# Patient Record
Sex: FEMALE | Race: WHITE | NOT HISPANIC OR LATINO | Employment: OTHER | URBAN - METROPOLITAN AREA
[De-identification: names, ages, dates, MRNs, and addresses within clinical notes are randomized per-mention and may not be internally consistent; named-entity substitution may affect disease eponyms.]

---

## 2017-02-27 ENCOUNTER — HOSPITAL ENCOUNTER (EMERGENCY)
Facility: OTHER | Age: 72
Discharge: HOME | End: 2017-02-27
Attending: EMERGENCY MEDICINE
Payer: MEDICARE

## 2017-02-27 VITALS
RESPIRATION RATE: 18 BRPM | OXYGEN SATURATION: 100 % | SYSTOLIC BLOOD PRESSURE: 115 MMHG | DIASTOLIC BLOOD PRESSURE: 72 MMHG | WEIGHT: 160 LBS | TEMPERATURE: 97 F | BODY MASS INDEX: 28.35 KG/M2 | HEART RATE: 66 BPM | HEIGHT: 63 IN

## 2017-02-27 DIAGNOSIS — R14.2 BELCHING: ICD-10-CM

## 2017-02-27 DIAGNOSIS — R42 VERTIGO: Primary | ICD-10-CM

## 2017-02-27 LAB
ALBUMIN SERPL-MCNC: 3.6 G/DL (ref 3.3–5.5)
ALP SERPL-CCNC: 60 U/L (ref 42–141)
BILIRUB SERPL-MCNC: 0.7 MG/DL (ref 0.2–1.6)
BUN SERPL-MCNC: 15 MG/DL (ref 7–22)
CALCIUM SERPL-MCNC: 9.1 MG/DL (ref 8–10.3)
CHLORIDE SERPL-SCNC: 103 MMOL/L (ref 98–108)
CREAT SERPL-MCNC: 0.7 MG/DL (ref 0.6–1.2)
GLUCOSE SERPL-MCNC: 146 MG/DL (ref 73–118)
POC ALT (SGPT): 20 (ref 10–47)
POC AST (SGOT): 23 (ref 11–38)
POC CARDIAC TROPONIN I: 0 NG/ML
POC TCO2: 25 (ref 18–33)
POTASSIUM BLD-SCNC: 4.1 MMOL/L (ref 3.6–5.1)
PROTEIN, POC: 7 (ref 6.4–8.1)
SAMPLE: NORMAL
SODIUM BLD-SCNC: 146 MMOL/L (ref 128–145)

## 2017-02-27 PROCEDURE — 25000003 PHARM REV CODE 250: Performed by: EMERGENCY MEDICINE

## 2017-02-27 PROCEDURE — 84484 ASSAY OF TROPONIN QUANT: CPT

## 2017-02-27 PROCEDURE — 96374 THER/PROPH/DIAG INJ IV PUSH: CPT

## 2017-02-27 PROCEDURE — 63600175 PHARM REV CODE 636 W HCPCS: Performed by: EMERGENCY MEDICINE

## 2017-02-27 PROCEDURE — 80053 COMPREHEN METABOLIC PANEL: CPT

## 2017-02-27 PROCEDURE — 99283 EMERGENCY DEPT VISIT LOW MDM: CPT

## 2017-02-27 PROCEDURE — 99284 EMERGENCY DEPT VISIT MOD MDM: CPT | Mod: 25

## 2017-02-27 PROCEDURE — 85025 COMPLETE CBC W/AUTO DIFF WBC: CPT

## 2017-02-27 RX ORDER — MECLIZINE HYDROCHLORIDE 25 MG/1
25 TABLET ORAL 3 TIMES DAILY PRN
Qty: 20 TABLET | Refills: 0 | Status: SHIPPED | OUTPATIENT
Start: 2017-02-27 | End: 2017-08-16

## 2017-02-27 RX ORDER — LIDOCAINE HYDROCHLORIDE 20 MG/ML
SOLUTION OROPHARYNGEAL
Status: DISCONTINUED
Start: 2017-02-27 | End: 2017-02-27 | Stop reason: WASHOUT

## 2017-02-27 RX ORDER — MECLIZINE HYDROCHLORIDE 25 MG/1
25 TABLET ORAL
Status: COMPLETED | OUTPATIENT
Start: 2017-02-27 | End: 2017-02-27

## 2017-02-27 RX ORDER — DICYCLOMINE HYDROCHLORIDE 10 MG/5ML
SOLUTION ORAL
Status: DISCONTINUED
Start: 2017-02-27 | End: 2017-02-27 | Stop reason: HOSPADM

## 2017-02-27 RX ORDER — ONDANSETRON 4 MG/1
4 TABLET, ORALLY DISINTEGRATING ORAL EVERY 8 HOURS PRN
Qty: 14 TABLET | Refills: 1 | Status: SHIPPED | OUTPATIENT
Start: 2017-02-27 | End: 2017-08-16

## 2017-02-27 RX ORDER — MAG HYDROX/ALUMINUM HYD/SIMETH 200-200-20
SUSPENSION, ORAL (FINAL DOSE FORM) ORAL
Status: DISCONTINUED
Start: 2017-02-27 | End: 2017-02-27 | Stop reason: WASHOUT

## 2017-02-27 RX ORDER — ONDANSETRON 2 MG/ML
4 INJECTION INTRAMUSCULAR; INTRAVENOUS
Status: COMPLETED | OUTPATIENT
Start: 2017-02-27 | End: 2017-02-27

## 2017-02-27 RX ADMIN — ONDANSETRON 4 MG: 2 INJECTION, SOLUTION INTRAMUSCULAR; INTRAVENOUS at 08:02

## 2017-02-27 RX ADMIN — MECLIZINE HYDROCHLORIDE 25 MG: 25 TABLET ORAL at 08:02

## 2017-02-27 NOTE — DISCHARGE INSTRUCTIONS
Managing Dizziness (Vertigo) with Medicines    Although medicines can't cure your problem, they can help control symptoms. Your doctor may prescribe medicines for a few weeks and then taper them off. Always take your medicine as prescribed. Never share your medicine with others.  Contact your healthcare provider right away if you have side effects from your medicines.   How medicines can help  · Treat infection or inflammation. If you have an infection caused by bacteria, your doctor can prescribe antibiotics.  · Limit conflicting balance signals. These medicines are often in pill form.  · Ease nausea. Suppositories, pills, or shots can reduce vomiting.  · Reduce pressure in the canals. Diuretics can be used to treat Meniere's disease. These medicines help your body get rid of extra fluid.  · Ease other symptoms. Other medicines can help ease depression and anxiety caused by living with dizziness or fainting.  Date Last Reviewed: 11/1/2016  © 7469-1175 [employer] [address] [place] [address], [place] [address]. All rights reserved. This information is not intended as a substitute for professional medical care. Always follow your healthcare professional's instructions.

## 2017-02-27 NOTE — ED AVS SNAPSHOT
[facility] EMERGENCY DEPARTMENT  [address] [facility] [address]               Deepali Alex   2017  7:39 AM   ED    Description:  Female : 1945   Department:  [facility] Emergency Department           Your Care was Coordinated By:     Provider Role From To    Barbra Escobar MD Attending Provider 17 0748 --      Reason for Visit     Dizziness           Diagnoses this Visit        Comments    Vertigo    -  Primary     Belching           ED Disposition     None           To Do List           Follow-up Information     Follow up with Bal Pathak MD.    Specialty:  Family Medicine    Contact information:    [address]  [facility] [address]  [phone]          Follow up with [facility] Emergency Department.    Specialty:  Emergency Medicine    Why:  If symptoms worsen    Contact information:    [address] [facility] [address]  [phone]       These Medications        Disp Refills Start End    meclizine (ANTIVERT) 25 mg tablet 20 tablet 0 2017     Take 1 tablet (25 mg total) by mouth 3 (three) times daily as needed. - Oral    Pharmacy: [facility] [place] Ph #: [phone]       ondansetron (ZOFRAN-ODT) 4 MG TbDL 14 tablet 1 2017     Take 1 tablet (4 mg total) by mouth every 8 (eight) hours as needed. - Oral    Ph[facility] [place] Ph #: [phone]         [facility] On Call     [facility] On Call Nurse Care Line -  Assistance  Registered nurses in the [facility] provide clinical advisement, health education, appointment booking, and other advisory services.  Call for this free service at [phone].             Medications           Message regarding Medications     Verify the changes and/or additions to your medication regime listed below are the same as discussed with your clinician today.  If any of these  changes or additions are incorrect, please notify your healthcare provider.        START taking these NEW medications        Refills    meclizine (ANTIVERT) 25 mg tablet 0    Sig: Take 1 tablet (25 mg total) by mouth 3 (three) times daily as needed.    Class: Print    Route: Oral    ondansetron (ZOFRAN-ODT) 4 MG TbDL 1    Sig: Take 1 tablet (4 mg total) by mouth every 8 (eight) hours as needed.    Class: Print    Route: Oral      These medications were administered today        Dose Freq    meclizine tablet 25 mg 25 mg ED 1 Time    Sig: Take 1 tablet (25 mg total) by mouth ED 1 Time.    Class: Normal    Route: Oral    GI cocktail (mylanta 30 mL, lidocaine 2 % viscous 10 mL, dicyclomine 10 mL) 50 mL  ED 1 Time    Sig: Take by mouth ED 1 Time.    Class: Normal    Route: Oral    Non-formulary Exception Code: Defer to pharmacy    ondansetron injection 4 mg 4 mg ED 1 Time    Sig: Inject 4 mg into the vein ED 1 Time.    Class: Normal    Route: Intravenous    aluminum-magnesium hydroxide-simethicone (MAALOX) 200-200-20 mg/5 mL suspension      Notes to Pharmacy: Created by cabinet override    dicyclomine (BENTYL) 10 mg/5 mL syrup      Notes to Pharmacy: Created by cabinet override    lidocaine HCl 2% (XYLOCAINE) 2 % oral solution      Notes to Pharmacy: Created by cabinet override           Verify that the below list of medications is an accurate representation of the medications you are currently taking.  If none reported, the list may be blank. If incorrect, please contact your healthcare provider. Carry this list with you in case of emergency.           Current Medications     aluminum-magnesium hydroxide-simethicone (MAALOX) 200-200-20 mg/5 mL suspension     dicyclomine (BENTYL) 10 mg/5 mL syrup     GI cocktail (mylanta 30 mL, lidocaine 2 % viscous 10 mL, dicyclomine 10 mL) 50 mL Take by mouth ED 1 Time.    lidocaine HCl 2% (XYLOCAINE) 2 % oral solution     meclizine (ANTIVERT) 25 mg tablet Take 1 tablet (25 mg total)  by mouth 3 (three) times daily as needed.    meclizine tablet 25 mg Take 1 tablet (25 mg total) by mouth ED 1 Time.    ondansetron (ZOFRAN-ODT) 4 MG TbDL Take 1 tablet (4 mg total) by mouth every 8 (eight) hours as needed.           Clinical Reference Information           Your Vitals Were     BP                   115/72 (Patient Position: Standing)           Allergies as of 2/27/2017        Reactions    Erythromycin (Bulk)     Other reaction(s): Eye irritation      Immunizations Administered on Date of Encounter - 2/27/2017     None      ED Micro, Lab, POCT     Start Ordered       Status Ordering Provider    02/27/17 0820 02/27/17 0820  Troponin ISTAT  Once      Final result     02/27/17 0811 02/27/17 0811  POCT CMP  Once      Final result     02/27/17 0806 02/27/17 0805  POCT URINALYSIS W/O SCOPE  Once      Acknowledged     02/27/17 0801 02/27/17 0801  POCT CBC  Once      Final result     02/27/17 0801 02/27/17 0801  POCT CMP  Once      Completed     02/27/17 0801 02/27/17 0801  POCT Troponin  Once      Completed     02/27/17 0801 02/27/17 0801    STAT,   Status:  Canceled      Canceled     02/27/17 0801 02/27/17 0801    Once,   Status:  Canceled      Canceled       ED Imaging Orders     None        Discharge Instructions         Managing Dizziness (Vertigo) with Medicines    Although medicines can't cure your problem, they can help control symptoms. Your doctor may prescribe medicines for a few weeks and then taper them off. Always take your medicine as prescribed. Never share your medicine with others.  Contact your healthcare provider right away if you have side effects from your medicines.   How medicines can help  · Treat infection or inflammation. If you have an infection caused by bacteria, your doctor can prescribe antibiotics.  · Limit conflicting balance signals. These medicines are often in pill form.  · Ease nausea. Suppositories, pills, or shots can reduce vomiting.  · Reduce pressure in the canals.  Diuretics can be used to treat Meniere's disease. These medicines help your body get rid of extra fluid.  · Ease other symptoms. Other medicines can help ease depression and anxiety caused by living with dizziness or fainting.  Date Last Reviewed: 11/1/2016  © 1024-0337 [employer]. [address] [place] [address], [place] [address]. All rights reserved. This information is not intended as a substitute for professional medical care. Always follow your healthcare professional's instructions.           [facility] Emergency Department complies with applicable Federal civil rights laws and does not discriminate on the basis of race, color, national origin, age, disability, or sex.        Language Assistance Services     ATTENTION: Language assistance services are available, free of charge. Please call [phone].      ATENCIÓN: Si jeri renita, tiene a scott disposición servicios gratuitos de asistencia lingüística. Llame al [phone].     MERNA Ý: N?u b?n nói Ti?ng Vi?t, có các d?ch v? h? tr? ngôn ng? mi?n phí dành cho b?n. G?i s? [phone].

## 2017-02-27 NOTE — ED PROVIDER NOTES
"Encounter Date: 2/27/2017       History     Chief Complaint   Patient presents with    Dizziness     Pt here with c/o dizziness along with nausea     Review of patient's allergies indicates:   Allergen Reactions    Erythromycin (bulk)      Other reaction(s): Eye irritation     The history is provided by the patient.    this is a 71-year-old lady who awoke around 6 AM and said that she felt dizzy.  Patient says that she  got up to walk to the bathroom and felt only slightly off balance.  While in the bathroom patient says she became nauseated and "was sweaty."  This lasted for about 5-10 minutes and resolved.  She denied chest pain but says that she has been "belching  and has a sour taste in the back of her throat she is no longer dizzy but is slightly nauseated.  She took Benadryl a few minutes after the symptoms began and felt better.  She says that she has dizzy spells every once in a while.  The last one was almost a year ago.  She denies headache".  No change in vision. Patient's symptoms worsen when she walks.  She denies abdominal pain or recent illness.  No nasal congestion   Past Medical History:   Diagnosis Date    Hyperlipidemia      Past Surgical History:   Procedure Laterality Date    ECTOPIC PREGNANCY SURGERY      HERNIA REPAIR      HYSTERECTOMY       Family History   Problem Relation Age of Onset    Cancer Mother     Breast cancer Mother 69    Heart disease Father     Breast cancer Sister 72    Breast cancer Maternal Aunt 70    Ovarian cancer Neg Hx      Social History   Substance Use Topics    Smoking status: Never Smoker    Smokeless tobacco: Never Used    Alcohol use No     Review of Systems   Constitutional: Positive for diaphoresis. Negative for fever.   HENT: Negative for sore throat.    Eyes: Negative for visual disturbance.   Respiratory: Negative for shortness of breath.    Cardiovascular: Negative for chest pain.   Gastrointestinal: Positive for nausea. Negative for abdominal " pain.         Belching   Genitourinary: Negative for dysuria.   Musculoskeletal: Negative for back pain.   Skin: Negative for rash.   Neurological: Positive for dizziness. Negative for headaches.       Physical Exam   Initial Vitals   BP Pulse Resp Temp SpO2   02/27/17 0746 02/27/17 0746 02/27/17 0746 02/27/17 0746 02/27/17 0746   146/78 67 18 96.7 °F (35.9 °C) 100 %     Physical Exam    Nursing note and vitals reviewed.  Constitutional: She appears well-developed and well-nourished.   HENT:   Head: Normocephalic and atraumatic.   Right Ear: Tympanic membrane normal.   Left Ear: Tympanic membrane normal.   Eyes: Conjunctivae and EOM are normal. Pupils are equal, round, and reactive to light.   Neck: Normal range of motion. Neck supple.   Cardiovascular: Normal rate and regular rhythm.   Pulmonary/Chest: Breath sounds normal.   Abdominal: Soft. There is no tenderness. There is no rebound and no guarding.   Musculoskeletal: Normal range of motion.   Neurological: She is alert and oriented to person, place, and time. She has normal strength. No cranial nerve deficit.   Good finger to nose, normal rapid alternating hand movements   Skin: Skin is warm and dry.   Psychiatric: She has a normal mood and affect.         ED Course   Procedures  Labs Reviewed   URINALYSIS   POCT CBC   POCT CMP   POCT TROPONIN   POCT TROPONIN     EKG Readings: (Independently Interpreted)   Normal sinus rhythm, heart rate 63, nonspecific ST abnormalities, no ST segment elevation, normal RI, normal QT, left axis          Medical Decision Making:   Initial Assessment:   This is a 71-year-old lady who complains of dizziness.  Patient felt dizzy at home.  She was concerned that her blood pressure was high.  She checked her blood pressure at home and her systolic was 100.  She has no neurological deficits on exam.  She said that she has had similar episodes in the past.  ED Management:  Patient's EKG shows no evidence of STEMI.  She did have a  decrease in her blood pressure when she stood up but her pulse had little change.  Patient will be evaluated with cardiac enzymes and baseline labs.  I do not feel that a CT is necessary as patient symptoms are almost completely resolved and she has no neurological deficits.  Patient's symptoms completely resolved in the emergency department.  Labs were without significant abnormalities.  She declined a GI cocktail and wanted to be discharged.  She appears well at discharge.  She was prescribed meclizine and Zofran.                   ED Course     Clinical Impression:   The primary encounter diagnosis was Vertigo. A diagnosis of Belching was also pertinent to this visit.          Barbra Escobar MD  02/27/17 1129

## 2017-02-27 NOTE — ED NOTES
"Pt refused GI cocktail. States having no GI or nausea issues at present and "feels better" Dr Escobar notified.   "

## 2017-08-16 ENCOUNTER — OFFICE VISIT (OUTPATIENT)
Dept: FAMILY MEDICINE | Facility: CLINIC | Age: 72
End: 2017-08-16
Payer: MEDICARE

## 2017-08-16 ENCOUNTER — HOSPITAL ENCOUNTER (OUTPATIENT)
Dept: RADIOLOGY | Facility: HOSPITAL | Age: 72
Discharge: HOME | End: 2017-08-16
Attending: INTERNAL MEDICINE
Payer: MEDICARE

## 2017-08-16 VITALS
HEIGHT: 63 IN | WEIGHT: 157.19 LBS | DIASTOLIC BLOOD PRESSURE: 78 MMHG | TEMPERATURE: 99 F | HEART RATE: 70 BPM | BODY MASS INDEX: 27.85 KG/M2 | SYSTOLIC BLOOD PRESSURE: 126 MMHG | OXYGEN SATURATION: 97 %

## 2017-08-16 DIAGNOSIS — E78.5 HYPERLIPIDEMIA, UNSPECIFIED HYPERLIPIDEMIA TYPE: ICD-10-CM

## 2017-08-16 DIAGNOSIS — Z00.00 ROUTINE MEDICAL EXAM: Primary | ICD-10-CM

## 2017-08-16 DIAGNOSIS — Z12.11 COLON CANCER SCREENING: ICD-10-CM

## 2017-08-16 DIAGNOSIS — E66.3 OVERWEIGHT (BMI 25.0-29.9): ICD-10-CM

## 2017-08-16 DIAGNOSIS — Z13.1 SCREENING FOR DIABETES MELLITUS: ICD-10-CM

## 2017-08-16 DIAGNOSIS — E34.9 ENDOCRINE DISORDER: ICD-10-CM

## 2017-08-16 DIAGNOSIS — Z12.31 ENCOUNTER FOR SCREENING MAMMOGRAM FOR BREAST CANCER: ICD-10-CM

## 2017-08-16 DIAGNOSIS — J30.9 ALLERGIC RHINITIS, UNSPECIFIED CHRONICITY, UNSPECIFIED SEASONALITY, UNSPECIFIED TRIGGER: ICD-10-CM

## 2017-08-16 DIAGNOSIS — R42 INTERMITTENT VERTIGO: ICD-10-CM

## 2017-08-16 PROCEDURE — 99999 PR PBB SHADOW E&M-EST. PATIENT-LVL IV: CPT | Mod: PBBFAC,,, | Performed by: INTERNAL MEDICINE

## 2017-08-16 PROCEDURE — 71020 XR CHEST PA AND LATERAL: CPT | Mod: 26,,, | Performed by: RADIOLOGY

## 2017-08-16 PROCEDURE — 71020 XR CHEST PA AND LATERAL: CPT | Mod: TC,PO

## 2017-08-16 PROCEDURE — 99397 PER PM REEVAL EST PAT 65+ YR: CPT | Mod: S$PBB,,, | Performed by: INTERNAL MEDICINE

## 2017-08-16 PROCEDURE — 99214 OFFICE O/P EST MOD 30 MIN: CPT | Mod: PBBFAC,25,PO | Performed by: INTERNAL MEDICINE

## 2017-08-16 NOTE — PROGRESS NOTES
HISTORY OF PRESENT ILLNESS:  Deepali Alex is a 72 y.o. female who presents to the clinic today for a routine medical physical exam. Her last physical exam was approximately 1 years(s) ago.        PAST MEDICAL HISTORY:  Past Medical History:   Diagnosis Date    Hyperlipidemia        PAST SURGICAL HISTORY:  Past Surgical History:   Procedure Laterality Date    ECTOPIC PREGNANCY SURGERY      HERNIA REPAIR      left inguinal    HYSTERECTOMY  1980    without BSO       SOCIAL HISTORY:  Social History     Social History    Marital status:      Spouse name: N/A    Number of children: 2    Years of education: N/A     Occupational History    retired      Social History Main Topics    Smoking status: Never Smoker    Smokeless tobacco: Never Used    Alcohol use No    Drug use: No    Sexual activity: Not Currently     Other Topics Concern    Not on file     Social History Narrative    No narrative on file       FAMILY HISTORY:  Family History   Problem Relation Age of Onset    Breast cancer Mother 69    Seizures Mother     Heart disease Father     Heart attack Father     Depression Father     Breast cancer Sister 72    Breast cancer Maternal Aunt 70    Obesity Brother     Mental illness Paternal Grandmother     Depression Paternal Aunt     Depression Paternal Uncle     Tongue cancer Cousin     Ovarian cancer Neg Hx        ALLERGIES AND MEDICATIONS: updated and reviewed.  Review of patient's allergies indicates:   Allergen Reactions    Erythromycin (bulk)      Other reaction(s): Eye irritation     Medication List with Changes/Refills   Discontinued Medications    MECLIZINE (ANTIVERT) 25 MG TABLET    Take 1 tablet (25 mg total) by mouth 3 (three) times daily as needed.    ONDANSETRON (ZOFRAN-ODT) 4 MG TBDL    Take 1 tablet (4 mg total) by mouth every 8 (eight) hours as needed.             SCREENING HISTORY:  Health Maintenance       Date Due Completion Date    Fecal Occult Blood Test  "(FOBT)/[employer] 03/05/1997 ---    DEXA SCAN 06/01/2010 6/1/2007 (Done)    Override on 6/1/2007: Done    Influenza Vaccine 08/01/2017 10/11/2016    Mammogram 07/07/2017 7/7/2016    Lipid Panel 07/21/2020 7/21/2015    TETANUS VACCINE 06/28/2026 6/28/2016            REVIEW OF SYSTEMS:   The patient reports good dietary habits.  The patient does not exercise regularly, but stays active.  General ROS: positive for  - mild difficulty sleeping at night  negative for - chills, fever or malaise  Psychological ROS: negative for - anxiety, depression, memory difficulties, obsessive thoughts, or suicidal ideation  Ophthalmic ROS: negative for - blurry vision or eye pain  ENT ROS: negative for - epistaxis, headaches, nasal congestion, oral lesions or sore throat  Allergy and Immunology ROS: negative for - hives  Hematological and Lymphatic ROS: negative for - swollen lymph nodes  Endocrine ROS: negative for - polydipsia/polyuria or temperature intolerance  Respiratory ROS: no cough, shortness of breath, or wheezing  Cardiovascular ROS: no chest pain or dyspnea on exertion  Gastrointestinal ROS: no abdominal pain, change in bowel habits, or black or bloody stools  Genito-Urinary ROS: no dysuria, trouble voiding, or hematuria (has intermittent uti's - asymptomatic today)  Breast ROS: negative for breast lumps  Musculoskeletal ROS: negative for - gait disturbance, joint swelling, muscle pain or muscular weakness  Neurological ROS: no TIA or stroke symptoms  Dermatological ROS: negative for mole changes and rash    Physical Examination:   Vitals:    08/16/17 0952   BP: 126/78   Pulse: 70   Temp: 99.3 °F (37.4 °C)     Weight: 71.3 kg (157 lb 3 oz)   Height: 5' 3" (160 cm)   Body mass index is 27.84 kg/m².    General appearance - alert, well appearing, and in no distress and overweight  Mental status - alert, oriented to person, place, and time, normal mood, behavior, speech, dress, motor activity, and thought processes  Eyes - pupils " equal and reactive, extraocular eye movements intact, sclera anicteric  Mouth - mucous membranes moist, pharynx normal without lesions  Neck - supple, no significant adenopathy, carotids upstroke normal bilaterally, no bruits, thyroid exam: thyroid is normal in size without nodules or tenderness  Lymphatics - no palpable lymphadenopathy  Chest - clear to auscultation, no wheezes, rales or rhonchi, symmetric air entry  Heart - normal rate and regular rhythm, no gallops noted  Abdomen - soft, nontender, nondistended, no masses or organomegaly  Breasts - not examined  Back exam - full range of motion, no tenderness, palpable spasm or pain on motion  Neurological - alert, oriented, normal speech, no focal findings or movement disorder noted, cranial nerves II through XII intact  Musculoskeletal - no muscular tenderness noted, Mild osteoarthritic changes noted to both knee joints. No joint effusions noted.   Extremities - pedal edema trace +, intact peripheral pulses, varicose veins noted  Skin - normal coloration and turgor, no rashes, no suspicious skin lesions noted      ASSESSMENT AND PLAN:  1. Routine medical exam  Counseled on age appropriate medical preventative services including age appropriate cancer screenings, age appropriate eye and dental exams, over all nutritional health, need for a consistent exercise regimen, and an over all push towards maintaining a vigorous and active lifestyle.  Counseled on age appropriate vaccines and discussed upcoming health care needs based on age/gender. Discussed good sleep hygiene and stress management.   - CBC auto differential; Future  - Comprehensive metabolic panel; Future  - Lipid panel; Future  - Hemoglobin A1c; Future    2. Hyperlipidemia, unspecified hyperlipidemia type  We discussed low fat diet and regular exercise.  She is not currently on a statin.  Per the CV risk calculator she probably should be.  Patient wishes to keep this under consideration at this time  but is not interested in starting.  There is no indication for her to be on an aspirin a day.  - CBC auto differential  - Comprehensive metabolic panel  - Lipid panel  - X-Ray Chest PA And Lateral; Future    3. Allergic rhinitis, unspecified chronicity, unspecified seasonality, unspecified trigger  We discussed several treatment strategies: antihistamine at bedtime, flonase in the morning. We also discussed saline nasal rinse in the evening as needed. I recommended allergy covers for pillow and mattress. Patient will let me know if symptoms worsen or persist.     4. Intermittent vertigo  This happens may be a few times a year.  Likely due to ALLERGIES.  Observe.    5. Overweight (BMI 25.0-29.9)  The patient is asked to make an attempt to improve diet and exercise patterns to aid in medical management of this problem.     6. Screening for diabetes mellitus  Asymptomatic.  I will screen for diabetes.  - Hemoglobin A1c    7. Endocrine disorder    - DXA Bone Density Spine And Hip; Future    8. Colon cancer screening    - Occult blood x 1, stool; Future  - Occult blood x 1, stool; Future  - Occult blood x 1, stool; Future    9. Encounter for screening mammogram for breast cancer    - Mammo Digital Screening Bilat with CAD; Future          Return in about 1 year (around 8/16/2018), or if symptoms worsen or fail to improve, for annual exam. or sooner as needed.

## 2017-08-17 ENCOUNTER — HOSPITAL ENCOUNTER (OUTPATIENT)
Dept: RADIOLOGY | Facility: CLINIC | Age: 72
Discharge: HOME | End: 2017-08-17
Attending: INTERNAL MEDICINE
Payer: MEDICARE

## 2017-08-17 DIAGNOSIS — E34.9 ENDOCRINE DISORDER: ICD-10-CM

## 2017-08-17 PROCEDURE — 77080 DXA BONE DENSITY AXIAL: CPT | Mod: 26,,, | Performed by: INTERNAL MEDICINE

## 2017-08-17 PROCEDURE — 77080 DXA BONE DENSITY AXIAL: CPT | Mod: TC,PO

## 2017-08-19 LAB
ALBUMIN SERPL-MCNC: 4.2 G/DL (ref 3.6–5.1)
ALBUMIN/GLOB SERPL: 1.6 (CALC) (ref 1–2.5)
ALP SERPL-CCNC: 82 U/L (ref 33–130)
ALT SERPL-CCNC: 11 U/L (ref 6–29)
AST SERPL-CCNC: 14 U/L (ref 10–35)
BASOPHILS # BLD AUTO: 50 CELLS/UL (ref 0–200)
BASOPHILS NFR BLD AUTO: 0.8 %
BILIRUB SERPL-MCNC: 0.6 MG/DL (ref 0.2–1.2)
BUN SERPL-MCNC: 18 MG/DL (ref 7–25)
BUN/CREAT SERPL: NORMAL (CALC) (ref 6–22)
CALCIUM SERPL-MCNC: 9.2 MG/DL (ref 8.6–10.4)
CHLORIDE SERPL-SCNC: 103 MMOL/L (ref 98–110)
CHOLEST SERPL-MCNC: 217 MG/DL (ref 125–200)
CHOLEST/HDLC SERPL: 3.4 (CALC)
CO2 SERPL-SCNC: 30 MMOL/L (ref 20–31)
CREAT SERPL-MCNC: 0.62 MG/DL (ref 0.6–0.93)
EOSINOPHIL # BLD AUTO: 99 CELLS/UL (ref 15–500)
EOSINOPHIL NFR BLD AUTO: 1.6 %
ERYTHROCYTE [DISTWIDTH] IN BLOOD BY AUTOMATED COUNT: 12.8 % (ref 11–15)
GFR SERPL CREATININE-BSD FRML MDRD: 91 ML/MIN/1.73M2
GLOBULIN SER CALC-MCNC: 2.7 G/DL (CALC) (ref 1.9–3.7)
GLUCOSE SERPL-MCNC: 92 MG/DL (ref 65–99)
HBA1C MFR BLD: 5.5 % OF TOTAL HGB
HCT VFR BLD AUTO: 37.6 % (ref 35–45)
HDLC SERPL-MCNC: 64 MG/DL
HGB BLD-MCNC: 12.7 G/DL (ref 11.7–15.5)
LDLC SERPL CALC-MCNC: 133 MG/DL (CALC)
LYMPHOCYTES # BLD AUTO: 1172 CELLS/UL (ref 850–3900)
LYMPHOCYTES NFR BLD AUTO: 18.9 %
MCH RBC QN AUTO: 30 PG (ref 27–33)
MCHC RBC AUTO-ENTMCNC: 33.8 G/DL (ref 32–36)
MCV RBC AUTO: 88.7 FL (ref 80–100)
MONOCYTES # BLD AUTO: 391 CELLS/UL (ref 200–950)
MONOCYTES NFR BLD AUTO: 6.3 %
NEUTROPHILS # BLD AUTO: 4489 CELLS/UL (ref 1500–7800)
NEUTROPHILS NFR BLD AUTO: 72.4 %
NONHDLC SERPL-MCNC: 153 MG/DL (CALC)
PLATELET # BLD AUTO: 268 THOUSAND/UL (ref 140–400)
PMV BLD REES-ECKER: 10.9 FL (ref 7.5–12.5)
POTASSIUM SERPL-SCNC: 4 MMOL/L (ref 3.5–5.3)
PROT SERPL-MCNC: 6.9 G/DL (ref 6.1–8.1)
RBC # BLD AUTO: 4.24 MILLION/UL (ref 3.8–5.1)
SODIUM SERPL-SCNC: 138 MMOL/L (ref 135–146)
TRIGL SERPL-MCNC: 99 MG/DL
WBC # BLD AUTO: 6.2 THOUSAND/UL (ref 3.8–10.8)

## 2017-08-21 ENCOUNTER — HOSPITAL ENCOUNTER (OUTPATIENT)
Dept: RADIOLOGY | Facility: HOSPITAL | Age: 72
Discharge: HOME | End: 2017-08-21
Attending: INTERNAL MEDICINE
Payer: MEDICARE

## 2017-08-21 VITALS — HEIGHT: 63 IN | BODY MASS INDEX: 27.82 KG/M2 | WEIGHT: 157 LBS

## 2017-08-21 DIAGNOSIS — Z12.31 ENCOUNTER FOR SCREENING MAMMOGRAM FOR BREAST CANCER: ICD-10-CM

## 2017-08-21 PROCEDURE — 77067 SCR MAMMO BI INCL CAD: CPT | Mod: TC

## 2017-08-21 PROCEDURE — 77063 BREAST TOMOSYNTHESIS BI: CPT | Mod: 26,,, | Performed by: RADIOLOGY

## 2017-08-21 PROCEDURE — 77067 SCR MAMMO BI INCL CAD: CPT | Mod: 26,,, | Performed by: RADIOLOGY

## 2017-10-17 ENCOUNTER — CLINICAL SUPPORT (OUTPATIENT)
Dept: FAMILY MEDICINE | Facility: CLINIC | Age: 72
End: 2017-10-17
Payer: MEDICARE

## 2017-10-17 DIAGNOSIS — Z23 NEED FOR INFLUENZA VACCINATION: Primary | ICD-10-CM

## 2017-10-17 PROCEDURE — G0008 ADMIN INFLUENZA VIRUS VAC: HCPCS | Mod: PBBFAC,PO | Performed by: INTERNAL MEDICINE

## 2017-10-17 PROCEDURE — 99499 UNLISTED E&M SERVICE: CPT | Mod: S$PBB,,, | Performed by: INTERNAL MEDICINE

## 2018-05-15 ENCOUNTER — PES CALL (OUTPATIENT)
Dept: ADMINISTRATIVE | Facility: CLINIC | Age: 73
End: 2018-05-15

## 2018-06-28 ENCOUNTER — OFFICE VISIT (OUTPATIENT)
Dept: FAMILY MEDICINE | Facility: CLINIC | Age: 73
End: 2018-06-28
Payer: MEDICARE

## 2018-06-28 VITALS
WEIGHT: 145.75 LBS | HEIGHT: 63 IN | BODY MASS INDEX: 25.82 KG/M2 | DIASTOLIC BLOOD PRESSURE: 74 MMHG | SYSTOLIC BLOOD PRESSURE: 138 MMHG

## 2018-06-28 DIAGNOSIS — I70.0 ATHEROSCLEROSIS OF AORTIC ARCH: ICD-10-CM

## 2018-06-28 DIAGNOSIS — Z12.39 SCREENING FOR MALIGNANT NEOPLASM OF BREAST: ICD-10-CM

## 2018-06-28 DIAGNOSIS — E78.5 HYPERLIPIDEMIA, UNSPECIFIED HYPERLIPIDEMIA TYPE: ICD-10-CM

## 2018-06-28 DIAGNOSIS — Z00.00 ENCOUNTER FOR PREVENTIVE HEALTH EXAMINATION: Primary | ICD-10-CM

## 2018-06-28 PROCEDURE — 99213 OFFICE O/P EST LOW 20 MIN: CPT | Mod: PBBFAC,PO | Performed by: NURSE PRACTITIONER

## 2018-06-28 PROCEDURE — 99999 PR PBB SHADOW E&M-EST. PATIENT-LVL III: CPT | Mod: PBBFAC,,, | Performed by: NURSE PRACTITIONER

## 2018-06-28 PROCEDURE — G0439 PPPS, SUBSEQ VISIT: HCPCS | Mod: ,,, | Performed by: NURSE PRACTITIONER

## 2018-06-30 NOTE — PROGRESS NOTES
"Deepali Alex presented for a  Medicare AWV and comprehensive Health Risk Assessment today. The following components were reviewed and updated:    · Medical history  · Family History  · Social history  · Allergies and Current Medications  · Health Risk Assessment  · Health Maintenance  · Care Team     ** See Completed Assessments for Annual Wellness Visit within the encounter summary.**       The following assessments were completed:  · Living Situation  · CAGE  · Depression Screening  · Timed Get Up and Go  · Whisper Test  · Cognitive Function Screening  · Nutrition Screening  · ADL Screening  · PAQ Screening    Vitals:    06/28/18 0919   BP: 138/74   BP Location: Left arm   Patient Position: Sitting   BP Method: Large (Manual)   Weight: 66.1 kg (145 lb 11.6 oz)   Height: 5' 3" (1.6 m)     Body mass index is 25.81 kg/m².  Physical Exam   Constitutional: She is oriented to person, place, and time.   Cardiovascular: Normal rate and regular rhythm.    Pulmonary/Chest: Effort normal and breath sounds normal.   Musculoskeletal: Normal range of motion.   Neurological: She is alert and oriented to person, place, and time.   Vitals reviewed.        Diagnoses and health risks identified today and associated recommendations/orders:    1. Encounter for preventive health examination  Education provided about preventive health examinations and procedures; addressed and discussed patient's health concerns. Additionally, reviewed medical record for risk factors and documented the results during this encounter.    2. Atherosclerosis of aortic arch  Stable, asymptomatic; monitor.     3. Hyperlipidemia, unspecified hyperlipidemia type  We discussed cholesterol, diet, family history, and exercise.     4. Screening for malignant neoplasm of breast  - Mammo Digital Screening Bilateral With CAD; Future    Reviewed health maintenance with patient, educated about recommended examinations, procedures (labs & images), and immunizations. "     Provided Deepali with a 5-10 year written screening schedule and personal prevention plan. Recommendations were developed using the USPSTF age appropriate recommendations. Education, counseling, and referrals were provided as needed. After Visit Summary printed and given to patient which includes a list of additional screenings\tests needed.    Follow-up in about 1 year (around 6/28/2019) for assessment .    Chava Fung Jr, NP

## 2018-06-30 NOTE — PATIENT INSTRUCTIONS
Counseling and Referral of Other Preventative  (Italic type indicates deductible and co-insurance are waived)    Patient Name: Deepali Alex  Today's Date: 6/30/2018    Health Maintenance       Date Due Completion Date    High Dose Statin 03/05/1966 ---    Mammogram 08/21/2018 8/21/2017    Influenza Vaccine 08/01/2018 10/17/2017    Fecal Occult Blood Test (FOBT)/FitKit 08/21/2018 8/21/2017    DEXA SCAN 08/17/2020 8/17/2017    Override on 6/1/2007: Done    Lipid Panel 08/18/2022 8/18/2017    TETANUS VACCINE 06/28/2026 6/28/2016        No orders of the defined types were placed in this encounter.    The following information is provided to all patients.  This information is to help you find resources for any of the problems found today that may be affecting your health:                Living healthy guide: www.[facility].[place].gov      Understanding Diabetes: www.diabetes.org      Eating healthy: www.cdc.gov/healthyweight      [facility] home safety checklist: www.cdc.gov/steadi/patient.html      Agency on Aging: www.goea.[place].[facility]      Alcoholics anonymous (AA): www.aa.org      Physical Activity: www.jatinder.nih.gov/cd6cjqx      Tobacco use: www.quitwithusla.org

## 2018-08-22 ENCOUNTER — TELEPHONE (OUTPATIENT)
Dept: FAMILY MEDICINE | Facility: CLINIC | Age: 73
End: 2018-08-22

## 2018-08-22 DIAGNOSIS — Z12.11 ENCOUNTER FOR FIT (FECAL IMMUNOCHEMICAL TEST) SCREENING: Primary | ICD-10-CM

## 2018-08-22 RX ORDER — NEOMYCIN SULFATE, POLYMYXIN B SULFATE AND DEXAMETHASONE 3.5; 10000; 1 MG/ML; [USP'U]/ML; MG/ML
SUSPENSION/ DROPS OPHTHALMIC
COMMUNITY
Start: 2018-07-18 | End: 2020-01-29

## 2018-08-22 RX ORDER — OLOPATADINE HYDROCHLORIDE 2 MG/ML
SOLUTION/ DROPS OPHTHALMIC
COMMUNITY
Start: 2018-07-18 | End: 2020-01-29

## 2018-08-22 NOTE — TELEPHONE ENCOUNTER
Spoke with the pt, I explained the procedure for the fitkit.  She will pick the package up tomorrow, for 9 am.  I explained the importance of the sensitive to light and the collection date process.  Do not provide the specimen on Sunday.

## 2018-08-22 NOTE — TELEPHONE ENCOUNTER
----- Message from Jeannette Spain sent at 8/22/2018  2:22 PM CDT -----  Contact: self  [phone]  Pt seen Chava Fung in July , she wants to get the colon guard test, wants to know if you can order it . Pls call pt [phone]. Thanks.......Sabrina

## 2018-08-24 ENCOUNTER — HOSPITAL ENCOUNTER (OUTPATIENT)
Dept: RADIOLOGY | Facility: HOSPITAL | Age: 73
Discharge: HOME | End: 2018-08-24
Attending: NURSE PRACTITIONER
Payer: MEDICARE

## 2018-08-24 DIAGNOSIS — Z12.39 SCREENING FOR MALIGNANT NEOPLASM OF BREAST: ICD-10-CM

## 2018-08-24 PROCEDURE — 77067 SCR MAMMO BI INCL CAD: CPT | Mod: 26,,, | Performed by: RADIOLOGY

## 2018-08-24 PROCEDURE — 77063 BREAST TOMOSYNTHESIS BI: CPT | Mod: 26,,, | Performed by: RADIOLOGY

## 2018-08-24 PROCEDURE — 77067 SCR MAMMO BI INCL CAD: CPT | Mod: TC,PO

## 2018-08-29 ENCOUNTER — LAB VISIT (OUTPATIENT)
Dept: LAB | Facility: HOSPITAL | Age: 73
End: 2018-08-29
Attending: INTERNAL MEDICINE
Payer: MEDICARE

## 2018-08-29 DIAGNOSIS — Z12.11 ENCOUNTER FOR FIT (FECAL IMMUNOCHEMICAL TEST) SCREENING: ICD-10-CM

## 2018-08-29 PROCEDURE — 82274 ASSAY TEST FOR BLOOD FECAL: CPT

## 2018-08-30 LAB — HEMOCCULT STL QL IA: NEGATIVE

## 2019-09-23 NOTE — PROGRESS NOTES
This note was created by combination of typed  and Dragon dictation.  Transcription errors may be present.  If there are any questions, please contact me.    Assessment / Plan:   Normal physical exam    Atherosclerosis of aortic arch  Dyslipidemia  -talked about statin therapy.  Her 10 year risk score is just above 10%.  We talked about risks and benefits of medication therapy.  She opts not to initiate therapy at this time.  -     Cancel: CBC auto differential; Future; Expected date: 09/24/2019  -     Cancel: Comprehensive metabolic panel; Future; Expected date: 09/24/2019  -     Cancel: Lipid panel; Future; Expected date: 09/24/2019  -     Lipid panel; Future; Expected date: 09/24/2019  -     Comprehensive metabolic panel; Future; Expected date: 09/24/2019  -     CBC auto differential; Future; Expected date: 09/24/2019    Urge urinary incontinence  -check urinalysis evaluate for occult infection.  -     Cancel: Urinalysis; Future; Expected date: 09/24/2019  -     Cancel: Urinalysis; Future; Expected date: 09/24/2019  -     Urinalysis    Osteopenia, unspecified location  -check vitamin-D.  She is not taking any sort of vitamin-D supplement.  She does eat dairy.  -     Cancel: Vitamin D; Future; Expected date: 09/24/2019  -     Vitamin D; Future; Expected date: 09/24/2019    Encounter for screening mammogram for malignant neoplasm of breast  -mammogram ordered  -     Mammo Digital Screening Bilat; Future; Expected date: 09/24/2019    There are no discontinued medications.    meds sent this encounter:       Follow Up: No follow-ups on file. Plan for PEx 1 year.      Subjective:     Chief Complaint   Patient presents with    Annual Exam    Generalized Body Aches       HPI  Deepali is a 74 y.o. female, last appointment with this clinic was Visit date not found.    No LMP recorded. Patient has had a hysterectomy.    Former pt of Dr. Pathak. Saw Dr. Arce last but access issues.  Dyslipidemia, had been  recommended for statin, pt had declined at that time.     8/2018 fitkit negative  8/2017 lipid borderline    Aches and pains.  Back pain.  [facility], dx with [facility].  Self directed PT. More noticeable with sitting after a period of time and then getting active again.  PRN use of ibuprofen; too much causes GERD.   And pain in the feet - was dx'd with hallux rigidus on the left.  [facility].   Plantar fasciitis. But pain in the toes, pain with dorsiflexion - cramping sensation in the dorsum of the foot.   No joint swelling.   No neck, thoracic, hip, knee pain.  No finger pain.     Had UTI about a month ago. Notes some urinary incontinence. Urge incontinence more.  Slight.    We talked about statin therapy. Is reluctant to start therapy.  We talked about risk/benefits and she declines statin therapy.    Upcoming colonoscopy next week with [facility]. For BRBPR.     mammo ordered    The 10-year ASCVD risk score (Alisa DC Jr., et al., 2013) is: 10.7%    Values used to calculate the score:      Age: 74 years      Sex: Female      Is Non- : No      Diabetic: No      Tobacco smoker: No      Systolic Blood Pressure: 122 mmHg      Is BP treated: No      HDL Cholesterol: 64 mg/dL      Total Cholesterol: 217 mg/dL    Diet - whole grains. Adequate vegetables/fruit  Physical activity - less walking; yes gardening.   I printed some chair based exercises.    Answers for HPI/ROS submitted by the patient on 9/24/2019   activity change: No  unexpected weight change: No  rhinorrhea: No  trouble swallowing: No  visual disturbance: No  chest tightness: No  polyuria: No  difficulty urinating: No  menstrual problem: No  joint swelling: Yes  arthralgias: Yes  confusion: No  dysphoric mood: No        Patient Care Team:  Raul Rivera MD as PCP - General (Internal Medicine)  Neha Rawls OD as Consulting Physician (Optometry)  Jerson Weaver MD as Consulting Physician (Orthopedic  Surgery)    Patient Active Problem List    Diagnosis Date Noted    Osteopenia      8/2017 - no need for prescription medication at this time.      AR (allergic rhinitis) 08/16/2017    Intermittent vertigo 08/16/2017    Atherosclerosis of aortic arch      - noted on CXR 8/2017      Family history of breast cancer in first degree relative 07/21/2015    Recurrent urinary tract infection 05/02/2014    Hyperlipidemia 10/17/2013     - no need for prescription medication at this time.         PAST MEDICAL HISTORY:  Past Medical History:   Diagnosis Date    Atherosclerosis of aortic arch     - noted on CXR 8/2017    Back pain     Fibrocystic breast     Hyperlipidemia     Osteopenia        PAST SURGICAL HISTORY:  Past Surgical History:   Procedure Laterality Date    ECTOPIC PREGNANCY SURGERY      HERNIA REPAIR      left inguinal    HYSTERECTOMY  1980    without BSO       SOCIAL HISTORY:  Social History     Socioeconomic History    Marital status:      Spouse name: Not on file    Number of children: 2    Years of education: Not on file    Highest education level: Not on file   Occupational History    Occupation: retired   Social Needs    Financial resource strain: Not hard at all    Food insecurity:     Worry: Never true     Inability: Never true    Transportation needs:     Medical: No     Non-medical: No   Tobacco Use    Smoking status: Never Smoker    Smokeless tobacco: Never Used   Substance and Sexual Activity    Alcohol use: No     Frequency: Never     Binge frequency: Never    Drug use: No    Sexual activity: Not Currently   Lifestyle    Physical activity:     Days per week: 1 day     Minutes per session: 30 min    Stress: Only a little   Relationships    Social connections:     Talks on phone: More than three times a week     Gets together: More than three times a week     Attends Yazidism service: Not on file     Active member of club or organization: No     Attends meetings of  "clubs or organizations: Never     Relationship status:    Other Topics Concern    Not on file   Social History Narrative    Not on file        ALLERGIES AND MEDICATIONS: updated and reviewed.  Review of patient's allergies indicates:   Allergen Reactions    Erythromycin (bulk)      Other reaction(s): Eye irritation     Current Outpatient Medications   Medication Sig Dispense Refill    neomycin-polymyxin-dexamethasone (MAXITROL) 3.5mg/mL-10,000 unit/mL-0.1 % DrpS       olopatadine (PATADAY) 0.2 % Drop        No current facility-administered medications for this visit.        Review of Systems   HENT: Positive for hearing loss.    Eyes: Negative for discharge.   Respiratory: Negative for wheezing.    Cardiovascular: Negative for chest pain and palpitations.   Gastrointestinal: Negative for blood in stool, constipation, diarrhea and vomiting.   Genitourinary: Positive for hematuria. Negative for dysuria.   Musculoskeletal: Negative for neck pain.   Neurological: Positive for tingling. Negative for weakness and headaches.        Notes in the mornings sometimes a feeling of tingling in her lower legs, does not involve the feet, did not go higher than the knees, resolves after getting up   Endo/Heme/Allergies: Negative for polydipsia.       Objective:   Physical Exam   Vitals:    09/24/19 1000   BP: 122/70   BP Location: Left arm   Patient Position: Sitting   BP Method: Small (Manual)   Pulse: 73   Temp: 98.6 °F (37 °C)   TempSrc: Oral   SpO2: 98%   Weight: 70 kg (154 lb 5.2 oz)   Height: 5' 3" (1.6 m)    Body mass index is 27.34 kg/m².  Weight: 70 kg (154 lb 5.2 oz)   Height: 5' 3" (160 cm)     Physical Exam   Constitutional: She is oriented to person, place, and time. She appears well-developed and well-nourished.   HENT:   Right Ear: Tympanic membrane, external ear and ear canal normal.   Left Ear: Tympanic membrane, external ear and ear canal normal.   Mouth/Throat: Oropharynx is clear and moist.   Eyes: " Pupils are equal, round, and reactive to light. EOM are normal. No scleral icterus.   Neck: Neck supple. No thyromegaly present.   Cardiovascular: Normal rate, regular rhythm and normal heart sounds.   No murmur heard.  Pulmonary/Chest: Effort normal and breath sounds normal. She has no wheezes.   Abdominal: Soft. She exhibits no mass. There is no splenomegaly or hepatomegaly. There is no tenderness.   Musculoskeletal: Normal range of motion. She exhibits no edema or deformity.   Lymphadenopathy:     She has no cervical adenopathy.   Neurological: She is alert and oriented to person, place, and time. She has normal reflexes.   Skin: Skin is warm and dry. No rash noted.   On exposed skin   Psychiatric: She has a normal mood and affect. Her behavior is normal. Judgment and thought content normal.

## 2019-09-24 ENCOUNTER — OFFICE VISIT (OUTPATIENT)
Dept: FAMILY MEDICINE | Facility: CLINIC | Age: 74
End: 2019-09-24
Payer: MEDICARE

## 2019-09-24 VITALS
SYSTOLIC BLOOD PRESSURE: 122 MMHG | HEART RATE: 73 BPM | BODY MASS INDEX: 27.34 KG/M2 | WEIGHT: 154.31 LBS | HEIGHT: 63 IN | OXYGEN SATURATION: 98 % | DIASTOLIC BLOOD PRESSURE: 70 MMHG | TEMPERATURE: 99 F

## 2019-09-24 DIAGNOSIS — I70.0 ATHEROSCLEROSIS OF AORTIC ARCH: ICD-10-CM

## 2019-09-24 DIAGNOSIS — M85.80 OSTEOPENIA, UNSPECIFIED LOCATION: ICD-10-CM

## 2019-09-24 DIAGNOSIS — Z00.00 NORMAL PHYSICAL EXAM: Primary | ICD-10-CM

## 2019-09-24 DIAGNOSIS — N39.41 URGE URINARY INCONTINENCE: ICD-10-CM

## 2019-09-24 DIAGNOSIS — E78.5 DYSLIPIDEMIA: ICD-10-CM

## 2019-09-24 DIAGNOSIS — Z12.31 ENCOUNTER FOR SCREENING MAMMOGRAM FOR MALIGNANT NEOPLASM OF BREAST: ICD-10-CM

## 2019-09-24 PROCEDURE — 99397 PR PREVENTIVE VISIT,EST,65 & OVER: ICD-10-PCS | Mod: S$PBB,,, | Performed by: INTERNAL MEDICINE

## 2019-09-24 PROCEDURE — 99397 PER PM REEVAL EST PAT 65+ YR: CPT | Mod: S$PBB,,, | Performed by: INTERNAL MEDICINE

## 2019-09-24 PROCEDURE — 99213 OFFICE O/P EST LOW 20 MIN: CPT | Mod: PBBFAC,PO | Performed by: INTERNAL MEDICINE

## 2019-09-24 PROCEDURE — 99999 PR PBB SHADOW E&M-EST. PATIENT-LVL III: CPT | Mod: PBBFAC,,, | Performed by: INTERNAL MEDICINE

## 2019-09-24 PROCEDURE — 99999 PR PBB SHADOW E&M-EST. PATIENT-LVL III: ICD-10-PCS | Mod: PBBFAC,,, | Performed by: INTERNAL MEDICINE

## 2019-09-26 ENCOUNTER — HOSPITAL ENCOUNTER (OUTPATIENT)
Dept: RADIOLOGY | Facility: HOSPITAL | Age: 74
Discharge: HOME | End: 2019-09-26
Attending: INTERNAL MEDICINE
Payer: MEDICARE

## 2019-09-26 DIAGNOSIS — Z12.31 ENCOUNTER FOR SCREENING MAMMOGRAM FOR MALIGNANT NEOPLASM OF BREAST: ICD-10-CM

## 2019-09-26 PROCEDURE — 77067 MAMMO DIGITAL SCREENING BILAT WITH TOMOSYNTHESIS_CAD: ICD-10-PCS | Mod: 26,,, | Performed by: RADIOLOGY

## 2019-09-26 PROCEDURE — 77067 SCR MAMMO BI INCL CAD: CPT | Mod: 26,,, | Performed by: RADIOLOGY

## 2019-09-26 PROCEDURE — 77063 MAMMO DIGITAL SCREENING BILAT WITH TOMOSYNTHESIS_CAD: ICD-10-PCS | Mod: 26,,, | Performed by: RADIOLOGY

## 2019-09-26 PROCEDURE — 77067 SCR MAMMO BI INCL CAD: CPT | Mod: TC,PO

## 2019-09-26 PROCEDURE — 77063 BREAST TOMOSYNTHESIS BI: CPT | Mod: 26,,, | Performed by: RADIOLOGY

## 2019-09-27 DIAGNOSIS — Z12.11 COLON CANCER SCREENING: ICD-10-CM

## 2019-09-28 LAB
25(OH)D3 SERPL-MCNC: 21 NG/ML (ref 30–100)
ALBUMIN SERPL-MCNC: 4.2 G/DL (ref 3.6–5.1)
ALBUMIN/GLOB SERPL: 1.6 (CALC) (ref 1–2.5)
ALP SERPL-CCNC: 80 U/L (ref 33–130)
ALT SERPL-CCNC: 11 U/L (ref 6–29)
APPEARANCE UR: CLEAR
AST SERPL-CCNC: 14 U/L (ref 10–35)
BASOPHILS # BLD AUTO: 39 CELLS/UL (ref 0–200)
BASOPHILS NFR BLD AUTO: 0.7 %
BILIRUB SERPL-MCNC: 0.6 MG/DL (ref 0.2–1.2)
BILIRUB UR QL STRIP: NEGATIVE
BUN SERPL-MCNC: 17 MG/DL (ref 7–25)
BUN/CREAT SERPL: NORMAL (CALC) (ref 6–22)
CALCIUM SERPL-MCNC: 9.4 MG/DL (ref 8.6–10.4)
CHLORIDE SERPL-SCNC: 104 MMOL/L (ref 98–110)
CHOLEST SERPL-MCNC: 207 MG/DL
CHOLEST/HDLC SERPL: 3.2 (CALC)
CO2 SERPL-SCNC: 29 MMOL/L (ref 20–32)
COLOR UR: YELLOW
CREAT SERPL-MCNC: 0.7 MG/DL (ref 0.6–0.93)
EOSINOPHIL # BLD AUTO: 77 CELLS/UL (ref 15–500)
EOSINOPHIL NFR BLD AUTO: 1.4 %
ERYTHROCYTE [DISTWIDTH] IN BLOOD BY AUTOMATED COUNT: 12.8 % (ref 11–15)
GFRSERPLBLD MDRD-ARVRAT: 87 ML/MIN/1.73M2
GLOBULIN SER CALC-MCNC: 2.7 G/DL (CALC) (ref 1.9–3.7)
GLUCOSE SERPL-MCNC: 91 MG/DL (ref 65–99)
GLUCOSE UR QL STRIP: NEGATIVE
HCT VFR BLD AUTO: 40.2 % (ref 35–45)
HDLC SERPL-MCNC: 65 MG/DL
HGB BLD-MCNC: 12.8 G/DL (ref 11.7–15.5)
HGB UR QL STRIP: NEGATIVE
KETONES UR QL STRIP: NEGATIVE
LDLC SERPL CALC-MCNC: 124 MG/DL (CALC)
LEUKOCYTE ESTERASE UR QL STRIP: NEGATIVE
LYMPHOCYTES # BLD AUTO: 1430 CELLS/UL (ref 850–3900)
LYMPHOCYTES NFR BLD AUTO: 26 %
MCH RBC QN AUTO: 29.2 PG (ref 27–33)
MCHC RBC AUTO-ENTMCNC: 31.8 G/DL (ref 32–36)
MCV RBC AUTO: 91.6 FL (ref 80–100)
MONOCYTES # BLD AUTO: 380 CELLS/UL (ref 200–950)
MONOCYTES NFR BLD AUTO: 6.9 %
NEUTROPHILS # BLD AUTO: 3575 CELLS/UL (ref 1500–7800)
NEUTROPHILS NFR BLD AUTO: 65 %
NITRITE UR QL STRIP: NEGATIVE
NONHDLC SERPL-MCNC: 142 MG/DL (CALC)
PH UR STRIP: 7 [PH] (ref 5–8)
PLATELET # BLD AUTO: 266 THOUSAND/UL (ref 140–400)
PMV BLD REES-ECKER: 10.5 FL (ref 7.5–12.5)
POTASSIUM SERPL-SCNC: 4.7 MMOL/L (ref 3.5–5.3)
PROT SERPL-MCNC: 6.9 G/DL (ref 6.1–8.1)
PROT UR QL STRIP: NEGATIVE
RBC # BLD AUTO: 4.39 MILLION/UL (ref 3.8–5.1)
SODIUM SERPL-SCNC: 139 MMOL/L (ref 135–146)
SP GR UR STRIP: 1.01 (ref 1–1.03)
TRIGL SERPL-MCNC: 84 MG/DL
WBC # BLD AUTO: 5.5 THOUSAND/UL (ref 3.8–10.8)

## 2019-09-29 NOTE — PROGRESS NOTES
Vit D low increase OTC dose by 2000  Lipid borderline mainly due to demographics. She had declined statin at [facility]  CMP WNL  CBC WNL  UA normal. Had urinary c/o.  If persisting - would refer to urology    Results to pt via my [facility]. PEx 1 year.

## 2019-10-24 ENCOUNTER — PATIENT OUTREACH (OUTPATIENT)
Dept: ADMINISTRATIVE | Facility: HOSPITAL | Age: 74
End: 2019-10-24

## 2019-10-24 PROBLEM — K63.5 POLYP OF ASCENDING COLON: Status: ACTIVE | Noted: 2019-10-24

## 2019-10-31 ENCOUNTER — DOCUMENTATION ONLY (OUTPATIENT)
Dept: FAMILY MEDICINE | Facility: CLINIC | Age: 74
End: 2019-10-31

## 2019-10-31 DIAGNOSIS — D12.6 TUBULAR ADENOMA OF COLON: ICD-10-CM

## 2020-01-28 PROBLEM — K63.5 POLYP OF ASCENDING COLON: Status: RESOLVED | Noted: 2019-10-24 | Resolved: 2020-01-28

## 2020-01-28 NOTE — PROGRESS NOTES
This note was created by combination of typed  and M-Modal dictation.  Transcription errors may be present.  If there are any questions, please contact me.    Assessment / Plan:   Numbness of tongue  -  Normal exam.  No evidence of glossitis, ulceration.  Did not respond to PPI therapy.  CBC and CMP have previously been normal.    I will check a TSH and a B12.  If all normal she does not find it debilitating and is content to watch.  -     Vitamin B12; Future; Expected date: 01/29/2020  -     TSH; Future; Expected date: 01/29/2020    Vertigo  -normal neuro exam today.  May be due to sinus congestion. No evidence of infection. Monitor.    Need for shingles vaccine  -     varicella-zoster [id number], PF, (SHINGRIX, PF,) 50 mcg/0.5 mL injection; Inject 0.5 mLs into the muscle once. Repeat in 2 months for 1 dose  Dispense: 0.5 mL; Refill: 1      Medications Discontinued During This Encounter   Medication Reason    FLUZONE HIGH-DOSE 2019-20, PF, 180 mcg/0.5 mL Syrg Patient no longer taking    neomycin-polymyxin-dexamethasone (MAXITROL) 3.5mg/mL-10,000 unit/mL-0.1 % DrpS Patient no longer taking    olopatadine (PATADAY) 0.2 % Drop Patient no longer taking       meds sent this encounter:       Follow Up: No follow-ups on file.      Subjective:     Chief Complaint   Patient presents with    Gastroesophageal Reflux     took omeprazole. sometimes have heartburn    dry mouth    Dizziness     last night       BERTHA Palumbo is a 74 y.o. female, last appointment with this clinic was 9/24/2019.    No LMP recorded. Patient has had a hysterectomy.    I previously saw her back in September for physical.  Vitamin-D levels at that time low increase the over-the-counter dose.  Borderline lipid profile.  She had declined statin therapy at office visit.  10/22/2019 colonoscopy tubular adenoma      She wonders if she has reflux.  She primarily notices at night that she gets numbness of the tip of her tongue as well as the hard  palate.  And maybe the lower lip.  Not cheeks.  No swelling.  No ulcerations.  She mentioned this to GI when she saw them for her preop colonoscopy.  Was recommended that she try over-the-counter Prilosec.    So she tried Prilosec over-the-counter once daily and did not help.  When she saw GI for her colonoscopy she mentioned that and they sent in a prescription for omeprazole 20 b.i.d..    She took it for several weeks and did not find any improvement so she stopped.    Does not find it debilitating.  No ulcerations.    She mentioned to her dentist and the dentist told her that her teeth and gums were normal.    No fevers, no chills.  No headaches.  No numbness or tingling in the extremities.  No unilateral weakness.      Mostly noticeable at night but when she sits to think about if she thinks she may get it during the day as well.  Has been going on for years.      She is also requesting that I check her ears.  She had an episode of dizziness with laying down last night.  When she turned her head.  Felt like room spinning.  Mild.  Did not occur this morning when she woke up.  Not occurring now.  Having some sinus congestion.  No headaches.  No unilateral weakness.    Patient Care Team:  Raul Rivera MD as PCP - General (Internal Medicine)  Neha Rawls OD as Consulting Physician (Optometry)  Jerson Weaver MD as Consulting Physician (Orthopedic Surgery)  Lico Her MA as Care Coordinator  Jimi Mccollum MD as Consulting Physician (Gastroenterology)    Patient Active Problem List    Diagnosis Date Noted    Tubular adenoma of colon 10/22/2019 colonoscopy with ascending tubular adenoma 3 mm 10/31/2019     10/22/2019 colonoscopy with ascending tubular adenoma 3 mm      Osteopenia      8/2017 - no need for prescription medication at this time.      AR (allergic rhinitis) 08/16/2017    Intermittent vertigo 08/16/2017    Atherosclerosis of aortic arch      - noted on CXR 8/2017       Family history of breast cancer in first degree relative 07/21/2015    Recurrent urinary tract infection 05/02/2014    Dyslipidemia 10/17/2013     - no need for prescription medication at this time.         PAST MEDICAL HISTORY:  Past Medical History:   Diagnosis Date    Atherosclerosis of aortic arch     - noted on CXR 8/2017    Back pain     Fibrocystic breast     Hyperlipidemia     Osteopenia        PAST SURGICAL HISTORY:  Past Surgical History:   Procedure Laterality Date    ECTOPIC PREGNANCY SURGERY      HERNIA REPAIR      left inguinal    HYSTERECTOMY  1980    without BSO       SOCIAL HISTORY:  Social History     Socioeconomic History    Marital status:      Spouse name: Not on file    Number of children: 2    Years of education: Not on file    Highest education level: Not on file   Occupational History    Occupation: retired   Social Needs    Financial resource strain: Not hard at all    Food insecurity:     Worry: Never true     Inability: Never true    Transportation needs:     Medical: No     Non-medical: No   Tobacco Use    Smoking status: Never Smoker    Smokeless tobacco: Never Used   Substance and Sexual Activity    Alcohol use: No     Frequency: Never     Binge frequency: Never    Drug use: No    Sexual activity: Not Currently   Lifestyle    Physical activity:     Days per week: 1 day     Minutes per session: 30 min    Stress: Only a little   Relationships    Social connections:     Talks on phone: More than three times a week     Gets together: More than three times a week     Attends Nondenominational service: Not on file     Active member of club or organization: No     Attends meetings of clubs or organizations: Never     Relationship status:    Other Topics Concern    Not on file   Social History Narrative    Not on file        ALLERGIES AND MEDICATIONS: updated and reviewed.  Review of patient's allergies indicates:   Allergen Reactions    Erythromycin (bulk)   "    Other reaction(s): Eye irritation     No current outpatient medications on file.     No current facility-administered medications for this visit.        Review of Systems   All other systems reviewed and are negative.      Objective:   Physical Exam   Vitals:    01/29/20 1130   BP: 108/72   BP Location: Left arm   Patient Position: Sitting   BP Method: Medium (Manual)   Pulse: 74   Temp: 98 °F (36.7 °C)   TempSrc: Oral   SpO2: 98%   Weight: 69.9 kg (154 lb)   Height: 5' 3" (1.6 m)    Body mass index is 27.28 kg/m².  Weight: 69.9 kg (154 lb)   Height: 5' 3" (160 cm)     Physical Exam   Constitutional: She is oriented to person, place, and time. She appears well-developed and well-nourished.   HENT:   TMs grey/clear bilaterally.  OP no erythema no exudates   Eyes: EOM are normal.   Neck: Neck supple.   Cardiovascular: Normal rate, regular rhythm and normal heart sounds.   Pulmonary/Chest: Effort normal and breath sounds normal. She has no wheezes.   Lymphadenopathy:     She has no cervical adenopathy.   Neurological: She is alert and oriented to person, place, and time.   earline hallpike maneuver normal/negative   Skin: Skin is warm and dry.   Psychiatric: She has a normal mood and affect. Her behavior is normal.     "

## 2020-01-29 ENCOUNTER — OFFICE VISIT (OUTPATIENT)
Dept: FAMILY MEDICINE | Facility: CLINIC | Age: 75
End: 2020-01-29
Payer: MEDICARE

## 2020-01-29 ENCOUNTER — LAB VISIT (OUTPATIENT)
Dept: LAB | Facility: HOSPITAL | Age: 75
End: 2020-01-29
Attending: INTERNAL MEDICINE
Payer: MEDICARE

## 2020-01-29 VITALS
DIASTOLIC BLOOD PRESSURE: 72 MMHG | SYSTOLIC BLOOD PRESSURE: 108 MMHG | HEIGHT: 63 IN | HEART RATE: 74 BPM | TEMPERATURE: 98 F | OXYGEN SATURATION: 98 % | BODY MASS INDEX: 27.29 KG/M2 | WEIGHT: 154 LBS

## 2020-01-29 DIAGNOSIS — R42 VERTIGO: ICD-10-CM

## 2020-01-29 DIAGNOSIS — R20.0 NUMBNESS OF TONGUE: Primary | ICD-10-CM

## 2020-01-29 DIAGNOSIS — R20.0 NUMBNESS OF TONGUE: ICD-10-CM

## 2020-01-29 DIAGNOSIS — Z23 NEED FOR SHINGLES VACCINE: ICD-10-CM

## 2020-01-29 LAB
TSH SERPL DL<=0.005 MIU/L-ACNC: 2.19 UIU/ML (ref 0.4–4)
VIT B12 SERPL-MCNC: 564 PG/ML (ref 210–950)

## 2020-01-29 PROCEDURE — 84443 ASSAY THYROID STIM HORMONE: CPT

## 2020-01-29 PROCEDURE — 1159F PR MEDICATION LIST DOCUMENTED IN MEDICAL RECORD: ICD-10-PCS | Mod: ,,, | Performed by: INTERNAL MEDICINE

## 2020-01-29 PROCEDURE — 82607 VITAMIN B-12: CPT

## 2020-01-29 PROCEDURE — 99213 OFFICE O/P EST LOW 20 MIN: CPT | Mod: PBBFAC,PO | Performed by: INTERNAL MEDICINE

## 2020-01-29 PROCEDURE — 99214 PR OFFICE/OUTPT VISIT, EST, LEVL IV, 30-39 MIN: ICD-10-PCS | Mod: S$PBB,25,, | Performed by: INTERNAL MEDICINE

## 2020-01-29 PROCEDURE — 99999 PR PBB SHADOW E&M-EST. PATIENT-LVL III: CPT | Mod: PBBFAC,,, | Performed by: INTERNAL MEDICINE

## 2020-01-29 PROCEDURE — 1159F MED LIST DOCD IN RCRD: CPT | Mod: ,,, | Performed by: INTERNAL MEDICINE

## 2020-01-29 PROCEDURE — 36415 COLL VENOUS BLD VENIPUNCTURE: CPT | Mod: PO

## 2020-01-29 PROCEDURE — 99214 OFFICE O/P EST MOD 30 MIN: CPT | Mod: S$PBB,25,, | Performed by: INTERNAL MEDICINE

## 2020-01-29 PROCEDURE — 1126F PR PAIN SEVERITY QUANTIFIED, NO PAIN PRESENT: ICD-10-PCS | Mod: ,,, | Performed by: INTERNAL MEDICINE

## 2020-01-29 PROCEDURE — 99999 PR PBB SHADOW E&M-EST. PATIENT-LVL III: ICD-10-PCS | Mod: PBBFAC,,, | Performed by: INTERNAL MEDICINE

## 2020-01-29 PROCEDURE — 1126F AMNT PAIN NOTED NONE PRSNT: CPT | Mod: ,,, | Performed by: INTERNAL MEDICINE

## 2020-01-30 NOTE — PROGRESS NOTES
TSH, B12 normal.  Done for complaints of dysesthesia of the tip of her tongue.  Had not complained of any headaches, dizziness, neurologic symptoms other than that.  Plan was to monitor if labs were negative.  Results to patient via my [facility].  No further pursuit.

## 2020-03-03 ENCOUNTER — PATIENT OUTREACH (OUTPATIENT)
Dept: ADMINISTRATIVE | Facility: HOSPITAL | Age: 75
End: 2020-03-03

## 2020-03-17 ENCOUNTER — PATIENT OUTREACH (OUTPATIENT)
Dept: ADMINISTRATIVE | Facility: HOSPITAL | Age: 75
End: 2020-03-17

## 2020-08-06 ENCOUNTER — TELEPHONE (OUTPATIENT)
Dept: FAMILY MEDICINE | Facility: CLINIC | Age: 75
End: 2020-08-06

## 2020-08-06 DIAGNOSIS — R30.0 DYSURIA: Primary | ICD-10-CM

## 2020-08-06 NOTE — TELEPHONE ENCOUNTER
----- Message from Nita Gómez sent at 8/6/2020 10:19 AM CDT -----  Contact: Self [phone]  Type: Patient Call Back    Who called: Self    What is the request in detail: pt is calling because she feels a UTI coming on and she is wondering if the doctor can call in CIPRO to her pharmacy. Pt pharmacy is   [facility]/pharmacy #[id number] - [place] - [address]  [address] [place] [address]  Phone: [phone] Fax: [phone]    Can the clinic reply by [facility]? Call back    Would the patient rather a call back or a response via [facility]? Call back    Best call back number: [phone]

## 2020-08-06 NOTE — TELEPHONE ENCOUNTER
Patient states frequent urination with discomfort,started yesterday,she thinks its a UTI requesting cipro for treatment. Please advise, Thanks

## 2020-08-07 ENCOUNTER — TELEPHONE (OUTPATIENT)
Dept: FAMILY MEDICINE | Facility: CLINIC | Age: 75
End: 2020-08-07

## 2020-08-07 ENCOUNTER — LAB VISIT (OUTPATIENT)
Dept: LAB | Facility: HOSPITAL | Age: 75
End: 2020-08-07
Attending: INTERNAL MEDICINE
Payer: MEDICARE

## 2020-08-07 DIAGNOSIS — R30.0 DYSURIA: ICD-10-CM

## 2020-08-07 LAB
BACTERIA #/AREA URNS AUTO: ABNORMAL /HPF
BILIRUB UR QL STRIP: NEGATIVE
CLARITY UR REFRACT.AUTO: ABNORMAL
COLOR UR AUTO: YELLOW
GLUCOSE UR QL STRIP: NEGATIVE
HGB UR QL STRIP: ABNORMAL
KETONES UR QL STRIP: NEGATIVE
LEUKOCYTE ESTERASE UR QL STRIP: ABNORMAL
MICROSCOPIC COMMENT: ABNORMAL
NITRITE UR QL STRIP: NEGATIVE
PH UR STRIP: 5 [PH] (ref 5–8)
PROT UR QL STRIP: NEGATIVE
RBC #/AREA URNS AUTO: 4 /HPF (ref 0–4)
SP GR UR STRIP: 1.01 (ref 1–1.03)
SQUAMOUS #/AREA URNS AUTO: 1 /HPF
URN SPEC COLLECT METH UR: ABNORMAL
WBC #/AREA URNS AUTO: >100 /HPF (ref 0–5)
WBC CLUMPS UR QL AUTO: ABNORMAL

## 2020-08-07 PROCEDURE — 87086 URINE CULTURE/COLONY COUNT: CPT

## 2020-08-07 PROCEDURE — 81001 URINALYSIS AUTO W/SCOPE: CPT

## 2020-08-07 NOTE — TELEPHONE ENCOUNTER
----- Message from Shahid Moseley sent at 8/6/2020  4:21 PM CDT -----  Regarding: Pt Advice  Contact: PO CONNOLLY [[id number]]  Type:  Patient Returning Call    Who Called: PO CONNOLLY [[id number]]    Who Left Message for Patient: Susan    Does the patient know what this is regarding?: yes    Would the patient rather a call back or a response via [facility]? call    Best Call Back Number: [phone]    Additional Information:

## 2020-08-07 NOTE — TELEPHONE ENCOUNTER
----- Message from Shahid Moseley sent at 8/6/2020  4:21 PM CDT -----  Regarding: Pt Advice  Contact: PO CONNOLLY [[id number]]  Type:  Patient Returning Call    Who Called: PO CONNOLLY [[id number]]    Who Left Message for Patient: Susan    Does the patient know what this is regarding?: yes    Would the patient rather a call back or a response via [facility]? call    Best Call Back Number: [phone]    Additional Information:

## 2020-08-07 NOTE — TELEPHONE ENCOUNTER
Patient reports today that her symptoms have resolved for the most part. Has been drinking plenty of fluids. Denies any strong odor in urine and barely cloudy. Will call back if symptoms get worst or just go to UC.

## 2020-08-09 LAB — BACTERIA UR CULT: NORMAL

## 2020-08-10 NOTE — PROGRESS NOTES
Urinalysis white blood cells otherwise was fairly unremarkable.  Urine culture was negative.  Patient on Friday reported via phone that she was feeling a bit better.  Results to patient via my chart.  I also sent her a message.  Asking about her clinical status.

## 2021-01-06 ENCOUNTER — APPOINTMENT (OUTPATIENT)
Dept: OBSTETRICS AND GYNECOLOGY | Facility: CLINIC | Age: 76
End: 2021-01-06
Payer: MEDICARE

## 2021-01-06 DIAGNOSIS — Z23 NEED FOR VACCINATION: ICD-10-CM

## 2021-01-06 PROCEDURE — 91300 COVID-19, MRNA, LNP-S, PF, 30 MCG/0.3 ML DOSE VACCINE: CPT | Mod: PBBFAC | Performed by: FAMILY MEDICINE

## 2021-01-27 ENCOUNTER — IMMUNIZATION (OUTPATIENT)
Dept: OBSTETRICS AND GYNECOLOGY | Facility: CLINIC | Age: 76
End: 2021-01-27
Payer: MEDICARE

## 2021-01-27 DIAGNOSIS — Z23 NEED FOR VACCINATION: Primary | ICD-10-CM

## 2021-01-27 PROCEDURE — 91300 COVID-19, MRNA, LNP-S, PF, 30 MCG/0.3 ML DOSE VACCINE: CPT | Mod: PBBFAC | Performed by: FAMILY MEDICINE

## 2021-01-27 PROCEDURE — 0002A COVID-19, MRNA, LNP-S, PF, 30 MCG/0.3 ML DOSE VACCINE: CPT | Mod: PBBFAC | Performed by: FAMILY MEDICINE

## 2021-04-06 ENCOUNTER — PATIENT MESSAGE (OUTPATIENT)
Dept: ADMINISTRATIVE | Facility: HOSPITAL | Age: 76
End: 2021-04-06

## 2021-04-20 DIAGNOSIS — Z78.0 ASYMPTOMATIC MENOPAUSAL STATE: ICD-10-CM

## 2021-04-20 DIAGNOSIS — Z12.31 ENCOUNTER FOR SCREENING MAMMOGRAM FOR MALIGNANT NEOPLASM OF BREAST: ICD-10-CM

## 2021-04-20 DIAGNOSIS — E78.5 DYSLIPIDEMIA: Primary | ICD-10-CM

## 2021-04-20 DIAGNOSIS — E55.9 VITAMIN D DEFICIENCY: ICD-10-CM

## 2021-04-20 DIAGNOSIS — M85.80 OSTEOPENIA, UNSPECIFIED LOCATION: ICD-10-CM

## 2021-04-22 ENCOUNTER — PATIENT OUTREACH (OUTPATIENT)
Dept: ADMINISTRATIVE | Facility: HOSPITAL | Age: 76
End: 2021-04-22

## 2021-04-23 ENCOUNTER — TELEPHONE (OUTPATIENT)
Dept: UROLOGY | Facility: CLINIC | Age: 76
End: 2021-04-23

## 2021-04-23 ENCOUNTER — OFFICE VISIT (OUTPATIENT)
Dept: UROLOGY | Facility: CLINIC | Age: 76
End: 2021-04-23
Payer: MEDICARE

## 2021-04-23 VITALS — HEIGHT: 63 IN | WEIGHT: 154.31 LBS | BODY MASS INDEX: 27.34 KG/M2

## 2021-04-23 DIAGNOSIS — N81.10 PELVIC ORGAN PROLAPSE QUANTIFICATION STAGE 2 CYSTOCELE: Primary | ICD-10-CM

## 2021-04-23 DIAGNOSIS — R15.9 INCONTINENCE OF FECES, UNSPECIFIED FECAL INCONTINENCE TYPE: ICD-10-CM

## 2021-04-23 DIAGNOSIS — N95.8 GENITOURINARY SYNDROME OF MENOPAUSE: ICD-10-CM

## 2021-04-23 PROCEDURE — 1159F PR MEDICATION LIST DOCUMENTED IN MEDICAL RECORD: ICD-10-PCS | Mod: S$GLB,,, | Performed by: STUDENT IN AN ORGANIZED HEALTH CARE EDUCATION/TRAINING PROGRAM

## 2021-04-23 PROCEDURE — 1126F PR PAIN SEVERITY QUANTIFIED, NO PAIN PRESENT: ICD-10-PCS | Mod: S$GLB,,, | Performed by: STUDENT IN AN ORGANIZED HEALTH CARE EDUCATION/TRAINING PROGRAM

## 2021-04-23 PROCEDURE — 3288F FALL RISK ASSESSMENT DOCD: CPT | Mod: CPTII,S$GLB,, | Performed by: STUDENT IN AN ORGANIZED HEALTH CARE EDUCATION/TRAINING PROGRAM

## 2021-04-23 PROCEDURE — 99204 OFFICE O/P NEW MOD 45 MIN: CPT | Mod: S$GLB,,, | Performed by: STUDENT IN AN ORGANIZED HEALTH CARE EDUCATION/TRAINING PROGRAM

## 2021-04-23 PROCEDURE — 3288F PR FALLS RISK ASSESSMENT DOCUMENTED: ICD-10-PCS | Mod: CPTII,S$GLB,, | Performed by: STUDENT IN AN ORGANIZED HEALTH CARE EDUCATION/TRAINING PROGRAM

## 2021-04-23 PROCEDURE — 3008F BODY MASS INDEX DOCD: CPT | Mod: CPTII,S$GLB,, | Performed by: STUDENT IN AN ORGANIZED HEALTH CARE EDUCATION/TRAINING PROGRAM

## 2021-04-23 PROCEDURE — 99204 PR OFFICE/OUTPT VISIT, NEW, LEVL IV, 45-59 MIN: ICD-10-PCS | Mod: S$GLB,,, | Performed by: STUDENT IN AN ORGANIZED HEALTH CARE EDUCATION/TRAINING PROGRAM

## 2021-04-23 PROCEDURE — 1101F PR PT FALLS ASSESS DOC 0-1 FALLS W/OUT INJ PAST YR: ICD-10-PCS | Mod: CPTII,S$GLB,, | Performed by: STUDENT IN AN ORGANIZED HEALTH CARE EDUCATION/TRAINING PROGRAM

## 2021-04-23 PROCEDURE — 1126F AMNT PAIN NOTED NONE PRSNT: CPT | Mod: S$GLB,,, | Performed by: STUDENT IN AN ORGANIZED HEALTH CARE EDUCATION/TRAINING PROGRAM

## 2021-04-23 PROCEDURE — 1159F MED LIST DOCD IN RCRD: CPT | Mod: S$GLB,,, | Performed by: STUDENT IN AN ORGANIZED HEALTH CARE EDUCATION/TRAINING PROGRAM

## 2021-04-23 PROCEDURE — 3008F PR BODY MASS INDEX (BMI) DOCUMENTED: ICD-10-PCS | Mod: CPTII,S$GLB,, | Performed by: STUDENT IN AN ORGANIZED HEALTH CARE EDUCATION/TRAINING PROGRAM

## 2021-04-23 PROCEDURE — 99999 PR PBB SHADOW E&M-EST. PATIENT-LVL III: CPT | Mod: PBBFAC,,, | Performed by: STUDENT IN AN ORGANIZED HEALTH CARE EDUCATION/TRAINING PROGRAM

## 2021-04-23 PROCEDURE — 99999 PR PBB SHADOW E&M-EST. PATIENT-LVL III: ICD-10-PCS | Mod: PBBFAC,,, | Performed by: STUDENT IN AN ORGANIZED HEALTH CARE EDUCATION/TRAINING PROGRAM

## 2021-04-23 PROCEDURE — 1101F PT FALLS ASSESS-DOCD LE1/YR: CPT | Mod: CPTII,S$GLB,, | Performed by: STUDENT IN AN ORGANIZED HEALTH CARE EDUCATION/TRAINING PROGRAM

## 2021-04-23 RX ORDER — ESTRADIOL 0.1 MG/G
1 CREAM VAGINAL DAILY
Qty: 42.5 G | Refills: 11 | Status: SHIPPED | OUTPATIENT
Start: 2021-04-23 | End: 2021-10-20

## 2021-05-03 ENCOUNTER — LAB VISIT (OUTPATIENT)
Dept: LAB | Facility: HOSPITAL | Age: 76
End: 2021-05-03
Attending: INTERNAL MEDICINE
Payer: MEDICARE

## 2021-05-03 DIAGNOSIS — M85.80 OSTEOPENIA, UNSPECIFIED LOCATION: ICD-10-CM

## 2021-05-03 DIAGNOSIS — E55.9 VITAMIN D DEFICIENCY: ICD-10-CM

## 2021-05-03 DIAGNOSIS — E78.5 DYSLIPIDEMIA: ICD-10-CM

## 2021-05-03 LAB
BASOPHILS # BLD AUTO: 0.04 K/UL (ref 0–0.2)
BASOPHILS NFR BLD: 0.7 % (ref 0–1.9)
DIFFERENTIAL METHOD: ABNORMAL
EOSINOPHIL # BLD AUTO: 0.1 K/UL (ref 0–0.5)
EOSINOPHIL NFR BLD: 1.4 % (ref 0–8)
ERYTHROCYTE [DISTWIDTH] IN BLOOD BY AUTOMATED COUNT: 13.3 % (ref 11.5–14.5)
HCT VFR BLD AUTO: 37.7 % (ref 37–48.5)
HGB BLD-MCNC: 12.3 G/DL (ref 12–16)
IMM GRANULOCYTES # BLD AUTO: 0.02 K/UL (ref 0–0.04)
IMM GRANULOCYTES NFR BLD AUTO: 0.4 % (ref 0–0.5)
LYMPHOCYTES # BLD AUTO: 1 K/UL (ref 1–4.8)
LYMPHOCYTES NFR BLD: 17.7 % (ref 18–48)
MCH RBC QN AUTO: 29.9 PG (ref 27–31)
MCHC RBC AUTO-ENTMCNC: 32.6 G/DL (ref 32–36)
MCV RBC AUTO: 92 FL (ref 82–98)
MONOCYTES # BLD AUTO: 0.5 K/UL (ref 0.3–1)
MONOCYTES NFR BLD: 7.9 % (ref 4–15)
NEUTROPHILS # BLD AUTO: 4.1 K/UL (ref 1.8–7.7)
NEUTROPHILS NFR BLD: 71.9 % (ref 38–73)
NRBC BLD-RTO: 0 /100 WBC
PLATELET # BLD AUTO: 251 K/UL (ref 150–450)
PMV BLD AUTO: 10.9 FL (ref 9.2–12.9)
RBC # BLD AUTO: 4.12 M/UL (ref 4–5.4)
WBC # BLD AUTO: 5.71 K/UL (ref 3.9–12.7)

## 2021-05-03 PROCEDURE — 36415 COLL VENOUS BLD VENIPUNCTURE: CPT | Mod: PO | Performed by: INTERNAL MEDICINE

## 2021-05-03 PROCEDURE — 80053 COMPREHEN METABOLIC PANEL: CPT | Performed by: INTERNAL MEDICINE

## 2021-05-03 PROCEDURE — 82306 VITAMIN D 25 HYDROXY: CPT | Performed by: INTERNAL MEDICINE

## 2021-05-03 PROCEDURE — 85025 COMPLETE CBC W/AUTO DIFF WBC: CPT | Performed by: INTERNAL MEDICINE

## 2021-05-03 PROCEDURE — 80061 LIPID PANEL: CPT | Performed by: INTERNAL MEDICINE

## 2021-05-04 LAB
25(OH)D3+25(OH)D2 SERPL-MCNC: 34 NG/ML (ref 30–96)
ALBUMIN SERPL BCP-MCNC: 3.9 G/DL (ref 3.5–5.2)
ALP SERPL-CCNC: 71 U/L (ref 55–135)
ALT SERPL W/O P-5'-P-CCNC: 14 U/L (ref 10–44)
ANION GAP SERPL CALC-SCNC: 8 MMOL/L (ref 8–16)
AST SERPL-CCNC: 21 U/L (ref 10–40)
BILIRUB SERPL-MCNC: 0.6 MG/DL (ref 0.1–1)
BUN SERPL-MCNC: 13 MG/DL (ref 8–23)
CALCIUM SERPL-MCNC: 9 MG/DL (ref 8.7–10.5)
CHLORIDE SERPL-SCNC: 105 MMOL/L (ref 95–110)
CHOLEST SERPL-MCNC: 190 MG/DL (ref 120–199)
CHOLEST/HDLC SERPL: 3 {RATIO} (ref 2–5)
CO2 SERPL-SCNC: 26 MMOL/L (ref 23–29)
CREAT SERPL-MCNC: 0.7 MG/DL (ref 0.5–1.4)
EST. GFR  (AFRICAN AMERICAN): >60 ML/MIN/1.73 M^2
EST. GFR  (NON AFRICAN AMERICAN): >60 ML/MIN/1.73 M^2
GLUCOSE SERPL-MCNC: 76 MG/DL (ref 70–110)
HDLC SERPL-MCNC: 63 MG/DL (ref 40–75)
HDLC SERPL: 33.2 % (ref 20–50)
LDLC SERPL CALC-MCNC: 111.4 MG/DL (ref 63–159)
NONHDLC SERPL-MCNC: 127 MG/DL
POTASSIUM SERPL-SCNC: 4 MMOL/L (ref 3.5–5.1)
PROT SERPL-MCNC: 7 G/DL (ref 6–8.4)
SODIUM SERPL-SCNC: 139 MMOL/L (ref 136–145)
TRIGL SERPL-MCNC: 78 MG/DL (ref 30–150)

## 2021-05-06 ENCOUNTER — OFFICE VISIT (OUTPATIENT)
Dept: FAMILY MEDICINE | Facility: CLINIC | Age: 76
End: 2021-05-06
Payer: MEDICARE

## 2021-05-06 ENCOUNTER — TELEPHONE (OUTPATIENT)
Dept: FAMILY MEDICINE | Facility: CLINIC | Age: 76
End: 2021-05-06

## 2021-05-06 VITALS
BODY MASS INDEX: 27.27 KG/M2 | TEMPERATURE: 98 F | WEIGHT: 153.88 LBS | OXYGEN SATURATION: 97 % | SYSTOLIC BLOOD PRESSURE: 120 MMHG | HEART RATE: 72 BPM | DIASTOLIC BLOOD PRESSURE: 64 MMHG | HEIGHT: 63 IN

## 2021-05-06 DIAGNOSIS — E55.9 VITAMIN D DEFICIENCY: ICD-10-CM

## 2021-05-06 DIAGNOSIS — Z23 NEED FOR SHINGLES VACCINE: ICD-10-CM

## 2021-05-06 DIAGNOSIS — I70.0 ATHEROSCLEROSIS OF AORTIC ARCH: ICD-10-CM

## 2021-05-06 DIAGNOSIS — M85.80 OSTEOPENIA, UNSPECIFIED LOCATION: ICD-10-CM

## 2021-05-06 DIAGNOSIS — M20.22 ACQUIRED HALLUX RIGIDUS OF LEFT FOOT: ICD-10-CM

## 2021-05-06 DIAGNOSIS — D12.6 TUBULAR ADENOMA OF COLON: ICD-10-CM

## 2021-05-06 DIAGNOSIS — E78.5 DYSLIPIDEMIA: ICD-10-CM

## 2021-05-06 DIAGNOSIS — Z00.00 NORMAL PHYSICAL EXAM: Primary | ICD-10-CM

## 2021-05-06 PROCEDURE — 3008F BODY MASS INDEX DOCD: CPT | Mod: CPTII,S$GLB,, | Performed by: INTERNAL MEDICINE

## 2021-05-06 PROCEDURE — 1101F PT FALLS ASSESS-DOCD LE1/YR: CPT | Mod: CPTII,S$GLB,, | Performed by: INTERNAL MEDICINE

## 2021-05-06 PROCEDURE — 1126F AMNT PAIN NOTED NONE PRSNT: CPT | Mod: S$GLB,,, | Performed by: INTERNAL MEDICINE

## 2021-05-06 PROCEDURE — 3008F PR BODY MASS INDEX (BMI) DOCUMENTED: ICD-10-PCS | Mod: CPTII,S$GLB,, | Performed by: INTERNAL MEDICINE

## 2021-05-06 PROCEDURE — 99999 PR PBB SHADOW E&M-EST. PATIENT-LVL IV: CPT | Mod: PBBFAC,,, | Performed by: INTERNAL MEDICINE

## 2021-05-06 PROCEDURE — 1159F MED LIST DOCD IN RCRD: CPT | Mod: S$GLB,,, | Performed by: INTERNAL MEDICINE

## 2021-05-06 PROCEDURE — 1126F PR PAIN SEVERITY QUANTIFIED, NO PAIN PRESENT: ICD-10-PCS | Mod: S$GLB,,, | Performed by: INTERNAL MEDICINE

## 2021-05-06 PROCEDURE — 99499 UNLISTED E&M SERVICE: CPT | Mod: HCNC,S$GLB,, | Performed by: INTERNAL MEDICINE

## 2021-05-06 PROCEDURE — 99214 OFFICE O/P EST MOD 30 MIN: CPT | Mod: S$GLB,,, | Performed by: INTERNAL MEDICINE

## 2021-05-06 PROCEDURE — 99214 PR OFFICE/OUTPT VISIT, EST, LEVL IV, 30-39 MIN: ICD-10-PCS | Mod: S$GLB,,, | Performed by: INTERNAL MEDICINE

## 2021-05-06 PROCEDURE — 99999 PR PBB SHADOW E&M-EST. PATIENT-LVL IV: ICD-10-PCS | Mod: PBBFAC,,, | Performed by: INTERNAL MEDICINE

## 2021-05-06 PROCEDURE — 3288F FALL RISK ASSESSMENT DOCD: CPT | Mod: CPTII,S$GLB,, | Performed by: INTERNAL MEDICINE

## 2021-05-06 PROCEDURE — 1101F PR PT FALLS ASSESS DOC 0-1 FALLS W/OUT INJ PAST YR: ICD-10-PCS | Mod: CPTII,S$GLB,, | Performed by: INTERNAL MEDICINE

## 2021-05-06 PROCEDURE — 99499 RISK ADDL DX/OHS AUDIT: ICD-10-PCS | Mod: HCNC,S$GLB,, | Performed by: INTERNAL MEDICINE

## 2021-05-06 PROCEDURE — 1159F PR MEDICATION LIST DOCUMENTED IN MEDICAL RECORD: ICD-10-PCS | Mod: S$GLB,,, | Performed by: INTERNAL MEDICINE

## 2021-05-06 PROCEDURE — 3288F PR FALLS RISK ASSESSMENT DOCUMENTED: ICD-10-PCS | Mod: CPTII,S$GLB,, | Performed by: INTERNAL MEDICINE

## 2021-05-06 RX ORDER — ZOSTER VACCINE RECOMBINANT, ADJUVANTED 50 MCG/0.5
0.5 KIT INTRAMUSCULAR ONCE
Qty: 1 EACH | Refills: 1 | Status: SHIPPED | OUTPATIENT
Start: 2021-05-06 | End: 2021-05-06

## 2021-05-13 ENCOUNTER — HOSPITAL ENCOUNTER (OUTPATIENT)
Dept: RADIOLOGY | Facility: CLINIC | Age: 76
Discharge: HOME | End: 2021-05-13
Attending: INTERNAL MEDICINE
Payer: MEDICARE

## 2021-05-13 ENCOUNTER — HOSPITAL ENCOUNTER (OUTPATIENT)
Dept: RADIOLOGY | Facility: HOSPITAL | Age: 76
Discharge: HOME | End: 2021-05-13
Attending: INTERNAL MEDICINE
Payer: MEDICARE

## 2021-05-13 DIAGNOSIS — Z78.0 ASYMPTOMATIC MENOPAUSAL STATE: ICD-10-CM

## 2021-05-13 DIAGNOSIS — M85.80 OSTEOPENIA, UNSPECIFIED LOCATION: ICD-10-CM

## 2021-05-13 DIAGNOSIS — Z12.31 ENCOUNTER FOR SCREENING MAMMOGRAM FOR MALIGNANT NEOPLASM OF BREAST: ICD-10-CM

## 2021-05-13 PROCEDURE — 77067 SCR MAMMO BI INCL CAD: CPT | Mod: 26,,, | Performed by: RADIOLOGY

## 2021-05-13 PROCEDURE — 77080 DXA BONE DENSITY AXIAL: CPT | Mod: 26,,, | Performed by: INTERNAL MEDICINE

## 2021-05-13 PROCEDURE — 77067 SCR MAMMO BI INCL CAD: CPT | Mod: TC,PO

## 2021-05-13 PROCEDURE — 77080 DXA BONE DENSITY AXIAL: CPT | Mod: TC,PO

## 2021-05-13 PROCEDURE — 77063 MAMMO DIGITAL SCREENING BILAT WITH TOMO: ICD-10-PCS | Mod: 26,,, | Performed by: RADIOLOGY

## 2021-05-13 PROCEDURE — 77080 DEXA BONE DENSITY SPINE HIP: ICD-10-PCS | Mod: 26,,, | Performed by: INTERNAL MEDICINE

## 2021-05-13 PROCEDURE — 77063 BREAST TOMOSYNTHESIS BI: CPT | Mod: 26,,, | Performed by: RADIOLOGY

## 2021-05-13 PROCEDURE — 77067 MAMMO DIGITAL SCREENING BILAT WITH TOMO: ICD-10-PCS | Mod: 26,,, | Performed by: RADIOLOGY

## 2021-06-03 DIAGNOSIS — M81.0 AGE-RELATED OSTEOPOROSIS WITHOUT CURRENT PATHOLOGICAL FRACTURE: ICD-10-CM

## 2021-06-03 DIAGNOSIS — M81.0 OSTEOPOROSIS WITHOUT CURRENT PATHOLOGICAL FRACTURE, UNSPECIFIED OSTEOPOROSIS TYPE: Primary | ICD-10-CM

## 2021-06-03 RX ORDER — ALENDRONATE SODIUM 70 MG/1
70 TABLET ORAL
Qty: 4 TABLET | Refills: 11 | Status: SHIPPED | OUTPATIENT
Start: 2021-06-03 | End: 2021-10-20 | Stop reason: ALTCHOICE

## 2021-06-17 ENCOUNTER — NURSE TRIAGE (OUTPATIENT)
Dept: ADMINISTRATIVE | Facility: CLINIC | Age: 76
End: 2021-06-17

## 2021-06-17 ENCOUNTER — TELEPHONE (OUTPATIENT)
Dept: FAMILY MEDICINE | Facility: CLINIC | Age: 76
End: 2021-06-17

## 2021-10-20 ENCOUNTER — HOSPITAL ENCOUNTER (OUTPATIENT)
Dept: RADIOLOGY | Facility: HOSPITAL | Age: 76
Discharge: HOME | End: 2021-10-20
Attending: INTERNAL MEDICINE
Payer: MEDICARE

## 2021-10-20 ENCOUNTER — OFFICE VISIT (OUTPATIENT)
Dept: FAMILY MEDICINE | Facility: CLINIC | Age: 76
End: 2021-10-20
Payer: MEDICARE

## 2021-10-20 VITALS
HEIGHT: 63 IN | BODY MASS INDEX: 26.93 KG/M2 | SYSTOLIC BLOOD PRESSURE: 130 MMHG | HEART RATE: 76 BPM | WEIGHT: 152 LBS | TEMPERATURE: 98 F | DIASTOLIC BLOOD PRESSURE: 70 MMHG | OXYGEN SATURATION: 98 %

## 2021-10-20 DIAGNOSIS — M81.0 AGE-RELATED OSTEOPOROSIS WITHOUT CURRENT PATHOLOGICAL FRACTURE: ICD-10-CM

## 2021-10-20 DIAGNOSIS — R00.2 PALPITATIONS: Primary | ICD-10-CM

## 2021-10-20 DIAGNOSIS — R00.2 PALPITATIONS: ICD-10-CM

## 2021-10-20 DIAGNOSIS — R20.0 NUMBNESS: ICD-10-CM

## 2021-10-20 DIAGNOSIS — E78.5 DYSLIPIDEMIA: ICD-10-CM

## 2021-10-20 PROCEDURE — 3288F PR FALLS RISK ASSESSMENT DOCUMENTED: ICD-10-PCS | Mod: CPTII,S$GLB,, | Performed by: INTERNAL MEDICINE

## 2021-10-20 PROCEDURE — 99214 OFFICE O/P EST MOD 30 MIN: CPT | Mod: S$GLB,,, | Performed by: INTERNAL MEDICINE

## 2021-10-20 PROCEDURE — 1159F PR MEDICATION LIST DOCUMENTED IN MEDICAL RECORD: ICD-10-PCS | Mod: CPTII,S$GLB,, | Performed by: INTERNAL MEDICINE

## 2021-10-20 PROCEDURE — 99999 PR PBB SHADOW E&M-EST. PATIENT-LVL IV: ICD-10-PCS | Mod: PBBFAC,,, | Performed by: INTERNAL MEDICINE

## 2021-10-20 PROCEDURE — 99999 PR PBB SHADOW E&M-EST. PATIENT-LVL IV: CPT | Mod: PBBFAC,,, | Performed by: INTERNAL MEDICINE

## 2021-10-20 PROCEDURE — 1126F AMNT PAIN NOTED NONE PRSNT: CPT | Mod: CPTII,S$GLB,, | Performed by: INTERNAL MEDICINE

## 2021-10-20 PROCEDURE — 3008F BODY MASS INDEX DOCD: CPT | Mod: CPTII,S$GLB,, | Performed by: INTERNAL MEDICINE

## 2021-10-20 PROCEDURE — 3288F FALL RISK ASSESSMENT DOCD: CPT | Mod: CPTII,S$GLB,, | Performed by: INTERNAL MEDICINE

## 2021-10-20 PROCEDURE — 93010 EKG 12-LEAD: ICD-10-PCS | Mod: S$GLB,,, | Performed by: INTERNAL MEDICINE

## 2021-10-20 PROCEDURE — 1160F RVW MEDS BY RX/DR IN RCRD: CPT | Mod: CPTII,S$GLB,, | Performed by: INTERNAL MEDICINE

## 2021-10-20 PROCEDURE — 99499 UNLISTED E&M SERVICE: CPT | Mod: S$GLB,,, | Performed by: INTERNAL MEDICINE

## 2021-10-20 PROCEDURE — 1126F PR PAIN SEVERITY QUANTIFIED, NO PAIN PRESENT: ICD-10-PCS | Mod: CPTII,S$GLB,, | Performed by: INTERNAL MEDICINE

## 2021-10-20 PROCEDURE — 71046 X-RAY EXAM CHEST 2 VIEWS: CPT | Mod: TC,FY,PO

## 2021-10-20 PROCEDURE — 3075F PR MOST RECENT SYSTOLIC BLOOD PRESS GE 130-139MM HG: ICD-10-PCS | Mod: CPTII,S$GLB,, | Performed by: INTERNAL MEDICINE

## 2021-10-20 PROCEDURE — 1101F PR PT FALLS ASSESS DOC 0-1 FALLS W/OUT INJ PAST YR: ICD-10-PCS | Mod: CPTII,S$GLB,, | Performed by: INTERNAL MEDICINE

## 2021-10-20 PROCEDURE — 3075F SYST BP GE 130 - 139MM HG: CPT | Mod: CPTII,S$GLB,, | Performed by: INTERNAL MEDICINE

## 2021-10-20 PROCEDURE — 93010 ELECTROCARDIOGRAM REPORT: CPT | Mod: S$GLB,,, | Performed by: INTERNAL MEDICINE

## 2021-10-20 PROCEDURE — 3078F PR MOST RECENT DIASTOLIC BLOOD PRESSURE < 80 MM HG: ICD-10-PCS | Mod: CPTII,S$GLB,, | Performed by: INTERNAL MEDICINE

## 2021-10-20 PROCEDURE — 93005 ELECTROCARDIOGRAM TRACING: CPT | Mod: S$GLB,,, | Performed by: INTERNAL MEDICINE

## 2021-10-20 PROCEDURE — 71046 X-RAY EXAM CHEST 2 VIEWS: CPT | Mod: 26,,, | Performed by: RADIOLOGY

## 2021-10-20 PROCEDURE — 71046 XR CHEST PA AND LATERAL: ICD-10-PCS | Mod: 26,,, | Performed by: RADIOLOGY

## 2021-10-20 PROCEDURE — 1101F PT FALLS ASSESS-DOCD LE1/YR: CPT | Mod: CPTII,S$GLB,, | Performed by: INTERNAL MEDICINE

## 2021-10-20 PROCEDURE — 1160F PR REVIEW ALL MEDS BY PRESCRIBER/CLIN PHARMACIST DOCUMENTED: ICD-10-PCS | Mod: CPTII,S$GLB,, | Performed by: INTERNAL MEDICINE

## 2021-10-20 PROCEDURE — 99499 RISK ADDL DX/OHS AUDIT: ICD-10-PCS | Mod: S$GLB,,, | Performed by: INTERNAL MEDICINE

## 2021-10-20 PROCEDURE — 3078F DIAST BP <80 MM HG: CPT | Mod: CPTII,S$GLB,, | Performed by: INTERNAL MEDICINE

## 2021-10-20 PROCEDURE — 1159F MED LIST DOCD IN RCRD: CPT | Mod: CPTII,S$GLB,, | Performed by: INTERNAL MEDICINE

## 2021-10-20 PROCEDURE — 3008F PR BODY MASS INDEX (BMI) DOCUMENTED: ICD-10-PCS | Mod: CPTII,S$GLB,, | Performed by: INTERNAL MEDICINE

## 2021-10-20 PROCEDURE — 93005 EKG 12-LEAD: ICD-10-PCS | Mod: S$GLB,,, | Performed by: INTERNAL MEDICINE

## 2021-10-20 PROCEDURE — 99214 PR OFFICE/OUTPT VISIT, EST, LEVL IV, 30-39 MIN: ICD-10-PCS | Mod: S$GLB,,, | Performed by: INTERNAL MEDICINE

## 2021-12-12 ENCOUNTER — OFFICE VISIT (OUTPATIENT)
Dept: URGENT CARE | Facility: CLINIC | Age: 76
End: 2021-12-12
Payer: MEDICARE

## 2021-12-12 VITALS
RESPIRATION RATE: 20 BRPM | SYSTOLIC BLOOD PRESSURE: 132 MMHG | TEMPERATURE: 99 F | DIASTOLIC BLOOD PRESSURE: 81 MMHG | WEIGHT: 152 LBS | OXYGEN SATURATION: 96 % | HEART RATE: 87 BPM | HEIGHT: 63 IN | BODY MASS INDEX: 26.93 KG/M2

## 2021-12-12 DIAGNOSIS — J01.00 ACUTE MAXILLARY SINUSITIS, RECURRENCE NOT SPECIFIED: Primary | ICD-10-CM

## 2021-12-12 LAB
CTP QC/QA: YES
CTP QC/QA: YES
FLUAV AG NPH QL: NEGATIVE
FLUBV AG NPH QL: NEGATIVE
SARS-COV-2 RDRP RESP QL NAA+PROBE: NEGATIVE

## 2021-12-12 PROCEDURE — 87804 INFLUENZA ASSAY W/OPTIC: CPT | Mod: QW,S$GLB,, | Performed by: FAMILY MEDICINE

## 2021-12-12 PROCEDURE — U0002 COVID-19 LAB TEST NON-CDC: HCPCS | Mod: QW,S$GLB,, | Performed by: FAMILY MEDICINE

## 2021-12-12 PROCEDURE — 99214 OFFICE O/P EST MOD 30 MIN: CPT | Mod: 25,S$GLB,, | Performed by: FAMILY MEDICINE

## 2021-12-12 PROCEDURE — U0002: ICD-10-PCS | Mod: QW,S$GLB,, | Performed by: FAMILY MEDICINE

## 2021-12-12 PROCEDURE — 87804 POCT INFLUENZA A/B: ICD-10-PCS | Mod: 59,QW,S$GLB, | Performed by: FAMILY MEDICINE

## 2021-12-12 PROCEDURE — 99214 PR OFFICE/OUTPT VISIT, EST, LEVL IV, 30-39 MIN: ICD-10-PCS | Mod: 25,S$GLB,, | Performed by: FAMILY MEDICINE

## 2021-12-12 RX ORDER — BENZONATATE 100 MG/1
100 CAPSULE ORAL EVERY 6 HOURS PRN
Qty: 30 CAPSULE | Refills: 1 | Status: SHIPPED | OUTPATIENT
Start: 2021-12-12 | End: 2022-03-15

## 2021-12-12 RX ORDER — KETOCONAZOLE 20 MG/ML
SHAMPOO, SUSPENSION TOPICAL
COMMUNITY
Start: 2021-07-14 | End: 2022-03-15

## 2021-12-12 RX ORDER — AMOXICILLIN AND CLAVULANATE POTASSIUM 875; 125 MG/1; MG/1
1 TABLET, FILM COATED ORAL 2 TIMES DAILY
Qty: 20 TABLET | Refills: 0 | Status: SHIPPED | OUTPATIENT
Start: 2021-12-12 | End: 2021-12-22

## 2021-12-12 RX ORDER — INFLUENZA A VIRUS A/VICTORIA/2570/2019 IVR-215 (H1N1) ANTIGEN (FORMALDEHYDE INACTIVATED), INFLUENZA A VIRUS A/TASMANIA/503/2020 IVR-221 (H3N2) ANTIGEN (FORMALDEHYDE INACTIVATED), INFLUENZA B VIRUS B/PHUKET/3073/2013 ANTIGEN (FORMALDEHYDE INACTIVATED), AND INFLUENZA B VIRUS B/WASHINGTON/02/2019 ANTIGEN (FORMALDEHYDE INACTIVATED) 60; 60; 60; 60 UG/.7ML; UG/.7ML; UG/.7ML; UG/.7ML
INJECTION, SUSPENSION INTRAMUSCULAR
COMMUNITY
Start: 2021-10-13 | End: 2022-03-15

## 2021-12-12 RX ORDER — NEOMYCIN SULFATE, POLYMYXIN B SULFATE, AND DEXAMETHASONE 3.5; 10000; 1 MG/G; [USP'U]/G; MG/G
OINTMENT OPHTHALMIC
COMMUNITY
Start: 2021-07-19 | End: 2022-03-15

## 2022-03-11 ENCOUNTER — TELEPHONE (OUTPATIENT)
Dept: FAMILY MEDICINE | Facility: CLINIC | Age: 77
End: 2022-03-11
Payer: MEDICARE

## 2022-03-11 ENCOUNTER — OFFICE VISIT (OUTPATIENT)
Dept: URGENT CARE | Facility: CLINIC | Age: 77
End: 2022-03-11
Payer: MEDICARE

## 2022-03-11 VITALS
OXYGEN SATURATION: 95 % | RESPIRATION RATE: 16 BRPM | HEART RATE: 69 BPM | TEMPERATURE: 98 F | WEIGHT: 152 LBS | HEIGHT: 63 IN | SYSTOLIC BLOOD PRESSURE: 147 MMHG | DIASTOLIC BLOOD PRESSURE: 80 MMHG | BODY MASS INDEX: 26.93 KG/M2

## 2022-03-11 DIAGNOSIS — R30.0 DYSURIA: ICD-10-CM

## 2022-03-11 DIAGNOSIS — N30.01 ACUTE CYSTITIS WITH HEMATURIA: Primary | ICD-10-CM

## 2022-03-11 LAB
BILIRUB UR QL STRIP: NEGATIVE
GLUCOSE UR QL STRIP: NEGATIVE
KETONES UR QL STRIP: NEGATIVE
LEUKOCYTE ESTERASE UR QL STRIP: NEGATIVE
PH, POC UA: 6 (ref 5–8)
POC BLOOD, URINE: POSITIVE
POC NITRATES, URINE: NEGATIVE
PROT UR QL STRIP: NEGATIVE
SP GR UR STRIP: 1.01 (ref 1–1.03)
UROBILINOGEN UR STRIP-ACNC: NORMAL (ref 0.1–1.1)

## 2022-03-11 PROCEDURE — 3079F PR MOST RECENT DIASTOLIC BLOOD PRESSURE 80-89 MM HG: ICD-10-PCS | Mod: CPTII,S$GLB,, | Performed by: FAMILY MEDICINE

## 2022-03-11 PROCEDURE — 1160F RVW MEDS BY RX/DR IN RCRD: CPT | Mod: CPTII,S$GLB,, | Performed by: FAMILY MEDICINE

## 2022-03-11 PROCEDURE — 1159F MED LIST DOCD IN RCRD: CPT | Mod: CPTII,S$GLB,, | Performed by: FAMILY MEDICINE

## 2022-03-11 PROCEDURE — 81003 POCT URINALYSIS, DIPSTICK, AUTOMATED, W/O SCOPE: ICD-10-PCS | Mod: QW,S$GLB,, | Performed by: FAMILY MEDICINE

## 2022-03-11 PROCEDURE — 87086 URINE CULTURE/COLONY COUNT: CPT | Performed by: FAMILY MEDICINE

## 2022-03-11 PROCEDURE — 3079F DIAST BP 80-89 MM HG: CPT | Mod: CPTII,S$GLB,, | Performed by: FAMILY MEDICINE

## 2022-03-11 PROCEDURE — 3077F PR MOST RECENT SYSTOLIC BLOOD PRESSURE >= 140 MM HG: ICD-10-PCS | Mod: CPTII,S$GLB,, | Performed by: FAMILY MEDICINE

## 2022-03-11 PROCEDURE — 87077 CULTURE AEROBIC IDENTIFY: CPT | Performed by: FAMILY MEDICINE

## 2022-03-11 PROCEDURE — 81003 URINALYSIS AUTO W/O SCOPE: CPT | Mod: QW,S$GLB,, | Performed by: FAMILY MEDICINE

## 2022-03-11 PROCEDURE — 99213 OFFICE O/P EST LOW 20 MIN: CPT | Mod: S$GLB,,, | Performed by: FAMILY MEDICINE

## 2022-03-11 PROCEDURE — 99213 PR OFFICE/OUTPT VISIT, EST, LEVL III, 20-29 MIN: ICD-10-PCS | Mod: S$GLB,,, | Performed by: FAMILY MEDICINE

## 2022-03-11 PROCEDURE — 87186 SC STD MICRODIL/AGAR DIL: CPT | Performed by: FAMILY MEDICINE

## 2022-03-11 PROCEDURE — 1160F PR REVIEW ALL MEDS BY PRESCRIBER/CLIN PHARMACIST DOCUMENTED: ICD-10-PCS | Mod: CPTII,S$GLB,, | Performed by: FAMILY MEDICINE

## 2022-03-11 PROCEDURE — 1159F PR MEDICATION LIST DOCUMENTED IN MEDICAL RECORD: ICD-10-PCS | Mod: CPTII,S$GLB,, | Performed by: FAMILY MEDICINE

## 2022-03-11 PROCEDURE — 3077F SYST BP >= 140 MM HG: CPT | Mod: CPTII,S$GLB,, | Performed by: FAMILY MEDICINE

## 2022-03-11 PROCEDURE — 87088 URINE BACTERIA CULTURE: CPT | Performed by: FAMILY MEDICINE

## 2022-03-11 RX ORDER — CIPROFLOXACIN 250 MG/1
250 TABLET, FILM COATED ORAL 2 TIMES DAILY
Qty: 10 TABLET | Refills: 0 | Status: SHIPPED | OUTPATIENT
Start: 2022-03-11 | End: 2022-03-15 | Stop reason: ALTCHOICE

## 2022-03-11 NOTE — PROGRESS NOTES
"Subjective:       Patient ID: Deepali Alex is a 77 y.o. female.    Vitals:  height is 5' 3" (1.6 m) and weight is 68.9 kg (152 lb). Her temperature is 97.8 °F (36.6 °C). Her blood pressure is 147/80 (abnormal) and her pulse is 69. Her respiration is 16 and oxygen saturation is 95%.     Chief Complaint: Urinary Tract Infection    Pt is coming in today with uti symptoms that started today, pt states she woke up this morning with burning with urination, pt states she has test strips at home from [employer] and her urine came back positive for leukotics.   Provider note begins below:  Pt reports she voided this morning and had some burning, she has voided since without symptoms. Reports last UTI about 1 year ago, denies any f/c/back pain. Denies hx of AAA, she says she has taken cipro in the past and would like this treatment again.     Urinary Tract Infection   This is a recurrent problem. The current episode started today. The problem occurs every urination. The problem has been unchanged. The quality of the pain is described as burning. The pain is at a severity of 5/10. The pain is mild. There has been no fever. She is not sexually active. There is no history of pyelonephritis. Pertinent negatives include no behavior changes, chills, discharge, flank pain, frequency, hematuria, hesitancy, nausea, possible pregnancy, sweats, urgency, vomiting, weight loss, bubble bath use, constipation, rash or withholding. She has tried nothing for the symptoms.       Constitution: Negative for activity change, appetite change, chills, sweating, fatigue and fever.   Gastrointestinal: Negative for nausea, vomiting and constipation.   Genitourinary: Positive for dysuria. Negative for frequency, urgency, urine decreased, flank pain, bladder incontinence, bed wetting, hematuria, vaginal discharge, vaginal bleeding, vaginal odor, painful intercourse, genital sore, painful ejaculation and pelvic pain.   Skin: Negative for rash.     "   Objective:      Physical Exam   Constitutional: She is oriented to person, place, and time. She appears well-developed.  Non-toxic appearance. She does not appear ill. No distress.   HENT:   Head: Normocephalic and atraumatic.   Ears:   Right Ear: External ear normal.   Left Ear: External ear normal.   Nose: Nose normal.   Mouth/Throat: Oropharynx is clear and moist.   Eyes: EOM and lids are normal.   Neck: Trachea normal and phonation normal. Neck supple.   Abdominal: Bowel sounds are normal. Soft. There is no abdominal tenderness. There is no left CVA tenderness and no right CVA tenderness.   Musculoskeletal: Normal range of motion.         General: Normal range of motion.   Neurological: She is alert and oriented to person, place, and time.   Skin: Skin is warm, dry, intact and not diaphoretic.   Psychiatric: Her speech is normal and behavior is normal. Judgment and thought content normal.   Nursing note and vitals reviewed.          Assessment:       1. Acute cystitis with hematuria    2. Dysuria        Results for orders placed or performed in visit on 03/11/22   POCT Urinalysis, Dipstick, Automated, W/O Scope   Result Value Ref Range    POC Blood, Urine Positive (A) Negative    POC Bilirubin, Urine Negative Negative    POC Urobilinogen, Urine normal 0.1 - 1.1    POC Ketones, Urine Negative Negative    POC Protein, Urine Negative Negative    POC Nitrates, Urine Negative Negative    POC Glucose, Urine Negative Negative    pH, UA 6.0 5 - 8    POC Specific Gravity, Urine 1.015 1.003 - 1.029    POC Leukocytes, Urine Negative Negative      Plan:       No CVAT  Will give cipro per request, taken in the past without problem  Risks of fluoroquinolones discussed in detail, she agrees with treatment plan.    Discussed results/diagnosis/plan with patient in clinic. Strict precautions given to patient to monitor for worsening signs and symptoms. Advised to follow up with PCP or specialist.    Explained side effects of  medications prescribed with patient and informed him/her to discontinue use if he/she has any side effects and to inform UC or PCP if this occurs. All questions answered. Strict ED verses clinic return precautions stressed and given in depth. Advised if symptoms worsens of fail to improve he/she should go to the Emergency Room. Discharge and follow-up instructions given verbally/printed with the patient who expressed understanding and willingness to comply with my recommendations. Patient voiced understanding and in agreement with current treatment plan. Patient exits the exam room in no acute distress. Conversant and engaged during discharge discussion, verbalized understanding.      Acute cystitis with hematuria  -     ciprofloxacin HCl (CIPRO) 250 MG tablet; Take 1 tablet (250 mg total) by mouth 2 (two) times daily. for 5 days  Dispense: 10 tablet; Refill: 0    Dysuria  -     POCT Urinalysis, Dipstick, Automated, W/O Scope  -     Culture, Urine           Medical Decision Making:   Clinical Tests:   Lab Tests: Ordered and Reviewed  The following lab test(s) were unremarkable: Urinalysis  Urgent Care Management:  After complete evaluation, including thorough history and physical exam, presentation is most consistent with uncomplicated UTI.  The patient has no severe flank pain or systemic symptoms to suggest pyelonephritis or sepsis. Physical exam is inconsistent with nephrolithiasis or acute intra-abdominal infection.  Patient is tolerating PO and is stable for D/C with PO antibiotics.  The patient was informed of findings, and recommended to follow-up with PCP for further management and urine re-check in 3-5 days.         Patient Instructions   General Discharge Instructions   PLEASE READ YOUR DISCHARGE INSTRUCTIONS ENTIRELY AS IT CONTAINS IMPORTANT INFORMATION.  If you were prescribed a narcotic or controlled medication, do not drive or operate heavy equipment or machinery while taking these medications.  If you  were prescribed antibiotics, please take them to completion.  You must understand that you've received an Urgent Care treatment only and that you may be released before all your medical problems are known or treated. You, the patient, will arrange for follow up care as instructed.    OVER THE COUNTER RECOMMENDATIONS/SUGGESTIONS.    Make sure to stay well hydrated.    Use Nasal Saline to mechanically move any post nasal drip from your eustachian tube or from the back of your throat.    Use warm salt water gargles to ease your throat pain. Warm salt water gargles as needed for sore throat- 1/2 tsp salt to 1 cup warm water, gargle as desired.    Use an antihistamine such as Claritin, Zyrtec or Allegra to dry you out.    Use pseudoephedrine (behind the counter) to decongest. Pseudoephedrine 30 mg up to 240 mg /day. It can raise your blood pressure and give you palpitations.    Use mucinex (guaifenesin) to break up mucous up to 2400mg/day to loosen any mucous.    The mucinex DM pill has a cough suppressant that can be sedating. It can be used at night to stop the tickle at the back of your throat.    You can use Mucinex D (it has guaifenesin and a high dose of pseudoephedrine) in the mornings to help decongest.    Use Afrin in each nare for no longer than 3 days, as it is addictive. It can also dry out your mucous membranes and cause elevated blood pressure. This is especially useful if you are flying.    Use Flonase 1-2 sprays/nostril per day. It is a local acting steroid nasal spray, if you develop a bloody nose, stop using the medication immediately.    Sometimes Nyquil at night is beneficial to help you get some rest, however it is sedating and it does have an antihistamine, and tylenol.    Honey is a natural cough suppressant that can be used.    Tylenol up to 4,000 mg a day is safe for short periods and can be used for body aches, pain, and fever. However in high doses and prolonged use it can cause liver  irritation.    Ibuprofen is a non-steroidal anti-inflammatory that can be used for body aches, pain, and fever.However it can also cause stomach irritation if over used.     Follow up with your PCP or specialty clinic as instructed in the next 2-3 days if not improved or as needed. You can call [phone] to schedule an appointment with appropriate provider.      If you condition worsens, we recommend that you receive another evaluation at the emergency room immediately or contact your primary medical clinic's after hours call service to discuss your concerns.      Please return here or go to the Emergency Department for any concerns or worsening condition.   You can also call [phone] to schedule an appointment with the appropriate provider.    Please return here or go to the Emergency Department for any concerns or worsening of condition.    Thank you for choosing [facility]!    Our goal in the Urgent Care is to always provide outstanding medical care. You may receive a survey by mail or e-mail in the next week regarding your experience today. We would greatly appreciate you completing and returning the survey. Your feedback provides us with a way to recognize our staff who provide very good care, and it helps us learn how to improve when your experience was below our aspiration of excellence.      We appreciate you trusting us with your medical care. We hope you feel better soon. We will be happy to take care of you for all of your future medical needs.    Sincerely,    YOLANDA Ross    Acute Cystitis Discharge Instructions   About this topic   Acute cystitis is irritation of the bladder. It is often caused by germs getting into the urinary tract. The urinary tract includes the kidneys, ureters, bladder, and urethra. The urethra is a tube at the bottom of the bladder. Urine flows out of this tube. The germs enter the urethra and then spread in the bladder. These germs may cause an  infection in the bladder or urinary tract. This condition may also be caused by irritation from things like bubble baths, sanitary pads, sex, certain drugs, or certain foods. This condition is more common if you are pregnant.         What care is needed at home?   · Ask your doctor what you need to do when you go home. Make sure you ask questions if you do not understand what the doctor says.  · For the first day or so, you may want to take an over-the-counter medicine, like phenazopyridine. This will help to numb your bladder. You will also not have the strong urge to urinate. This medicine causes your urine and tears to look orange. If you have kidney disease, talk to your doctor before taking this medicine.  · To lower your chance of getting a UTI in the future, you can:  ? Drink extra fluids.  ? If you have sex, urinate right afterwards.  · Apply a warm compress or warm water bottle to your lower belly to lessen pain.  · Practice good hygiene. Wipe from front to back after going to the toilet.  · Do not use scented tampons, soap, or toilet paper.  · Keep your genital area clean. Wash daily with soap and water.  · Take showers instead of tub baths.  · Do not use douches or genital hygiene sprays.  What follow-up care is needed?   Your doctor may ask you to make visits to the office to check on your progress. Be sure to keep these visits.  What drugs may be needed?   The doctor may order drugs to:  · Help with pain or swelling  · Help with the urge or need to pass urine often  · Fight an infection  Will physical activity be limited?   Physical activities will not be limited. You may have to pass urine more often.  What changes to diet are needed?   · Do not drink beer, wine, and mixed drinks (alcohol) or caffeine. These can bother the bladder.  · Talk to your doctor about drinking cranberry juice or taking cranberry tablets.  What problems could happen?   · Kidney infection  · Blood stream infection or  sepsis  · Blood in the urine  When do I need to call the doctor?   · You have very bad pain in your back, shoulder, or belly.  · You have a fever of 100.4°F (38°C) or higher; shaking chills or sweats even though you are taking antibiotics.  · You notice more blood in your urine.  · Your signs get worse or do not improve within 24 hours of starting treatment.  · You are not able to urinate for more than 8 hours.  · Your signs come back after treatment has stopped.  Teach Back: Helping You Understand   The Teach Back Method helps you understand the information we are giving you. After you talk with the staff, tell them in your own words what you learned. This helps to make sure the staff has described each thing clearly. It also helps to explain things that may have been confusing. Before going home, make sure you can do these:  · I can tell you about my condition.  · I can tell you what are good fluids for me to drink and how often I should try to go to the bathroom.  · I can tell you what I will do if I have a fever; chills; pain with passing urine; blood in my urine; or back, side, or belly pain.  Where can I learn more?   Better [employer]  https://www.betterhealth.[place].gov.[place]/health/ConditionsAndTreatments/cystitis   NHS Choices  http://www.nhs.uk/conditions/Cystitis/Pages/Introduction.aspx   Last Reviewed Date   2021-06-09  Consumer Information Use and Disclaimer   This information is not specific medical advice and does not replace information you receive from your health care provider. This is only a brief summary of general information. It does NOT include all information about conditions, illnesses, injuries, tests, procedures, treatments, therapies, discharge instructions or life-style choices that may apply to you. You must talk with your health care provider for complete information about your health and treatment options. This information should not be used to decide whether or not to accept your  health care providers advice, instructions or recommendations. Only your health care provider has the knowledge and training to provide advice that is right for you.  Copyright   Copyright © 2021 [employer], Inc. and its affiliates and/or licensors. All rights reserved.     Patient Education        Urinary Tract Infection Discharge Instructions, Adult   About this topic   A urinary tract infection is a UTI. It is caused by germs getting into the urinary tract. The urinary tract is made up of the kidneys, ureters, bladder, and urethra. The urethra is a tube at the bottom of the bladder. Urine flows out of this tube. The germs enter the urethra and then spread in the bladder. The ureters are small tubes that join the bladder and the kidneys. Most UTIs are infections in either your bladder or your kidneys. Bladder infections are more common and may also be called cystitis. Kidney infections are more serious and may also be called pyelonephritis.         What care is needed at home?   · Ask your doctor what you need to do when you go home. Make sure you ask questions if you do not understand what the doctor says. This way you will know what you need to do.  · Take your drugs as ordered by your doctor.  · Unless your doctor has told you otherwise, drink at least 8 to 10 glasses of water or water-based drinks each day. Do not include drinks with caffeine, like coffee or tea.  · Do not hold back your urine. Go to the bathroom every 2 to 3 hours.  What follow-up care is needed?   Your doctor may ask you to make visits to the office to check on your progress. Be sure to keep these visits.  What drugs may be needed?   The doctor may order drugs to:  · Fight an infection  · Help with pain  · Numb your bladder  · Help you pass your urine more easily  Be sure to talk to your doctor about all of your drugs if you are pregnant.  Will physical activity be limited?   Physical activities will not be limited. You may have to pass urine  more often.  What changes to diet are needed?   · Do not drink beer, wine, and mixed drinks (alcohol) or caffeine. These can bother the bladder.  · Talk to your doctor about drinking cranberry juice.  What can be done to prevent this health problem?   · Gently cleanse your genital area each day. Wipe from front to back to keep germs from going in your body.  · If your penis is uncircumcised, retract the foreskin and gently clean around the head of the penis each day.  · Consider other methods of birth control instead of a spermicide.  · Empty your bladder after having sex.  · Empty your bladder before going to sleep.  When do I need to call the doctor?   · Signs of infection. These include a fever of 100.4°F (38°C) or higher, chills, back pain, nausea, throwing up, or bloody urine.  · Signs come back after treatment ends  · You notice more blood in your urine.  · Your signs get worse or do not improve within 24 hours of starting treatment.  · You are not able to urinate for more than 8 hours.  · Your signs come back after treatment has stopped.  Teach Back: Helping You Understand   The Teach Back Method helps you understand the information we are giving you. After you talk with the staff, tell them in your own words what you learned. This helps to make sure the staff has described each thing clearly. It also helps to explain things that may have been confusing. Before going home, make sure you can do these things:  · I can tell you about my condition.  · I can tell you how to prevent this problem from coming back.  · I can tell you what I will do if my signs do not get better after 24 hours of treatment or come back after I have finished treatment.  Where can I learn more?   American Academy of Family Physicians  https://familydoctor.org/condition/urinary-tract-infections/   NHS Choices  https://www.nhs.uk/conditions/urinary-tract-infections-utis/   Last Reviewed Date   2021-06-03  Consumer Information Use and  Disclaimer   This information is not specific medical advice and does not replace information you receive from your health care provider. This is only a brief summary of general information. It does NOT include all information about conditions, illnesses, injuries, tests, procedures, treatments, therapies, discharge instructions or life-style choices that may apply to you. You must talk with your health care provider for complete information about your health and treatment options. This information should not be used to decide whether or not to accept your health care providers advice, instructions or recommendations. Only your health care provider has the knowledge and training to provide advice that is right for you.  Copyright   Copyright © 2021 [employer]Date, Inc. and its affiliates and/or licensors. All rights reserved.

## 2022-03-11 NOTE — PATIENT INSTRUCTIONS
General Discharge Instructions   PLEASE READ YOUR DISCHARGE INSTRUCTIONS ENTIRELY AS IT CONTAINS IMPORTANT INFORMATION.  If you were prescribed a narcotic or controlled medication, do not drive or operate heavy equipment or machinery while taking these medications.  If you were prescribed antibiotics, please take them to completion.  You must understand that you've received an Urgent Care treatment only and that you may be released before all your medical problems are known or treated. You, the patient, will arrange for follow up care as instructed.    OVER THE COUNTER RECOMMENDATIONS/SUGGESTIONS.    Make sure to stay well hydrated.    Use Nasal Saline to mechanically move any post nasal drip from your eustachian tube or from the back of your throat.    Use warm salt water gargles to ease your throat pain. Warm salt water gargles as needed for sore throat- 1/2 tsp salt to 1 cup warm water, gargle as desired.    Use an antihistamine such as Claritin, Zyrtec or Allegra to dry you out.    Use pseudoephedrine (behind the counter) to decongest. Pseudoephedrine 30 mg up to 240 mg /day. It can raise your blood pressure and give you palpitations.    Use mucinex (guaifenesin) to break up mucous up to 2400mg/day to loosen any mucous.    The mucinex DM pill has a cough suppressant that can be sedating. It can be used at night to stop the tickle at the back of your throat.    You can use Mucinex D (it has guaifenesin and a high dose of pseudoephedrine) in the mornings to help decongest.    Use Afrin in each nare for no longer than 3 days, as it is addictive. It can also dry out your mucous membranes and cause elevated blood pressure. This is especially useful if you are flying.    Use Flonase 1-2 sprays/nostril per day. It is a local acting steroid nasal spray, if you develop a bloody nose, stop using the medication immediately.    Sometimes Nyquil at night is beneficial to help you get some rest, however it is sedating and it  does have an antihistamine, and tylenol.    Honey is a natural cough suppressant that can be used.    Tylenol up to 4,000 mg a day is safe for short periods and can be used for body aches, pain, and fever. However in high doses and prolonged use it can cause liver irritation.    Ibuprofen is a non-steroidal anti-inflammatory that can be used for body aches, pain, and fever.However it can also cause stomach irritation if over used.     Follow up with your PCP or specialty clinic as instructed in the next 2-3 days if not improved or as needed. You can call [phone] to schedule an appointment with appropriate provider.      If you condition worsens, we recommend that you receive another evaluation at the emergency room immediately or contact your primary medical clinic's after hours call service to discuss your concerns.      Please return here or go to the Emergency Department for any concerns or worsening condition.   You can also call [phone] to schedule an appointment with the appropriate provider.    Please return here or go to the Emergency Department for any concerns or worsening of condition.    Thank you for choosing [facility]!    Our goal in the Urgent Care is to always provide outstanding medical care. You may receive a survey by mail or e-mail in the next week regarding your experience today. We would greatly appreciate you completing and returning the survey. Your feedback provides us with a way to recognize our staff who provide very good care, and it helps us learn how to improve when your experience was below our aspiration of excellence.      We appreciate you trusting us with your medical care. We hope you feel better soon. We will be happy to take care of you for all of your future medical needs.    Sincerely,    YOLANDA Ross

## 2022-03-11 NOTE — TELEPHONE ENCOUNTER
Patient is wanting to know if she could be prescribed for Cipro for urine infection,please advise

## 2022-03-11 NOTE — TELEPHONE ENCOUNTER
----- Message from Lianet Bruno sent at 3/11/2022  9:59 AM CST -----  Type:  Needs Medical Advice/Symptom-based Call    Who Called:  Self    Symptoms (please be specific): UTI ( burning when urinating )  but has urinated 3 times this morning , she would like to know if she can get Cipro called in     How long has patient had these symptoms:  this morning     Would the patient rather a call back or a response via [facility]? Call     Best Call Back Number: .[phone] (home)       [id number] - [place], [place] - [address]   Phone:  [phone]  Fax:  [phone]

## 2022-03-15 ENCOUNTER — TELEPHONE (OUTPATIENT)
Dept: FAMILY MEDICINE | Facility: CLINIC | Age: 77
End: 2022-03-15
Payer: MEDICARE

## 2022-03-15 ENCOUNTER — OFFICE VISIT (OUTPATIENT)
Dept: FAMILY MEDICINE | Facility: CLINIC | Age: 77
End: 2022-03-15
Payer: MEDICARE

## 2022-03-15 VITALS
HEIGHT: 63 IN | DIASTOLIC BLOOD PRESSURE: 70 MMHG | HEART RATE: 84 BPM | TEMPERATURE: 98 F | OXYGEN SATURATION: 97 % | SYSTOLIC BLOOD PRESSURE: 130 MMHG | BODY MASS INDEX: 27.29 KG/M2 | WEIGHT: 154 LBS

## 2022-03-15 DIAGNOSIS — N81.10 BLADDER PROLAPSE, FEMALE, ACQUIRED: ICD-10-CM

## 2022-03-15 DIAGNOSIS — N39.0 URINARY TRACT INFECTION WITH HEMATURIA, SITE UNSPECIFIED: Primary | ICD-10-CM

## 2022-03-15 DIAGNOSIS — R19.8 PULSATILE ABDOMEN: ICD-10-CM

## 2022-03-15 DIAGNOSIS — R03.0 WHITE COAT SYNDROME WITHOUT DIAGNOSIS OF HYPERTENSION: ICD-10-CM

## 2022-03-15 DIAGNOSIS — I70.0 ATHEROSCLEROSIS OF AORTIC ARCH: ICD-10-CM

## 2022-03-15 DIAGNOSIS — R31.9 URINARY TRACT INFECTION WITH HEMATURIA, SITE UNSPECIFIED: Primary | ICD-10-CM

## 2022-03-15 PROCEDURE — 99214 OFFICE O/P EST MOD 30 MIN: CPT | Mod: S$GLB,,, | Performed by: INTERNAL MEDICINE

## 2022-03-15 PROCEDURE — 1101F PT FALLS ASSESS-DOCD LE1/YR: CPT | Mod: CPTII,S$GLB,, | Performed by: INTERNAL MEDICINE

## 2022-03-15 PROCEDURE — 1101F PR PT FALLS ASSESS DOC 0-1 FALLS W/OUT INJ PAST YR: ICD-10-PCS | Mod: CPTII,S$GLB,, | Performed by: INTERNAL MEDICINE

## 2022-03-15 PROCEDURE — 3075F PR MOST RECENT SYSTOLIC BLOOD PRESS GE 130-139MM HG: ICD-10-PCS | Mod: CPTII,S$GLB,, | Performed by: INTERNAL MEDICINE

## 2022-03-15 PROCEDURE — 99999 PR PBB SHADOW E&M-EST. PATIENT-LVL V: ICD-10-PCS | Mod: PBBFAC,,, | Performed by: INTERNAL MEDICINE

## 2022-03-15 PROCEDURE — 1126F AMNT PAIN NOTED NONE PRSNT: CPT | Mod: CPTII,S$GLB,, | Performed by: INTERNAL MEDICINE

## 2022-03-15 PROCEDURE — 3078F PR MOST RECENT DIASTOLIC BLOOD PRESSURE < 80 MM HG: ICD-10-PCS | Mod: CPTII,S$GLB,, | Performed by: INTERNAL MEDICINE

## 2022-03-15 PROCEDURE — 1160F PR REVIEW ALL MEDS BY PRESCRIBER/CLIN PHARMACIST DOCUMENTED: ICD-10-PCS | Mod: CPTII,S$GLB,, | Performed by: INTERNAL MEDICINE

## 2022-03-15 PROCEDURE — 99999 PR PBB SHADOW E&M-EST. PATIENT-LVL V: CPT | Mod: PBBFAC,,, | Performed by: INTERNAL MEDICINE

## 2022-03-15 PROCEDURE — 99499 RISK ADDL DX/OHS AUDIT: ICD-10-PCS | Mod: S$GLB,,, | Performed by: INTERNAL MEDICINE

## 2022-03-15 PROCEDURE — 99499 UNLISTED E&M SERVICE: CPT | Mod: S$GLB,,, | Performed by: INTERNAL MEDICINE

## 2022-03-15 PROCEDURE — 3288F PR FALLS RISK ASSESSMENT DOCUMENTED: ICD-10-PCS | Mod: CPTII,S$GLB,, | Performed by: INTERNAL MEDICINE

## 2022-03-15 PROCEDURE — 99214 PR OFFICE/OUTPT VISIT, EST, LEVL IV, 30-39 MIN: ICD-10-PCS | Mod: S$GLB,,, | Performed by: INTERNAL MEDICINE

## 2022-03-15 PROCEDURE — 1126F PR PAIN SEVERITY QUANTIFIED, NO PAIN PRESENT: ICD-10-PCS | Mod: CPTII,S$GLB,, | Performed by: INTERNAL MEDICINE

## 2022-03-15 PROCEDURE — 3078F DIAST BP <80 MM HG: CPT | Mod: CPTII,S$GLB,, | Performed by: INTERNAL MEDICINE

## 2022-03-15 PROCEDURE — 1160F RVW MEDS BY RX/DR IN RCRD: CPT | Mod: CPTII,S$GLB,, | Performed by: INTERNAL MEDICINE

## 2022-03-15 PROCEDURE — 3288F FALL RISK ASSESSMENT DOCD: CPT | Mod: CPTII,S$GLB,, | Performed by: INTERNAL MEDICINE

## 2022-03-15 PROCEDURE — 1159F PR MEDICATION LIST DOCUMENTED IN MEDICAL RECORD: ICD-10-PCS | Mod: CPTII,S$GLB,, | Performed by: INTERNAL MEDICINE

## 2022-03-15 PROCEDURE — 3075F SYST BP GE 130 - 139MM HG: CPT | Mod: CPTII,S$GLB,, | Performed by: INTERNAL MEDICINE

## 2022-03-15 PROCEDURE — 1159F MED LIST DOCD IN RCRD: CPT | Mod: CPTII,S$GLB,, | Performed by: INTERNAL MEDICINE

## 2022-03-15 RX ORDER — NITROFURANTOIN 25; 75 MG/1; MG/1
100 CAPSULE ORAL 2 TIMES DAILY
Qty: 10 CAPSULE | Refills: 0 | Status: SHIPPED | OUTPATIENT
Start: 2022-03-15 | End: 2022-03-20

## 2022-03-16 ENCOUNTER — TELEPHONE (OUTPATIENT)
Dept: URGENT CARE | Facility: CLINIC | Age: 77
End: 2022-03-16
Payer: MEDICARE

## 2022-03-16 LAB — BACTERIA UR CULT: ABNORMAL

## 2022-03-16 NOTE — TELEPHONE ENCOUNTER
Called pt to discuss UCx with pt. (+) E. Coli. Tx with macrobid by PCP. Reports improvement in sxs.

## 2022-04-19 ENCOUNTER — PATIENT OUTREACH (OUTPATIENT)
Dept: ADMINISTRATIVE | Facility: OTHER | Age: 77
End: 2022-04-19
Payer: MEDICARE

## 2022-04-19 NOTE — PROGRESS NOTES
Health Maintenance Due   Topic Date Due    High Dose Statin  Never done    Shingles Vaccine (2 of 3) 01/28/2016    Mammogram  05/13/2022     Updates were requested from care everywhere.  Chart was reviewed for overdue Proactive [facility] Encounters ([facility]) topics (CRS, Breast Cancer Screening, Eye exam)  Health Maintenance has been updated.  [employer] immunization registry triggered.  Immunizations were reconciled.

## 2022-04-20 ENCOUNTER — TELEPHONE (OUTPATIENT)
Dept: UROGYNECOLOGY | Facility: CLINIC | Age: 77
End: 2022-04-20
Payer: MEDICARE

## 2022-04-20 DIAGNOSIS — R19.8 PULSATILE ABDOMEN: Primary | ICD-10-CM

## 2022-04-20 NOTE — TELEPHONE ENCOUNTER
----- Message from Maryann Mann sent at 4/20/2022 11:12 AM CDT -----  Pt is calling to see if she can be seen sooner  Pt can be contacted at [phone]

## 2022-04-20 NOTE — TELEPHONE ENCOUNTER
----- Message from Domenica Smith sent at 4/20/2022 11:29 AM CDT -----  Pt called and wanted to be seen before her 05/24 appointment with the provider.     PT call back is [phone]

## 2022-04-20 NOTE — TELEPHONE ENCOUNTER
Patient returned call, patient was informed that she is on the waiting list for a sooner appointment, but the appointment on 5/24/22 at 9:30am is the soonest appointment at the moment.    Ivy SERRATO MA

## 2022-04-20 NOTE — TELEPHONE ENCOUNTER
Reached out to patient in regards to her message about getting sooner appointment that 5/24/22 at 9:30am. Patient did not and lvm instructing patient to return the call.    Ivy SERRATO MA

## 2022-04-21 ENCOUNTER — OFFICE VISIT (OUTPATIENT)
Dept: CARDIOLOGY | Facility: CLINIC | Age: 77
End: 2022-04-21
Payer: MEDICARE

## 2022-04-21 VITALS
HEIGHT: 63 IN | DIASTOLIC BLOOD PRESSURE: 62 MMHG | SYSTOLIC BLOOD PRESSURE: 138 MMHG | WEIGHT: 156.44 LBS | HEART RATE: 96 BPM | OXYGEN SATURATION: 98 % | RESPIRATION RATE: 15 BRPM | BODY MASS INDEX: 27.72 KG/M2

## 2022-04-21 DIAGNOSIS — R19.8 PULSATILE ABDOMEN: ICD-10-CM

## 2022-04-21 DIAGNOSIS — I45.2 RIGHT BUNDLE BRANCH BLOCK (RBBB) WITH LEFT ANTERIOR FASCICULAR BLOCK (LAFB): ICD-10-CM

## 2022-04-21 DIAGNOSIS — I70.0 ATHEROSCLEROSIS OF AORTIC ARCH: ICD-10-CM

## 2022-04-21 DIAGNOSIS — R00.2 PALPITATIONS: Primary | ICD-10-CM

## 2022-04-21 DIAGNOSIS — E78.5 DYSLIPIDEMIA: ICD-10-CM

## 2022-04-21 PROCEDURE — 1159F MED LIST DOCD IN RCRD: CPT | Mod: CPTII,S$GLB,, | Performed by: INTERNAL MEDICINE

## 2022-04-21 PROCEDURE — 3288F FALL RISK ASSESSMENT DOCD: CPT | Mod: CPTII,S$GLB,, | Performed by: INTERNAL MEDICINE

## 2022-04-21 PROCEDURE — 3288F PR FALLS RISK ASSESSMENT DOCUMENTED: ICD-10-PCS | Mod: CPTII,S$GLB,, | Performed by: INTERNAL MEDICINE

## 2022-04-21 PROCEDURE — 99204 PR OFFICE/OUTPT VISIT, NEW, LEVL IV, 45-59 MIN: ICD-10-PCS | Mod: S$GLB,,, | Performed by: INTERNAL MEDICINE

## 2022-04-21 PROCEDURE — 3078F PR MOST RECENT DIASTOLIC BLOOD PRESSURE < 80 MM HG: ICD-10-PCS | Mod: CPTII,S$GLB,, | Performed by: INTERNAL MEDICINE

## 2022-04-21 PROCEDURE — 93000 ELECTROCARDIOGRAM COMPLETE: CPT | Mod: S$GLB,,, | Performed by: INTERNAL MEDICINE

## 2022-04-21 PROCEDURE — 3075F PR MOST RECENT SYSTOLIC BLOOD PRESS GE 130-139MM HG: ICD-10-PCS | Mod: CPTII,S$GLB,, | Performed by: INTERNAL MEDICINE

## 2022-04-21 PROCEDURE — 99999 PR PBB SHADOW E&M-EST. PATIENT-LVL III: ICD-10-PCS | Mod: PBBFAC,,, | Performed by: INTERNAL MEDICINE

## 2022-04-21 PROCEDURE — 1101F PT FALLS ASSESS-DOCD LE1/YR: CPT | Mod: CPTII,S$GLB,, | Performed by: INTERNAL MEDICINE

## 2022-04-21 PROCEDURE — 3078F DIAST BP <80 MM HG: CPT | Mod: CPTII,S$GLB,, | Performed by: INTERNAL MEDICINE

## 2022-04-21 PROCEDURE — 1159F PR MEDICATION LIST DOCUMENTED IN MEDICAL RECORD: ICD-10-PCS | Mod: CPTII,S$GLB,, | Performed by: INTERNAL MEDICINE

## 2022-04-21 PROCEDURE — 99999 PR PBB SHADOW E&M-EST. PATIENT-LVL III: CPT | Mod: PBBFAC,,, | Performed by: INTERNAL MEDICINE

## 2022-04-21 PROCEDURE — 1101F PR PT FALLS ASSESS DOC 0-1 FALLS W/OUT INJ PAST YR: ICD-10-PCS | Mod: CPTII,S$GLB,, | Performed by: INTERNAL MEDICINE

## 2022-04-21 PROCEDURE — 93000 EKG 12-LEAD: ICD-10-PCS | Mod: S$GLB,,, | Performed by: INTERNAL MEDICINE

## 2022-04-21 PROCEDURE — 99204 OFFICE O/P NEW MOD 45 MIN: CPT | Mod: S$GLB,,, | Performed by: INTERNAL MEDICINE

## 2022-04-21 PROCEDURE — 3075F SYST BP GE 130 - 139MM HG: CPT | Mod: CPTII,S$GLB,, | Performed by: INTERNAL MEDICINE

## 2022-04-21 NOTE — PROGRESS NOTES
CARDIOLOGY CONSULTATION    REASON FOR CONSULT:   Deepali Alex is a 77 y.o. female who presents for evaluation palpitations.      HISTORY OF PRESENT ILLNESS:     Deepali Alex presents for evaluation of palpitations.  Symptoms since August of last year.  Almost daily.  Describes a rhythmic sensation.  Pulsatile.  At times feels her heart racing.  Can last a few minutes upwards of hours.  EKG shows sinus rhythm RBBB/LAFB.  No history of hypertension.  Last . On exam aorta is palpable but does not feel enlarged.    CARDIOVASCULAR HISTORY:     None    PAST MEDICAL HISTORY:     Past Medical History:   Diagnosis Date    Age-related osteoporosis without current pathological fracture     Atherosclerosis of aortic arch     - noted on CXR 8/2017    Back pain     Diverticulosis of sigmoid colon 10/22/2019    Fibrocystic breast     Hyperlipidemia     Osteopenia     Polyp of ascending colon 10/22/2019       PAST SURGICAL HISTORY:     Past Surgical History:   Procedure Laterality Date    ECTOPIC PREGNANCY SURGERY      HERNIA REPAIR      left inguinal    HYSTERECTOMY  1980    without BSO       ALLERGIES AND MEDICATION:     Review of patient's allergies indicates:   Allergen Reactions    Erythromycin (bulk)      Other reaction(s): Eye irritation        Medication List      as of April 21, 2022  2:52 PM     You have not been prescribed any medications.         SOCIAL HISTORY:     Social History     Socioeconomic History    Marital status:     Number of children: 2   Occupational History    Occupation: retired   Tobacco Use    Smoking status: Never Smoker    Smokeless tobacco: Never Used   Substance and Sexual Activity    Alcohol use: No    Drug use: No    Sexual activity: Not Currently     Social Determinants of Health     Financial Resource Strain: Low Risk     Difficulty of Paying Living Expenses: Not hard at all   Food Insecurity: No Food Insecurity    Worried About Running Out of  Food in the Last Year: Never true    Ran Out of Food in the Last Year: Never true   Transportation Needs: No Transportation Needs    Lack of Transportation (Medical): No    Lack of Transportation (Non-Medical): No   Physical Activity: Inactive    Days of Exercise per Week: 0 days    Minutes of Exercise per Session: 0 min   Stress: No Stress Concern Present    Feeling of Stress : Only a little   Social Connections: Unknown    Frequency of Communication with Friends and Family: Three times a week    Frequency of Social Gatherings with Friends and Family: Three times a week    Active Member of Clubs or Organizations: No    Attends Club or Organization Meetings: Never    Marital Status:    Housing Stability: Low Risk     Unable to Pay for Housing in the Last Year: No    Number of Places Lived in the Last Year: 1    Unstable Housing in the Last Year: No       FAMILY HISTORY:     Family History   Problem Relation Age of Onset    Breast cancer Mother 69    Seizures Mother     Heart disease Father     Heart attack Father     Depression Father     Breast cancer Sister 72    Breast cancer Maternal Aunt 70    Obesity Brother     Mental illness Paternal Grandmother     Depression Paternal Aunt     Depression Paternal Uncle     Tongue cancer Cousin     Ovarian cancer Neg Hx        REVIEW OF SYSTEMS:   Review of Systems   Constitutional: Negative for chills, diaphoresis, fever, malaise/fatigue and weight loss.   Eyes: Negative for blurred vision and pain.   Respiratory: Negative for sputum production, shortness of breath and wheezing.    Cardiovascular: Positive for palpitations. Negative for chest pain, orthopnea, claudication, leg swelling and PND.   Gastrointestinal: Negative for abdominal pain, heartburn, nausea and vomiting.   Musculoskeletal: Negative for back pain, falls, joint pain, myalgias and neck pain.   Neurological: Negative for dizziness, speech change, focal weakness, loss of  "consciousness, weakness and headaches.   Endo/Heme/Allergies: Does not bruise/bleed easily.   Psychiatric/Behavioral: Negative for depression, memory loss and substance abuse. The patient is not nervous/anxious.        PHYSICAL EXAM:     Vitals:    04/21/22 1414   BP: 138/62   Pulse: 96   Resp: 15    Body mass index is 27.71 kg/m².  Weight: 70.9 kg (156 lb 6.7 oz)   Height: 5' 3" (160 cm)     Physical Exam  Vitals reviewed.   Constitutional:       General: She is not in acute distress.     Appearance: She is well-developed. She is not diaphoretic.   HENT:      Head: Normocephalic.   Neck:      Vascular: No carotid bruit or JVD.   Cardiovascular:      Rate and Rhythm: Normal rate and regular rhythm.      Pulses: Normal pulses.      Heart sounds: Normal heart sounds.   Pulmonary:      Effort: Pulmonary effort is normal.      Breath sounds: Normal breath sounds.   Abdominal:      General: Bowel sounds are normal.      Palpations: Abdomen is soft.      Tenderness: There is no abdominal tenderness.   Skin:     General: Skin is warm and dry.   Neurological:      Mental Status: She is alert and oriented to person, place, and time.   Psychiatric:         Speech: Speech normal.         Behavior: Behavior normal.         Thought Content: Thought content normal.         DATA:   EKG: (personally reviewed tracing)  04/21/2022-sinus rhythm, right bundle branch block/left anterior fascicular block  Laboratory:  CBC:  Recent Labs   Lab 09/27/19  0902 05/03/21  1025 10/20/21  1024   WBC 5.5 5.71 7.60   Hemoglobin 12.8 12.3 12.5   Hematocrit 40.2 37.7 38.7   Platelets 266 251 280       CHEMISTRIES:  Recent Labs   Lab 09/27/19  0902 05/03/21  1025 10/20/21  1024   Glucose 91 76 85   Sodium 139 139 140   Potassium 4.7 4.0 4.6   BUN 17 13 16   Creatinine 0.70 0.7 0.7   eGFR if African American 100 >60.0 >60.0   eGFR if non African American 87 >60.0 >60.0   Calcium 9.4 9.0 9.6       CARDIAC BIOMARKERS:        COAGS:    "     LIPIDS/LFTS:  Recent Labs   Lab 09/27/19  0902 05/03/21  1025 10/20/21  1024   Cholesterol 207 H 190  --    Triglycerides 84 78  --    HDL 65 63  --    LDL Cholesterol 124 H 111.4  --    Non-HDL Cholesterol  --  127  --    Non HDL Chol. (LDL+VLDL) 142 H  --   --    AST 14 21 18   ALT 11 14 15       Cardiovascular Testing:      ASSESSMENT:     1. Palpitations  2. Pulsatile abdomen:  Palpable aorta but does not feel enlarged  3. Aortic atherosclerosis  4. 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 Palpitations:  24 hour Holter.  2D echocardiogram.  2. Pulsatile abdomen:  AAA screen.  3. Return to clinic 1 month.            Gopi Ambrocio MD, MPH, FACC, [facility]

## 2022-04-29 ENCOUNTER — HOSPITAL ENCOUNTER (OUTPATIENT)
Dept: CARDIOLOGY | Facility: HOSPITAL | Age: 77
Discharge: HOME | End: 2022-04-29
Attending: INTERNAL MEDICINE
Payer: MEDICARE

## 2022-04-29 DIAGNOSIS — I45.2 RIGHT BUNDLE BRANCH BLOCK (RBBB) WITH LEFT ANTERIOR FASCICULAR BLOCK (LAFB): ICD-10-CM

## 2022-04-29 DIAGNOSIS — R00.2 PALPITATIONS: ICD-10-CM

## 2022-04-29 DIAGNOSIS — I70.0 ATHEROSCLEROSIS OF AORTIC ARCH: ICD-10-CM

## 2022-04-29 DIAGNOSIS — R19.8 PULSATILE ABDOMEN: ICD-10-CM

## 2022-04-29 DIAGNOSIS — E78.5 DYSLIPIDEMIA: ICD-10-CM

## 2022-04-29 LAB
ABDOMINAL IMA AP: 1.4 CM
ABDOMINAL IMA ED VEL: 0 CM/S
ABDOMINAL IMA PS VEL: 81 CM/S
ABDOMINAL IMA TRANS: 1.4 CM
ABDOMINAL INFRARENAL AORTA AP: 1.4 CM
ABDOMINAL INFRARENAL AORTA ED VEL: 0 CM/S
ABDOMINAL INFRARENAL AORTA PS VEL: 74 CM/S
ABDOMINAL INFRARENAL AORTA TRANS: 1.6 CM
ABDOMINAL JUXTARENAL AORTA AP: 2 CM
ABDOMINAL JUXTARENAL AORTA ED VEL: 0 CM/S
ABDOMINAL JUXTARENAL AORTA PS VEL: 74 CM/S
ABDOMINAL JUXTARENAL AORTA TRANS: 1.9 CM
ABDOMINAL LT COM ILIAC AP: 1.2 CM
ABDOMINAL LT COM ILIAC TRANS: 1 CM
ABDOMINAL LT COM ILIAC VEL: 153 CM/S
ABDOMINAL LT COM ILLIAC ED VEL: 0 CM/S
ABDOMINAL RT COM ILIAC AP: 1.1 CM
ABDOMINAL RT COM ILIAC TRANS: 1.1 CM
ABDOMINAL RT COM ILIAC VEL: 90 CM/S
ABDOMINAL RT COM ILLIAC ED VEL: 0 CM/S
ABDOMINAL SUPRARENAL AORTA AP: 2.1 CM
ABDOMINAL SUPRARENAL AORTA ED VEL: 0 CM/S
ABDOMINAL SUPRARENAL AORTA PS VEL: 124 CM/S
ABDOMINAL SUPRARENAL AORTA TRANS: 2.1 CM
AV INDEX (PROSTH): 0.91
AV MEAN GRADIENT: 3 MMHG
AV PEAK GRADIENT: 5 MMHG
AV VALVE AREA: 2.33 CM2
AV VELOCITY RATIO: 0.9
CV ECHO LV RWT: 0.35 CM
DOP CALC AO PEAK VEL: 1.15 M/S
DOP CALC AO VTI: 28.46 CM
DOP CALC LVOT AREA: 2.5 CM2
DOP CALC LVOT DIAMETER: 1.8 CM
DOP CALC LVOT PEAK VEL: 1.04 M/S
DOP CALC LVOT STROKE VOLUME: 66.23 CM3
DOP CALC MV VTI: 31.07 CM
DOP CALCLVOT PEAK VEL VTI: 26.04 CM
E WAVE DECELERATION TIME: 173.16 MSEC
E/A RATIO: 0.98
E/E' RATIO: 11.47 M/S
ECHO LV POSTERIOR WALL: 0.82 CM (ref 0.6–1.1)
EJECTION FRACTION: 55 %
FRACTIONAL SHORTENING: 30 % (ref 28–44)
INTERVENTRICULAR SEPTUM: 0.77 CM (ref 0.6–1.1)
IVC PROX: 1.6 CM
IVRT: 148.43 MSEC
LA MAJOR: 4.19 CM
LA MINOR: 4.64 CM
LA WIDTH: 4.14 CM
LEFT ATRIUM SIZE: 3.39 CM
LEFT ATRIUM VOLUME: 52.53 CM3
LEFT INTERNAL DIMENSION IN SYSTOLE: 3.27 CM (ref 2.1–4)
LEFT VENTRICLE DIASTOLIC VOLUME: 102.39 ML
LEFT VENTRICLE SYSTOLIC VOLUME: 43.28 ML
LEFT VENTRICULAR INTERNAL DIMENSION IN DIASTOLE: 4.7 CM (ref 3.5–6)
LEFT VENTRICULAR MASS: 121.27 G
LV LATERAL E/E' RATIO: 10.75 M/S
LV SEPTAL E/E' RATIO: 12.29 M/S
MV MEAN GRADIENT: 1 MMHG
MV PEAK A VEL: 0.88 M/S
MV PEAK E VEL: 0.86 M/S
MV PEAK GRADIENT: 4 MMHG
MV VALVE AREA BY CONTINUITY EQUATION: 2.13 CM2
PISA MRMAX VEL: 0.06 M/S
PISA TR MAX VEL: 2.33 M/S
PULM VEIN S/D RATIO: 1.73
PV PEAK D VEL: 0.41 M/S
PV PEAK S VEL: 0.71 M/S
PV PEAK VELOCITY: 1.06 CM/S
RA MAJOR: 4.03 CM
RA PRESSURE: 3 MMHG
RA WIDTH: 3.5 CM
RIGHT VENTRICULAR END-DIASTOLIC DIMENSION: 3.08 CM
SINUS: 3.1 CM
STJ: 3.33 CM
TDI LATERAL: 0.08 M/S
TDI SEPTAL: 0.07 M/S
TDI: 0.08 M/S
TR MAX PG: 22 MMHG
TRICUSPID ANNULAR PLANE SYSTOLIC EXCURSION: 2.03 CM
TV REST PULMONARY ARTERY PRESSURE: 25 MMHG

## 2022-04-29 PROCEDURE — 76706 US ABDL AORTA SCREEN AAA: CPT

## 2022-04-29 PROCEDURE — 76706 CV US AAA SCREENING (CUPID ONLY): ICD-10-PCS | Mod: 26,,, | Performed by: INTERNAL MEDICINE

## 2022-04-29 PROCEDURE — 93227 XTRNL ECG REC<48 HR R&I: CPT | Mod: ,,, | Performed by: INTERNAL MEDICINE

## 2022-04-29 PROCEDURE — 93225 XTRNL ECG REC<48 HRS REC: CPT

## 2022-04-29 PROCEDURE — 93306 TTE W/DOPPLER COMPLETE: CPT | Mod: 26,,, | Performed by: INTERNAL MEDICINE

## 2022-04-29 PROCEDURE — 93227 HOLTER MONITOR - 24 HOUR (CUPID ONLY): ICD-10-PCS | Mod: ,,, | Performed by: INTERNAL MEDICINE

## 2022-04-29 PROCEDURE — 76706 US ABDL AORTA SCREEN AAA: CPT | Mod: 26,,, | Performed by: INTERNAL MEDICINE

## 2022-04-29 PROCEDURE — 93306 ECHO (CUPID ONLY): ICD-10-PCS | Mod: 26,,, | Performed by: INTERNAL MEDICINE

## 2022-04-29 PROCEDURE — 93306 TTE W/DOPPLER COMPLETE: CPT

## 2022-05-02 LAB
OHS CV EVENT MONITOR DAY: 1
OHS CV HOLTER LENGTH DECIMAL HOURS: 48
OHS CV HOLTER LENGTH HOURS: 24
OHS CV HOLTER LENGTH MINUTES: 0
OHS CV HOLTER SINUS AVERAGE HR: 77
OHS CV HOLTER SINUS MAX HR: 125
OHS CV HOLTER SINUS MIN HR: 58

## 2022-05-20 ENCOUNTER — OFFICE VISIT (OUTPATIENT)
Dept: CARDIOLOGY | Facility: CLINIC | Age: 77
End: 2022-05-20
Payer: MEDICARE

## 2022-05-20 VITALS
HEART RATE: 88 BPM | WEIGHT: 155 LBS | SYSTOLIC BLOOD PRESSURE: 118 MMHG | OXYGEN SATURATION: 95 % | DIASTOLIC BLOOD PRESSURE: 66 MMHG | BODY MASS INDEX: 27.46 KG/M2 | RESPIRATION RATE: 15 BRPM | HEIGHT: 63 IN

## 2022-05-20 DIAGNOSIS — I38 VALVULAR HEART DISEASE: ICD-10-CM

## 2022-05-20 DIAGNOSIS — R00.2 PALPITATIONS: Primary | ICD-10-CM

## 2022-05-20 DIAGNOSIS — R19.8 PULSATILE ABDOMEN: ICD-10-CM

## 2022-05-20 DIAGNOSIS — I70.0 AORTIC ATHEROSCLEROSIS: ICD-10-CM

## 2022-05-20 DIAGNOSIS — I51.89 DIASTOLIC DYSFUNCTION: ICD-10-CM

## 2022-05-20 DIAGNOSIS — I45.2 RIGHT BUNDLE BRANCH BLOCK (RBBB) WITH LEFT ANTERIOR FASCICULAR BLOCK (LAFB): ICD-10-CM

## 2022-05-20 PROCEDURE — 99999 PR PBB SHADOW E&M-EST. PATIENT-LVL III: CPT | Mod: PBBFAC,,, | Performed by: INTERNAL MEDICINE

## 2022-05-20 PROCEDURE — 3288F PR FALLS RISK ASSESSMENT DOCUMENTED: ICD-10-PCS | Mod: CPTII,S$GLB,, | Performed by: INTERNAL MEDICINE

## 2022-05-20 PROCEDURE — 1101F PT FALLS ASSESS-DOCD LE1/YR: CPT | Mod: CPTII,S$GLB,, | Performed by: INTERNAL MEDICINE

## 2022-05-20 PROCEDURE — 3078F DIAST BP <80 MM HG: CPT | Mod: CPTII,S$GLB,, | Performed by: INTERNAL MEDICINE

## 2022-05-20 PROCEDURE — 3074F SYST BP LT 130 MM HG: CPT | Mod: CPTII,S$GLB,, | Performed by: INTERNAL MEDICINE

## 2022-05-20 PROCEDURE — 3074F PR MOST RECENT SYSTOLIC BLOOD PRESSURE < 130 MM HG: ICD-10-PCS | Mod: CPTII,S$GLB,, | Performed by: INTERNAL MEDICINE

## 2022-05-20 PROCEDURE — 1126F PR PAIN SEVERITY QUANTIFIED, NO PAIN PRESENT: ICD-10-PCS | Mod: CPTII,S$GLB,, | Performed by: INTERNAL MEDICINE

## 2022-05-20 PROCEDURE — 99999 PR PBB SHADOW E&M-EST. PATIENT-LVL III: ICD-10-PCS | Mod: PBBFAC,,, | Performed by: INTERNAL MEDICINE

## 2022-05-20 PROCEDURE — 3288F FALL RISK ASSESSMENT DOCD: CPT | Mod: CPTII,S$GLB,, | Performed by: INTERNAL MEDICINE

## 2022-05-20 PROCEDURE — 99214 PR OFFICE/OUTPT VISIT, EST, LEVL IV, 30-39 MIN: ICD-10-PCS | Mod: S$GLB,,, | Performed by: INTERNAL MEDICINE

## 2022-05-20 PROCEDURE — 1159F PR MEDICATION LIST DOCUMENTED IN MEDICAL RECORD: ICD-10-PCS | Mod: CPTII,S$GLB,, | Performed by: INTERNAL MEDICINE

## 2022-05-20 PROCEDURE — 1126F AMNT PAIN NOTED NONE PRSNT: CPT | Mod: CPTII,S$GLB,, | Performed by: INTERNAL MEDICINE

## 2022-05-20 PROCEDURE — 1101F PR PT FALLS ASSESS DOC 0-1 FALLS W/OUT INJ PAST YR: ICD-10-PCS | Mod: CPTII,S$GLB,, | Performed by: INTERNAL MEDICINE

## 2022-05-20 PROCEDURE — 99214 OFFICE O/P EST MOD 30 MIN: CPT | Mod: S$GLB,,, | Performed by: INTERNAL MEDICINE

## 2022-05-20 PROCEDURE — 1159F MED LIST DOCD IN RCRD: CPT | Mod: CPTII,S$GLB,, | Performed by: INTERNAL MEDICINE

## 2022-05-20 PROCEDURE — 3078F PR MOST RECENT DIASTOLIC BLOOD PRESSURE < 80 MM HG: ICD-10-PCS | Mod: CPTII,S$GLB,, | Performed by: INTERNAL MEDICINE

## 2022-05-20 NOTE — PROGRESS NOTES
CARDIOLOGY CLINIC VISIT        HISTORY OF PRESENT ILLNESS:     Deepali Alex presents for continued care.  Seen 04/21/2022 for evaluation of palpitations.  Symptoms since August of last year.  Almost daily.  Describes a rhythmic sensation.  Pulsatile.  At times feels her heart racing.  Can last a few minutes upwards of hours.  EKG shows sinus rhythm RBBB/LAFB.  No history of hypertension.  Last . On exam aorta is palpable but does not feel enlarged.    05/20/2022:  Holter showed normal sinus rhythm.  Average heart rate 77 beats per minute.  Very rare PACs.  AAA screen was negative.  Did show some aortic atherosclerosis.  Echocardiogram showed normal left ventricular systolic function with estimated ejection fraction 55%.  Grade 1 diastolic dysfunction.  Normal pulmonary pressure.  No significant valvular abnormalities.    CARDIOVASCULAR HISTORY:     None    PAST MEDICAL HISTORY:     Past Medical History:   Diagnosis Date    Age-related osteoporosis without current pathological fracture     Atherosclerosis of aortic arch     - noted on CXR 8/2017    Back pain     Diverticulosis of sigmoid colon 10/22/2019    Fibrocystic breast     Hyperlipidemia     Osteopenia     Polyp of ascending colon 10/22/2019       PAST SURGICAL HISTORY:     Past Surgical History:   Procedure Laterality Date    ECTOPIC PREGNANCY SURGERY      HERNIA REPAIR      left inguinal    HYSTERECTOMY  1980    without BSO       ALLERGIES AND MEDICATION:     Review of patient's allergies indicates:   Allergen Reactions    Erythromycin (bulk)      Other reaction(s): Eye irritation        Medication List      as of May 20, 2022  1:14 PM     You have not been prescribed any medications.         SOCIAL HISTORY:     Social History     Socioeconomic History    Marital status:     Number of children: 2   Occupational History    Occupation: retired   Tobacco Use    Smoking status: Never Smoker    Smokeless tobacco: Never Used    Substance and Sexual Activity    Alcohol use: No    Drug use: No    Sexual activity: Not Currently     Social Determinants of Health     Financial Resource Strain: Low Risk     Difficulty of Paying Living Expenses: Not hard at all   Food Insecurity: No Food Insecurity    Worried About Running Out of Food in the Last Year: Never true    Ran Out of Food in the Last Year: Never true   Transportation Needs: No Transportation Needs    Lack of Transportation (Medical): No    Lack of Transportation (Non-Medical): No   Physical Activity: Insufficiently Active    Days of Exercise per Week: 1 day    Minutes of Exercise per Session: 20 min   Stress: No Stress Concern Present    Feeling of Stress : Not at all   Social Connections: Unknown    Frequency of Communication with Friends and Family: Three times a week    Frequency of Social Gatherings with Friends and Family: More than three times a week    Active Member of Clubs or Organizations: No    Attends Club or Organization Meetings: Never    Marital Status:    Housing Stability: Low Risk     Unable to Pay for Housing in the Last Year: No    Number of Places Lived in the Last Year: 1    Unstable Housing in the Last Year: No       FAMILY HISTORY:     Family History   Problem Relation Age of Onset    Breast cancer Mother 69    Seizures Mother     Heart disease Father     Heart attack Father     Depression Father     Breast cancer Sister 72    Breast cancer Maternal Aunt 70    Obesity Brother     Mental illness Paternal Grandmother     Depression Paternal Aunt     Depression Paternal Uncle     Tongue cancer Cousin     Ovarian cancer Neg Hx        REVIEW OF SYSTEMS:   Review of Systems   Constitutional: Negative for chills, diaphoresis, fever, malaise/fatigue and weight loss.   Eyes: Negative for blurred vision and pain.   Respiratory: Negative for sputum production, shortness of breath and wheezing.    Cardiovascular: Positive for  "palpitations. Negative for chest pain, orthopnea, claudication, leg swelling and PND.   Gastrointestinal: Negative for abdominal pain, heartburn, nausea and vomiting.   Musculoskeletal: Negative for back pain, falls, joint pain, myalgias and neck pain.   Neurological: Negative for dizziness, speech change, focal weakness, loss of consciousness, weakness and headaches.   Endo/Heme/Allergies: Does not bruise/bleed easily.   Psychiatric/Behavioral: Negative for depression, memory loss and substance abuse. The patient is not nervous/anxious.        PHYSICAL EXAM:     Vitals:    05/20/22 1256   BP: 118/66   Pulse: 88   Resp: 15    Body mass index is 27.45 kg/m².  Weight: 70.3 kg (154 lb 15.7 oz)   Height: 5' 3" (160 cm)     Physical Exam  Vitals reviewed.   Constitutional:       General: She is not in acute distress.     Appearance: She is well-developed. She is not diaphoretic.   HENT:      Head: Normocephalic.   Neck:      Vascular: No carotid bruit or JVD.   Cardiovascular:      Rate and Rhythm: Normal rate and regular rhythm.      Pulses: Normal pulses.      Heart sounds: Normal heart sounds.   Pulmonary:      Effort: Pulmonary effort is normal.      Breath sounds: Normal breath sounds.   Abdominal:      General: Bowel sounds are normal.      Palpations: Abdomen is soft.      Tenderness: There is no abdominal tenderness.   Skin:     General: Skin is warm and dry.   Neurological:      Mental Status: She is alert and oriented to person, place, and time.   Psychiatric:         Speech: Speech normal.         Behavior: Behavior normal.         Thought Content: Thought content normal.         DATA:   EKG: (personally reviewed tracing)    Laboratory:  CBC:  Recent Labs   Lab 09/27/19  0902 05/03/21  1025 10/20/21  1024   WBC 5.5 5.71 7.60   Hemoglobin 12.8 12.3 12.5   Hematocrit 40.2 37.7 38.7   Platelets 266 251 280       CHEMISTRIES:  Recent Labs   Lab 09/27/19  0902 05/03/21  1025 10/20/21  1024   Glucose 91 76 85 "   Sodium 139 139 140   Potassium 4.7 4.0 4.6   BUN 17 13 16   Creatinine 0.70 0.7 0.7   eGFR if African American 100 >60.0 >60.0   eGFR if non African American 87 >60.0 >60.0   Calcium 9.4 9.0 9.6       CARDIAC BIOMARKERS:        COAGS:        LIPIDS/LFTS:  Recent Labs   Lab 09/27/19  0902 05/03/21  1025 10/20/21  1024   Cholesterol 207 H 190  --    Triglycerides 84 78  --    HDL 65 63  --    LDL Cholesterol 124 H 111.4  --    Non-HDL Cholesterol  --  127  --    Non HDL Chol. (LDL+VLDL) 142 H  --   --    AST 14 21 18   ALT 11 14 15       Cardiovascular Testing:    Echocardiogram 04/29/2022:    · The estimated ejection fraction is 55%.  · The left ventricle is normal in size with normal systolic function.  · Grade I left ventricular diastolic dysfunction.  · Normal right ventricular size with normal right ventricular systolic function.  · Mild mitral regurgitation.  · The estimated PA systolic pressure is 25 mmHg.  · Normal central venous pressure (3 mmHg).  · Mild pulmonic regurgitation.  · Mild to moderate left atrial enlargement.  · Mild right atrial enlargement.  · Mild aortic regurgitation.    Holter 04/29/2022:    · NSR. Heart rates varied between 58 and 125 BPM with an average of 77 BPM.  · There were very rare PACs totalling 59 and averaging 1.23 per hour    CV AAA screening 04/29/2022:    · Negative AAA study.  · Aortic atherosclerosis.        ASSESSMENT:     1. Palpitations  2. Pulsatile abdomen:  Negative AAA screen.  3. Aortic atherosclerosis  4. 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 Diastolic dysfunction  6. Valvular heart disease:  Mild AI, MR, TX    PLAN:     1. Palpitations:  24 hour Holter showed average heart rate 77 beats per minute.  No significant arrhythmias..  2D echocardiogram showed normal function.  2. Pulsatile abdomen:  AAA screen negative.  3. Diastolic dysfunction:  No symptoms suggestive of heart failure  4. Valvular heart disease:  Mild    5. Return to clinic 6  months.           Gopi Ambrocio MD, MPH, FACC, [facility]

## 2022-05-23 NOTE — PROGRESS NOTES
[facility]  [address]  [facility] [address]    Deepali Alex  [id number]  1945  May 24, 2022    Consulting Physician: Raul Rivera MD   GYN: none  Primary M.D.: Raul Rivera MD    Chief Complaint   Patient presents with    BLADDER PROLAPSE       HPI:     1)  UI:  (+) JOEL (occasionally) > (+) UUI (occasionally).  (+) pads (liner):1/day, usually minimum wetness and 1/night usually dry. Daytime frequency: Q 2 hours.  Nocturia: Yes: 1/night.   (--) dysuria,  (--) hematuria,  (--) frequent UTIs. 3/2022 urine C&S+  (+) complete bladder emptying.    2)  POP:  Present--feels worsening x last year. below introitus.  Symptoms: (--).  (--) vaginal bleeding. (--) vaginal discharge. (+) sexually active--mostly gives oral sex,  has some ED.  (--) dyspareunia.  (--)  Vaginal dryness.  (--) vaginal estrogen use. Estrogen cream was recc'd in past--did not want to use due to +FH BR cancer.     3)  BM:  (--) constipation/straining.  (--) chronic diarrhea. (--) hematochezia.  (--) fecal incontinence.  (+) fecal smearing/urgency--notices some smudges on pad sometimes.  (+) complete evacuation.     4)  H/o L inguinal hernia repair:  --notices some burning/pulling when raises leg   --started after heavy lifting last year    Past Medical History  Past Medical History:   Diagnosis Date    Age-related osteoporosis without current pathological fracture     Atherosclerosis of aortic arch     - noted on CXR 8/2017    Back pain     Diverticulosis of sigmoid colon 10/22/2019    Fibrocystic breast     Hyperlipidemia     Osteopenia     Polyp of ascending colon 10/22/2019    Urinary incontinence, mixed 5/24/2022        Past Surgical History  Past Surgical History:   Procedure Laterality Date    ECTOPIC PREGNANCY SURGERY      HERNIA REPAIR      left inguinal    HYSTERECTOMY  1980    without BSO   --vert midline ectopic (had blood transfusion)  --L inguinal hernia     Hysterectomy: Yes    Date: .  Indication: menorrhagia.    Type: vaginal  Cervix present: No  Ovaries present: Yes   Other procedures at time of hysterectomy:  none    Past Ob History    --SAB x 1, ectopic x 1   x 2.  C/s x 0.    Largest infant weight: 9#2oz.   no FAVD. no episiotomy. +significant ob lac.     Gynecologic History  LMP: No LMP recorded. Patient has had a hysterectomy.  Age of menarche: unknown  Age of menopause: with TVH  Menstrual history: h/o menorrhagia  Pap test: post hyst.  History of abnormal paps: No.  History of STIs:  No  Mammogram: Date of last: 2021.  Result: Normal  Colonoscopy: Date of last: .  Result:  Diverticulosis, polyp.  Repeat due:  .    DEXA:  Date of last: 2021.  Result:  Osteoporosis--fosamax started .  Repeat due:   per PCP.     Family History  Family History   Problem Relation Age of Onset    Breast cancer Mother 69    Seizures Mother     Heart disease Father     Heart attack Father     Depression Father     Breast cancer Sister 72    Breast cancer Maternal Aunt 70    Obesity Brother     Mental illness Paternal Grandmother     Depression Paternal Aunt     Depression Paternal Uncle     Tongue cancer Cousin     Ovarian cancer Neg Hx       Colon CA: No  Breast CA: Yes - sister, mother, MA -- has no had genetic testing  GYN CA: No   CA: No    Social History  Social History     Tobacco Use   Smoking Status Never Smoker   Smokeless Tobacco Never Used     Social History     Substance and Sexual Activity   Alcohol Use No   .    Social History     Substance and Sexual Activity   Drug Use No     The patient is .  Resides in [place] [address].  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 .     Allergies  Review of patient's allergies indicates:   Allergen Reactions    Erythromycin (bulk)      Other reaction(s): Eye irritation       Medications  No current outpatient medications on file prior to visit.     No current  "facility-administered medications on file prior to visit.       Review of Systems A 14 point ROS was reviewed with pertinent positives as noted above in the history of present illness.      Constitutional: negative  Eyes: negative  Endocrine: negative  Gastrointestinal: negative  Cardiovascular: negative  Respiratory: negative  Allergic/Immunologic: negative  Integumentary: negative  Psychiatric: negative  Musculoskeletal: negative   Ear/Nose/Throat: negative  Neurologic: negative  Genitourinary: SEE HPI  Hematologic/Lymphatic: negative   Breast: negative    Urogynecologic Exam  /68   Ht 5' 3" (1.6 m)   Wt 70.3 kg (155 lb)   BMI 27.46 kg/m²     GENERAL APPEARANCE:  The patient is well-developed, well-nourished.  Neck:  Supple with no thyromegaly, no carotid bruits.  Heart:  Regular rate and rhythm, no murmurs, rubs or gallops.  Lungs:  Clear.  No CVA tenderness.  Abdomen:  Soft, nontender, nondistended, no hepatosplenomegaly.  Incisions:  vert midline well-healed    PELVIC:    External genitalia:  Normal Bartholins, Skenes and labia bilaterally.    Urethra:  No caruncle, diverticulum or masses.  (+) hypermobility.    Vagina:  Atrophy (+) , no bladder masses or tender, no discharge.  +TTP B LV.  Cervix:  absent  Uterus: uterus absent  Adnexa: Not palpable.    POP-Q:  Aa +1; Ba +1; C -4; Ap 0; Bp 0.  Genital hiatus 3, perineal body 2, total vaginal length 9-10.    NEUROLOGIC:  Cranial nerves 2 through 12 intact.  Strength 5/5.  DTRs 2+ lower extremities.  S2 through 4 normal.  Sacral reflexes intact.    EXT: GONCALVES, 2+ pulses bilaterally, no C/C/E    COUGH STRESS TEST:  negative  KEGEL: 1 /5    RECTAL:    External:  Normal, (--) hemorrhoids, (--) dovetailing.   Internal:   (--) tenderness, (--) masses, Normal resting tone, Normal active tone.    PVR: 140 mL    The patient was fit with #2 ring + support pessary. #3/4 caused too much pressure/too tight.  She was able to tolerate the device comfortabley with " bending, squatting, valsalva.  She was able to demonstrate independent removal and placement. At the end, she wasn't sure if #2 was causing some rectal pressure, too, but she was also very sensitive from exams and rectal exam.  Therefore, will defer final pessary fitting to a few weeks from now to give time for patient to rest from exam and see which pessary is really best.     Impression    1. Encounter for screening mammogram for breast cancer    2. Midline cystocele    3. Urinary incontinence, mixed    4. Incomplete emptying of bladder    5. Rectocele, female    6. Vaginal vault prolapse    7. Family history of breast cancer    8. Fecal soiling    9. Vaginal atrophy        Initial Plan  The patient was counseled regarding these issues. The patient was given a summary sheet containing each of these issues with possible options for evaluation and management. When appropriate, we also reviewed computer-generated diagrams specific to their diagnoses..  All questions were addressed to the patient's satisfaction.    1)  Well-woman:  --mammogram ordered-please order    2)  Family history of breast cancer:  --if you would like testing in the future, please let us know    3)  Stage 2 cystocele/rectocele, stage 1 vaginal vault prolapse:  --Empty bladder every 3 hours.  Empty well: wait a minute, lean forward on toilet.  Use pessary.  Try not to hover--makes harder to empty.   --since not emptying bladder, want to reduce bulge   --pessary vs surgery (sacral colpopexy)    --if surgery: would need bladder testing to see if need sling for stress leakage    --if surgery: need clearance per PCP + labs (CBC, BMP, T&S)/EKG  --trial of pessary   --RTC for size confirmation with NP: would try #2, then #3 ring + support--need to see if one feels ok and minimal rectal pressure (was too uncomfortable after last fitting to determine)--also will use coconut oil in between visits to help tolerate fitting   --recheck PVR with pessary in  place at next visit, as well    PESSARY CARE: Remove pessary as frequently as nightly and as infrequently as every 2-3 weeks.  Remove before intercourse.  May need to remove before bowel movements if straining dislodges.   Wash with soap and water (no ).  Replace using small amount of water-based lubricant (like KY jelly or coconut oil) at end being introduced into vagina.      4)  Mixed urinary incontinence, urge < stress:    --urine C&S  --Empty bladder every 3 hours.  Empty well: wait a minute, lean forward on toilet.    --Avoid dietary irritants (see sheet).  Keep diary x 3-5 days to determine your irritants.  --start pelvic floor PT.  Call to make appt:  GABRIELLE Alejo: (p) [phone]. (f) [phone].    --URGE: consider medication in future. Takes 2-4 weeks to see if will have effect.  For dry mouth: get sour, sugar free lozenge or gum.     --see if pessary helps  --STRESS:  Pessary vs. Sling.     5)  Fecal smearing:  --hydrate well  --work on stool consistency  --Start daily fiber.  Take 1 tsp of fiber powder (psyllium or other sugar-free powder).  Mix in 8 oz of water.  Take x 3-5 days.  Then, increase fiber by 1 tsp every 3-5 days until stool is easy to pass, well-formed, less smearing.  Stop and continue at that dose.   Do not exceed 6 tsps/day.  May also use over the counter stool softener 1-2 x/day.  AVOID laxatives.  --start PT    6)  Incomplete emptying:  --urine C&S  --trial of pessary--RTC for placement and PVR recheck  --Empty bladder every 3 hours.  Empty well: wait a minute, lean forward on toilet.  Use pessary.  Try not to hover--makes harder to empty.   --renal US  --start pelvic floor PT.  Call to make appt:  GABRIELLE Alejo: (p) [phone]  . (f) [phone].    --if PVR not improved on RTC, would need to do further testing    7)  Vaginal atrophy (dryness):  Use REPLENS or REFRESH OTC: 1/2 applicator full in vagina twice a week.    --OR can also use coconut  oil: dime-sized amount with finger in vagina up nightly and as needed    8)  RTC for pessary size confirmation with NP: would try #2, then #3 ring + support--need to see if one feels ok and minimal rectal pressure (was too uncomfortable after last fitting to determine)--also will use coconut oil in between visits to help tolerate fitting   --recheck PVR with pessary in place at next visit, as well    Approximately 60 min were spent in consult, 75 % in discussion.     Thank you for requesting consultation of your patient.  I look forward to participating in their care.    Marcelle Richter  Female Pelvic Medicine and Reconstructive Surgery  [facility] [place], [place]

## 2022-05-24 ENCOUNTER — OFFICE VISIT (OUTPATIENT)
Dept: UROGYNECOLOGY | Facility: CLINIC | Age: 77
End: 2022-05-24
Payer: MEDICARE

## 2022-05-24 ENCOUNTER — TELEPHONE (OUTPATIENT)
Dept: UROGYNECOLOGY | Facility: CLINIC | Age: 77
End: 2022-05-24
Payer: MEDICARE

## 2022-05-24 VITALS
HEIGHT: 63 IN | WEIGHT: 155 LBS | DIASTOLIC BLOOD PRESSURE: 68 MMHG | SYSTOLIC BLOOD PRESSURE: 130 MMHG | BODY MASS INDEX: 27.46 KG/M2

## 2022-05-24 DIAGNOSIS — Z12.31 ENCOUNTER FOR SCREENING MAMMOGRAM FOR BREAST CANCER: Primary | ICD-10-CM

## 2022-05-24 DIAGNOSIS — N81.6 RECTOCELE, FEMALE: ICD-10-CM

## 2022-05-24 DIAGNOSIS — R33.9 INCOMPLETE EMPTYING OF BLADDER: ICD-10-CM

## 2022-05-24 DIAGNOSIS — N39.46 URINARY INCONTINENCE, MIXED: ICD-10-CM

## 2022-05-24 DIAGNOSIS — N81.9 VAGINAL VAULT PROLAPSE: ICD-10-CM

## 2022-05-24 DIAGNOSIS — N95.2 VAGINAL ATROPHY: ICD-10-CM

## 2022-05-24 DIAGNOSIS — R15.1 FECAL SOILING: ICD-10-CM

## 2022-05-24 DIAGNOSIS — N81.11 MIDLINE CYSTOCELE: ICD-10-CM

## 2022-05-24 DIAGNOSIS — Z80.3 FAMILY HISTORY OF BREAST CANCER: ICD-10-CM

## 2022-05-24 PROCEDURE — 99205 PR OFFICE/OUTPT VISIT, NEW, LEVL V, 60-74 MIN: ICD-10-PCS | Mod: 25,S$GLB,, | Performed by: OBSTETRICS & GYNECOLOGY

## 2022-05-24 PROCEDURE — 1159F PR MEDICATION LIST DOCUMENTED IN MEDICAL RECORD: ICD-10-PCS | Mod: CPTII,S$GLB,, | Performed by: OBSTETRICS & GYNECOLOGY

## 2022-05-24 PROCEDURE — 1126F AMNT PAIN NOTED NONE PRSNT: CPT | Mod: CPTII,S$GLB,, | Performed by: OBSTETRICS & GYNECOLOGY

## 2022-05-24 PROCEDURE — 3075F SYST BP GE 130 - 139MM HG: CPT | Mod: CPTII,S$GLB,, | Performed by: OBSTETRICS & GYNECOLOGY

## 2022-05-24 PROCEDURE — 1160F RVW MEDS BY RX/DR IN RCRD: CPT | Mod: CPTII,S$GLB,, | Performed by: OBSTETRICS & GYNECOLOGY

## 2022-05-24 PROCEDURE — 99205 OFFICE O/P NEW HI 60 MIN: CPT | Mod: 25,S$GLB,, | Performed by: OBSTETRICS & GYNECOLOGY

## 2022-05-24 PROCEDURE — 87086 URINE CULTURE/COLONY COUNT: CPT | Performed by: OBSTETRICS & GYNECOLOGY

## 2022-05-24 PROCEDURE — 1160F PR REVIEW ALL MEDS BY PRESCRIBER/CLIN PHARMACIST DOCUMENTED: ICD-10-PCS | Mod: CPTII,S$GLB,, | Performed by: OBSTETRICS & GYNECOLOGY

## 2022-05-24 PROCEDURE — 3078F DIAST BP <80 MM HG: CPT | Mod: CPTII,S$GLB,, | Performed by: OBSTETRICS & GYNECOLOGY

## 2022-05-24 PROCEDURE — 1126F PR PAIN SEVERITY QUANTIFIED, NO PAIN PRESENT: ICD-10-PCS | Mod: CPTII,S$GLB,, | Performed by: OBSTETRICS & GYNECOLOGY

## 2022-05-24 PROCEDURE — 1159F MED LIST DOCD IN RCRD: CPT | Mod: CPTII,S$GLB,, | Performed by: OBSTETRICS & GYNECOLOGY

## 2022-05-24 PROCEDURE — 1101F PR PT FALLS ASSESS DOC 0-1 FALLS W/OUT INJ PAST YR: ICD-10-PCS | Mod: CPTII,S$GLB,, | Performed by: OBSTETRICS & GYNECOLOGY

## 2022-05-24 PROCEDURE — 57160 PR FIT/INSERT INTRAVAG SUPPORT DEVICE: ICD-10-PCS | Mod: S$GLB,,, | Performed by: OBSTETRICS & GYNECOLOGY

## 2022-05-24 PROCEDURE — 3075F PR MOST RECENT SYSTOLIC BLOOD PRESS GE 130-139MM HG: ICD-10-PCS | Mod: CPTII,S$GLB,, | Performed by: OBSTETRICS & GYNECOLOGY

## 2022-05-24 PROCEDURE — 1101F PT FALLS ASSESS-DOCD LE1/YR: CPT | Mod: CPTII,S$GLB,, | Performed by: OBSTETRICS & GYNECOLOGY

## 2022-05-24 PROCEDURE — 99999 PR PBB SHADOW E&M-EST. PATIENT-LVL IV: ICD-10-PCS | Mod: PBBFAC,,, | Performed by: OBSTETRICS & GYNECOLOGY

## 2022-05-24 PROCEDURE — 3288F PR FALLS RISK ASSESSMENT DOCUMENTED: ICD-10-PCS | Mod: CPTII,S$GLB,, | Performed by: OBSTETRICS & GYNECOLOGY

## 2022-05-24 PROCEDURE — 3078F PR MOST RECENT DIASTOLIC BLOOD PRESSURE < 80 MM HG: ICD-10-PCS | Mod: CPTII,S$GLB,, | Performed by: OBSTETRICS & GYNECOLOGY

## 2022-05-24 PROCEDURE — 99999 PR PBB SHADOW E&M-EST. PATIENT-LVL IV: CPT | Mod: PBBFAC,,, | Performed by: OBSTETRICS & GYNECOLOGY

## 2022-05-24 PROCEDURE — 3288F FALL RISK ASSESSMENT DOCD: CPT | Mod: CPTII,S$GLB,, | Performed by: OBSTETRICS & GYNECOLOGY

## 2022-05-24 PROCEDURE — 57160 INSERT PESSARY/OTHER DEVICE: CPT | Mod: S$GLB,,, | Performed by: OBSTETRICS & GYNECOLOGY

## 2022-05-24 NOTE — TELEPHONE ENCOUNTER
Reached out to patient to confirm appointment and location. Patient did not answer; lvm instructing patient to return the call.    Ivy SERRATO MA

## 2022-05-24 NOTE — PATIENT INSTRUCTIONS
Bladder Irritants  Certain foods and drinks have been associated with worsening symptoms of urinary frequency, urgency, urge incontinence, or bladder pain. If you suffer from any of these conditions, you may wish to try eliminating one or more of these foods from your diet and see if your symptoms improve. If bladder symptoms are related to dietary factors, strict adherence to a diet thateliminates the food should bring marked relief in 10 days. Once you are feeling better, you can begin to add foods back into your diet, one at a time. If symptoms return, you will be able to identify the irritant. As you add foods back to your diet it is very important that you drink significant amounts of water.    -----------------------------------------------------------------------------------------------  List of Common Bladder Irritants*  Alcoholic beverages  Apples and apple juice  Cantaloupe  Carbonated beverages  Chili and spicy foods  Chocolate  Citrus fruit  Coffee (including decaffeinated)  Cranberries and cranberry juice  Grapes  Guava  Milk Products: milk, cheese, cottage cheese, yogurt, ice cream  Peaches  Pineapple  Plums  Strawberries  Sugar especially artificial sweeteners, saccharin, aspartame, corn sweeteners, honey, fructose, sucrose, lactose  Tea  Tomatoes and tomato juice  Vitamin B complex  Vinegar  *Most people are not sensitive to ALL of these products; your goal is to find the foods that make YOUR symptoms worse.  ---------------------------------------------------------------------------------------------------    Low-acid fruit substitutions include apricots, papaya, pears and watermelon. Coffee drinkers can drink Kava or other lowacid instant drinks. Tea drinkers can substitute non-citrus herbal and sun brewed teas. Calcium carbonate co-buffered with calcium ascorbate can be substituted for Vitamin C. Prelief is a dietary supplement that works as an acid blocker for the bladder.    Where to get more  information:        Overcoming Bladder Disorders by Karina Renee and Joy Ferrara, 1990        You Dont Have to Live with Cystitis! By Brooke Moe, 1988  http://[email or web address].org/oab  -------------------------------------------------    Fiber Information Sheet  Your doctor has recommended that you follow a high fiber diet. The addition of fiber to your diet can make an enormous difference in your bowel control and regularity. Fiber helps people whether they lose stool or have trouble with constipation. Fiber works by bulking the stool and keeping it formed, yet making the movement soft and easy to pass. Fiber helps keep moisture within the stool so that neither diarrhea nor hard stool occurs. Fiber makes the bowels work more regularly, but it is not a laxative. An additional bonus from eating a high fiber diet is that your risk of cancer is reduced, too.    Most of us eat some high fiber foods already, but nearly all of us do not eat the necessary amount. For example, a slice of whole wheat bread contains only about 10% of the daily recommended amount of fiber. This means if you are relying on only whole wheat bread to meet the recommended fiber requirements, you would need to eat  between 10-18 slices every day! Please note that fiber is NOT in any meat or dairy product. It is only found in grains, vegetables and fruits. The recommended daily fiber intake is 20-25 grams. Foods having high fiber content include:     Fiber One Cereal, ½ cup 13.0 g   Aragon beans, ¾ cup 10.4 g   Wheat bran cereal, 1 oz 10.0 g   Kidney beans, ¾ cup 9.3 g   All Bran Cereal, ½ cup 6.0 g   Oat Bran Cereal, hot, 1 oz 4.0 g   Banana, 1medium 3.8 g   Canned pears, ½ cup 3.7 g   3 prunes or ¼ cup raisins 3.5 g   Whole Wheat Total, 1 cup 3.0 g   Carrots, ½ cup 3.2 g   Apple, small 2.8 g   Broccoli, ½ cup 2.8 g   Cauliflower, ½ cup 2.6 g   Oatmeal, 1 oz 2.5 g   Whole Wheat Toast 2.0 g   Cheerios,  1 1/3 cup 2.0 g   Baked potato with skin 2.0 g   Corn, ½ cup 1.9 g   Popcorn, 3 cups 1.9 g   Orange, medium 1.9 g   Granola bar 1.0 g   Lettuce, ½ cup 0.9 g    If you dont think that you can get enough fiber through your everyday diet, there are many good fiber supplements you can take along with eating your high fiber diet. Some of these are: Metamucil (1 heaping teaspoon or 1-2 wafers), Citrucel (1 tablespoon), Fiberall (1-2 wafers or 1 teaspoon), Perdium (2 rounded teaspoons) and 1-2 teaspoons unprocessed bran (to mix with foods)    You may need to use the fiber supplement up to 3-4 times daily to produce normal elimination. Please follow specific package directions or call us for help in regulating the dose. You may notice some bloating and/or increased gas at first. These symptoms can be relieved by adding fiber to your diet slowly. Once your body gets used to this increased fiber, these symptoms will go away.   ----------------------------------------------------  1)  Well-woman:  --mammogram ordered-please order    2)  Family history of breast cancer:  --if you would like testing in the future, please let us know    3)  Stage 2 cystocele/rectocele, stage 1 vaginal vault prolapse:  --Empty bladder every 3 hours.  Empty well: wait a minute, lean forward on toilet.  Use pessary.  Try not to hover--makes harder to empty.   --since not emptying bladder, want to reduce bulge   --pessary vs surgery (sacral colpopexy)    --if surgery: would need bladder testing to see if need sling for stress leakage    --if surgery: need clearance per PCP + labs (CBC, BMP, T&S)/EKG  --trial of pessary   --will help you order   --RTC for placement/review and reheck PVR    PESSARY CARE: Remove pessary as frequently as nightly and as infrequently as every 2-3 weeks.  Remove before intercourse.  May need to remove before bowel movements if straining dislodges.   Wash with soap and water (no ).  Replace using small amount of  water-based lubricant (like KY jelly or coconut oil) at end being introduced into vagina.      4)  Mixed urinary incontinence, urge < stress:    --urine C&S  --Empty bladder every 3 hours.  Empty well: wait a minute, lean forward on toilet.    --Avoid dietary irritants (see sheet).  Keep diary x 3-5 days to determine your irritants.  --start pelvic floor PT.  Call to make appt:  GABRIELLE Alejo: (p) [phone]. (f) [phone].    --URGE: consider medication in future. Takes 2-4 weeks to see if will have effect.  For dry mouth: get sour, sugar free lozenge or gum.     --see if pessary helps  --STRESS:  Pessary vs. Sling.     5)  Fecal smearing:  --hydrate well  --work on stool consistency  --Start daily fiber.  Take 1 tsp of fiber powder (psyllium or other sugar-free powder).  Mix in 8 oz of water.  Take x 3-5 days.  Then, increase fiber by 1 tsp every 3-5 days until stool is easy to pass, well-formed, less smearing.  Stop and continue at that dose.   Do not exceed 6 tsps/day.  May also use over the counter stool softener 1-2 x/day.  AVOID laxatives.  --start PT    6)  Incomplete emptying:  --urine C&S  --trial of pessary--RTC for placement and PVR recheck  --Empty bladder every 3 hours.  Empty well: wait a minute, lean forward on toilet.  Use pessary.  Try not to hover--makes harder to empty.   --renal US  --start pelvic floor PT.  Call to make appt:  GABRIELLE Alejo: (p) [phone]  . (f) [phone].    --if PVR not improved on RTC, would need to do further testing    7)  Vaginal atrophy (dryness):  Use REPLENS or REFRESH OTC: 1/2 applicator full in vagina twice a week.    --OR can also use coconut oil: dime-sized amount with finger in vagina up nightly and as needed    8)  RTC 2-3 weeks to place pessary and recheck PVR.

## 2022-05-25 ENCOUNTER — OFFICE VISIT (OUTPATIENT)
Dept: FAMILY MEDICINE | Facility: CLINIC | Age: 77
End: 2022-05-25
Payer: MEDICARE

## 2022-05-25 ENCOUNTER — LAB VISIT (OUTPATIENT)
Dept: LAB | Facility: HOSPITAL | Age: 77
End: 2022-05-25
Attending: INTERNAL MEDICINE
Payer: MEDICARE

## 2022-05-25 ENCOUNTER — CLINICAL SUPPORT (OUTPATIENT)
Dept: REHABILITATION | Facility: HOSPITAL | Age: 77
End: 2022-05-25
Attending: OBSTETRICS & GYNECOLOGY
Payer: MEDICARE

## 2022-05-25 VITALS
DIASTOLIC BLOOD PRESSURE: 58 MMHG | OXYGEN SATURATION: 97 % | BODY MASS INDEX: 27.46 KG/M2 | HEIGHT: 63 IN | SYSTOLIC BLOOD PRESSURE: 126 MMHG | TEMPERATURE: 98 F | HEART RATE: 78 BPM

## 2022-05-25 DIAGNOSIS — R27.8 COORDINATION IMPAIRMENT: ICD-10-CM

## 2022-05-25 DIAGNOSIS — R33.9 INCOMPLETE EMPTYING OF BLADDER: ICD-10-CM

## 2022-05-25 DIAGNOSIS — R39.89 SUSPECTED UTI: ICD-10-CM

## 2022-05-25 DIAGNOSIS — E78.5 DYSLIPIDEMIA: ICD-10-CM

## 2022-05-25 DIAGNOSIS — R30.0 DYSURIA: ICD-10-CM

## 2022-05-25 DIAGNOSIS — R30.0 DYSURIA: Primary | ICD-10-CM

## 2022-05-25 DIAGNOSIS — R29.3 POSTURAL IMBALANCE: ICD-10-CM

## 2022-05-25 DIAGNOSIS — N39.46 URINARY INCONTINENCE, MIXED: ICD-10-CM

## 2022-05-25 DIAGNOSIS — M62.89 PELVIC FLOOR DYSFUNCTION: ICD-10-CM

## 2022-05-25 LAB
BACTERIA #/AREA URNS AUTO: ABNORMAL /HPF
BACTERIA UR CULT: NO GROWTH
BILIRUB UR QL STRIP: NEGATIVE
CAOX CRY UR QL COMP ASSIST: ABNORMAL
CLARITY UR REFRACT.AUTO: ABNORMAL
COLOR UR AUTO: YELLOW
GLUCOSE UR QL STRIP: NEGATIVE
HGB UR QL STRIP: ABNORMAL
HYALINE CASTS UR QL AUTO: 4 /LPF
KETONES UR QL STRIP: NEGATIVE
LEUKOCYTE ESTERASE UR QL STRIP: ABNORMAL
MICROSCOPIC COMMENT: ABNORMAL
NITRITE UR QL STRIP: NEGATIVE
PH UR STRIP: 5 [PH] (ref 5–8)
PROT UR QL STRIP: NEGATIVE
RBC #/AREA URNS AUTO: 40 /HPF (ref 0–4)
SP GR UR STRIP: 1.02 (ref 1–1.03)
SQUAMOUS #/AREA URNS AUTO: 1 /HPF
URN SPEC COLLECT METH UR: ABNORMAL
WBC #/AREA URNS AUTO: >100 /HPF (ref 0–5)
WBC CLUMPS UR QL AUTO: ABNORMAL

## 2022-05-25 PROCEDURE — 1159F MED LIST DOCD IN RCRD: CPT | Mod: CPTII,S$GLB,, | Performed by: INTERNAL MEDICINE

## 2022-05-25 PROCEDURE — 3074F SYST BP LT 130 MM HG: CPT | Mod: CPTII,S$GLB,, | Performed by: INTERNAL MEDICINE

## 2022-05-25 PROCEDURE — 3074F PR MOST RECENT SYSTOLIC BLOOD PRESSURE < 130 MM HG: ICD-10-PCS | Mod: CPTII,S$GLB,, | Performed by: INTERNAL MEDICINE

## 2022-05-25 PROCEDURE — 97530 THERAPEUTIC ACTIVITIES: CPT | Mod: PN

## 2022-05-25 PROCEDURE — 99214 PR OFFICE/OUTPT VISIT, EST, LEVL IV, 30-39 MIN: ICD-10-PCS | Mod: S$GLB,,, | Performed by: INTERNAL MEDICINE

## 2022-05-25 PROCEDURE — 1126F AMNT PAIN NOTED NONE PRSNT: CPT | Mod: CPTII,S$GLB,, | Performed by: INTERNAL MEDICINE

## 2022-05-25 PROCEDURE — 97161 PT EVAL LOW COMPLEX 20 MIN: CPT | Mod: PN

## 2022-05-25 PROCEDURE — 99999 PR PBB SHADOW E&M-EST. PATIENT-LVL III: CPT | Mod: PBBFAC,,, | Performed by: INTERNAL MEDICINE

## 2022-05-25 PROCEDURE — 87086 URINE CULTURE/COLONY COUNT: CPT | Performed by: INTERNAL MEDICINE

## 2022-05-25 PROCEDURE — 1126F PR PAIN SEVERITY QUANTIFIED, NO PAIN PRESENT: ICD-10-PCS | Mod: CPTII,S$GLB,, | Performed by: INTERNAL MEDICINE

## 2022-05-25 PROCEDURE — 99999 PR PBB SHADOW E&M-EST. PATIENT-LVL III: ICD-10-PCS | Mod: PBBFAC,,, | Performed by: INTERNAL MEDICINE

## 2022-05-25 PROCEDURE — 3078F PR MOST RECENT DIASTOLIC BLOOD PRESSURE < 80 MM HG: ICD-10-PCS | Mod: CPTII,S$GLB,, | Performed by: INTERNAL MEDICINE

## 2022-05-25 PROCEDURE — 3078F DIAST BP <80 MM HG: CPT | Mod: CPTII,S$GLB,, | Performed by: INTERNAL MEDICINE

## 2022-05-25 PROCEDURE — 81001 URINALYSIS AUTO W/SCOPE: CPT | Performed by: INTERNAL MEDICINE

## 2022-05-25 PROCEDURE — 97112 NEUROMUSCULAR REEDUCATION: CPT | Mod: PN

## 2022-05-25 PROCEDURE — 1159F PR MEDICATION LIST DOCUMENTED IN MEDICAL RECORD: ICD-10-PCS | Mod: CPTII,S$GLB,, | Performed by: INTERNAL MEDICINE

## 2022-05-25 PROCEDURE — 99214 OFFICE O/P EST MOD 30 MIN: CPT | Mod: S$GLB,,, | Performed by: INTERNAL MEDICINE

## 2022-05-25 RX ORDER — NITROFURANTOIN 25; 75 MG/1; MG/1
100 CAPSULE ORAL 2 TIMES DAILY
Qty: 10 CAPSULE | Refills: 0 | Status: SHIPPED | OUTPATIENT
Start: 2022-05-25 | End: 2022-05-30

## 2022-05-25 NOTE — PATIENT INSTRUCTIONS
Home Exercise Program: 05/25/2022    DIAPHRAGMATIC BREATHING     The diaphragm is a dome shaped muscle that forms the floor of the rib cage. It is the most efficient muscle for breathing and relaxation, although most people are not used to using the diaphragm. Diaphragmatic or belly breathing is an important technique to learn because it helps settle down or relax the autonomic nervous system. The correct use of diaphragmatic breathing can help to quiet brain activity resulting in the relaxation of all the muscles and organs of the body. This is accomplished by slow rhythmic breathing concentrated in the diaphragm muscle rather than the chest.    360 Breath - Inhale long, slow and deep. You should feel as if your lower ribs are expanding in all directions like the way an umbrella opens. You should feel the belly, back and sides gently expand and you may notice a relaxation in the pelvic floor. If you notice that your belly is pulling up and flattening during your inhale - try to reverse this and allow your belly to gently expand during the inhale while it recoils and flattens during the exhale      Continue to breath like this for 1 minutes. Repeat 1 times/day.         ([email or web address])    Initially it may be useful to practice squeezing at different layers of your pelvic floor muscles to help you find them, as each layer plays an important role in pelvic floor function. Eventually you want to be doing your kegel contractions to include squeezing at all 3 layers, and this should become one smooth movement.    To isolate layer 1: think about closing your openings (around vagina and anus) and nodding the clitoris    To isolate layer 2: imagine a drawstring in your urethra that you are pulling up inside or that you are telescoping the urethra in on itself.     To isolate layer 3: pull your tailbone up and forward towards your pubic bone    When putting it all together, it can be helpful to think about closing  your openings and lifting up and in.      So when practicing this at home:   1. Inhale and let the muscles relax   2. Exhale and perform a kegel (closing your openings and lifting up and in)   3. Hold for 1-2 seconds without holding your breath   4. Inhale and release. Drop these muscles away fully before repeating   5. Perform 1 set of 5 repetitions at each layer and one set of perla all 3 layers together.

## 2022-05-25 NOTE — PROGRESS NOTES
This note was created by combination of typed  and M-Modal dictation.  Transcription errors may be present.  If there are any questions, please contact me.    Assessment and Plan:   Dysuria  Suspected UTI  -symptoms started this afternoon  Check UA  Empiric treatment with macrobid  S/[facility] yesterday. Could be from pelvic floor dysfunction without infection, could be irritation from cath without infection, could be from infection from cath?   -     Urinalysis, Reflex to Urine Culture Urine, Clean Catch; Future; Expected date: 05/25/2022  -     nitrofurantoin, macrocrystal-monohydrate, (MACROBID) 100 MG capsule; Take 1 capsule (100 mg total) by mouth 2 (two) times daily. for 5 days  Dispense: 10 capsule; Refill: 0    Dyslipidemia  -future labs. She has been resistant to statin. Would take if TC > 250 otherwise is resistant to idea of statin.  -     CBC Auto Differential; Future; Expected date: 05/26/2022  -     Comprehensive Metabolic Panel; Future; Expected date: 05/26/2022  -     Lipid Panel; Future; Expected date: 05/26/2022      There are no discontinued medications.    meds sent this encounter:  Medications Ordered This Encounter   Medications    nitrofurantoin, macrocrystal-monohydrate, (MACROBID) 100 MG capsule     Sig: Take 1 capsule (100 mg total) by mouth 2 (two) times daily. for 5 days     Dispense:  10 capsule     Refill:  0       Follow Up: No follow-ups on file.    Subjective:     Chief Complaint   Patient presents with    Urinary Tract Infection     PAINFUL WHILE URINATING       BERTHA Palumbo is a 77 y.o. female, last appointment with this clinic was 3/15/2022.  Social History     Tobacco Use    Smoking status: Never Smoker    Smokeless tobacco: Never Used   Substance Use Topics    Alcohol use: No      Social History     Occupational History    Occupation: retired      Social History     Social History Narrative    Not on file       No LMP recorded. Patient has had  a hysterectomy.    Last visit with me in mid March  UTI  Bladder prolapse.  Was given topical estrogen cream but did not started because of family history of breast cancer.  She wanted to see Urogynecology, referral submitted. Saw urogyn yesterday. Fitted with pessary but not sent home with one. Plan was future fitting  White coat, home BP cuff slightly over reads.  History of aortic atherosclerosis.  Had previously discussed statin therapy she did not want to initiate.  Complaining of sensation of pulsatile sensation in the abdomen.  Referred to Cardiology.  Subsequently seen by Cardiology.  Echo showing diastolic dysfunction otherwise normal.  Holter negative.  AAA screening negative.    Saw urogyn yesterday  Trial of pessary, not sent home with one  Urine culture obtained for sx of incomplete emptying and mixed urinary incontinence. Ucx pending.     Underwent the catheterization yesterday.  No pain yesterday  Started pelvic floor PT.  This morning no pain urination  But about 2 hours ago - developed discomfort in the lower abd; sensation of swelling and then pain with urination. No blood.      Patient Care Team:  Raul Rivera MD as PCP - General (Internal Medicine)  Neha Rawls OD as Consulting Physician (Optometry)  Jerson Weaver MD as Consulting Physician (Orthopedic Surgery)  Lico Her MA as Care Coordinator  Jimi Mccollum MD as Consulting Physician (Gastroenterology)    Patient Active Problem List    Diagnosis Date Noted    Urinary incontinence, mixed 05/24/2022    Vaginal vault prolapse 05/24/2022    Incomplete emptying of bladder 05/24/2022    Rectocele, female 05/24/2022    Fecal soiling 05/24/2022    Vaginal atrophy 05/24/2022    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    Diastolic dysfunction 05/20/2022    Tubular adenoma of colon 10/22/2019 colonoscopy with ascending tubular adenoma 3 mm 10/31/2019     10/22/2019  "colonoscopy with ascending tubular adenoma 3 mm      Age-related osteoporosis without current pathological fracture      06/03/2021 DEXA scan L-spine-0.3, total hip -2.4 femoral neck -2.5.  FRAX score4.6/16%. osteoporosis.      AR (allergic rhinitis) 08/16/2017    Intermittent vertigo 08/16/2017    Aortic atherosclerosis      - noted on CXR 8/2017      Family history of breast cancer 07/21/2015    Recurrent urinary tract infection 05/02/2014    Dyslipidemia 10/17/2013     - no need for prescription medication at this time.  4/29/22 TTE LV normal size, systolic function LVEF 55%. Grade 1 diastolic dysfunction. Normal RV size and systolic function. PA pressure 25, CVP 3  4/29/22 holter normal  4/29/22 AAA US negative         PAST MEDICAL PROBLEMS, PAST SURGICAL HISTORY: please see relevant portions of the electronic medical record    ALLERGIES AND MEDICATIONS: updated and reviewed.  Review of patient's allergies indicates:   Allergen Reactions    Erythromycin (bulk)      Other reaction(s): Eye irritation            Objective:   Physical Exam   Vitals:    05/25/22 1501   BP: (!) 126/58   BP Location: Right arm   Patient Position: Sitting   BP Method: Medium (Manual)   Pulse: 78   Temp: 97.8 °F (36.6 °C)   TempSrc: Oral   SpO2: 97%   Height: 5' 3" (1.6 m)    Body mass index is 27.46 kg/m².      Height: 5' 3" (160 cm)     Physical Exam  Constitutional:       General: She is not in acute distress.     Appearance: She is well-developed.   Cardiovascular:      Rate and Rhythm: Normal rate and regular rhythm.      Heart sounds: Normal heart sounds. No murmur heard.  Pulmonary:      Effort: Pulmonary effort is normal.      Breath sounds: Normal breath sounds.   Abdominal:      General: There is no distension.      Palpations: There is no mass.      Tenderness: There is no abdominal tenderness. There is no guarding.   Musculoskeletal:         General: Normal range of motion.      Right lower leg: No edema.      Left " lower leg: No edema.   Skin:     General: Skin is warm and dry.   Neurological:      Mental Status: She is alert and oriented to person, place, and time.      Coordination: Coordination normal.   Psychiatric:         Behavior: Behavior normal.         Thought Content: Thought content normal.

## 2022-05-26 ENCOUNTER — TELEPHONE (OUTPATIENT)
Dept: UROGYNECOLOGY | Facility: CLINIC | Age: 77
End: 2022-05-26
Payer: MEDICARE

## 2022-05-26 NOTE — TELEPHONE ENCOUNTER
----- Message from Marcelle Richter MD sent at 5/26/2022  6:58 AM CDT -----  Please let the patient know urine C&S was negative for infection.  Thanks!

## 2022-05-26 NOTE — TELEPHONE ENCOUNTER
Reached out to per to inform her per MD BARRY Benítez Staff  Cc: Ivy Chanel MA  Please let the patient know urine C&S was negative for infection.  Thanks!   Patient did not answer; lvm instructing patient to return the call to the office.    Ivy SERRATO MA

## 2022-05-27 LAB
BACTERIA UR CULT: NORMAL
BACTERIA UR CULT: NORMAL

## 2022-05-27 NOTE — PROGRESS NOTES
UCx mixed avila  Empirically treated with macrobid  Results to pt via MyChart. If no improvement may change antibiotic

## 2022-05-30 PROBLEM — M62.89 PELVIC FLOOR DYSFUNCTION: Status: ACTIVE | Noted: 2022-05-30

## 2022-05-30 PROBLEM — R27.8 COORDINATION IMPAIRMENT: Status: ACTIVE | Noted: 2022-05-30

## 2022-05-30 PROBLEM — R29.3 POSTURAL IMBALANCE: Status: ACTIVE | Noted: 2022-05-30

## 2022-05-30 NOTE — PLAN OF CARE
[facility]  Pelvic Health Physical Therapy Initial Evaluation    Date: 2022   Name: Deepali Davis [facility] Number: [id number]  Therapy Diagnosis:   Encounter Diagnoses   Name Primary?    Urinary incontinence, mixed     Incomplete emptying of bladder     Pelvic floor dysfunction     Coordination impairment     Postural imbalance      Physician: Marcelle Richter MD    Physician Orders: PT Eval and Treat - Pelvic Floor PT   Medical Diagnosis from Referral:   N39.46 (ICD-10-CM) - Urinary incontinence, mixed   R33.9 (ICD-10-CM) - Incomplete emptying of bladder   Evaluation Date: 2022  Authorization Period Expiration: 2023  Plan of Care Expiration: 2022  Visit # / Visits authorized:     Time In: 905  Time Out: 101  Total Appointment Time (timed & untimed codes): 65 minutes  Total Billing Time: 25    Precautions: universal    Subjective     Date of onset: >1 yr ago     History of current condition - Deepali reports: Has been diagnosed with cystocele; feels like it has worsened over the last year. Feels it is below introitus. Does not use vaginal estrogen due to + FH BR cancer.To be fitted for pessary at the end of .  Would like to avoid surgery. Experiences occasional ALEXANDRA. Slight frequency reported. Denies constipation, but she does finds small smudges of stool on pad occasionally. Feels as if she is emptying completely. Hx of L inguinal hernia. Was repaired years ago, but she still notices some burning/pulling when she raises leg.     OB/GYN History:   - SAB x 1, ectopic x 1,  x2, Hx of partial hysterectomy     Bladder/Bowel History:    Frequency of urination:   Daytime: 2           Nighttime: 1   Difficulty initiating urine stream: No   Urine stream: strong   Complete emptying: Yes   Bladder leakage: Yes   Frequency of incidents: occasionally    Amount leaked (urine): few drops and teaspoon(s)   Urinary Urgency: No    Frequency of bowel  movements: WNL    Form of protection: panty liner   Number of pads required in 24 hours: 1    PAIN: n/a      Medical History: Deepali  has a past medical history of Age-related osteoporosis without current pathological fracture, Atherosclerosis of aortic arch, Back pain, Diverticulosis of sigmoid colon (10/22/2019), Fibrocystic breast, Hyperlipidemia, Osteopenia, Polyp of ascending colon (10/22/2019), and Urinary incontinence, mixed (5/24/2022).     Surgical History:  Deepali Alex  has a past surgical history that includes Hysterectomy (1980); Ectopic pregnancy surgery; and Hernia repair.    Medications: Deepali has a current medication list which includes the following prescription(s): nitrofurantoin (macrocrystal-monohydrate).    Allergies:   Review of patient's allergies indicates:   Allergen Reactions    Erythromycin (bulk)      Other reaction(s): Eye irritation        Imaging none    Prior Therapy/Previous treatment included: none   Social History:  lives with their spouse  Current exercise: irregular use of exercise plan   Prior Level of Function: independent   Current Level of Function: independent     Habitus:well developed, well nourished  Abuse/Neglect: No     Pts goals: learn how to prevent prolapse from progressing, avoid surgical repair if possible     OBJECTIVE     See EMR under MEDIA for written consent provided 5/25/2022  Chaperone: declined    ORTHO SCREEN  Posture in sitting: lateral trunk flexion to the R  Posture in standing: thoracic rotation to the right, lateral trunk flexion to the right, decreased lordosis and elevated iliac crest on the right   Pelvic alignment: Left leg appears shorter in supine    SI Joint Palpation: Denies tenderness to SI joint palpation bilaterally.    LUMBAR ROM      ROM Loss   Flexion within functional limits   Extension within functional limits   Side bending Right within functional limits   Side bending Left minimal loss   Rotation Right within  functional limits   Rotation Left minimal loss       HIP STRENGTH:      Left      Right  Hip flexion: 4/5 Hip flexion: 4/5   Hip Internal Rotation:  4-/5    Hip Internal Rotation: 4-/5      Hip External Rotation: 4+/5    Hip External Rotation: 4+/5      Hip extension:  4+/5 Hip extension: 4+/5   Hip abduction: 4+/5 Hip abduction: 4+/5   Hip adduction: 4/5 Hip adduction 4/5     Special Tests for Load Transfer Assessment Between the Trunk and Lower Extremities:     Active Straight Leg Test:    o Right: pelvic rotation or drop    o Left: pelvic rotation or drop    Single Leg Stance Test:    o Right: impaired balance reactions compared to L   o Left: WNL     ABDOMINAL WALL ASSESSMENT  Palpation: tender and increased tension at upper abdomen, WNL at lower abdomen  Abdominal strength: Rectus abdominus: 4+/5     Transverse abdominus: 4+/5  Scarring: all with good mobility   Pelvic Floor Muscle and Transverse Abdominus Synergy: present  Diastasis: <1 cm       BREATHING MECHANICS ASSESSMENT   Thorax Assessment During Quiet Respiration: WNL excursion of ribcage and WNL excursion of abdominal wall  Thorax Assessment During Deep Respiration: WNL excursion of ribcage and WNL excursion of abdominal wall    VAGINAL PELVIC FLOOR EXAM - NP       Limitation/Restriction for FOTO Pelvic Floor Survey - NP this session        TREATMENT     Treatment Time In: 0940  Treatment Time Out: 1010  Total Treatment time (time-based codes) separate from Evaluation: 30 minutes    Neuromuscular Re-education to develop Coordination and Control for 10 minutes including: pelvic floor relaxation speed after performing a kegel, diaphragmatic breathing with Kegel, diaphragmatic breathing and pelvic floor mm contractions focus on activation at 3 layers sequentially in isolation and then sequentially    Therapeutic Activity Patient participated in dynamic functional therapeutic activities to improve functional performance for 20 minutes. Including:  Education as described below.     Patient Education provided:   general anatomy/physiology of urinary/ bowel  system, benefits of treatment, risks of treatment, and alternative methods of treatment were discussed with the pt. Additionally, anatomy/physiology of pelvic floor, posture/body mechanices, bladder retraining, kegels and behavior modifications were reviewed.     Home Exercises Provided:  Written Home Exercises Provided: yes.  Exercises were reviewed and Deepali was able to demonstrate them prior to the end of the session.    Deepali demonstrated good  understanding of the education provided.     See EMR under Patient Instructions for exercises provided 5/25/2022.    Assessment     Deepali is a 77 y.o. female referred to outpatient Physical Therapy with a medical diagnosis of mixed urinary incontinence. Pt presents with significantly altered posture, pelvic asymmetry, and poor knowledge of body mechanics and posture. Poor trunk stability demonstrated, with weakness noted at B LE and abdominals. Small MORIS noted near umbilicus. Complete pelvic assessment not performed, though poor pelvic floor coordination indicated by bowel and bladder incontinence. Together these deficits have resulted in significant limitations in bowel and bladder control as well as pelvic organ prolapse. Based on presentation, Deepali would benefit from continued pelvic floor PT to improve abdominopelvic coordination and control.    Pt prognosis is Good.   Pt will benefit from skilled outpatient Physical Therapy to address the deficits stated above and in the chart below, provide pt/family education, and to maximize pt's level of independence.     Plan of care discussed with patient: Yes  Pt's spiritual, cultural and educational needs considered and patient is agreeable to the plan of care and goals as stated below:       Anticipated Barriers for therapy: none    Medical Necessity is demonstrated by the  following    History  Co-morbidities and personal factors that may impact the plan of care Co-morbidities:   Vertigo, dyslipidemia, osteoporosis, RBBB, aortic atherosclerosis    Personal Factors:   no deficits     low   Examination  Body Structures and Functions, activity limitations and participation restrictions that may impact the plan of care Body Regions/Systems/Functions:  altered posture, pelvic asymmetry, poor knowledge of body mechanics and posture, poor trunk stability, decreased pelvic muscle strength, poor quality of pelvic muscle contraction, increased frequency of urination, poor coordination of pelvic floor muscles during ADL's leading to urinary or fecal leakage, dysfunctional voiding and dysfunctional defecation     Activity Limitations:  incontinence with ADLs and Participating in social activities and ADLs due to pelvic pressure    Participation Restrictions:  all ADLs/iADLs uninterrupted by urinary incontinence/urgency/frequency and all ADLs/iADLs uninterrupted by fecal incontinence/urgency/frequency    Activity limitations:   Learning and applying knowledge  no deficits    General Tasks and Commands  no deficits    Communication  no deficits    Mobility  no deficits    Self care  no deficits    Domestic Life  no deficits    Interactions/Relationships  no deficits    Life Areas  no deficits    Community and Social Life  community life  recreation and leisure       low   Clinical Presentation stable and uncomplicated low   Decision Making/ Complexity Score: low       Goals:  Short Term Goals: 4 weeks   Pt to be edu pelvic muscle bracing and be able to consistently perform correctly and quickly to help decrease incontinence with cough/laugh/sneeze.  Pt to demonstrate being able to correctly and consistently perform a kegel which is needed  to increase pelvic floor muscle coordination and strength needed for continence.  Pt to be compliant with a voiding schedule of every 4 hours  to help decrease  urinary urgency   Pt to demonstrate proper diaphragmatic breathing to help with calming the nervous system in order to decrease pain and to improve abdominal wall musculature extensibility.   Pt to demonstrate good body mechanics when performing ADLs such as lifting and bending to decrease strain to lumbopelvic structures and reduce risk of further injury.     Long Term Goals: 8 weeks   Pt to be discharged with home plan for carry over after discharge.    Pt to no longer need to wear a pad for protection due to reduction of urinary incontinence by at least 90% with ADLs.  Pt will be trained and compliant with postural strategies in sitting and standing to improve alignment and decrease muscle fatigue  Pt to report being able to have a comfortable vaginal exam without significant increase in pelvic pain.  Pt to increase abdominal wall strength by at least 1 muscle grade to improve lumbopelvic stability with ADLs.    Plan     Plan of care Certification: 5/25/2022 to 8/25/2022.    Outpatient Physical Therapy 1 times weekly for 8 weeks to include the following interventions: therapeutic exercises, therapeutic activity, neuromuscular re-education, manual therapy, modalities PRN, patient/family education and self care/home management    Plan for next visit: PFM assessment     KERI PLAZA, PT  5/25/2022        I have seen the patient, reviewed the therapist's plan of care, and I agree with the plan of care.      I certify the need for these services furnished under this plan of treatment and while under my care.     ___________________ ________ Physician/Referring Practitioner            ___________________________ Date of Signature

## 2022-05-31 ENCOUNTER — LAB VISIT (OUTPATIENT)
Dept: LAB | Facility: HOSPITAL | Age: 77
End: 2022-05-31
Attending: INTERNAL MEDICINE
Payer: MEDICARE

## 2022-05-31 DIAGNOSIS — E55.9 VITAMIN D DEFICIENCY: ICD-10-CM

## 2022-05-31 DIAGNOSIS — Z00.00 NORMAL PHYSICAL EXAM: ICD-10-CM

## 2022-05-31 DIAGNOSIS — E78.5 DYSLIPIDEMIA: ICD-10-CM

## 2022-05-31 LAB
25(OH)D3+25(OH)D2 SERPL-MCNC: 36 NG/ML (ref 30–96)
ALBUMIN SERPL BCP-MCNC: 3.9 G/DL (ref 3.5–5.2)
ALP SERPL-CCNC: 78 U/L (ref 55–135)
ALT SERPL W/O P-5'-P-CCNC: 16 U/L (ref 10–44)
ANION GAP SERPL CALC-SCNC: 6 MMOL/L (ref 8–16)
AST SERPL-CCNC: 20 U/L (ref 10–40)
BASOPHILS # BLD AUTO: 0.07 K/UL (ref 0–0.2)
BASOPHILS NFR BLD: 1.1 % (ref 0–1.9)
BILIRUB SERPL-MCNC: 0.8 MG/DL (ref 0.1–1)
BUN SERPL-MCNC: 16 MG/DL (ref 8–23)
CALCIUM SERPL-MCNC: 9 MG/DL (ref 8.7–10.5)
CHLORIDE SERPL-SCNC: 104 MMOL/L (ref 95–110)
CHOLEST SERPL-MCNC: 201 MG/DL (ref 120–199)
CHOLEST/HDLC SERPL: 3.2 {RATIO} (ref 2–5)
CO2 SERPL-SCNC: 27 MMOL/L (ref 23–29)
CREAT SERPL-MCNC: 0.7 MG/DL (ref 0.5–1.4)
DIFFERENTIAL METHOD: ABNORMAL
EOSINOPHIL # BLD AUTO: 0.2 K/UL (ref 0–0.5)
EOSINOPHIL NFR BLD: 2.3 % (ref 0–8)
ERYTHROCYTE [DISTWIDTH] IN BLOOD BY AUTOMATED COUNT: 13.6 % (ref 11.5–14.5)
EST. GFR  (AFRICAN AMERICAN): >60 ML/MIN/1.73 M^2
EST. GFR  (NON AFRICAN AMERICAN): >60 ML/MIN/1.73 M^2
GLUCOSE SERPL-MCNC: 95 MG/DL (ref 70–110)
HCT VFR BLD AUTO: 37.4 % (ref 37–48.5)
HDLC SERPL-MCNC: 62 MG/DL (ref 40–75)
HDLC SERPL: 30.8 % (ref 20–50)
HGB BLD-MCNC: 12.3 G/DL (ref 12–16)
IMM GRANULOCYTES # BLD AUTO: 0.01 K/UL (ref 0–0.04)
IMM GRANULOCYTES NFR BLD AUTO: 0.2 % (ref 0–0.5)
LDLC SERPL CALC-MCNC: 119 MG/DL (ref 63–159)
LYMPHOCYTES # BLD AUTO: 1 K/UL (ref 1–4.8)
LYMPHOCYTES NFR BLD: 14.8 % (ref 18–48)
MCH RBC QN AUTO: 29.6 PG (ref 27–31)
MCHC RBC AUTO-ENTMCNC: 32.9 G/DL (ref 32–36)
MCV RBC AUTO: 90 FL (ref 82–98)
MONOCYTES # BLD AUTO: 0.4 K/UL (ref 0.3–1)
MONOCYTES NFR BLD: 6.5 % (ref 4–15)
NEUTROPHILS # BLD AUTO: 4.8 K/UL (ref 1.8–7.7)
NEUTROPHILS NFR BLD: 75.1 % (ref 38–73)
NONHDLC SERPL-MCNC: 139 MG/DL
NRBC BLD-RTO: 0 /100 WBC
PLATELET # BLD AUTO: 284 K/UL (ref 150–450)
PMV BLD AUTO: 11.2 FL (ref 9.2–12.9)
POTASSIUM SERPL-SCNC: 4 MMOL/L (ref 3.5–5.1)
PROT SERPL-MCNC: 6.9 G/DL (ref 6–8.4)
RBC # BLD AUTO: 4.15 M/UL (ref 4–5.4)
SODIUM SERPL-SCNC: 137 MMOL/L (ref 136–145)
TRIGL SERPL-MCNC: 100 MG/DL (ref 30–150)
WBC # BLD AUTO: 6.43 K/UL (ref 3.9–12.7)

## 2022-05-31 PROCEDURE — 80053 COMPREHEN METABOLIC PANEL: CPT | Performed by: INTERNAL MEDICINE

## 2022-05-31 PROCEDURE — 36415 COLL VENOUS BLD VENIPUNCTURE: CPT | Mod: PO | Performed by: INTERNAL MEDICINE

## 2022-05-31 PROCEDURE — 80061 LIPID PANEL: CPT | Performed by: INTERNAL MEDICINE

## 2022-05-31 PROCEDURE — 85025 COMPLETE CBC W/AUTO DIFF WBC: CPT | Performed by: INTERNAL MEDICINE

## 2022-05-31 PROCEDURE — 82306 VITAMIN D 25 HYDROXY: CPT | Performed by: INTERNAL MEDICINE

## 2022-06-02 DIAGNOSIS — Z00.00 NORMAL PHYSICAL EXAM: ICD-10-CM

## 2022-06-02 DIAGNOSIS — R03.0 WHITE COAT SYNDROME WITHOUT DIAGNOSIS OF HYPERTENSION: ICD-10-CM

## 2022-06-02 DIAGNOSIS — I70.0 AORTIC ATHEROSCLEROSIS: Primary | ICD-10-CM

## 2022-06-02 NOTE — PROGRESS NOTES
CBC normal  CMP normal  Lipid okay, non .  We previously discussed statins and she was very reluctant to pursue.  Plan was unless lipid very high, hold off on statin  Vitamin-D is good  Urinalysis with pyuria, urine culture mixed avila, empirically treated with Macrobid at office visit  Recent cystoscopy with Urogynecology  Results to patient via mychart.  No changes.

## 2022-06-08 ENCOUNTER — CLINICAL SUPPORT (OUTPATIENT)
Dept: REHABILITATION | Facility: HOSPITAL | Age: 77
End: 2022-06-08
Attending: OBSTETRICS & GYNECOLOGY
Payer: MEDICARE

## 2022-06-08 DIAGNOSIS — R27.8 COORDINATION IMPAIRMENT: ICD-10-CM

## 2022-06-08 DIAGNOSIS — R29.3 POSTURAL IMBALANCE: ICD-10-CM

## 2022-06-08 DIAGNOSIS — M62.89 PELVIC FLOOR DYSFUNCTION: Primary | ICD-10-CM

## 2022-06-08 PROCEDURE — 97140 MANUAL THERAPY 1/> REGIONS: CPT | Mod: PN

## 2022-06-08 PROCEDURE — 97112 NEUROMUSCULAR REEDUCATION: CPT | Mod: PN

## 2022-06-08 PROCEDURE — 97530 THERAPEUTIC ACTIVITIES: CPT | Mod: PN

## 2022-06-08 NOTE — PROGRESS NOTES
Pelvic Health Physical Therapy   Treatment Note     Name: Deepali Davis [facility] Number: [id number]    Therapy Diagnosis:   Encounter Diagnoses   Name Primary?    Pelvic floor dysfunction Yes    Coordination impairment     Postural imbalance      Physician: Marcelle Richter MD    Visit Date: 6/8/2022    Physician Orders: PT Eval and Treat - Pelvic Floor PT   Medical Diagnosis from Referral:   N39.46 (ICD-10-CM) - Urinary incontinence, mixed   R33.9 (ICD-10-CM) - Incomplete emptying of bladder   Evaluation Date: 5/25/2022  Authorization Period Expiration: 12/31/2022  Plan of Care Expiration: 8/24/2022  Visit # / Visits authorized: 1/ 20    Cancelled Visits: 0  No Show Visits: 0    Time In: 0900  Time Out: 1002  Total Billable Time: 62 minutes    Precautions: Standard    Subjective     Pt reports: Has been using exercises and feels that she is perla well. No significant changes in bowel/bladder habits. Noted that she had a UTI after she left here; has completed treatment and not longer feels dysuria.     She was compliant with home exercise program.  Response to previous treatment: Good   Functional change: Ongoing     Pain: 0/10  Location:    Objective     VAGINAL PELVIC FLOOR EXAM    EXTERNAL ASSESSMENT  Introitus: WNL  Skin condition: WNL  Scarring: episiotomy scar noted   Sensation: WNL   Pain:  none  Voluntary contraction: visible lift  Voluntary relaxation: visible drop  Involuntary contraction: reflex tightening  Bearing down: bulge  Perineal descent: present      INTERNAL ASSESSMENT  Pain: tender areas noted as follows: posterolateral vaginal canal on R    Sensation: able to localized pressure appropriately   Vaginal vault: asymmetrical - stenotic posteriorly when superficial; when deeper, roomy on R and stenotic on L  Muscle Bulk: atrophy on R, hypertonus on L   Muscle Power: 3/5     Quality of contraction: WNL   Specificity: WNL   Coordination: WNL     Deepali received therapeutic exercises  to develop  strength, endurance, ROM and flexibility for 00 minutes including:    Deepali received the following manual therapy techniques: to develop extensibility for 20 minutes including: trigger point/myofascial release of PFM     SIP + DB posterior layer 1, L LA     Deepali participated in neuromuscular re-education activities to develop Coordination and Control for 30 minutes including: pelvic floor relaxation/bulging training, pelvic floor relaxation speed after performing a kegel, diaphragmatic breathing with Kegel and diaphragmatic breathing     DB review     Deepali participated in dynamic functional therapeutic activities to improve functional performance for 12  minutes, including:    Breath management during activity    Modification to HEP       Home Exercises Provided and Patient Education Provided     Education provided:   - anatomy/physiology of pelvic floor, posture/body mechanices, diaphragmatic breathing, kegels and behavior modifications  Discussed progression of plan of care with patient; educated pt in activity modification; reviewed HEP with pt. Pt demonstrated and verbalized understanding of all instruction and was provided with a handout of HEP (see Patient Instructions).    Written Home Exercises Provided: yes.  Exercises were reviewed and Deepali was able to demonstrate them prior to the end of the session.  Deepali demonstrated good  understanding of the education provided.     See EMR under Patient Instructions for exercises provided 6/8/2022.    Assessment     PFM assessment resumed this session, with Deepali demonstrating significant tension in posterolateral pelvic floor musculature, though she denied tenderness. Slight improvement in mobility noted following pelvic floor drops, though Deepali would benefit from continued practice and review. HEP updated to include. Pelvic floor recruitment also evaluated, with significant deficit in layer 3 recruitment noted in comparison to layer 1.  When cued to increase focus on tailbone, improved recruitment noted. Asymmetry in contraction likely related to demonstrated tension, so focus placed on improved mobility at this time.     Deepali Is progressing well towards her goals.   Pt prognosis is Good.     Pt will continue to benefit from skilled outpatient physical therapy to address the deficits listed in the problem list box on initial evaluation, provide pt/family education and to maximize pt's level of independence in the home and community environment.     Pt's spiritual, cultural and educational needs considered and pt agreeable to plan of care and goals.     Anticipated barriers to physical therapy: None     Goals:   Short Term Goals: 4 weeks -progressing  Pt to be edu pelvic muscle bracing and be able to consistently perform correctly and quickly to help decrease incontinence with cough/laugh/sneeze.  Pt to demonstrate being able to correctly and consistently perform a kegel which is needed  to increase pelvic floor muscle coordination and strength needed for continence.  Pt to be compliant with a voiding schedule of every 4 hours  to help decrease urinary urgency   Pt to demonstrate proper diaphragmatic breathing to help with calming the nervous system in order to decrease pain and to improve abdominal wall musculature extensibility.   Pt to demonstrate good body mechanics when performing ADLs such as lifting and bending to decrease strain to lumbopelvic structures and reduce risk of further injury.      Long Term Goals: 8 weeks -progressing  Pt to be discharged with home plan for carry over after discharge.    Pt to no longer need to wear a pad for protection due to reduction of urinary incontinence by at least 90% with ADLs.  Pt will be trained and compliant with postural strategies in sitting and standing to improve alignment and decrease muscle fatigue  Pt to report being able to have a comfortable vaginal exam without significant increase in  pelvic pain.  Pt to increase abdominal wall strength by at least 1 muscle grade to improve lumbopelvic stability with ADLs.    Plan        Plan of care Certification: 5/25/2022 to 8/25/2022.    Plan for next visit: review pelvic drops, progress abdominal strengthening and pressure management     KERI PLAZA, PT   06/08/2022

## 2022-06-08 NOTE — PATIENT INSTRUCTIONS
([employer])    Initially it may be useful to practice squeezing at different layers of your pelvic floor muscles to help you find them, as each layer plays an important role in pelvic floor function. Eventually you want to be doing your kegel contractions to include squeezing at all 3 layers, and this should become one smooth movement.    To isolate layer 1: think about closing your openings (around vagina and anus) and nodding the clitoris    To isolate layer 2: imagine a drawstring in your urethra that you are pulling up inside or that you are telescoping the urethra in on itself.     To isolate layer 3: pull your tailbone up and forward towards your pubic bone    When putting it all together, it can be helpful to think about closing your openings and lifting up and in.    Reverse Kegels/Pelvic Floor Drops - relaxation practice      The feeling of dropping your pelvic floor is similar to the moment of relief when you have reached the bathroom; when you urinate or have a bowel movement, you first drop your pelvic floor, and let the pelvic floor muscles (PFM) go. Pay attention to this, and see if you can feel that happening. The key to dropping your pelvic floor is visualization, and Deep Breathing. The best way to consciously release tension from the PFMs is to try to release the muscles while you inhale. When you inhale properly with diaphragmatic breathing, your diaphragm actually lowers to make room for the breath, so it is natural to also lower and relax the pelvic floor muscles at the same time. When you exhale, your diaphragm rises to push the air out, and you then naturally raise or contract your PFMs on the breath out. If you can get this Pelvic Floor Rhythm, reverse kegels will be much easier to do.     You can start by gently perla your pelvic floor to feel what tightening feels like. Let that go and feel how the tension releases. Really focus on the difference between tension and  relaxation. Once you get this, you can go a step further and visualize the muscles between the pubic bone and tailbone lengthen and gently move downwards away from your body. You can visualize your pubic bone moving towards the ceiling and tailbone towards the surface (if you are laying on your back).      It is helpful to take a mirror to look at your contraction and relaxation. When you perform a pelvic floor contraction (Kegel) your clitoris should move slightly downward, your anus should wink, and the perineal body (area between the vagina and anus) should move up and in. On the reverse Kegel, you should see the anus release and your perineal body move downwards towards the mirror. It should also feel like you are creating more space between the pubic bone and tailbone. Dont make it happen, visualize and let it happen!        Eventually, once you have mastered the art of relaxing your pelvic floor muscles, you will need to check in with your pelvic floor throughout the day, and let go of any tension that you discover.

## 2022-06-17 ENCOUNTER — TELEPHONE (OUTPATIENT)
Dept: UROGYNECOLOGY | Facility: CLINIC | Age: 77
End: 2022-06-17
Payer: MEDICARE

## 2022-06-17 ENCOUNTER — CLINICAL SUPPORT (OUTPATIENT)
Dept: REHABILITATION | Facility: HOSPITAL | Age: 77
End: 2022-06-17
Attending: OBSTETRICS & GYNECOLOGY
Payer: MEDICARE

## 2022-06-17 DIAGNOSIS — M62.89 PELVIC FLOOR DYSFUNCTION: Primary | ICD-10-CM

## 2022-06-17 DIAGNOSIS — R29.3 POSTURAL IMBALANCE: ICD-10-CM

## 2022-06-17 DIAGNOSIS — R27.8 COORDINATION IMPAIRMENT: ICD-10-CM

## 2022-06-17 PROCEDURE — 97112 NEUROMUSCULAR REEDUCATION: CPT | Mod: PN

## 2022-06-17 NOTE — TELEPHONE ENCOUNTER
Reviewed need for retroperitoneal ultrasound. Patient will keep both appointments. Graciela Joy, ALFREDOP-BC

## 2022-06-17 NOTE — TELEPHONE ENCOUNTER
----- Message from Rosalinda Kaba sent at 6/17/2022  3:01 PM CDT -----  Regarding: Patient would like a call  Have a questions.  Did not go into detail.

## 2022-06-17 NOTE — PROGRESS NOTES
Pelvic Health Physical Therapy   Treatment Note     Name: Deepali Davis [facility] Number: [id number]    Therapy Diagnosis:   Encounter Diagnoses   Name Primary?    Pelvic floor dysfunction Yes    Coordination impairment     Postural imbalance      Physician: Marcelle Richter MD    Visit Date: 6/17/2022    Physician Orders: PT Eval and Treat - Pelvic Floor PT   Medical Diagnosis from Referral:   N39.46 (ICD-10-CM) - Urinary incontinence, mixed   R33.9 (ICD-10-CM) - Incomplete emptying of bladder   Evaluation Date: 5/25/2022  Authorization Period Expiration: 12/31/2022  Plan of Care Expiration: 8/24/2022  Visit # / Visits authorized: 2/ 20    Cancelled Visits: 0  No Show Visits: 0    Time In: 1000  Time Out: 1056  Total Billable Time: 56 minutes    Precautions: Standard    Subjective     Pt reports: Feels like frequency has decreased slightly. No difficulty reported with relaxation exercises.     She was compliant with home exercise program.  Response to previous treatment: Good   Functional change: Ongoing     Pain: 0/10  Location:    Objective       Deepali received therapeutic exercises to develop  strength, endurance, ROM and flexibility for 00 minutes including:    Deepali received the following manual therapy techniques: to develop extensibility for 00 minutes including: trigger point/myofascial release of PFM     SIP + DB posterior layer 1, L LA     Deepali participated in neuromuscular re-education activities to develop Coordination and Control for 56 minutes including: pelvic floor relaxation/bulging training, pelvic floor relaxation speed after performing a kegel, diaphragmatic breathing with Kegel and diaphragmatic breathing     PFM elevators    Inhale > Exhale and Contract > Inhale and Drop > Exhale and Relax   - layer sprcific focus   - sitting, sitting with forward lean, leaning over mat     Deepali participated in dynamic functional therapeutic activities to improve functional performance for  00  minutes, including:    Breath management during activity    Modification to HEP       Home Exercises Provided and Patient Education Provided     Education provided:   - anatomy/physiology of pelvic floor, posture/body mechanices, diaphragmatic breathing, kegels and behavior modifications  Discussed progression of plan of care with patient; educated pt in activity modification; reviewed HEP with pt. Pt demonstrated and verbalized understanding of all instruction and was provided with a handout of HEP (see Patient Instructions).    Written Home Exercises Provided: yes.  Exercises were reviewed and Deepali was able to demonstrate them prior to the end of the session.  Deepali demonstrated good  understanding of the education provided.     See EMR under Patient Instructions for exercises provided 6/8/2022.    Assessment     Deepali demonstrated slight improvement in coordination with drops, so elevators introduced. Good performance demonstrated following continued practice and frequent verbal cueing. Consistency of coordinated performance remains most limited. Based on performance, PT to review prior to progressing in positions of performance NV.     Deepali Is progressing well towards her goals.   Pt prognosis is Good.     Pt will continue to benefit from skilled outpatient physical therapy to address the deficits listed in the problem list box on initial evaluation, provide pt/family education and to maximize pt's level of independence in the home and community environment.     Pt's spiritual, cultural and educational needs considered and pt agreeable to plan of care and goals.     Anticipated barriers to physical therapy: None     Goals:   Short Term Goals: 4 weeks -progressing  Pt to be edu pelvic muscle bracing and be able to consistently perform correctly and quickly to help decrease incontinence with cough/laugh/sneeze.  Pt to demonstrate being able to correctly and consistently perform a kegel which is  needed  to increase pelvic floor muscle coordination and strength needed for continence.  Pt to be compliant with a voiding schedule of every 4 hours  to help decrease urinary urgency   Pt to demonstrate proper diaphragmatic breathing to help with calming the nervous system in order to decrease pain and to improve abdominal wall musculature extensibility.   Pt to demonstrate good body mechanics when performing ADLs such as lifting and bending to decrease strain to lumbopelvic structures and reduce risk of further injury.      Long Term Goals: 8 weeks -progressing  Pt to be discharged with home plan for carry over after discharge.    Pt to no longer need to wear a pad for protection due to reduction of urinary incontinence by at least 90% with ADLs.  Pt will be trained and compliant with postural strategies in sitting and standing to improve alignment and decrease muscle fatigue  Pt to report being able to have a comfortable vaginal exam without significant increase in pelvic pain.  Pt to increase abdominal wall strength by at least 1 muscle grade to improve lumbopelvic stability with ADLs.    Plan        Plan of care Certification: 5/25/2022 to 8/25/2022.    Plan for next visit: review PFM elevators , progress abdominal strengthening and pressure management     KERI PLAZA, PT   06/17/2022

## 2022-06-17 NOTE — PATIENT INSTRUCTIONS
([email or web address])    Initially it may be useful to practice squeezing at different layers of your pelvic floor muscles to help you find them, as each layer plays an important role in pelvic floor function. Eventually you want to be doing your kegel contractions to include squeezing at all 3 layers, and this should become one smooth movement.    To isolate layer 1: think about closing your openings (around vagina and anus) and nodding the clitoris    To isolate layer 2: imagine a drawstring in your urethra that you are pulling up inside or that you are telescoping the urethra in on itself.     To isolate layer 3: pull your tailbone up and forward towards your pubic bone    When putting it all together, it can be helpful to think about closing your openings and lifting up and in.

## 2022-06-22 ENCOUNTER — TELEPHONE (OUTPATIENT)
Dept: RADIOLOGY | Facility: HOSPITAL | Age: 77
End: 2022-06-22
Payer: MEDICARE

## 2022-06-22 ENCOUNTER — CLINICAL SUPPORT (OUTPATIENT)
Dept: REHABILITATION | Facility: HOSPITAL | Age: 77
End: 2022-06-22
Attending: OBSTETRICS & GYNECOLOGY
Payer: MEDICARE

## 2022-06-22 DIAGNOSIS — R29.3 POSTURAL IMBALANCE: ICD-10-CM

## 2022-06-22 DIAGNOSIS — R27.8 COORDINATION IMPAIRMENT: ICD-10-CM

## 2022-06-22 DIAGNOSIS — M62.89 PELVIC FLOOR DYSFUNCTION: Primary | ICD-10-CM

## 2022-06-22 PROCEDURE — 97112 NEUROMUSCULAR REEDUCATION: CPT | Mod: PN

## 2022-06-22 PROCEDURE — 97110 THERAPEUTIC EXERCISES: CPT | Mod: PN

## 2022-06-22 NOTE — PROGRESS NOTES
Pelvic Health Physical Therapy   Treatment Note     Name: Deepali Davis [facility] Number: [id number]    Therapy Diagnosis:   Encounter Diagnoses   Name Primary?    Pelvic floor dysfunction Yes    Coordination impairment     Postural imbalance      Physician: Marcelle Richter MD    Visit Date: 6/22/2022    Physician Orders: PT Eval and Treat - Pelvic Floor PT   Medical Diagnosis from Referral:   N39.46 (ICD-10-CM) - Urinary incontinence, mixed   R33.9 (ICD-10-CM) - Incomplete emptying of bladder   Evaluation Date: 5/25/2022  Authorization Period Expiration: 12/31/2022  Plan of Care Expiration: 8/24/2022  Visit # / Visits authorized: 3/ 20    Cancelled Visits: 0  No Show Visits: 0    Time In: 1002  Time Out: 1037  Total Billable Time: 35 minutes    Precautions: Standard    Subjective     Pt reports: Used HEP a few times per day. Continues with use of complete kegels more than individual layers. Appointment for pessary fitting scheduled next week. She feels she is prepared to continue with use of HEP independently until pessary insertion.     She was compliant with home exercise program.  Response to previous treatment: Good   Functional change: Ongoing     Pain: 0/10  Location:    Objective             Deepali received therapeutic exercises to develop  strength, endurance, ROM and flexibility for 15 minutes including:    Kegel review in: sitting, with forward lean, standing against wall, staggered standing     Deepali received the following manual therapy techniques: to develop extensibility for 00 minutes including: trigger point/myofascial release of PFM      SIP + DB posterior layer 1, L ARBEN Palumbo participated in neuromuscular re-education activities to develop Coordination and Control for 20 minutes including: pelvic floor relaxation/bulging training, pelvic floor relaxation speed after performing a kegel, diaphragmatic breathing with Kegel and diaphragmatic breathing     Review of PFM elevators     Inhale > Exhale and Contract > Inhale and Drop > Exhale and Relax   - layer sprcific focus   - sitting, sitting with forward lean, leaning over mat     Review of complete kegel recruitment in staggered standing, standing at wall, standing     Deepali participated in dynamic functional therapeutic activities to improve functional performance for 00  minutes, including:    Breath management during activity    Modification to HEP - core stability, L LE strength       Home Exercises Provided and Patient Education Provided     Education provided:   - anatomy/physiology of pelvic floor, posture/body mechanices, diaphragmatic breathing, kegels and behavior modifications  Discussed progression of plan of care with patient; educated pt in activity modification; reviewed HEP with pt. Pt demonstrated and verbalized understanding of all instruction and was provided with a handout of HEP (see Patient Instructions).    Written Home Exercises Provided: yes.  Exercises were reviewed and Deepali was able to demonstrate them prior to the end of the session.  Deepali demonstrated good  understanding of the education provided.     See EMR under Patient Instructions for exercises provided 6/8/2022.    Assessment     Good carry over demonstrated from previous session. Complete kegels in asymmetrical stance introduced this session with decreased recruitment noted with L-sided weight bearing. Performance supports previous findings of decreased mobility on L side. Slight improvement noted with continued practice and pelvic drop performance prior to kegels. Overall, improved awareness and coordination noted compared to previous sessions. Based on performance, Deepali would benefit from further review. At patient's request, appointments to be held until pessary placement. Until then, Deepali to continue with use of HEP.     Deepali Is progressing well towards her goals.   Pt prognosis is Good.     Pt will continue to benefit from skilled  outpatient physical therapy to address the deficits listed in the problem list box on initial evaluation, provide pt/family education and to maximize pt's level of independence in the home and community environment.     Pt's spiritual, cultural and educational needs considered and pt agreeable to plan of care and goals.     Anticipated barriers to physical therapy: None     Goals:   Short Term Goals: 4 weeks -progressing  Pt to be edu pelvic muscle bracing and be able to consistently perform correctly and quickly to help decrease incontinence with cough/laugh/sneeze.  Pt to demonstrate being able to correctly and consistently perform a kegel which is needed  to increase pelvic floor muscle coordination and strength needed for continence.  Pt to be compliant with a voiding schedule of every 4 hours  to help decrease urinary urgency   Pt to demonstrate proper diaphragmatic breathing to help with calming the nervous system in order to decrease pain and to improve abdominal wall musculature extensibility.   Pt to demonstrate good body mechanics when performing ADLs such as lifting and bending to decrease strain to lumbopelvic structures and reduce risk of further injury.      Long Term Goals: 8 weeks -progressing  Pt to be discharged with home plan for carry over after discharge.    Pt to no longer need to wear a pad for protection due to reduction of urinary incontinence by at least 90% with ADLs.  Pt will be trained and compliant with postural strategies in sitting and standing to improve alignment and decrease muscle fatigue  Pt to report being able to have a comfortable vaginal exam without significant increase in pelvic pain.  Pt to increase abdominal wall strength by at least 1 muscle grade to improve lumbopelvic stability with ADLs.    Plan        Plan of care Certification: 5/25/2022 to 8/25/2022.    Plan for next visit: review asymmetrical positions >> progress , progress abdominal strengthening and pressure  management     KERI PLAZA, PT   06/22/2022

## 2022-06-30 ENCOUNTER — HOSPITAL ENCOUNTER (OUTPATIENT)
Dept: RADIOLOGY | Facility: OTHER | Age: 77
Discharge: HOME | End: 2022-06-30
Attending: OBSTETRICS & GYNECOLOGY
Payer: MEDICARE

## 2022-06-30 ENCOUNTER — TELEPHONE (OUTPATIENT)
Dept: UROGYNECOLOGY | Facility: CLINIC | Age: 77
End: 2022-06-30
Payer: MEDICARE

## 2022-06-30 ENCOUNTER — OFFICE VISIT (OUTPATIENT)
Dept: UROGYNECOLOGY | Facility: CLINIC | Age: 77
End: 2022-06-30
Payer: MEDICARE

## 2022-06-30 VITALS
WEIGHT: 154.56 LBS | SYSTOLIC BLOOD PRESSURE: 110 MMHG | HEIGHT: 63 IN | BODY MASS INDEX: 27.39 KG/M2 | DIASTOLIC BLOOD PRESSURE: 70 MMHG

## 2022-06-30 DIAGNOSIS — N81.6 RECTOCELE, FEMALE: ICD-10-CM

## 2022-06-30 DIAGNOSIS — R33.9 INCOMPLETE EMPTYING OF BLADDER: ICD-10-CM

## 2022-06-30 DIAGNOSIS — N81.11 MIDLINE CYSTOCELE: Primary | ICD-10-CM

## 2022-06-30 DIAGNOSIS — R15.1 FECAL SMEARING: ICD-10-CM

## 2022-06-30 DIAGNOSIS — N81.9 VAGINAL VAULT PROLAPSE: ICD-10-CM

## 2022-06-30 DIAGNOSIS — N39.46 URINARY INCONTINENCE, MIXED: ICD-10-CM

## 2022-06-30 PROCEDURE — 3288F FALL RISK ASSESSMENT DOCD: CPT | Mod: CPTII,S$GLB,, | Performed by: NURSE PRACTITIONER

## 2022-06-30 PROCEDURE — 3288F PR FALLS RISK ASSESSMENT DOCUMENTED: ICD-10-PCS | Mod: CPTII,S$GLB,, | Performed by: NURSE PRACTITIONER

## 2022-06-30 PROCEDURE — 76770 US RETROPERITONEAL COMPLETE: ICD-10-PCS | Mod: 26,,, | Performed by: RADIOLOGY

## 2022-06-30 PROCEDURE — 99213 OFFICE O/P EST LOW 20 MIN: CPT | Mod: 25,S$GLB,, | Performed by: NURSE PRACTITIONER

## 2022-06-30 PROCEDURE — 99999 PR PBB SHADOW E&M-EST. PATIENT-LVL III: CPT | Mod: PBBFAC,,, | Performed by: NURSE PRACTITIONER

## 2022-06-30 PROCEDURE — 1159F PR MEDICATION LIST DOCUMENTED IN MEDICAL RECORD: ICD-10-PCS | Mod: CPTII,S$GLB,, | Performed by: NURSE PRACTITIONER

## 2022-06-30 PROCEDURE — 76770 US EXAM ABDO BACK WALL COMP: CPT | Mod: 26,,, | Performed by: RADIOLOGY

## 2022-06-30 PROCEDURE — 1126F AMNT PAIN NOTED NONE PRSNT: CPT | Mod: CPTII,S$GLB,, | Performed by: NURSE PRACTITIONER

## 2022-06-30 PROCEDURE — 57160 INSERT PESSARY/OTHER DEVICE: CPT | Mod: S$GLB,,, | Performed by: NURSE PRACTITIONER

## 2022-06-30 PROCEDURE — 1101F PT FALLS ASSESS-DOCD LE1/YR: CPT | Mod: CPTII,S$GLB,, | Performed by: NURSE PRACTITIONER

## 2022-06-30 PROCEDURE — 3074F PR MOST RECENT SYSTOLIC BLOOD PRESSURE < 130 MM HG: ICD-10-PCS | Mod: CPTII,S$GLB,, | Performed by: NURSE PRACTITIONER

## 2022-06-30 PROCEDURE — 1159F MED LIST DOCD IN RCRD: CPT | Mod: CPTII,S$GLB,, | Performed by: NURSE PRACTITIONER

## 2022-06-30 PROCEDURE — 1101F PR PT FALLS ASSESS DOC 0-1 FALLS W/OUT INJ PAST YR: ICD-10-PCS | Mod: CPTII,S$GLB,, | Performed by: NURSE PRACTITIONER

## 2022-06-30 PROCEDURE — 1160F RVW MEDS BY RX/DR IN RCRD: CPT | Mod: CPTII,S$GLB,, | Performed by: NURSE PRACTITIONER

## 2022-06-30 PROCEDURE — 3078F PR MOST RECENT DIASTOLIC BLOOD PRESSURE < 80 MM HG: ICD-10-PCS | Mod: CPTII,S$GLB,, | Performed by: NURSE PRACTITIONER

## 2022-06-30 PROCEDURE — 3074F SYST BP LT 130 MM HG: CPT | Mod: CPTII,S$GLB,, | Performed by: NURSE PRACTITIONER

## 2022-06-30 PROCEDURE — 99999 PR PBB SHADOW E&M-EST. PATIENT-LVL III: ICD-10-PCS | Mod: PBBFAC,,, | Performed by: NURSE PRACTITIONER

## 2022-06-30 PROCEDURE — 76770 US EXAM ABDO BACK WALL COMP: CPT | Mod: TC

## 2022-06-30 PROCEDURE — 1160F PR REVIEW ALL MEDS BY PRESCRIBER/CLIN PHARMACIST DOCUMENTED: ICD-10-PCS | Mod: CPTII,S$GLB,, | Performed by: NURSE PRACTITIONER

## 2022-06-30 PROCEDURE — 1126F PR PAIN SEVERITY QUANTIFIED, NO PAIN PRESENT: ICD-10-PCS | Mod: CPTII,S$GLB,, | Performed by: NURSE PRACTITIONER

## 2022-06-30 PROCEDURE — 99213 PR OFFICE/OUTPT VISIT, EST, LEVL III, 20-29 MIN: ICD-10-PCS | Mod: 25,S$GLB,, | Performed by: NURSE PRACTITIONER

## 2022-06-30 PROCEDURE — 57160 PR FIT/INSERT INTRAVAG SUPPORT DEVICE: ICD-10-PCS | Mod: S$GLB,,, | Performed by: NURSE PRACTITIONER

## 2022-06-30 PROCEDURE — 3078F DIAST BP <80 MM HG: CPT | Mod: CPTII,S$GLB,, | Performed by: NURSE PRACTITIONER

## 2022-06-30 NOTE — PROGRESS NOTES
Urogyn follow up  06/30/2022  .  [facility] - UROGYNECOLOGY  [address] [address]    Deepali Alex  [id number]  1945      Deepali Alex is a 77 y.o.  here for a urogyn follow up for pessary fitting    Last HPI from 05/24/2022  1)  UI:  (+) JOEL (occasionally) > (+) UUI (occasionally).  (+) pads (liner):1/day, usually minimum wetness and 1/night usually dry. Daytime frequency: Q 2 hours.  Nocturia: Yes: 1/night.   (--) dysuria,  (--) hematuria,  (--) frequent UTIs. 3/2022 urine C&S+  (+) complete bladder emptying.     2)  POP:  Present--feels worsening x last year. below introitus.  Symptoms: (--).  (--) vaginal bleeding. (--) vaginal discharge. (+) sexually active--mostly gives oral sex,  has some ED.  (--) dyspareunia.  (--)  Vaginal dryness.  (--) vaginal estrogen use. Estrogen cream was recc'd in past--did not want to use due to +FH BR cancer.      3)  BM:  (--) constipation/straining.  (--) chronic diarrhea. (--) hematochezia.  (--) fecal incontinence.  (+) fecal smearing/urgency--notices some smudges on pad sometimes.  (+) complete evacuation.      4)  H/o L inguinal hernia repair:  --notices some burning/pulling when raises leg   --started after heavy lifting last year       Changes from last visit:  1)  Stage 2 cystocele/rectocele, stage 1 vaginal vault prolapse:  --here for pessary check       2)  Mixed urinary incontinence, urge < stress:    --rare     3)  Fecal smearing:  --improved with metamucil     4)  Incomplete emptying:  --here for pessary fitting  --retroperitoneal ultrasound normal      5)  Vaginal atrophy (dryness):    --using REPLENS or REFRESH OTC: 1/2 applicator full in vagina twice a week.    --OR can also use coconut oil: dime-sized amount with finger in vagina up nightly and as needed        06/30/2022  Retroperitoneal ultrasound  Right kidney: The right kidney measures 9.5 cm in length.  No cortical thinning. No loss of  corticomedullary distinction. Resistive index measures 0.72.  No mass. No renal stone. No hydronephrosis.   Left kidney: The left kidney measures 11.3 cm in length.  No cortical thinning. No loss of corticomedullary distinction. Resistive index measures 0.74.  No mass. No renal stone. No hydronephrosis.   The bladder is partially distended at the time of scanning and has an unremarkable appearance.   Impression:   Unremarkable sonographic appearance of the kidneys and urinary bladder.       Past Medical History:   Diagnosis Date    Age-related osteoporosis without current pathological fracture     Atherosclerosis of aortic arch     - noted on CXR 8/2017    Back pain     Diverticulosis of sigmoid colon 10/22/2019    Fibrocystic breast     Hyperlipidemia     Osteopenia     Polyp of ascending colon 10/22/2019    Urinary incontinence, mixed 5/24/2022       Past Surgical History:   Procedure Laterality Date    ECTOPIC PREGNANCY SURGERY      HERNIA REPAIR      left inguinal    HYSTERECTOMY  1980    without BSO       Family History   Problem Relation Age of Onset    Breast cancer Mother 69    Seizures Mother     Heart disease Father     Heart attack Father     Depression Father     Breast cancer Sister 72    Breast cancer Maternal Aunt 70    Obesity Brother     Mental illness Paternal Grandmother     Depression Paternal Aunt     Depression Paternal Uncle     Tongue cancer Cousin     Ovarian cancer Neg Hx        Social History     Socioeconomic History    Marital status:     Number of children: 2   Occupational History    Occupation: retired   Tobacco Use    Smoking status: Never Smoker    Smokeless tobacco: Never Used   Substance and Sexual Activity    Alcohol use: No    Drug use: No    Sexual activity: Not Currently     Social Determinants of Health     Financial Resource Strain: Low Risk     Difficulty of Paying Living Expenses: Not hard at all   Food Insecurity: No Food  "Insecurity    Worried About Running Out of Food in the Last Year: Never true    Ran Out of Food in the Last Year: Never true   Transportation Needs: No Transportation Needs    Lack of Transportation (Medical): No    Lack of Transportation (Non-Medical): No   Physical Activity: Insufficiently Active    Days of Exercise per Week: 1 day    Minutes of Exercise per Session: 20 min   Stress: No Stress Concern Present    Feeling of Stress : Not at all   Social Connections: Unknown    Frequency of Communication with Friends and Family: Three times a week    Frequency of Social Gatherings with Friends and Family: More than three times a week    Active Member of Clubs or Organizations: No    Attends Club or Organization Meetings: Never    Marital Status:    Housing Stability: Low Risk     Unable to Pay for Housing in the Last Year: No    Number of Places Lived in the Last Year: 1    Unstable Housing in the Last Year: No       No current outpatient medications on file.     No current facility-administered medications for this visit.       Review of patient's allergies indicates:   Allergen Reactions    Erythromycin (bulk)      Other reaction(s): Eye irritation       ROS:  As per HPI.      Exam  /70 (BP Location: Right arm, Patient Position: Sitting, BP Method: Medium (Manual))   Ht 5' 3" (1.6 m)   Wt 70.1 kg (154 lb 8.7 oz)   BMI 27.38 kg/m²   General: alert and oriented, no acute distress  Respiratory: normal respiratory effort  Abd: soft, non-tender, non-distended    Pelvic  Ext. Genitalia: normal external genitalia. Normal bartholin's and skeens glands  Vagina: + atrophy. Normal vaginal mucosa without lesions. No discharge noted.   Non-tender bladder base without palpable mass.  Cervix: absent  Uterus:  absent   Urethra: no masses or tenderness  Urethral meatus: no lesions, caruncle or prolapse.      The patient was fit with #2 ring with support pessary.  She was able to tolerate the device " comfortabley with bending, squatting, valsalva.  She was able to demonstrate independent removal and placement.  Will order replacement from [employer]. She tolerated the procedure well.      Bladder scan 50 mL with pessary in place    Impression  1. Midline cystocele     2. Rectocele, female     3. Vaginal vault prolapse     4. Incomplete emptying of bladder     5. Urinary incontinence, mixed     6. Fecal smearing       We reviewed the above issues and discussed options for short-term versus long-term management of her problems.   Plan:       1) Stage 2 cystocele/rectocele, stage 1 vaginal vault prolapse:  --Empty bladder every 3 hours.  Empty well: wait a minute, lean forward on toilet.  Use pessary.  Try not to hover--makes harder to empty.   --since not emptying bladder, want to reduce bulge  --trial #2 ring with support -- will order replacement from [employer]  ----to insert:  Fold in half, push down behind cervix and back to back of vagina. Lift up under pubic bone.  --to remove: grab rim with forefinger and thumb, pull down and twist out  PESSARY CARE: Remove pessary as frequently as nightly and as infrequently as every 2-3 weeks.  Remove before intercourse.  May need to remove before bowel movements if straining dislodges.   Wash with soap and water (no ).  Replace using small amount of water-based lubricant (like KY jelly or coconut oil) at end being introduced into vagina                  --pessary vs surgery (sacral colpopexy)                          --if surgery: would need bladder testing to see if need sling for stress leakage                          --if surgery: need clearance per PCP + labs (CBC, BMP, T&S)/EKG  --trial of pessary            4)  Mixed urinary incontinence, urge < stress:    --Empty bladder every 3 hours.  Empty well: wait a minute, lean forward on toilet.    --Avoid dietary irritants (see sheet).  Keep diary x 3-5 days to determine your irritants.  --start pelvic floor PT.   Call to make appt:  GABRIELLE W Bank/Melonie Alejo: (p) [phone]. f) [phone].    --URGE: consider medication in future. Takes 2-4 weeks to see if will have effect.  For dry mouth: get sour, sugar free lozenge or gum.                --see if pessary helps  --STRESS:  Pessary vs. Sling.      5)  Fecal smearing:  --hydrate well  --work on stool consistency  --continue fiber supplement  --continue pelvic floor PT     6)  Incomplete emptying:  --improved with pessary in place  --Empty bladder every 3 hours.  Empty well: wait a minute, lean forward on toilet.  Use pessary.  Try not to hover--makes harder to empty.   --renal US normal  --start pelvic floor PT.  Call to make appt:     7)  Vaginal atrophy (dryness):  Use REPLENS or REFRESH OTC: 1/2 applicator full in vagina twice a week.    --OR can also use coconut oil: dime-sized amount with finger in vagina up nightly and as needed     8) RTC 6 weeks for follow up for pc         I spent a total of 20 minutes on the day of the visit.  This includes face to face time and non-face to face time preparing to see the patient (eg, review of tests), obtaining and/or reviewing separately obtained history, documenting clinical information in the electronic or other health record, independently interpreting results and communicating results to the patient/family/caregiver, or care coordinator.    Graciela Joy, FNP-BC  [facility]  Division of Female Pelvic Medicine and Reconstructive Surgery  Department of Obstetrics & Gynecology

## 2022-06-30 NOTE — TELEPHONE ENCOUNTER
----- Message from Marcelle Richter MD sent at 6/30/2022 12:31 PM CDT -----  Please let patient know that renal US looks normal. I tried to call several times but no answer :( Thanks!

## 2022-06-30 NOTE — PATIENT INSTRUCTIONS
1) Stage 2 cystocele/rectocele, stage 1 vaginal vault prolapse:  --Empty bladder every 3 hours.  Empty well: wait a minute, lean forward on toilet.  Use pessary.  Try not to hover--makes harder to empty.   --since not emptying bladder, want to reduce bulge  --trial #2 ring with support -- will order replacement from [employer]  ----to insert:  Fold in half, push down behind cervix and back to back of vagina. Lift up under pubic bone.  --to remove: grab rim with forefinger and thumb, pull down and twist out  PESSARY CARE: Remove pessary as frequently as nightly and as infrequently as every 2-3 weeks.  Remove before intercourse.  May need to remove before bowel movements if straining dislodges.   Wash with soap and water (no ).  Replace using small amount of water-based lubricant (like KY jelly or coconut oil) at end being introduced into vagina                  --pessary vs surgery (sacral colpopexy)                          --if surgery: would need bladder testing to see if need sling for stress leakage                          --if surgery: need clearance per PCP + labs (CBC, BMP, T&S)/EKG  --trial of pessary            4)  Mixed urinary incontinence, urge < stress:    --Empty bladder every 3 hours.  Empty well: wait a minute, lean forward on toilet.    --Avoid dietary irritants (see sheet).  Keep diary x 3-5 days to determine your irritants.  --start pelvic floor PT.  Call to make appt:  GABRIELLE W Bank/Melonie Alejo: p) [phone]. (f) [phone].    --URGE: consider medication in future. Takes 2-4 weeks to see if will have effect.  For dry mouth: get sour, sugar free lozenge or gum.                --see if pessary helps  --STRESS:  Pessary vs. Sling.      5)  Fecal smearing:  --hydrate well  --work on stool consistency  --continue fiber supplement  --continue pelvic floor PT     6)  Incomplete emptying:  --improved with pessary in place  --Empty bladder every 3 hours.  Empty well: wait a minute, lean  forward on toilet.  Use pessary.  Try not to hover--makes harder to empty.   --renal US normal  --continue pelvic floor PT     7)  Vaginal atrophy (dryness):  Use REPLENS or REFRESH OTC: 1/2 applicator full in vagina twice a week.    --OR can also use coconut oil: dime-sized amount with finger in vagina up nightly and as needed     8) RTC 6 weeks for follow up

## 2022-07-01 ENCOUNTER — HOSPITAL ENCOUNTER (OUTPATIENT)
Dept: RADIOLOGY | Facility: HOSPITAL | Age: 77
Discharge: HOME | End: 2022-07-01
Attending: OBSTETRICS & GYNECOLOGY
Payer: MEDICARE

## 2022-07-01 DIAGNOSIS — Z12.31 ENCOUNTER FOR SCREENING MAMMOGRAM FOR BREAST CANCER: ICD-10-CM

## 2022-07-01 PROCEDURE — 77063 MAMMO DIGITAL SCREENING BILAT WITH TOMO: ICD-10-PCS | Mod: 26,,, | Performed by: RADIOLOGY

## 2022-07-01 PROCEDURE — 77063 BREAST TOMOSYNTHESIS BI: CPT | Mod: TC

## 2022-07-01 PROCEDURE — 77067 SCR MAMMO BI INCL CAD: CPT | Mod: TC

## 2022-07-01 PROCEDURE — 77063 BREAST TOMOSYNTHESIS BI: CPT | Mod: 26,,, | Performed by: RADIOLOGY

## 2022-07-01 PROCEDURE — 77067 MAMMO DIGITAL SCREENING BILAT WITH TOMO: ICD-10-PCS | Mod: 26,,, | Performed by: RADIOLOGY

## 2022-07-01 PROCEDURE — 77067 SCR MAMMO BI INCL CAD: CPT | Mod: 26,,, | Performed by: RADIOLOGY

## 2022-07-07 ENCOUNTER — TELEPHONE (OUTPATIENT)
Dept: UROGYNECOLOGY | Facility: CLINIC | Age: 77
End: 2022-07-07
Payer: MEDICARE

## 2022-07-07 NOTE — TELEPHONE ENCOUNTER
----- Message from Marcelle Richter MD sent at 7/6/2022  5:48 PM CDT -----  Please let patient know mammogram was normal. Thanks!

## 2022-07-07 NOTE — TELEPHONE ENCOUNTER
Patient is having pessary issues. Stated uncomfortable and patient took it out multiple times throughout the weekend (may be the cause)     Called to state normal mammo, patient verbalized understanding. Call ended     Please advise in regards to pessary

## 2022-07-19 ENCOUNTER — PATIENT MESSAGE (OUTPATIENT)
Dept: RESEARCH | Facility: CLINIC | Age: 77
End: 2022-07-19
Payer: MEDICARE

## 2022-07-22 ENCOUNTER — OFFICE VISIT (OUTPATIENT)
Dept: FAMILY MEDICINE | Facility: CLINIC | Age: 77
End: 2022-07-22
Payer: MEDICARE

## 2022-07-22 ENCOUNTER — TELEPHONE (OUTPATIENT)
Dept: FAMILY MEDICINE | Facility: CLINIC | Age: 77
End: 2022-07-22
Payer: MEDICARE

## 2022-07-22 VITALS
HEIGHT: 63 IN | SYSTOLIC BLOOD PRESSURE: 118 MMHG | TEMPERATURE: 99 F | HEART RATE: 76 BPM | BODY MASS INDEX: 27.56 KG/M2 | OXYGEN SATURATION: 95 % | WEIGHT: 155.56 LBS | DIASTOLIC BLOOD PRESSURE: 60 MMHG

## 2022-07-22 DIAGNOSIS — I51.89 DIASTOLIC DYSFUNCTION: ICD-10-CM

## 2022-07-22 DIAGNOSIS — I70.0 AORTIC ATHEROSCLEROSIS: ICD-10-CM

## 2022-07-22 DIAGNOSIS — N30.01 ACUTE CYSTITIS WITH HEMATURIA: Primary | ICD-10-CM

## 2022-07-22 LAB
BILIRUB SERPL-MCNC: NEGATIVE MG/DL
BILIRUB UR QL STRIP: NEGATIVE
BLOOD URINE, POC: NORMAL
CLARITY UR REFRACT.AUTO: ABNORMAL
CLARITY, POC UA: CLEAR
COLOR UR AUTO: YELLOW
COLOR, POC UA: NORMAL
GLUCOSE UR QL STRIP: NEGATIVE
GLUCOSE UR QL STRIP: NORMAL
HGB UR QL STRIP: ABNORMAL
KETONES UR QL STRIP: NEGATIVE
KETONES UR QL STRIP: NEGATIVE
LEUKOCYTE ESTERASE UR QL STRIP: ABNORMAL
LEUKOCYTE ESTERASE URINE, POC: NORMAL
MICROSCOPIC COMMENT: ABNORMAL
NITRITE UR QL STRIP: NEGATIVE
NITRITE, POC UA: NEGATIVE
PH UR STRIP: 5 [PH] (ref 5–8)
PH, POC UA: 5
PROT UR QL STRIP: NEGATIVE
PROTEIN, POC: NORMAL
RBC #/AREA URNS AUTO: 1 /HPF (ref 0–4)
SP GR UR STRIP: 1 (ref 1–1.03)
SPECIFIC GRAVITY, POC UA: 1.01
URN SPEC COLLECT METH UR: ABNORMAL
UROBILINOGEN, POC UA: NORMAL
WBC #/AREA URNS AUTO: 45 /HPF (ref 0–5)
WBC CLUMPS UR QL AUTO: ABNORMAL

## 2022-07-22 PROCEDURE — 3288F PR FALLS RISK ASSESSMENT DOCUMENTED: ICD-10-PCS | Mod: CPTII,S$GLB,, | Performed by: NURSE PRACTITIONER

## 2022-07-22 PROCEDURE — 99999 PR PBB SHADOW E&M-EST. PATIENT-LVL III: CPT | Mod: PBBFAC,,, | Performed by: NURSE PRACTITIONER

## 2022-07-22 PROCEDURE — 99215 PR OFFICE/OUTPT VISIT, EST, LEVL V, 40-54 MIN: ICD-10-PCS | Mod: S$GLB,,, | Performed by: NURSE PRACTITIONER

## 2022-07-22 PROCEDURE — 1159F MED LIST DOCD IN RCRD: CPT | Mod: CPTII,S$GLB,, | Performed by: NURSE PRACTITIONER

## 2022-07-22 PROCEDURE — 1126F AMNT PAIN NOTED NONE PRSNT: CPT | Mod: CPTII,S$GLB,, | Performed by: NURSE PRACTITIONER

## 2022-07-22 PROCEDURE — 1159F PR MEDICATION LIST DOCUMENTED IN MEDICAL RECORD: ICD-10-PCS | Mod: CPTII,S$GLB,, | Performed by: NURSE PRACTITIONER

## 2022-07-22 PROCEDURE — 81002 POCT URINE DIPSTICK WITHOUT MICROSCOPE: ICD-10-PCS | Mod: S$GLB,,, | Performed by: NURSE PRACTITIONER

## 2022-07-22 PROCEDURE — 1126F PR PAIN SEVERITY QUANTIFIED, NO PAIN PRESENT: ICD-10-PCS | Mod: CPTII,S$GLB,, | Performed by: NURSE PRACTITIONER

## 2022-07-22 PROCEDURE — 3078F PR MOST RECENT DIASTOLIC BLOOD PRESSURE < 80 MM HG: ICD-10-PCS | Mod: CPTII,S$GLB,, | Performed by: NURSE PRACTITIONER

## 2022-07-22 PROCEDURE — 81002 URINALYSIS NONAUTO W/O SCOPE: CPT | Mod: S$GLB,,, | Performed by: NURSE PRACTITIONER

## 2022-07-22 PROCEDURE — 3074F PR MOST RECENT SYSTOLIC BLOOD PRESSURE < 130 MM HG: ICD-10-PCS | Mod: CPTII,S$GLB,, | Performed by: NURSE PRACTITIONER

## 2022-07-22 PROCEDURE — 99215 OFFICE O/P EST HI 40 MIN: CPT | Mod: S$GLB,,, | Performed by: NURSE PRACTITIONER

## 2022-07-22 PROCEDURE — 3078F DIAST BP <80 MM HG: CPT | Mod: CPTII,S$GLB,, | Performed by: NURSE PRACTITIONER

## 2022-07-22 PROCEDURE — 1101F PT FALLS ASSESS-DOCD LE1/YR: CPT | Mod: CPTII,S$GLB,, | Performed by: NURSE PRACTITIONER

## 2022-07-22 PROCEDURE — 81001 URINALYSIS AUTO W/SCOPE: CPT | Performed by: NURSE PRACTITIONER

## 2022-07-22 PROCEDURE — 1101F PR PT FALLS ASSESS DOC 0-1 FALLS W/OUT INJ PAST YR: ICD-10-PCS | Mod: CPTII,S$GLB,, | Performed by: NURSE PRACTITIONER

## 2022-07-22 PROCEDURE — 99999 PR PBB SHADOW E&M-EST. PATIENT-LVL III: ICD-10-PCS | Mod: PBBFAC,,, | Performed by: NURSE PRACTITIONER

## 2022-07-22 PROCEDURE — 3288F FALL RISK ASSESSMENT DOCD: CPT | Mod: CPTII,S$GLB,, | Performed by: NURSE PRACTITIONER

## 2022-07-22 PROCEDURE — 87088 URINE BACTERIA CULTURE: CPT | Performed by: NURSE PRACTITIONER

## 2022-07-22 PROCEDURE — 87186 SC STD MICRODIL/AGAR DIL: CPT | Performed by: NURSE PRACTITIONER

## 2022-07-22 PROCEDURE — 87086 URINE CULTURE/COLONY COUNT: CPT | Performed by: NURSE PRACTITIONER

## 2022-07-22 PROCEDURE — 87077 CULTURE AEROBIC IDENTIFY: CPT | Performed by: NURSE PRACTITIONER

## 2022-07-22 PROCEDURE — 3074F SYST BP LT 130 MM HG: CPT | Mod: CPTII,S$GLB,, | Performed by: NURSE PRACTITIONER

## 2022-07-22 RX ORDER — NITROFURANTOIN 25; 75 MG/1; MG/1
100 CAPSULE ORAL 2 TIMES DAILY
Qty: 10 CAPSULE | Refills: 0 | Status: SHIPPED | OUTPATIENT
Start: 2022-07-22 | End: 2022-07-27

## 2022-07-22 NOTE — TELEPHONE ENCOUNTER
----- Message from Pierre Cortez sent at 7/22/2022  8:05 AM CDT -----  Type: Patient Call Back    Who called:self    What is the request in detail:Pt states she is having another UTI and would like to know if doctor can prescribe medication. Dr. Rivera does not available appt until 8/15.    Can the clinic reply by [facility]?no    Would the patient rather a call back or a response via [facility]? call    Best call back number:[phone]

## 2022-07-22 NOTE — TELEPHONE ENCOUNTER
Spoke to patient and she sts that she has a possible Uti. Informed patient that Dr. Rivera is currently out of office. Patient was able to get an appt with Swati Concepcion for 1:00 today.

## 2022-07-25 DIAGNOSIS — N30.01 ACUTE CYSTITIS WITH HEMATURIA: Primary | ICD-10-CM

## 2022-07-25 LAB — BACTERIA UR CULT: ABNORMAL

## 2022-07-27 ENCOUNTER — TELEPHONE (OUTPATIENT)
Dept: UROGYNECOLOGY | Facility: CLINIC | Age: 77
End: 2022-07-27
Payer: MEDICARE

## 2022-07-27 NOTE — TELEPHONE ENCOUNTER
----- Message from Patrice Monique sent at 7/27/2022  9:19 AM CDT -----  Please call pt she has some question regarding her appt [phone]

## 2022-08-05 ENCOUNTER — OFFICE VISIT (OUTPATIENT)
Dept: UROGYNECOLOGY | Facility: CLINIC | Age: 77
End: 2022-08-05
Payer: MEDICARE

## 2022-08-05 VITALS
SYSTOLIC BLOOD PRESSURE: 140 MMHG | HEIGHT: 63 IN | WEIGHT: 153.44 LBS | BODY MASS INDEX: 27.19 KG/M2 | DIASTOLIC BLOOD PRESSURE: 80 MMHG

## 2022-08-05 DIAGNOSIS — N81.11 MIDLINE CYSTOCELE: Primary | ICD-10-CM

## 2022-08-05 DIAGNOSIS — N39.46 MIXED INCONTINENCE URGE AND STRESS: ICD-10-CM

## 2022-08-05 DIAGNOSIS — N95.2 VAGINAL ATROPHY: ICD-10-CM

## 2022-08-05 DIAGNOSIS — N81.6 RECTOCELE, FEMALE: ICD-10-CM

## 2022-08-05 DIAGNOSIS — R33.9 INCOMPLETE EMPTYING OF BLADDER: ICD-10-CM

## 2022-08-05 DIAGNOSIS — N81.9 VAGINAL VAULT PROLAPSE: ICD-10-CM

## 2022-08-05 DIAGNOSIS — R15.1 FECAL SMEARING: ICD-10-CM

## 2022-08-05 DIAGNOSIS — Z46.89 PESSARY MAINTENANCE: ICD-10-CM

## 2022-08-05 PROCEDURE — 1101F PT FALLS ASSESS-DOCD LE1/YR: CPT | Mod: CPTII,S$GLB,, | Performed by: NURSE PRACTITIONER

## 2022-08-05 PROCEDURE — 3077F PR MOST RECENT SYSTOLIC BLOOD PRESSURE >= 140 MM HG: ICD-10-PCS | Mod: CPTII,S$GLB,, | Performed by: NURSE PRACTITIONER

## 2022-08-05 PROCEDURE — 1126F PR PAIN SEVERITY QUANTIFIED, NO PAIN PRESENT: ICD-10-PCS | Mod: CPTII,S$GLB,, | Performed by: NURSE PRACTITIONER

## 2022-08-05 PROCEDURE — 3079F PR MOST RECENT DIASTOLIC BLOOD PRESSURE 80-89 MM HG: ICD-10-PCS | Mod: CPTII,S$GLB,, | Performed by: NURSE PRACTITIONER

## 2022-08-05 PROCEDURE — 99213 PR OFFICE/OUTPT VISIT, EST, LEVL III, 20-29 MIN: ICD-10-PCS | Mod: S$GLB,,, | Performed by: NURSE PRACTITIONER

## 2022-08-05 PROCEDURE — 99213 OFFICE O/P EST LOW 20 MIN: CPT | Mod: S$GLB,,, | Performed by: NURSE PRACTITIONER

## 2022-08-05 PROCEDURE — 99999 PR PBB SHADOW E&M-EST. PATIENT-LVL III: CPT | Mod: PBBFAC,,, | Performed by: NURSE PRACTITIONER

## 2022-08-05 PROCEDURE — 99499 RISK ADDL DX/OHS AUDIT: ICD-10-PCS | Mod: S$GLB,,, | Performed by: NURSE PRACTITIONER

## 2022-08-05 PROCEDURE — 99499 UNLISTED E&M SERVICE: CPT | Mod: S$GLB,,, | Performed by: NURSE PRACTITIONER

## 2022-08-05 PROCEDURE — 3079F DIAST BP 80-89 MM HG: CPT | Mod: CPTII,S$GLB,, | Performed by: NURSE PRACTITIONER

## 2022-08-05 PROCEDURE — 3077F SYST BP >= 140 MM HG: CPT | Mod: CPTII,S$GLB,, | Performed by: NURSE PRACTITIONER

## 2022-08-05 PROCEDURE — 1159F PR MEDICATION LIST DOCUMENTED IN MEDICAL RECORD: ICD-10-PCS | Mod: CPTII,S$GLB,, | Performed by: NURSE PRACTITIONER

## 2022-08-05 PROCEDURE — 1160F PR REVIEW ALL MEDS BY PRESCRIBER/CLIN PHARMACIST DOCUMENTED: ICD-10-PCS | Mod: CPTII,S$GLB,, | Performed by: NURSE PRACTITIONER

## 2022-08-05 PROCEDURE — 1126F AMNT PAIN NOTED NONE PRSNT: CPT | Mod: CPTII,S$GLB,, | Performed by: NURSE PRACTITIONER

## 2022-08-05 PROCEDURE — 1159F MED LIST DOCD IN RCRD: CPT | Mod: CPTII,S$GLB,, | Performed by: NURSE PRACTITIONER

## 2022-08-05 PROCEDURE — 3288F FALL RISK ASSESSMENT DOCD: CPT | Mod: CPTII,S$GLB,, | Performed by: NURSE PRACTITIONER

## 2022-08-05 PROCEDURE — 3288F PR FALLS RISK ASSESSMENT DOCUMENTED: ICD-10-PCS | Mod: CPTII,S$GLB,, | Performed by: NURSE PRACTITIONER

## 2022-08-05 PROCEDURE — 1101F PR PT FALLS ASSESS DOC 0-1 FALLS W/OUT INJ PAST YR: ICD-10-PCS | Mod: CPTII,S$GLB,, | Performed by: NURSE PRACTITIONER

## 2022-08-05 PROCEDURE — 99999 PR PBB SHADOW E&M-EST. PATIENT-LVL III: ICD-10-PCS | Mod: PBBFAC,,, | Performed by: NURSE PRACTITIONER

## 2022-08-05 PROCEDURE — 1160F RVW MEDS BY RX/DR IN RCRD: CPT | Mod: CPTII,S$GLB,, | Performed by: NURSE PRACTITIONER

## 2022-08-05 NOTE — PROGRESS NOTES
Urogyn follow up  08/5/2022  .  [facility] - UROGYNECOLOGY  [address] [address]    Deepali Alex  [id number]  1945      Deepali Alex is a 77 y.o.  here for a urogyn follow up for pessary check    Last HPI from 05/24/2022  1)  UI:  (+) JOEL (occasionally) > (+) UUI (occasionally).  (+) pads (liner):1/day, usually minimum wetness and 1/night usually dry. Daytime frequency: Q 2 hours.  Nocturia: Yes: 1/night.   (--) dysuria,  (--) hematuria,  (--) frequent UTIs. 3/2022 urine C&S+  (+) complete bladder emptying.     2)  POP:  Present--feels worsening x last year. below introitus.  Symptoms: (--).  (--) vaginal bleeding. (--) vaginal discharge. (+) sexually active--mostly gives oral sex,  has some ED.  (--) dyspareunia.  (--)  Vaginal dryness.  (--) vaginal estrogen use. Estrogen cream was recc'd in past--did not want to use due to +FH BR cancer.      3)  BM:  (--) constipation/straining.  (--) chronic diarrhea. (--) hematochezia.  (--) fecal incontinence.  (+) fecal smearing/urgency--notices some smudges on pad sometimes.  (+) complete evacuation.      4)  H/o L inguinal hernia repair:  --notices some burning/pulling when raises leg   --started after heavy lifting last year       Changes from last visit:  1)  Stage 2 cystocele/rectocele, stage 1 vaginal vault prolapse:  --here for pessary check       2)  Mixed urinary incontinence, urge < stress:    --rare UUI with full bladder or rare JOEL with change in position  --did one session with PT     3)  Fecal smearing:  --improved with metamucil     4)  Incomplete emptying:  --retroperitoneal ultrasound normal      5)  Vaginal atrophy (dryness):    --using REPLENS or REFRESH OTC: 1/2 applicator full in vagina twice a week.    --OR can also use coconut oil: dime-sized amount with finger in vagina up nightly and as needed        06/30/2022  Retroperitoneal ultrasound  Right kidney: The right kidney measures 9.5 cm in  length.  No cortical thinning. No loss of corticomedullary distinction. Resistive index measures 0.72.  No mass. No renal stone. No hydronephrosis.   Left kidney: The left kidney measures 11.3 cm in length.  No cortical thinning. No loss of corticomedullary distinction. Resistive index measures 0.74.  No mass. No renal stone. No hydronephrosis.   The bladder is partially distended at the time of scanning and has an unremarkable appearance.   Impression:   Unremarkable sonographic appearance of the kidneys and urinary bladder.       Past Medical History:   Diagnosis Date    Age-related osteoporosis without current pathological fracture     Atherosclerosis of aortic arch     - noted on CXR 8/2017    Back pain     Diverticulosis of sigmoid colon 10/22/2019    Fibrocystic breast     Hyperlipidemia     Osteopenia     Polyp of ascending colon 10/22/2019    Urinary incontinence, mixed 5/24/2022       Past Surgical History:   Procedure Laterality Date    ECTOPIC PREGNANCY SURGERY      HERNIA REPAIR      left inguinal    HYSTERECTOMY  1980    without BSO       Family History   Problem Relation Age of Onset    Breast cancer Mother 69    Seizures Mother     Heart disease Father     Heart attack Father     Depression Father     Breast cancer Sister 72    Breast cancer Maternal Aunt 70    Obesity Brother     Mental illness Paternal Grandmother     Depression Paternal Aunt     Depression Paternal Uncle     Tongue cancer Cousin     Ovarian cancer Neg Hx        Social History     Socioeconomic History    Marital status:     Number of children: 2   Occupational History    Occupation: retired   Tobacco Use    Smoking status: Never Smoker    Smokeless tobacco: Never Used   Substance and Sexual Activity    Alcohol use: No    Drug use: No    Sexual activity: Not Currently     Social Determinants of Health     Financial Resource Strain: Low Risk     Difficulty of Paying Living Expenses: Not hard  "at all   Food Insecurity: No Food Insecurity    Worried About Running Out of Food in the Last Year: Never true    Ran Out of Food in the Last Year: Never true   Transportation Needs: No Transportation Needs    Lack of Transportation (Medical): No    Lack of Transportation (Non-Medical): No   Physical Activity: Insufficiently Active    Days of Exercise per Week: 1 day    Minutes of Exercise per Session: 20 min   Stress: No Stress Concern Present    Feeling of Stress : Not at all   Social Connections: Unknown    Frequency of Communication with Friends and Family: Three times a week    Frequency of Social Gatherings with Friends and Family: More than three times a week    Active Member of Clubs or Organizations: No    Attends Club or Organization Meetings: Never    Marital Status:    Housing Stability: Low Risk     Unable to Pay for Housing in the Last Year: No    Number of Places Lived in the Last Year: 1    Unstable Housing in the Last Year: No       No current outpatient medications on file.     No current facility-administered medications for this visit.       Review of patient's allergies indicates:   Allergen Reactions    Erythromycin (bulk)      Other reaction(s): Eye irritation       ROS:  As per HPI.      Pap test: post hyst.  History of abnormal paps: No.  History of STIs:  No  Mammogram: Date of last: 5/13/2021.  Result: Normal  Colonoscopy: Date of last: 2019.  Result:  Diverticulosis, polyp.  Repeat due:  2024.    DEXA:  Date of last: 5/2021.  Result:  Osteoporosis--fosamax started 2021.  Repeat due:  2026 per PCP.     Exam  BP (!) 140/80 (BP Location: Right arm, Patient Position: Sitting, BP Method: Medium (Manual))   Ht 5' 3" (1.6 m)   Wt 69.6 kg (153 lb 7 oz)   BMI 27.18 kg/m²   General: alert and oriented, no acute distress  Respiratory: normal respiratory effort  Abd: soft, non-tender, non-distended    Pelvic  Ext. Genitalia: normal external genitalia. Normal bartholin's and " skeens glands  Vagina: + atrophy. Normal vaginal mucosa without lesions. No discharge noted.   Non-tender bladder base without palpable mass.  #2 ring with support   Cervix: absent  Uterus:  absent   Urethra: no masses or tenderness  Urethral meatus: no lesions, caruncle or prolapse.      Pessary removed without difficulty. Washed with soap and water. Reinserted without difficulty    Impression  1. Midline cystocele     2. Rectocele, female     3. Vaginal vault prolapse     4. Vaginal atrophy     5. Pessary maintenance     6. Incomplete emptying of bladder     7. Mixed incontinence urge and stress  Ambulatory referral/consult to Urogynecology   8. Fecal smearing       We reviewed the above issues and discussed options for short-term versus long-term management of her problems.   Plan:       1) Stage 2 cystocele/rectocele, stage 1 vaginal vault prolapse:  --Empty bladder every 3 hours.  Empty well: wait a minute, lean forward on toilet.  Use pessary.  Try not to hover--makes harder to empty.   --since not emptying bladder, want to reduce bulge  --continue #2 ring with support  ----to insert:  Fold in half, push down behind cervix and back to back of vagina. Lift up under pubic bone.  --to remove: grab rim with forefinger and thumb, pull down and twist out  PESSARY CARE: Remove pessary as frequently as nightly and as infrequently as every 2-3 weeks.  Remove before intercourse.  May need to remove before bowel movements if straining dislodges.   Wash with soap and water (no ).  Replace using small amount of water-based lubricant (like KY jelly or coconut oil) at end being introduced into vagina              --pessary vs surgery (sacral colpopexy)                          --if surgery: would need bladder testing to see if need sling for stress leakage                          --if surgery: need clearance per PCP + labs (CBC, BMP, T&S)/EKG        4)  Mixed urinary incontinence, urge < stress:    --Empty bladder  every 3 hours.  Empty well: wait a minute, lean forward on toilet.    --Avoid dietary irritants (see sheet).  Keep diary x 3-5 days to determine your irritants.  --continue pelvic floor PT home exercise program  --URGE: consider medication in future. Takes 2-4 weeks to see if will have effect.  For dry mouth: get sour, sugar free lozenge or gum.                --see if pessary helps  --STRESS:  Pessary vs. Sling.      5)  Fecal smearing:  --hydrate well  --work on stool consistency  --continue fiber supplement  --continue pelvic floor PT home exercise program     6)  Incomplete emptying:  --improved with pessary in place  --Empty bladder every 3 hours.  Empty well: wait a minute, lean forward on toilet.  Use pessary.  Try not to hover--makes harder to empty.   --renal US normal     7)  Vaginal atrophy (dryness):   -- use coconut oil: dime-sized amount with finger in vagina up nightly and as needed     8) RTC 6 months for pc         I spent a total of 20 minutes on the day of the visit.  This includes face to face time and non-face to face time preparing to see the patient (eg, review of tests), obtaining and/or reviewing separately obtained history, documenting clinical information in the electronic or other health record, independently interpreting results and communicating results to the patient/family/caregiver, or care coordinator.    Graciela Joy, FNP-BC  [facility]  Division of Female Pelvic Medicine and Reconstructive Surgery  Department of Obstetrics & Gynecology

## 2022-08-05 NOTE — PATIENT INSTRUCTIONS
1) Stage 2 cystocele/rectocele, stage 1 vaginal vault prolapse:  --Empty bladder every 3 hours.  Empty well: wait a minute, lean forward on toilet.  Use pessary.  Try not to hover--makes harder to empty.   --since not emptying bladder, want to reduce bulge  --continue #2 ring with support  ----to insert:  Fold in half, push down behind cervix and back to back of vagina. Lift up under pubic bone.  --to remove: grab rim with forefinger and thumb, pull down and twist out  PESSARY CARE: Remove pessary as frequently as nightly and as infrequently as every 2-3 weeks.  Remove before intercourse.  May need to remove before bowel movements if straining dislodges.   Wash with soap and water (no ).  Replace using small amount of water-based lubricant (like KY jelly or coconut oil) at end being introduced into vagina              --pessary vs surgery (sacral colpopexy)                          --if surgery: would need bladder testing to see if need sling for stress leakage                          --if surgery: need clearance per PCP + labs (CBC, BMP, T&S)/EKG        4)  Mixed urinary incontinence, urge < stress:    --Empty bladder every 3 hours.  Empty well: wait a minute, lean forward on toilet.    --Avoid dietary irritants (see sheet).  Keep diary x 3-5 days to determine your irritants.  --continue pelvic floor PT home exercise program  --URGE: consider medication in future. Takes 2-4 weeks to see if will have effect.  For dry mouth: get sour, sugar free lozenge or gum.                --see if pessary helps  --STRESS:  Pessary vs. Sling.      5)  Fecal smearing:  --hydrate well  --work on stool consistency  --continue fiber supplement  --continue pelvic floor PT home exercise program     6)  Incomplete emptying:  --improved with pessary in place  --Empty bladder every 3 hours.  Empty well: wait a minute, lean forward on toilet.  Use pessary.  Try not to hover--makes harder to empty.   --renal US normal     7)   Vaginal atrophy (dryness):   -- use coconut oil: dime-sized amount with finger in vagina up nightly and as needed     8) RTC 6 months for pc

## 2022-08-12 ENCOUNTER — PES CALL (OUTPATIENT)
Dept: ADMINISTRATIVE | Facility: CLINIC | Age: 77
End: 2022-08-12
Payer: MEDICARE

## 2022-08-12 ENCOUNTER — TELEPHONE (OUTPATIENT)
Dept: UROGYNECOLOGY | Facility: CLINIC | Age: 77
End: 2022-08-12
Payer: MEDICARE

## 2022-08-12 DIAGNOSIS — N90.89 VULVAR IRRITATION: Primary | ICD-10-CM

## 2022-08-12 RX ORDER — TERCONAZOLE 4 MG/G
1 CREAM VAGINAL NIGHTLY
Qty: 45 G | Refills: 0 | Status: SHIPPED | OUTPATIENT
Start: 2022-08-12 | End: 2022-08-19

## 2022-08-12 NOTE — TELEPHONE ENCOUNTER
----- Message from Genevieve Lewis sent at 8/12/2022  2:16 PM CDT -----  Regarding: Requesting a call back  Contact: PO CONNOLLY [[id number]]  Name of Who is Calling:PO CONNOLLY [[id number]]          What is the request in detail:  Pt states she is calling stating she thinks she has a yeast infection, she states she is not sure and is requesting a call back for advice. Please advise         Can the clinic reply by [facility]:No          [facility]:[phone]

## 2022-08-12 NOTE — TELEPHONE ENCOUNTER
Pt stated she's experiencing vaginal irritation and think it may be a yeast infection, requests rx. She noticed redness around her vagina and took out the pessary. Pt is requesting a call back directly to discuss further.

## 2022-08-12 NOTE — TELEPHONE ENCOUNTER
Complains of vulvar irritation after being on antibiotics. Will treat with terazol 7.  MARY Pacheco-BC

## 2022-08-19 NOTE — PROGRESS NOTES
See POC for initial evaluation.     KERI PLAZA, PT, DPT  5/25/2022     Abbe Flap (Lower To Upper Lip) Text: The defect of the upper lip was assessed and measured.  Given the location and size of the defect, an Abbe flap was deemed most appropriate.  Using a sterile surgical marker, an appropriate Abbe flap was measured and drawn on the lower lip. Local anesthesia was then infiltrated. A scalpel was then used to incise the upper lip through and through the skin, vermilion, muscle and mucosa, leaving the flap pedicled on the opposite side.  The flap was then rotated and transferred to the lower lip defect.  The flap was then sutured into place with a three layer technique, closing the orbicularis oris muscle layer with subcutaneous buried sutures, followed by a mucosal layer and an epidermal layer.

## 2022-11-22 ENCOUNTER — OFFICE VISIT (OUTPATIENT)
Dept: FAMILY MEDICINE | Facility: CLINIC | Age: 77
End: 2022-11-22
Payer: MEDICARE

## 2022-11-22 ENCOUNTER — LAB VISIT (OUTPATIENT)
Dept: LAB | Facility: HOSPITAL | Age: 77
End: 2022-11-22
Attending: FAMILY MEDICINE
Payer: MEDICARE

## 2022-11-22 VITALS
SYSTOLIC BLOOD PRESSURE: 108 MMHG | TEMPERATURE: 99 F | HEART RATE: 97 BPM | OXYGEN SATURATION: 98 % | BODY MASS INDEX: 27.15 KG/M2 | DIASTOLIC BLOOD PRESSURE: 60 MMHG | WEIGHT: 153.25 LBS | HEIGHT: 63 IN

## 2022-11-22 DIAGNOSIS — R35.0 URINARY FREQUENCY: ICD-10-CM

## 2022-11-22 DIAGNOSIS — I51.89 DIASTOLIC DYSFUNCTION: ICD-10-CM

## 2022-11-22 DIAGNOSIS — R35.0 URINARY FREQUENCY: Primary | ICD-10-CM

## 2022-11-22 DIAGNOSIS — J40 BRONCHITIS: ICD-10-CM

## 2022-11-22 DIAGNOSIS — E78.5 DYSLIPIDEMIA: ICD-10-CM

## 2022-11-22 DIAGNOSIS — I70.0 AORTIC ATHEROSCLEROSIS: ICD-10-CM

## 2022-11-22 PROCEDURE — 3078F DIAST BP <80 MM HG: CPT | Mod: CPTII,S$GLB,, | Performed by: FAMILY MEDICINE

## 2022-11-22 PROCEDURE — 99999 PR PBB SHADOW E&M-EST. PATIENT-LVL III: CPT | Mod: PBBFAC,,, | Performed by: FAMILY MEDICINE

## 2022-11-22 PROCEDURE — 81001 URINALYSIS AUTO W/SCOPE: CPT | Performed by: FAMILY MEDICINE

## 2022-11-22 PROCEDURE — 1126F AMNT PAIN NOTED NONE PRSNT: CPT | Mod: CPTII,S$GLB,, | Performed by: FAMILY MEDICINE

## 2022-11-22 PROCEDURE — 3288F PR FALLS RISK ASSESSMENT DOCUMENTED: ICD-10-PCS | Mod: CPTII,S$GLB,, | Performed by: FAMILY MEDICINE

## 2022-11-22 PROCEDURE — 1159F MED LIST DOCD IN RCRD: CPT | Mod: CPTII,S$GLB,, | Performed by: FAMILY MEDICINE

## 2022-11-22 PROCEDURE — 87086 URINE CULTURE/COLONY COUNT: CPT | Performed by: FAMILY MEDICINE

## 2022-11-22 PROCEDURE — 1160F RVW MEDS BY RX/DR IN RCRD: CPT | Mod: CPTII,S$GLB,, | Performed by: FAMILY MEDICINE

## 2022-11-22 PROCEDURE — 3078F PR MOST RECENT DIASTOLIC BLOOD PRESSURE < 80 MM HG: ICD-10-PCS | Mod: CPTII,S$GLB,, | Performed by: FAMILY MEDICINE

## 2022-11-22 PROCEDURE — 94640 AIRWAY INHALATION TREATMENT: CPT | Mod: S$GLB,,, | Performed by: FAMILY MEDICINE

## 2022-11-22 PROCEDURE — 1126F PR PAIN SEVERITY QUANTIFIED, NO PAIN PRESENT: ICD-10-PCS | Mod: CPTII,S$GLB,, | Performed by: FAMILY MEDICINE

## 2022-11-22 PROCEDURE — 1160F PR REVIEW ALL MEDS BY PRESCRIBER/CLIN PHARMACIST DOCUMENTED: ICD-10-PCS | Mod: CPTII,S$GLB,, | Performed by: FAMILY MEDICINE

## 2022-11-22 PROCEDURE — 99214 OFFICE O/P EST MOD 30 MIN: CPT | Mod: 25,S$GLB,, | Performed by: FAMILY MEDICINE

## 2022-11-22 PROCEDURE — 3288F FALL RISK ASSESSMENT DOCD: CPT | Mod: CPTII,S$GLB,, | Performed by: FAMILY MEDICINE

## 2022-11-22 PROCEDURE — 1101F PR PT FALLS ASSESS DOC 0-1 FALLS W/OUT INJ PAST YR: ICD-10-PCS | Mod: CPTII,S$GLB,, | Performed by: FAMILY MEDICINE

## 2022-11-22 PROCEDURE — 96372 THER/PROPH/DIAG INJ SC/IM: CPT | Mod: 59,S$GLB,, | Performed by: FAMILY MEDICINE

## 2022-11-22 PROCEDURE — 3074F SYST BP LT 130 MM HG: CPT | Mod: CPTII,S$GLB,, | Performed by: FAMILY MEDICINE

## 2022-11-22 PROCEDURE — 99999 PR PBB SHADOW E&M-EST. PATIENT-LVL III: ICD-10-PCS | Mod: PBBFAC,,, | Performed by: FAMILY MEDICINE

## 2022-11-22 PROCEDURE — 1101F PT FALLS ASSESS-DOCD LE1/YR: CPT | Mod: CPTII,S$GLB,, | Performed by: FAMILY MEDICINE

## 2022-11-22 PROCEDURE — 1159F PR MEDICATION LIST DOCUMENTED IN MEDICAL RECORD: ICD-10-PCS | Mod: CPTII,S$GLB,, | Performed by: FAMILY MEDICINE

## 2022-11-22 PROCEDURE — 3074F PR MOST RECENT SYSTOLIC BLOOD PRESSURE < 130 MM HG: ICD-10-PCS | Mod: CPTII,S$GLB,, | Performed by: FAMILY MEDICINE

## 2022-11-22 PROCEDURE — 94640 PR INHAL RX, AIRWAY OBST/DX SPUTUM INDUCT: ICD-10-PCS | Mod: S$GLB,,, | Performed by: FAMILY MEDICINE

## 2022-11-22 PROCEDURE — 99214 PR OFFICE/OUTPT VISIT, EST, LEVL IV, 30-39 MIN: ICD-10-PCS | Mod: 25,S$GLB,, | Performed by: FAMILY MEDICINE

## 2022-11-22 PROCEDURE — 96372 PR INJECTION,THERAP/PROPH/DIAG2ST, IM OR SUBCUT: ICD-10-PCS | Mod: 59,S$GLB,, | Performed by: FAMILY MEDICINE

## 2022-11-22 RX ORDER — IPRATROPIUM BROMIDE AND ALBUTEROL SULFATE 2.5; .5 MG/3ML; MG/3ML
3 SOLUTION RESPIRATORY (INHALATION)
Status: COMPLETED | OUTPATIENT
Start: 2022-11-22 | End: 2022-11-22

## 2022-11-22 RX ORDER — ALBUTEROL SULFATE 90 UG/1
1-2 AEROSOL, METERED RESPIRATORY (INHALATION)
Qty: 18 G | Refills: 0 | Status: SHIPPED | OUTPATIENT
Start: 2022-11-22 | End: 2023-06-22

## 2022-11-22 RX ORDER — PROMETHAZINE HYDROCHLORIDE AND DEXTROMETHORPHAN HYDROBROMIDE 6.25; 15 MG/5ML; MG/5ML
SYRUP ORAL
Qty: 240 ML | Refills: 0 | Status: SHIPPED | OUTPATIENT
Start: 2022-11-22 | End: 2023-06-22

## 2022-11-22 RX ORDER — PREDNISONE 20 MG/1
40 TABLET ORAL DAILY
Qty: 8 TABLET | Refills: 0 | Status: SHIPPED | OUTPATIENT
Start: 2022-11-22 | End: 2022-11-26

## 2022-11-22 RX ORDER — DEXAMETHASONE SODIUM PHOSPHATE 4 MG/ML
8 INJECTION, SOLUTION INTRA-ARTICULAR; INTRALESIONAL; INTRAMUSCULAR; INTRAVENOUS; SOFT TISSUE
Status: COMPLETED | OUTPATIENT
Start: 2022-11-22 | End: 2022-11-22

## 2022-11-22 RX ADMIN — DEXAMETHASONE SODIUM PHOSPHATE 8 MG: 4 INJECTION, SOLUTION INTRA-ARTICULAR; INTRALESIONAL; INTRAMUSCULAR; INTRAVENOUS; SOFT TISSUE at 02:11

## 2022-11-22 RX ADMIN — IPRATROPIUM BROMIDE AND ALBUTEROL SULFATE 3 ML: 2.5; .5 SOLUTION RESPIRATORY (INHALATION) at 02:11

## 2022-11-22 NOTE — PROGRESS NOTES
Patient tolerated dexamethasone 8  mg  injection well, instructed to remain in clinic for 15mins.to monitor for allergic reaction. Verbalized understanding.

## 2022-11-22 NOTE — PROGRESS NOTES
"Routine Office Visit    Deepali Alex  1945  [id number]      Subjective     Deepali is a 77 y.o. female who presents today for:    Urinary frequency - started today. Patient does not have pain like her normal UTIs. She started taking AZO. She does have a "fallen bladder" and has UTI. She checked at home and noted leukocytes on the test strips.   Cold - Patient had tickly cough 4 days ago. Patient has facial pain and cough. Cough is non productive. Covid testing was negative. No sick contacts. Patient reports fever of 100.6.     Objective     Review of Systems   Constitutional:  Negative for chills and fever.   HENT:  Negative for congestion.    Eyes:  Negative for blurred vision.   Respiratory:  Negative for cough.    Cardiovascular:  Negative for chest pain.   Gastrointestinal:  Negative for abdominal pain, constipation, diarrhea, heartburn, nausea and vomiting.   Genitourinary:  Negative for dysuria.   Musculoskeletal:  Negative for myalgias.   Skin:  Negative for itching and rash.   Neurological:  Negative for dizziness and headaches.   Psychiatric/Behavioral:  Negative for depression.      /60   Pulse 97   Temp 98.5 °F (36.9 °C) (Oral)   Ht 5' 3" (1.6 m)   Wt 69.5 kg (153 lb 3.5 oz)   SpO2 98%   BMI 27.14 kg/m²   Physical Exam  Constitutional:       Appearance: She is well-developed.   HENT:      Head: Normocephalic and atraumatic.   Eyes:      Conjunctiva/sclera: Conjunctivae normal.      Pupils: Pupils are equal, round, and reactive to light.   Cardiovascular:      Rate and Rhythm: Normal rate and regular rhythm.      Heart sounds: Normal heart sounds. No murmur heard.    No friction rub. No gallop.   Pulmonary:      Effort: No respiratory distress.      Breath sounds: Wheezing present.   Abdominal:      General: Bowel sounds are normal. There is no distension.      Palpations: Abdomen is soft.      Tenderness: There is no abdominal tenderness.   Musculoskeletal:         General: Normal " range of motion.      Cervical back: Normal range of motion and neck supple.   Lymphadenopathy:      Cervical: No cervical adenopathy.   Skin:     General: Skin is warm.   Neurological:      Mental Status: She is alert and oriented to person, place, and time.           Assessment     Health Maintenance         Date Due Completion Date    Shingles Vaccine (2 of 3) 01/28/2016 12/3/2015    DEXA Scan 05/13/2023 5/13/2021    Mammogram 07/01/2023 7/1/2022    Colorectal Cancer Screening 10/22/2024 10/22/2019    TETANUS VACCINE 06/28/2026 6/28/2016    Lipid Panel 05/31/2027 5/31/2022              Problem List Items Addressed This Visit          Cardiac/Vascular    Aortic atherosclerosis    Overview     - noted on CXR 8/2017  Asymptomatic   Continue monitoring            Diastolic dysfunction    Dyslipidemia    Overview     - no need for prescription medication at this time.  4/29/22 TTE LV normal size, systolic function LVEF 55%. Grade 1 diastolic dysfunction. Normal RV size and systolic function. PA pressure 25, CVP 3  4/29/22 holter normal  4/29/22 AAA US negative          Other Visit Diagnoses       Urinary frequency    -  Primary    Relevant Orders    Urinalysis    Urine culture  F/u with studies and treat as indicated       Bronchitis        Relevant Medications    dexAMETHasone injection 8 mg (Completed)    albuterol-ipratropium 2.5 mg-0.5 mg/3 mL nebulizer solution 3 mL (Completed)    promethazine-dextromethorphan (PROMETHAZINE-DM) 6.25-15 mg/5 mL Syrp    predniSONE (DELTASONE) 20 MG tablet    albuterol (PROVENTIL/VENTOLIN HFA) 90 mcg/actuation inhaler  Wheezing improved in clinic.   - Antibiotics are not needed at this time  - Usual course of cold symptom is 10-14 days, but longer if patient is a smoker.   - If symptoms have not improved, will consider an antibiotic.   - Symptomatic treatment with over the counter Tylenol / Ibuprofen  - Use salt water gargle for sore throat  - Drink plenty of fluids                       Follow up if symptoms worsen or fail to improve.

## 2022-11-23 ENCOUNTER — TELEPHONE (OUTPATIENT)
Dept: FAMILY MEDICINE | Facility: CLINIC | Age: 77
End: 2022-11-23
Payer: MEDICARE

## 2022-11-23 LAB
BILIRUB UR QL STRIP: NEGATIVE
CLARITY UR REFRACT.AUTO: CLEAR
COLOR UR AUTO: YELLOW
GLUCOSE UR QL STRIP: NEGATIVE
HGB UR QL STRIP: ABNORMAL
KETONES UR QL STRIP: NEGATIVE
LEUKOCYTE ESTERASE UR QL STRIP: ABNORMAL
MICROSCOPIC COMMENT: NORMAL
NITRITE UR QL STRIP: NEGATIVE
PH UR STRIP: 6 [PH] (ref 5–8)
PROT UR QL STRIP: NEGATIVE
RBC #/AREA URNS AUTO: 1 /HPF (ref 0–4)
SP GR UR STRIP: 1.01 (ref 1–1.03)
URN SPEC COLLECT METH UR: ABNORMAL
WBC #/AREA URNS AUTO: 1 /HPF (ref 0–5)

## 2022-11-23 RX ORDER — CIPROFLOXACIN 500 MG/1
500 TABLET ORAL 2 TIMES DAILY
Qty: 10 TABLET | Refills: 0 | Status: SHIPPED | OUTPATIENT
Start: 2022-11-23 | End: 2022-11-28

## 2022-11-23 NOTE — TELEPHONE ENCOUNTER
Patient is requesting antibiotics for her abnormal urine results she received. Would like to have Cipro called in. Patient pharmacy information verified, using [employer] [id number].

## 2022-11-23 NOTE — TELEPHONE ENCOUNTER
----- Message from Arjun Tidwell MA sent at 11/23/2022  9:35 AM CST -----  Type: Patient Call Back    Who called: Self    What is the request in detail:Pt. States  her urinalysis results came back and looks like she has a infection.. she would like something called in..     Can the clinic reply by [facility]?No    Would the patient rather a call back or a response via [facility]? yes    Best call back number: [phone] (home)

## 2022-11-24 LAB — BACTERIA UR CULT: NO GROWTH

## 2022-11-25 ENCOUNTER — TELEPHONE (OUTPATIENT)
Dept: FAMILY MEDICINE | Facility: CLINIC | Age: 77
End: 2022-11-25
Payer: MEDICARE

## 2022-11-25 NOTE — TELEPHONE ENCOUNTER
----- Message from Marcia Barr sent at 11/25/2022 10:51 AM CST -----  Regarding: self [phone]  Type: Patient Call Back    Who called: self     What is the request in detail: pt would like to know her results from urine sample.    Can the clinic reply by [facility]? no    Would the patient rather a call back or a response via [facility]? Call back    Best call back number: [phone] (home)

## 2022-11-25 NOTE — TELEPHONE ENCOUNTER
Patient states she picked up her medication from her pharmacy. Patient states she seen a flag on her leukocytes and occult blood. Patient states she will start taking the medication

## 2022-12-22 ENCOUNTER — PES CALL (OUTPATIENT)
Dept: ADMINISTRATIVE | Facility: CLINIC | Age: 77
End: 2022-12-22
Payer: MEDICARE

## 2023-01-24 DIAGNOSIS — M25.561 PAIN IN BOTH KNEES, UNSPECIFIED CHRONICITY: Primary | ICD-10-CM

## 2023-01-24 DIAGNOSIS — M25.562 PAIN IN BOTH KNEES, UNSPECIFIED CHRONICITY: Primary | ICD-10-CM

## 2023-01-27 NOTE — TELEPHONE ENCOUNTER
Can she drop off UA/culture?   Fluconazole Counseling:  Patient counseled regarding adverse effects of fluconazole including but not limited to headache, diarrhea, nausea, upset stomach, liver function test abnormalities, taste disturbance, and stomach pain.  There is a rare possibility of liver failure that can occur when taking fluconazole.  The patient understands that monitoring of LFTs and kidney function test may be required, especially at baseline. The patient verbalized understanding of the proper use and possible adverse effects of fluconazole.  All of the patient's questions and concerns were addressed.

## 2023-02-09 DIAGNOSIS — Z00.00 ENCOUNTER FOR MEDICARE ANNUAL WELLNESS EXAM: ICD-10-CM

## 2023-04-03 ENCOUNTER — TELEPHONE (OUTPATIENT)
Dept: FAMILY MEDICINE | Facility: CLINIC | Age: 78
End: 2023-04-03
Payer: MEDICARE

## 2023-04-03 NOTE — TELEPHONE ENCOUNTER
----- Message from Trina Stover sent at 4/3/2023 10:31 AM CDT -----  Type:  Needs Medical Advice/Symptom-based Call    Who Called: pt     Symptoms (please be specific): bladder infection or uti. Asking for antibiotics. Call pt     How long has patient had these symptoms:  few days    Would the patient rather a call back or a response via [facility]? call    Best Call Back Number: [phone] (home)       Additional Information:

## 2023-04-06 ENCOUNTER — OFFICE VISIT (OUTPATIENT)
Dept: FAMILY MEDICINE | Facility: CLINIC | Age: 78
End: 2023-04-06
Payer: MEDICARE

## 2023-04-06 VITALS
DIASTOLIC BLOOD PRESSURE: 70 MMHG | SYSTOLIC BLOOD PRESSURE: 128 MMHG | OXYGEN SATURATION: 97 % | HEIGHT: 63 IN | WEIGHT: 157.31 LBS | TEMPERATURE: 99 F | BODY MASS INDEX: 27.87 KG/M2 | HEART RATE: 76 BPM

## 2023-04-06 DIAGNOSIS — N39.0 URINARY TRACT INFECTION WITHOUT HEMATURIA, SITE UNSPECIFIED: Primary | ICD-10-CM

## 2023-04-06 PROCEDURE — 99999 PR PBB SHADOW E&M-EST. PATIENT-LVL III: ICD-10-PCS | Mod: PBBFAC,HCNC,, | Performed by: FAMILY MEDICINE

## 2023-04-06 PROCEDURE — 3078F DIAST BP <80 MM HG: CPT | Mod: HCNC,CPTII,S$GLB, | Performed by: FAMILY MEDICINE

## 2023-04-06 PROCEDURE — 3074F SYST BP LT 130 MM HG: CPT | Mod: HCNC,CPTII,S$GLB, | Performed by: FAMILY MEDICINE

## 2023-04-06 PROCEDURE — 3074F PR MOST RECENT SYSTOLIC BLOOD PRESSURE < 130 MM HG: ICD-10-PCS | Mod: HCNC,CPTII,S$GLB, | Performed by: FAMILY MEDICINE

## 2023-04-06 PROCEDURE — 1126F AMNT PAIN NOTED NONE PRSNT: CPT | Mod: HCNC,CPTII,S$GLB, | Performed by: FAMILY MEDICINE

## 2023-04-06 PROCEDURE — 1101F PR PT FALLS ASSESS DOC 0-1 FALLS W/OUT INJ PAST YR: ICD-10-PCS | Mod: HCNC,CPTII,S$GLB, | Performed by: FAMILY MEDICINE

## 2023-04-06 PROCEDURE — 99214 OFFICE O/P EST MOD 30 MIN: CPT | Mod: HCNC,S$GLB,, | Performed by: FAMILY MEDICINE

## 2023-04-06 PROCEDURE — 1159F MED LIST DOCD IN RCRD: CPT | Mod: HCNC,CPTII,S$GLB, | Performed by: FAMILY MEDICINE

## 2023-04-06 PROCEDURE — 3078F PR MOST RECENT DIASTOLIC BLOOD PRESSURE < 80 MM HG: ICD-10-PCS | Mod: HCNC,CPTII,S$GLB, | Performed by: FAMILY MEDICINE

## 2023-04-06 PROCEDURE — 1159F PR MEDICATION LIST DOCUMENTED IN MEDICAL RECORD: ICD-10-PCS | Mod: HCNC,CPTII,S$GLB, | Performed by: FAMILY MEDICINE

## 2023-04-06 PROCEDURE — 1126F PR PAIN SEVERITY QUANTIFIED, NO PAIN PRESENT: ICD-10-PCS | Mod: HCNC,CPTII,S$GLB, | Performed by: FAMILY MEDICINE

## 2023-04-06 PROCEDURE — 99999 PR PBB SHADOW E&M-EST. PATIENT-LVL III: CPT | Mod: PBBFAC,HCNC,, | Performed by: FAMILY MEDICINE

## 2023-04-06 PROCEDURE — 3288F FALL RISK ASSESSMENT DOCD: CPT | Mod: HCNC,CPTII,S$GLB, | Performed by: FAMILY MEDICINE

## 2023-04-06 PROCEDURE — 3288F PR FALLS RISK ASSESSMENT DOCUMENTED: ICD-10-PCS | Mod: HCNC,CPTII,S$GLB, | Performed by: FAMILY MEDICINE

## 2023-04-06 PROCEDURE — 1101F PT FALLS ASSESS-DOCD LE1/YR: CPT | Mod: HCNC,CPTII,S$GLB, | Performed by: FAMILY MEDICINE

## 2023-04-06 PROCEDURE — 99214 PR OFFICE/OUTPT VISIT, EST, LEVL IV, 30-39 MIN: ICD-10-PCS | Mod: HCNC,S$GLB,, | Performed by: FAMILY MEDICINE

## 2023-04-06 RX ORDER — NITROFURANTOIN 25; 75 MG/1; MG/1
100 CAPSULE ORAL 2 TIMES DAILY
Qty: 10 CAPSULE | Refills: 0 | Status: SHIPPED | OUTPATIENT
Start: 2023-04-06 | End: 2023-06-22

## 2023-04-06 NOTE — PROGRESS NOTES
[facility]  Progress Note    SUBJECTIVE:     Chief Complaint   Patient presents with    Urinary Tract Infection       HPI   Deepali Alex  is a 78 y.o. female here for c/o dysuria, increased urinary frequency, and pelvic pressure for the past couple days. No fevers, chills, flank pain. Patient has no other new complaints/problems at this time.      Review of patient's allergies indicates:   Allergen Reactions    Erythromycin (bulk)      Other reaction(s): Eye irritation       Past Medical History:   Diagnosis Date    Age-related osteoporosis without current pathological fracture     Atherosclerosis of aortic arch     - noted on CXR 8/2017    Back pain     Diverticulosis of sigmoid colon 10/22/2019    Fibrocystic breast     Hyperlipidemia     Osteopenia     Polyp of ascending colon 10/22/2019    Urinary incontinence, mixed 5/24/2022     Past Surgical History:   Procedure Laterality Date    ECTOPIC PREGNANCY SURGERY      HERNIA REPAIR      left inguinal    HYSTERECTOMY  1980    without BSO     Family History   Problem Relation Age of Onset    Breast cancer Mother 69    Seizures Mother     Heart disease Father     Heart attack Father     Depression Father     Breast cancer Sister 72    Breast cancer Maternal Aunt 70    Obesity Brother     Mental illness Paternal Grandmother     Depression Paternal Aunt     Depression Paternal Uncle     Tongue cancer Cousin     Ovarian cancer Neg Hx      Social History     Tobacco Use    Smoking status: Never    Smokeless tobacco: Never   Substance Use Topics    Alcohol use: No    Drug use: No        Review of Systems   Constitutional:  Negative for chills and fever.   Gastrointestinal:  Negative for abdominal pain, nausea and vomiting.   Genitourinary:  Positive for dysuria, frequency and urgency. Negative for flank pain and hematuria.   All other systems reviewed and are negative.  OBJECTIVE:     Vitals:    04/06/23 1308   BP: 128/70   Pulse: 76   Temp: 98.9 °F  (37.2 °C)     Body mass index is 27.86 kg/m².    Physical Exam  Constitutional:       General: She is in acute distress (mild).      Appearance: She is not diaphoretic.   HENT:      Head: Normocephalic and atraumatic.   Abdominal:      General: Bowel sounds are normal. There is no distension.      Palpations: Abdomen is soft.      Tenderness: There is no abdominal tenderness. There is no right CVA tenderness, left CVA tenderness or rebound.   Neurological:      Mental Status: She is alert and oriented to person, place, and time.       Old records were reviewed. Labs and/or images were independently reviewed.    ASSESSMENT     1. Urinary tract infection without hematuria, site unspecified        PLAN:     Urinary tract infection without hematuria, site unspecified  -     POCT urinalysis, dipstick or tablet reag  -     Urine culture; Future; Expected date: 04/06/2023  -     nitrofurantoin, macrocrystal-monohydrate, (MACROBID) 100 MG capsule; Take 1 capsule (100 mg total) by mouth 2 (two) times daily.  Dispense: 10 capsule; Refill: 0  -     Instructed patient to drink plenty of water, and to take medications as prescribed. Advised patient to call or RTC if symptoms persist or worsen.    RTC PRMAGUI Rawls MD  04/06/2023 1:15 PM

## 2023-05-05 ENCOUNTER — PES CALL (OUTPATIENT)
Dept: ADMINISTRATIVE | Facility: CLINIC | Age: 78
End: 2023-05-05
Payer: MEDICARE

## 2023-06-16 ENCOUNTER — TELEPHONE (OUTPATIENT)
Dept: FAMILY MEDICINE | Facility: CLINIC | Age: 78
End: 2023-06-16
Payer: MEDICARE

## 2023-06-16 DIAGNOSIS — E78.5 DYSLIPIDEMIA: ICD-10-CM

## 2023-06-16 DIAGNOSIS — Z00.00 NORMAL PHYSICAL EXAM: ICD-10-CM

## 2023-06-16 DIAGNOSIS — I70.0 AORTIC ATHEROSCLEROSIS: Primary | ICD-10-CM

## 2023-06-16 NOTE — TELEPHONE ENCOUNTER
----- Message from Sin Sullivan sent at 6/16/2023  2:17 PM CDT -----  Regarding: Self [phone]  Caller is requesting to schedule their Lab appointment prior to annual appointment.    Order is not listed in EPIC.  Please enter order and contact patient to schedule.    Name of Caller: Self    Preferred Date and Time of Labs: Anytime ( early morning)    Date of EPP Appointment: 06/22    Where would they like the lab performed? [place] office     Would the patient rather a call back or a response via [facility]?    Best Call Back Number: [phone]      Additional Information:    Thank you.

## 2023-06-20 ENCOUNTER — LAB VISIT (OUTPATIENT)
Dept: LAB | Facility: HOSPITAL | Age: 78
End: 2023-06-20
Attending: INTERNAL MEDICINE
Payer: MEDICARE

## 2023-06-20 DIAGNOSIS — E78.5 DYSLIPIDEMIA: ICD-10-CM

## 2023-06-20 DIAGNOSIS — Z00.00 NORMAL PHYSICAL EXAM: ICD-10-CM

## 2023-06-20 DIAGNOSIS — I70.0 AORTIC ATHEROSCLEROSIS: ICD-10-CM

## 2023-06-20 LAB
ALBUMIN SERPL BCP-MCNC: 3.9 G/DL (ref 3.5–5.2)
ALP SERPL-CCNC: 73 U/L (ref 55–135)
ALT SERPL W/O P-5'-P-CCNC: 13 U/L (ref 10–44)
ANION GAP SERPL CALC-SCNC: 8 MMOL/L (ref 8–16)
AST SERPL-CCNC: 19 U/L (ref 10–40)
BILIRUB SERPL-MCNC: 0.4 MG/DL (ref 0.1–1)
BUN SERPL-MCNC: 15 MG/DL (ref 8–23)
CALCIUM SERPL-MCNC: 9.2 MG/DL (ref 8.7–10.5)
CHLORIDE SERPL-SCNC: 107 MMOL/L (ref 95–110)
CHOLEST SERPL-MCNC: 187 MG/DL (ref 120–199)
CHOLEST/HDLC SERPL: 3.2 {RATIO} (ref 2–5)
CO2 SERPL-SCNC: 27 MMOL/L (ref 23–29)
CREAT SERPL-MCNC: 0.6 MG/DL (ref 0.5–1.4)
ERYTHROCYTE [DISTWIDTH] IN BLOOD BY AUTOMATED COUNT: 13.1 % (ref 11.5–14.5)
EST. GFR  (NO RACE VARIABLE): >60 ML/MIN/1.73 M^2
GLUCOSE SERPL-MCNC: 94 MG/DL (ref 70–110)
HCT VFR BLD AUTO: 40.1 % (ref 37–48.5)
HDLC SERPL-MCNC: 58 MG/DL (ref 40–75)
HDLC SERPL: 31 % (ref 20–50)
HGB BLD-MCNC: 12.9 G/DL (ref 12–16)
LDLC SERPL CALC-MCNC: 112 MG/DL (ref 63–159)
MCH RBC QN AUTO: 29.7 PG (ref 27–31)
MCHC RBC AUTO-ENTMCNC: 32.2 G/DL (ref 32–36)
MCV RBC AUTO: 92 FL (ref 82–98)
NONHDLC SERPL-MCNC: 129 MG/DL
PLATELET # BLD AUTO: 255 K/UL (ref 150–450)
PMV BLD AUTO: 10.6 FL (ref 9.2–12.9)
POTASSIUM SERPL-SCNC: 4.7 MMOL/L (ref 3.5–5.1)
PROT SERPL-MCNC: 7.2 G/DL (ref 6–8.4)
RBC # BLD AUTO: 4.34 M/UL (ref 4–5.4)
SODIUM SERPL-SCNC: 142 MMOL/L (ref 136–145)
TRIGL SERPL-MCNC: 85 MG/DL (ref 30–150)
WBC # BLD AUTO: 4.88 K/UL (ref 3.9–12.7)

## 2023-06-20 PROCEDURE — 80053 COMPREHEN METABOLIC PANEL: CPT | Performed by: INTERNAL MEDICINE

## 2023-06-20 PROCEDURE — 85027 COMPLETE CBC AUTOMATED: CPT | Performed by: INTERNAL MEDICINE

## 2023-06-20 PROCEDURE — 80061 LIPID PANEL: CPT | Performed by: INTERNAL MEDICINE

## 2023-06-20 PROCEDURE — 36415 COLL VENOUS BLD VENIPUNCTURE: CPT | Mod: PO | Performed by: INTERNAL MEDICINE

## 2023-06-21 DIAGNOSIS — Z78.0 MENOPAUSE: ICD-10-CM

## 2023-06-21 NOTE — PROGRESS NOTES
This note was created by combination of typed  and M-Modal dictation.  Transcription errors may be present.  If there are any questions, please contact me.    Assessment and Plan:   Assessment and Plan    Normal physical exam  Tubular adenoma of colon 10/22/2019 colonoscopy with ascending tubular adenoma 3 mm  Encounter for screening for cardiovascular disorders  -will be due for colonoscopy 2024  -aortic atherosclerosis on previous CXR. Pt has been reluctant to start statin  Previsit lipid better  Continue therapeutic lifestyle modification (TLC)  -     Comprehensive Metabolic Panel; Future; Expected date: 06/22/2024  -     Lipid Panel; Future; Expected date: 06/22/2024  -     CBC Auto Differential; Future; Expected date: 06/22/2024    Recurrent urinary tract infection  Fecal soiling  -she thinks possibly fecal soiling contributes to UTIs. Using pessary. Denies urinary incontinence  Needs to do more kegels.  Strategies discussd to remind herself to do these more often  -     CBC Auto Differential; Future; Expected date: 06/22/2024    Age-related osteoporosis without current pathological fracture  -needs to update.  Is not committed to bisphosphonate. Last DEXA with osteoporosis. She's willing to update to see if any improvement at all. If still osteoporosis range, I recommend bisphosphonate. And I would communicate that to her and she would need to reply back to start if she is agreeable.             Medications Discontinued During This Encounter   Medication Reason    albuterol (PROVENTIL/VENTOLIN HFA) 90 mcg/actuation inhaler     nitrofurantoin, macrocrystal-monohydrate, (MACROBID) 100 MG capsule     promethazine-dextromethorphan (PROMETHAZINE-DM) 6.25-15 mg/5 mL Syrp        meds sent this encounter:         Follow Up: PEx 1 year.   Future Appointments   Date Time Provider Department Center   6/22/2023  1:40 PM Raul Rivera MD [facility]         Subjective:   Subjective   Chief Complaint    Patient presents with    Annual Exam       HPI  Deepali is a 78 y.o. female.    Social History     Tobacco Use    Smoking status: Never    Smokeless tobacco: Never   Substance Use Topics    Alcohol use: No      Social History     Occupational History    Occupation: retired      Social History     Social History Narrative    Not on file       No LMP recorded. Patient has had a hysterectomy.    Last appointment with this clinic was 4/6/2023. Last visit with me 5/25/2022   To summarize last visit and events leading up to today:  Bladder prolapse.  Was given topical estrogen cream but did not started because of family history of breast cancer.  She wanted to see Urogynecology, referral submitted. Saw urogyn yesterday. Fitted with pessary but not sent home with one. Plan was future fitting  White coat, home BP cuff slightly over reads.  History of aortic atherosclerosis.  Had previously discussed statin therapy she did not want to initiate.  Complaining of sensation of pulsatile sensation in the abdomen.  Referred to Cardiology.  Subsequently seen by Cardiology.  Echo showing diastolic dysfunction otherwise normal.  Holter negative.  AAA screening negative.    Recurrent UTI/dysuria   Last visit me in May 2022, urine culture mixed avila   Subsequently seen in July for UTI type symptoms, urine culture E coli  11/2022 saw my colleague for urinary frequency, urine culture negative  Saw my colleague in April, empirically treated with Macrobid, urine culture not obtained   5/6 urgent care for UTI symptoms.  No urine culture sent    Pre visit labs  CBC normal   CMP normal   Lipid profile non     Today's visit:  Please note: Chronic medical problems that are stable but are being addressed at this visit may not be listed/documented here, but may be addressed in more detail in the Assessment and Plan section.   Below are salient features of chronic medical conditions, or new/acute conditions and their details.    Has a  pessary  But gets dysuria    Thinks maybe some bowel incontinence?  Will have a BM, wipe clean, and then when she pees later she might see stool on wiping, does not feel anything. So wonders if that is causing her to have urine infections.  When she has bowel urgency is able to hold it.  No saddle anesthesia.     Is supposed to do kegels but forgets    Wants check for ear wax.  Slow to drain after shower    Diet high fiber. Eating more oatmeal.  Avoids sweets.     Went to bone and joint for left knee pain and bursitis. Given injection;  With stretching - startd getting fleeting lateral knee pain. Looked it up and thinks it is ITBS      Answers submitted by the patient for this visit:  Review of Systems Questionnaire (Submitted on 6/16/2023)  activity change: No  unexpected weight change: No  neck pain: No  hearing loss: Yes  rhinorrhea: No  trouble swallowing: No  eye discharge: No  visual disturbance: No  chest tightness: No  wheezing: No  chest pain: No  palpitations: No  blood in stool: No  constipation: No  vomiting: No  diarrhea: No  polydipsia: No  polyuria: No  difficulty urinating: No  hematuria: No  menstrual problem: No  dysuria: No  joint swelling: No  arthralgias: Yes  headaches: No  weakness: No  confusion: No  dysphoric mood: No      Patient Care Team:  Raul Rivera MD as PCP - General (Internal Medicine)  Neha Rawls OD as Consulting Physician (Optometry)  Jerson Weaver MD as Consulting Physician (Orthopedic Surgery)  Jimi Mccollum MD (Inactive) as Consulting Physician (Gastroenterology)  Mehnaz Dyson MA as Care Coordinator      Patient Active Problem List    Diagnosis Date Noted    Pelvic floor dysfunction 05/30/2022    Coordination impairment 05/30/2022    Postural imbalance 05/30/2022    Urinary incontinence, mixed 05/24/2022    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  FRAX score4.6/16%. osteoporosis.      AR (allergic rhinitis) 08/16/2017    Intermittent vertigo 08/16/2017    Aortic atherosclerosis      - noted on CXR 8/2017      Family history of breast cancer 07/21/2015    Recurrent urinary tract infection 05/02/2014    Dyslipidemia 10/17/2013     - no need for prescription medication at this time.  4/29/22 TTE LV normal size, systolic function LVEF 55%. Grade 1 diastolic dysfunction. Normal RV size and systolic function. PA pressure 25, CVP 3  4/29/22 holter normal  4/29/22 AAA US negative         PAST MEDICAL PROBLEMS, PAST SURGICAL HISTORY: please see relevant portions of the electronic medical record    ALLERGIES AND MEDICATIONS: updated and reviewed.  Medication List with Changes/Refills   Current Medications    ALBUTEROL (PROVENTIL/VENTOLIN HFA) 90 MCG/ACTUATION INHALER    Inhale 1-2 puffs into the lungs every 4 to 6 hours as needed for Wheezing or Shortness of Breath (chest tightness).    NITROFURANTOIN, MACROCRYSTAL-MONOHYDRATE, (MACROBID) 100 MG CAPSULE    Take 1 capsule (100 mg total) by mouth 2 (two) times daily.    PROMETHAZINE-DEXTROMETHORPHAN (PROMETHAZINE-DM) 6.25-15 MG/5 ML SYRP    Take 10-15ml by mouth every 8 hours as needed for coughing         Objective:   Objective   Physical Exam   Vitals:    06/22/23 1340   BP: 112/60   Pulse: 72   Temp: 98.1 °F (36.7 °C)   TempSrc: Oral   SpO2: 96%   Weight: 70.5 kg (155 lb 5 oz)   Height: 5' 3" (1.6 m)    Body mass index is 27.51 kg/m².            Physical " Exam  Constitutional:       Appearance: She is well-developed.   HENT:      Right Ear: Tympanic membrane, ear canal and external ear normal.      Left Ear: Tympanic membrane, ear canal and external ear normal.   Eyes:      General: No scleral icterus.     Extraocular Movements: Extraocular movements intact.      Pupils: Pupils are equal, round, and reactive to light.   Neck:      Thyroid: No thyromegaly.   Cardiovascular:      Rate and Rhythm: Normal rate and regular rhythm.      Heart sounds: Normal heart sounds. No murmur heard.  Pulmonary:      Effort: Pulmonary effort is normal.      Breath sounds: Normal breath sounds. No wheezing.   Abdominal:      Palpations: Abdomen is soft. There is no hepatomegaly, splenomegaly or mass.      Tenderness: There is no abdominal tenderness.   Musculoskeletal:         General: No deformity. Normal range of motion.      Cervical back: Neck supple.      Right lower leg: No edema.      Left lower leg: No edema.      Comments: Left knee no effusion  Varus stress no pain  No pain on the joint line   Lymphadenopathy:      Cervical: No cervical adenopathy.   Skin:     General: Skin is warm and dry.      Findings: No rash.      Comments: On exposed skin   Neurological:      Mental Status: She is alert and oriented to person, place, and time.      Deep Tendon Reflexes: Reflexes are normal and symmetric.   Psychiatric:         Behavior: Behavior normal.         Thought Content: Thought content normal.         Judgment: Judgment normal.       Component      Latest Ref Rng & Units 6/20/2023 5/31/2022   WBC      3.90 - 12.70 K/uL 4.88 6.43   RBC      4.00 - 5.40 M/uL 4.34 4.15   Hemoglobin      12.0 - 16.0 g/dL 12.9 12.3   Hematocrit      37.0 - 48.5 % 40.1 37.4   MCV      82 - 98 fL 92 90   MCH      27.0 - 31.0 pg 29.7 29.6   MCHC      32.0 - 36.0 g/dL 32.2 32.9   RDW      11.5 - 14.5 % 13.1 13.6   Platelets      150 - 450 K/uL 255 284   MPV      9.2 - 12.9 fL 10.6 11.2   Immature  Granulocytes      0.0 - 0.5 %  0.2   Gran # (ANC)      1.8 - 7.7 K/uL  4.8   Immature Grans (Abs)      0.00 - 0.04 K/uL  0.01   Lymph #      1.0 - 4.8 K/uL  1.0   Mono #      0.3 - 1.0 K/uL  0.4   Eos #      0.0 - 0.5 K/uL  0.2   Baso #      0.00 - 0.20 K/uL  0.07   nRBC      0 /100 WBC  0   Gran %      38.0 - 73.0 %  75.1 (H)   Lymph %      18.0 - 48.0 %  14.8 (L)   Mono %      4.0 - 15.0 %  6.5   Eosinophil %      0.0 - 8.0 %  2.3   Basophil %      0.0 - 1.9 %  1.1   Differential Method        Automated   Sodium      136 - 145 mmol/L 142 137   Potassium      3.5 - 5.1 mmol/L 4.7 4.0   Chloride      95 - 110 mmol/L 107 104   CO2      23 - 29 mmol/L 27 27   Glucose      70 - 110 mg/dL 94 95   BUN      8 - 23 mg/dL 15 16   Creatinine      0.5 - 1.4 mg/dL 0.6 0.7   Calcium      8.7 - 10.5 mg/dL 9.2 9.0   PROTEIN TOTAL      6.0 - 8.4 g/dL 7.2 6.9   Albumin      3.5 - 5.2 g/dL 3.9 3.9   BILIRUBIN TOTAL      0.1 - 1.0 mg/dL 0.4 0.8   Alkaline Phosphatase      55 - 135 U/L 73 78   AST      10 - 40 U/L 19 20   ALT      10 - 44 U/L 13 16   Anion Gap      8 - 16 mmol/L 8 6 (L)   eGFR if African American      >60 mL/min/1.73 m:2  >60.0   eGFR if non African American      >60 mL/min/1.73 m:2  >60.0   eGFR      >60 mL/min/1.73 m:2 >60.0    Cholesterol      120 - 199 mg/dL 187 201 (H)   Triglycerides      30 - 150 mg/dL 85 100   HDL      40 - 75 mg/dL 58 62   LDL Cholesterol External      63.0 - 159.0 mg/dL 112.0 119.0   HDL/Cholesterol Ratio      20.0 - 50.0 % 31.0 30.8   Total Cholesterol/HDL Ratio      2.0 - 5.0 3.2 3.2   Non-HDL Cholesterol      mg/dL 129 139   Vit D, 25-Hydroxy      30 - 96 ng/mL  36

## 2023-06-22 ENCOUNTER — OFFICE VISIT (OUTPATIENT)
Dept: FAMILY MEDICINE | Facility: CLINIC | Age: 78
End: 2023-06-22
Payer: MEDICARE

## 2023-06-22 VITALS
DIASTOLIC BLOOD PRESSURE: 60 MMHG | WEIGHT: 155.31 LBS | HEIGHT: 63 IN | OXYGEN SATURATION: 96 % | SYSTOLIC BLOOD PRESSURE: 112 MMHG | BODY MASS INDEX: 27.52 KG/M2 | TEMPERATURE: 98 F | HEART RATE: 72 BPM

## 2023-06-22 DIAGNOSIS — N39.0 RECURRENT URINARY TRACT INFECTION: ICD-10-CM

## 2023-06-22 DIAGNOSIS — Z13.6 ENCOUNTER FOR SCREENING FOR CARDIOVASCULAR DISORDERS: ICD-10-CM

## 2023-06-22 DIAGNOSIS — Z00.00 NORMAL PHYSICAL EXAM: Primary | ICD-10-CM

## 2023-06-22 DIAGNOSIS — M81.0 AGE-RELATED OSTEOPOROSIS WITHOUT CURRENT PATHOLOGICAL FRACTURE: ICD-10-CM

## 2023-06-22 DIAGNOSIS — R15.1 FECAL SOILING: ICD-10-CM

## 2023-06-22 DIAGNOSIS — D12.6 TUBULAR ADENOMA OF COLON: ICD-10-CM

## 2023-06-22 PROCEDURE — 3288F FALL RISK ASSESSMENT DOCD: CPT | Mod: CPTII,S$GLB,, | Performed by: INTERNAL MEDICINE

## 2023-06-22 PROCEDURE — 3078F DIAST BP <80 MM HG: CPT | Mod: CPTII,S$GLB,, | Performed by: INTERNAL MEDICINE

## 2023-06-22 PROCEDURE — 1160F RVW MEDS BY RX/DR IN RCRD: CPT | Mod: CPTII,S$GLB,, | Performed by: INTERNAL MEDICINE

## 2023-06-22 PROCEDURE — 1101F PT FALLS ASSESS-DOCD LE1/YR: CPT | Mod: CPTII,S$GLB,, | Performed by: INTERNAL MEDICINE

## 2023-06-22 PROCEDURE — 99397 PER PM REEVAL EST PAT 65+ YR: CPT | Mod: S$GLB,,, | Performed by: INTERNAL MEDICINE

## 2023-06-22 PROCEDURE — 1159F MED LIST DOCD IN RCRD: CPT | Mod: CPTII,S$GLB,, | Performed by: INTERNAL MEDICINE

## 2023-06-22 PROCEDURE — 1126F AMNT PAIN NOTED NONE PRSNT: CPT | Mod: CPTII,S$GLB,, | Performed by: INTERNAL MEDICINE

## 2023-06-22 PROCEDURE — 3074F PR MOST RECENT SYSTOLIC BLOOD PRESSURE < 130 MM HG: ICD-10-PCS | Mod: CPTII,S$GLB,, | Performed by: INTERNAL MEDICINE

## 2023-06-22 PROCEDURE — 1159F PR MEDICATION LIST DOCUMENTED IN MEDICAL RECORD: ICD-10-PCS | Mod: CPTII,S$GLB,, | Performed by: INTERNAL MEDICINE

## 2023-06-22 PROCEDURE — 3078F PR MOST RECENT DIASTOLIC BLOOD PRESSURE < 80 MM HG: ICD-10-PCS | Mod: CPTII,S$GLB,, | Performed by: INTERNAL MEDICINE

## 2023-06-22 PROCEDURE — 1101F PR PT FALLS ASSESS DOC 0-1 FALLS W/OUT INJ PAST YR: ICD-10-PCS | Mod: CPTII,S$GLB,, | Performed by: INTERNAL MEDICINE

## 2023-06-22 PROCEDURE — 99397 PR PREVENTIVE VISIT,EST,65 & OVER: ICD-10-PCS | Mod: S$GLB,,, | Performed by: INTERNAL MEDICINE

## 2023-06-22 PROCEDURE — 99999 PR PBB SHADOW E&M-EST. PATIENT-LVL III: ICD-10-PCS | Mod: PBBFAC,,, | Performed by: INTERNAL MEDICINE

## 2023-06-22 PROCEDURE — 99999 PR PBB SHADOW E&M-EST. PATIENT-LVL III: CPT | Mod: PBBFAC,,, | Performed by: INTERNAL MEDICINE

## 2023-06-22 PROCEDURE — 3288F PR FALLS RISK ASSESSMENT DOCUMENTED: ICD-10-PCS | Mod: CPTII,S$GLB,, | Performed by: INTERNAL MEDICINE

## 2023-06-22 PROCEDURE — 1126F PR PAIN SEVERITY QUANTIFIED, NO PAIN PRESENT: ICD-10-PCS | Mod: CPTII,S$GLB,, | Performed by: INTERNAL MEDICINE

## 2023-06-22 PROCEDURE — 1160F PR REVIEW ALL MEDS BY PRESCRIBER/CLIN PHARMACIST DOCUMENTED: ICD-10-PCS | Mod: CPTII,S$GLB,, | Performed by: INTERNAL MEDICINE

## 2023-06-22 PROCEDURE — 3074F SYST BP LT 130 MM HG: CPT | Mod: CPTII,S$GLB,, | Performed by: INTERNAL MEDICINE

## 2023-07-07 ENCOUNTER — TELEPHONE (OUTPATIENT)
Dept: FAMILY MEDICINE | Facility: CLINIC | Age: 78
End: 2023-07-07
Payer: MEDICARE

## 2023-07-07 DIAGNOSIS — Z12.31 ENCOUNTER FOR SCREENING MAMMOGRAM FOR BREAST CANCER: Primary | ICD-10-CM

## 2023-07-07 NOTE — TELEPHONE ENCOUNTER
----- Message from Compa Crocker sent at 7/7/2023  2:37 PM CDT -----  Type:  Mammogram    Caller is requesting to schedule their annual mammogram appointment.  Order is not listed in EPIC.  Please enter order and contact patient to schedule.  Name of Caller: Self     Where would they like the mammogram performed? [place]     Would the patient rather a call back or a response via [facility]? Call     Best Call Back Number:[phone] (cell) OR  [phone] (home)     Additional Information: Asking for order for bone density as well

## 2023-07-07 NOTE — TELEPHONE ENCOUNTER
Orders places. Patient scheduled mammogram for 7/10/23. Patient scheduled bone mass density for 7/11/23.

## 2023-07-10 ENCOUNTER — HOSPITAL ENCOUNTER (OUTPATIENT)
Dept: RADIOLOGY | Facility: HOSPITAL | Age: 78
Discharge: HOME | End: 2023-07-10
Attending: INTERNAL MEDICINE
Payer: MEDICARE

## 2023-07-10 DIAGNOSIS — Z12.31 ENCOUNTER FOR SCREENING MAMMOGRAM FOR BREAST CANCER: ICD-10-CM

## 2023-07-10 PROCEDURE — 77067 SCR MAMMO BI INCL CAD: CPT | Mod: 26,,, | Performed by: RADIOLOGY

## 2023-07-10 PROCEDURE — 77067 SCR MAMMO BI INCL CAD: CPT | Mod: TC,PO

## 2023-07-10 PROCEDURE — 77063 MAMMO DIGITAL SCREENING BILAT WITH TOMO: ICD-10-PCS | Mod: 26,,, | Performed by: RADIOLOGY

## 2023-07-10 PROCEDURE — 77063 BREAST TOMOSYNTHESIS BI: CPT | Mod: 26,,, | Performed by: RADIOLOGY

## 2023-07-10 PROCEDURE — 77067 MAMMO DIGITAL SCREENING BILAT WITH TOMO: ICD-10-PCS | Mod: 26,,, | Performed by: RADIOLOGY

## 2023-07-31 ENCOUNTER — OFFICE VISIT (OUTPATIENT)
Dept: UROGYNECOLOGY | Facility: CLINIC | Age: 78
End: 2023-07-31
Payer: MEDICARE

## 2023-07-31 VITALS
BODY MASS INDEX: 27.31 KG/M2 | DIASTOLIC BLOOD PRESSURE: 70 MMHG | WEIGHT: 154.13 LBS | HEIGHT: 63 IN | SYSTOLIC BLOOD PRESSURE: 120 MMHG

## 2023-07-31 DIAGNOSIS — N81.9 VAGINAL VAULT PROLAPSE: ICD-10-CM

## 2023-07-31 DIAGNOSIS — N39.41 URGE INCONTINENCE: ICD-10-CM

## 2023-07-31 DIAGNOSIS — N95.2 VAGINAL ATROPHY: ICD-10-CM

## 2023-07-31 DIAGNOSIS — N81.6 RECTOCELE, FEMALE: ICD-10-CM

## 2023-07-31 DIAGNOSIS — N81.11 MIDLINE CYSTOCELE: Primary | ICD-10-CM

## 2023-07-31 PROCEDURE — 1126F PR PAIN SEVERITY QUANTIFIED, NO PAIN PRESENT: ICD-10-PCS | Mod: HCNC,CPTII,S$GLB, | Performed by: NURSE PRACTITIONER

## 2023-07-31 PROCEDURE — 99214 OFFICE O/P EST MOD 30 MIN: CPT | Mod: HCNC,S$GLB,, | Performed by: NURSE PRACTITIONER

## 2023-07-31 PROCEDURE — 1126F AMNT PAIN NOTED NONE PRSNT: CPT | Mod: HCNC,CPTII,S$GLB, | Performed by: NURSE PRACTITIONER

## 2023-07-31 PROCEDURE — 3078F DIAST BP <80 MM HG: CPT | Mod: HCNC,CPTII,S$GLB, | Performed by: NURSE PRACTITIONER

## 2023-07-31 PROCEDURE — 99999 PR PBB SHADOW E&M-EST. PATIENT-LVL III: ICD-10-PCS | Mod: PBBFAC,HCNC,, | Performed by: NURSE PRACTITIONER

## 2023-07-31 PROCEDURE — 3074F SYST BP LT 130 MM HG: CPT | Mod: HCNC,CPTII,S$GLB, | Performed by: NURSE PRACTITIONER

## 2023-07-31 PROCEDURE — 3078F PR MOST RECENT DIASTOLIC BLOOD PRESSURE < 80 MM HG: ICD-10-PCS | Mod: HCNC,CPTII,S$GLB, | Performed by: NURSE PRACTITIONER

## 2023-07-31 PROCEDURE — 99214 PR OFFICE/OUTPT VISIT, EST, LEVL IV, 30-39 MIN: ICD-10-PCS | Mod: HCNC,S$GLB,, | Performed by: NURSE PRACTITIONER

## 2023-07-31 PROCEDURE — 3074F PR MOST RECENT SYSTOLIC BLOOD PRESSURE < 130 MM HG: ICD-10-PCS | Mod: HCNC,CPTII,S$GLB, | Performed by: NURSE PRACTITIONER

## 2023-07-31 PROCEDURE — 99999 PR PBB SHADOW E&M-EST. PATIENT-LVL III: CPT | Mod: PBBFAC,HCNC,, | Performed by: NURSE PRACTITIONER

## 2023-07-31 RX ORDER — COCONUT OIL
OIL (ML) MISCELLANEOUS
COMMUNITY

## 2023-07-31 NOTE — PROGRESS NOTES
Urogyn follow up  07/31/2023  .  [facility] - UROGYNECOLOGY  [address] [id number]    Deepali Alex  [id number]  1945      Deepali Alex is a 78 y.o.  here for a urogyn follow up for pessary check    Last HPI from 05/24/2022  1)  UI:  (+) JOEL (occasionally) > (+) UUI (occasionally).  (+) pads (liner):1/day, usually minimum wetness and 1/night usually dry. Daytime frequency: Q 2 hours.  Nocturia: Yes: 1/night.   (--) dysuria,  (--) hematuria,  (--) frequent UTIs. 3/2022 urine C&S+  (+) complete bladder emptying.     2)  POP:  Present--feels worsening x last year. below introitus.  Symptoms: (--).  (--) vaginal bleeding. (--) vaginal discharge. (+) sexually active--mostly gives oral sex,  has some ED.  (--) dyspareunia.  (--)  Vaginal dryness.  (--) vaginal estrogen use. Estrogen cream was recc'd in past--did not want to use due to +FH BR cancer.      3)  BM:  (--) constipation/straining.  (--) chronic diarrhea. (--) hematochezia.  (--) fecal incontinence.  (+) fecal smearing/urgency--notices some smudges on pad sometimes.  (+) complete evacuation.      4)  H/o L inguinal hernia repair:  --notices some burning/pulling when raises leg   --started after heavy lifting last year       08/5/2022  1)  Stage 2 cystocele/rectocele, stage 1 vaginal vault prolapse:  --here for pessary check       2)  Mixed urinary incontinence, urge < stress:    --rare UUI with full bladder or rare JOEL with change in position  --did one session with PT     3)  Fecal smearing:  --improved with metamucil     4)  Incomplete emptying:  --retroperitoneal ultrasound normal    5)  Vaginal atrophy (dryness):    --using REPLENS or REFRESH OTC: 1/2 applicator full in vagina twice a week.    --OR can also use coconut oil: dime-sized amount with finger in vagina up nightly and as needed    Changes since last visit:  Using #2 ring with support --independent in use. Has one episode of spotting a few weeks  ago after using coconut oil  Rare UUI if ignores urge  Denies constipation--eating high fiber diet        06/30/2022  Retroperitoneal ultrasound  Right kidney: The right kidney measures 9.5 cm in length.  No cortical thinning. No loss of corticomedullary distinction. Resistive index measures 0.72.  No mass. No renal stone. No hydronephrosis.   Left kidney: The left kidney measures 11.3 cm in length.  No cortical thinning. No loss of corticomedullary distinction. Resistive index measures 0.74.  No mass. No renal stone. No hydronephrosis.   The bladder is partially distended at the time of scanning and has an unremarkable appearance.   Impression:   Unremarkable sonographic appearance of the kidneys and urinary bladder.       Past Medical History:   Diagnosis Date    Age-related osteoporosis without current pathological fracture     Atherosclerosis of aortic arch     - noted on CXR 8/2017    Back pain     Diverticulosis of sigmoid colon 10/22/2019    Fibrocystic breast     Hyperlipidemia     Osteopenia     Polyp of ascending colon 10/22/2019    Urinary incontinence, mixed 5/24/2022       Past Surgical History:   Procedure Laterality Date    ECTOPIC PREGNANCY SURGERY      HERNIA REPAIR      left inguinal    HYSTERECTOMY  1980    without BSO       Family History   Problem Relation Age of Onset    Breast cancer Mother 69    Seizures Mother     Heart disease Father     Heart attack Father     Depression Father     Breast cancer Sister 72    Breast cancer Maternal Aunt 70    Obesity Brother     Mental illness Paternal Grandmother     Depression Paternal Aunt     Depression Paternal Uncle     Tongue cancer Cousin     Ovarian cancer Neg Hx        Social History     Socioeconomic History    Marital status:     Number of children: 2   Occupational History    Occupation: retired   Tobacco Use    Smoking status: Never    Smokeless tobacco: Never   Substance and Sexual Activity    Alcohol use: No    Drug use: No    Sexual  activity: Not Currently     Social Determinants of Health     Financial Resource Strain: Low Risk  (6/16/2023)    Overall Financial Resource Strain (CARDIA)     Difficulty of Paying Living Expenses: Not hard at all   Food Insecurity: No Food Insecurity (6/16/2023)    Hunger Vital Sign     Worried About Running Out of Food in the Last Year: Never true     Ran Out of Food in the Last Year: Never true   Transportation Needs: No Transportation Needs (6/16/2023)    PRAPARE - Transportation     Lack of Transportation (Medical): No     Lack of Transportation (Non-Medical): No   Physical Activity: Insufficiently Active (5/17/2022)    Exercise Vital Sign     Days of Exercise per Week: 1 day     Minutes of Exercise per Session: 20 min   Stress: No Stress Concern Present (6/16/2023)    [place] [employer] - Occupational Stress Questionnaire     Feeling of Stress : Only a little   Social Connections: Unknown (6/16/2023)    Social Connection and Isolation Panel [NHANES]     Frequency of Communication with Friends and Family: More than three times a week     Frequency of Social Gatherings with Friends and Family: Three times a week     Active Member of Clubs or Organizations: No     Attends Club or Organization Meetings: Never     Marital Status:    Housing Stability: Low Risk  (6/16/2023)    Housing Stability Vital Sign     Unable to Pay for Housing in the Last Year: No     Number of Places Lived in the Last Year: 1     Unstable Housing in the Last Year: No       Current Outpatient Medications   Medication Sig Dispense Refill    coconut oil, bulk, Oil by Misc.(Non-Drug; Combo Route) route.       No current facility-administered medications for this visit.       Review of patient's allergies indicates:   Allergen Reactions    Erythromycin (bulk)      Other reaction(s): Eye irritation       ROS:  As per HPI.      Pap test: post hyst.  History of abnormal paps: No.  History of STIs:  No  Mammogram: Date  "of last: 5/13/2021.  Result: Normal  Colonoscopy: Date of last: 2019.  Result:  Diverticulosis, polyp.  Repeat due:  2024.    DEXA:  Date of last: 5/2021.  Result:  Osteoporosis--fosamax started 2021.  Repeat due:  2026 per PCP.     Exam  /70 (BP Location: Right arm, Patient Position: Sitting, BP Method: Medium (Manual))   Ht 5' 3" (1.6 m)   Wt 69.9 kg (154 lb 1.6 oz)   BMI 27.30 kg/m²   General: alert and oriented, no acute distress  Respiratory: normal respiratory effort  Abd: soft, non-tender, non-distended    Pelvic  Ext. Genitalia: normal external genitalia. Normal bartholin's and skeens glands  Vagina: + atrophy. Normal vaginal mucosa without lesions. No discharge noted.   Non-tender bladder base without palpable mass.  #2 ring with support   Cervix: absent  Uterus:  absent   Urethra: no masses or tenderness  Urethral meatus: no lesions, caruncle or prolapse.      Pessary removed without difficulty. Washed with soap and water. Reinserted without difficulty    Impression  1. Midline cystocele        2. Rectocele, female        3. Vaginal vault prolapse        4. Vaginal atrophy        5. Urge incontinence            We reviewed the above issues and discussed options for short-term versus long-term management of her problems.   Plan:       1) Stage 2 cystocele/rectocele, stage 1 vaginal vault prolapse:  --Empty bladder every 3 hours.  Empty well: wait a minute, lean forward on toilet.  Use pessary.  Try not to hover--makes harder to empty.   --since not emptying bladder, want to reduce bulge  --continue #2 ring with support  ----to insert:  Fold in half, push down behind cervix and back to back of vagina. Lift up under pubic bone.  --to remove: grab rim with forefinger and thumb, pull down and twist out  PESSARY CARE: Remove pessary as frequently as nightly and as infrequently as every 2-3 weeks.  Remove before intercourse.  May need to remove before bowel movements if straining dislodges.   Wash with " soap and water (no ).  Replace using small amount of water-based lubricant (like KY jelly or coconut oil) at end being introduced into vagina              --pessary vs surgery (sacral colpopexy)                          --if surgery: would need bladder testing to see if need sling for stress leakage                          --if surgery: need clearance per PCP + labs (CBC, BMP, T&S)/EKG        4)  Mixed urinary incontinence, urge < stress:    --Empty bladder every 3 hours.  Empty well: wait a minute, lean forward on toilet.    --Avoid dietary irritants (see sheet).  Keep diary x 3-5 days to determine your irritants.  --continue pelvic floor PT home exercise program  --URGE: consider medication in future. Takes 2-4 weeks to see if will have effect.  For dry mouth: get sour, sugar free lozenge or gum.                --see if pessary helps  --STRESS:  Pessary vs. Sling.      5)  Fecal smearing:  --hydrate well  --work on stool consistency  --continue high fiber diet  --continue pelvic floor PT home exercise program     6)  Incomplete emptying:  --improved with pessary in place  --Empty bladder every 3 hours.  Empty well: wait a minute, lean forward on toilet.  Use pessary.  Try not to hover--makes harder to empty.   --renal US normal     7)  Vaginal atrophy (dryness):   -- use coconut oil: dime-sized amount with finger in vagina up nightly and as needed     8) RTC 1 year for pc         I spent a total of 30 minutes on the day of the visit.  This includes face to face time and non-face to face time preparing to see the patient (eg, review of tests), obtaining and/or reviewing separately obtained history, documenting clinical information in the electronic or other health record, independently interpreting results and communicating results to the patient/family/caregiver, or care coordinator.    Graciela Joy, MARY-BC  [facility]  Division of Female Pelvic Medicine and Reconstructive  Surgery  Department of Obstetrics & Gynecology

## 2023-07-31 NOTE — PATIENT INSTRUCTIONS
1) Stage 2 cystocele/rectocele, stage 1 vaginal vault prolapse:  --Empty bladder every 3 hours.  Empty well: wait a minute, lean forward on toilet.  Use pessary.  Try not to hover--makes harder to empty.   --since not emptying bladder, want to reduce bulge  --continue #2 ring with support  ----to insert:  Fold in half, push down behind cervix and back to back of vagina. Lift up under pubic bone.  --to remove: grab rim with forefinger and thumb, pull down and twist out  PESSARY CARE: Remove pessary as frequently as nightly and as infrequently as every 2-3 weeks.  Remove before intercourse.  May need to remove before bowel movements if straining dislodges.   Wash with soap and water (no ).  Replace using small amount of water-based lubricant (like KY jelly or coconut oil) at end being introduced into vagina              --pessary vs surgery (sacral colpopexy)                          --if surgery: would need bladder testing to see if need sling for stress leakage                          --if surgery: need clearance per PCP + labs (CBC, BMP, T&S)/EKG        4)  Mixed urinary incontinence, urge < stress:    --Empty bladder every 3 hours.  Empty well: wait a minute, lean forward on toilet.    --Avoid dietary irritants (see sheet).  Keep diary x 3-5 days to determine your irritants.  --continue pelvic floor PT home exercise program  --URGE: consider medication in future. Takes 2-4 weeks to see if will have effect.  For dry mouth: get sour, sugar free lozenge or gum.                --see if pessary helps  --STRESS:  Pessary vs. Sling.      5)  Fecal smearing:  --hydrate well  --work on stool consistency  --continue high fiber diet  --continue pelvic floor PT home exercise program     6)  Incomplete emptying:  --improved with pessary in place  --Empty bladder every 3 hours.  Empty well: wait a minute, lean forward on toilet.  Use pessary.  Try not to hover--makes harder to empty.   --renal US normal     7)   Vaginal atrophy (dryness):   -- use coconut oil: dime-sized amount with finger in vagina up nightly and as needed     8) RTC 1 year for pc

## 2023-08-04 ENCOUNTER — HOSPITAL ENCOUNTER (OUTPATIENT)
Dept: RADIOLOGY | Facility: HOSPITAL | Age: 78
Discharge: HOME | End: 2023-08-04
Attending: PODIATRIST
Payer: MEDICARE

## 2023-08-04 ENCOUNTER — OFFICE VISIT (OUTPATIENT)
Dept: PODIATRY | Facility: CLINIC | Age: 78
End: 2023-08-04
Payer: MEDICARE

## 2023-08-04 VITALS
HEART RATE: 74 BPM | DIASTOLIC BLOOD PRESSURE: 70 MMHG | BODY MASS INDEX: 27.31 KG/M2 | WEIGHT: 154.13 LBS | HEIGHT: 63 IN | SYSTOLIC BLOOD PRESSURE: 124 MMHG

## 2023-08-04 DIAGNOSIS — M20.5X9 HALLUX LIMITUS, UNSPECIFIED LATERALITY: Primary | ICD-10-CM

## 2023-08-04 DIAGNOSIS — M19.079 ARTHRITIS OF FOOT: ICD-10-CM

## 2023-08-04 DIAGNOSIS — M79.672 FOOT PAIN, LEFT: ICD-10-CM

## 2023-08-04 DIAGNOSIS — M24.573 EQUINUS CONTRACTURE OF ANKLE: ICD-10-CM

## 2023-08-04 DIAGNOSIS — M20.5X9 HALLUX LIMITUS, UNSPECIFIED LATERALITY: ICD-10-CM

## 2023-08-04 PROCEDURE — 73630 X-RAY EXAM OF FOOT: CPT | Mod: 26,HCNC,LT, | Performed by: RADIOLOGY

## 2023-08-04 PROCEDURE — 3288F FALL RISK ASSESSMENT DOCD: CPT | Mod: HCNC,CPTII,S$GLB, | Performed by: PODIATRIST

## 2023-08-04 PROCEDURE — 99204 OFFICE O/P NEW MOD 45 MIN: CPT | Mod: HCNC,S$GLB,, | Performed by: PODIATRIST

## 2023-08-04 PROCEDURE — 3288F PR FALLS RISK ASSESSMENT DOCUMENTED: ICD-10-PCS | Mod: HCNC,CPTII,S$GLB, | Performed by: PODIATRIST

## 2023-08-04 PROCEDURE — 1126F PR PAIN SEVERITY QUANTIFIED, NO PAIN PRESENT: ICD-10-PCS | Mod: HCNC,CPTII,S$GLB, | Performed by: PODIATRIST

## 2023-08-04 PROCEDURE — 3078F PR MOST RECENT DIASTOLIC BLOOD PRESSURE < 80 MM HG: ICD-10-PCS | Mod: HCNC,CPTII,S$GLB, | Performed by: PODIATRIST

## 2023-08-04 PROCEDURE — 1101F PT FALLS ASSESS-DOCD LE1/YR: CPT | Mod: HCNC,CPTII,S$GLB, | Performed by: PODIATRIST

## 2023-08-04 PROCEDURE — 3074F SYST BP LT 130 MM HG: CPT | Mod: HCNC,CPTII,S$GLB, | Performed by: PODIATRIST

## 2023-08-04 PROCEDURE — 3078F DIAST BP <80 MM HG: CPT | Mod: HCNC,CPTII,S$GLB, | Performed by: PODIATRIST

## 2023-08-04 PROCEDURE — 1101F PR PT FALLS ASSESS DOC 0-1 FALLS W/OUT INJ PAST YR: ICD-10-PCS | Mod: HCNC,CPTII,S$GLB, | Performed by: PODIATRIST

## 2023-08-04 PROCEDURE — 1126F AMNT PAIN NOTED NONE PRSNT: CPT | Mod: HCNC,CPTII,S$GLB, | Performed by: PODIATRIST

## 2023-08-04 PROCEDURE — 3074F PR MOST RECENT SYSTOLIC BLOOD PRESSURE < 130 MM HG: ICD-10-PCS | Mod: HCNC,CPTII,S$GLB, | Performed by: PODIATRIST

## 2023-08-04 PROCEDURE — 99999 PR PBB SHADOW E&M-EST. PATIENT-LVL III: ICD-10-PCS | Mod: PBBFAC,HCNC,, | Performed by: PODIATRIST

## 2023-08-04 PROCEDURE — 73630 X-RAY EXAM OF FOOT: CPT | Mod: TC,HCNC,FY,PO,LT

## 2023-08-04 PROCEDURE — 99204 PR OFFICE/OUTPT VISIT, NEW, LEVL IV, 45-59 MIN: ICD-10-PCS | Mod: HCNC,S$GLB,, | Performed by: PODIATRIST

## 2023-08-04 PROCEDURE — 1159F PR MEDICATION LIST DOCUMENTED IN MEDICAL RECORD: ICD-10-PCS | Mod: HCNC,CPTII,S$GLB, | Performed by: PODIATRIST

## 2023-08-04 PROCEDURE — 1159F MED LIST DOCD IN RCRD: CPT | Mod: HCNC,CPTII,S$GLB, | Performed by: PODIATRIST

## 2023-08-04 PROCEDURE — 73630 XR FOOT COMPLETE 3 VIEW LEFT: ICD-10-PCS | Mod: 26,HCNC,LT, | Performed by: RADIOLOGY

## 2023-08-04 PROCEDURE — 99999 PR PBB SHADOW E&M-EST. PATIENT-LVL III: CPT | Mod: PBBFAC,HCNC,, | Performed by: PODIATRIST

## 2023-08-04 RX ORDER — LIDOCAINE AND PRILOCAINE 25; 25 MG/G; MG/G
CREAM TOPICAL
Qty: 30 G | Refills: 3 | Status: SHIPPED | OUTPATIENT
Start: 2023-08-04 | End: 2023-09-01

## 2023-08-04 NOTE — PROGRESS NOTES
Subjective:      Patient ID: Deepali Alex is a 78 y.o. female.    Chief Complaint: Foot Pain (Pain from hallux rigidux in big toe)    Enlarged left big toe joint with pain on bump ans sometimes deep.  Gradual onset, worsening over past several years, aggravated by increased weight bearing, shoe gear, pressure.  No previous medical treatment past year or more.  OTC pain med not helping. Denies trauma and surgery.    Review of Systems   Constitutional: Negative for chills, diaphoresis, fever, malaise/fatigue and night sweats.   Cardiovascular:  Negative for claudication, cyanosis, leg swelling and syncope.   Skin:  Negative for color change, dry skin, nail changes, rash, suspicious lesions and unusual hair distribution.   Musculoskeletal:  Positive for joint pain and joint swelling. Negative for falls, muscle cramps, muscle weakness and stiffness.   Gastrointestinal:  Negative for constipation, diarrhea, nausea and vomiting.   Neurological:  Negative for brief paralysis, disturbances in coordination, focal weakness, numbness, paresthesias, sensory change and tremors.         Objective:      Physical Exam  Constitutional:       General: She is not in acute distress.     Appearance: She is well-developed. She is not diaphoretic.   Cardiovascular:      Pulses:           Popliteal pulses are 2+ on the right side and 2+ on the left side.        Dorsalis pedis pulses are 2+ on the right side and 2+ on the left side.        Posterior tibial pulses are 2+ on the right side and 2+ on the left side.      Comments: Capillary refill 3 seconds all toes/distal feet, all toes/both feet warm to touch.      Negative lymphadenopathy bilateral popliteal fossa and tarsal tunnel.      Negavie lower extremity edema bilateral.    Musculoskeletal:      Right ankle: No swelling, deformity, ecchymosis or lacerations. Normal range of motion. Normal pulse.      Right Achilles Tendon: Normal. No defects. Watkins's test negative.       Comments: Pain to palpation and end range of motion left 1st mtpj with visible and palpable periarticular exostoses, and reduced range of motion with mild crepitus.     Ankle dorsiflexion decreased at <10 degrees bilateral with moderate increase with knee flexion bilateral.        Otherwise, Normal angle, base, station of gait. All ten toes without clubbing, cyanosis, or signs of ischemia.  No pain to palpation bilateral lower extremities.  Range of motion, stability, muscle strength, and muscle tone normal bilateral feet and legs.    Lymphadenopathy:      Lower Body: No right inguinal adenopathy. No left inguinal adenopathy.      Comments: Negative lymphadenopathy bilateral popliteal fossa and tarsal tunnel.    Negative lymphangitic streaking bilateral feet/ankles/legs.   Skin:     General: Skin is warm and dry.      Capillary Refill: Capillary refill takes 2 to 3 seconds.      Coloration: Skin is not pale.      Findings: No abrasion, bruising, burn, ecchymosis, erythema, laceration, lesion or rash.      Nails: There is no clubbing.      Comments:   Skin is normal age and health appropriate color, turgor, texture, and temperature bilateral lower extremities without ulceration, hyperpigmentation, discoloration, masses nodules or cords palpated.  No ecchymosis, erythema, edema, or cardinal signs of infection bilateral lower extremities.    Neurological:      Mental Status: She is alert and oriented to person, place, and time.      Sensory: No sensory deficit.      Motor: No tremor, atrophy or abnormal muscle tone.      Gait: Gait normal.      Deep Tendon Reflexes:      Reflex Scores:       Patellar reflexes are 2+ on the right side and 2+ on the left side.       Achilles reflexes are 2+ on the right side and 2+ on the left side.     Comments: Negative tinel sign to percussion sural, superficial peroneal, deep peroneal, saphenous, and posterior tibial nerves right and left ankles and feet.     Psychiatric:          Behavior: Behavior is cooperative.           Assessment:       Encounter Diagnoses   Name Primary?    Hallux limitus, unspecified laterality Yes    Foot pain, left     Arthritis of foot     Equinus contracture of ankle          Plan:       Deepali was seen today for foot pain.    Diagnoses and all orders for this visit:    Hallux limitus, unspecified laterality  -     X-Ray Foot Complete Left; Future    Foot pain, left  -     X-Ray Foot Complete Left; Future    Arthritis of foot  -     X-Ray Foot Complete Left; Future    Equinus contracture of ankle  -     X-Ray Foot Complete Left; Future    Other orders  -     LIDOcaine-prilocaine (EMLA) cream; Apply topically as needed.      I counseled the patient on her conditions, their implications and medical management.        Patient will stretch the tendo achilles complex three times daily as demonstrated in the office.  Literature was dispensed illustrating proper stretching technique.    Patient will obtain over the counter arch supports and wear them in shoes whenever possible.  Athletic shoes intended for walking or running are usually best.    The patient was advised that NSAID-type medications have two very important potential side effects: gastrointestinal irritation including hemorrhage and renal injuries. She was asked to take the medication with food and to stop if she experiences any GI upset. I asked her to call for vomiting, abdominal pain or black/bloody stools. The patient expresses understanding of these issues and questions were answered.    Discussed conservative treatment with shoes of adequate dimensions, material, and style to alleviate symptoms and delay or prevent surgical intervention.    Spot stretch shoes    Emla    Xrays            No follow-ups on file.

## 2023-08-23 ENCOUNTER — TELEPHONE (OUTPATIENT)
Dept: FAMILY MEDICINE | Facility: CLINIC | Age: 78
End: 2023-08-23
Payer: MEDICARE

## 2023-08-23 ENCOUNTER — LAB VISIT (OUTPATIENT)
Dept: LAB | Facility: HOSPITAL | Age: 78
End: 2023-08-23
Attending: INTERNAL MEDICINE
Payer: MEDICARE

## 2023-08-23 ENCOUNTER — OFFICE VISIT (OUTPATIENT)
Dept: FAMILY MEDICINE | Facility: CLINIC | Age: 78
End: 2023-08-23
Payer: MEDICARE

## 2023-08-23 VITALS
OXYGEN SATURATION: 96 % | BODY MASS INDEX: 26.99 KG/M2 | HEART RATE: 84 BPM | TEMPERATURE: 98 F | DIASTOLIC BLOOD PRESSURE: 68 MMHG | HEIGHT: 63 IN | SYSTOLIC BLOOD PRESSURE: 122 MMHG | WEIGHT: 152.31 LBS

## 2023-08-23 DIAGNOSIS — R10.11 RUQ PAIN: ICD-10-CM

## 2023-08-23 DIAGNOSIS — R10.11 RUQ PAIN: Primary | ICD-10-CM

## 2023-08-23 LAB
ALBUMIN SERPL BCP-MCNC: 4 G/DL (ref 3.5–5.2)
ALP SERPL-CCNC: 89 U/L (ref 55–135)
ALT SERPL W/O P-5'-P-CCNC: 18 U/L (ref 10–44)
ANION GAP SERPL CALC-SCNC: 9 MMOL/L (ref 8–16)
AST SERPL-CCNC: 24 U/L (ref 10–40)
BILIRUB SERPL-MCNC: 0.5 MG/DL (ref 0.1–1)
BUN SERPL-MCNC: 17 MG/DL (ref 8–23)
CALCIUM SERPL-MCNC: 9.5 MG/DL (ref 8.7–10.5)
CHLORIDE SERPL-SCNC: 103 MMOL/L (ref 95–110)
CO2 SERPL-SCNC: 27 MMOL/L (ref 23–29)
CREAT SERPL-MCNC: 0.7 MG/DL (ref 0.5–1.4)
ERYTHROCYTE [DISTWIDTH] IN BLOOD BY AUTOMATED COUNT: 13.4 % (ref 11.5–14.5)
EST. GFR  (NO RACE VARIABLE): >60 ML/MIN/1.73 M^2
GLUCOSE SERPL-MCNC: 95 MG/DL (ref 70–110)
HCT VFR BLD AUTO: 40 % (ref 37–48.5)
HGB BLD-MCNC: 12.5 G/DL (ref 12–16)
MCH RBC QN AUTO: 29.1 PG (ref 27–31)
MCHC RBC AUTO-ENTMCNC: 31.3 G/DL (ref 32–36)
MCV RBC AUTO: 93 FL (ref 82–98)
PLATELET # BLD AUTO: 275 K/UL (ref 150–450)
PMV BLD AUTO: 10.8 FL (ref 9.2–12.9)
POTASSIUM SERPL-SCNC: 4.5 MMOL/L (ref 3.5–5.1)
PROT SERPL-MCNC: 7.5 G/DL (ref 6–8.4)
RBC # BLD AUTO: 4.29 M/UL (ref 4–5.4)
SODIUM SERPL-SCNC: 139 MMOL/L (ref 136–145)
WBC # BLD AUTO: 7.92 K/UL (ref 3.9–12.7)

## 2023-08-23 PROCEDURE — 85027 COMPLETE CBC AUTOMATED: CPT | Mod: HCNC | Performed by: INTERNAL MEDICINE

## 2023-08-23 PROCEDURE — 99214 OFFICE O/P EST MOD 30 MIN: CPT | Mod: HCNC,S$GLB,, | Performed by: INTERNAL MEDICINE

## 2023-08-23 PROCEDURE — 3288F PR FALLS RISK ASSESSMENT DOCUMENTED: ICD-10-PCS | Mod: HCNC,CPTII,S$GLB, | Performed by: INTERNAL MEDICINE

## 2023-08-23 PROCEDURE — 3074F SYST BP LT 130 MM HG: CPT | Mod: HCNC,CPTII,S$GLB, | Performed by: INTERNAL MEDICINE

## 2023-08-23 PROCEDURE — 80053 COMPREHEN METABOLIC PANEL: CPT | Mod: HCNC | Performed by: INTERNAL MEDICINE

## 2023-08-23 PROCEDURE — 1159F PR MEDICATION LIST DOCUMENTED IN MEDICAL RECORD: ICD-10-PCS | Mod: HCNC,CPTII,S$GLB, | Performed by: INTERNAL MEDICINE

## 2023-08-23 PROCEDURE — 3288F FALL RISK ASSESSMENT DOCD: CPT | Mod: HCNC,CPTII,S$GLB, | Performed by: INTERNAL MEDICINE

## 2023-08-23 PROCEDURE — 1160F RVW MEDS BY RX/DR IN RCRD: CPT | Mod: HCNC,CPTII,S$GLB, | Performed by: INTERNAL MEDICINE

## 2023-08-23 PROCEDURE — 3074F PR MOST RECENT SYSTOLIC BLOOD PRESSURE < 130 MM HG: ICD-10-PCS | Mod: HCNC,CPTII,S$GLB, | Performed by: INTERNAL MEDICINE

## 2023-08-23 PROCEDURE — 1159F MED LIST DOCD IN RCRD: CPT | Mod: HCNC,CPTII,S$GLB, | Performed by: INTERNAL MEDICINE

## 2023-08-23 PROCEDURE — 99214 PR OFFICE/OUTPT VISIT, EST, LEVL IV, 30-39 MIN: ICD-10-PCS | Mod: HCNC,S$GLB,, | Performed by: INTERNAL MEDICINE

## 2023-08-23 PROCEDURE — 3078F DIAST BP <80 MM HG: CPT | Mod: HCNC,CPTII,S$GLB, | Performed by: INTERNAL MEDICINE

## 2023-08-23 PROCEDURE — 99999 PR PBB SHADOW E&M-EST. PATIENT-LVL IV: CPT | Mod: PBBFAC,HCNC,, | Performed by: INTERNAL MEDICINE

## 2023-08-23 PROCEDURE — 1101F PT FALLS ASSESS-DOCD LE1/YR: CPT | Mod: HCNC,CPTII,S$GLB, | Performed by: INTERNAL MEDICINE

## 2023-08-23 PROCEDURE — 36415 COLL VENOUS BLD VENIPUNCTURE: CPT | Mod: HCNC,PO | Performed by: INTERNAL MEDICINE

## 2023-08-23 PROCEDURE — 1160F PR REVIEW ALL MEDS BY PRESCRIBER/CLIN PHARMACIST DOCUMENTED: ICD-10-PCS | Mod: HCNC,CPTII,S$GLB, | Performed by: INTERNAL MEDICINE

## 2023-08-23 PROCEDURE — 99999 PR PBB SHADOW E&M-EST. PATIENT-LVL IV: ICD-10-PCS | Mod: PBBFAC,HCNC,, | Performed by: INTERNAL MEDICINE

## 2023-08-23 PROCEDURE — 1125F PR PAIN SEVERITY QUANTIFIED, PAIN PRESENT: ICD-10-PCS | Mod: HCNC,CPTII,S$GLB, | Performed by: INTERNAL MEDICINE

## 2023-08-23 PROCEDURE — 3078F PR MOST RECENT DIASTOLIC BLOOD PRESSURE < 80 MM HG: ICD-10-PCS | Mod: HCNC,CPTII,S$GLB, | Performed by: INTERNAL MEDICINE

## 2023-08-23 PROCEDURE — 1125F AMNT PAIN NOTED PAIN PRSNT: CPT | Mod: HCNC,CPTII,S$GLB, | Performed by: INTERNAL MEDICINE

## 2023-08-23 PROCEDURE — 1101F PR PT FALLS ASSESS DOC 0-1 FALLS W/OUT INJ PAST YR: ICD-10-PCS | Mod: HCNC,CPTII,S$GLB, | Performed by: INTERNAL MEDICINE

## 2023-08-23 NOTE — PROGRESS NOTES
This note was created by combination of typed  and M-Modal dictation.  Transcription errors may be present.  If there are any questions, please contact me.    Assessment and Plan:   Assessment and Plan    RUQ pain  -suspicious for gallstones.  Check CBC, CMP.  Check right upper quadrant ultrasound.  Feels like overall it is improving.  She has researched it and is not interested in surgery unless symptoms recur/worsen.    If labs and ultrasound completely negative and does not recur, monitor; but if it recurs, would check CT  -     Comprehensive Metabolic Panel; Future; Expected date: 08/24/2023  -     CBC Without Differential; Future; Expected date: 08/24/2023  -     US Abdomen Limited; Future; Expected date: 08/23/2023    She declines DEXA screening at this time.        There are no discontinued medications.    meds sent this encounter:         Follow Up:   Future Appointments   Date Time Provider Department Center   9/8/2023  8:15 AM Kishan Kennedy DPM [facility] POD [facility]   12/13/2023  2:30 PM Marcelle Richter MD [facility] UROGYN [facility]         Subjective:   Subjective   Chief Complaint   Patient presents with    Abdominal Pain       HPI  Deepali is a 78 y.o. female.    Social History     Tobacco Use    Smoking status: Never    Smokeless tobacco: Never   Substance Use Topics    Alcohol use: No      Social History     Occupational History    Occupation: retired      Social History     Social History Narrative    Not on file       No LMP recorded. Patient has had a hysterectomy.    Last appointment with this clinic was 6/22/2023. Last visit with me 6/22/2023   To summarize last visit and events leading up to today:  Bladder prolapse.  Was given topical estrogen cream but did not started because of family history of breast cancer.  S followed by Urogynecology.  Was fitted with a pessary.  Recurrent UTI type symptoms.  With which she thinks is accompanying fecal soiling.  Work on Kegel exercises.  05/2022  urine culture mixed avila   2022 urine culture E coli   2022 urine culture negative  2023 empiric treatment with Macrobid.  Urine culture not obtained.  2023 urgent care for UTI symptoms, no urine culture sent.    White coat, home BP cuff slightly over reads.  History of aortic atherosclerosis.  Had previously discussed statin therapy she did not want to initiate.  Complaining of sensation of pulsatile sensation in the abdomen.  Referred to Cardiology.  Subsequently seen by Cardiology.  Echo showing diastolic dysfunction otherwise normal.  Holter negative.  AAA screening negative.        Today's visit:  Please note: Chronic medical problems that are stable but are being addressed at this visit may not be listed/documented here, but may be addressed in more detail in the Assessment and Plan section.   Below are salient features of chronic medical conditions, or new/acute conditions and their details.    3 weeks of RUQ pain  Worse with deep inspiration  Feels like a bruise  And then this weekend R shoulder pain radiating into the shoulder  No previous dx of gallstones  But she looked it up   If she eats fried food she gets belching.    Initially noticeable with deep inspiration on standing  But more noticeable with sitting  And could not lie on the right side  No fever no chills  Stool is unremarkable  Urine normal. No nausea  No pain triggered with eating  The shoulder has resolved.     No chest pain  No dyspnea no cough    If she is dx'd with gallstones she does not want surgery.  Her mother-in-law was able to live with gallstones and  of other causes.  She already has issues with urinary frequency and incontinence and is concerned about post cholecystectomy diarrhea worsening this.    Feels like the rUQ pain is starting to lessen.  Is able to lay down on her back/right side now.    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  FRAX score4.6/16%. osteoporosis.      AR (allergic rhinitis) 08/16/2017    Intermittent vertigo 08/16/2017    Aortic atherosclerosis      - noted on CXR 8/2017      Family history of breast cancer 07/21/2015    Recurrent urinary tract infection 05/02/2014    Dyslipidemia 10/17/2013     - no need for prescription medication at this time.  4/29/22 TTE LV normal size, systolic function LVEF 55%. Grade 1 diastolic dysfunction. Normal RV size and systolic function. PA pressure 25, CVP 3  4/29/22 holter normal  4/29/22 AAA US negative         PAST MEDICAL PROBLEMS, PAST SURGICAL HISTORY: please see relevant portions of the electronic medical record    ALLERGIES AND MEDICATIONS: updated and reviewed.  Medication List with Changes/Refills   Current Medications    COCONUT OIL, BULK, OIL    by Misc.(Non-Drug; Combo Route) route.    LIDOCAINE-PRILOCAINE (EMLA) CREAM    Apply topically as needed.         Objective:  "  Objective   Physical Exam   Vitals:    08/23/23 1312   BP: 122/68   Pulse: 84   Temp: 98.2 °F (36.8 °C)   TempSrc: Oral   SpO2: 96%   Weight: 69.1 kg (152 lb 5.4 oz)   Height: 5' 3" (1.6 m)    Body mass index is 26.99 kg/m².  Weight: 69.1 kg (152 lb 5.4 oz)   Height: 5' 3" (160 cm)     Physical Exam  Constitutional:       General: She is not in acute distress.     Appearance: She is well-developed.   Eyes:      Extraocular Movements: Extraocular movements intact.   Cardiovascular:      Rate and Rhythm: Normal rate and regular rhythm.      Heart sounds: Normal heart sounds. No murmur heard.  Pulmonary:      Effort: Pulmonary effort is normal.      Breath sounds: Normal breath sounds.   Abdominal:      Tenderness: There is abdominal tenderness.      Comments: Some mild tenderness on palpation of the right upper quadrant without mass, guarding, rebound   Musculoskeletal:         General: Normal range of motion.   Skin:     General: Skin is warm and dry.   Neurological:      Mental Status: She is alert and oriented to person, place, and time.      Coordination: Coordination normal.   Psychiatric:         Behavior: Behavior normal.         Thought Content: Thought content normal.                "

## 2023-08-24 ENCOUNTER — TELEPHONE (OUTPATIENT)
Dept: FAMILY MEDICINE | Facility: CLINIC | Age: 78
End: 2023-08-24
Payer: MEDICARE

## 2023-08-24 ENCOUNTER — HOSPITAL ENCOUNTER (OUTPATIENT)
Dept: RADIOLOGY | Facility: HOSPITAL | Age: 78
Discharge: HOME | End: 2023-08-24
Attending: INTERNAL MEDICINE
Payer: MEDICARE

## 2023-08-24 DIAGNOSIS — R10.11 RUQ PAIN: ICD-10-CM

## 2023-08-24 PROCEDURE — 76705 ECHO EXAM OF ABDOMEN: CPT | Mod: TC,HCNC

## 2023-08-24 PROCEDURE — 76705 ECHO EXAM OF ABDOMEN: CPT | Mod: 26,HCNC,, | Performed by: STUDENT IN AN ORGANIZED HEALTH CARE EDUCATION/TRAINING PROGRAM

## 2023-08-24 PROCEDURE — 76705 US ABDOMEN LIMITED: ICD-10-PCS | Mod: 26,HCNC,, | Performed by: STUDENT IN AN ORGANIZED HEALTH CARE EDUCATION/TRAINING PROGRAM

## 2023-08-24 NOTE — TELEPHONE ENCOUNTER
Patient states she wanted to know if provider was seen results. Patient was notified that she will receive a call once provider has reviewed those results.

## 2023-08-25 DIAGNOSIS — K76.9 LIVER DISEASE, UNSPECIFIED: Primary | ICD-10-CM

## 2023-08-25 NOTE — PROGRESS NOTES
Mild heterogeneous echotexture of the liver with increased echogenicity surrounding the portal triads.  Findings are nonspecific, and but can be seen in the setting of hepatitis.  Correlation is advised.  Hypoechoic lesions in both hepatic lobes as detailed above.  In the absence of prior imaging, lesions remain indeterminate, and further evaluation is recommended with CT or MR liver mass protocol.    Results to patient via [email or web address].  No evidence of gallstones.  She would noted that her symptoms were improving at the time of visit.  Musculoskeletal?    Incidental findings of abnormal echotexture and hypoechoic lesions.  Will check MRI liver

## 2023-08-28 ENCOUNTER — TELEPHONE (OUTPATIENT)
Dept: FAMILY MEDICINE | Facility: CLINIC | Age: 78
End: 2023-08-28
Payer: MEDICARE

## 2023-08-28 NOTE — TELEPHONE ENCOUNTER
----- Message from Sourav Diaz sent at 8/28/2023  4:08 PM CDT -----  Regarding: Deepali  Type: Patient Callback     Who called: Deepali     What is the request in detail: Pt stated that she would like to know if she has to wait a whole week for the MRI or can she get a sooner appointment. Please call the patient as soon as possible.     [facility]     Would the patient rather a call back or a response via [facility]?Callback     Best call back number: [phone]      Additional Information:

## 2023-08-31 NOTE — PROGRESS NOTES
This note was created by combination of typed  and M-Modal dictation.  Transcription errors may be present.  If there are any questions, please contact me.    Assessment and Plan:   Assessment and Plan    RUQ pain  Liver disease, unspecified  Epigastric pain  Right-sided chest pain  -has MRI liver tomorrow to evaluate the abnormal findings on the right upper quadrant ultrasound.  Sonographic Mahan's negative.  No gallstones.  Pain seems to localize more in the epigastrium.  No pain with eating.    I will check a chest x-ray but I suspect lungs are not the issue   If MRI unrevealing and I suspect it will not localize the source of her pain, I will order CT abdomen pelvis with contrast.  We discussed this today.  Most recent creatinine was normal.  Because the pain is worsened with laying down, I gave her Percocet to take 1-2 pills 1 hour before MRI  -     oxyCODONE-acetaminophen (PERCOCET) 5-325 mg per tablet; Take 2 tablets by mouth once daily.  Dispense: 2 tablet; Refill: 0  -     X-Ray Chest PA And Lateral; Future; Expected date: 09/01/2023    Medications Discontinued During This Encounter   Medication Reason    LIDOcaine-prilocaine (EMLA) cream        meds sent this encounter:     Medications Ordered This Encounter   Medications    oxyCODONE-acetaminophen (PERCOCET) 5-325 mg per tablet     Sig: Take 2 tablets by mouth once daily.     Dispense:  2 tablet     Refill:  0     Quantity prescribed more than 7 day supply? No     Order Specific Question:   I have reviewed the Prescription Drug Monitoring Program (PDMP) database for this patient prior to prescribing the above opioid medication     Answer:   Yes        Follow Up:   Future Appointments   Date Time Provider Department Center   9/2/2023  8:15 AM [facility]   9/15/2023  8:15 AM Kishan Kennedy DPM [facility]         Subjective:   Subjective   Chief Complaint   Patient presents with    Advice Only     Regarding MRI         BERTHA Palumbo is a 78 y.o. female.    Social History     Tobacco Use    Smoking status: Never    Smokeless tobacco: Never   Substance Use Topics    Alcohol use: No      Social History     Occupational History    Occupation: retired      Social History     Social History Narrative    Not on file       No LMP recorded. Patient has had a hysterectomy.    Last appointment with this clinic was 8/23/2023. Last visit with me 8/23/2023   To summarize last visit and events leading up to today:  White coat, home BP cuff slightly over reads.  History of aortic atherosclerosis.  Had previously discussed statin therapy she did not want to initiate.  Complaining of sensation of pulsatile sensation in the abdomen.  Referred to Cardiology.  Subsequently seen by Cardiology.  Echo showing diastolic dysfunction otherwise normal.  Holter negative.  AAA screening negative.  Bladder prolapse.  Was given topical estrogen cream but did not started because of family history of breast cancer.  S followed by Urogynecology.  Was fitted with a pessary.  Recurrent UTI type symptoms.  With which she thinks is accompanying fecal soiling.  Work on Kegel exercises.  05/2022 urine culture mixed avila   07/2022 urine culture E coli   11/2022 urine culture negative  04/2023 empiric treatment with Macrobid.  Urine culture not obtained.  05/06/2023 urgent care for UTI symptoms, no urine culture sent.    Last visit had complained of right upper quadrant pain, plan was ultrasound and if completely negative monitor.  If it recurs, CT.  Right upper quadrant ultrasound  Mild heterogeneous echotexture of the liver with increased echogenicity surrounding the portal triads. Findings are nonspecific, and but can be seen in the setting of hepatitis. Correlation is advised.  Hypoechoic lesions in both hepatic lobes as detailed above. In the absence of prior imaging, lesions remain indeterminate, and further evaluation is recommended with CT or MR liver mass  protocol.  Results to patient via [facility]. No evidence of gallstones. She would noted that her symptoms were improving at the time of visit. Musculoskeletal?   Incidental findings of abnormal echotexture and hypoechoic lesions. Will check MRI liver    MRI 9/2    Today's visit:  Please note: Chronic medical problems that are stable but are being addressed at this visit may not be listed/documented here, but may be addressed in more detail in the Assessment and Plan section.   Below are salient features of chronic medical conditions, or new/acute conditions and their details.    She is here accompanied by her .    She continues to have pain.  It actually is more pronounced when she lays down.  She does not have upper back pain she has lower back pain in general.  But when she lays down it seems to make the pain occur.  She is pointing towards epigastrium.    She notes a slight cough but nothing severe.  She attributes this to postnasal drip.  Denies dyspnea.  But sometimes she takes deep breath in May trigger the pain.    Eating food does not make it hurt.    Discussed that I think the MRI of her liver is going to help us with the abnormal ultrasound findings but is probably not going to give her the source of her pain.  She is requesting something for pain because it is going to be intolerable if she has to lay down for the MRI without something for pain.    Answers submitted by the patient for this visit:  Abdominal Pain Questionnaire (Submitted on 9/1/2023)  Chief Complaint: Abdominal pain  Chronicity: chronic  Onset: 1 to 4 weeks ago  Onset quality: sudden  Frequency: daily  Episode duration: 24 Hours  Progression since onset: waxing and waning  Pain location: RUQ  Pain - numeric: 8/10  anorexia: No  arthralgias: No  belching: Yes  constipation: No  diarrhea: No  dysuria: No  fever: No  flatus: Yes  frequency: No  headaches: No  hematochezia: No  hematuria: No  melena: No  myalgias: No  nausea: Yes  weight  loss: No  vomiting: No  Aggravated by: certain positions, coughing, deep breathing, movement  Relieved by: belching, passing flatus, standing  Pain severity: moderate  Treatments tried: nothing  UTI: Yes      Patient Care Team:  Raul Rivera MD as PCP - General (Internal Medicine)  Neha Rawls OD as Consulting Physician (Optometry)  Jerson Weaver MD as Consulting Physician (Orthopedic Surgery)  Jimi Mccollum MD (Inactive) as Consulting Physician (Gastroenterology)  Mehnaz Dyson MA as Care Coordinator      Patient Active Problem List    Diagnosis Date Noted    Pelvic floor dysfunction 05/30/2022    Coordination impairment 05/30/2022    Postural imbalance 05/30/2022    Urinary incontinence, mixed 05/24/2022    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  FRAX score4.6/16%. osteoporosis.      AR (allergic rhinitis) 08/16/2017    Intermittent vertigo 08/16/2017    Aortic atherosclerosis      - noted on CXR 8/2017      Family history of breast cancer 07/21/2015    Recurrent urinary tract infection 05/02/2014    Dyslipidemia 10/17/2013     - no need for prescription medication at this time.  4/29/22 TTE LV normal size, systolic function LVEF 55%. Grade 1 diastolic dysfunction. Normal RV size and systolic function. PA pressure 25, CVP 3  4/29/22 holter normal  4/29/22 AAA US negative         PAST MEDICAL PROBLEMS, PAST SURGICAL HISTORY: please see  "relevant portions of the electronic medical record    ALLERGIES AND MEDICATIONS: updated and reviewed.  Medication List with Changes/Refills   Current Medications    COCONUT OIL, BULK, OIL    by Misc.(Non-Drug; Combo Route) route.    LIDOCAINE-PRILOCAINE (EMLA) CREAM    Apply topically as needed.         Objective:   Objective   Physical Exam   Vitals:    09/01/23 0856   BP: (!) 116/50   Pulse: 78   Temp: 98.3 °F (36.8 °C)   TempSrc: Oral   SpO2: 96%   Weight: 67.8 kg (149 lb 7.6 oz)   Height: 5' 3" (1.6 m)    Body mass index is 26.48 kg/m².  Weight: 67.8 kg (149 lb 7.6 oz)   Height: 5' 3" (160 cm)     Physical Exam  Constitutional:       General: She is not in acute distress.     Appearance: She is well-developed.   HENT:      Head: Normocephalic and atraumatic.   Eyes:      General: No scleral icterus.  Pulmonary:      Effort: Pulmonary effort is normal.   Abdominal:      Tenderness: There is abdominal tenderness.          Comments: She is tender in the epigastrium without obvious mass, no guarding no rebound  No flank area tenderness   Musculoskeletal:      Comments: Thoracic back nontender, the right-sided posterior and lateral ribcage nontender on moderate pressure palpation   Skin:     General: Skin is warm and dry.   Neurological:      Mental Status: She is alert and oriented to person, place, and time.   Psychiatric:         Behavior: Behavior normal.         Thought Content: Thought content normal.                "

## 2023-09-01 ENCOUNTER — HOSPITAL ENCOUNTER (OUTPATIENT)
Dept: RADIOLOGY | Facility: HOSPITAL | Age: 78
Discharge: HOME | End: 2023-09-01
Attending: INTERNAL MEDICINE
Payer: MEDICARE

## 2023-09-01 ENCOUNTER — OFFICE VISIT (OUTPATIENT)
Dept: FAMILY MEDICINE | Facility: CLINIC | Age: 78
End: 2023-09-01
Payer: MEDICARE

## 2023-09-01 VITALS
TEMPERATURE: 98 F | WEIGHT: 149.5 LBS | SYSTOLIC BLOOD PRESSURE: 116 MMHG | BODY MASS INDEX: 26.49 KG/M2 | OXYGEN SATURATION: 96 % | HEART RATE: 78 BPM | DIASTOLIC BLOOD PRESSURE: 50 MMHG | HEIGHT: 63 IN

## 2023-09-01 DIAGNOSIS — R07.9 RIGHT-SIDED CHEST PAIN: ICD-10-CM

## 2023-09-01 DIAGNOSIS — R10.11 RUQ PAIN: Primary | ICD-10-CM

## 2023-09-01 DIAGNOSIS — K76.9 LIVER DISEASE, UNSPECIFIED: ICD-10-CM

## 2023-09-01 DIAGNOSIS — R10.13 EPIGASTRIC PAIN: ICD-10-CM

## 2023-09-01 PROCEDURE — 71046 XR CHEST PA AND LATERAL: ICD-10-PCS | Mod: 26,HCNC,, | Performed by: RADIOLOGY

## 2023-09-01 PROCEDURE — 1101F PR PT FALLS ASSESS DOC 0-1 FALLS W/OUT INJ PAST YR: ICD-10-PCS | Mod: HCNC,CPTII,S$GLB, | Performed by: INTERNAL MEDICINE

## 2023-09-01 PROCEDURE — 99999 PR PBB SHADOW E&M-EST. PATIENT-LVL IV: ICD-10-PCS | Mod: PBBFAC,HCNC,, | Performed by: INTERNAL MEDICINE

## 2023-09-01 PROCEDURE — 3074F PR MOST RECENT SYSTOLIC BLOOD PRESSURE < 130 MM HG: ICD-10-PCS | Mod: HCNC,CPTII,S$GLB, | Performed by: INTERNAL MEDICINE

## 2023-09-01 PROCEDURE — 3288F FALL RISK ASSESSMENT DOCD: CPT | Mod: HCNC,CPTII,S$GLB, | Performed by: INTERNAL MEDICINE

## 2023-09-01 PROCEDURE — 1159F PR MEDICATION LIST DOCUMENTED IN MEDICAL RECORD: ICD-10-PCS | Mod: HCNC,CPTII,S$GLB, | Performed by: INTERNAL MEDICINE

## 2023-09-01 PROCEDURE — 99214 OFFICE O/P EST MOD 30 MIN: CPT | Mod: HCNC,S$GLB,, | Performed by: INTERNAL MEDICINE

## 2023-09-01 PROCEDURE — 1160F RVW MEDS BY RX/DR IN RCRD: CPT | Mod: HCNC,CPTII,S$GLB, | Performed by: INTERNAL MEDICINE

## 2023-09-01 PROCEDURE — 71046 X-RAY EXAM CHEST 2 VIEWS: CPT | Mod: 26,HCNC,, | Performed by: RADIOLOGY

## 2023-09-01 PROCEDURE — 1125F AMNT PAIN NOTED PAIN PRSNT: CPT | Mod: HCNC,CPTII,S$GLB, | Performed by: INTERNAL MEDICINE

## 2023-09-01 PROCEDURE — 3288F PR FALLS RISK ASSESSMENT DOCUMENTED: ICD-10-PCS | Mod: HCNC,CPTII,S$GLB, | Performed by: INTERNAL MEDICINE

## 2023-09-01 PROCEDURE — 71046 X-RAY EXAM CHEST 2 VIEWS: CPT | Mod: TC,HCNC,FY,PO

## 2023-09-01 PROCEDURE — 99999 PR PBB SHADOW E&M-EST. PATIENT-LVL IV: CPT | Mod: PBBFAC,HCNC,, | Performed by: INTERNAL MEDICINE

## 2023-09-01 PROCEDURE — 3074F SYST BP LT 130 MM HG: CPT | Mod: HCNC,CPTII,S$GLB, | Performed by: INTERNAL MEDICINE

## 2023-09-01 PROCEDURE — 3078F DIAST BP <80 MM HG: CPT | Mod: HCNC,CPTII,S$GLB, | Performed by: INTERNAL MEDICINE

## 2023-09-01 PROCEDURE — 1159F MED LIST DOCD IN RCRD: CPT | Mod: HCNC,CPTII,S$GLB, | Performed by: INTERNAL MEDICINE

## 2023-09-01 PROCEDURE — 1125F PR PAIN SEVERITY QUANTIFIED, PAIN PRESENT: ICD-10-PCS | Mod: HCNC,CPTII,S$GLB, | Performed by: INTERNAL MEDICINE

## 2023-09-01 PROCEDURE — 99214 PR OFFICE/OUTPT VISIT, EST, LEVL IV, 30-39 MIN: ICD-10-PCS | Mod: HCNC,S$GLB,, | Performed by: INTERNAL MEDICINE

## 2023-09-01 PROCEDURE — 1160F PR REVIEW ALL MEDS BY PRESCRIBER/CLIN PHARMACIST DOCUMENTED: ICD-10-PCS | Mod: HCNC,CPTII,S$GLB, | Performed by: INTERNAL MEDICINE

## 2023-09-01 PROCEDURE — 3078F PR MOST RECENT DIASTOLIC BLOOD PRESSURE < 80 MM HG: ICD-10-PCS | Mod: HCNC,CPTII,S$GLB, | Performed by: INTERNAL MEDICINE

## 2023-09-01 PROCEDURE — 1101F PT FALLS ASSESS-DOCD LE1/YR: CPT | Mod: HCNC,CPTII,S$GLB, | Performed by: INTERNAL MEDICINE

## 2023-09-01 RX ORDER — OXYCODONE AND ACETAMINOPHEN 5; 325 MG/1; MG/1
2 TABLET ORAL DAILY
Qty: 2 TABLET | Refills: 0 | Status: SHIPPED | OUTPATIENT
Start: 2023-09-01 | End: 2023-09-28 | Stop reason: ALTCHOICE

## 2023-09-02 ENCOUNTER — HOSPITAL ENCOUNTER (OUTPATIENT)
Dept: RADIOLOGY | Facility: HOSPITAL | Age: 78
Discharge: HOME | End: 2023-09-02
Attending: INTERNAL MEDICINE
Payer: MEDICARE

## 2023-09-02 DIAGNOSIS — K76.9 LIVER DISEASE, UNSPECIFIED: ICD-10-CM

## 2023-09-02 PROCEDURE — 74183 MRI ABD W/O CNTR FLWD CNTR: CPT | Mod: TC,HCNC

## 2023-09-02 PROCEDURE — A9585 GADOBUTROL INJECTION: HCPCS | Mod: HCNC | Performed by: INTERNAL MEDICINE

## 2023-09-02 PROCEDURE — 25500020 PHARM REV CODE 255: Mod: HCNC | Performed by: INTERNAL MEDICINE

## 2023-09-02 PROCEDURE — 74183 MRI ABD W/O CNTR FLWD CNTR: CPT | Mod: 26,HCNC,, | Performed by: RADIOLOGY

## 2023-09-02 PROCEDURE — 74183 MRI ABDOMEN W WO CONTRAST: ICD-10-PCS | Mod: 26,HCNC,, | Performed by: RADIOLOGY

## 2023-09-02 RX ORDER — GADOBUTROL 604.72 MG/ML
10 INJECTION INTRAVENOUS
Status: COMPLETED | OUTPATIENT
Start: 2023-09-02 | End: 2023-09-02

## 2023-09-02 RX ADMIN — GADOBUTROL 10 ML: 604.72 INJECTION INTRAVENOUS at 08:09

## 2023-09-05 ENCOUNTER — TELEPHONE (OUTPATIENT)
Dept: FAMILY MEDICINE | Facility: CLINIC | Age: 78
End: 2023-09-05
Payer: MEDICARE

## 2023-09-05 DIAGNOSIS — K86.89 PANCREATIC MASS: Primary | ICD-10-CM

## 2023-09-05 NOTE — TELEPHONE ENCOUNTER
----- Message from Compa Crocker sent at 9/5/2023  8:43 AM CDT -----  Type: Patient Call Back    Who called:Self     What is the request in detail: Asking for a call regarding MRI Results     Can the clinic reply by [facility]? No     Would the patient rather a call back or a response via [facility]? Call     Best call back number: [phone] (home)

## 2023-09-05 NOTE — TELEPHONE ENCOUNTER
----- Message from Compa Crocker sent at 9/5/2023  8:43 AM CDT -----  Type: Patient Call Back    Who called:Self     What is the request in detail: Asking for a call regarding MRI Results     Can the clinic reply by [facility]? No     Would the patient rather a call back or a response via [facility]? Call     Best call back number: [phone] (home)

## 2023-09-05 NOTE — TELEPHONE ENCOUNTER
Spoke with pt notified of results, will discuss with GI whether this is appropriate for EUS for biopsy or if this should be with IR for CT guided biopsy  The narcotic helped with pain but caused significant nausea  She notes less pain and able to lie supine at night  She'll try ibuprofen or tylenol for future flares of pain  If not controlled may re-try narcotic like hydrocodone and send in zofran for any accompanying nausea.

## 2023-09-05 NOTE — PROGRESS NOTES
1.8 cm hypoenhancing mass of the pancreatic tail, concerning for malignancy.  Numerous enhancing lesions of the liver with diffusion restriction, concerning for metastases.    Spoke with pt about results - see phone note  Discussed with GI - would pursue CT guided biopsy; if unable to schedule in expedited timeframe, next option would be EUS with bx

## 2023-09-07 ENCOUNTER — TELEPHONE (OUTPATIENT)
Dept: FAMILY MEDICINE | Facility: CLINIC | Age: 78
End: 2023-09-07
Payer: MEDICARE

## 2023-09-07 NOTE — TELEPHONE ENCOUNTER
----- Message from Trina Stover sent at 9/7/2023  3:48 PM CDT -----  Type: Patient Call Back    Who called:pt     What is the request in detail:pt requesting to speak to nurse in regards to biopsy. Call pt     Can the clinic reply by [facility]?    Would the patient rather a call back or a response via [facility]? call    Best call back number:[phone] (home)       Additional Information:

## 2023-09-07 NOTE — TELEPHONE ENCOUNTER
Spoke to patient and relay message per Dr. Rivera concerning pt seeing a specialist before getting a biopsy done.

## 2023-09-11 ENCOUNTER — PATIENT MESSAGE (OUTPATIENT)
Dept: FAMILY MEDICINE | Facility: CLINIC | Age: 78
End: 2023-09-11
Payer: MEDICARE

## 2023-09-11 ENCOUNTER — OFFICE VISIT (OUTPATIENT)
Dept: URGENT CARE | Facility: CLINIC | Age: 78
End: 2023-09-11
Payer: MEDICARE

## 2023-09-11 VITALS
SYSTOLIC BLOOD PRESSURE: 123 MMHG | TEMPERATURE: 98 F | RESPIRATION RATE: 19 BRPM | DIASTOLIC BLOOD PRESSURE: 73 MMHG | OXYGEN SATURATION: 95 % | HEART RATE: 72 BPM | BODY MASS INDEX: 26.49 KG/M2 | HEIGHT: 63 IN | WEIGHT: 149.5 LBS

## 2023-09-11 DIAGNOSIS — K86.89 PANCREATIC MASS: Primary | ICD-10-CM

## 2023-09-11 DIAGNOSIS — R10.9 FLANK PAIN: Primary | ICD-10-CM

## 2023-09-11 DIAGNOSIS — K59.00 CONSTIPATION, UNSPECIFIED CONSTIPATION TYPE: ICD-10-CM

## 2023-09-11 DIAGNOSIS — K76.9 LIVER DISEASE, UNSPECIFIED: ICD-10-CM

## 2023-09-11 LAB
BILIRUB UR QL STRIP: NEGATIVE
GLUCOSE UR QL STRIP: NEGATIVE
KETONES UR QL STRIP: NEGATIVE
LEUKOCYTE ESTERASE UR QL STRIP: NEGATIVE
PH, POC UA: 5
POC BLOOD, URINE: POSITIVE
POC NITRATES, URINE: NEGATIVE
PROT UR QL STRIP: NEGATIVE
SP GR UR STRIP: 1.01 (ref 1–1.03)
UROBILINOGEN UR STRIP-ACNC: NORMAL (ref 0.1–1.1)

## 2023-09-11 PROCEDURE — 81003 URINALYSIS AUTO W/O SCOPE: CPT | Mod: QW,S$GLB,,

## 2023-09-11 PROCEDURE — 74018 XR KUB: ICD-10-PCS | Mod: S$GLB,,, | Performed by: RADIOLOGY

## 2023-09-11 PROCEDURE — 99214 OFFICE O/P EST MOD 30 MIN: CPT | Mod: S$GLB,,,

## 2023-09-11 PROCEDURE — 99214 PR OFFICE/OUTPT VISIT, EST, LEVL IV, 30-39 MIN: ICD-10-PCS | Mod: S$GLB,,,

## 2023-09-11 PROCEDURE — 74018 RADEX ABDOMEN 1 VIEW: CPT | Mod: S$GLB,,, | Performed by: RADIOLOGY

## 2023-09-11 PROCEDURE — 81003 POCT URINALYSIS, DIPSTICK, AUTOMATED, W/O SCOPE: ICD-10-PCS | Mod: QW,S$GLB,,

## 2023-09-11 PROCEDURE — 87086 URINE CULTURE/COLONY COUNT: CPT | Mod: HCNC

## 2023-09-12 NOTE — PATIENT INSTRUCTIONS
Please return here or go to the Emergency Department for any concerns or worsening of condition.    Please increase fluid intake.  Please consider taking over the counter MiraLAX for constipation relief.    Someone will contact you with your results from your urine culture results and any potential changes to your treatment plan.    Tylenol:  Regular strength can take 650 mg every 4-6 hours as needed, do not exceed 3,250 mg within a day.  Extra strength can take 1,000 mg every 6 hours as needed, do not exceed 3,000 mg in one day.    Please take IBUPROFEN (up to 800mg) every 8 hours as needed for pain/inflammation, you may decrease to every 12 hour, or once daily, or discontinue as your symptoms improve.     Please follow up with your primary care doctor or specialist as needed.    If you  smoke, please stop smoking.

## 2023-09-12 NOTE — PROGRESS NOTES
"Subjective:      Patient ID: Deepali Alex is a 78 y.o. female.    Vitals:  height is 5' 3" (1.6 m) and weight is 67.8 kg (149 lb 7.6 oz). Her oral temperature is 98.1 °F (36.7 °C). Her blood pressure is 123/73 and her pulse is 72. Her respiration is 19 and oxygen saturation is 95%.     Chief Complaint: Dysuria    Patient is presenting with bilateral flank pain.  She states that her symptoms started yesterday.  She states that she has not tried anything for her symptoms.  She denies dysuria, urinary frequency, urinary urgency, hematuria, fever, chills, nausea, vomiting, abdominal pain, jaundice, CP, SOB. Patient is able to tolerate food and beverage. Patient has a history of a pancreatic mass with "spots" on her liver that she is currently having worked up.    Flank Pain  This is a new problem. The current episode started in the past 7 days. The problem occurs intermittently. The pain is present in the lumbar spine. The quality of the pain is described as aching. The pain does not radiate. Pertinent negatives include no abdominal pain, bladder incontinence, bowel incontinence, chest pain, dysuria, fever, headaches, leg pain, numbness, paresis, paresthesias, pelvic pain, perianal numbness, tingling, weakness or weight loss. She has tried nothing for the symptoms. The treatment provided no relief.       Constitution: Negative for chills, fatigue and fever.   Cardiovascular:  Negative for chest pain and sob on exertion.   Respiratory:  Negative for shortness of breath.    Gastrointestinal:  Negative for abdominal pain, nausea, vomiting, diarrhea and bowel incontinence.   Genitourinary:  Positive for flank pain. Negative for dysuria, frequency, urgency, bladder incontinence, hematuria and pelvic pain.   Skin:  Negative for erythema.   Neurological:  Negative for headaches and numbness.      Objective:     Physical Exam   Constitutional:  Non-toxic appearance. She does not appear ill. No distress.      " Comments:Patient is in no acute distress, patient is non-toxic appearing, patient is ox3, patient is answering question appropriately.   normal  Eyes: Conjunctivae are normal. Right eye exhibits no discharge. Left eye exhibits no discharge. No scleral icterus. Extraocular movement intact   Cardiovascular: Normal rate, regular rhythm and normal heart sounds.   No murmur heard.Exam reveals no gallop and no friction rub.   Pulmonary/Chest: Effort normal and breath sounds normal. No stridor. No respiratory distress. She has no wheezes. She has no rhonchi. She has no rales. She exhibits no tenderness.         Comments: Patient is in no respiratory distress. Breath sounds even, unlabored, and clear to auscultation bilaterally. No accessory muscle usage. Patient able to speak in complete sentences with ease.    Abdominal: Normal appearance and bowel sounds are normal. She exhibits no shifting dullness, no distension, no fluid wave, no pulsatile midline mass and no mass. Soft. There is no splenomegaly or hepatomegaly. No signs of injury. There is no abdominal tenderness. There is no rebound, no guarding, no tenderness at McBurney's point, negative Mahan's sign, no left CVA tenderness, negative Rovsing's sign and no right CVA tenderness. No hernia.   Neurological: She is alert.   Skin: Skin is not diaphoretic. No erythema jaundice  Nursing note and vitals reviewed.    Results for orders placed or performed in visit on 09/11/23   POCT Urinalysis, Dipstick, Automated, W/O Scope   Result Value Ref Range    POC Blood, Urine Positive (A) Negative    POC Bilirubin, Urine Negative Negative    POC Urobilinogen, Urine normal 0.1 - 1.1    POC Ketones, Urine Negative Negative    POC Protein, Urine Negative Negative    POC Nitrates, Urine Negative Negative    POC Glucose, Urine Negative Negative    pH, UA 5.0     POC Specific Gravity, Urine 1.015 1.003 - 1.029    POC Leukocytes, Urine Negative Negative     XR KUB    Result Date:  "9/11/2023  EXAMINATION: XR KUB CLINICAL HISTORY: Unspecified abdominal pain TECHNIQUE: Single AP supine view of the abdomen (KUB) was performed COMPARISON: None FINDINGS: There is a normal, nonobstructive bowel gas pattern.  There is a large volume of stool.  Free air cannot be excluded on this supine radiograph, though none is seen.  There is no abnormal calcification. The visualized osseous structures demonstrate degenerative changes.  The visualized lung bases are clear.     Large volume of stool, correlate for constipation. Electronically signed by: Sean Ware Date:    09/11/2023 Time:    20:23    Assessment:     1. Flank pain    2. Constipation, unspecified constipation type      Plan:   Previous notes reviewed.  Vital signs reviewed.  Labs ordered. Labs reviewed. KUB shows constipation without kidney stones. MRI/US reviewed.  Discussed flank pain and constipation, home care, tx options, and given follow up precautions.  Patient was briefed on my thought process and diagnosis.   Patient involved with the treatment plan and agreed to the plan.    Patient present with flank pain, patient has a history of a pancreatic mass and "spots" on her liver that is being further worked up, she presents today without systemic symptoms/ worsening of pre-existing abdominal concerns, patient is stable and nontoxic appearing, physical exam is without jaundice, abdominal tenderness/guarding/rebound, no hepatomegaly, no CVA tenderness, UA shows hematuria, KUB is without evidence of nephrolithiasis, KUB shows significant evidence of constipation, will send urine culture due to patient history and new onset of flank pain, most recent US/CT reviewed, will have patient increase fluids, take over the counter miralax for constipation, will treat pain with OTC management, pcp follow up encouraged, ER precautions given.    Patient informed on warning signs, patient understood warning signs and to go to urgent care or ER if warning " signs appear.  Please excuse grammatical/spelling errors appreciated throughout this visit encounter for a remote dictation device was used during this encounter.    Patient Instructions   Please return here or go to the Emergency Department for any concerns or worsening of condition.    Please increase fluid intake.  Please consider taking over the counter MiraLAX for constipation relief.    Someone will contact you with your results from your urine culture results and any potential changes to your treatment plan.    Tylenol:  Regular strength can take 650 mg every 4-6 hours as needed, do not exceed 3,250 mg within a day.  Extra strength can take 1,000 mg every 6 hours as needed, do not exceed 3,000 mg in one day.    Please take IBUPROFEN (up to 800mg) every 8 hours as needed for pain/inflammation, you may decrease to every 12 hour, or once daily, or discontinue as your symptoms improve.     Please follow up with your primary care doctor or specialist as needed.    If you  smoke, please stop smoking.    Flank pain  -     POCT Urinalysis, Dipstick, Automated, W/O Scope  -     XR KUB; Future; Expected date: 09/11/2023  -     Urine culture    Constipation, unspecified constipation type    Medical Decision Making:   Differential Diagnosis:   Nephrolithiasis, acute abdomen (hepatitis, appendicitis, cholecystitis, diverticulitis, pancreatitis), cholelithiasis, UTI, pyelonephritis, AAA, malignancy, constipation, muscular strain, bowel obstruction, renal cyst, shingles    Zacarias Geller PA-C

## 2023-09-13 ENCOUNTER — TELEPHONE (OUTPATIENT)
Dept: URGENT CARE | Facility: CLINIC | Age: 78
End: 2023-09-13
Payer: MEDICARE

## 2023-09-13 LAB — BACTERIA UR CULT: NO GROWTH

## 2023-09-13 NOTE — TELEPHONE ENCOUNTER
Results for orders placed or performed in visit on 09/11/23   Urine culture    Specimen: Urine, Clean Catch   Result Value Ref Range    Urine Culture, Routine No growth    POCT Urinalysis, Dipstick, Automated, W/O Scope   Result Value Ref Range    POC Blood, Urine Positive (A) Negative    POC Bilirubin, Urine Negative Negative    POC Urobilinogen, Urine normal 0.1 - 1.1    POC Ketones, Urine Negative Negative    POC Protein, Urine Negative Negative    POC Nitrates, Urine Negative Negative    POC Glucose, Urine Negative Negative    pH, UA 5.0     POC Specific Gravity, Urine 1.015 1.003 - 1.029    POC Leukocytes, Urine Negative Negative       Called and discussed test results with patient, she verified full name and date of birth.  Advise urine without any sign of infection.  Patient reports she took MiraLax in the helped with her symptoms.  She says she is wears hematuria and that she has workup later on for evaluation of liver and pancreas.  I advised her to follow up with Dr. Alves and review further hematuria for recheck she agreed with plan.  She reports she took MiraLax and she feels much better today.

## 2023-09-14 ENCOUNTER — TELEPHONE (OUTPATIENT)
Dept: FAMILY MEDICINE | Facility: CLINIC | Age: 78
End: 2023-09-14
Payer: MEDICARE

## 2023-09-14 DIAGNOSIS — R82.90 ABNORMAL URINALYSIS: Primary | ICD-10-CM

## 2023-09-14 NOTE — TELEPHONE ENCOUNTER
I would repeat another urine.  They saw 5 blood cells under the microscope.  That is just at the borderline of what is normal versus abnormal.  If more than 5 may need to have her see urology  But the pancreas/liver should be figured out first.  I ordered the urine

## 2023-09-14 NOTE — TELEPHONE ENCOUNTER
----- Message from Sourav Diaz sent at 9/14/2023  2:29 PM CDT -----  Regarding: Deepali  Type: Patient Callback     Who called: Deepali     What is the request in detail: Patient went to urgent care for a possible bladder infection and was negative. Urine results came back and was advised that she had a little blood in the urine and that she needed contact her doctor. Please call the patient as soon as possible    Can the clinic reply by [facility]? Yes     Would the patient rather a call back or a response via [facility]?Callback     Best call back number: [phone]      Additional Information:

## 2023-09-18 ENCOUNTER — LAB VISIT (OUTPATIENT)
Dept: LAB | Facility: HOSPITAL | Age: 78
End: 2023-09-18
Attending: INTERNAL MEDICINE
Payer: MEDICARE

## 2023-09-18 ENCOUNTER — TELEPHONE (OUTPATIENT)
Dept: FAMILY MEDICINE | Facility: CLINIC | Age: 78
End: 2023-09-18
Payer: MEDICARE

## 2023-09-18 DIAGNOSIS — R82.90 ABNORMAL URINALYSIS: ICD-10-CM

## 2023-09-18 LAB
BACTERIA #/AREA URNS AUTO: NORMAL /HPF
BILIRUB UR QL STRIP: NEGATIVE
CLARITY UR REFRACT.AUTO: CLEAR
COLOR UR AUTO: YELLOW
GLUCOSE UR QL STRIP: NEGATIVE
HGB UR QL STRIP: ABNORMAL
HYALINE CASTS UR QL AUTO: 1 /LPF
KETONES UR QL STRIP: NEGATIVE
LEUKOCYTE ESTERASE UR QL STRIP: NEGATIVE
MICROSCOPIC COMMENT: NORMAL
NITRITE UR QL STRIP: NEGATIVE
PH UR STRIP: 6 [PH] (ref 5–8)
PROT UR QL STRIP: NEGATIVE
RBC #/AREA URNS AUTO: 3 /HPF (ref 0–4)
SP GR UR STRIP: 1.01 (ref 1–1.03)
SQUAMOUS #/AREA URNS AUTO: 0 /HPF
URN SPEC COLLECT METH UR: ABNORMAL

## 2023-09-18 PROCEDURE — 81001 URINALYSIS AUTO W/SCOPE: CPT | Mod: HCNC | Performed by: INTERNAL MEDICINE

## 2023-09-18 NOTE — PROGRESS NOTES
Repeat UA normal with RBC < 5  [facility] 5 just at border of abnormal  Undergoing workup for panreatic mass with liver lesions

## 2023-09-18 NOTE — TELEPHONE ENCOUNTER
----- Message from Marcia Barr sent at 9/18/2023 11:21 AM CDT -----  Regarding: self [phone]  Type: Patient Call Back    Who called: self     What is the request in detail: pt wanted to let provider know, she left urine sample this morning.    Can the clinic reply by [facility]? no    Would the patient rather a call back or a response via [facility]? Call back     Best call back number: [phone]

## 2023-09-19 ENCOUNTER — HOSPITAL ENCOUNTER (OUTPATIENT)
Dept: PREADMISSION TESTING | Facility: HOSPITAL | Age: 78
Discharge: HOME | End: 2023-09-19
Attending: INTERNAL MEDICINE
Payer: MEDICARE

## 2023-09-19 NOTE — PLAN OF CARE
PHONE PREOP COMPLETED--Pre-operative instructions, medication directives and pain scales reviewed with patient. All questions the patient had were answered. Re-assurance about surgical procedure and day of surgery routine given as needed, patient verbalized understanding of the pre-op instructions.

## 2023-09-21 ENCOUNTER — HOSPITAL ENCOUNTER (OUTPATIENT)
Dept: INTERVENTIONAL RADIOLOGY/VASCULAR | Facility: HOSPITAL | Age: 78
Discharge: HOME | End: 2023-09-21
Attending: INTERNAL MEDICINE | Admitting: RADIOLOGY
Payer: MEDICARE

## 2023-09-21 VITALS
HEART RATE: 68 BPM | OXYGEN SATURATION: 97 % | DIASTOLIC BLOOD PRESSURE: 62 MMHG | SYSTOLIC BLOOD PRESSURE: 114 MMHG | RESPIRATION RATE: 20 BRPM | TEMPERATURE: 98 F

## 2023-09-21 DIAGNOSIS — K76.9 LIVER DISEASE, UNSPECIFIED: ICD-10-CM

## 2023-09-21 DIAGNOSIS — R16.0 LIVER MASS: ICD-10-CM

## 2023-09-21 DIAGNOSIS — K86.89 PANCREATIC MASS: ICD-10-CM

## 2023-09-21 LAB
INR PPP: 1.1 (ref 0.8–1.2)
PROTHROMBIN TIME: 11.4 SEC (ref 9–12.5)

## 2023-09-21 PROCEDURE — 25000003 PHARM REV CODE 250: Mod: HCNC | Performed by: RADIOLOGY

## 2023-09-21 PROCEDURE — 99203 OFFICE O/P NEW LOW 30 MIN: CPT | Mod: HCNC,,, | Performed by: RADIOLOGY

## 2023-09-21 PROCEDURE — 99152 PR MOD CONSCIOUS SEDATION, SAME PHYS, 5+ YRS, FIRST 15 MIN: ICD-10-PCS | Mod: HCNC,,, | Performed by: RADIOLOGY

## 2023-09-21 PROCEDURE — 77012 IR BIOPSY LIVER: ICD-10-PCS | Mod: 26,HCNC,, | Performed by: RADIOLOGY

## 2023-09-21 PROCEDURE — 36415 COLL VENOUS BLD VENIPUNCTURE: CPT | Mod: HCNC | Performed by: RADIOLOGY

## 2023-09-21 PROCEDURE — 77012 CT SCAN FOR NEEDLE BIOPSY: CPT | Mod: 26,HCNC,, | Performed by: RADIOLOGY

## 2023-09-21 PROCEDURE — 47000 NEEDLE BIOPSY OF LIVER PERQ: CPT | Mod: HCNC,,, | Performed by: RADIOLOGY

## 2023-09-21 PROCEDURE — 99153 MOD SED SAME PHYS/QHP EA: CPT | Mod: HCNC

## 2023-09-21 PROCEDURE — 47000 IR BIOPSY LIVER: ICD-10-PCS | Mod: HCNC,,, | Performed by: RADIOLOGY

## 2023-09-21 PROCEDURE — 63600175 PHARM REV CODE 636 W HCPCS: Mod: HCNC | Performed by: RADIOLOGY

## 2023-09-21 PROCEDURE — 99203 PR OFFICE/OUTPT VISIT, NEW, LEVL III, 30-44 MIN: ICD-10-PCS | Mod: HCNC,,, | Performed by: RADIOLOGY

## 2023-09-21 PROCEDURE — 77012 CT SCAN FOR NEEDLE BIOPSY: CPT | Mod: HCNC

## 2023-09-21 PROCEDURE — 99152 MOD SED SAME PHYS/QHP 5/>YRS: CPT | Mod: HCNC,,, | Performed by: RADIOLOGY

## 2023-09-21 PROCEDURE — 99152 MOD SED SAME PHYS/QHP 5/>YRS: CPT | Mod: HCNC

## 2023-09-21 PROCEDURE — 27200937 IR BIOPSY LIVER: Mod: HCNC

## 2023-09-21 PROCEDURE — 85610 PROTHROMBIN TIME: CPT | Mod: HCNC | Performed by: RADIOLOGY

## 2023-09-21 PROCEDURE — 47000 NEEDLE BIOPSY OF LIVER PERQ: CPT | Mod: HCNC,LT

## 2023-09-21 RX ORDER — MORPHINE SULFATE 10 MG/ML
2 INJECTION INTRAMUSCULAR; INTRAVENOUS; SUBCUTANEOUS
Status: DISCONTINUED | OUTPATIENT
Start: 2023-09-21 | End: 2023-09-22 | Stop reason: HOSPADM

## 2023-09-21 RX ORDER — LIDOCAINE HYDROCHLORIDE 10 MG/ML
INJECTION INFILTRATION; PERINEURAL
Status: COMPLETED | OUTPATIENT
Start: 2023-09-21 | End: 2023-09-21

## 2023-09-21 RX ORDER — HYDROCODONE BITARTRATE AND ACETAMINOPHEN 5; 325 MG/1; MG/1
1 TABLET ORAL EVERY 4 HOURS PRN
Status: DISCONTINUED | OUTPATIENT
Start: 2023-09-21 | End: 2023-09-22 | Stop reason: HOSPADM

## 2023-09-21 RX ORDER — FENTANYL CITRATE 50 UG/ML
INJECTION, SOLUTION INTRAMUSCULAR; INTRAVENOUS
Status: COMPLETED | OUTPATIENT
Start: 2023-09-21 | End: 2023-09-21

## 2023-09-21 RX ORDER — MIDAZOLAM HYDROCHLORIDE 1 MG/ML
INJECTION INTRAMUSCULAR; INTRAVENOUS
Status: COMPLETED | OUTPATIENT
Start: 2023-09-21 | End: 2023-09-21

## 2023-09-21 RX ADMIN — MIDAZOLAM HYDROCHLORIDE 0.5 MG: 1 INJECTION INTRAMUSCULAR; INTRAVENOUS at 10:09

## 2023-09-21 RX ADMIN — MIDAZOLAM HYDROCHLORIDE 1 MG: 1 INJECTION INTRAMUSCULAR; INTRAVENOUS at 10:09

## 2023-09-21 RX ADMIN — LIDOCAINE HYDROCHLORIDE 5 ML: 10 INJECTION, SOLUTION INFILTRATION; PERINEURAL at 10:09

## 2023-09-21 RX ADMIN — FENTANYL CITRATE 25 MCG: 50 INJECTION, SOLUTION INTRAMUSCULAR; INTRAVENOUS at 10:09

## 2023-09-21 RX ADMIN — FENTANYL CITRATE 50 MCG: 50 INJECTION, SOLUTION INTRAMUSCULAR; INTRAVENOUS at 10:09

## 2023-09-21 NOTE — DISCHARGE SUMMARY
Radiology Discharge Summary      Hospital Course: No complications    Admit Date: 9/21/2023  Discharge Date: 09/21/2023     Instructions Given to Patient: Yes    Diet: Resume prior diet    Activity: No driving for today. No heavy lifting, strenuous activity, exercising or submerging in water x 2 days.    Description of Condition on Discharge: Stable    Vital Signs (Most Recent): Temp: 98.2 °F (36.8 °C) (09/21/23 0820)  Pulse: 70 (09/21/23 1055)  Resp: 16 (09/21/23 1055)  BP: (!) 118/59 (09/21/23 1055)  SpO2: 96 % (09/21/23 1055)    Discharge Disposition: Home    Discharge Diagnosis:   78 y.o. 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    Patient is now s/p successful CT-guided percutaneous epigastric-approach 18-gauge core biopsy of Lt hepatic lobe suspicious mass with local anesthetic and moderate conscious sedation. Patient tolerated the procedure well. No immediate post-procedural complications noted.     No heavy lifting, strenuous activity, exercising or submerging in water x 2 days.    Thank you for considering IR for the care of your patient.     Ankur Juarez MD  Interventional Radiology

## 2023-09-21 NOTE — BRIEF OP NOTE
Radiology Post-Procedure Note    Pre Op Diagnosis: Pancreatic mass with hepatic lesions  Post Op Diagnosis: Same    Procedure: 1. CT-guided percutaneous epigastric-approach 18-gauge core biopsy of Lt hepatic lobe suspicious mass    Procedure performed by: Ankur Juarez MD    Written Informed Consent Obtained: Yes  Specimen Removed: YES, 18-G cores x 16  Estimated Blood Loss: Minimal    Findings:   Successful CT-guided percutaneous epigastric-approach 18-gauge core biopsy of Lt hepatic lobe suspicious mass with local anesthetic and moderate conscious sedation. Patient tolerated the procedure well. No immediate post-procedural complications noted.     Patient transferred back to [facility] for 2 hours post-op monitoring and bed rest prior to tentative discharge.    No heavy lifting, strenuous activity, exercising or submerging in water x 2 days.    Thank you for considering IR for the care of your patient.     Ankur Juarez MD  Interventional Radiology

## 2023-09-21 NOTE — PLAN OF CARE
Report given to ERNESTINE Ibrahim.     Pt transferred to [facility] for recovery.

## 2023-09-21 NOTE — DISCHARGE INSTRUCTIONS
BATHING:  You may shower after the dressing is removed.  DRESSING:  Remove dressing tomorrow        ACTIVITY LEVEL: If you have received sedation or an anesthetic, you may feel sleepy for several hours. Rest until you are more awake. Gradually resume your normal activities        No heavy lifting, strenuous activity, exercising or submerging in water x 2 days.      DIET: You may resume your home diet. If nausea is present, increase your diet gradually with fluids and bland foods.    Medications: Pain medication should be taken only if needed and as directed. If antibiotics are prescribed, the medication should be taken until completed. You will be given an updated list of you medications.  No driving, alcoholic beverages or signing legal documents for next 24 hours if you have had sedation, or while taking pain medication    CALL THE DOCTOR:   For any obvious bleeding (some dried blood over the incision is normal).     Redness, swelling, foul smell around incision or fever over 101.  Shortness of breath.  Persistent pain or nausea not relieved by medication.                         Call  [phone]     to speak with an Interventional Radiologist    If any unusual problems or difficulties occur contact your doctor. If you cannot contact your doctor but feel your signs and symptoms warrant a physicians attention return to the emergency room.    Follow with Dr. Rivera    Fall Prevention  Millions of people fall every year and injure themselves. You may have had anesthesia or sedation which may increase your risk of falling. You may have health issues that put you at an increased risk of falling.     Here are ways to reduce your risk of falling.    Make your home safe by keeping walkways clear of objects you may trip over.  Use non-slip pads under rugs. Do not use area rugs or small throw rugs.  Use non-slip mats in bathtubs and showers.  Install handrails and lights on staircases.  Do not walk in poorly lit areas.  Do not  stand on chairs or wobbly ladders.  Use caution when reaching overhead or looking upward. This position can cause a loss of balance.  Be sure your shoes fit properly, have non-slip bottoms and are in good condition.   Wear shoes both inside and out. Avoid going barefoot or wearing slippers.  Be cautious when going up and down stairs, curbs, and when walking on uneven sidewalks.  If your balance is poor, consider using a cane or walker.  If your fall was related to alcohol use, stop or limit alcohol intake.   If your fall was related to use of sleeping medicines, talk to your doctor about this. You may need to reduce your dosage at bedtime if you awaken during the night to go to the bathroom.    To reduce the need for nighttime bathroom trips:  Avoid drinking fluids for several hours before going to bed  Empty your bladder before going to bed  Men can keep a urinal at the bedside  Stay as active as you can. Balance, flexibility, strength, and endurance all come from exercise. They all play a role in preventing falls. Ask your healthcare provider which types of activity are right for you.  Get your vision checked on a regular basis.  If you have pets, know where they are before you stand up or walk so you don't trip over them.  Use night lights.

## 2023-09-21 NOTE — H&P
Radiology History & Physical      SUBJECTIVE:     Chief Complaint: Pancreatic mass with hepatic lesions    History of Present Illness:  Deepali Alex is a 78 y.o. 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    A new inpatient IR consult received for CT-guided percutaneous epigastric-approach 18-gauge core biopsy of Lt hepatic lobe suspicious mass.    Past Medical History:   Diagnosis Date    Age-related osteoporosis without current pathological fracture     Atherosclerosis of aortic arch     - noted on CXR 8/2017    Back pain     Diverticulosis of sigmoid colon 10/22/2019    Fibrocystic breast     Hyperlipidemia     Osteopenia     Polyp of ascending colon 10/22/2019    Urinary incontinence, mixed 5/24/2022     Past Surgical History:   Procedure Laterality Date    ECTOPIC PREGNANCY SURGERY      HERNIA REPAIR      left inguinal    HYSTERECTOMY  1980    without BSO     Home Meds:   Prior to Admission medications    Medication Sig Start Date End Date Taking? Authorizing Provider   coconut oil, bulk, Oil by Misc.(Non-Drug; Combo Route) route.    Provider, Historical   oxyCODONE-acetaminophen (PERCOCET) 5-325 mg per tablet Take 2 tablets by mouth once daily. 9/1/23   Raul Rivera MD     Anticoagulants/Antiplatelets: no anticoagulation    Allergies:   Review of patient's allergies indicates:   Allergen Reactions    Erythromycin (bulk)      Other reaction(s): Eye irritation     Sedation History:  no adverse reactions    Review of Systems:   Hematological: no known coagulopathies  Respiratory: no cough, shortness of breath, or wheezing  Cardiovascular: positive for -  "chest pain  Gastrointestinal: positive for - abdominal pain  Genito-Urinary: no dysuria, trouble voiding, or hematuria  Musculoskeletal: negative  Neurological: no TIA or stroke symptoms       OBJECTIVE:     Vital Signs (Most Recent)       Physical Exam:  ASA: II  Mallampati: II    General: no acute distress  Mental Status: alert and oriented to person, place and time  HEENT: normocephalic, atraumatic  Chest: unlabored breathing  Heart: regular heart rate  Abdomen: nondistended  Extremity: moves all extremities    Laboratory  No results found for: "INR", "PT", "PTT"    Lab Results   Component Value Date    WBC 7.92 08/23/2023    HGB 12.5 08/23/2023    HCT 40.0 08/23/2023    MCV 93 08/23/2023     08/23/2023      Lab Results   Component Value Date    GLU 95 08/23/2023     08/23/2023    K 4.5 08/23/2023     08/23/2023    CO2 27 08/23/2023    BUN 17 08/23/2023    CREATININE 0.7 08/23/2023    CALCIUM 9.5 08/23/2023    ALT 18 08/23/2023    AST 24 08/23/2023    ALBUMIN 4.0 08/23/2023    BILITOT 0.5 08/23/2023     ASSESSMENT/PLAN:     78 y.o. 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    Pancreatic mass with hepatic lesions - Will attempt CT-guided percutaneous epigastric-approach 18-gauge core biopsy of Lt hepatic lobe suspicious mass with local anesthetic and moderate conscious sedation.    Risks (including, but not limited to, pain, bleeding, infection, damage to nearby structures, failure to obtain sufficient material for a diagnosis, the need for additional procedures, and death), benefits, and alternatives were discussed with the patient. All questions " were answered to the best of my abilities. The patient wishes to proceed with the procedure. Written informed consent was obtained.    Thank you for considering IR for the care of your patient.     Ankur Juarez MD  Interventional Radiology

## 2023-09-21 NOTE — Clinical Note
Left: Abdomen.   Scrubbed with Chlorhexidine/Alcohol.    Hair: N/A.  Skin prep dry before draping.  Prepped by: Ankur Juarez MD 9/21/2023 10:29 AM.

## 2023-09-21 NOTE — PLAN OF CARE
Pt arrived to IR for liver biopsy. Pt oriented to unit and staff. Plan of care reviewed with patient, patient verbalizes understanding. Comfort measures utilized. Pt safely transferred from stretcher to procedural table. Fall risk reviewed with patient, fall risk interventions maintained. Safety strap applied, positioner pillows utilized to minimize pressure points. Blankets applied. Pt prepped and draped utilizing standard sterile technique. Patient placed on continuous monitoring, as required by sedation policy. Timeouts completed utilizing standard universal time-out, per department and facility policy. RN to remain at bedside, continuous monitoring maintained. Pt resting comfortably. Denies pain/discomfort. Will continue to monitor. See flow sheets for monitoring, medication administration, and updates.

## 2023-09-21 NOTE — PLAN OF CARE
Procedure completed, pt tolerated well. No apparent distress noted. Dressing applied CDI. Biopsies collected and handed to pathologist. Pt to be transferred to \[facility]\"" for recovery. Attempted to call report, nurse not available; will attempt again.

## 2023-09-21 NOTE — PLAN OF CARE
Attempted to call report to SDS x4; nurse not available to take report and states will call IR back.

## 2023-09-22 ENCOUNTER — TELEPHONE (OUTPATIENT)
Dept: FAMILY MEDICINE | Facility: CLINIC | Age: 78
End: 2023-09-22
Payer: MEDICARE

## 2023-09-22 ENCOUNTER — HOSPITAL ENCOUNTER (EMERGENCY)
Facility: HOSPITAL | Age: 78
Discharge: HOME | End: 2023-09-22
Attending: INTERNAL MEDICINE
Payer: MEDICARE

## 2023-09-22 VITALS
WEIGHT: 145 LBS | TEMPERATURE: 98 F | OXYGEN SATURATION: 99 % | DIASTOLIC BLOOD PRESSURE: 77 MMHG | BODY MASS INDEX: 25.69 KG/M2 | SYSTOLIC BLOOD PRESSURE: 159 MMHG | RESPIRATION RATE: 16 BRPM | HEIGHT: 63 IN | HEART RATE: 77 BPM

## 2023-09-22 DIAGNOSIS — R10.10 PAIN OF UPPER ABDOMEN: Primary | ICD-10-CM

## 2023-09-22 DIAGNOSIS — R10.11 RUQ PAIN: Primary | ICD-10-CM

## 2023-09-22 LAB
ALBUMIN SERPL BCP-MCNC: 3.9 G/DL (ref 3.5–5.2)
ALLENS TEST: ABNORMAL
ALP SERPL-CCNC: 159 U/L (ref 55–135)
ALT SERPL W/O P-5'-P-CCNC: 28 U/L (ref 10–44)
ANION GAP SERPL CALC-SCNC: 11 MMOL/L (ref 8–16)
ANION GAP SERPL CALC-SCNC: 14 MMOL/L (ref 8–16)
AST SERPL-CCNC: 29 U/L (ref 10–40)
BASOPHILS # BLD AUTO: 0.06 K/UL (ref 0–0.2)
BASOPHILS NFR BLD: 0.4 % (ref 0–1.9)
BILIRUB SERPL-MCNC: 0.6 MG/DL (ref 0.1–1)
BILIRUB UR QL STRIP: NEGATIVE
BUN SERPL-MCNC: 12 MG/DL (ref 8–23)
BUN SERPL-MCNC: 13 MG/DL (ref 6–30)
CALCIUM SERPL-MCNC: 9.6 MG/DL (ref 8.7–10.5)
CHLORIDE SERPL-SCNC: 101 MMOL/L (ref 95–110)
CHLORIDE SERPL-SCNC: 98 MMOL/L (ref 95–110)
CLARITY UR: CLEAR
CO2 SERPL-SCNC: 26 MMOL/L (ref 23–29)
COLOR UR: COLORLESS
CREAT SERPL-MCNC: 0.6 MG/DL (ref 0.5–1.4)
CREAT SERPL-MCNC: 0.8 MG/DL (ref 0.5–1.4)
DIFFERENTIAL METHOD: ABNORMAL
EOSINOPHIL # BLD AUTO: 0.3 K/UL (ref 0–0.5)
EOSINOPHIL NFR BLD: 2.3 % (ref 0–8)
ERYTHROCYTE [DISTWIDTH] IN BLOOD BY AUTOMATED COUNT: 12.7 % (ref 11.5–14.5)
EST. GFR  (NO RACE VARIABLE): >60 ML/MIN/1.73 M^2
GLUCOSE SERPL-MCNC: 114 MG/DL (ref 70–110)
GLUCOSE SERPL-MCNC: 114 MG/DL (ref 70–110)
GLUCOSE UR QL STRIP: NEGATIVE
HCT VFR BLD AUTO: 36.4 % (ref 37–48.5)
HCT VFR BLD CALC: 39 %PCV (ref 36–54)
HGB BLD-MCNC: 12.1 G/DL (ref 12–16)
HGB UR QL STRIP: ABNORMAL
IMM GRANULOCYTES # BLD AUTO: 0.05 K/UL (ref 0–0.04)
IMM GRANULOCYTES NFR BLD AUTO: 0.4 % (ref 0–0.5)
KETONES UR QL STRIP: ABNORMAL
LEUKOCYTE ESTERASE UR QL STRIP: NEGATIVE
LIPASE SERPL-CCNC: 51 U/L (ref 4–60)
LYMPHOCYTES # BLD AUTO: 1.2 K/UL (ref 1–4.8)
LYMPHOCYTES NFR BLD: 8.5 % (ref 18–48)
MCH RBC QN AUTO: 29 PG (ref 27–31)
MCHC RBC AUTO-ENTMCNC: 33.2 G/DL (ref 32–36)
MCV RBC AUTO: 87 FL (ref 82–98)
MICROSCOPIC COMMENT: NORMAL
MONOCYTES # BLD AUTO: 1 K/UL (ref 0.3–1)
MONOCYTES NFR BLD: 7.2 % (ref 4–15)
NEUTROPHILS # BLD AUTO: 11.1 K/UL (ref 1.8–7.7)
NEUTROPHILS NFR BLD: 81.2 % (ref 38–73)
NITRITE UR QL STRIP: NEGATIVE
NRBC BLD-RTO: 0 /100 WBC
PH UR STRIP: 7 [PH] (ref 5–8)
PLATELET # BLD AUTO: 302 K/UL (ref 150–450)
PMV BLD AUTO: 10.3 FL (ref 9.2–12.9)
POC IONIZED CALCIUM: 1.2 MMOL/L (ref 1.06–1.42)
POC TCO2 (MEASURED): 30 MMOL/L (ref 23–29)
POTASSIUM BLD-SCNC: 4.2 MMOL/L (ref 3.5–5.1)
POTASSIUM SERPL-SCNC: 4.2 MMOL/L (ref 3.5–5.1)
PROT SERPL-MCNC: 8.1 G/DL (ref 6–8.4)
PROT UR QL STRIP: NEGATIVE
RBC # BLD AUTO: 4.17 M/UL (ref 4–5.4)
RBC #/AREA URNS HPF: 4 /HPF (ref 0–4)
SAMPLE: ABNORMAL
SITE: ABNORMAL
SODIUM BLD-SCNC: 137 MMOL/L (ref 136–145)
SODIUM SERPL-SCNC: 138 MMOL/L (ref 136–145)
SP GR UR STRIP: >1.03 (ref 1–1.03)
URN SPEC COLLECT METH UR: ABNORMAL
UROBILINOGEN UR STRIP-ACNC: NEGATIVE EU/DL
WBC # BLD AUTO: 13.7 K/UL (ref 3.9–12.7)

## 2023-09-22 PROCEDURE — 84295 ASSAY OF SERUM SODIUM: CPT | Mod: HCNC

## 2023-09-22 PROCEDURE — 63600175 PHARM REV CODE 636 W HCPCS: Mod: HCNC | Performed by: INTERNAL MEDICINE

## 2023-09-22 PROCEDURE — 82565 ASSAY OF CREATININE: CPT | Mod: HCNC

## 2023-09-22 PROCEDURE — 96374 THER/PROPH/DIAG INJ IV PUSH: CPT | Mod: HCNC

## 2023-09-22 PROCEDURE — 85014 HEMATOCRIT: CPT | Mod: 91,HCNC

## 2023-09-22 PROCEDURE — 81000 URINALYSIS NONAUTO W/SCOPE: CPT | Mod: HCNC | Performed by: NURSE PRACTITIONER

## 2023-09-22 PROCEDURE — 84132 ASSAY OF SERUM POTASSIUM: CPT | Mod: 91,HCNC

## 2023-09-22 PROCEDURE — 85025 COMPLETE CBC W/AUTO DIFF WBC: CPT | Mod: HCNC | Performed by: NURSE PRACTITIONER

## 2023-09-22 PROCEDURE — 80048 BASIC METABOLIC PNL TOTAL CA: CPT | Mod: HCNC,XB

## 2023-09-22 PROCEDURE — 82803 BLOOD GASES ANY COMBINATION: CPT | Mod: HCNC

## 2023-09-22 PROCEDURE — 96361 HYDRATE IV INFUSION ADD-ON: CPT | Mod: HCNC

## 2023-09-22 PROCEDURE — 83690 ASSAY OF LIPASE: CPT | Mod: HCNC | Performed by: NURSE PRACTITIONER

## 2023-09-22 PROCEDURE — 80053 COMPREHEN METABOLIC PANEL: CPT | Mod: HCNC | Performed by: NURSE PRACTITIONER

## 2023-09-22 PROCEDURE — 99900035 HC TECH TIME PER 15 MIN (STAT): Mod: HCNC

## 2023-09-22 PROCEDURE — 25500020 PHARM REV CODE 255: Mod: HCNC | Performed by: INTERNAL MEDICINE

## 2023-09-22 PROCEDURE — 25000003 PHARM REV CODE 250: Mod: HCNC | Performed by: INTERNAL MEDICINE

## 2023-09-22 PROCEDURE — 82330 ASSAY OF CALCIUM: CPT | Mod: HCNC

## 2023-09-22 PROCEDURE — 99285 EMERGENCY DEPT VISIT HI MDM: CPT | Mod: 25,HCNC

## 2023-09-22 RX ORDER — HYDROCODONE BITARTRATE AND ACETAMINOPHEN 5; 325 MG/1; MG/1
1 TABLET ORAL EVERY 12 HOURS PRN
Qty: 10 TABLET | Refills: 0 | Status: SHIPPED | OUTPATIENT
Start: 2023-09-22

## 2023-09-22 RX ORDER — KETOROLAC TROMETHAMINE 30 MG/ML
15 INJECTION, SOLUTION INTRAMUSCULAR; INTRAVENOUS
Status: COMPLETED | OUTPATIENT
Start: 2023-09-22 | End: 2023-09-22

## 2023-09-22 RX ORDER — ONDANSETRON 4 MG/1
4 TABLET, ORALLY DISINTEGRATING ORAL EVERY 8 HOURS PRN
Qty: 10 TABLET | Refills: 0 | Status: SHIPPED | OUTPATIENT
Start: 2023-09-22

## 2023-09-22 RX ORDER — KETOROLAC TROMETHAMINE 10 MG/1
10 TABLET, FILM COATED ORAL 3 TIMES DAILY PRN
Qty: 10 TABLET | Refills: 0 | Status: SHIPPED | OUTPATIENT
Start: 2023-09-22 | End: 2023-10-19 | Stop reason: SDUPTHER

## 2023-09-22 RX ADMIN — IOHEXOL 100 ML: 350 INJECTION, SOLUTION INTRAVENOUS at 09:09

## 2023-09-22 RX ADMIN — KETOROLAC TROMETHAMINE 15 MG: 30 INJECTION, SOLUTION INTRAMUSCULAR; INTRAVENOUS at 08:09

## 2023-09-22 RX ADMIN — SODIUM CHLORIDE 500 ML: 9 INJECTION, SOLUTION INTRAVENOUS at 08:09

## 2023-09-22 NOTE — TELEPHONE ENCOUNTER
I spoke with pt, screaming with paroxysms of pain.  The LUQ   She wonders if this could be from constipation  Ok to continue miralax   But given such pain, would recommend ED - may need imaging to r/o any complication from biopsy

## 2023-09-22 NOTE — TELEPHONE ENCOUNTER
I sent in hydrocodone 5 mg.  She can take 1/2 pill if she wants to start off low.   If pain gets worse, if she gets nausea, swelling in the abdomen - to ER

## 2023-09-22 NOTE — TELEPHONE ENCOUNTER
Patient said that she had a liver biopsy on yesterday and having pain from biopsy on yesterday. Did CT after the procedure and it did not show any bleeding. Patient is on the left side. Patient said that she don't like taking any extra strong medication due to pain being not unbearable. Patient don't know if this gas related. Just concern.

## 2023-09-23 NOTE — ED PROVIDER NOTES
Encounter Date: 9/22/2023    SCRIBE #1 NOTE: I, Tex Robin, am scribing for, and in the presence of,  Paolo Newman MD.       History     Chief Complaint   Patient presents with    Abdominal Pain     Patient reports had liver BX yesterday has been having sharp abdominal pain and spasms that are getting worse.      Deepali Alex is a 78 y.o. female who has a past medical history of  Diverticulosis of sigmoid colon (10/22/2019), Hyperlipidemia, Polyp of ascending colon (10/22/2019), and Urinary incontinence, mixed (5/24/2022) presents to the ED due to abdominal pain.  Patient reports experiencing LUQ abdominal pain after IR biopsy of her liver yesterday. Patient states that her last bowel movement was yesterday despite Miralax PO 5 hours prior to arrival. Denies nausea, vomiting, and diarrhea.    The history is provided by the patient.     Review of patient's allergies indicates:   Allergen Reactions    Erythromycin (bulk)      Other reaction(s): Eye irritation     Past Medical History:   Diagnosis Date    Age-related osteoporosis without current pathological fracture     Atherosclerosis of aortic arch     - noted on CXR 8/2017    Back pain     Diverticulosis of sigmoid colon 10/22/2019    Fibrocystic breast     Hyperlipidemia     Osteopenia     Polyp of ascending colon 10/22/2019    Urinary incontinence, mixed 5/24/2022     Past Surgical History:   Procedure Laterality Date    ECTOPIC PREGNANCY SURGERY      HERNIA REPAIR      left inguinal    HYSTERECTOMY  1980    without BSO     Family History   Problem Relation Age of Onset    Breast cancer Mother 69    Seizures Mother     Heart disease Father     Heart attack Father     Depression Father     Breast cancer Sister 72    Breast cancer Maternal Aunt 70    Obesity Brother     Mental illness Paternal Grandmother     Depression Paternal Aunt     Depression Paternal Uncle     Tongue cancer Cousin     Ovarian cancer Neg Hx      Social History     Tobacco Use     Smoking status: Never    Smokeless tobacco: Never   Substance Use Topics    Alcohol use: No    Drug use: No     Review of Systems   Constitutional:  Negative for fever.   HENT:  Negative for sore throat.    Respiratory:  Negative for shortness of breath.    Cardiovascular:  Negative for chest pain.   Gastrointestinal:  Positive for abdominal pain and constipation. Negative for diarrhea, nausea and vomiting.   Genitourinary:  Negative for dysuria.   Musculoskeletal:  Negative for back pain.   Skin:  Negative for rash.   Neurological:  Negative for weakness and headaches.   Psychiatric/Behavioral:  Negative for behavioral problems.    All other systems reviewed and are negative.      Physical Exam     Initial Vitals [09/22/23 1833]   BP Pulse Resp Temp SpO2   (!) 154/67 97 18 98.1 °F (36.7 °C) 96 %      MAP       --         Physical Exam    Nursing note and vitals reviewed.  Constitutional: She appears well-developed and well-nourished.   HENT:   Head: Normocephalic and atraumatic.   Eyes: Conjunctivae are normal.   Neck: Neck supple.   Normal range of motion.  Cardiovascular:  Normal rate, regular rhythm and normal heart sounds.     Exam reveals no gallop and no friction rub.       No murmur heard.  Pulmonary/Chest: Breath sounds normal. No respiratory distress. She has no wheezes. She has no rhonchi. She has no rales.   Abdominal: Abdomen is soft.   There is LUQ tenderness to palpation with guarding. There is no rebound.   Musculoskeletal:         General: No edema. Normal range of motion.      Cervical back: Normal range of motion and neck supple.     Neurological: She is alert and oriented to person, place, and time. GCS score is 15. GCS eye subscore is 4. GCS verbal subscore is 5. GCS motor subscore is 6.   Skin: Skin is warm and dry.   Psychiatric: She has a normal mood and affect.         ED Course   Procedures  Labs Reviewed   CBC W/ AUTO DIFFERENTIAL - Abnormal; Notable for the following components:        Result Value    WBC 13.70 (*)     Hematocrit 36.4 (*)     Gran # (ANC) 11.1 (*)     Immature Grans (Abs) 0.05 (*)     Gran % 81.2 (*)     Lymph % 8.5 (*)     All other components within normal limits   COMPREHENSIVE METABOLIC PANEL - Abnormal; Notable for the following components:    Glucose 114 (*)     Alkaline Phosphatase 159 (*)     All other components within normal limits   ISTAT PROCEDURE - Abnormal; Notable for the following components:    POC Glucose 114 (*)     POC TCO2 (MEASURED) 30 (*)     All other components within normal limits   LIPASE   URINALYSIS, REFLEX TO URINE CULTURE   ISTAT CHEM8          Imaging Results              CT Abdomen Pelvis With Contrast (Final result)  Result time 09/22/23 22:50:33      Final result by Donald Meneses MD (09/22/23 22:50:33)                   Impression:      1. 1.9 cm mass in the pancreatic tail suspicious for neoplasm/carcinoma.  Follow-up recommended.  2. Diffuse metastatic disease to the liver with the largest measuring 4.5 cm in the left lobe.  3. Small fat containing umbilical hernia.  4. Diverticulosis of the sigmoid colon.      Electronically signed by: Donald Meneses  Date:    09/22/2023  Time:    22:50               Narrative:    EXAMINATION:  CT ABDOMEN PELVIS WITH CONTRAST    CLINICAL HISTORY:  Abdominal pain, post-op;    TECHNIQUE:  Low dose axial images, sagittal and coronal reformations were obtained from the lung bases to the pubic symphysis following the IV administration of 100 mL of Omnipaque 350 .  Oral contrast was not administered.    COMPARISON:  09/02/2023    FINDINGS:  Abdomen:    - Lower thorax:    - Lung bases: No infiltrates and no nodules.    - Liver: Numerous hepatic masses consistent with metastatic disease.  The largest measures 4.5 cm in the left lobe on axial 37.    - Gallbladder: No calcified gallstones.    - Bile Ducts: No evidence of intra or extra hepatic biliary ductal dilation.    - Spleen: Negative.    - Kidneys: No mass or  hydronephrosis.    - Adrenals: Unremarkable.    - Pancreas: 1.9 cm ovoid hypodense mass in the pancreatic tail could represent carcinoma.    - Retroperitoneum:  No significant adenopathy.    - Vascular: No abdominal aortic aneurysm.    - Abdominal wall:  Small fat containing umbilical hernia.    Pelvis:    The urinary bladder is within normal limits.    Status post hysterectomy.    Mild retained feces in the colon.    No evidence of appendicitis.    Bowel/Mesentery:    No evidence of bowel obstruction or inflammation.    Diverticulosis of the sigmoid colon.  No evidence of acute diverticulitis.    Bones:  Grade 1 spondylolisthesis of L4 on L5.    No acute fractures.    No hematoma or abscess.    No significant change.                                       Medications   sodium chloride 0.9% bolus 500 mL 500 mL (0 mLs Intravenous Stopped 9/22/23 2145)   ketorolac injection 15 mg (15 mg Intravenous Given 9/22/23 2046)   iohexoL (OMNIPAQUE 350) injection 100 mL (100 mLs Intravenous Given 9/22/23 2140)     Medical Decision Making  Deepali Alex is a 78 y.o. female who has a past medical history of  Diverticulosis of sigmoid colon (10/22/2019), Hyperlipidemia, Polyp of ascending colon (10/22/2019), and Urinary incontinence, mixed (5/24/2022) presents to the ED due to abdominal pain.  Patient reports experiencing LUQ abdominal pain after IR biopsy of her liver yesterday. Patient states that her last bowel movement was yesterday despite Miralax PO 5 hours prior to arrival.  Course of ED stay:   CBC and CMP are reassuring except for elevated white blood cell count (13,000).  CT of the abdomen and pelvis with contrast shows no acute changes and is consistent with previous readings diffuse metastatic disease/pancreatic lesion.  Results were discussed with the patient and she received Toradol for pain, which she states relieved her pain completely.  Instructions for abdominal pain were given as well as prescriptions  for Zofran/Toradol.  Patient was advised to follow-up with her primary care physician within the next 2-3 days for re-evaluation/return to the emergency department if condition worsens.    Amount and/or Complexity of Data Reviewed  External Data Reviewed: notes.     Details: External documents reviewed.  Radiology: ordered.    Risk  Prescription drug management.                               This document was produced by a scribe under my direction and in my presence. I agree with the content of the note and have made any necessary edits.     Dr. Newman    09/22/2023 11:35 PM      Clinical Impression:   Final diagnoses:  [R10.10] Pain of upper abdomen (Primary)        ED Disposition Condition    Discharge Stable          ED Prescriptions       Medication Sig Dispense Start Date End Date Auth. Provider    ketorolac (TORADOL) 10 mg tablet Take 1 tablet (10 mg total) by mouth 3 (three) times daily as needed for Pain. 10 tablet 9/22/2023 -- Paolo Newman MD    ondansetron (ZOFRAN-ODT) 4 MG TbDL Take 1 tablet (4 mg total) by mouth every 8 (eight) hours as needed (Nausea). 10 tablet 9/22/2023 -- Paolo Newman MD          Follow-up Information       Follow up With Specialties Details Why Contact Info    Raul Rivera MD Internal Medicine Schedule an appointment as soon as possible for a visit in 2 days For reevaluation [address] [facility]  [place] [address]  [phone]               Paolo Newman MD  09/22/23 9942

## 2023-09-23 NOTE — ED TRIAGE NOTES
"Pt AAOX3, non-diaphoretic, abd pain S/P liver bx on 9/21/23, "bloated," abd soft, non-distended, denies N/V/D or any other complaints.  "

## 2023-09-25 ENCOUNTER — TELEPHONE (OUTPATIENT)
Dept: FAMILY MEDICINE | Facility: CLINIC | Age: 78
End: 2023-09-25
Payer: MEDICARE

## 2023-09-25 NOTE — TELEPHONE ENCOUNTER
----- Message from Saray Arredondo sent at 9/25/2023  3:42 PM CDT -----  Regarding: self [phone]  .Type: Patient Call Back    Who called: self    What is the request in detail: patient called seeking advice on a follow up appointment from her biopsy on Thursday. Patient stated she had to go to the ER Thursday night and would like to follow up with her pcp.    Can the clinic reply by [facility]? NO    Would the patient rather a call back or a response via [facility]? Call back     Best call back number [phone]

## 2023-09-26 DIAGNOSIS — K86.89 PANCREATIC MASS: Primary | ICD-10-CM

## 2023-09-26 DIAGNOSIS — R16.0 LIVER MASS: ICD-10-CM

## 2023-09-28 ENCOUNTER — TELEPHONE (OUTPATIENT)
Dept: FAMILY MEDICINE | Facility: CLINIC | Age: 78
End: 2023-09-28
Payer: MEDICARE

## 2023-09-28 DIAGNOSIS — C25.9 MALIGNANT NEOPLASM OF PANCREAS, UNSPECIFIED LOCATION OF MALIGNANCY: Primary | ICD-10-CM

## 2023-09-28 DIAGNOSIS — K86.89 PANCREATIC MASS: Primary | ICD-10-CM

## 2023-09-28 NOTE — TELEPHONE ENCOUNTER
I spoke with the patient at length about her biopsy findings, will submit urgent referral to Hematology-Oncology.  She does not have to keep her follow-up appointment with me scheduled for early October.  She has Toradol in case of pain.  She also has some old hydrocodone.  It sounds like she was not able to get the hydrocodone that I sent in at the pharmacy as they are out of stock.    Please cancel appointment with me 10/2.

## 2023-09-28 NOTE — TELEPHONE ENCOUNTER
Pt states she has received the biopsy report and her cancer has spread to the liver, she is in the process of scheduling with Dr Cooper, asking if she  should keep appointment with pcp for 10/2

## 2023-09-28 NOTE — TELEPHONE ENCOUNTER
----- Message from Alvaro Wall sent at 9/28/2023  8:20 AM CDT -----  Regarding: Self  [phone]  Type: Patient Call Back    Who called: Self     What is the request in detail: called to let the doctor know stating she saw her biopsy report, and would like to talk to the office in regards to this. Would like a call back.     Can the clinic reply by [facility]? No     Would the patient rather a call back or a response via [facility]? Call back     Best call back number: [phone]     Additional Information:    Thank you.

## 2023-09-29 ENCOUNTER — HOSPITAL ENCOUNTER (OUTPATIENT)
Dept: RADIOLOGY | Facility: HOSPITAL | Age: 78
Discharge: HOME | End: 2023-09-29
Attending: STUDENT IN AN ORGANIZED HEALTH CARE EDUCATION/TRAINING PROGRAM
Payer: MEDICARE

## 2023-09-29 DIAGNOSIS — K86.89 PANCREATIC MASS: ICD-10-CM

## 2023-09-29 PROCEDURE — 71260 CT THORAX DX C+: CPT | Mod: TC,HCNC

## 2023-09-29 PROCEDURE — 25500020 PHARM REV CODE 255: Mod: HCNC | Performed by: STUDENT IN AN ORGANIZED HEALTH CARE EDUCATION/TRAINING PROGRAM

## 2023-09-29 PROCEDURE — 71260 CT CHEST WITH CONTRAST: ICD-10-PCS | Mod: 26,HCNC,, | Performed by: RADIOLOGY

## 2023-09-29 PROCEDURE — 71260 CT THORAX DX C+: CPT | Mod: 26,HCNC,, | Performed by: RADIOLOGY

## 2023-09-29 RX ADMIN — IOHEXOL 75 ML: 350 INJECTION, SOLUTION INTRAVENOUS at 02:09

## 2023-10-02 ENCOUNTER — TUMOR BOARD CONFERENCE (OUTPATIENT)
Dept: SURGERY | Facility: CLINIC | Age: 78
End: 2023-10-02
Payer: MEDICARE

## 2023-10-02 NOTE — PROGRESS NOTES
[facility] UGI MULTIDISCIPLINARY TUMOR BOARD  PATIENT REVIEW FORM   ____________________________________________________________    CLINIC #: [id number]  DATE: 10/2/2023    DIAGNOSIS: pancreas CA    PRESENTER: Angela    PATIENT SUMMARY:   This 77 y/o female presented in August with abd pain, decreased appetite with weight loss. Reviewed CT and MRI imaging ~ 2 cm mass in tail of pancreas with diffuse metastatic liver disease, largest liver lesion in left lobe measuring 4.5 cm. IR performed liver bx and this pathology was reviewed - poorly differentiated adenocarcinoma, favoring upper GI origin.     BOARD RECOMMENDATIONS:   Stage IV pancreas CA with liver mets  Add MMR and TEMPUS  Proceed with palliative systemic chemotherapy    CONSULT NEEDED:     [] Surgery    [] Hem/Onc    [] Rad/Onc    [] Dietary     [] Social Service    [] Psychology       [] AES  [] Radiology     Clinical Stage: Tumor   Node(s)   Metastasis        GROUP STAGE:  [] O    [] 1A    [] IB    [] IIA    [] IIB     [] IIIA     [] IIIB     [] IIIC    [x]IV  [] Local recurrence     [] Regional recurrence     [] Distant recurrence   Metastatic site(s): liver         [x] [facility]idia'[facility] Treatment Guidelines reviewed and care planned is consistent with guidelines.         (i.e., NCCN, NCI, PD, ACO, AUA, etc.)    PRESENTATION AT CANCER CONFERENCE:         [x] Prospective    [] Retrospective     [] Follow-Up    Eligible for clinical trial : yes

## 2023-10-05 LAB
FINAL PATHOLOGIC DIAGNOSIS: NORMAL
GROSS: NORMAL
Lab: NORMAL
MICROSCOPIC EXAM: NORMAL
SUPPLEMENTAL DIAGNOSIS: NORMAL

## 2023-10-08 LAB
DNA RANGE(S) EXAMINED NAR: NORMAL
GENE DIS ANL INTERP-IMP: NORMAL
GENE DIS ASSESSED: NORMAL
REASON FOR STUDY: NORMAL
TEMPUS LCA: NORMAL
TEMPUS PORTAL: NORMAL
TEMPUS TRIAL1: NORMAL
TEMPUS TRIAL2: NORMAL
TEMPUS TRIAL3: NORMAL
TEMPUS TRIALCOUNT: 3

## 2023-10-10 ENCOUNTER — TELEPHONE (OUTPATIENT)
Dept: HEMATOLOGY/ONCOLOGY | Facility: CLINIC | Age: 78
End: 2023-10-10
Payer: MEDICARE

## 2023-10-10 LAB
DNA RANGE(S) EXAMINED NAR: NORMAL
GENE DIS ANL INTERP-IMP: POSITIVE
GENE DIS ASSESSED: NORMAL
GENE MUT TESTED BLD/T: 1.6 M/MB
MSI CA SPEC-IMP: NORMAL
REASON FOR STUDY: NORMAL
TEMPUS FUSIONADDENDUM: NORMAL
TEMPUS LCA: NORMAL
TEMPUS PORTAL: NORMAL
TEMPUS TRIAL1: NORMAL
TEMPUS TRIAL2: NORMAL
TEMPUS TRIAL3: NORMAL
TEMPUS TRIALCOUNT: 3

## 2023-10-11 ENCOUNTER — OFFICE VISIT (OUTPATIENT)
Dept: HEMATOLOGY/ONCOLOGY | Facility: CLINIC | Age: 78
End: 2023-10-11
Payer: MEDICARE

## 2023-10-11 DIAGNOSIS — E78.5 DYSLIPIDEMIA: ICD-10-CM

## 2023-10-11 DIAGNOSIS — C25.9 PANCREATIC ADENOCARCINOMA: Primary | ICD-10-CM

## 2023-10-11 DIAGNOSIS — M81.0 AGE-RELATED OSTEOPOROSIS WITHOUT CURRENT PATHOLOGICAL FRACTURE: ICD-10-CM

## 2023-10-11 DIAGNOSIS — C78.7 METASTASIS TO LIVER: ICD-10-CM

## 2023-10-11 PROCEDURE — 1159F MED LIST DOCD IN RCRD: CPT | Mod: HCNC,CPTII,S$GLB, | Performed by: INTERNAL MEDICINE

## 2023-10-11 PROCEDURE — 3288F PR FALLS RISK ASSESSMENT DOCUMENTED: ICD-10-PCS | Mod: HCNC,CPTII,S$GLB, | Performed by: INTERNAL MEDICINE

## 2023-10-11 PROCEDURE — 99999 PR PBB SHADOW E&M-EST. PATIENT-LVL III: ICD-10-PCS | Mod: PBBFAC,HCNC,, | Performed by: INTERNAL MEDICINE

## 2023-10-11 PROCEDURE — 1101F PT FALLS ASSESS-DOCD LE1/YR: CPT | Mod: HCNC,CPTII,S$GLB, | Performed by: INTERNAL MEDICINE

## 2023-10-11 PROCEDURE — 1159F PR MEDICATION LIST DOCUMENTED IN MEDICAL RECORD: ICD-10-PCS | Mod: HCNC,CPTII,S$GLB, | Performed by: INTERNAL MEDICINE

## 2023-10-11 PROCEDURE — 1101F PR PT FALLS ASSESS DOC 0-1 FALLS W/OUT INJ PAST YR: ICD-10-PCS | Mod: HCNC,CPTII,S$GLB, | Performed by: INTERNAL MEDICINE

## 2023-10-11 PROCEDURE — 3288F FALL RISK ASSESSMENT DOCD: CPT | Mod: HCNC,CPTII,S$GLB, | Performed by: INTERNAL MEDICINE

## 2023-10-11 PROCEDURE — 99205 OFFICE O/P NEW HI 60 MIN: CPT | Mod: HCNC,S$GLB,, | Performed by: INTERNAL MEDICINE

## 2023-10-11 PROCEDURE — 99999 PR PBB SHADOW E&M-EST. PATIENT-LVL III: CPT | Mod: PBBFAC,HCNC,, | Performed by: INTERNAL MEDICINE

## 2023-10-11 PROCEDURE — 99205 PR OFFICE/OUTPT VISIT, NEW, LEVL V, 60-74 MIN: ICD-10-PCS | Mod: HCNC,S$GLB,, | Performed by: INTERNAL MEDICINE

## 2023-10-11 NOTE — Clinical Note
Hey team, Can we get her over to IR for port asap and then get her chemo auth'ed and scheduled asap.  This is a patient with aggressive stage IV pancreatic cancer. Thanks! Jc

## 2023-10-11 NOTE — PROGRESS NOTES
MEDICAL ONCOLOGY - NEW PATIENT VISIT    Reason for visit: Pancreatic adenocarcinoma    Best Contact Phone Number(s): [phone] (home)      Cancer/Stage/TNM:    Cancer Staging   Pancreatic adenocarcinoma  Staging form: Exocrine Pancreas, AJCC 8th Edition  - Clinical stage from 9/21/2023: Stage IV (cT1, cNX, pM1) - Signed by David Briceño MD on 10/11/2023       Oncology History   Pancreatic adenocarcinoma   9/5/2023 Initial Diagnosis    Pancreatic adenocarcinoma     9/21/2023 Cancer Staged    Staging form: Exocrine Pancreas, AJCC 8th Edition  - Clinical stage from 9/21/2023: Stage IV (cT1, cNX, pM1)     10/18/2023 -  Chemotherapy    Treatment Summary   Plan Name: OP PANC NAB-PACLITAXEL + GEMCITABINE Q4W  Treatment Goal: Palliative  Status: Active  Start Date: 10/18/2023 (Planned)  End Date: 9/4/2024 (Planned)  Provider: David Briceño MD  Chemotherapy: gemcitabine (GEMZAR) 1,000 mg/m2 in sodium chloride 0.9% SolP 250 mL chemo infusion, 1,000 mg/m2, Intravenous, Clinic/HOD 1 time, 0 of 12 cycles          HPI:   78 y.o. female with osteoporosis who presents for further management discussion of her recently diagnosed metastatic pancreatic cancer.  She has been experiencing RUQ pain with deep inspiration since August of 2023 along with poor appetite with weight loss of about 12 pounds and constipation.  She had workup including abdominal ultrasound, MRI and CT which ultimately revealed a pancreatic tail mass with multiple liver lesions consistent with metastases.  On 9/21/23 she underwent IR biopsy of a liver lesion which was consistent with poorly differentiated adenocarcinoma, consistent with pancreatic origin.  She remains active; never smoker.  Mother, maternal aunt and sister all had breast cancer.  No family history of pancreatic cancer.  Genetic testing was drawn at [facility].  [facility] already checked.    Patient presents with her  and two daughters in law.  ECOG PS is 0.  History has been  obtained by chart review and discussion with the patient.    ROS:   Review of Systems   Constitutional:  Positive for weight loss. Negative for chills, fever and malaise/fatigue.   HENT:  Negative for sore throat.    Eyes:  Negative for blurred vision and pain.   Respiratory:  Negative for cough and shortness of breath.    Cardiovascular:  Negative for chest pain and leg swelling.   Gastrointestinal:  Positive for abdominal pain and constipation. Negative for diarrhea, nausea and vomiting.   Genitourinary:  Positive for frequency. Negative for dysuria.   Musculoskeletal:  Negative for back pain, falls and myalgias.   Skin:  Positive for rash (lower legs).   Neurological:  Negative for dizziness, weakness and headaches.   Endo/Heme/Allergies:  Does not bruise/bleed easily.   Psychiatric/Behavioral:  Negative for depression. The patient is not nervous/anxious.        Past Medical History:   Past Medical History:   Diagnosis Date    Age-related osteoporosis without current pathological fracture     Atherosclerosis of aortic arch     - noted on CXR 8/2017    Back pain     Diverticulosis of sigmoid colon 10/22/2019    Fibrocystic breast     Hyperlipidemia     Osteopenia     Polyp of ascending colon 10/22/2019    Urinary incontinence, mixed 5/24/2022        Past Surgical History:   Past Surgical History:   Procedure Laterality Date    ECTOPIC PREGNANCY SURGERY      HERNIA REPAIR      left inguinal    HYSTERECTOMY  1980    without BSO        Family History:   Family History   Problem Relation Age of Onset    Breast cancer Mother 69    Seizures Mother     Heart disease Father     Heart attack Father     Depression Father     Breast cancer Sister 72    Breast cancer Maternal Aunt 70    Obesity Brother     Mental illness Paternal Grandmother     Depression Paternal Aunt     Depression Paternal Uncle     Tongue cancer Cousin     Ovarian cancer Neg Hx         Social History:   Social History     Tobacco Use    Smoking status:  "Never    Smokeless tobacco: Never   Substance Use Topics    Alcohol use: No        I have reviewed and updated the patient's past medical, surgical, family and social histories.    Allergies:   Review of patient's allergies indicates:   Allergen Reactions    Erythromycin (bulk)      Other reaction(s): Eye irritation        Medications:   Current Outpatient Medications   Medication Sig Dispense Refill    coconut oil, bulk, Oil by Misc.(Non-Drug; Combo Route) route.      HYDROcodone-acetaminophen (NORCO) 5-325 mg per tablet Take 1 tablet by mouth every 12 (twelve) hours as needed for Pain. (Patient not taking: Reported on 10/11/2023) 10 tablet 0    ketorolac (TORADOL) 10 mg tablet Take 1 tablet (10 mg total) by mouth 3 (three) times daily as needed for Pain. (Patient not taking: Reported on 10/11/2023) 10 tablet 0    ondansetron (ZOFRAN-ODT) 4 MG TbDL Take 1 tablet (4 mg total) by mouth every 8 (eight) hours as needed (Nausea). (Patient not taking: Reported on 10/11/2023) 10 tablet 0     No current facility-administered medications for this visit.        Physical Exam:   BP (P) 130/74 (BP Location: Right arm, Patient Position: Sitting, BP Method: Medium (Automatic))   Pulse (P) 94   Temp (P) 97.9 °F (36.6 °C) (Oral)   Ht (P) 5' 3" (1.6 m)   Wt (P) 63.7 kg (140 lb 6.9 oz)   SpO2 (P) 97%   BMI (P) 24.88 kg/m²                Physical Exam  Vitals reviewed.   Constitutional:       General: She is not in acute distress.     Appearance: Normal appearance. She is normal weight.   HENT:      Head: Normocephalic and atraumatic.      Right Ear: External ear normal.      Left Ear: External ear normal.      Nose: Nose normal.      Mouth/Throat:      Mouth: Mucous membranes are moist.      Pharynx: Oropharynx is clear. No posterior oropharyngeal erythema.   Eyes:      General: No scleral icterus.     Extraocular Movements: Extraocular movements intact.      Conjunctiva/sclera: Conjunctivae normal.      Pupils: Pupils are " equal, round, and reactive to light.   Cardiovascular:      Rate and Rhythm: Normal rate and regular rhythm.      Pulses: Normal pulses.      Heart sounds: Normal heart sounds.   Pulmonary:      Effort: Pulmonary effort is normal.      Breath sounds: Normal breath sounds. No wheezing or rales.   Abdominal:      General: Bowel sounds are normal. There is no distension.      Palpations: Abdomen is soft.      Tenderness: There is no abdominal tenderness.   Musculoskeletal:         General: No swelling. Normal range of motion.      Cervical back: Normal range of motion and neck supple.   Skin:     General: Skin is warm.      Coloration: Skin is not jaundiced.      Findings: Rash (faint reticular rash on lower legs bilaterally) present. No erythema.   Neurological:      General: No focal deficit present.      Mental Status: She is alert and oriented to person, place, and time. Mental status is at baseline.      Gait: Gait normal.   Psychiatric:         Mood and Affect: Mood normal.         Behavior: Behavior normal.         Thought Content: Thought content normal.         Judgment: Judgment normal.           Labs:   Recent Results (from the past 48 hour(s))   Tumor NGS Tissue    Collection Time: 10/10/23  9:20 AM    Specimen: Tissue   Result Value Ref Range    Reason for Study       To identify somatic and germline mutations relevant to patient's cancer.    Genetic Diseases Assessed Cancer     Description of Ranges of DNA Sequences Examined 648 gene panel     Overall Interpretation positive     MSI Stable     TMB 1.6 m/MB    [facility] Portal       [email or web address]    Fusion Addendum Issue Type       NEGATIVE  Negative - This addendum is being issued to report that no gene fusions or altered splicing variants from RNA sequencing analysis were found.       Low Coverage Regions KDM5D, TAF1     Trial Count 3      [facility]: Clinical Trial Match 1       Clinical Trial NCT ID: [id number]  Clinical Trial Title: TAPUR: Testing the Use of Food and Drug Administration (FDA) Approved Drugs That Target a Specific Abnormality in a Tumor Gene in People With Advanced Stage Cancer  Clinical Trial URL: https://clinicaltrials.gov/ct2/show/SUE91758750  Clinical Phase: Phase 2  Matched criteria: BRAF p.Q148_R692ecujdjM mutation, CDKN2A deletion, CDKN2B deletion      [facility]: Clinical Trial Match 2       Clinical Trial NCT ID: [id number]  Clinical Trial Title: Safety and Effectiveness of ABM-168 in Adults With Advanced Solid Tumors.  Clinical Trial URL: https://clinicaltrials.gov/ct2/show/QZB34822319  Clinical Phase: Phase 1  Matched criteria: BRAF p.B410_I278dcctjdW mutation      [facility]: Clinical Trial Match 3       Clinical Trial NCT ID: [id number]  Clinical Trial Title: Study of Combined SGT-53 Plus Gemcitabine/Nab-Paclitaxel for Metastatic Pancreatic Cancer  Clinical Trial URL: https://clinicaltrials.gov/ct2/show/PEC00032869  Clinical Phase: Phase 2  Matched criteria: TP53 p.Y163C mutation          Imaging:       I have personally reviewed her CT A/P from 9/22/23 which reveals a 1.9 cm pancreatic tail mass and multifocal liver metastases.     Path:   Final Pathologic Diagnosis     Left hepatic lobe, biopsy:   Positive for malignancy, poorly differentiated adenocarcinoma (see comment)   Tumor necrosis present     Comment:  A review of the patient's imaging shows the presence of a 1.9 cm pancreatic tail mass and diffuse liver lesions.  These morphologic and immunophenotypic findings are supportive of a pancreatobiliary and/or upper gastrointestinal primary.  VC      Comment: Interp By Nataliia Peralta D.O., Signed on 10/05/2023 at 08:36   Supplemental Diagnosis     The purpose of this supplemental report is to report results of MMR panel in the tumor cells as follows:     Immunohistochemistry (IHC) Testing for Mismatch Repair (MMR)  Proteins:      MLH1 - Intact nuclear expression    MSH2 - Intact nuclear expression    MSH6 - Intact nuclear expression    PMS2 - Intact nuclear expression            Somatic Genomic Results       Pancreatic mass       Tumor NGS Tissue  Resulted: 10/10/23 Status: Preliminary result       Detected - Pathogenic (4)      Gene Variant VAF   BRAF p.[id number] - c.1458_1466del Inframe deletion - GOF 12.00 %   CDKN2A CDKN2A - Copy number loss --   CDKN2B CDKN2B - Copy number loss --   TP53 p.Y163C - c.488A>G Missense variant - LOF 30.50 %              Detected - Uncertain significance (1)      Gene Variant VAF   DIS3 p.N671S - c.2012A>G Missense variant 23.10 %                                        Assessment:       1. Pancreatic adenocarcinoma    2. Metastasis to liver    3. Dyslipidemia    4. Age-related osteoporosis without current pathological fracture          Plan:             # Pancreatic cancer  I discussed with the patient and her family her biopsy-proven diagnosis of metastatic pancreatic adenocarcinoma with multifocal liver metastases.  Her tumor is pMMR and [facility] tissue showed a non V600E BRAF mutation, TP53 mutation and CDKN2A and CDKN2B loss.  QUINN.    I discussed her prognosis and potential treatment options.  Because of her age and discussion of tolerance concerns, I have recommended biweekly gemcitabine + nab-paclitaxel as palliative intent first-line therapy.  Dosing for gemcitabine + nab-paclitaxel will be 1000 mg/m2 and 125 mg/m2, respectively, and I will administer these on a biweekly basis.    She is interested in having a port placed.  Will place a referral to IR.  We do not currently have a first-line clinical trial available, but will continue to consider these options with each line of therapy.  Consideration to BRAF non-V600E targeting trials in the future if available.    Will place palliative care referral.  Genetic testing done outside - results pending.    PGX showed DPYD nml and  UGT1A1 intermediate metabolism.    # HLD  Not currently on therapy.  Monitor.    # Osteoporosis  Monitor calcium levels on chemo.  Will continue to encourage maintaining activity.    Follow up: 1-2 weeks for cycle 1.     The above information has been reviewed with the patient and all questions have been answered to their apparent satisfaction.  They understand that they can call the clinic with any questions.    At least 88 minutes were spent today on this encounter including face to face time with the patient, data gathering/interpretation and documentation. Greater than 50% of this time involved counseling or coordination of care. I have provided the patient with an opportunity to ask questions and have all questions answered to patient's satisfaction.       David Briceño MD  Hematology/Oncology  [facility]    Route Chart for Scheduling    Med Onc Chart Routing  Urgent    Follow up with physician 2 weeks. cycle 1 of gem/abraxane   Follow up with DONTE 1 week. chemo education   Infusion scheduling note New or changed treatment      Injection scheduling note    Labs CMP, CA 19-9 and CBC   Scheduling:  Preferred lab:  Lab interval:  prior to cycle 1 - can be done at [facility] day prior to appt   Imaging    Pharmacy appointment    Other referrals       Additional referrals needed  palliative care and IR for port             Treatment Plan Information   OP PANC NAB-PACLITAXEL + GEMCITABINE Q4W   David Briceño MD   Upcoming Treatment Dates - OP PANC NAB-PACLITAXEL + GEMCITABINE Q4W    10/18/2023       Chemotherapy       PACLitaxeL protein-bound (ABRAXANE) 125 mg/m2 infusion       gemcitabine (GEMZAR) 1,000 mg/m2 in sodium chloride 0.9% SolP 250 mL chemo infusion       Antiemetics       ondansetron injection 8 mg  10/25/2023       Chemotherapy       PACLitaxeL protein-bound (ABRAXANE) 125 mg/m2 infusion       gemcitabine (GEMZAR) 1,000 mg/m2 in sodium chloride 0.9% SolP 250 mL chemo  infusion       Antiemetics       ondansetron injection 8 mg  11/1/2023       Chemotherapy       PACLitaxeL protein-bound (ABRAXANE) 125 mg/m2 infusion       gemcitabine (GEMZAR) 1,000 mg/m2 in sodium chloride 0.9% SolP 250 mL chemo infusion       Antiemetics       ondansetron injection 8 mg  11/15/2023       Chemotherapy       PACLitaxeL protein-bound (ABRAXANE) 125 mg/m2 infusion       gemcitabine (GEMZAR) 1,000 mg/m2 in sodium chloride 0.9% SolP 250 mL chemo infusion       Antiemetics       ondansetron injection 8 mg

## 2023-10-12 NOTE — PLAN OF CARE
START ON PATHWAY REGIMEN - Pancreatic Adenocarcinoma    PANOS51        Nab-paclitaxel (protein bound) (Abraxane)       Gemcitabine     **Always confirm dose/schedule in your pharmacy ordering system**    Patient Characteristics:  Metastatic Disease, First Line, PS = 0,1, BRCA1/2 and PALB2  Mutation   Absent/Unknown  Therapeutic Status: Metastatic Disease  Line of Therapy: First Line  ECOG Performance Status: 0  BRCA1/2 Mutation Status: Awaiting Test Results  PALB2 Mutation Status: Awaiting Test Results  Intent of Therapy:  Non-Curative / Palliative Intent, Discussed with Patient

## 2023-10-13 ENCOUNTER — PATIENT MESSAGE (OUTPATIENT)
Dept: RESEARCH | Facility: HOSPITAL | Age: 78
End: 2023-10-13
Payer: MEDICARE

## 2023-10-13 ENCOUNTER — PATIENT MESSAGE (OUTPATIENT)
Dept: SURGERY | Facility: CLINIC | Age: 78
End: 2023-10-13
Payer: MEDICARE

## 2023-10-16 ENCOUNTER — PATIENT MESSAGE (OUTPATIENT)
Dept: PALLIATIVE MEDICINE | Facility: CLINIC | Age: 78
End: 2023-10-16
Payer: MEDICARE

## 2023-10-16 ENCOUNTER — PATIENT MESSAGE (OUTPATIENT)
Dept: HEMATOLOGY/ONCOLOGY | Facility: CLINIC | Age: 78
End: 2023-10-16
Payer: MEDICARE

## 2023-10-16 DIAGNOSIS — C25.9 PANCREATIC ADENOCARCINOMA: Primary | ICD-10-CM

## 2023-10-17 ENCOUNTER — RESEARCH ENCOUNTER (OUTPATIENT)
Dept: RESEARCH | Facility: HOSPITAL | Age: 78
End: 2023-10-17
Payer: MEDICARE

## 2023-10-17 ENCOUNTER — PATIENT MESSAGE (OUTPATIENT)
Dept: RESEARCH | Facility: HOSPITAL | Age: 78
End: 2023-10-17
Payer: MEDICARE

## 2023-10-17 NOTE — PROGRESS NOTES
The Patient and spouse was on the phone when called today regarding the patient's participation in (IRB #[id number] PI: Britt and [id number] PI: Tomi).   The Verbal Informed Consent was read and discussed by the consenter. The following was discussed:  Types of specimens to be collected  All medical information released to researchers will be stripped of identifiers and no patient information will be given to anyone outside of this research project.   Participating in a research study is not the same as getting regular medical care and will not improve the patient's health. The purpose of a research study is to gather information.  Being in this study does not interfere with your regular medical care.  The patient/LAR does not have to participate in this study. If they do not join, their care at [facility] will not be affected.  The person granting permission was provided adequate time to ask questions regarding the scope and purpose of the study.  Permission was obtained by telephone.   The above statements were read by the person obtaining permission to the person granting permission and witnessed by Gina Rosas, who also confirmed the patient's consent to the study. The witness information was documented on the verbal consent form as well.  This Verbal Informed Consent process was conducted prior to initiation of any study procedures.

## 2023-10-18 ENCOUNTER — RESEARCH ENCOUNTER (OUTPATIENT)
Dept: RESEARCH | Facility: HOSPITAL | Age: 78
End: 2023-10-18
Payer: MEDICARE

## 2023-10-18 ENCOUNTER — OFFICE VISIT (OUTPATIENT)
Dept: PALLIATIVE MEDICINE | Facility: CLINIC | Age: 78
End: 2023-10-18
Payer: MEDICARE

## 2023-10-18 VITALS
HEART RATE: 77 BPM | DIASTOLIC BLOOD PRESSURE: 58 MMHG | HEIGHT: 63 IN | WEIGHT: 143.06 LBS | BODY MASS INDEX: 25.35 KG/M2 | SYSTOLIC BLOOD PRESSURE: 119 MMHG

## 2023-10-18 DIAGNOSIS — Z71.89 ACP (ADVANCE CARE PLANNING): ICD-10-CM

## 2023-10-18 DIAGNOSIS — C25.9 PANCREATIC ADENOCARCINOMA: ICD-10-CM

## 2023-10-18 DIAGNOSIS — R11.0 NAUSEA: ICD-10-CM

## 2023-10-18 DIAGNOSIS — Z51.5 ENCOUNTER FOR PALLIATIVE CARE: Primary | ICD-10-CM

## 2023-10-18 DIAGNOSIS — G47.00 INSOMNIA, UNSPECIFIED TYPE: ICD-10-CM

## 2023-10-18 DIAGNOSIS — G89.3 CANCER ASSOCIATED PAIN: ICD-10-CM

## 2023-10-18 DIAGNOSIS — F43.20 ADJUSTMENT DISORDER, UNSPECIFIED TYPE: ICD-10-CM

## 2023-10-18 DIAGNOSIS — R53.83 FATIGUE, UNSPECIFIED TYPE: ICD-10-CM

## 2023-10-18 PROCEDURE — 3074F SYST BP LT 130 MM HG: CPT | Mod: HCNC,CPTII,S$GLB, | Performed by: NURSE PRACTITIONER

## 2023-10-18 PROCEDURE — 99205 PR OFFICE/OUTPT VISIT, NEW, LEVL V, 60-74 MIN: ICD-10-PCS | Mod: HCNC,S$GLB,, | Performed by: NURSE PRACTITIONER

## 2023-10-18 PROCEDURE — 1101F PT FALLS ASSESS-DOCD LE1/YR: CPT | Mod: HCNC,CPTII,S$GLB, | Performed by: NURSE PRACTITIONER

## 2023-10-18 PROCEDURE — 99417 PR PROLONGED SVC, OUTPT, W/WO DIRECT PT CONTACT,  EA ADDTL 15 MIN: ICD-10-PCS | Mod: HCNC,S$GLB,, | Performed by: NURSE PRACTITIONER

## 2023-10-18 PROCEDURE — 99417 PROLNG OP E/M EACH 15 MIN: CPT | Mod: HCNC,S$GLB,, | Performed by: NURSE PRACTITIONER

## 2023-10-18 PROCEDURE — 3078F PR MOST RECENT DIASTOLIC BLOOD PRESSURE < 80 MM HG: ICD-10-PCS | Mod: HCNC,CPTII,S$GLB, | Performed by: NURSE PRACTITIONER

## 2023-10-18 PROCEDURE — 1159F PR MEDICATION LIST DOCUMENTED IN MEDICAL RECORD: ICD-10-PCS | Mod: HCNC,CPTII,S$GLB, | Performed by: NURSE PRACTITIONER

## 2023-10-18 PROCEDURE — 1159F MED LIST DOCD IN RCRD: CPT | Mod: HCNC,CPTII,S$GLB, | Performed by: NURSE PRACTITIONER

## 2023-10-18 PROCEDURE — 1125F PR PAIN SEVERITY QUANTIFIED, PAIN PRESENT: ICD-10-PCS | Mod: HCNC,CPTII,S$GLB, | Performed by: NURSE PRACTITIONER

## 2023-10-18 PROCEDURE — 3288F PR FALLS RISK ASSESSMENT DOCUMENTED: ICD-10-PCS | Mod: HCNC,CPTII,S$GLB, | Performed by: NURSE PRACTITIONER

## 2023-10-18 PROCEDURE — 1125F AMNT PAIN NOTED PAIN PRSNT: CPT | Mod: HCNC,CPTII,S$GLB, | Performed by: NURSE PRACTITIONER

## 2023-10-18 PROCEDURE — 99999 PR PBB SHADOW E&M-EST. PATIENT-LVL III: ICD-10-PCS | Mod: PBBFAC,HCNC,, | Performed by: NURSE PRACTITIONER

## 2023-10-18 PROCEDURE — 1101F PR PT FALLS ASSESS DOC 0-1 FALLS W/OUT INJ PAST YR: ICD-10-PCS | Mod: HCNC,CPTII,S$GLB, | Performed by: NURSE PRACTITIONER

## 2023-10-18 PROCEDURE — 99999 PR PBB SHADOW E&M-EST. PATIENT-LVL III: CPT | Mod: PBBFAC,HCNC,, | Performed by: NURSE PRACTITIONER

## 2023-10-18 PROCEDURE — 3074F PR MOST RECENT SYSTOLIC BLOOD PRESSURE < 130 MM HG: ICD-10-PCS | Mod: HCNC,CPTII,S$GLB, | Performed by: NURSE PRACTITIONER

## 2023-10-18 PROCEDURE — 3078F DIAST BP <80 MM HG: CPT | Mod: HCNC,CPTII,S$GLB, | Performed by: NURSE PRACTITIONER

## 2023-10-18 PROCEDURE — 3288F FALL RISK ASSESSMENT DOCD: CPT | Mod: HCNC,CPTII,S$GLB, | Performed by: NURSE PRACTITIONER

## 2023-10-18 PROCEDURE — 99205 OFFICE O/P NEW HI 60 MIN: CPT | Mod: HCNC,S$GLB,, | Performed by: NURSE PRACTITIONER

## 2023-10-18 RX ORDER — GADOBUTROL 604.72 MG/ML
INJECTION INTRAVENOUS
COMMUNITY
Start: 2023-09-02

## 2023-10-18 NOTE — Clinical Note
Met with this carmelina patient and her  (who quietly cried through our entire appt). She's very much at peace with her prognosis, sounds like she has a nice supportive family around her. Mostly rapport building and a refill of Toradol. Looking forward to continuing to support her and her family.   Best, A

## 2023-10-18 NOTE — PROGRESS NOTES
Consult Note  Palliative Care      Consult Requested By: Dr. David Briceño  Reason for Consult: symptom mgmt/ACP      ASSESSMENT/PLAN:     Plan/Recommendations:  Diagnoses and all orders for this visit:    10/20/2023:  - initial visit     Pancreatic adenocarcinoma  - ID 9/5/2023  - following w Dr. Briceño   - as of initial visit, plan to start first-line chemo, palliative intent, pending port placement    Encounter for palliative care   - Patient is decisional  - Patient accompanied to initial visit by her  Deshawn  - ACP documents are not uploaded into EMR   - Philosophy of Palliative Medicine reviewed with patient and family at first visit  - New patient folder given to and reviewed with patient and family at first visit  - Goals of care: first-line chemo, palliative intent. Patient has excellent insight and is accepting of her prognosis. She has a robust support system including: , Deshawn; two sons (50, 52 yrs); 4 grandchildren (19-27 yrs, 26 yr old is an RN); older sister.     Adjustment disorder   - patient is accepting of her prognosis  - her biggest worry is her . Says he's always been the emotional/anxious one. She is emotional when discussing leaving her  to care for himself. 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   - has good support system  - extensive family hx of cancer, has seen many of her loved-ones' journeys, including both DIL's having double mastectomies  - older sister has hx of breast cancer, says she always expected to eventually care for her, it is unexpected that it will be other way around  - no additional support needs identified at this time  - emotional support provided    Insomnia/fatigue  - wakes up after ~ 6 hours and has difficulty falling back to sleep  - she uses audio-books on a timer to help fall asleep   - some  "fatigue in afternoon  - prefers to avoid medications  - tip sheet provided in new patient folder  - will continue to monitor     Cancer related pain   - "soreness"/discomfort in upper abdominal area  - did take percocet for an MRI, which worked for pain, but caused her nausea, prefers to aovid medications where possible   - does have a prescription for hydrocodone 5 mg, but did not fill due shortage, discussed eventually trying norco if needed  - currently on Toradol 10 mg TID prn at night to help with pain, provides good relief, refill provided at initial visit   - will continue to monitor     Nausea/anorexia   - "a little" nausea, usually in AM  - eating helps, though doesn't always feel like it   - cont zofran 4 mg q 8 hrs  - will continue to monitor    Constipation   - cont miralax  - bm soft     Understanding of illness: excellent     Goals of care: palliative intent    Follow up: 6 weeks     Patient's encounter and above plan of care discussed with patient's oncology team.     SUBJECTIVE:     History of Present Illness:  Patient is a 78 y.o. year old female presenting with pancreatic adenocarcinoma. Please see oncology notes for full oncologic history and treatment course.     10/20/2023:  ARBEN MICHAEL reviewed: no surprises     Patient presents to initial visit with her  Deshawn. Deshawn is tearful throughout our appointment. Patient is doing quite well. Physically, she is still able to do most tasks like cooking and laundry, which she enjoys. She has given up some more difficult tasks, like gardening, due to discomfort. She has a loving and supportive family. She is accepting and denies anxiety, depression. She is most worried about her  who she has cared for domestically through the course of their marriage. She says he is the 'emotional one', she worries about how he will fare in her absence, she is emotional while discussing this. She is awaiting port placement and plans to start chemo shortly.       Past " Medical History:   Diagnosis Date    Age-related osteoporosis without current pathological fracture     Atherosclerosis of aortic arch     - noted on CXR 8/2017    Back pain     Diverticulosis of sigmoid colon 10/22/2019    Fibrocystic breast     Hyperlipidemia     Osteopenia     Polyp of ascending colon 10/22/2019    Urinary incontinence, mixed 5/24/2022     Past Surgical History:   Procedure Laterality Date    ECTOPIC PREGNANCY SURGERY      HERNIA REPAIR      left inguinal    HYSTERECTOMY  1980    without BSO     Family History   Problem Relation Age of Onset    Breast cancer Mother 69    Seizures Mother     Heart disease Father     Heart attack Father     Depression Father     Breast cancer Sister 72    Breast cancer Maternal Aunt 70    Obesity Brother     Mental illness Paternal Grandmother     Depression Paternal Aunt     Depression Paternal Uncle     Tongue cancer Cousin     Ovarian cancer Neg Hx      Review of patient's allergies indicates:   Allergen Reactions    Erythromycin (bulk)      Other reaction(s): Eye irritation       Medications:    Current Outpatient Medications:     coconut oil, bulk, Oil, by Misc.(Non-Drug; Combo Route) route., Disp: , Rfl:     HYDROcodone-acetaminophen (NORCO) 5-325 mg per tablet, Take 1 tablet by mouth every 12 (twelve) hours as needed for Pain. (Patient not taking: Reported on 10/11/2023), Disp: 10 tablet, Rfl: 0    ketorolac (TORADOL) 10 mg tablet, Take 1 tablet (10 mg total) by mouth 3 (three) times daily as needed for Pain. (Patient not taking: Reported on 10/11/2023), Disp: 10 tablet, Rfl: 0    ondansetron (ZOFRAN-ODT) 4 MG TbDL, Take 1 tablet (4 mg total) by mouth every 8 (eight) hours as needed (Nausea). (Patient not taking: Reported on 10/11/2023), Disp: 10 tablet, Rfl: 0    OBJECTIVE:       ROS:  Review of Systems   Constitutional:  Positive for activity change, appetite change and fatigue.   HENT:  Negative for congestion, dental problem and drooling.    Eyes:   Negative for pain, discharge and itching.   Respiratory:  Positive for shortness of breath. Negative for wheezing and stridor.    Cardiovascular:  Negative for chest pain, palpitations and leg swelling.   Gastrointestinal:  Positive for abdominal pain, constipation and nausea. Negative for abdominal distention and diarrhea.   Musculoskeletal:  Negative for arthralgias, back pain and gait problem.   Neurological:  Negative for dizziness and light-headedness.   Psychiatric/Behavioral:  Positive for sleep disturbance. Negative for dysphoric mood. The patient is not nervous/anxious.        Review of Symptoms      Symptom Assessment (ESAS 0-10 Scale)  Pain:  4  Dyspnea:  1  Anxiety:  2  Nausea:  2  Depression:  0  Anorexia:  3  Fatigue:  3  Insomnia:  4  Restlessness:  0  Agitation:  0     CAM / Delirium:  Negative  Constipation:  Positive  Diarrhea:  Negative    Anxiety:  Is not nervous/anxious  Constipation:  Constipation    Bowel Management Plan (BMP):  Yes      Pain Assessment:  OME in 24 hours:  0  Location(s): abdomen    Abdomen       Location: generalized (upper)        Quality: None (soreness)        Quantity: 4/10 in intensity        Chronicity: Onset 0 second(s) ago, controlled        Aggravating Factors: None        Alleviating Factors: Opiates and NSAIDs        Associated Symptoms: None    Modified Ba Scale:  0    ECOG Performance Status ndGndrndanddndend:nd nd2nd Living Arrangements:  Lives with spouse    Psychosocial/Cultural:   See Palliative Psychosocial Note: Yes  **Primary  to Follow**  Palliative Care  Consult: Yes      Advance Care Planning   Advance Directives:   Living Will: No        Oral Declaration: No    LaPOST: No    Do Not Resuscitate Status: No    Medical Power of : No        Oral Declaration: No      Decision Making:  Patient answered questions  Goals of Care: The patient endorses that what is most important right now is to focus on symptom/pain control, quality of  life, even if it means sacrificing a little time, and improvement in condition but with limits to adverse effects    Accordingly, we have decided that the best plan to meet the patient's goals includes continuing with treatment         Physical Exam:  Vitals:    Vitals:    10/18/23 0842   BP: (!) 119/58   Pulse: 77     Physical Exam  Vitals reviewed.   Constitutional:       General: She is not in acute distress.     Appearance: Normal appearance. She is not ill-appearing, toxic-appearing or diaphoretic.   HENT:      Head: Normocephalic and atraumatic.      Right Ear: External ear normal.      Left Ear: External ear normal.      Nose: Nose normal.   Eyes:      General:         Right eye: No discharge.         Left eye: No discharge.      Extraocular Movements: Extraocular movements intact.      Conjunctiva/sclera: Conjunctivae normal.   Cardiovascular:      Rate and Rhythm: Normal rate.   Pulmonary:      Effort: Pulmonary effort is normal.      Breath sounds: No stridor. No wheezing.   Musculoskeletal:         General: No swelling or deformity. Normal range of motion.      Cervical back: Normal range of motion.   Skin:     General: Skin is warm and dry.      Coloration: Skin is not jaundiced.   Neurological:      Mental Status: She is alert and oriented to person, place, and time. Mental status is at baseline.      Motor: No weakness.      Gait: Gait normal.   Psychiatric:         Mood and Affect: Mood normal.         Behavior: Behavior normal.         Thought Content: Thought content normal.         Judgment: Judgment normal.         Labs:  CBC:   WBC   Date Value Ref Range Status   09/29/2023 10.38 3.90 - 12.70 K/uL Final     Hemoglobin   Date Value Ref Range Status   09/29/2023 11.0 (L) 12.0 - 16.0 g/dL Final     POC Hematocrit   Date Value Ref Range Status   09/22/2023 39 36 - 54 %PCV Final     Hematocrit   Date Value Ref Range Status   09/29/2023 34.2 (L) 37.0 - 48.5 % Final     MCV   Date Value Ref Range Status    09/29/2023 88 82 - 98 fL Final     Platelets   Date Value Ref Range Status   09/29/2023 326 150 - 450 K/uL Final       LFT:   Lab Results   Component Value Date    AST 32 09/29/2023    ALKPHOS 171 (H) 09/29/2023    BILITOT 0.5 09/29/2023       Albumin:   Albumin   Date Value Ref Range Status   09/29/2023 3.6 3.5 - 5.2 g/dL Final     Protein:   Total Protein   Date Value Ref Range Status   09/29/2023 7.5 6.0 - 8.4 g/dL Final     Radiology:I have reviewed all pertinent imaging results/findings within the past 24 hours.    09/29/2023 CT chest: Impression:     Few subcentimeter pulmonary nodules, For multiple solid nodules all <6 mm, Fleischner Society 2017 guidelines recommend    follow up with non-contrast chest CT at 12 months after discovery in a high risk patient.     Multiple liver masses and pancreatic body mass, better evaluated on CT 09/22/2023.    09/22/2023 CT AP: Impression:     1. 1.9 cm mass in the pancreatic tail suspicious for neoplasm/carcinoma.  Follow-up recommended.  2. Diffuse metastatic disease to the liver with the largest measuring 4.5 cm in the left lobe.  3. Small fat containing umbilical hernia.  4. Diverticulosis of the sigmoid colon.    I spent a total of 103 minutes on the day of the visit.This includes face to face time in discussion of goals of care, symptom assessment, coordination of care and emotional support.  This also includes non-face to face time preparing to see the patient (eg, review of tests/imaging), obtaining and/or reviewing separately obtained history, documenting clinical information in the electronic or other health record, independently interpreting results and communicating results to the patient/family/caregiver, or care coordinator.     A total of 16 min was spent on advance care planning, goals of care discussion, emotional support, formulating and communicating prognosis and goals of care, exploring burden/benefit of various approaches of treatment. This  discussion occurred on a fully voluntary basis with the verbal consent of the patient and/or family    Signature: Codie Gomez DNP

## 2023-10-18 NOTE — PROGRESS NOTES
Subject came in today for lab draw for the EDRN protocol(IRB #2015.101 PI: Britt and [id number] PI: Tomi).      Patient was consented for the study on October 17,2023. Labs were drawn October 18,2023 at 10:02 per protocol

## 2023-10-19 RX ORDER — KETOROLAC TROMETHAMINE 10 MG/1
10 TABLET, FILM COATED ORAL 3 TIMES DAILY PRN
Qty: 10 TABLET | Refills: 0 | Status: SHIPPED | OUTPATIENT
Start: 2023-10-19 | End: 2023-10-30 | Stop reason: SDUPTHER

## 2023-10-24 ENCOUNTER — TELEPHONE (OUTPATIENT)
Dept: INTERVENTIONAL RADIOLOGY/VASCULAR | Facility: HOSPITAL | Age: 78
End: 2023-10-24
Payer: MEDICARE

## 2023-10-24 ENCOUNTER — LAB VISIT (OUTPATIENT)
Dept: LAB | Facility: HOSPITAL | Age: 78
End: 2023-10-24
Payer: MEDICARE

## 2023-10-24 DIAGNOSIS — C25.9 PANCREATIC ADENOCARCINOMA: ICD-10-CM

## 2023-10-24 LAB
BASOPHILS # BLD AUTO: 0.09 K/UL (ref 0–0.2)
BASOPHILS NFR BLD: 0.7 % (ref 0–1.9)
DIFFERENTIAL METHOD: ABNORMAL
EOSINOPHIL # BLD AUTO: 0.5 K/UL (ref 0–0.5)
EOSINOPHIL NFR BLD: 4.1 % (ref 0–8)
ERYTHROCYTE [DISTWIDTH] IN BLOOD BY AUTOMATED COUNT: 13.3 % (ref 11.5–14.5)
HCT VFR BLD AUTO: 33.2 % (ref 37–48.5)
HGB BLD-MCNC: 10.4 G/DL (ref 12–16)
IMM GRANULOCYTES # BLD AUTO: 0.06 K/UL (ref 0–0.04)
IMM GRANULOCYTES NFR BLD AUTO: 0.5 % (ref 0–0.5)
INR PPP: 1.1 (ref 0.8–1.2)
LYMPHOCYTES # BLD AUTO: 1.2 K/UL (ref 1–4.8)
LYMPHOCYTES NFR BLD: 9.6 % (ref 18–48)
MCH RBC QN AUTO: 28 PG (ref 27–31)
MCHC RBC AUTO-ENTMCNC: 31.3 G/DL (ref 32–36)
MCV RBC AUTO: 89 FL (ref 82–98)
MONOCYTES # BLD AUTO: 1 K/UL (ref 0.3–1)
MONOCYTES NFR BLD: 8 % (ref 4–15)
NEUTROPHILS # BLD AUTO: 9.9 K/UL (ref 1.8–7.7)
NEUTROPHILS NFR BLD: 77.1 % (ref 38–73)
NRBC BLD-RTO: 0 /100 WBC
PLATELET # BLD AUTO: 387 K/UL (ref 150–450)
PMV BLD AUTO: 11.1 FL (ref 9.2–12.9)
PROTHROMBIN TIME: 11.9 SEC (ref 9–12.5)
RBC # BLD AUTO: 3.72 M/UL (ref 4–5.4)
WBC # BLD AUTO: 12.85 K/UL (ref 3.9–12.7)

## 2023-10-24 PROCEDURE — 36415 COLL VENOUS BLD VENIPUNCTURE: CPT | Mod: HCNC,PO

## 2023-10-24 PROCEDURE — 85025 COMPLETE CBC W/AUTO DIFF WBC: CPT | Mod: HCNC

## 2023-10-24 PROCEDURE — 85610 PROTHROMBIN TIME: CPT | Mod: HCNC

## 2023-10-24 NOTE — NURSING
Pre-procedure call complete.Arrival time,1200,  Pt instructed not to eat or drink anything after midnight the night before procedure.  Pt aware will need someone to provide transport home and monitor pt post procedure.  No driving for at least 24 hours after procedure.   Medications reviewed.  Arrival time and location given.  Expected length of stay reviewed.  Covid screening completed.  Pt verbalized understanding.

## 2023-10-25 ENCOUNTER — HOSPITAL ENCOUNTER (OUTPATIENT)
Dept: INTERVENTIONAL RADIOLOGY/VASCULAR | Facility: HOSPITAL | Age: 78
Discharge: HOME | End: 2023-10-25
Attending: INTERNAL MEDICINE
Payer: MEDICARE

## 2023-10-25 VITALS
TEMPERATURE: 98 F | BODY MASS INDEX: 24.45 KG/M2 | HEART RATE: 77 BPM | WEIGHT: 138 LBS | RESPIRATION RATE: 20 BRPM | OXYGEN SATURATION: 95 % | DIASTOLIC BLOOD PRESSURE: 65 MMHG | HEIGHT: 63 IN | SYSTOLIC BLOOD PRESSURE: 142 MMHG

## 2023-10-25 DIAGNOSIS — C25.9 PANCREATIC ADENOCARCINOMA: Primary | ICD-10-CM

## 2023-10-25 PROCEDURE — 36561 INSERT TUNNELED CV CATH: CPT | Mod: RT

## 2023-10-25 PROCEDURE — 99152 PR MOD CONSCIOUS SEDATION, SAME PHYS, 5+ YRS, FIRST 15 MIN: ICD-10-PCS | Mod: ,,, | Performed by: RADIOLOGY

## 2023-10-25 PROCEDURE — 94761 N-INVAS EAR/PLS OXIMETRY MLT: CPT

## 2023-10-25 PROCEDURE — 99152 MOD SED SAME PHYS/QHP 5/>YRS: CPT

## 2023-10-25 PROCEDURE — 76380 IR TUNNELED CATH PLACEMENT WITH PORT: ICD-10-PCS | Mod: 26,,, | Performed by: RADIOLOGY

## 2023-10-25 PROCEDURE — 36561 INSERT TUNNELED CV CATH: CPT | Mod: RT,,, | Performed by: RADIOLOGY

## 2023-10-25 PROCEDURE — 99152 MOD SED SAME PHYS/QHP 5/>YRS: CPT | Mod: ,,, | Performed by: RADIOLOGY

## 2023-10-25 PROCEDURE — 36561 PR INSERT TUNNELED CV CATH WITH PORT: ICD-10-PCS | Mod: RT,,, | Performed by: RADIOLOGY

## 2023-10-25 PROCEDURE — 63600175 PHARM REV CODE 636 W HCPCS: Performed by: RADIOLOGY

## 2023-10-25 PROCEDURE — C1788 PORT, INDWELLING, IMP: HCPCS

## 2023-10-25 PROCEDURE — 76380 CAT SCAN FOLLOW-UP STUDY: CPT | Mod: 26,,, | Performed by: RADIOLOGY

## 2023-10-25 PROCEDURE — C1894 INTRO/SHEATH, NON-LASER: HCPCS

## 2023-10-25 PROCEDURE — 99153 MOD SED SAME PHYS/QHP EA: CPT

## 2023-10-25 PROCEDURE — 77001 FLUOROGUIDE FOR VEIN DEVICE: CPT | Mod: TC

## 2023-10-25 PROCEDURE — 25000003 PHARM REV CODE 250: Performed by: RADIOLOGY

## 2023-10-25 PROCEDURE — 99900035 HC TECH TIME PER 15 MIN (STAT)

## 2023-10-25 RX ORDER — HEPARIN SODIUM 1000 [USP'U]/ML
INJECTION, SOLUTION INTRAVENOUS; SUBCUTANEOUS
Status: COMPLETED | OUTPATIENT
Start: 2023-10-25 | End: 2023-10-25

## 2023-10-25 RX ORDER — SODIUM CHLORIDE 9 MG/ML
INJECTION, SOLUTION INTRAVENOUS CONTINUOUS
Status: DISCONTINUED | OUTPATIENT
Start: 2023-10-25 | End: 2023-10-26 | Stop reason: HOSPADM

## 2023-10-25 RX ORDER — MIDAZOLAM HYDROCHLORIDE 1 MG/ML
INJECTION INTRAMUSCULAR; INTRAVENOUS
Status: COMPLETED | OUTPATIENT
Start: 2023-10-25 | End: 2023-10-25

## 2023-10-25 RX ORDER — FENTANYL CITRATE 50 UG/ML
INJECTION, SOLUTION INTRAMUSCULAR; INTRAVENOUS
Status: COMPLETED | OUTPATIENT
Start: 2023-10-25 | End: 2023-10-25

## 2023-10-25 RX ORDER — LIDOCAINE HYDROCHLORIDE 10 MG/ML
1 INJECTION, SOLUTION EPIDURAL; INFILTRATION; INTRACAUDAL; PERINEURAL ONCE
Status: DISCONTINUED | OUTPATIENT
Start: 2023-10-25 | End: 2023-10-26 | Stop reason: HOSPADM

## 2023-10-25 RX ORDER — CHOLECALCIFEROL (VITAMIN D3) 25 MCG
1000 TABLET ORAL DAILY
COMMUNITY

## 2023-10-25 RX ORDER — LIDOCAINE HYDROCHLORIDE 20 MG/ML
INJECTION, SOLUTION EPIDURAL; INFILTRATION; INTRACAUDAL; PERINEURAL
Status: COMPLETED | OUTPATIENT
Start: 2023-10-25 | End: 2023-10-25

## 2023-10-25 RX ORDER — ONDANSETRON 2 MG/ML
4 INJECTION INTRAMUSCULAR; INTRAVENOUS EVERY 6 HOURS PRN
Status: DISCONTINUED | OUTPATIENT
Start: 2023-10-25 | End: 2023-10-26 | Stop reason: HOSPADM

## 2023-10-25 RX ORDER — MULTIVITAMIN
1 TABLET ORAL DAILY
COMMUNITY

## 2023-10-25 RX ADMIN — HEPARIN SODIUM 300 UNITS: 1000 INJECTION, SOLUTION INTRAVENOUS; SUBCUTANEOUS at 01:10

## 2023-10-25 RX ADMIN — MIDAZOLAM HYDROCHLORIDE 0.5 MG: 1 INJECTION INTRAMUSCULAR; INTRAVENOUS at 01:10

## 2023-10-25 RX ADMIN — FENTANYL CITRATE 50 MCG: 0.05 INJECTION, SOLUTION INTRAMUSCULAR; INTRAVENOUS at 01:10

## 2023-10-25 RX ADMIN — LIDOCAINE HYDROCHLORIDE 10 ML: 20 INJECTION, SOLUTION EPIDURAL; INFILTRATION; INTRACAUDAL; PERINEURAL at 01:10

## 2023-10-25 NOTE — Clinical Note
Right: Chest.   Scrubbed with ChloroPrep With Tint.    Hair: N/A.  Skin prep dry before draping.  Prepped by: Ifeoma Hidalgo RT 10/25/2023 1:38 PM.

## 2023-10-25 NOTE — H&P
Radiology History & Physical      SUBJECTIVE:     Chief Complaint: pancreatic cancer    History of Present Illness:  Deepali Alex is a 78 y.o. female who presents for port placement for chemotherapy.   Past Medical History:   Diagnosis Date    Age-related osteoporosis without current pathological fracture     Atherosclerosis of aortic arch     - noted on CXR 8/2017    Back pain     Diverticulosis of sigmoid colon 10/22/2019    Fibrocystic breast     Hyperlipidemia     Osteopenia     Polyp of ascending colon 10/22/2019    Urinary incontinence, mixed 5/24/2022     Past Surgical History:   Procedure Laterality Date    ECTOPIC PREGNANCY SURGERY      HERNIA REPAIR      left inguinal    HYSTERECTOMY  1980    without BSO       Home Meds:   Prior to Admission medications    Medication Sig Start Date End Date Taking? Authorizing Provider   coconut oil, bulk, Oil by Misc.(Non-Drug; Combo Route) route.   Yes Provider, Historical   ketorolac (TORADOL) 10 mg tablet Take 1 tablet (10 mg total) by mouth 3 (three) times daily as needed for Pain. 10/19/23  Yes Codie Gomez DNP   multivitamin (THERAGRAN) per tablet Take 1 tablet by mouth once daily.   Yes Provider, Historical   vitamin D (VITAMIN D3) 1000 units Tab Take 1,000 Units by mouth once daily.   Yes Provider, Historical   GADAVIST 1 mmol/mL (604.72 mg/mL) Soln injection  9/2/23   Provider, Historical   HYDROcodone-acetaminophen (NORCO) 5-325 mg per tablet Take 1 tablet by mouth every 12 (twelve) hours as needed for Pain.  Patient not taking: Reported on 10/11/2023 9/22/23   Raul Rivera MD   ondansetron (ZOFRAN-ODT) 4 MG TbDL Take 1 tablet (4 mg total) by mouth every 8 (eight) hours as needed (Nausea).  Patient not taking: Reported on 10/18/2023 9/22/23   Paolo Newman MD     Anticoagulants/Antiplatelets: no anticoagulation    Allergies:   Review of patient's allergies indicates:   Allergen Reactions    Erythromycin (bulk)      Other reaction(s): Eye  irritation     Sedation History:  no adverse reactions    Review of Systems:   Hematological: negative  Respiratory: negative  Cardiovascular: negative  Gastrointestinal: negative  Genito-Urinary: negative  Musculoskeletal: negative  Neurological: negative         OBJECTIVE:     Vital Signs (Most Recent)       Physical Exam:  ASA: 2  Mallampati: 2    General: no acute distress  Mental Status: alert and oriented to person, place and time  HEENT: normocephalic, atraumatic  Chest: unlabored breathing  Heart: regular heart rate  Abdomen: nondistended  Extremity: moves all extremities    Laboratory  Lab Results   Component Value Date    INR 1.1 10/24/2023       Lab Results   Component Value Date    WBC 12.85 (H) 10/24/2023    HGB 10.4 (L) 10/24/2023    HCT 33.2 (L) 10/24/2023    MCV 89 10/24/2023     10/24/2023      Lab Results   Component Value Date    GLU 93 09/29/2023     (L) 09/29/2023    K 4.5 09/29/2023     09/29/2023    CO2 27 09/29/2023    BUN 13 09/29/2023    CREATININE 0.7 09/29/2023    CALCIUM 9.3 09/29/2023    ALT 31 09/29/2023    AST 32 09/29/2023    ALBUMIN 3.6 09/29/2023    BILITOT 0.5 09/29/2023       ASSESSMENT/PLAN:     Sedation Plan: moderate sedation  Patient will undergo post placement.    Miquel Mcfarland MD  Interventional Radiology  Department of Radiology

## 2023-10-25 NOTE — PLAN OF CARE
Pt AAOx3, VSS on room air.Pt tolerated procedure well. Pt denies any pain, Dizziness or N/V. IV removed with catheter tip intact. Assisted up for the first time, steady on feet. Pt Discharge instructions given and explained to patient and family ( Deshawn) with verbalization of understanding all instructions. Pt denies any further questions at this time. Pt DC'd home VIA wheelchair with family.

## 2023-10-25 NOTE — PROCEDURES
Radiology Post-Procedure Note    Pre Op Diagnosis: Pancreatic cancer  Post Op Diagnosis: Same    Procedure: Right internal jugular vein single lumen chest port placement    Procedure performed by: Miquel Mcfarland MD    Written Informed Consent Obtained: Yes  Specimen Removed: NO  Estimated Blood Loss: Minimal    Findings:   Widely patent right internal jugular vein. The port is ready for immediate use.     Patient tolerated procedure well.    Miquel Mcfarland MD  Interventional Radiology  Department of Radiology

## 2023-10-26 ENCOUNTER — TELEPHONE (OUTPATIENT)
Dept: HEMATOLOGY/ONCOLOGY | Facility: CLINIC | Age: 78
End: 2023-10-26
Payer: MEDICARE

## 2023-10-26 NOTE — TELEPHONE ENCOUNTER
Genetic consult discussed and scheduled for Wednesday, 11/8 at 11am with Julito Sosa DNP. She states she received genetic testing from her doctor at [facility], states the results she be being sent over to Dr. Briceño's office, but she will also bring her copy. 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 Confirmed location and appt details, she voiced thanks and understanding.

## 2023-10-27 ENCOUNTER — TELEPHONE (OUTPATIENT)
Dept: RESEARCH | Facility: HOSPITAL | Age: 78
End: 2023-10-27
Payer: MEDICARE

## 2023-10-27 ENCOUNTER — LAB VISIT (OUTPATIENT)
Dept: LAB | Facility: HOSPITAL | Age: 78
End: 2023-10-27
Attending: INTERNAL MEDICINE
Payer: MEDICARE

## 2023-10-27 DIAGNOSIS — C25.9 PANCREATIC ADENOCARCINOMA: ICD-10-CM

## 2023-10-27 DIAGNOSIS — C25.9 PANCREATIC ADENOCARCINOMA: Primary | ICD-10-CM

## 2023-10-27 DIAGNOSIS — C78.7 METASTASIS TO LIVER: ICD-10-CM

## 2023-10-27 LAB
ALBUMIN SERPL BCP-MCNC: 3.1 G/DL (ref 3.5–5.2)
ALP SERPL-CCNC: 362 U/L (ref 55–135)
ALT SERPL W/O P-5'-P-CCNC: 46 U/L (ref 10–44)
ANION GAP SERPL CALC-SCNC: 9 MMOL/L (ref 8–16)
AST SERPL-CCNC: 50 U/L (ref 10–40)
BILIRUB SERPL-MCNC: 0.6 MG/DL (ref 0.1–1)
BUN SERPL-MCNC: 14 MG/DL (ref 8–23)
CALCIUM SERPL-MCNC: 9 MG/DL (ref 8.7–10.5)
CANCER AG19-9 SERPL-ACNC: ABNORMAL U/ML (ref 0–40)
CHLORIDE SERPL-SCNC: 103 MMOL/L (ref 95–110)
CO2 SERPL-SCNC: 25 MMOL/L (ref 23–29)
CREAT SERPL-MCNC: 0.8 MG/DL (ref 0.5–1.4)
ERYTHROCYTE [DISTWIDTH] IN BLOOD BY AUTOMATED COUNT: 13.4 % (ref 11.5–14.5)
EST. GFR  (NO RACE VARIABLE): >60 ML/MIN/1.73 M^2
GLUCOSE SERPL-MCNC: 199 MG/DL (ref 70–110)
HCT VFR BLD AUTO: 32.1 % (ref 37–48.5)
HGB BLD-MCNC: 10 G/DL (ref 12–16)
IMM GRANULOCYTES # BLD AUTO: 0.12 K/UL (ref 0–0.04)
MCH RBC QN AUTO: 27.3 PG (ref 27–31)
MCHC RBC AUTO-ENTMCNC: 31.2 G/DL (ref 32–36)
MCV RBC AUTO: 88 FL (ref 82–98)
NEUTROPHILS # BLD AUTO: 10.7 K/UL (ref 1.8–7.7)
PLATELET # BLD AUTO: 400 K/UL (ref 150–450)
PMV BLD AUTO: 10.9 FL (ref 9.2–12.9)
POTASSIUM SERPL-SCNC: 4.3 MMOL/L (ref 3.5–5.1)
PROT SERPL-MCNC: 7.1 G/DL (ref 6–8.4)
RBC # BLD AUTO: 3.66 M/UL (ref 4–5.4)
SODIUM SERPL-SCNC: 137 MMOL/L (ref 136–145)
WBC # BLD AUTO: 13.17 K/UL (ref 3.9–12.7)

## 2023-10-27 PROCEDURE — 85027 COMPLETE CBC AUTOMATED: CPT | Mod: HCNC | Performed by: INTERNAL MEDICINE

## 2023-10-27 PROCEDURE — 36415 COLL VENOUS BLD VENIPUNCTURE: CPT | Mod: HCNC,PO | Performed by: INTERNAL MEDICINE

## 2023-10-27 PROCEDURE — 80053 COMPREHEN METABOLIC PANEL: CPT | Mod: HCNC | Performed by: INTERNAL MEDICINE

## 2023-10-27 PROCEDURE — 86301 IMMUNOASSAY TUMOR CA 19-9: CPT | Mod: HCNC | Performed by: INTERNAL MEDICINE

## 2023-10-27 NOTE — TELEPHONE ENCOUNTER
Contacted patient regarding [facility] 1801 clinical trial per Dr. Briceño. Dr. Briceño had previously discussed clinical trial with patient and patient was interested in receiving further information.     [facility] of 9-ING-41, a Glycogen synthase kinase-3 beta inhibitor, as a single agent and combined with chemotherapy, in patients with refractory hematologic malignancies or solid tumors [facility] Protocol 6.0 IRB approved Aug. 07, 2023 was briefly discussed with patient and spouse over the phone. The study is currently in Part 3 Arm B.    This clinical research RN will send copy of [facility]CF [facility] Phase [facility]2 Study of 9-ING-41 [facility] Protocol 6.0 IRB approved Aug. 07, 2023 through patient portal for patient's review. Patient verbalizes understanding and plans to review over the weekend.     Patient is agreeable to nurse visit on Monday October 30, 2023 with Judit Gamboa RN at 1000 for ICF review and signing.     Emphasized to patient that the research study is completely voluntary, the patient can withdraw consent at any time, and withdrawal of consent or not signing the ICF would not effect her care at [facility]. Patient verbalizes understanding and states will present to appointment with spouse and daughter-in-law.

## 2023-10-30 ENCOUNTER — CLINICAL SUPPORT (OUTPATIENT)
Dept: HEMATOLOGY/ONCOLOGY | Facility: CLINIC | Age: 78
End: 2023-10-30
Payer: MEDICARE

## 2023-10-30 ENCOUNTER — RESEARCH ENCOUNTER (OUTPATIENT)
Dept: RESEARCH | Facility: HOSPITAL | Age: 78
End: 2023-10-30
Payer: MEDICARE

## 2023-10-30 VITALS
HEART RATE: 94 BPM | WEIGHT: 139.44 LBS | DIASTOLIC BLOOD PRESSURE: 58 MMHG | RESPIRATION RATE: 18 BRPM | OXYGEN SATURATION: 97 % | HEIGHT: 63 IN | BODY MASS INDEX: 24.71 KG/M2 | SYSTOLIC BLOOD PRESSURE: 110 MMHG

## 2023-10-30 DIAGNOSIS — D49.9 IMMUNODEFICIENCY SECONDARY TO NEOPLASM: ICD-10-CM

## 2023-10-30 DIAGNOSIS — D84.81 IMMUNODEFICIENCY SECONDARY TO NEOPLASM: ICD-10-CM

## 2023-10-30 DIAGNOSIS — C78.7 METASTASIS TO LIVER: ICD-10-CM

## 2023-10-30 DIAGNOSIS — C25.9 PANCREATIC ADENOCARCINOMA: ICD-10-CM

## 2023-10-30 DIAGNOSIS — G89.3 CANCER ASSOCIATED PAIN: ICD-10-CM

## 2023-10-30 DIAGNOSIS — C25.9 PANCREATIC ADENOCARCINOMA: Primary | ICD-10-CM

## 2023-10-30 PROCEDURE — 99215 OFFICE O/P EST HI 40 MIN: CPT | Mod: HCNC,S$GLB,, | Performed by: REGISTERED NURSE

## 2023-10-30 PROCEDURE — 99999 PR PBB SHADOW E&M-EST. PATIENT-LVL III: CPT | Mod: PBBFAC,HCNC,, | Performed by: REGISTERED NURSE

## 2023-10-30 PROCEDURE — 99999 PR PBB SHADOW E&M-EST. PATIENT-LVL III: ICD-10-PCS | Mod: PBBFAC,HCNC,, | Performed by: REGISTERED NURSE

## 2023-10-30 PROCEDURE — 99215 PR OFFICE/OUTPT VISIT, EST, LEVL V, 40-54 MIN: ICD-10-PCS | Mod: HCNC,S$GLB,, | Performed by: REGISTERED NURSE

## 2023-10-30 RX ORDER — ONDANSETRON 8 MG/1
8 TABLET, ORALLY DISINTEGRATING ORAL EVERY 6 HOURS PRN
Qty: 1 TABLET | Refills: 0 | Status: SHIPPED | OUTPATIENT
Start: 2023-10-30 | End: 2023-10-30

## 2023-10-30 RX ORDER — LIDOCAINE AND PRILOCAINE 25; 25 MG/G; MG/G
CREAM TOPICAL
Qty: 30 G | Refills: 3 | Status: SHIPPED | OUTPATIENT
Start: 2023-10-30

## 2023-10-30 RX ORDER — ONDANSETRON 8 MG/1
TABLET, ORALLY DISINTEGRATING ORAL
Qty: 30 TABLET | Refills: 5 | Status: SHIPPED | OUTPATIENT
Start: 2023-10-30

## 2023-10-30 RX ORDER — KETOROLAC TROMETHAMINE 10 MG/1
10 TABLET, FILM COATED ORAL EVERY 6 HOURS PRN
Qty: 60 TABLET | Refills: 0 | Status: SHIPPED | OUTPATIENT
Start: 2023-10-30 | End: 2023-11-14

## 2023-10-30 NOTE — PROGRESS NOTES
"Protocol: [facility] 1801: Phase 1/2 Study of 9-ING-41, a Glycogen Synthase Kinase-3 Beta (GSK-3?) Inhibitor, as a Single Agent and Combined with Chemotherapy, in Patients with Refractory Hematologic Malignancies or Solid Tumors  Protocol #: [facility] 1801  Protocol Version: v6, dated 28.Dec.2022  Sponsor: [employer]  IRB #: [id number]  P.I.: David Briceño MD        Informed Consent Review/ Introduction to [facility] 1801 Clinical Trial: 30.Oct.2023     Meredith Kowalski NP, and this research RN, met with subject and multiple family members today in person to discuss the above mentioned clinical trial at Dr. Briceño's request. Dr. Briceño would like to offer this patient clinical trials as a treatment option and briefly introduced the [facility] 1801 clinical trial to the patient. The following aspects of the consent version "ICF template Protocol v6", dated 30May2023, IRB approved [id number] were discussed in detail by this RN with the patient and her family:  - the purpose of the study, why she is being asked to participate.  - Pt understands she will be in the Phase 2 Part 3B portion of the trial and will receive either study drug 9-ING-41 + gemcitabine/nab-paclitaxel OR gemcitabine/ nab-paclitaxel   - the length of the study, that she will be on the medication until progression, if she can no longer tolerate the treatment or complete remission.  - the tasks she will need to complete prior to enrolling in the study  - the tasks she will need to complete during the study timepoints and why.  - the risks associated with study medication and study tasks and the likelihood of these risks, including (but not limited to) general/unforeseeable risks.  - the benefits of participating, for both the patient and future cancer patients.  -  alternative options besides study participation. (Meredith Kowalski NP, present to discuss SOC option).  - that participation in the study is completely voluntary. That she can withdraw her " consent at any time without penalty.   - Confidentiality and HIPAA authorization. What information may be shared with the study, what groups may have access to that information, and why.      After all of the above was discussed in detail, patient and her family were given the opportunity to ask questions. Patient's questions answered to her satisfaction at this time by RN. Patient and her family verbalized understanding of information. Patient given the opportunity to take the consent form home to review prior to signing. Patient stated she would like some time to consider her options and is not ready to sign consent at this time. Patient given a copy of the informed consent for further review and this RNs contact information. Will plan to follow-up with the patient by Wednesday of this week. Patient encouraged to call with any questions or concerns.    No study procedures were completed during this encounter

## 2023-10-30 NOTE — PROGRESS NOTES
Deepali Alex was consented for chemotherapy/immunotherapy to treat pancreatic cancer. Deepali Alex was consented to receive gemcitabine + nab-paclitaxel. The consent was discussed and reviewed with patient, spouse, son, and 2 daughter in laws.     2. Patient was education on what to expect when receiving chemotherapy including: checking in, receiving an ID band, 1 guest allowed in the infusion suite during infusion, can alternate people as well, pole going with you once you are hooked up, warm blankets are available, you may bring lunch and snacks, minimal snacks are available, take medications as regularly unless told otherwise, warm blankets, will be available. Education about what to expect during their chemo cycle and how often their regimen is given.     3. An extensive discussion was had which included a thorough discussion of the risk and benefits of treatment and alternatives.  Risks, including but not limited to, possible hair loss, bone marrow damage (anemia, thrombocytopenia, immune suppression, neutropenia), damage to body organs (brain, heart, liver, kidney, lungs, nervous system, skin, and others), allergic reactions, sterility, nausea/vomiting, constipation/diarrhea, sores in the mouth, secondary cancers, local damage at possible injection sites, and rarely death were all discussed. Specific side effects pertaining to their chemotherapy/immunotherapy medications were discussed as well. Consented the patient to the treatment plan and the patient was educated on the planned duration of the treatment and schedule of the treatment administration. The patient agrees with the plan, and all questions and their support system's questions have been answered to their satisfaction. Contraindications and potential side effects discussed as listed in chemocare.     4. Patient was given binder which includes: contact information for the [facility], an immunotherapy side effect guide  (if applicable to patient), resources including, but not limited to: women's wellness, acupuncture, physical therapy, , urgent care within oncology and financial assistance.     5. Patient was educated on when to call (and given the numbers to call and knows to message via [facility] if possible) or notify the provider including, but not limited to:   Persistent Nausea and/or Vomiting  Dehydration  Persistent Diarrhea  Fever of 100.4 > 1 hour in duration or any isolated fever > 101   Rash (while on active chemotherapy or immunotherapy)   Severe pain or new onset pain not controlled by current medication regimen  Or any other symptom you feel is related to your current hematology or oncology treatment    6. Patient was provided with additional resources that [facility] offers including, but not limited to: financial counseling, , psychologist, palliative care, support groups, transportation, dietician, rehab services, women's wellness and urgent care visits within the oncology department.     7. Patient was offered and refused chemo care companion. Educated on daily vital signs and daily questionnaire. Will consider this in the future but would like to hold off for now.     8. Patient has an established PCP, Dr. Rivera.      9. Patient was offered a phone call 1 week post their Cycle 1 Day 1 chemotherapy and agreed.     Patient will Proceed with cycle 1 day 1 of gemcitabine + nab-paclitaxel as scheduled on 11/1/23. Patient will be called ~1 week after for a post chemo f/u with DONTE.     Patient is in agreement with the proposed treatment plan. All questions were answered to the patient's satisfaction. Pt knows to call clinic if anything is needed before the next clinic visit.    Patient discussed with collaborating physician, Dr. Briceño.    At least 40 minutes were spent today on this encounter including face to face time with the patient, data gathering/interpretation and documentation.        Meredith Kowalski, MSN, APRN, ACCNS-AG  Hematology and Medical Oncology  Clinical Nurse Specialist to Dr. Briceño, Dr. Hammer & Dr. Orlando Chart for Scheduling    Med Onc Chart Routing      Follow up with physician . 12/13 with labs the day prior at [facility] to see Dr. Briceño for cycle 4   Follow up with DONTE . 11/29 with labs the day prior at [facility] to see DONTE for cycle 3   Infusion scheduling note   infusion every 2 weeks   Injection scheduling note    Labs CBC, CMP and CA 19-9   Scheduling:  Preferred lab: [facility]  Lab interval: every 2 weeks     Imaging    Pharmacy appointment    Other referrals              Treatment Plan Information   OP PANC NAB-PACLITAXEL + GEMCITABINE Q4W   David Bricñeo MD   Upcoming Treatment Dates - OP PANC NAB-PACLITAXEL + GEMCITABINE Q4W    10/18/2023       Chemotherapy       PACLitaxeL protein-bound (ABRAXANE) infusion       gemcitabine (GEMZAR) in sodium chloride 0.9% SolP 250 mL chemo infusion       Antiemetics       ondansetron injection 8 mg  11/1/2023       Chemotherapy       PACLitaxeL protein-bound (ABRAXANE) infusion       gemcitabine (GEMZAR) in sodium chloride 0.9% SolP 250 mL chemo infusion       Antiemetics       ondansetron injection 8 mg  11/15/2023       Chemotherapy       PACLitaxeL protein-bound (ABRAXANE) infusion       gemcitabine (GEMZAR) in sodium chloride 0.9% SolP 250 mL chemo infusion       Antiemetics       ondansetron injection 8 mg  11/29/2023       Chemotherapy       PACLitaxeL protein-bound (ABRAXANE) infusion       gemcitabine (GEMZAR) in sodium chloride 0.9% SolP 250 mL chemo infusion       Antiemetics       ondansetron injection 8 mg

## 2023-10-31 ENCOUNTER — TELEPHONE (OUTPATIENT)
Dept: HEMATOLOGY/ONCOLOGY | Facility: CLINIC | Age: 78
End: 2023-10-31
Payer: MEDICARE

## 2023-10-31 RX ORDER — DIPHENHYDRAMINE HYDROCHLORIDE 50 MG/ML
50 INJECTION INTRAMUSCULAR; INTRAVENOUS ONCE AS NEEDED
Status: CANCELLED | OUTPATIENT
Start: 2023-10-31

## 2023-10-31 RX ORDER — SODIUM CHLORIDE 0.9 % (FLUSH) 0.9 %
10 SYRINGE (ML) INJECTION
Status: CANCELLED | OUTPATIENT
Start: 2023-10-31

## 2023-10-31 RX ORDER — EPINEPHRINE 0.3 MG/.3ML
0.3 INJECTION SUBCUTANEOUS ONCE AS NEEDED
Status: CANCELLED | OUTPATIENT
Start: 2023-10-31

## 2023-10-31 RX ORDER — PROCHLORPERAZINE EDISYLATE 5 MG/ML
5 INJECTION INTRAMUSCULAR; INTRAVENOUS ONCE AS NEEDED
Status: CANCELLED
Start: 2023-10-31

## 2023-10-31 RX ORDER — ONDANSETRON 2 MG/ML
8 INJECTION INTRAMUSCULAR; INTRAVENOUS
Status: CANCELLED | OUTPATIENT
Start: 2023-10-31

## 2023-10-31 RX ORDER — HEPARIN 100 UNIT/ML
500 SYRINGE INTRAVENOUS
Status: CANCELLED | OUTPATIENT
Start: 2023-10-31

## 2023-10-31 NOTE — TELEPHONE ENCOUNTER
I spoke to patient. She said she read over the information last night and she just thinks the trial would be too much for her. She will be going for her regular chemo tomorrow. I thanked her for calling.

## 2023-10-31 NOTE — TELEPHONE ENCOUNTER
"----- Message from Sean Chavez sent at 10/31/2023  9:50 AM CDT -----  Consult/Advisory:          Name Of Caller: Self      Contact Preference?: [phone]      Provider Name: Dex /Keyanna      Does patient feel the need to be seen today? No      What is the nature of the call?: Calling to inform that she doesn't want to participate in the trial and would rather receive traditional chemo treatment          Additional Notes:  "Thank you for all that you do for our patients"       "

## 2023-11-01 ENCOUNTER — TELEPHONE (OUTPATIENT)
Dept: HEMATOLOGY/ONCOLOGY | Facility: CLINIC | Age: 78
End: 2023-11-01
Payer: MEDICARE

## 2023-11-01 ENCOUNTER — INFUSION (OUTPATIENT)
Dept: INFUSION THERAPY | Facility: HOSPITAL | Age: 78
End: 2023-11-01
Payer: MEDICARE

## 2023-11-01 VITALS
HEART RATE: 73 BPM | HEIGHT: 63 IN | TEMPERATURE: 98 F | WEIGHT: 139.44 LBS | RESPIRATION RATE: 18 BRPM | SYSTOLIC BLOOD PRESSURE: 119 MMHG | DIASTOLIC BLOOD PRESSURE: 57 MMHG | BODY MASS INDEX: 24.71 KG/M2

## 2023-11-01 DIAGNOSIS — C25.9 PANCREATIC ADENOCARCINOMA: Primary | ICD-10-CM

## 2023-11-01 PROCEDURE — 96413 CHEMO IV INFUSION 1 HR: CPT | Mod: HCNC

## 2023-11-01 PROCEDURE — A4216 STERILE WATER/SALINE, 10 ML: HCPCS | Mod: HCNC | Performed by: INTERNAL MEDICINE

## 2023-11-01 PROCEDURE — 96375 TX/PRO/DX INJ NEW DRUG ADDON: CPT | Mod: HCNC

## 2023-11-01 PROCEDURE — 63600175 PHARM REV CODE 636 W HCPCS: Mod: HCNC | Performed by: INTERNAL MEDICINE

## 2023-11-01 PROCEDURE — 96417 CHEMO IV INFUS EACH ADDL SEQ: CPT | Mod: HCNC

## 2023-11-01 PROCEDURE — 25000003 PHARM REV CODE 250: Mod: HCNC | Performed by: INTERNAL MEDICINE

## 2023-11-01 RX ORDER — DIPHENHYDRAMINE HYDROCHLORIDE 50 MG/ML
50 INJECTION INTRAMUSCULAR; INTRAVENOUS ONCE AS NEEDED
Status: DISCONTINUED | OUTPATIENT
Start: 2023-11-01 | End: 2023-11-01 | Stop reason: HOSPADM

## 2023-11-01 RX ORDER — SODIUM CHLORIDE 0.9 % (FLUSH) 0.9 %
10 SYRINGE (ML) INJECTION
Status: DISCONTINUED | OUTPATIENT
Start: 2023-11-01 | End: 2023-11-01 | Stop reason: HOSPADM

## 2023-11-01 RX ORDER — EPINEPHRINE 0.3 MG/.3ML
0.3 INJECTION SUBCUTANEOUS ONCE AS NEEDED
Status: DISCONTINUED | OUTPATIENT
Start: 2023-11-01 | End: 2023-11-01 | Stop reason: HOSPADM

## 2023-11-01 RX ORDER — HEPARIN 100 UNIT/ML
500 SYRINGE INTRAVENOUS
Status: DISCONTINUED | OUTPATIENT
Start: 2023-11-01 | End: 2023-11-01 | Stop reason: HOSPADM

## 2023-11-01 RX ORDER — ONDANSETRON 2 MG/ML
8 INJECTION INTRAMUSCULAR; INTRAVENOUS
Status: COMPLETED | OUTPATIENT
Start: 2023-11-01 | End: 2023-11-01

## 2023-11-01 RX ORDER — PROCHLORPERAZINE EDISYLATE 5 MG/ML
5 INJECTION INTRAMUSCULAR; INTRAVENOUS ONCE AS NEEDED
Status: DISCONTINUED | OUTPATIENT
Start: 2023-11-01 | End: 2023-11-01 | Stop reason: HOSPADM

## 2023-11-01 RX ADMIN — HEPARIN 500 UNITS: 100 SYRINGE at 10:11

## 2023-11-01 RX ADMIN — SODIUM CHLORIDE: 9 INJECTION, SOLUTION INTRAVENOUS at 07:11

## 2023-11-01 RX ADMIN — ONDANSETRON 8 MG: 2 INJECTION INTRAMUSCULAR; INTRAVENOUS at 08:11

## 2023-11-01 RX ADMIN — Medication 10 ML: at 10:11

## 2023-11-01 RX ADMIN — PACLITAXEL 200 MG: 100 INJECTION, POWDER, LYOPHILIZED, FOR SUSPENSION INTRAVENOUS at 09:11

## 2023-11-01 RX ADMIN — GEMCITABINE HYDROCHLORIDE 1600 MG: 1 INJECTION, SOLUTION INTRAVENOUS at 09:11

## 2023-11-01 NOTE — PLAN OF CARE
Patient tolerated C1D1 Abraxane/gemzar infusions today. Educated patient on treatment plan today and what to expect. Patient verbalized understanding. PAC + blood return upon access and deaccess. Steri strips still intact and patient is aware she can shower and let steri strips fall off individually dont pull them off. VSS. NAD noted. Discharged home and ambulated independently off unit with family by her side

## 2023-11-01 NOTE — TELEPHONE ENCOUNTER
"----- Message from Sean Amayaon sent at 11/1/2023  4:53 PM CDT -----  Consult/Advisory:        Name Of Caller: Deshawn Alex (Spouse)       Contact Preference?: [phone] (Mobile)    [phone]       Provider Name: Keyanna      Does patient feel the need to be seen today? Yes      What is the nature of the call?: Calling to speak w/ nurse ASAP about if pt needs to go to the ER. Stating pt currently has fever of 100.4; Also stating pt has chills       Additional Notes:  "Thank you for all that you do for our patients"       "

## 2023-11-01 NOTE — TELEPHONE ENCOUNTER
Patient has had chills the last few hours. T max 100.9.    Spoke to provider.    Patients symptoms most likely related to gemzar. She may take tylenol. If temp goes to 102 she should go to the ED.

## 2023-11-02 ENCOUNTER — PATIENT MESSAGE (OUTPATIENT)
Dept: HEMATOLOGY/ONCOLOGY | Facility: CLINIC | Age: 78
End: 2023-11-02
Payer: MEDICARE

## 2023-11-02 ENCOUNTER — TELEPHONE (OUTPATIENT)
Dept: HEMATOLOGY/ONCOLOGY | Facility: CLINIC | Age: 78
End: 2023-11-02
Payer: MEDICARE

## 2023-11-02 NOTE — TELEPHONE ENCOUNTER
"Called pt.  Her temperature still wnl.  No other issues.  Encouraged rest and drinking fluids.    ----- Message from Sean Chavez sent at 11/2/2023 10:37 AM CDT -----  Consult/Advisory:      Name Of Caller: Self      Contact Preference?: [phone]       Provider Name: Keyanna      Does patient feel the need to be seen today? No      What is the nature of the call?: Calling to inform office that her temperature never reached 102 last night and has since dropped to 98.6      Additional Notes:  "Thank you for all that you do for our patients"       "

## 2023-11-05 DIAGNOSIS — L28.2 PRURITIC RASH: Primary | ICD-10-CM

## 2023-11-05 RX ORDER — HYDROXYZINE HYDROCHLORIDE 25 MG/1
25 TABLET, FILM COATED ORAL 3 TIMES DAILY PRN
Qty: 60 TABLET | Refills: 2 | Status: SHIPPED | OUTPATIENT
Start: 2023-11-05

## 2023-11-05 NOTE — PROGRESS NOTES
Patient called with complaint of pruritic rash with erythema involving her abdomen, lower back and arms.  She has taken Benadryl but this did not help much. She has triamcinolone ointment at home.  I recommended she try this to affected areas BID and I sent her hydroxyzine 25 mg tabs PRN for pruritus.  She has no issues breathing or swallowing.  No new medications other than what we prescribed and chemo last week.   She will notify us tomorrow if symptoms not improving.    David Briceño MD  Hematology/Oncology  [facility]

## 2023-11-06 ENCOUNTER — PATIENT MESSAGE (OUTPATIENT)
Dept: HEMATOLOGY/ONCOLOGY | Facility: CLINIC | Age: 78
End: 2023-11-06
Payer: MEDICARE

## 2023-11-07 ENCOUNTER — PATIENT MESSAGE (OUTPATIENT)
Dept: HEMATOLOGY/ONCOLOGY | Facility: CLINIC | Age: 78
End: 2023-11-07
Payer: MEDICARE

## 2023-11-07 ENCOUNTER — HOSPITAL ENCOUNTER (OUTPATIENT)
Dept: RADIOLOGY | Facility: HOSPITAL | Age: 78
Discharge: HOME | End: 2023-11-07
Attending: REGISTERED NURSE
Payer: MEDICARE

## 2023-11-07 DIAGNOSIS — L28.2 PRURITIC RASH: Primary | ICD-10-CM

## 2023-11-07 DIAGNOSIS — C25.9 PANCREATIC ADENOCARCINOMA: ICD-10-CM

## 2023-11-07 DIAGNOSIS — C78.7 METASTASIS TO LIVER: ICD-10-CM

## 2023-11-07 PROCEDURE — 71260 CT THORAX DX C+: CPT | Mod: 26,HCNC,, | Performed by: RADIOLOGY

## 2023-11-07 PROCEDURE — 74177 CT ABD & PELVIS W/CONTRAST: CPT | Mod: 26,HCNC,, | Performed by: RADIOLOGY

## 2023-11-07 PROCEDURE — 25500020 PHARM REV CODE 255: Mod: HCNC | Performed by: REGISTERED NURSE

## 2023-11-07 PROCEDURE — 71260 CT CHEST ABDOMEN PELVIS WITH IV CONTRAST (XPD): ICD-10-PCS | Mod: 26,HCNC,, | Performed by: RADIOLOGY

## 2023-11-07 PROCEDURE — 74177 CT CHEST ABDOMEN PELVIS WITH IV CONTRAST (XPD): ICD-10-PCS | Mod: 26,HCNC,, | Performed by: RADIOLOGY

## 2023-11-07 PROCEDURE — 71260 CT THORAX DX C+: CPT | Mod: TC,HCNC

## 2023-11-07 RX ORDER — METHYLPREDNISOLONE 4 MG/1
TABLET ORAL
Qty: 21 EACH | Refills: 0 | Status: SHIPPED | OUTPATIENT
Start: 2023-11-07 | End: 2023-11-14 | Stop reason: SDUPTHER

## 2023-11-07 RX ADMIN — IOHEXOL 100 ML: 350 INJECTION, SOLUTION INTRAVENOUS at 03:11

## 2023-11-08 ENCOUNTER — OFFICE VISIT (OUTPATIENT)
Dept: HEMATOLOGY/ONCOLOGY | Facility: CLINIC | Age: 78
End: 2023-11-08
Payer: MEDICARE

## 2023-11-08 DIAGNOSIS — Z80.42 FAMILY HISTORY OF PROSTATE CANCER: ICD-10-CM

## 2023-11-08 DIAGNOSIS — Z71.83 ENCOUNTER FOR NONPROCREATIVE GENETIC COUNSELING: Primary | ICD-10-CM

## 2023-11-08 DIAGNOSIS — R06.02 SHORTNESS OF BREATH: ICD-10-CM

## 2023-11-08 DIAGNOSIS — C25.9 PANCREATIC ADENOCARCINOMA: ICD-10-CM

## 2023-11-08 DIAGNOSIS — L28.2 PRURITIC RASH: ICD-10-CM

## 2023-11-08 DIAGNOSIS — Z80.3 FAMILY HISTORY OF BREAST CANCER: ICD-10-CM

## 2023-11-08 PROCEDURE — 3074F SYST BP LT 130 MM HG: CPT | Mod: HCNC,CPTII,S$GLB, | Performed by: NURSE PRACTITIONER

## 2023-11-08 PROCEDURE — 3078F DIAST BP <80 MM HG: CPT | Mod: HCNC,CPTII,S$GLB, | Performed by: NURSE PRACTITIONER

## 2023-11-08 PROCEDURE — 99999 PR PBB SHADOW E&M-EST. PATIENT-LVL III: ICD-10-PCS | Mod: PBBFAC,HCNC,, | Performed by: NURSE PRACTITIONER

## 2023-11-08 PROCEDURE — 99215 PR OFFICE/OUTPT VISIT, EST, LEVL V, 40-54 MIN: ICD-10-PCS | Mod: HCNC,S$GLB,, | Performed by: NURSE PRACTITIONER

## 2023-11-08 PROCEDURE — 99999 PR PBB SHADOW E&M-EST. PATIENT-LVL III: CPT | Mod: PBBFAC,HCNC,, | Performed by: NURSE PRACTITIONER

## 2023-11-08 PROCEDURE — 3074F PR MOST RECENT SYSTOLIC BLOOD PRESSURE < 130 MM HG: ICD-10-PCS | Mod: HCNC,CPTII,S$GLB, | Performed by: NURSE PRACTITIONER

## 2023-11-08 PROCEDURE — 3078F PR MOST RECENT DIASTOLIC BLOOD PRESSURE < 80 MM HG: ICD-10-PCS | Mod: HCNC,CPTII,S$GLB, | Performed by: NURSE PRACTITIONER

## 2023-11-08 PROCEDURE — 1126F AMNT PAIN NOTED NONE PRSNT: CPT | Mod: HCNC,CPTII,S$GLB, | Performed by: NURSE PRACTITIONER

## 2023-11-08 PROCEDURE — 99215 OFFICE O/P EST HI 40 MIN: CPT | Mod: HCNC,S$GLB,, | Performed by: NURSE PRACTITIONER

## 2023-11-08 PROCEDURE — 1126F PR PAIN SEVERITY QUANTIFIED, NO PAIN PRESENT: ICD-10-PCS | Mod: HCNC,CPTII,S$GLB, | Performed by: NURSE PRACTITIONER

## 2023-11-08 NOTE — PROGRESS NOTES
"Cancer Genetics  Department of Hematology and Oncology  [facility]  [facility]  [facility]    Date of Service:  23  Visit Provider:  Julito Sosa DNP  Collaborating Physician:  Vicky Hammer MD    Patient ID  Name: Deepali Alex    : 1945    MRN: [id number]      Referring Provider  David Briceño MD  [address] [place] [address]    Release of Information (ERIKA):  Regarding Deepali Alex ( 1945) ("Deepali"), in the event that genetic results of Deepali's are updated and Deepali cannot be reached fo notification, Deepali has designated any and all of the following individuals to be notified:  Spouse, Deshawn Alex  Son, Landon Alex  Son, Jamaal Alex  Brother, Al      SUBJECTIVE      Chief Complaint: Genetic Evaluation    History of Present Illness (HPI):  Deepali Alex ("Deepali"), 78 y.o., assigned female sex at birth, is established with the [facility] but new to me.  She was referred by Dr. David Briceño ([facility]) for cancer-genetic risk assessment given her diagnosis of pancreatic adenocarcinoma.    Focused Medical History  Genetic testing:  Yes - only related to this recent pancreatic cancer diagnosis as indicated below  Cancer:  Yes  Squamous cell skin cancer on chest in ~ (and possibly an additional skin cancer on face; however, when patient when to [facility] it was reportedly said to be just an infected hair follicle)  Pancreatic adenocarcinoma, stage IV, diagnosed at age 78  2023:  Abdomen US for abdominal pain, with report indicating presence of liver lesions  2023:  Abdomen MRI, with report indicating presence of mass of the pancreatic tail, concerning for malignancy, and numerous liver lesions, concerning for metastases  2023:  Biopsy of left hepatic lobe, with pathology indicating " "presence of adenocarcinoma with features supportive of a pancreatobiliary and/or upper GI primary; IHC testing demonstrated intact nuclear expression of MLH1, MSH2, MSH6, and PMS2  [facility] Tumor Origin (TO) test estimated 99% probability of pancreatic adenocarcinoma  Has begun chemotherapy  [facility] xT 648-gene panel, liver (left lobe) specimen collected on 9/21/23, with results reported on 10/10/23 indicating the following (in part):  "Somatic - Biologically Relevant" genomic variants:  TP53 p.Y163C (VAF 30.5%)  BRAF p.[id number] (VAF 12%)  CDKN2A copy number loss  CDKN2B copy number loss  Microsatellite-stable (QUINN)  Per chart review:  "Mehdi Baldwin MD - 10/24/2023 5:29 PM CDT  Formatting of this note might be different from the original.  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 Mccollum, [facility]):    (1) tubular adenoma, 3 mm in size, with low-grade glandular dysplasia  History of colonoscopy in addition to those abovementioned?  No    Other tumor or pertinent mass/lesion:  Pulmonary nodules (see 9/29/23 CT chest report)  Chronic pancreatitis:  No  Blood disorder:  No    Past Medical History:   Diagnosis Date    Age-related osteoporosis without current pathological fracture     Atherosclerosis of aortic arch     - noted on CXR 8/2017    Back pain     Diverticulosis of sigmoid colon 10/22/2019    Ectopic pregnancy     Fibrocystic breast     Hyperlipidemia     Inguinal hernia     Osteopenia     Polyp of ascending colon 10/22/2019    Urinary incontinence, mixed 05/24/2022     Patient Active Problem List    Diagnosis Date Noted    Pain of upper abdomen 09/22/2023    Liver disease, unspecified 09/21/2023    Metastasis to liver 09/21/2023    Pancreatic adenocarcinoma " 09/05/2023    Pelvic floor dysfunction 05/30/2022    Coordination impairment 05/30/2022    Postural imbalance 05/30/2022    Urinary incontinence, mixed 05/24/2022    Vaginal vault prolapse 05/24/2022    Incomplete emptying of bladder 05/24/2022    Rectocele, female 05/24/2022    Fecal soiling 05/24/2022    Vaginal atrophy 05/24/2022    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    Diastolic dysfunction 05/20/2022    Tubular adenoma of colon 10/22/2019 colonoscopy with ascending tubular adenoma 3 mm 10/31/2019    Age-related osteoporosis without current pathological fracture     AR (allergic rhinitis) 08/16/2017    Intermittent vertigo 08/16/2017    Aortic atherosclerosis     Family history of breast cancer 07/21/2015    Recurrent urinary tract infection 05/02/2014    Dyslipidemia 10/17/2013     Focused Surgical History  S/p hysterectomy  Ovaries remain in situ    Ancestry  Ashkenazi Yazdanism:  No    Family Oncologic History  Consanguinity:  No  Hereditary cancer genetic testing in blood relatives:  No  Other than noted, no known history of cancer in relatives depicted in the pedigree or in siblings' descendants.  Other than noted, no known family history of benign tumor/mass/lesion, pancreatitis, or colon polyps.  Unaware of or limited knowledge of medical history of:  maternal first cousins, maternal extended family, paternal extended family.    ONCOLOGY PEDIGREE  ** If this pedigree appears small/illegible on your screen, expand this note window horizontally. **      Review of Systems  See HPI.    Pruritic rash wrapping around entirety of lower torso, onset 5 days ago (chemo started 7 days ago), and now extending down legs.  Shortness of breath began after cancer diagnosis but pre-chemotherapy treatment.  Patient states she feels she has been in denial about it.     OBJECTIVE     Physical Exam  Very pleasant patient.  Accompanied by her spouse, Deshawn, who is also very  "pleasant.  Vitals signs:  Reviewed:  Vitals:    11/08/23 1045 11/08/23 1140   BP: (!) 120/57    Pulse: 79    Resp:  18   Temp: 98.5 °F (36.9 °C)    TempSrc: Oral    SpO2: 95%    PainSc: 0-No pain    (Pain is rated on scale of 0-10 on which 10=worst.)  Distress score:  Reviewed: (0/10, on scale of 0-10 on which 10=worst).  Constitutional      Appearance:  Appears well developed and well nourished. No distress.   Cardiac     Heart sounds:  S1 and S2 auscultated.   Pulmonary     Effort:  Patient appears mildly short of breath to inspection.     Breath sounds:  No abnormality auscultated.  Integumentary     Skin:  Light-pink lacy-appearing rash wrapping around entirety of lower torso; it is evident that the rash is pruritic.  Neurological     Mental Status:  Alert and oriented.     Coordination:  Normal.   Psychiatric         Mood and Affect:  Normal.     Thought Content:  Normal.     Speech:  Normal.     Behavior:  Normal.     Judgment:  Normal.  Genetics-specific     It is my assessment that the patient is ready to proceed with cancer genetic testing from a psychosocial perspective.    ASSESSMENT / PLAN      Deepali Alex ("Deepali"), 78 y.o., assigned female sex at birth, is established with the [facility] but new to me.  She was referred by Dr. David Briceño ([facility]) for cancer-genetic risk assessment given her diagnosis of pancreatic adenocarcinoma.      Cancer-genetic risk assessment and pre-test cancer genetic counseling were conducted.  Cancer-genetic risk assessment is not yet complete as I am awaiting an additional finalized [facility] report of patient's (the germline testing report demonstrating her FANCC variant).        Assessment    From a clinical standpoint, regarding hereditary cancer susceptibility gene testing:    NCCN germline testing criteria is satisfied based on the personal diagnosis of pancreatic adenocarcinoma (as well as the " family history of breast and prostate cancers).       Counseling    Cancer Genetics:  Germline cancer genetic testing is the testing of genes associated with cancer, known as cancer susceptibility genes.  Just as these genes are inherited from parents--one copy of each gene from each parent--mutations in these genes can be inherited, as well.  A mutation in a cancer susceptibility gene adversely affects the gene's ability to prevent cancer; therefore, carriers of cancer susceptibility gene mutations may be at increased risk for certain cancers.     Causes of Cancer:  Only approximately 5%-10% of cancers are caused by an inherited cancer susceptibility gene mutation; rather, the majority of cancers are sporadic.  Causes of sporadic cancers may include environmental risk factors, lifestyle risk factors, and non-modifiable risk factors.  It is important to note that members of a family often share not only their genetics but also other risk factors, including environmental and lifestyle risk factors, so cancers can be familial.    Genetic Testing:  Somatic tumor-genetic testing assesses for presence of mutations within a tumor.  Germline cancer-genetic testing assesses for mutations that predispose the individual to certain cancers and may be present in other members of the family.      According to chart documentation by Dr. Baldwin with an outside facility, Deepali was found on [facility] germline testing to carry FANCC gene variant c.843+ 1G>A, which according to ClinVar at this time is classified as a pathogenic/likely pathogenic variant (i.e., a disease-causing mutation).  Individuals who carry only one mutated copy of the FANCC gene (rather than having both copies of that gene mutated) are considered to be carriers of (and not directly affected with) Fanconi anemia complementation group C (FANCC).  Fanconi anemia is a disorder which characteristic clinical features that include developmental abnormalities in major  organ systems, early-onset bone marrow failure, and a high predisposition to cancer.    Reference:  National Center for Biotechnology Information. ClinVar; [LTW664885815.18], https://www.ncbi.nlm.nih.gov/clinvar/variation/MEN981803657.18 (accessed Nov. 9, 2023).     Potential Results of Genetic Testing, and Their Implications:  Potential results of genetic testing include positive, negative, and variant of unknown significance (VUS).    Positive:  A positive result indicates the presence of at least one clinically significant gene mutation, and the individual's associated cancer risks vary depending upon the cancer susceptibility gene(s) in which there is/are a mutation(s).  With a positive result, depending upon the specific result and the individual's clinical history, modified risk management may be recommended, including measures for risk reduction and/or surveillance; however, there are not always effective strategies for modified risk management.  Negative:  A negative result indicates that no clinically significant mutations were identified in the gene(s) tested.  There can be cancer-treatment implications with a positive genetic test result.  VUS:  A VUS indicates that there is not presently enough data for the laboratory to make a determination as to whether the genetic variant is clinically significant.  VUSs are not typically acted upon clinically.       Genetic Mutation Inheritance:  When an individual tests positive for a gene mutation, her first-degree relatives each have a 50% chance of carrying the same mutation, and other, more distant blood relatives can also be at risk of carrying the same mutation.      Genetic Discrimination:  Genetic discrimination occurs when individuals are discriminated against on the basis of their genetic information.  The Genetic Information Nondiscrimination Act of 2008 (RENETTA) is [place]S. federal legislation that provides some protections against use of an individual's  genetic information by their health insurer and by their employer.  Title I of RENETTA prohibits most health insurers from utilizing an individual's genetic information to make decisions regarding insurance eligibility or premium charges; this protection does not apply to health insurance obtained through a job with the , and it may not apply to health insurance obtained through the Federal Employees Health Benefits Plan.  Title II of RENETTA prohibits covered entities, in many cases, from requesting or requiring the genetic information of employees and applicants and from using said information to make employment decisions; this protection does not apply to employers with fewer than 15 employees or to the .  RENETTA does not protect individuals from genetic discrimination by any other type of policy or entity, including but not limited to life insurance, disability insurance, long-term care insurance,  benefits, and [place] citizen Health Services benefits.    Genetic Testing Logistics:  An outside laboratory would perform the testing after a blood sample is collected at [facility].  One can expect this genetic testing to take approximately three weeks to result.  There is a potential for the patient to incur out-of-pocket costs related to genetic testing.  Post-test genetic counseling can be conducted once the genetic testing results are available.           ICD-10-CM ICD-9-CM   1. Encounter for nonprocreative genetic counseling  Z71.83 V26.33   2. Pancreatic adenocarcinoma  C25.9 157.9   3. Family history of breast cancer  Z80.3 V16.3   4. Family history of prostate cancer  Z80.42 V16.42   5. Shortness of breath  R06.02 786.05   6. Pruritic rash  L28.2 698.2     Plan    1. Encounter for nonprocreative genetic counseling  NCCN germline testing criteria is satisfied based on the personal diagnosis of pancreatic adenocarcinoma (as well as the family history of breast and prostate cancers).  I have requested  Deepali's germline genetic testing report from [facility], and once I am able to review that, I can determine whether any additional/confirmatory germline genetic testing is indicated for Deepali.  I will follow up with Deepali, via [facility] portal message, regarding this.  I suspect additional germline testing will be indicated, and we can tentatively plan to have this specimen collected on 11/14/23 with her other labs (Deepali would collect the test kit for such on 11/14 when she comes to [facility]).  Offered Deepali options of proceeding with hereditary cancer susceptibility gene testing at this time if indicated versus delaying/declining such.  Deepali desires to proceed with such testing at this time if indicated.  Provided germline cancer genetic test options of focused panel versus broad panel, and Deepali opts to proceed with whatever I recommend.    2. Pancreatic adenocarcinoma  - Ambulatory referral/consult to Genetics:  COMPLETED 11/8/23   - SEE #1    3. Family history of breast cancer  - SEE #1    4. Family history of prostate cancer  - SEE #1    5. Shortness of breath  - Discussed with Dr. Briceño.  Management deferred to Dr. Briceño.  - Provided to patient and spouse alarm signs that would warrant emergency care.    6. Pruritic rash  - Discussed with Dr. Briceño.  Management deferred to Dr. Briceño.         Questions were encouraged and answered to the patient's satisfaction, and she verbalized understanding of the information and agreement with the plan.           Approximately 48 minutes were spent face-to-face with the patient.  Approximately 63 minutes in total were spent on this encounter, which includes face-to-face time and non-face-to-face time preparing to see the patient (e.g., review of tests), obtaining and/or reviewing separately obtained history, documenting clinical information in the electronic or other health record, independently interpreting results (not separately reported)  and communicating results to the patient/family/caregiver, or care coordination (not separately reported).         Julito Sosa, ABDIFATAH, APRN, FNP-BC, AOCNP, CGRA  Nurse Practitioner, Cancer Genetics  Department of Hematology and Oncology  [facility]        CC:  Dr. David Briceño

## 2023-11-09 ENCOUNTER — PATIENT MESSAGE (OUTPATIENT)
Dept: HEMATOLOGY/ONCOLOGY | Facility: CLINIC | Age: 78
End: 2023-11-09
Payer: MEDICARE

## 2023-11-09 VITALS
OXYGEN SATURATION: 95 % | HEART RATE: 79 BPM | DIASTOLIC BLOOD PRESSURE: 57 MMHG | RESPIRATION RATE: 18 BRPM | SYSTOLIC BLOOD PRESSURE: 120 MMHG | TEMPERATURE: 99 F

## 2023-11-10 RX ORDER — TRIAMCINOLONE ACETONIDE 1 MG/G
OINTMENT TOPICAL
COMMUNITY

## 2023-11-14 ENCOUNTER — OFFICE VISIT (OUTPATIENT)
Dept: HEMATOLOGY/ONCOLOGY | Facility: CLINIC | Age: 78
End: 2023-11-14
Payer: MEDICARE

## 2023-11-14 ENCOUNTER — LAB VISIT (OUTPATIENT)
Dept: LAB | Facility: HOSPITAL | Age: 78
End: 2023-11-14
Attending: INTERNAL MEDICINE
Payer: MEDICARE

## 2023-11-14 ENCOUNTER — PATIENT MESSAGE (OUTPATIENT)
Dept: ADMINISTRATIVE | Facility: OTHER | Age: 78
End: 2023-11-14
Payer: MEDICARE

## 2023-11-14 VITALS
OXYGEN SATURATION: 98 % | HEART RATE: 79 BPM | DIASTOLIC BLOOD PRESSURE: 57 MMHG | WEIGHT: 132.19 LBS | HEIGHT: 63 IN | RESPIRATION RATE: 18 BRPM | SYSTOLIC BLOOD PRESSURE: 112 MMHG | BODY MASS INDEX: 23.42 KG/M2

## 2023-11-14 DIAGNOSIS — Z79.899 IMMUNODEFICIENCY DUE TO CHEMOTHERAPY: ICD-10-CM

## 2023-11-14 DIAGNOSIS — C78.01 MALIGNANT NEOPLASM METASTATIC TO BOTH LUNGS: ICD-10-CM

## 2023-11-14 DIAGNOSIS — C25.9 PANCREATIC ADENOCARCINOMA: Primary | ICD-10-CM

## 2023-11-14 DIAGNOSIS — D49.9 IMMUNODEFICIENCY SECONDARY TO NEOPLASM: ICD-10-CM

## 2023-11-14 DIAGNOSIS — D84.81 IMMUNODEFICIENCY SECONDARY TO NEOPLASM: ICD-10-CM

## 2023-11-14 DIAGNOSIS — C78.02 MALIGNANT NEOPLASM METASTATIC TO BOTH LUNGS: ICD-10-CM

## 2023-11-14 DIAGNOSIS — D84.821 IMMUNODEFICIENCY DUE TO CHEMOTHERAPY: ICD-10-CM

## 2023-11-14 DIAGNOSIS — C25.9 PANCREATIC ADENOCARCINOMA: ICD-10-CM

## 2023-11-14 DIAGNOSIS — C78.7 METASTASIS TO LIVER: ICD-10-CM

## 2023-11-14 DIAGNOSIS — T45.1X5A IMMUNODEFICIENCY DUE TO CHEMOTHERAPY: ICD-10-CM

## 2023-11-14 DIAGNOSIS — E78.5 DYSLIPIDEMIA: ICD-10-CM

## 2023-11-14 DIAGNOSIS — L28.2 PRURITIC RASH: ICD-10-CM

## 2023-11-14 DIAGNOSIS — M81.0 AGE-RELATED OSTEOPOROSIS WITHOUT CURRENT PATHOLOGICAL FRACTURE: ICD-10-CM

## 2023-11-14 LAB
ALBUMIN SERPL BCP-MCNC: 3 G/DL (ref 3.5–5.2)
ALP SERPL-CCNC: 296 U/L (ref 55–135)
ALT SERPL W/O P-5'-P-CCNC: 43 U/L (ref 10–44)
ANION GAP SERPL CALC-SCNC: 8 MMOL/L (ref 8–16)
AST SERPL-CCNC: 27 U/L (ref 10–40)
BILIRUB SERPL-MCNC: 0.5 MG/DL (ref 0.1–1)
BUN SERPL-MCNC: 16 MG/DL (ref 8–23)
CALCIUM SERPL-MCNC: 8.5 MG/DL (ref 8.7–10.5)
CANCER AG19-9 SERPL-ACNC: ABNORMAL U/ML (ref 0–40)
CHLORIDE SERPL-SCNC: 102 MMOL/L (ref 95–110)
CO2 SERPL-SCNC: 26 MMOL/L (ref 23–29)
CREAT SERPL-MCNC: 0.7 MG/DL (ref 0.5–1.4)
ERYTHROCYTE [DISTWIDTH] IN BLOOD BY AUTOMATED COUNT: 15.9 % (ref 11.5–14.5)
EST. GFR  (NO RACE VARIABLE): >60 ML/MIN/1.73 M^2
GLUCOSE SERPL-MCNC: 104 MG/DL (ref 70–110)
HCT VFR BLD AUTO: 29.7 % (ref 37–48.5)
HGB BLD-MCNC: 9.5 G/DL (ref 12–16)
IMM GRANULOCYTES # BLD AUTO: 0.15 K/UL (ref 0–0.04)
MCH RBC QN AUTO: 27.9 PG (ref 27–31)
MCHC RBC AUTO-ENTMCNC: 32 G/DL (ref 32–36)
MCV RBC AUTO: 87 FL (ref 82–98)
NEUTROPHILS # BLD AUTO: 10.8 K/UL (ref 1.8–7.7)
PLATELET # BLD AUTO: 387 K/UL (ref 150–450)
PMV BLD AUTO: 10.1 FL (ref 9.2–12.9)
POTASSIUM SERPL-SCNC: 4.2 MMOL/L (ref 3.5–5.1)
PROT SERPL-MCNC: 6.4 G/DL (ref 6–8.4)
RBC # BLD AUTO: 3.41 M/UL (ref 4–5.4)
SODIUM SERPL-SCNC: 136 MMOL/L (ref 136–145)
WBC # BLD AUTO: 14.34 K/UL (ref 3.9–12.7)

## 2023-11-14 PROCEDURE — 86301 IMMUNOASSAY TUMOR CA 19-9: CPT | Mod: HCNC | Performed by: INTERNAL MEDICINE

## 2023-11-14 PROCEDURE — 99215 OFFICE O/P EST HI 40 MIN: CPT | Mod: HCNC,S$GLB,, | Performed by: INTERNAL MEDICINE

## 2023-11-14 PROCEDURE — 3288F FALL RISK ASSESSMENT DOCD: CPT | Mod: HCNC,CPTII,S$GLB, | Performed by: INTERNAL MEDICINE

## 2023-11-14 PROCEDURE — 99999 PR PBB SHADOW E&M-EST. PATIENT-LVL III: ICD-10-PCS | Mod: PBBFAC,HCNC,, | Performed by: INTERNAL MEDICINE

## 2023-11-14 PROCEDURE — 85027 COMPLETE CBC AUTOMATED: CPT | Mod: HCNC | Performed by: INTERNAL MEDICINE

## 2023-11-14 PROCEDURE — 3074F PR MOST RECENT SYSTOLIC BLOOD PRESSURE < 130 MM HG: ICD-10-PCS | Mod: HCNC,CPTII,S$GLB, | Performed by: INTERNAL MEDICINE

## 2023-11-14 PROCEDURE — 1101F PR PT FALLS ASSESS DOC 0-1 FALLS W/OUT INJ PAST YR: ICD-10-PCS | Mod: HCNC,CPTII,S$GLB, | Performed by: INTERNAL MEDICINE

## 2023-11-14 PROCEDURE — 80053 COMPREHEN METABOLIC PANEL: CPT | Mod: HCNC | Performed by: INTERNAL MEDICINE

## 2023-11-14 PROCEDURE — 99215 PR OFFICE/OUTPT VISIT, EST, LEVL V, 40-54 MIN: ICD-10-PCS | Mod: HCNC,S$GLB,, | Performed by: INTERNAL MEDICINE

## 2023-11-14 PROCEDURE — 3288F PR FALLS RISK ASSESSMENT DOCUMENTED: ICD-10-PCS | Mod: HCNC,CPTII,S$GLB, | Performed by: INTERNAL MEDICINE

## 2023-11-14 PROCEDURE — 3074F SYST BP LT 130 MM HG: CPT | Mod: HCNC,CPTII,S$GLB, | Performed by: INTERNAL MEDICINE

## 2023-11-14 PROCEDURE — 3078F DIAST BP <80 MM HG: CPT | Mod: HCNC,CPTII,S$GLB, | Performed by: INTERNAL MEDICINE

## 2023-11-14 PROCEDURE — 1159F PR MEDICATION LIST DOCUMENTED IN MEDICAL RECORD: ICD-10-PCS | Mod: HCNC,CPTII,S$GLB, | Performed by: INTERNAL MEDICINE

## 2023-11-14 PROCEDURE — 1126F AMNT PAIN NOTED NONE PRSNT: CPT | Mod: HCNC,CPTII,S$GLB, | Performed by: INTERNAL MEDICINE

## 2023-11-14 PROCEDURE — 1159F MED LIST DOCD IN RCRD: CPT | Mod: HCNC,CPTII,S$GLB, | Performed by: INTERNAL MEDICINE

## 2023-11-14 PROCEDURE — 99999 PR PBB SHADOW E&M-EST. PATIENT-LVL III: CPT | Mod: PBBFAC,HCNC,, | Performed by: INTERNAL MEDICINE

## 2023-11-14 PROCEDURE — 36415 COLL VENOUS BLD VENIPUNCTURE: CPT | Mod: HCNC | Performed by: INTERNAL MEDICINE

## 2023-11-14 PROCEDURE — 1101F PT FALLS ASSESS-DOCD LE1/YR: CPT | Mod: HCNC,CPTII,S$GLB, | Performed by: INTERNAL MEDICINE

## 2023-11-14 PROCEDURE — 3078F PR MOST RECENT DIASTOLIC BLOOD PRESSURE < 80 MM HG: ICD-10-PCS | Mod: HCNC,CPTII,S$GLB, | Performed by: INTERNAL MEDICINE

## 2023-11-14 PROCEDURE — 1126F PR PAIN SEVERITY QUANTIFIED, NO PAIN PRESENT: ICD-10-PCS | Mod: HCNC,CPTII,S$GLB, | Performed by: INTERNAL MEDICINE

## 2023-11-14 RX ORDER — PROCHLORPERAZINE EDISYLATE 5 MG/ML
5 INJECTION INTRAMUSCULAR; INTRAVENOUS ONCE AS NEEDED
Status: CANCELLED
Start: 2023-11-14

## 2023-11-14 RX ORDER — SODIUM CHLORIDE 0.9 % (FLUSH) 0.9 %
10 SYRINGE (ML) INJECTION
Status: CANCELLED | OUTPATIENT
Start: 2023-11-14

## 2023-11-14 RX ORDER — HEPARIN 100 UNIT/ML
500 SYRINGE INTRAVENOUS
Status: CANCELLED | OUTPATIENT
Start: 2023-11-14

## 2023-11-14 RX ORDER — ONDANSETRON 2 MG/ML
8 INJECTION INTRAMUSCULAR; INTRAVENOUS
Status: CANCELLED | OUTPATIENT
Start: 2023-11-14

## 2023-11-14 RX ORDER — METHYLPREDNISOLONE 4 MG/1
TABLET ORAL
Qty: 21 EACH | Refills: 0 | Status: SHIPPED | OUTPATIENT
Start: 2023-11-14 | End: 2023-12-05

## 2023-11-14 RX ORDER — DEXAMETHASONE SODIUM PHOSPHATE 4 MG/ML
12 INJECTION, SOLUTION INTRA-ARTICULAR; INTRALESIONAL; INTRAMUSCULAR; INTRAVENOUS; SOFT TISSUE
Status: CANCELLED | OUTPATIENT
Start: 2023-11-14

## 2023-11-14 RX ORDER — EPINEPHRINE 0.3 MG/.3ML
0.3 INJECTION SUBCUTANEOUS ONCE AS NEEDED
Status: CANCELLED | OUTPATIENT
Start: 2023-11-14

## 2023-11-14 RX ORDER — DIPHENHYDRAMINE HYDROCHLORIDE 50 MG/ML
50 INJECTION INTRAMUSCULAR; INTRAVENOUS ONCE AS NEEDED
Status: CANCELLED | OUTPATIENT
Start: 2023-11-14

## 2023-11-14 NOTE — PROGRESS NOTES
MEDICAL ONCOLOGY - ESTABLISHED PATIENT VISIT    Reason for visit: Pancreatic adenocarcinoma    Best Contact Phone Number(s): [phone] (home)      Cancer/Stage/TNM:    Cancer Staging   Pancreatic adenocarcinoma  Staging form: Exocrine Pancreas, AJCC 8th Edition  - Clinical stage from 9/21/2023: Stage IV (cT1, cNX, pM1) - Signed by David Briceño MD on 10/11/2023       Oncology History   Pancreatic adenocarcinoma   9/5/2023 Initial Diagnosis    Pancreatic adenocarcinoma     9/21/2023 Cancer Staged    Staging form: Exocrine Pancreas, AJCC 8th Edition  - Clinical stage from 9/21/2023: Stage IV (cT1, cNX, pM1)     10/2/2023 Tumor Conference     [facility] UGI MULTIDISCIPLINARY TUMOR BOARD  PATIENT REVIEW FORM   ____________________________________________________________    CLINIC #: [id number]  DATE: 10/2/2023    DIAGNOSIS: pancreas CA    PRESENTER: Angela    PATIENT SUMMARY:   This 79 y/o female presented in August with abd pain, decreased appetite with weight loss. Reviewed CT and MRI imaging ~ 2 cm mass in tail of pancreas with diffuse metastatic liver disease, largest liver lesion in left lobe measuring 4.5 cm. IR performed liver bx and this pathology was reviewed - poorly differentiated adenocarcinoma, favoring upper GI origin.     BOARD RECOMMENDATIONS:   Stage IV pancreas CA with liver mets  Add MMR and TEMPUS  Proceed with palliative systemic chemotherapy    CONSULT NEEDED:     [] Surgery    [] Hem/Onc    [] Rad/Onc    [] Dietary     [] Social Service    [] Psychology       [] AES  [] Radiology     Clinical Stage: Tumor   Node(s)   Metastasis        GROUP STAGE:  [] O    [] 1A    [] IB    [] IIA    [] IIB     [] IIIA     [] IIIB     [] IIIC    [x]IV  [] Local recurrence     [] Regional recurrence     [] Distant recurrence   Metastatic site(s): liver         [x] Elidia'l Treatment Guidelines reviewed and care planned is consistent with guidelines.         (i.e., NCCN, NCI, PD, ACO, AUA,  etc.)    PRESENTATION AT CANCER CONFERENCE:         [x] Prospective    [] Retrospective     [] Follow-Up    Eligible for clinical trial : yes       11/1/2023 -  Chemotherapy    Treatment Summary   Plan Name: OP PANC NAB-PACLITAXEL + GEMCITABINE Q4W  Treatment Goal: Palliative  Status: Active  Start Date: 11/1/2023  End Date: 9/5/2024 (Planned)  Provider: David Briceño MD  Chemotherapy: gemcitabine (GEMZAR) 1,600 mg in sodium chloride 0.9% SolP 327.08 mL chemo infusion, 1,680 mg, Intravenous, Clinic/HOD 1 time, 1 of 12 cycles  Administration: 1,600 mg (11/1/2023)          Interim History:   78 y.o. female with metastatic pancreatic cancer who presents for follow-up prior to cycle 2 of biweekly gemcitabine + nab-paclitaxel.  She developed fevers up to 101 starting on day 1 of her cycle.  She took Tylenol as she felt chills but had no other localizing symptoms.  She developed a rash after two days on her bilateral arms and trunk; involving her back, abdomen and then later into the back of her legs.  It was pruritic.  Topical steroids and Benadryl provided only minimal relief.  Her symptoms did seem to improve with a Medrol Dose Nghia and the pruritus has essentially resolved.  She was having some constipation but this has improved.  She has no significant change in her abdominal pain; continues to be primarily when she takes a deep breath.  She is not taking any medications for pain.    Patient presents with her .  ECOG PS is 0.  History has been obtained by chart review and discussion with the patient.    ROS:   Review of Systems   Constitutional:  Positive for weight loss. Negative for chills, fever and malaise/fatigue.   HENT:  Negative for sore throat.    Eyes:  Negative for blurred vision and pain.   Respiratory:  Negative for cough and shortness of breath.    Cardiovascular:  Negative for chest pain and leg swelling.   Gastrointestinal:  Positive for abdominal pain and constipation. Negative for  diarrhea, nausea and vomiting.   Genitourinary:  Positive for frequency. Negative for dysuria.   Musculoskeletal:  Negative for back pain, falls and myalgias.   Skin:  Positive for itching. Negative for rash.   Neurological:  Negative for dizziness, weakness and headaches.   Endo/Heme/Allergies:  Does not bruise/bleed easily.   Psychiatric/Behavioral:  Negative for depression. The patient is not nervous/anxious.        Past Medical History:   Past Medical History:   Diagnosis Date    Age-related osteoporosis without current pathological fracture     Atherosclerosis of aortic arch     - noted on CXR 8/2017    Back pain     Diverticulosis of sigmoid colon 10/22/2019    Ectopic pregnancy     Fibrocystic breast     Hyperlipidemia     Inguinal hernia     Osteopenia     Polyp of ascending colon 10/22/2019    Urinary incontinence, mixed 05/24/2022        Past Surgical History:   Past Surgical History:   Procedure Laterality Date    ECTOPIC PREGNANCY SURGERY      HERNIA REPAIR      left inguinal    HYSTERECTOMY  1980    without BSO        Family History:   Family History   Problem Relation Age of Onset    Breast cancer Mother 69    Seizures Mother     Heart disease Father     Heart attack Father     Depression Father     Breast cancer Sister 73    Breast cancer Maternal Aunt 73    Prostate cancer Brother 81        no mets    Obesity Brother     Heart failure Maternal Grandmother     Heart attack Maternal Grandfather     Suicide Attempts Paternal Grandmother     Depression Paternal Grandmother     Mental illness Paternal Grandmother     Depression Paternal Aunt     Heart attack Paternal Uncle     Depression Paternal Uncle     Cancer Maternal Aunt 83        blood cancer (leukemia?)    Cancer Maternal Uncle         unk type; was COD    Suicide Paternal Cousin     Tongue cancer Paternal Cousin         smoking hx unk    Ovarian cancer Neg Hx         Social History:   Social History     Tobacco Use    Smoking status: Never     Smokeless tobacco: Never   Substance Use Topics    Alcohol use: No        I have reviewed and updated the patient's past medical, surgical, family and social histories.    Allergies:   Review of patient's allergies indicates:   Allergen Reactions    Erythromycin (bulk)      Other reaction(s): Eye irritation        Medications:   Current Outpatient Medications   Medication Sig Dispense Refill    coconut oil, bulk, Oil by Misc.(Non-Drug; Combo Route) route.      diphenhydramine HCl (BENADRYL ORAL) Take 25 mg by mouth as needed (rash).      GADAVIST 1 mmol/mL (604.72 mg/mL) Soln injection       hydrOXYzine HCL (ATARAX) 25 MG tablet Take 1 tablet (25 mg total) by mouth 3 (three) times daily as needed for Itching. 60 tablet 2    ketorolac (TORADOL) 10 mg tablet Take 1 tablet (10 mg total) by mouth every 6 (six) hours as needed for Pain. 60 tablet 0    LIDOcaine-prilocaine (EMLA) cream Apply topically as needed (place to port site 45-60 minutes prior to chemotherapy). 30 g 3    multivitamin (THERAGRAN) per tablet Take 1 tablet by mouth once daily.      ondansetron (ZOFRAN-ODT) 8 MG TbDL TAKE 1 TABLET (8 MG TOTAL) BY MOUTH EVERY 6 HOURS AS NEEDED FOR NAUSEA 30 tablet 5    triamcinolone acetonide 0.1% (KENALOG) 0.1 % ointment Apply topically as needed (rash).      vitamin D (VITAMIN D3) 1000 units Tab Take 1,000 Units by mouth once daily.      HYDROcodone-acetaminophen (NORCO) 5-325 mg per tablet Take 1 tablet by mouth every 12 (twelve) hours as needed for Pain. (Patient not taking: Reported on 10/11/2023) 10 tablet 0    methylPREDNISolone (MEDROL DOSEPACK) 4 mg tablet use as directed 21 each 0    ondansetron (ZOFRAN-ODT) 4 MG TbDL Take 1 tablet (4 mg total) by mouth every 8 (eight) hours as needed (Nausea). (Patient not taking: Reported on 10/18/2023) 10 tablet 0     No current facility-administered medications for this visit.        Physical Exam:   BP (!) 112/57 (BP Location: Left arm, Patient Position: Sitting, BP  "Method: Medium (Automatic))   Pulse 79   Resp 18   Ht 5' 3" (1.6 m)   Wt 59.9 kg (132 lb 2.7 oz)   SpO2 98%   BMI 23.41 kg/m²                Physical Exam  Vitals reviewed.   Constitutional:       General: She is not in acute distress.     Appearance: Normal appearance. She is normal weight.   HENT:      Head: Normocephalic and atraumatic.      Right Ear: External ear normal.      Left Ear: External ear normal.      Nose: Nose normal.      Mouth/Throat:      Mouth: Mucous membranes are moist.      Pharynx: Oropharynx is clear. No posterior oropharyngeal erythema.   Eyes:      General: No scleral icterus.     Extraocular Movements: Extraocular movements intact.      Conjunctiva/sclera: Conjunctivae normal.      Pupils: Pupils are equal, round, and reactive to light.   Cardiovascular:      Rate and Rhythm: Normal rate and regular rhythm.      Pulses: Normal pulses.      Heart sounds: Normal heart sounds.   Pulmonary:      Effort: Pulmonary effort is normal.      Breath sounds: Normal breath sounds. No wheezing or rales.   Abdominal:      General: Bowel sounds are normal. There is no distension.      Palpations: Abdomen is soft.      Tenderness: There is no abdominal tenderness.   Musculoskeletal:         General: No swelling. Normal range of motion.      Cervical back: Normal range of motion and neck supple.   Skin:     General: Skin is warm.      Coloration: Skin is not jaundiced.      Findings: No erythema or rash (no visible rash).   Neurological:      General: No focal deficit present.      Mental Status: She is alert and oriented to person, place, and time. Mental status is at baseline.      Gait: Gait normal.   Psychiatric:         Mood and Affect: Mood normal.         Behavior: Behavior normal.         Thought Content: Thought content normal.         Judgment: Judgment normal.           Labs:   No results found for this or any previous visit (from the past 48 hour(s)).       Imaging:       CT CAP " 11/7/23:  Impression:     1. Enlarged, 3.0 cm hypodense mass in the pancreatic tail concerning for malignancy.  There is associated encasement of the splenic artery and occlusion of the splenic vein.  2. Interval increase in size and number of numerous hypodense masses throughout the liver, consistent with biopsy-proven adenocarcinoma.  3. Interval increase in number and size of multiple bilateral pulmonary nodules and masses concerning for metastases.  4. Sigmoid diverticulosis without diverticulitis.  5. Additional findings as above.  6. This report was flagged in Epic as abnormal.    Path:   Final Pathologic Diagnosis     Left hepatic lobe, biopsy:   Positive for malignancy, poorly differentiated adenocarcinoma (see comment)   Tumor necrosis present     Comment:  A review of the patient's imaging shows the presence of a 1.9 cm pancreatic tail mass and diffuse liver lesions.  These morphologic and immunophenotypic findings are supportive of a pancreatobiliary and/or upper gastrointestinal primary.  VC      Comment: Interp By Nataliia Peralta D.O., Signed on 10/05/2023 at 08:36   Supplemental Diagnosis     The purpose of this supplemental report is to report results of MMR panel in the tumor cells as follows:     Immunohistochemistry (IHC) Testing for Mismatch Repair (MMR) Proteins:      MLH1 - Intact nuclear expression    MSH2 - Intact nuclear expression    MSH6 - Intact nuclear expression    PMS2 - Intact nuclear expression            Somatic Genomic Results       Pancreatic mass       Tumor NGS Tissue  Resulted: 10/10/23 Status: Preliminary result       Detected - Pathogenic (4)      Gene Variant VAF   BRAF p.[id number] - [id number]_[id number] Inframe deletion - GOF 12.00 %   CDKN2A CDKN2A - Copy number loss --   CDKN2B CDKN2B - Copy number loss --   TP53 p.Y163C - c.488A>G Missense variant - LOF 30.50 %              Detected - Uncertain significance (1)      Gene Variant VAF   DIS3 p.N671S - c.2012A>G  Missense variant 23.10 %                                        Assessment:       1. Pancreatic adenocarcinoma    2. 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 Metastasis to liver    4. 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 Immunodeficiency secondary to neoplasm    7. Dyslipidemia    8. Age-related osteoporosis without current pathological fracture            Plan:             # Pancreatic cancer  I discussed with the patient and her family her biopsy-proven diagnosis of metastatic pancreatic adenocarcinoma with multifocal liver metastases.  Her tumor is pMMR and Tempus tissue showed a non V600E BRAF mutation, TP53 mutation and CDKN2A and CDKN2B loss.  QUINN.    I discussed her prognosis and potential treatment options.  Because of her age and discussion of tolerance concerns, I have recommended biweekly gemcitabine + nab-paclitaxel as palliative intent first-line therapy.  Dosing for gemcitabine + nab-paclitaxel will be 1000 mg/m2 and 125 mg/m2, respectively, and I will administer these on a biweekly basis.    Port was placed.  Consideration to BRAF non-V600E targeting trials in the future if available.    She began cycle 1 of biweekly gemcitabine + nab-paclitaxel on 11/1/23.  She experienced fevers/chills and rash.  I believe these are all chemotherapy related.    Will administer dexamethasone 12 mg IV prior to infusion for rash prevention.  May need to increase to 20mg for future cycles.  Also gave her Medrol Dose Nghia to use in case rash recurs despite premed dex.  Proceed with cycle 2.    Will place palliative care referral.  Genetic testing done outside - saw Julito Sosa here.    PGX showed DPYD nml and UGT1A1 intermediate metabolism.    # HLD  Not currently on therapy.  Monitor.    # Osteoporosis  Monitor calcium levels on chemo.  Will continue to encourage maintaining activity.    Follow up: 2 weeks for cycle 3.     The above information has been reviewed with the patient and all  questions have been answered to their apparent satisfaction.  They understand that they can call the clinic with any questions.      David Briceño MD  Hematology/Oncology  [facility] [place]    Route Chart for Scheduling    Med Onc Chart Routing      Follow up with physician 4 weeks. for gem + abraxane   Follow up with DONTE 2 weeks. for gem + abraxane   Infusion scheduling note    Injection scheduling note    Labs CBC, CMP and CA 19-9   Scheduling:  Preferred lab:  Lab interval: every 2 weeks  labs at [facility]   Imaging    Pharmacy appointment    Other referrals                  Treatment Plan Information   OP PANC NAB-PACLITAXEL + GEMCITABINE Q4W   David Briceño MD   Upcoming Treatment Dates - OP PANC NAB-PACLITAXEL + GEMCITABINE Q4W    11/2/2023       Chemotherapy       PACLitaxeL protein-bound (ABRAXANE) in 40 mL infusion       gemcitabine (GEMZAR) in sodium chloride 0.9% SolP 250 mL chemo infusion       Antiemetics       ondansetron injection 8 mg       dexAMETHasone injection 12 mg  11/16/2023       Chemotherapy       PACLitaxeL protein-bound (ABRAXANE) in 40 mL infusion       gemcitabine (GEMZAR) in sodium chloride 0.9% SolP 250 mL chemo infusion       Antiemetics       dexAMETHasone injection 12 mg       ondansetron injection 8 mg  11/30/2023       Chemotherapy       PACLitaxeL protein-bound (ABRAXANE) in 40 mL infusion       gemcitabine (GEMZAR) in sodium chloride 0.9% SolP 250 mL chemo infusion       Antiemetics       dexAMETHasone injection 12 mg       ondansetron injection 8 mg  12/14/2023       Chemotherapy       PACLitaxeL protein-bound (ABRAXANE) in 40 mL infusion       gemcitabine (GEMZAR) in sodium chloride 0.9% SolP 250 mL chemo infusion       Antiemetics       dexAMETHasone injection 12 mg       ondansetron injection 8 mg       see note

## 2023-11-15 ENCOUNTER — INFUSION (OUTPATIENT)
Dept: INFUSION THERAPY | Facility: HOSPITAL | Age: 78
End: 2023-11-15
Payer: MEDICARE

## 2023-11-15 ENCOUNTER — PATIENT MESSAGE (OUTPATIENT)
Dept: ADMINISTRATIVE | Facility: OTHER | Age: 78
End: 2023-11-15
Payer: MEDICARE

## 2023-11-15 VITALS
HEART RATE: 86 BPM | TEMPERATURE: 99 F | HEIGHT: 63 IN | SYSTOLIC BLOOD PRESSURE: 131 MMHG | WEIGHT: 132.19 LBS | OXYGEN SATURATION: 97 % | DIASTOLIC BLOOD PRESSURE: 60 MMHG | BODY MASS INDEX: 23.42 KG/M2 | RESPIRATION RATE: 18 BRPM

## 2023-11-15 DIAGNOSIS — C25.9 PANCREATIC ADENOCARCINOMA: Primary | ICD-10-CM

## 2023-11-15 PROCEDURE — 96413 CHEMO IV INFUSION 1 HR: CPT | Mod: HCNC

## 2023-11-15 PROCEDURE — 96367 TX/PROPH/DG ADDL SEQ IV INF: CPT | Mod: HCNC

## 2023-11-15 PROCEDURE — A4216 STERILE WATER/SALINE, 10 ML: HCPCS | Mod: HCNC | Performed by: INTERNAL MEDICINE

## 2023-11-15 PROCEDURE — 96375 TX/PRO/DX INJ NEW DRUG ADDON: CPT | Mod: HCNC

## 2023-11-15 PROCEDURE — 96417 CHEMO IV INFUS EACH ADDL SEQ: CPT | Mod: HCNC

## 2023-11-15 PROCEDURE — 63600175 PHARM REV CODE 636 W HCPCS: Mod: HCNC | Performed by: INTERNAL MEDICINE

## 2023-11-15 PROCEDURE — 25000003 PHARM REV CODE 250: Mod: HCNC | Performed by: INTERNAL MEDICINE

## 2023-11-15 RX ORDER — ONDANSETRON 2 MG/ML
8 INJECTION INTRAMUSCULAR; INTRAVENOUS
Status: COMPLETED | OUTPATIENT
Start: 2023-11-15 | End: 2023-11-15

## 2023-11-15 RX ORDER — DIPHENHYDRAMINE HYDROCHLORIDE 50 MG/ML
50 INJECTION INTRAMUSCULAR; INTRAVENOUS ONCE AS NEEDED
Status: DISCONTINUED | OUTPATIENT
Start: 2023-11-15 | End: 2023-11-15 | Stop reason: HOSPADM

## 2023-11-15 RX ORDER — PROCHLORPERAZINE EDISYLATE 5 MG/ML
5 INJECTION INTRAMUSCULAR; INTRAVENOUS ONCE AS NEEDED
Status: DISCONTINUED | OUTPATIENT
Start: 2023-11-15 | End: 2023-11-15 | Stop reason: HOSPADM

## 2023-11-15 RX ORDER — EPINEPHRINE 0.3 MG/.3ML
0.3 INJECTION SUBCUTANEOUS ONCE AS NEEDED
Status: DISCONTINUED | OUTPATIENT
Start: 2023-11-15 | End: 2023-11-15 | Stop reason: HOSPADM

## 2023-11-15 RX ORDER — SODIUM CHLORIDE 0.9 % (FLUSH) 0.9 %
10 SYRINGE (ML) INJECTION
Status: DISCONTINUED | OUTPATIENT
Start: 2023-11-15 | End: 2023-11-15 | Stop reason: HOSPADM

## 2023-11-15 RX ORDER — HEPARIN 100 UNIT/ML
500 SYRINGE INTRAVENOUS
Status: DISCONTINUED | OUTPATIENT
Start: 2023-11-15 | End: 2023-11-15 | Stop reason: HOSPADM

## 2023-11-15 RX ADMIN — Medication 10 ML: at 12:11

## 2023-11-15 RX ADMIN — GEMCITABINE HYDROCHLORIDE 1600 MG: 1 INJECTION, SOLUTION INTRAVENOUS at 12:11

## 2023-11-15 RX ADMIN — DEXAMETHASONE SODIUM PHOSPHATE: 4 INJECTION, SOLUTION INTRA-ARTICULAR; INTRALESIONAL; INTRAMUSCULAR; INTRAVENOUS; SOFT TISSUE at 10:11

## 2023-11-15 RX ADMIN — HEPARIN 500 UNITS: 100 SYRINGE at 12:11

## 2023-11-15 RX ADMIN — SODIUM CHLORIDE: 9 INJECTION, SOLUTION INTRAVENOUS at 10:11

## 2023-11-15 RX ADMIN — ONDANSETRON 8 MG: 2 INJECTION INTRAMUSCULAR; INTRAVENOUS at 10:11

## 2023-11-15 RX ADMIN — PACLITAXEL 200 MG: 100 INJECTION, POWDER, LYOPHILIZED, FOR SUSPENSION INTRAVENOUS at 11:11

## 2023-11-15 NOTE — NURSING
Pt tolerated C1D15 Gemzar/Abraxane without complication. VSS. Pt aware of next appointment. Pt d/c from unit ambulatory and in stable condition.

## 2023-11-16 ENCOUNTER — PATIENT MESSAGE (OUTPATIENT)
Dept: ADMINISTRATIVE | Facility: OTHER | Age: 78
End: 2023-11-16
Payer: MEDICARE

## 2023-11-17 ENCOUNTER — PATIENT MESSAGE (OUTPATIENT)
Dept: ADMINISTRATIVE | Facility: OTHER | Age: 78
End: 2023-11-17
Payer: MEDICARE

## 2023-11-18 ENCOUNTER — PATIENT MESSAGE (OUTPATIENT)
Dept: HEMATOLOGY/ONCOLOGY | Facility: CLINIC | Age: 78
End: 2023-11-18
Payer: MEDICARE

## 2023-11-18 ENCOUNTER — PATIENT MESSAGE (OUTPATIENT)
Dept: ADMINISTRATIVE | Facility: OTHER | Age: 78
End: 2023-11-18
Payer: MEDICARE

## 2023-11-18 DIAGNOSIS — R60.0 LEG EDEMA, LEFT: Primary | ICD-10-CM

## 2023-11-19 ENCOUNTER — PATIENT MESSAGE (OUTPATIENT)
Dept: ADMINISTRATIVE | Facility: OTHER | Age: 78
End: 2023-11-19
Payer: MEDICARE

## 2023-11-20 ENCOUNTER — PATIENT MESSAGE (OUTPATIENT)
Dept: ADMINISTRATIVE | Facility: OTHER | Age: 78
End: 2023-11-20
Payer: MEDICARE

## 2023-11-21 ENCOUNTER — HOSPITAL ENCOUNTER (OUTPATIENT)
Dept: RADIOLOGY | Facility: HOSPITAL | Age: 78
Discharge: HOME | End: 2023-11-21
Attending: INTERNAL MEDICINE
Payer: MEDICARE

## 2023-11-21 ENCOUNTER — PATIENT MESSAGE (OUTPATIENT)
Dept: ADMINISTRATIVE | Facility: OTHER | Age: 78
End: 2023-11-21
Payer: MEDICARE

## 2023-11-21 DIAGNOSIS — R60.0 LEG EDEMA, LEFT: ICD-10-CM

## 2023-11-21 PROCEDURE — 93971 EXTREMITY STUDY: CPT | Mod: TC,HCNC,LT

## 2023-11-21 PROCEDURE — 93971 US LOWER EXTREMITY VEINS LEFT: ICD-10-PCS | Mod: 26,HCNC,LT, | Performed by: RADIOLOGY

## 2023-11-21 PROCEDURE — 93971 EXTREMITY STUDY: CPT | Mod: 26,HCNC,LT, | Performed by: RADIOLOGY

## 2023-11-22 ENCOUNTER — PATIENT MESSAGE (OUTPATIENT)
Dept: ADMINISTRATIVE | Facility: OTHER | Age: 78
End: 2023-11-22
Payer: MEDICARE

## 2023-11-22 DIAGNOSIS — L03.116 CELLULITIS OF LEFT LOWER EXTREMITY: Primary | ICD-10-CM

## 2023-11-22 RX ORDER — DOXYCYCLINE 100 MG/1
100 CAPSULE ORAL EVERY 12 HOURS
Qty: 14 CAPSULE | Refills: 0 | Status: SHIPPED | OUTPATIENT
Start: 2023-11-22

## 2023-11-23 ENCOUNTER — PATIENT MESSAGE (OUTPATIENT)
Dept: ADMINISTRATIVE | Facility: OTHER | Age: 78
End: 2023-11-23
Payer: MEDICARE

## 2023-11-24 ENCOUNTER — PATIENT MESSAGE (OUTPATIENT)
Dept: ADMINISTRATIVE | Facility: OTHER | Age: 78
End: 2023-11-24
Payer: MEDICARE

## 2023-11-28 ENCOUNTER — OFFICE VISIT (OUTPATIENT)
Dept: HEMATOLOGY/ONCOLOGY | Facility: CLINIC | Age: 78
End: 2023-11-28
Payer: MEDICARE

## 2023-11-28 ENCOUNTER — TELEPHONE (OUTPATIENT)
Dept: HEMATOLOGY/ONCOLOGY | Facility: CLINIC | Age: 78
End: 2023-11-28
Payer: MEDICARE

## 2023-11-28 ENCOUNTER — LAB VISIT (OUTPATIENT)
Dept: LAB | Facility: HOSPITAL | Age: 78
End: 2023-11-28
Attending: INTERNAL MEDICINE
Payer: MEDICARE

## 2023-11-28 DIAGNOSIS — C25.9 PANCREATIC ADENOCARCINOMA: ICD-10-CM

## 2023-11-28 DIAGNOSIS — R60.0 LEG EDEMA, LEFT: ICD-10-CM

## 2023-11-28 DIAGNOSIS — D84.821 IMMUNODEFICIENCY DUE TO CHEMOTHERAPY: ICD-10-CM

## 2023-11-28 DIAGNOSIS — D84.81 IMMUNODEFICIENCY SECONDARY TO NEOPLASM: ICD-10-CM

## 2023-11-28 DIAGNOSIS — D63.0 ANEMIA IN NEOPLASTIC DISEASE: ICD-10-CM

## 2023-11-28 DIAGNOSIS — C78.02 MALIGNANT NEOPLASM METASTATIC TO BOTH LUNGS: ICD-10-CM

## 2023-11-28 DIAGNOSIS — Z79.899 IMMUNODEFICIENCY DUE TO CHEMOTHERAPY: ICD-10-CM

## 2023-11-28 DIAGNOSIS — C78.01 MALIGNANT NEOPLASM METASTATIC TO BOTH LUNGS: ICD-10-CM

## 2023-11-28 DIAGNOSIS — K59.00 CONSTIPATION, UNSPECIFIED CONSTIPATION TYPE: ICD-10-CM

## 2023-11-28 DIAGNOSIS — L28.2 PRURITIC RASH: ICD-10-CM

## 2023-11-28 DIAGNOSIS — C78.7 METASTASIS TO LIVER: ICD-10-CM

## 2023-11-28 DIAGNOSIS — C25.9 PANCREATIC ADENOCARCINOMA: Primary | ICD-10-CM

## 2023-11-28 DIAGNOSIS — T45.1X5A IMMUNODEFICIENCY DUE TO CHEMOTHERAPY: ICD-10-CM

## 2023-11-28 DIAGNOSIS — D49.9 IMMUNODEFICIENCY SECONDARY TO NEOPLASM: ICD-10-CM

## 2023-11-28 LAB
ALBUMIN SERPL BCP-MCNC: 3.2 G/DL (ref 3.5–5.2)
ALP SERPL-CCNC: 238 U/L (ref 55–135)
ALT SERPL W/O P-5'-P-CCNC: 27 U/L (ref 10–44)
ANION GAP SERPL CALC-SCNC: 5 MMOL/L (ref 8–16)
AST SERPL-CCNC: 28 U/L (ref 10–40)
BILIRUB SERPL-MCNC: 0.5 MG/DL (ref 0.1–1)
BUN SERPL-MCNC: 12 MG/DL (ref 8–23)
CALCIUM SERPL-MCNC: 8.7 MG/DL (ref 8.7–10.5)
CANCER AG19-9 SERPL-ACNC: ABNORMAL U/ML (ref 0–40)
CHLORIDE SERPL-SCNC: 106 MMOL/L (ref 95–110)
CO2 SERPL-SCNC: 26 MMOL/L (ref 23–29)
CREAT SERPL-MCNC: 0.7 MG/DL (ref 0.5–1.4)
ERYTHROCYTE [DISTWIDTH] IN BLOOD BY AUTOMATED COUNT: 18.2 % (ref 11.5–14.5)
EST. GFR  (NO RACE VARIABLE): >60 ML/MIN/1.73 M^2
GLUCOSE SERPL-MCNC: 124 MG/DL (ref 70–110)
HCT VFR BLD AUTO: 29 % (ref 37–48.5)
HGB BLD-MCNC: 9 G/DL (ref 12–16)
IMM GRANULOCYTES # BLD AUTO: 0.04 K/UL (ref 0–0.04)
MCH RBC QN AUTO: 28.3 PG (ref 27–31)
MCHC RBC AUTO-ENTMCNC: 31 G/DL (ref 32–36)
MCV RBC AUTO: 91 FL (ref 82–98)
NEUTROPHILS # BLD AUTO: 5.9 K/UL (ref 1.8–7.7)
PLATELET # BLD AUTO: 283 K/UL (ref 150–450)
PMV BLD AUTO: 10.5 FL (ref 9.2–12.9)
POTASSIUM SERPL-SCNC: 4 MMOL/L (ref 3.5–5.1)
PROT SERPL-MCNC: 6.7 G/DL (ref 6–8.4)
RBC # BLD AUTO: 3.18 M/UL (ref 4–5.4)
SODIUM SERPL-SCNC: 137 MMOL/L (ref 136–145)
WBC # BLD AUTO: 8.04 K/UL (ref 3.9–12.7)

## 2023-11-28 PROCEDURE — 1160F PR REVIEW ALL MEDS BY PRESCRIBER/CLIN PHARMACIST DOCUMENTED: ICD-10-PCS | Mod: HCNC,CPTII,95, | Performed by: PHYSICIAN ASSISTANT

## 2023-11-28 PROCEDURE — 1159F MED LIST DOCD IN RCRD: CPT | Mod: HCNC,CPTII,95, | Performed by: PHYSICIAN ASSISTANT

## 2023-11-28 PROCEDURE — 99215 OFFICE O/P EST HI 40 MIN: CPT | Mod: HCNC,95,, | Performed by: PHYSICIAN ASSISTANT

## 2023-11-28 PROCEDURE — 1159F PR MEDICATION LIST DOCUMENTED IN MEDICAL RECORD: ICD-10-PCS | Mod: HCNC,CPTII,95, | Performed by: PHYSICIAN ASSISTANT

## 2023-11-28 PROCEDURE — 86301 IMMUNOASSAY TUMOR CA 19-9: CPT | Mod: HCNC | Performed by: INTERNAL MEDICINE

## 2023-11-28 PROCEDURE — 80053 COMPREHEN METABOLIC PANEL: CPT | Mod: HCNC | Performed by: INTERNAL MEDICINE

## 2023-11-28 PROCEDURE — 1160F RVW MEDS BY RX/DR IN RCRD: CPT | Mod: HCNC,CPTII,95, | Performed by: PHYSICIAN ASSISTANT

## 2023-11-28 PROCEDURE — 85027 COMPLETE CBC AUTOMATED: CPT | Mod: HCNC | Performed by: INTERNAL MEDICINE

## 2023-11-28 PROCEDURE — 36415 COLL VENOUS BLD VENIPUNCTURE: CPT | Mod: HCNC,PO | Performed by: INTERNAL MEDICINE

## 2023-11-28 PROCEDURE — 99215 PR OFFICE/OUTPT VISIT, EST, LEVL V, 40-54 MIN: ICD-10-PCS | Mod: HCNC,95,, | Performed by: PHYSICIAN ASSISTANT

## 2023-11-28 RX ORDER — ONDANSETRON 2 MG/ML
8 INJECTION INTRAMUSCULAR; INTRAVENOUS
Status: CANCELLED | OUTPATIENT
Start: 2023-11-28

## 2023-11-28 RX ORDER — EPINEPHRINE 0.3 MG/.3ML
0.3 INJECTION SUBCUTANEOUS ONCE AS NEEDED
Status: CANCELLED | OUTPATIENT
Start: 2023-11-28

## 2023-11-28 RX ORDER — DIPHENHYDRAMINE HYDROCHLORIDE 50 MG/ML
50 INJECTION INTRAMUSCULAR; INTRAVENOUS ONCE AS NEEDED
Status: CANCELLED | OUTPATIENT
Start: 2023-11-28

## 2023-11-28 RX ORDER — PROCHLORPERAZINE EDISYLATE 5 MG/ML
5 INJECTION INTRAMUSCULAR; INTRAVENOUS ONCE AS NEEDED
Status: CANCELLED
Start: 2023-11-28

## 2023-11-28 RX ORDER — SODIUM CHLORIDE 0.9 % (FLUSH) 0.9 %
10 SYRINGE (ML) INJECTION
Status: CANCELLED | OUTPATIENT
Start: 2023-11-28

## 2023-11-28 RX ORDER — HEPARIN 100 UNIT/ML
500 SYRINGE INTRAVENOUS
Status: CANCELLED | OUTPATIENT
Start: 2023-11-28

## 2023-11-28 RX ORDER — DEXAMETHASONE SODIUM PHOSPHATE 4 MG/ML
12 INJECTION, SOLUTION INTRA-ARTICULAR; INTRALESIONAL; INTRAMUSCULAR; INTRAVENOUS; SOFT TISSUE
Status: CANCELLED | OUTPATIENT
Start: 2023-11-28

## 2023-11-28 NOTE — TELEPHONE ENCOUNTER
Spoke to patient and , they are aware of the change for the visit this afternoon. Instructions given on how to complete a virtual visit.

## 2023-11-28 NOTE — PROGRESS NOTES
The patient location is: Home  The chief complaint leading to consultation is: Pancreatic adenocarcinoma    Visit type: audiovisual    Face to Face time with patient: 46 minutes of total time spent on the encounter, which includes face to face time and non-face to face time preparing to see the patient (eg, review of tests), Obtaining and/or reviewing separately obtained history, Documenting clinical information in the electronic or other health record, Independently interpreting results (not separately reported) and communicating results to the patient/family/caregiver, or Care coordination (not separately reported).     Each patient to whom he or she provides medical services by telemedicine is:  (1) informed of the relationship between the physician and patient and the respective role of any other health care provider with respect to management of the patient; and (2) notified that he or she may decline to receive medical services by telemedicine and may withdraw from such care at any time.    Notes:     MEDICAL ONCOLOGY - ESTABLISHED PATIENT VISIT    Reason for visit: Pancreatic adenocarcinoma    Best Contact Phone Number(s): [phone] (home)      Cancer/Stage/TNM:    Cancer Staging   Pancreatic adenocarcinoma  Staging form: Exocrine Pancreas, AJCC 8th Edition  - Clinical stage from 9/21/2023: Stage IV (cT1, cNX, pM1) - Signed by David Briceño MD on 10/11/2023       Oncology History   Pancreatic adenocarcinoma   9/5/2023 Initial Diagnosis    Pancreatic adenocarcinoma     9/21/2023 Cancer Staged    Staging form: Exocrine Pancreas, AJCC 8th Edition  - Clinical stage from 9/21/2023: Stage IV (cT1, cNX, pM1)     10/2/2023 Tumor Conference     [facility] UGI MULTIDISCIPLINARY TUMOR BOARD  PATIENT REVIEW FORM   ____________________________________________________________    CLINIC #: [id number]  DATE: 10/2/2023    DIAGNOSIS: pancreas CA    PRESENTER: Angela    PATIENT SUMMARY:   This 77 y/o female  presented in August with abd pain, decreased appetite with weight loss. Reviewed CT and MRI imaging ~ 2 cm mass in tail of pancreas with diffuse metastatic liver disease, largest liver lesion in left lobe measuring 4.5 cm. IR performed liver bx and this pathology was reviewed - poorly differentiated adenocarcinoma, favoring upper GI origin.     BOARD RECOMMENDATIONS:   Stage IV pancreas CA with liver mets  Add MMR and TEMPUS  Proceed with palliative systemic chemotherapy    CONSULT NEEDED:     [] Surgery    [] Hem/Onc    [] Rad/Onc    [] Dietary     [] Social Service    [] Psychology       [] AES  [] Radiology     Clinical Stage: Tumor   Node(s)   Metastasis        GROUP STAGE:  [] O    [] 1A    [] IB    [] IIA    [] IIB     [] IIIA     [] IIIB     [] IIIC    [x]IV  [] Local recurrence     [] Regional recurrence     [] Distant recurrence   Metastatic site(s): liver         [x] Elidia'l Treatment Guidelines reviewed and care planned is consistent with guidelines.         (i.e., NCCN, NCI, PD, ACO, AUA, etc.)    PRESENTATION AT CANCER CONFERENCE:         [x] Prospective    [] Retrospective     [] Follow-Up    Eligible for clinical trial : yes       11/1/2023 -  Chemotherapy    Treatment Summary   Plan Name: OP PANC NAB-PACLITAXEL + GEMCITABINE Q4W  Treatment Goal: Palliative  Status: Active  Start Date: 11/1/2023  End Date: 9/5/2024 (Planned)  Provider: David Briceño MD  Chemotherapy: gemcitabine (GEMZAR) 1,600 mg in sodium chloride 0.9% SolP 327.08 mL chemo infusion, 1,680 mg, Intravenous, Clinic/HOD 1 time, 1 of 12 cycles  Administration: 1,600 mg (11/1/2023), 1,600 mg (11/15/2023)          Interim History:   78 y.o. female with metastatic pancreatic cancer who presents for follow-up prior to cycle 3 of biweekly gemcitabine + nab-paclitaxel. Doing better after this past cycle of chemotherapy. Her bowel movements have improved and her abdominal pain is stable but controlled with medication if needed. She has not  needed any pain medication at this time. She is eating and has a stable appetite. Her main concern is swelling of the lower legs and ankles. This improves with elevation and compression stockings. Fatigue is stable. She is able to perform ADLs at home. No worsening SOB or ongoing weight loss. Itching has resolved with benadryl. She has managed her chemotherapy fever, alternating between Motrin and Tylenol. Overall doing much better today.       ECOG PS is 0. Presents by virtual visit alone.     History has been obtained by chart review and discussion with the patient.    ROS:   Review of Systems   Constitutional:  Positive for weight loss. Negative for chills, fever and malaise/fatigue.   HENT:  Negative for sore throat.    Eyes:  Negative for blurred vision and pain.   Respiratory:  Negative for cough and shortness of breath.    Cardiovascular:  Positive for leg swelling. Negative for chest pain.   Gastrointestinal:  Positive for abdominal pain and constipation. Negative for diarrhea, nausea and vomiting.   Genitourinary:  Negative for dysuria and frequency.   Musculoskeletal:  Negative for back pain, falls and myalgias.   Skin:  Positive for itching. Negative for rash.   Neurological:  Negative for dizziness, weakness and headaches.   Endo/Heme/Allergies:  Does not bruise/bleed easily.   Psychiatric/Behavioral:  Negative for depression. The patient is not nervous/anxious.        Past Medical History:   Past Medical History:   Diagnosis Date    Age-related osteoporosis without current pathological fracture     Atherosclerosis of aortic arch     - noted on CXR 8/2017    Back pain     Diverticulosis of sigmoid colon 10/22/2019    Ectopic pregnancy     Fibrocystic breast     Hyperlipidemia     Inguinal hernia     Osteopenia     Polyp of ascending colon 10/22/2019    Urinary incontinence, mixed 05/24/2022        Past Surgical History:   Past Surgical History:   Procedure Laterality Date    ECTOPIC PREGNANCY SURGERY       HERNIA REPAIR      left inguinal    HYSTERECTOMY  1980    without BSO        Family History:   Family History   Problem Relation Age of Onset    Breast cancer Mother 69    Seizures Mother     Heart disease Father     Heart attack Father     Depression Father     Breast cancer Sister 73    Breast cancer Maternal Aunt 73    Prostate cancer Brother 81        no mets    Obesity Brother     Heart failure Maternal Grandmother     Heart attack Maternal Grandfather     Suicide Attempts Paternal Grandmother     Depression Paternal Grandmother     Mental illness Paternal Grandmother     Depression Paternal Aunt     Heart attack Paternal Uncle     Depression Paternal Uncle     Cancer Maternal Aunt 83        blood cancer (leukemia?)    Cancer Maternal Uncle         unk type; was COD    Suicide Paternal Cousin     Tongue cancer Paternal Cousin         smoking hx unk    Ovarian cancer Neg Hx         Social History:   Social History     Tobacco Use    Smoking status: Never    Smokeless tobacco: Never   Substance Use Topics    Alcohol use: No        I have reviewed and updated the patient's past medical, surgical, family and social histories.    Allergies:   Review of patient's allergies indicates:   Allergen Reactions    Erythromycin (bulk)      Other reaction(s): Eye irritation        Medications:   Current Outpatient Medications   Medication Sig Dispense Refill    coconut oil, bulk, Oil by Misc.(Non-Drug; Combo Route) route.      diphenhydramine HCl (BENADRYL ORAL) Take 25 mg by mouth as needed (rash).      doxycycline (VIBRAMYCIN) 100 MG Cap Take 1 capsule (100 mg total) by mouth every 12 (twelve) hours. 14 capsule 0    GADAVIST 1 mmol/mL (604.72 mg/mL) Soln injection       HYDROcodone-acetaminophen (NORCO) 5-325 mg per tablet Take 1 tablet by mouth every 12 (twelve) hours as needed for Pain. (Patient not taking: Reported on 10/11/2023) 10 tablet 0    hydrOXYzine HCL (ATARAX) 25 MG tablet Take 1 tablet (25 mg total) by mouth  3 (three) times daily as needed for Itching. 60 tablet 2    LIDOcaine-prilocaine (EMLA) cream Apply topically as needed (place to port site 45-60 minutes prior to chemotherapy). 30 g 3    methylPREDNISolone (MEDROL DOSEPACK) 4 mg tablet use as directed 21 each 0    multivitamin (THERAGRAN) per tablet Take 1 tablet by mouth once daily.      ondansetron (ZOFRAN-ODT) 4 MG TbDL Take 1 tablet (4 mg total) by mouth every 8 (eight) hours as needed (Nausea). (Patient not taking: Reported on 10/18/2023) 10 tablet 0    ondansetron (ZOFRAN-ODT) 8 MG TbDL TAKE 1 TABLET (8 MG TOTAL) BY MOUTH EVERY 6 HOURS AS NEEDED FOR NAUSEA 30 tablet 5    triamcinolone acetonide 0.1% (KENALOG) 0.1 % ointment Apply topically as needed (rash).      vitamin D (VITAMIN D3) 1000 units Tab Take 1,000 Units by mouth once daily.       No current facility-administered medications for this visit.        Physical Exam:   There were no vitals taken for this visit.               Physical Exam  Vitals reviewed.   Constitutional:       General: She is not in acute distress.     Appearance: She is well-developed. She is not diaphoretic.      Comments: General physical exam was limited due to virtual visit presentation.    HENT:      Head: Normocephalic.   Eyes:      General: No scleral icterus.  Neck:      Trachea: No tracheal deviation.   Pulmonary:      Effort: Pulmonary effort is normal.   Musculoskeletal:         General: No deformity.   Skin:     Findings: No rash.   Neurological:      Mental Status: She is alert and oriented to person, place, and time.   Psychiatric:         Behavior: Behavior normal.         Thought Content: Thought content normal.         Judgment: Judgment normal.           Labs:   Recent Results (from the past 48 hour(s))   CBC Oncology    Collection Time: 11/28/23  9:23 AM   Result Value Ref Range    WBC 8.04 3.90 - 12.70 K/uL    RBC 3.18 (L) 4.00 - 5.40 M/uL    Hemoglobin 9.0 (L) 12.0 - 16.0 g/dL    Hematocrit 29.0 (L) 37.0 - 48.5  %    MCV 91 82 - 98 fL    MCH 28.3 27.0 - 31.0 pg    MCHC 31.0 (L) 32.0 - 36.0 g/dL    RDW 18.2 (H) 11.5 - 14.5 %    Platelets 283 150 - 450 K/uL    MPV 10.5 9.2 - 12.9 fL    Gran # (ANC) 5.9 1.8 - 7.7 K/uL    Immature Grans (Abs) 0.04 0.00 - 0.04 K/uL   Comprehensive Metabolic Panel    Collection Time: 11/28/23  9:23 AM   Result Value Ref Range    Sodium 137 136 - 145 mmol/L    Potassium 4.0 3.5 - 5.1 mmol/L    Chloride 106 95 - 110 mmol/L    CO2 26 23 - 29 mmol/L    Glucose 124 (H) 70 - 110 mg/dL    BUN 12 8 - 23 mg/dL    Creatinine 0.7 0.5 - 1.4 mg/dL    Calcium 8.7 8.7 - 10.5 mg/dL    Total Protein 6.7 6.0 - 8.4 g/dL    Albumin 3.2 (L) 3.5 - 5.2 g/dL    Total Bilirubin 0.5 0.1 - 1.0 mg/dL    Alkaline Phosphatase 238 (H) 55 - 135 U/L    AST 28 10 - 40 U/L    ALT 27 10 - 44 U/L    eGFR >60 >60 mL/min/1.73 m^2    Anion Gap 5 (L) 8 - 16 mmol/L   Cancer Antigen 19-9    Collection Time: 11/28/23  9:23 AM   Result Value Ref Range    CA 19-9 >15343.0 (H) 0.0 - 40.0 U/mL          Imaging:       CT CAP 11/7/23:  Impression:     1. Enlarged, 3.0 cm hypodense mass in the pancreatic tail concerning for malignancy.  There is associated encasement of the splenic artery and occlusion of the splenic vein.  2. Interval increase in size and number of numerous hypodense masses throughout the liver, consistent with biopsy-proven adenocarcinoma.  3. Interval increase in number and size of multiple bilateral pulmonary nodules and masses concerning for metastases.  4. Sigmoid diverticulosis without diverticulitis.  5. Additional findings as above.  6. This report was flagged in Epic as abnormal.    Path:   Final Pathologic Diagnosis     Left hepatic lobe, biopsy:   Positive for malignancy, poorly differentiated adenocarcinoma (see comment)   Tumor necrosis present     Comment:  A review of the patient's imaging shows the presence of a 1.9 cm pancreatic tail mass and diffuse liver lesions.  These morphologic and immunophenotypic  findings are supportive of a pancreatobiliary and/or upper gastrointestinal primary.  VC      Comment: Interp By Nataliia Peralta D.O., Signed on 10/05/2023 at 08:36   Supplemental Diagnosis     The purpose of this supplemental report is to report results of MMR panel in the tumor cells as follows:     Immunohistochemistry (IHC) Testing for Mismatch Repair (MMR) Proteins:      MLH1 - Intact nuclear expression    MSH2 - Intact nuclear expression    MSH6 - Intact nuclear expression    PMS2 - Intact nuclear expression            Somatic Genomic Results       Pancreatic mass       Tumor NGS Tissue  Resulted: 10/10/23 Status: Preliminary result       Detected - Pathogenic (4)      Gene Variant VAF   BRAF p.[id number] - [id number]_[id number] Inframe deletion - GOF 12.00 %   CDKN2A CDKN2A - Copy number loss --   CDKN2B CDKN2B - Copy number loss --   TP53 p.Y163C - c.488A>G Missense variant - LOF 30.50 %              Detected - Uncertain significance (1)      Gene Variant VAF   DIS3 p.N671S - c.2012A>G Missense variant 23.10 %                                        Assessment:       1. Pancreatic adenocarcinoma    2. 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 Metastasis to liver    4. Immunodeficiency due to chemotherapy    5. Immunodeficiency secondary to neoplasm    6. Leg edema, left    7. Pruritic rash    8. 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  Her tumor is pMMR and Tempus tissue showed a non V600E BRAF mutation, TP53 mutation and CDKN2A and CDKN2B loss.  QUINN.    We discussed her prognosis and potential treatment options.  Because of her age and discussion of tolerance concerns, I have recommended biweekly gemcitabine + nab-paclitaxel as palliative intent first-line  therapy.  Dosing for gemcitabine + nab-paclitaxel will be 1000 mg/m2 and 125 mg/m2, respectively, and I will administer these on a biweekly basis.    Port was placed.  Consideration to BRAF non-V600E targeting trials in the future if available.    She began cycle 1 of biweekly gemcitabine + nab-paclitaxel on 11/1/23.  She experienced fevers/chills and rash. believe these are all chemotherapy related. Improved after the administration of Dex.     Will continue to administer dexamethasone 12 mg IV prior to infusion for rash prevention.  May need to increase to 20mg for future cycles. Will continue to monitor.    Also gave her Medrol Dose Nghia to use in case rash recurs despite premed dex.  Doing much better today. Lab work adequate to proceed with chemotherapy  Proceed with cycle 3.    RTC in 2 weeks for next cycle. Will repeat imaging studies after cycle 4    Has Palliative Care appointment on 12/6  Genetic testing done outside - saw Julito Sosa here.    PGX showed DPYD nml and UGT1A1 intermediate metabolism.    # HLD, constipation, pruritus, fever  Not currently on therapy. Cholesterol stable from June 2023. Will continue to f/u with PCP regarding this   Constipation: stable.   Pruritus/fever: controlled with increased pre-meds and Benadryl. Continues to control fever alternating Motrin and Tylenol. Feels better today  Monitor.    # Osteoporosis, physical concerns  Monitor calcium levels on chemo.  Will continue to encourage maintaining activity.  Discussed physical contact precautions and concerns with patient. Pt voiced appreciation of discussion    Follow up: 2 weeks for cycle 4.     The above information has been reviewed with the patient and all questions have been answered to their apparent satisfaction.  They understand that they can call the clinic with any questions.      ESTER Rowan, PA-C  Physician Assistant Certified  Dept of Hematology/Oncology  PA-C to Dr. Willis, Dr. Briceño and Dr. Noyola        Route Chart for Scheduling.    Med Onc Chart Routing      Follow up with physician 2 weeks and 4 weeks. See Dr Briceño in 2 weeks with lab work and cycle 4 of chemotherapy with [place]/Abraxane. See Dr. Briceño in 4 weeks with lab work, CT CAP and treatmet discussion, chemotherapy with [place]/Abraxane   Follow up with DONTE 6 weeks. See DONTE in 6 weeks with lab work and chemotherapy   Infusion scheduling note    Injection scheduling note    Labs CBC, CMP, CA 19-9, iron and TIBC and ferritin   Scheduling:  Preferred lab:  Lab interval: every 2 weeks  Please schedule iron panel and ferritin level in 2 weeks. Thank you   Imaging CT chest abdomen pelvis   Please scheduled CT CAP in 4 weeks prior to clinic visit with Dr. Briceño   Pharmacy appointment    Other referrals                  Treatment Plan Information   OP PANC NAB-PACLITAXEL + GEMCITABINE Q4W   David Briceño MD   Upcoming Treatment Dates - OP PANC NAB-PACLITAXEL + GEMCITABINE Q4W    11/16/2023       Chemotherapy       PACLitaxeL protein-bound (ABRAXANE) in 40 mL infusion       gemcitabine (GEMZAR) in sodium chloride 0.9% SolP 250 mL chemo infusion       Antiemetics       dexAMETHasone injection 12 mg       ondansetron injection 8 mg  11/30/2023       Chemotherapy       PACLitaxeL protein-bound (ABRAXANE) in 40 mL infusion       gemcitabine (GEMZAR) in sodium chloride 0.9% SolP 250 mL chemo infusion       Antiemetics       dexAMETHasone injection 12 mg       ondansetron injection 8 mg  12/14/2023       Chemotherapy       PACLitaxeL protein-bound (ABRAXANE) in 40 mL infusion       gemcitabine (GEMZAR) in sodium chloride 0.9% SolP 250 mL chemo infusion       Antiemetics       dexAMETHasone injection 12 mg       ondansetron injection 8 mg  12/28/2023       Chemotherapy       PACLitaxeL protein-bound (ABRAXANE) in 40 mL infusion       gemcitabine (GEMZAR) in sodium chloride 0.9% SolP 250 mL chemo infusion       Antiemetics       dexAMETHasone injection 12 mg        ondansetron injection 8 mg

## 2023-11-29 ENCOUNTER — INFUSION (OUTPATIENT)
Dept: INFUSION THERAPY | Facility: HOSPITAL | Age: 78
End: 2023-11-29
Payer: MEDICARE

## 2023-11-29 ENCOUNTER — PATIENT MESSAGE (OUTPATIENT)
Dept: ADMINISTRATIVE | Facility: OTHER | Age: 78
End: 2023-11-29
Payer: MEDICARE

## 2023-11-29 VITALS
SYSTOLIC BLOOD PRESSURE: 120 MMHG | TEMPERATURE: 98 F | HEIGHT: 63 IN | DIASTOLIC BLOOD PRESSURE: 57 MMHG | WEIGHT: 135.13 LBS | BODY MASS INDEX: 23.94 KG/M2 | HEART RATE: 70 BPM | RESPIRATION RATE: 18 BRPM

## 2023-11-29 DIAGNOSIS — C25.9 PANCREATIC ADENOCARCINOMA: Primary | ICD-10-CM

## 2023-11-29 PROCEDURE — 96413 CHEMO IV INFUSION 1 HR: CPT | Mod: HCNC

## 2023-11-29 PROCEDURE — 96376 TX/PRO/DX INJ SAME DRUG ADON: CPT | Mod: HCNC

## 2023-11-29 PROCEDURE — A4216 STERILE WATER/SALINE, 10 ML: HCPCS | Mod: HCNC | Performed by: PHYSICIAN ASSISTANT

## 2023-11-29 PROCEDURE — 25000003 PHARM REV CODE 250: Mod: HCNC | Performed by: PHYSICIAN ASSISTANT

## 2023-11-29 PROCEDURE — 96417 CHEMO IV INFUS EACH ADDL SEQ: CPT | Mod: HCNC

## 2023-11-29 PROCEDURE — 96367 TX/PROPH/DG ADDL SEQ IV INF: CPT | Mod: HCNC

## 2023-11-29 PROCEDURE — 63600175 PHARM REV CODE 636 W HCPCS: Mod: HCNC | Performed by: PHYSICIAN ASSISTANT

## 2023-11-29 RX ORDER — PROCHLORPERAZINE EDISYLATE 5 MG/ML
5 INJECTION INTRAMUSCULAR; INTRAVENOUS ONCE AS NEEDED
Status: DISCONTINUED | OUTPATIENT
Start: 2023-11-29 | End: 2023-11-29 | Stop reason: HOSPADM

## 2023-11-29 RX ORDER — SODIUM CHLORIDE 0.9 % (FLUSH) 0.9 %
10 SYRINGE (ML) INJECTION
Status: DISCONTINUED | OUTPATIENT
Start: 2023-11-29 | End: 2023-11-29 | Stop reason: HOSPADM

## 2023-11-29 RX ORDER — ONDANSETRON 2 MG/ML
8 INJECTION INTRAMUSCULAR; INTRAVENOUS
Status: COMPLETED | OUTPATIENT
Start: 2023-11-29 | End: 2023-11-29

## 2023-11-29 RX ORDER — DEXAMETHASONE SODIUM PHOSPHATE 4 MG/ML
12 INJECTION, SOLUTION INTRA-ARTICULAR; INTRALESIONAL; INTRAMUSCULAR; INTRAVENOUS; SOFT TISSUE
Status: DISCONTINUED | OUTPATIENT
Start: 2023-11-29 | End: 2023-11-29

## 2023-11-29 RX ORDER — DIPHENHYDRAMINE HYDROCHLORIDE 50 MG/ML
50 INJECTION INTRAMUSCULAR; INTRAVENOUS ONCE AS NEEDED
Status: DISCONTINUED | OUTPATIENT
Start: 2023-11-29 | End: 2023-11-29 | Stop reason: HOSPADM

## 2023-11-29 RX ORDER — EPINEPHRINE 0.3 MG/.3ML
0.3 INJECTION SUBCUTANEOUS ONCE AS NEEDED
Status: DISCONTINUED | OUTPATIENT
Start: 2023-11-29 | End: 2023-11-29 | Stop reason: HOSPADM

## 2023-11-29 RX ORDER — HEPARIN 100 UNIT/ML
500 SYRINGE INTRAVENOUS
Status: DISCONTINUED | OUTPATIENT
Start: 2023-11-29 | End: 2023-11-29 | Stop reason: HOSPADM

## 2023-11-29 RX ADMIN — DEXAMETHASONE SODIUM PHOSPHATE: 4 INJECTION, SOLUTION INTRA-ARTICULAR; INTRALESIONAL; INTRAMUSCULAR; INTRAVENOUS; SOFT TISSUE at 10:11

## 2023-11-29 RX ADMIN — Medication 10 ML: at 12:11

## 2023-11-29 RX ADMIN — PACLITAXEL 200 MG: 100 INJECTION, POWDER, LYOPHILIZED, FOR SUSPENSION INTRAVENOUS at 11:11

## 2023-11-29 RX ADMIN — GEMCITABINE HYDROCHLORIDE 1600 MG: 1 INJECTION, SOLUTION INTRAVENOUS at 12:11

## 2023-11-29 RX ADMIN — HEPARIN 500 UNITS: 100 SYRINGE at 12:11

## 2023-11-29 RX ADMIN — SODIUM CHLORIDE: 9 INJECTION, SOLUTION INTRAVENOUS at 10:11

## 2023-11-29 RX ADMIN — ONDANSETRON 8 MG: 2 INJECTION INTRAMUSCULAR; INTRAVENOUS at 10:11

## 2023-11-30 ENCOUNTER — PATIENT MESSAGE (OUTPATIENT)
Dept: ADMINISTRATIVE | Facility: OTHER | Age: 78
End: 2023-11-30
Payer: MEDICARE

## 2023-12-01 ENCOUNTER — PATIENT MESSAGE (OUTPATIENT)
Dept: ADMINISTRATIVE | Facility: OTHER | Age: 78
End: 2023-12-01
Payer: MEDICARE

## 2023-12-02 ENCOUNTER — PATIENT MESSAGE (OUTPATIENT)
Dept: ADMINISTRATIVE | Facility: OTHER | Age: 78
End: 2023-12-02
Payer: MEDICARE

## 2023-12-03 ENCOUNTER — PATIENT MESSAGE (OUTPATIENT)
Dept: ADMINISTRATIVE | Facility: OTHER | Age: 78
End: 2023-12-03
Payer: MEDICARE

## 2023-12-04 ENCOUNTER — PATIENT MESSAGE (OUTPATIENT)
Dept: ADMINISTRATIVE | Facility: OTHER | Age: 78
End: 2023-12-04
Payer: MEDICARE

## 2023-12-05 ENCOUNTER — PATIENT MESSAGE (OUTPATIENT)
Dept: ADMINISTRATIVE | Facility: OTHER | Age: 78
End: 2023-12-05
Payer: MEDICARE

## 2023-12-06 ENCOUNTER — PATIENT MESSAGE (OUTPATIENT)
Dept: ADMINISTRATIVE | Facility: OTHER | Age: 78
End: 2023-12-06
Payer: MEDICARE

## 2023-12-06 ENCOUNTER — TELEPHONE (OUTPATIENT)
Dept: PALLIATIVE MEDICINE | Facility: CLINIC | Age: 78
End: 2023-12-06
Payer: MEDICARE

## 2023-12-06 ENCOUNTER — OFFICE VISIT (OUTPATIENT)
Dept: PALLIATIVE MEDICINE | Facility: CLINIC | Age: 78
End: 2023-12-06
Payer: MEDICARE

## 2023-12-06 DIAGNOSIS — R53.83 FATIGUE, UNSPECIFIED TYPE: ICD-10-CM

## 2023-12-06 DIAGNOSIS — F43.20 ADJUSTMENT DISORDER, UNSPECIFIED TYPE: ICD-10-CM

## 2023-12-06 DIAGNOSIS — M79.89 LEG SWELLING: ICD-10-CM

## 2023-12-06 DIAGNOSIS — C25.9 PANCREATIC ADENOCARCINOMA: ICD-10-CM

## 2023-12-06 DIAGNOSIS — R11.0 NAUSEA: ICD-10-CM

## 2023-12-06 DIAGNOSIS — R53.81 DEBILITY: Primary | ICD-10-CM

## 2023-12-06 DIAGNOSIS — G89.3 CANCER ASSOCIATED PAIN: ICD-10-CM

## 2023-12-06 DIAGNOSIS — G47.00 INSOMNIA, UNSPECIFIED TYPE: ICD-10-CM

## 2023-12-06 DIAGNOSIS — Z51.5 ENCOUNTER FOR PALLIATIVE CARE: ICD-10-CM

## 2023-12-06 PROCEDURE — 1159F PR MEDICATION LIST DOCUMENTED IN MEDICAL RECORD: ICD-10-PCS | Mod: CPTII,95,, | Performed by: NURSE PRACTITIONER

## 2023-12-06 PROCEDURE — 99215 PR OFFICE/OUTPT VISIT, EST, LEVL V, 40-54 MIN: ICD-10-PCS | Mod: 95,,, | Performed by: NURSE PRACTITIONER

## 2023-12-06 PROCEDURE — 99215 OFFICE O/P EST HI 40 MIN: CPT | Mod: 95,,, | Performed by: NURSE PRACTITIONER

## 2023-12-06 PROCEDURE — 1159F MED LIST DOCD IN RCRD: CPT | Mod: CPTII,95,, | Performed by: NURSE PRACTITIONER

## 2023-12-06 NOTE — PROGRESS NOTES
The patient location is: [place]  The chief complaint leading to consultation is: symptom mgmt/ACP    Visit type: audiovisual    Face to Face time with patient: 30    45 minutes of total time spent on the encounter, which includes face to face time and non-face to face time preparing to see the patient (eg, review of tests), Obtaining and/or reviewing separately obtained history, Documenting clinical information in the electronic or other health record, Independently interpreting results (not separately reported) and communicating results to the patient/family/caregiver, or Care coordination (not separately reported).         Each patient to whom he or she provides medical services by telemedicine is:  (1) informed of the relationship between the physician and patient and the respective role of any other health care provider with respect to management of the patient; and (2) notified that he or she may decline to receive medical services by telemedicine and may withdraw from such care at any time.    Notes:     Consult Note  Palliative Care      Consult Requested By: No ref. provider found  Reason for Consult: symptom mgmt/ACP      ASSESSMENT/PLAN:     Plan/Recommendations:  Diagnoses and all orders for this visit:    12/06/2023:  - no changes    Pancreatic adenocarcinoma  - ID 9/5/2023  - following w Dr. Briceño   - as of initial visit, plan to start first-line chemo, palliative intent, pending port placement    Encounter for palliative care   - Patient is decisional  - Patient accompanied to initial visit by her  Deshawn  - ACP documents are not uploaded into EMR   - Philosophy of Palliative Medicine reviewed with patient and family at first visit  - New patient folder given to and reviewed with patient and family at first visit  - Goals of care: first-line chemo, palliative intent. Patient has excellent insight and is accepting of her prognosis. She has a robust support system including: , Deshawn; two sons  "(50, 52 yrs); 4 grandchildren (19-27 yrs, 26 yr old is an RN); older sister.     Adjustment disorder   - patient is accepting of her prognosis  - her biggest worry is her . Says he's always been the emotional/anxious one. She is emotional when discussing leaving her  to care for himself. 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   - has good support system  - extensive family hx of cancer, has seen many of her loved-ones' journeys, including both DIL's having double mastectomies  - older sister has hx of breast cancer, says she always expected to eventually care for her, it is unexpected that it will be other way around  - no additional support needs identified at this time  - emotional support provided    Insomnia/fatigue  - wakes up after ~ 6 hours and has difficulty falling back to sleep  - she uses audio-books on a timer to help fall asleep   - some fatigue in afternoon  - prefers to avoid medications  - tip sheet provided in new patient folder  - will continue to monitor     Cancer related pain   - "soreness"/discomfort in upper abdominal area  - did take percocet for an MRI, which worked for pain, but caused her nausea, prefers to aovid medications where possible   - does have a prescription for hydrocodone 5 mg, but did not fill due shortage, discussed eventually trying norco if needed  - currently on Toradol 10 mg TID prn at night to help with pain, provides good relief, refill provided at initial visit, NO LONGER USING  - will continue to monitor     Nausea/anorexia   - "a little" nausea, usually in AM  - eating helps, though doesn't always feel like it   - cont zofran 4 mg q 8 hrs  - will continue to monitor    Constipation   - cont miralax  - bm soft     Understanding of illness: excellent     Goals of care: palliative intent    Follow up: 8-10 weeks "     Patient's encounter and above plan of care discussed with patient's oncology team.     SUBJECTIVE:     History of Present Illness:  Patient is a 78 y.o. year old female presenting with pancreatic adenocarcinoma. Please see oncology notes for full oncologic history and treatment course.     12/06/2023:  [place]  reviewed: no surprises    Virtual visit.     - s/p 3rd tx, tolerating well   - interval scan with worsening disease, but she is pain-free and functioning very well  - some fatigue for a few days post tx  - has had some issues w fevers and itching but have improved w guidance of oncology team  - no longer using Toradol   - bilateral ankle swelling (dependant edema), using compression socks and elevation with good relief       10/20/2023:  [place]  reviewed: no surprises     Patient presents to initial visit with her  Lyle Hess is tearful throughout our appointment. Patient is doing quite well. Physically, she is still able to do most tasks like cooking and laundry, which she enjoys. She has given up some more difficult tasks, like gardening, due to discomfort. She has a loving and supportive family. She is accepting and denies anxiety, depression. She is most worried about her  who she has cared for domestically through the course of their marriage. She says he is the 'emotional one', she worries about how he will fare in her absence, she is emotional while discussing this. She is awaiting port placement and plans to start chemo shortly.       Past Medical History:   Diagnosis Date    Age-related osteoporosis without current pathological fracture     Atherosclerosis of aortic arch     - noted on CXR 8/2017    Back pain     Diverticulosis of sigmoid colon 10/22/2019    Ectopic pregnancy     Fibrocystic breast     Hyperlipidemia     Inguinal hernia     Osteopenia     Polyp of ascending colon 10/22/2019    Urinary incontinence, mixed 05/24/2022     Past Surgical History:   Procedure Laterality Date     ECTOPIC PREGNANCY SURGERY      HERNIA REPAIR      left inguinal    HYSTERECTOMY  1980    without BSO     Family History   Problem Relation Age of Onset    Breast cancer Mother 69    Seizures Mother     Heart disease Father     Heart attack Father     Depression Father     Breast cancer Sister 73    Breast cancer Maternal Aunt 73    Prostate cancer Brother 81        no mets    Obesity Brother     Heart failure Maternal Grandmother     Heart attack Maternal Grandfather     Suicide Attempts Paternal Grandmother     Depression Paternal Grandmother     Mental illness Paternal Grandmother     Depression Paternal Aunt     Heart attack Paternal Uncle     Depression Paternal Uncle     Cancer Maternal Aunt 83        blood cancer (leukemia?)    Cancer Maternal Uncle         unk type; was COD    Suicide Paternal Cousin     Tongue cancer Paternal Cousin         smoking hx unk    Ovarian cancer Neg Hx      Review of patient's allergies indicates:   Allergen Reactions    Erythromycin (bulk)      Other reaction(s): Eye irritation       Medications:    Current Outpatient Medications:     coconut oil, bulk, Oil, by Misc.(Non-Drug; Combo Route) route., Disp: , Rfl:     diphenhydramine HCl (BENADRYL ORAL), Take 25 mg by mouth as needed (rash)., Disp: , Rfl:     doxycycline (VIBRAMYCIN) 100 MG Cap, Take 1 capsule (100 mg total) by mouth every 12 (twelve) hours., Disp: 14 capsule, Rfl: 0    GADAVIST 1 mmol/mL (604.72 mg/mL) Soln injection, , Disp: , Rfl:     hydrOXYzine HCL (ATARAX) 25 MG tablet, Take 1 tablet (25 mg total) by mouth 3 (three) times daily as needed for Itching., Disp: 60 tablet, Rfl: 2    LIDOcaine-prilocaine (EMLA) cream, Apply topically as needed (place to port site 45-60 minutes prior to chemotherapy)., Disp: 30 g, Rfl: 3    multivitamin (THERAGRAN) per tablet, Take 1 tablet by mouth once daily., Disp: , Rfl:     ondansetron (ZOFRAN-ODT) 4 MG TbDL, Take 1 tablet (4 mg total) by mouth every 8 (eight) hours as needed  (Nausea)., Disp: 10 tablet, Rfl: 0    ondansetron (ZOFRAN-ODT) 8 MG TbDL, TAKE 1 TABLET (8 MG TOTAL) BY MOUTH EVERY 6 HOURS AS NEEDED FOR NAUSEA, Disp: 30 tablet, Rfl: 5    triamcinolone acetonide 0.1% (KENALOG) 0.1 % ointment, Apply topically as needed (rash)., Disp: , Rfl:     vitamin D (VITAMIN D3) 1000 units Tab, Take 1,000 Units by mouth once daily., Disp: , Rfl:     HYDROcodone-acetaminophen (NORCO) 5-325 mg per tablet, Take 1 tablet by mouth every 12 (twelve) hours as needed for Pain. (Patient not taking: Reported on 12/6/2023), Disp: 10 tablet, Rfl: 0    OBJECTIVE:       ROS:  Review of Systems   Constitutional:  Positive for activity change, appetite change and fatigue.   HENT:  Negative for congestion, dental problem and drooling.    Eyes:  Negative for pain, discharge and itching.   Respiratory:  Positive for shortness of breath. Negative for wheezing and stridor.    Cardiovascular:  Positive for leg swelling. Negative for chest pain and palpitations.   Gastrointestinal:  Positive for abdominal pain, constipation and nausea. Negative for abdominal distention and diarrhea.   Musculoskeletal:  Negative for arthralgias, back pain and gait problem.   Neurological:  Negative for dizziness and light-headedness.   Psychiatric/Behavioral:  Positive for sleep disturbance. Negative for dysphoric mood. The patient is not nervous/anxious.        Review of Symptoms      Symptom Assessment (ESAS 0-10 Scale)  Pain:  0  Dyspnea:  0  Anxiety:  0  Nausea:  0  Depression:  0  Anorexia:  0  Fatigue:  0  Insomnia:  0  Restlessness:  0  Agitation:  0     CAM / Delirium:  Negative  Constipation:  Positive  Diarrhea:  Negative    Anxiety:  Is not nervous/anxious  Constipation:  Constipation    Bowel Management Plan (BMP):  Yes      Pain Assessment:  OME in 24 hours:  0  Location(s): abdomen    Abdomen       Location: generalized (upper)        Quality: None (soreness)        Quantity: 4/10 in intensity        Chronicity: Onset 0  second(s) ago, controlled        Aggravating Factors: None        Alleviating Factors: Opiates and NSAIDs        Associated Symptoms: None    Modified Ba Scale:  0    ECOG Performance Status ndGndrndanddndend:nd nd2nd Living Arrangements:  Lives with spouse    Psychosocial/Cultural:   See Palliative Psychosocial Note: Yes  **Primary  to Follow**  Palliative Care  Consult: Yes    Advance Care Planning   Advance Directives:   Living Will: No        Oral Declaration: No    [place]: No    Do Not Resuscitate Status: No    Medical Power of : No        Oral Declaration: No      Decision Making:  Patient answered questions  Goals of Care: What is most important right now is to focus on symptom/pain control, quality of life, even if it means sacrificing a little time, improvement in condition but with limits to invasive therapies. Accordingly, we have decided that the best plan to meet the patient's goals includes continuing with treatment.         Physical Exam:  Vitals:  virtual visit   There were no vitals filed for this visit.    Physical Exam  Constitutional:       General: She is not in acute distress.     Appearance: Normal appearance. She is not ill-appearing, toxic-appearing or diaphoretic.   HENT:      Head: Normocephalic and atraumatic.      Right Ear: External ear normal.      Left Ear: External ear normal.      Nose: Nose normal.   Eyes:      General:         Right eye: No discharge.         Left eye: No discharge.      Extraocular Movements: Extraocular movements intact.      Conjunctiva/sclera: Conjunctivae normal.   Cardiovascular:      Rate and Rhythm: Normal rate.   Pulmonary:      Effort: Pulmonary effort is normal.      Breath sounds: No stridor. No wheezing.   Musculoskeletal:         General: No swelling or deformity. Normal range of motion.      Cervical back: Normal range of motion.   Skin:     Coloration: Skin is not jaundiced.   Neurological:      Mental Status: She is alert and  oriented to person, place, and time. Mental status is at baseline.      Motor: No weakness.      Gait: Gait normal.   Psychiatric:         Mood and Affect: Mood normal.         Behavior: Behavior normal.         Thought Content: Thought content normal.         Judgment: Judgment normal.         Labs:  CBC:   WBC   Date Value Ref Range Status   11/28/2023 8.04 3.90 - 12.70 K/uL Final     Hemoglobin   Date Value Ref Range Status   11/28/2023 9.0 (L) 12.0 - 16.0 g/dL Final     POC Hematocrit   Date Value Ref Range Status   09/22/2023 39 36 - 54 %PCV Final     Hematocrit   Date Value Ref Range Status   11/28/2023 29.0 (L) 37.0 - 48.5 % Final     MCV   Date Value Ref Range Status   11/28/2023 91 82 - 98 fL Final     Platelets   Date Value Ref Range Status   11/28/2023 283 150 - 450 K/uL Final       LFT:   Lab Results   Component Value Date    AST 28 11/28/2023    ALKPHOS 238 (H) 11/28/2023    BILITOT 0.5 11/28/2023       Albumin:   Albumin   Date Value Ref Range Status   11/28/2023 3.2 (L) 3.5 - 5.2 g/dL Final     Protein:   Total Protein   Date Value Ref Range Status   11/28/2023 6.7 6.0 - 8.4 g/dL Final     Radiology:I have reviewed all pertinent imaging results/findings within the past 24 hours.    11/07/2023 CT CAP: 1. Enlarged, 3.0 cm hypodense mass in the pancreatic tail concerning for malignancy.  There is associated encasement of the splenic artery and occlusion of the splenic vein.  2. Interval increase in size and number of numerous hypodense masses throughout the liver, consistent with biopsy-proven adenocarcinoma.  3. Interval increase in number and size of multiple bilateral pulmonary nodules and masses concerning for metastases.  4. Sigmoid diverticulosis without diverticulitis.  5. Additional findings as above.    09/29/2023 CT chest: Impression:     Few subcentimeter pulmonary nodules, For multiple solid nodules all <6 mm, Fleischner Society 2017 guidelines recommend    follow up with non-contrast chest  CT at 12 months after discovery in a high risk patient.     Multiple liver masses and pancreatic body mass, better evaluated on CT 09/22/2023.    09/22/2023 CT AP: Impression:     1. 1.9 cm mass in the pancreatic tail suspicious for neoplasm/carcinoma.  Follow-up recommended.  2. Diffuse metastatic disease to the liver with the largest measuring 4.5 cm in the left lobe.  3. Small fat containing umbilical hernia.  4. Diverticulosis of the sigmoid colon.      Signature: Codie Gomez, DNP

## 2023-12-06 NOTE — TELEPHONE ENCOUNTER
"[facility]  From: darrell  ------------------------------------------------------------  12/6/2023 2:37:51 PM Transmission Record          Sent to [phone] with remote ID "[id number]"          Result: (0/339;0/0) Success          Page record: 1 - 6          Elapsed time: 01:57 on channel 25    "

## 2023-12-07 ENCOUNTER — PATIENT MESSAGE (OUTPATIENT)
Dept: ADMINISTRATIVE | Facility: OTHER | Age: 78
End: 2023-12-07
Payer: MEDICARE

## 2023-12-08 ENCOUNTER — PATIENT MESSAGE (OUTPATIENT)
Dept: ADMINISTRATIVE | Facility: OTHER | Age: 78
End: 2023-12-08
Payer: MEDICARE

## 2023-12-09 ENCOUNTER — PATIENT MESSAGE (OUTPATIENT)
Dept: ADMINISTRATIVE | Facility: OTHER | Age: 78
End: 2023-12-09
Payer: MEDICARE

## 2023-12-10 ENCOUNTER — PATIENT MESSAGE (OUTPATIENT)
Dept: ADMINISTRATIVE | Facility: OTHER | Age: 78
End: 2023-12-10
Payer: MEDICARE

## 2023-12-11 ENCOUNTER — PATIENT MESSAGE (OUTPATIENT)
Dept: ADMINISTRATIVE | Facility: OTHER | Age: 78
End: 2023-12-11
Payer: MEDICARE

## 2023-12-12 ENCOUNTER — PATIENT MESSAGE (OUTPATIENT)
Dept: ADMINISTRATIVE | Facility: OTHER | Age: 78
End: 2023-12-12
Payer: MEDICARE

## 2023-12-13 ENCOUNTER — LAB VISIT (OUTPATIENT)
Dept: LAB | Facility: HOSPITAL | Age: 78
End: 2023-12-13
Attending: INTERNAL MEDICINE
Payer: MEDICARE

## 2023-12-13 ENCOUNTER — PATIENT MESSAGE (OUTPATIENT)
Dept: ADMINISTRATIVE | Facility: OTHER | Age: 78
End: 2023-12-13
Payer: MEDICARE

## 2023-12-13 DIAGNOSIS — C25.9 PANCREATIC ADENOCARCINOMA: ICD-10-CM

## 2023-12-13 DIAGNOSIS — D63.0 ANEMIA IN NEOPLASTIC DISEASE: ICD-10-CM

## 2023-12-13 LAB
ALBUMIN SERPL BCP-MCNC: 3.6 G/DL (ref 3.5–5.2)
ALP SERPL-CCNC: 152 U/L (ref 55–135)
ALT SERPL W/O P-5'-P-CCNC: 18 U/L (ref 10–44)
ANION GAP SERPL CALC-SCNC: 8 MMOL/L (ref 8–16)
AST SERPL-CCNC: 26 U/L (ref 10–40)
BILIRUB SERPL-MCNC: 0.6 MG/DL (ref 0.1–1)
BUN SERPL-MCNC: 13 MG/DL (ref 8–23)
CALCIUM SERPL-MCNC: 8.9 MG/DL (ref 8.7–10.5)
CANCER AG19-9 SERPL-ACNC: ABNORMAL U/ML (ref 0–40)
CHLORIDE SERPL-SCNC: 105 MMOL/L (ref 95–110)
CO2 SERPL-SCNC: 27 MMOL/L (ref 23–29)
CREAT SERPL-MCNC: 0.7 MG/DL (ref 0.5–1.4)
ERYTHROCYTE [DISTWIDTH] IN BLOOD BY AUTOMATED COUNT: 19.1 % (ref 11.5–14.5)
EST. GFR  (NO RACE VARIABLE): >60 ML/MIN/1.73 M^2
FERRITIN SERPL-MCNC: 1571 NG/ML (ref 20–300)
GLUCOSE SERPL-MCNC: 128 MG/DL (ref 70–110)
HCT VFR BLD AUTO: 32.4 % (ref 37–48.5)
HGB BLD-MCNC: 9.8 G/DL (ref 12–16)
IMM GRANULOCYTES # BLD AUTO: 0.02 K/UL (ref 0–0.04)
IRON SERPL-MCNC: 74 UG/DL (ref 30–160)
MCH RBC QN AUTO: 27.5 PG (ref 27–31)
MCHC RBC AUTO-ENTMCNC: 30.2 G/DL (ref 32–36)
MCV RBC AUTO: 91 FL (ref 82–98)
NEUTROPHILS # BLD AUTO: 5.1 K/UL (ref 1.8–7.7)
PLATELET # BLD AUTO: 257 K/UL (ref 150–450)
PMV BLD AUTO: 10.8 FL (ref 9.2–12.9)
POTASSIUM SERPL-SCNC: 4 MMOL/L (ref 3.5–5.1)
PROT SERPL-MCNC: 7.2 G/DL (ref 6–8.4)
RBC # BLD AUTO: 3.56 M/UL (ref 4–5.4)
SATURATED IRON: 23 % (ref 20–50)
SODIUM SERPL-SCNC: 140 MMOL/L (ref 136–145)
TOTAL IRON BINDING CAPACITY: 320 UG/DL (ref 250–450)
TRANSFERRIN SERPL-MCNC: 216 MG/DL (ref 200–375)
WBC # BLD AUTO: 6.96 K/UL (ref 3.9–12.7)

## 2023-12-13 PROCEDURE — 36415 COLL VENOUS BLD VENIPUNCTURE: CPT | Mod: PO | Performed by: INTERNAL MEDICINE

## 2023-12-13 PROCEDURE — 86301 IMMUNOASSAY TUMOR CA 19-9: CPT | Performed by: INTERNAL MEDICINE

## 2023-12-13 PROCEDURE — 82728 ASSAY OF FERRITIN: CPT | Performed by: PHYSICIAN ASSISTANT

## 2023-12-13 PROCEDURE — 84466 ASSAY OF TRANSFERRIN: CPT | Performed by: PHYSICIAN ASSISTANT

## 2023-12-13 PROCEDURE — 80053 COMPREHEN METABOLIC PANEL: CPT | Performed by: INTERNAL MEDICINE

## 2023-12-13 PROCEDURE — 83540 ASSAY OF IRON: CPT | Performed by: PHYSICIAN ASSISTANT

## 2023-12-13 PROCEDURE — 85027 COMPLETE CBC AUTOMATED: CPT | Performed by: INTERNAL MEDICINE

## 2023-12-14 ENCOUNTER — INFUSION (OUTPATIENT)
Dept: INFUSION THERAPY | Facility: HOSPITAL | Age: 78
End: 2023-12-14
Payer: MEDICARE

## 2023-12-14 ENCOUNTER — PATIENT MESSAGE (OUTPATIENT)
Dept: ADMINISTRATIVE | Facility: OTHER | Age: 78
End: 2023-12-14
Payer: MEDICARE

## 2023-12-14 ENCOUNTER — OFFICE VISIT (OUTPATIENT)
Dept: HEMATOLOGY/ONCOLOGY | Facility: CLINIC | Age: 78
End: 2023-12-14
Payer: MEDICARE

## 2023-12-14 VITALS
WEIGHT: 135.56 LBS | BODY MASS INDEX: 24.02 KG/M2 | DIASTOLIC BLOOD PRESSURE: 65 MMHG | SYSTOLIC BLOOD PRESSURE: 142 MMHG | HEART RATE: 77 BPM | HEIGHT: 63 IN | RESPIRATION RATE: 18 BRPM | TEMPERATURE: 98 F

## 2023-12-14 VITALS
WEIGHT: 135.69 LBS | BODY MASS INDEX: 24.04 KG/M2 | HEART RATE: 80 BPM | SYSTOLIC BLOOD PRESSURE: 123 MMHG | OXYGEN SATURATION: 99 % | DIASTOLIC BLOOD PRESSURE: 60 MMHG | RESPIRATION RATE: 18 BRPM | HEIGHT: 63 IN

## 2023-12-14 DIAGNOSIS — M81.0 AGE-RELATED OSTEOPOROSIS WITHOUT CURRENT PATHOLOGICAL FRACTURE: ICD-10-CM

## 2023-12-14 DIAGNOSIS — C25.9 PANCREATIC ADENOCARCINOMA: Primary | ICD-10-CM

## 2023-12-14 DIAGNOSIS — D49.9 IMMUNODEFICIENCY SECONDARY TO NEOPLASM: ICD-10-CM

## 2023-12-14 DIAGNOSIS — C78.7 METASTASIS TO LIVER: ICD-10-CM

## 2023-12-14 DIAGNOSIS — T45.1X5A IMMUNODEFICIENCY DUE TO CHEMOTHERAPY: ICD-10-CM

## 2023-12-14 DIAGNOSIS — C78.02 MALIGNANT NEOPLASM METASTATIC TO BOTH LUNGS: ICD-10-CM

## 2023-12-14 DIAGNOSIS — L28.2 PRURITIC RASH: ICD-10-CM

## 2023-12-14 DIAGNOSIS — D84.821 IMMUNODEFICIENCY DUE TO CHEMOTHERAPY: ICD-10-CM

## 2023-12-14 DIAGNOSIS — Z79.899 IMMUNODEFICIENCY DUE TO CHEMOTHERAPY: ICD-10-CM

## 2023-12-14 DIAGNOSIS — K59.00 CONSTIPATION, UNSPECIFIED CONSTIPATION TYPE: ICD-10-CM

## 2023-12-14 DIAGNOSIS — C78.01 MALIGNANT NEOPLASM METASTATIC TO BOTH LUNGS: ICD-10-CM

## 2023-12-14 DIAGNOSIS — D84.81 IMMUNODEFICIENCY SECONDARY TO NEOPLASM: ICD-10-CM

## 2023-12-14 DIAGNOSIS — D63.0 ANEMIA IN NEOPLASTIC DISEASE: ICD-10-CM

## 2023-12-14 DIAGNOSIS — R60.0 LEG EDEMA, LEFT: ICD-10-CM

## 2023-12-14 PROCEDURE — 25000003 PHARM REV CODE 250: Mod: HCNC | Performed by: INTERNAL MEDICINE

## 2023-12-14 PROCEDURE — 1126F PR PAIN SEVERITY QUANTIFIED, NO PAIN PRESENT: ICD-10-PCS | Mod: HCNC,CPTII,S$GLB, | Performed by: PHYSICIAN ASSISTANT

## 2023-12-14 PROCEDURE — 99215 PR OFFICE/OUTPT VISIT, EST, LEVL V, 40-54 MIN: ICD-10-PCS | Mod: HCNC,S$GLB,, | Performed by: PHYSICIAN ASSISTANT

## 2023-12-14 PROCEDURE — 3074F SYST BP LT 130 MM HG: CPT | Mod: HCNC,CPTII,S$GLB, | Performed by: PHYSICIAN ASSISTANT

## 2023-12-14 PROCEDURE — 1160F RVW MEDS BY RX/DR IN RCRD: CPT | Mod: HCNC,CPTII,S$GLB, | Performed by: PHYSICIAN ASSISTANT

## 2023-12-14 PROCEDURE — 1126F AMNT PAIN NOTED NONE PRSNT: CPT | Mod: HCNC,CPTII,S$GLB, | Performed by: PHYSICIAN ASSISTANT

## 2023-12-14 PROCEDURE — 1159F MED LIST DOCD IN RCRD: CPT | Mod: HCNC,CPTII,S$GLB, | Performed by: PHYSICIAN ASSISTANT

## 2023-12-14 PROCEDURE — 96417 CHEMO IV INFUS EACH ADDL SEQ: CPT | Mod: HCNC

## 2023-12-14 PROCEDURE — 3078F DIAST BP <80 MM HG: CPT | Mod: HCNC,CPTII,S$GLB, | Performed by: PHYSICIAN ASSISTANT

## 2023-12-14 PROCEDURE — 3078F PR MOST RECENT DIASTOLIC BLOOD PRESSURE < 80 MM HG: ICD-10-PCS | Mod: HCNC,CPTII,S$GLB, | Performed by: PHYSICIAN ASSISTANT

## 2023-12-14 PROCEDURE — 3288F FALL RISK ASSESSMENT DOCD: CPT | Mod: HCNC,CPTII,S$GLB, | Performed by: PHYSICIAN ASSISTANT

## 2023-12-14 PROCEDURE — 99215 OFFICE O/P EST HI 40 MIN: CPT | Mod: HCNC,S$GLB,, | Performed by: PHYSICIAN ASSISTANT

## 2023-12-14 PROCEDURE — 96413 CHEMO IV INFUSION 1 HR: CPT | Mod: HCNC

## 2023-12-14 PROCEDURE — 1101F PT FALLS ASSESS-DOCD LE1/YR: CPT | Mod: HCNC,CPTII,S$GLB, | Performed by: PHYSICIAN ASSISTANT

## 2023-12-14 PROCEDURE — 63600175 PHARM REV CODE 636 W HCPCS: Mod: JZ,JG,HCNC | Performed by: INTERNAL MEDICINE

## 2023-12-14 PROCEDURE — 3288F PR FALLS RISK ASSESSMENT DOCUMENTED: ICD-10-PCS | Mod: HCNC,CPTII,S$GLB, | Performed by: PHYSICIAN ASSISTANT

## 2023-12-14 PROCEDURE — 1160F PR REVIEW ALL MEDS BY PRESCRIBER/CLIN PHARMACIST DOCUMENTED: ICD-10-PCS | Mod: HCNC,CPTII,S$GLB, | Performed by: PHYSICIAN ASSISTANT

## 2023-12-14 PROCEDURE — 99999 PR PBB SHADOW E&M-EST. PATIENT-LVL IV: CPT | Mod: PBBFAC,,, | Performed by: PHYSICIAN ASSISTANT

## 2023-12-14 PROCEDURE — 3074F PR MOST RECENT SYSTOLIC BLOOD PRESSURE < 130 MM HG: ICD-10-PCS | Mod: HCNC,CPTII,S$GLB, | Performed by: PHYSICIAN ASSISTANT

## 2023-12-14 PROCEDURE — 99999 PR PBB SHADOW E&M-EST. PATIENT-LVL IV: ICD-10-PCS | Mod: PBBFAC,,, | Performed by: PHYSICIAN ASSISTANT

## 2023-12-14 PROCEDURE — 96367 TX/PROPH/DG ADDL SEQ IV INF: CPT | Mod: HCNC

## 2023-12-14 PROCEDURE — 1159F PR MEDICATION LIST DOCUMENTED IN MEDICAL RECORD: ICD-10-PCS | Mod: HCNC,CPTII,S$GLB, | Performed by: PHYSICIAN ASSISTANT

## 2023-12-14 PROCEDURE — 1101F PR PT FALLS ASSESS DOC 0-1 FALLS W/OUT INJ PAST YR: ICD-10-PCS | Mod: HCNC,CPTII,S$GLB, | Performed by: PHYSICIAN ASSISTANT

## 2023-12-14 RX ORDER — EPINEPHRINE 0.3 MG/.3ML
0.3 INJECTION SUBCUTANEOUS ONCE AS NEEDED
Status: DISCONTINUED | OUTPATIENT
Start: 2023-12-14 | End: 2023-12-14 | Stop reason: HOSPADM

## 2023-12-14 RX ORDER — SODIUM CHLORIDE 0.9 % (FLUSH) 0.9 %
10 SYRINGE (ML) INJECTION
Status: DISCONTINUED | OUTPATIENT
Start: 2023-12-14 | End: 2023-12-14 | Stop reason: HOSPADM

## 2023-12-14 RX ORDER — ONDANSETRON HCL IN 0.9 % NACL 8 MG/50 ML
8 INTRAVENOUS SOLUTION, PIGGYBACK (ML) INTRAVENOUS
Status: CANCELLED
Start: 2023-12-14

## 2023-12-14 RX ORDER — PROCHLORPERAZINE EDISYLATE 5 MG/ML
5 INJECTION INTRAMUSCULAR; INTRAVENOUS ONCE AS NEEDED
Status: DISCONTINUED | OUTPATIENT
Start: 2023-12-14 | End: 2023-12-14 | Stop reason: HOSPADM

## 2023-12-14 RX ORDER — HEPARIN 100 UNIT/ML
500 SYRINGE INTRAVENOUS
Status: DISCONTINUED | OUTPATIENT
Start: 2023-12-14 | End: 2023-12-14 | Stop reason: HOSPADM

## 2023-12-14 RX ORDER — DIPHENHYDRAMINE HYDROCHLORIDE 50 MG/ML
50 INJECTION INTRAMUSCULAR; INTRAVENOUS ONCE AS NEEDED
Status: DISCONTINUED | OUTPATIENT
Start: 2023-12-14 | End: 2023-12-14 | Stop reason: HOSPADM

## 2023-12-14 RX ADMIN — ALTEPLASE 2 MG: 2.2 INJECTION, POWDER, LYOPHILIZED, FOR SOLUTION INTRAVENOUS at 11:12

## 2023-12-14 RX ADMIN — DEXAMETHASONE SODIUM PHOSPHATE 8 MG: 4 INJECTION, SOLUTION INTRAMUSCULAR; INTRAVENOUS at 11:12

## 2023-12-14 RX ADMIN — GEMCITABINE HYDROCHLORIDE 1600 MG: 1 INJECTION, SOLUTION INTRAVENOUS at 01:12

## 2023-12-14 RX ADMIN — HEPARIN 500 UNITS: 100 SYRINGE at 01:12

## 2023-12-14 RX ADMIN — PACLITAXEL 200 MG: 100 INJECTION, POWDER, LYOPHILIZED, FOR SUSPENSION INTRAVENOUS at 12:12

## 2023-12-14 NOTE — PROGRESS NOTES
MEDICAL ONCOLOGY - ESTABLISHED PATIENT VISIT    Reason for visit: Pancreatic adenocarcinoma    Best Contact Phone Number(s): [phone] (home)      Cancer/Stage/TNM:    Cancer Staging   Pancreatic adenocarcinoma  Staging form: Exocrine Pancreas, AJCC 8th Edition  - Clinical stage from 9/21/2023: Stage IV (cT1, cNX, pM1) - Signed by David Briceño MD on 10/11/2023       Oncology History   Pancreatic adenocarcinoma   9/5/2023 Initial Diagnosis    Pancreatic adenocarcinoma     9/21/2023 Cancer Staged    Staging form: Exocrine Pancreas, AJCC 8th Edition  - Clinical stage from 9/21/2023: Stage IV (cT1, cNX, pM1)     10/2/2023 Tumor Conference     [facility] UGI MULTIDISCIPLINARY TUMOR BOARD  PATIENT REVIEW FORM   ____________________________________________________________    CLINIC #: [id number]  DATE: 10/2/2023    DIAGNOSIS: pancreas CA    PRESENTER: Angela    PATIENT SUMMARY:   This 77 y/o female presented in August with abd pain, decreased appetite with weight loss. Reviewed CT and MRI imaging ~ 2 cm mass in tail of pancreas with diffuse metastatic liver disease, largest liver lesion in left lobe measuring 4.5 cm. IR performed liver bx and this pathology was reviewed - poorly differentiated adenocarcinoma, favoring upper GI origin.     BOARD RECOMMENDATIONS:   Stage IV pancreas CA with liver mets  Add MMR and TEMPUS  Proceed with palliative systemic chemotherapy    CONSULT NEEDED:     [] Surgery    [] Hem/Onc    [] Rad/Onc    [] Dietary     [] Social Service    [] Psychology       [] AES  [] Radiology     Clinical Stage: Tumor   Node(s)   Metastasis        GROUP STAGE:  [] O    [] 1A    [] IB    [] IIA    [] IIB     [] IIIA     [] IIIB     [] IIIC    [x]IV  [] Local recurrence     [] Regional recurrence     [] Distant recurrence   Metastatic site(s): liver         [x] Elidia'l Treatment Guidelines reviewed and care planned is consistent with guidelines.         (i.e., NCCN, NCI, PD, ACO, AUA,  etc.)    PRESENTATION AT CANCER CONFERENCE:         [x] Prospective    [] Retrospective     [] Follow-Up    Eligible for clinical trial : yes       11/1/2023 -  Chemotherapy    Treatment Summary   Plan Name: OP PANC NAB-PACLITAXEL + GEMCITABINE Q4W  Treatment Goal: Palliative  Status: Active  Start Date: 11/1/2023  End Date: 9/5/2024 (Planned)  Provider: David Briceño MD  Chemotherapy: gemcitabine (GEMZAR) 1,600 mg in sodium chloride 0.9% SolP 327.08 mL chemo infusion, 1,680 mg, Intravenous, Clinic/HOD 1 time, 2 of 12 cycles  Administration: 1,600 mg (11/1/2023), 1,600 mg (11/15/2023), 1,600 mg (11/29/2023)          Interim History:   78 y.o. female with metastatic pancreatic cancer who presents for follow-up prior to cycle 4 of biweekly gemcitabine + nab-paclitaxel. Doing better after this past cycle of chemotherapy. Her bowel movements have improved and her abdominal pain is stable but controlled with medication if needed. She has not needed any pain medication at this time. She is eating and has a stable appetite. Fatigue is stable. She is able to perform ADLs at home. She does have some intermittent unsteadiness on her feet but she has not fallen. She also has some intermittent swelling to the lower legs, improves with compression stockings. No worsening SOB or ongoing weight loss. Itching continues to be controlled with benadryl. She has managed her chemotherapy fever, alternating between Motrin and Tylenol. She did not have a fever after this past cycle due to her management. Overall doing much better today.     ECOG PS is 0. Presents with her  today.     History has been obtained by chart review and discussion with the patient.    ROS:   Review of Systems   Constitutional:  Positive for weight loss. Negative for chills, fever and malaise/fatigue.   HENT:  Negative for sore throat.    Eyes:  Negative for blurred vision and pain.   Respiratory:  Negative for cough and shortness of breath.     Cardiovascular:  Positive for leg swelling. Negative for chest pain.   Gastrointestinal:  Positive for abdominal pain and constipation. Negative for diarrhea, nausea and vomiting.   Genitourinary:  Negative for dysuria and frequency.   Musculoskeletal:  Negative for back pain, falls and myalgias.   Skin:  Positive for itching. Negative for rash.   Neurological:  Negative for dizziness, weakness and headaches.   Endo/Heme/Allergies:  Does not bruise/bleed easily.   Psychiatric/Behavioral:  Negative for depression. The patient is not nervous/anxious.        Past Medical History:   Past Medical History:   Diagnosis Date    Age-related osteoporosis without current pathological fracture     Atherosclerosis of aortic arch     - noted on CXR 8/2017    Back pain     Diverticulosis of sigmoid colon 10/22/2019    Ectopic pregnancy     Fibrocystic breast     Hyperlipidemia     Inguinal hernia     Osteopenia     Polyp of ascending colon 10/22/2019    Urinary incontinence, mixed 05/24/2022        Past Surgical History:   Past Surgical History:   Procedure Laterality Date    ECTOPIC PREGNANCY SURGERY      HERNIA REPAIR      left inguinal    HYSTERECTOMY  1980    without BSO        Family History:   Family History   Problem Relation Age of Onset    Breast cancer Mother 69    Seizures Mother     Heart disease Father     Heart attack Father     Depression Father     Breast cancer Sister 73    Breast cancer Maternal Aunt 73    Prostate cancer Brother 81        no mets    Obesity Brother     Heart failure Maternal Grandmother     Heart attack Maternal Grandfather     Suicide Attempts Paternal Grandmother     Depression Paternal Grandmother     Mental illness Paternal Grandmother     Depression Paternal Aunt     Heart attack Paternal Uncle     Depression Paternal Uncle     Cancer Maternal Aunt 83        blood cancer (leukemia?)    Cancer Maternal Uncle         unk type; was COD    Suicide Paternal Cousin     Tongue cancer Paternal  Cousin         smoking hx unk    Ovarian cancer Neg Hx         Social History:   Social History     Tobacco Use    Smoking status: Never    Smokeless tobacco: Never   Substance Use Topics    Alcohol use: No        I have reviewed and updated the patient's past medical, surgical, family and social histories.    Allergies:   Review of patient's allergies indicates:   Allergen Reactions    Erythromycin (bulk)      Other reaction(s): Eye irritation        Medications:   Current Outpatient Medications   Medication Sig Dispense Refill    coconut oil, bulk, Oil by Misc.(Non-Drug; Combo Route) route.      diphenhydramine HCl (BENADRYL ORAL) Take 25 mg by mouth as needed (rash).      doxycycline (VIBRAMYCIN) 100 MG Cap Take 1 capsule (100 mg total) by mouth every 12 (twelve) hours. 14 capsule 0    GADAVIST 1 mmol/mL (604.72 mg/mL) Soln injection       HYDROcodone-acetaminophen (NORCO) 5-325 mg per tablet Take 1 tablet by mouth every 12 (twelve) hours as needed for Pain. (Patient not taking: Reported on 12/6/2023) 10 tablet 0    hydrOXYzine HCL (ATARAX) 25 MG tablet Take 1 tablet (25 mg total) by mouth 3 (three) times daily as needed for Itching. 60 tablet 2    LIDOcaine-prilocaine (EMLA) cream Apply topically as needed (place to port site 45-60 minutes prior to chemotherapy). 30 g 3    multivitamin (THERAGRAN) per tablet Take 1 tablet by mouth once daily.      ondansetron (ZOFRAN-ODT) 4 MG TbDL Take 1 tablet (4 mg total) by mouth every 8 (eight) hours as needed (Nausea). 10 tablet 0    ondansetron (ZOFRAN-ODT) 8 MG TbDL TAKE 1 TABLET (8 MG TOTAL) BY MOUTH EVERY 6 HOURS AS NEEDED FOR NAUSEA 30 tablet 5    triamcinolone acetonide 0.1% (KENALOG) 0.1 % ointment Apply topically as needed (rash).      vitamin D (VITAMIN D3) 1000 units Tab Take 1,000 Units by mouth once daily.       No current facility-administered medications for this visit.        Physical Exam:   /60 (BP Location: Left arm, Patient Position: Sitting, BP  "Method: Large (Automatic))   Pulse 80   Resp 18   Ht 5' 3" (1.6 m)   Wt 61.5 kg (135 lb 11.1 oz)   SpO2 99%   BMI 24.04 kg/m²                Physical Exam  Vitals reviewed.   Constitutional:       General: She is not in acute distress.     Appearance: She is well-developed. She is not diaphoretic.      Comments: General physical exam was limited due to virtual visit presentation.    HENT:      Head: Normocephalic.   Eyes:      General: No scleral icterus.  Neck:      Trachea: No tracheal deviation.   Pulmonary:      Effort: Pulmonary effort is normal.   Musculoskeletal:         General: No deformity.   Skin:     Findings: No rash.   Neurological:      Mental Status: She is alert and oriented to person, place, and time.   Psychiatric:         Behavior: Behavior normal.         Thought Content: Thought content normal.         Judgment: Judgment normal.           Labs:   Recent Results (from the past 48 hour(s))   CBC Oncology    Collection Time: 12/13/23 10:00 AM   Result Value Ref Range    WBC 6.96 3.90 - 12.70 K/uL    RBC 3.56 (L) 4.00 - 5.40 M/uL    Hemoglobin 9.8 (L) 12.0 - 16.0 g/dL    Hematocrit 32.4 (L) 37.0 - 48.5 %    MCV 91 82 - 98 fL    MCH 27.5 27.0 - 31.0 pg    MCHC 30.2 (L) 32.0 - 36.0 g/dL    RDW 19.1 (H) 11.5 - 14.5 %    Platelets 257 150 - 450 K/uL    MPV 10.8 9.2 - 12.9 fL    Gran # (ANC) 5.1 1.8 - 7.7 K/uL    Immature Grans (Abs) 0.02 0.00 - 0.04 K/uL   Comprehensive Metabolic Panel    Collection Time: 12/13/23 10:00 AM   Result Value Ref Range    Sodium 140 136 - 145 mmol/L    Potassium 4.0 3.5 - 5.1 mmol/L    Chloride 105 95 - 110 mmol/L    CO2 27 23 - 29 mmol/L    Glucose 128 (H) 70 - 110 mg/dL    BUN 13 8 - 23 mg/dL    Creatinine 0.7 0.5 - 1.4 mg/dL    Calcium 8.9 8.7 - 10.5 mg/dL    Total Protein 7.2 6.0 - 8.4 g/dL    Albumin 3.6 3.5 - 5.2 g/dL    Total Bilirubin 0.6 0.1 - 1.0 mg/dL    Alkaline Phosphatase 152 (H) 55 - 135 U/L    AST 26 10 - 40 U/L    ALT 18 10 - 44 U/L    eGFR >60 >60 " mL/min/1.73 m^2    Anion Gap 8 8 - 16 mmol/L   Cancer Antigen 19-9    Collection Time: 12/13/23 10:00 AM   Result Value Ref Range    CA 19-9 >66251.0 (H) 0.0 - 40.0 U/mL   Iron and TIBC    Collection Time: 12/13/23 10:00 AM   Result Value Ref Range    Iron 74 30 - 160 ug/dL    Transferrin 216 200 - 375 mg/dL    TIBC 320 250 - 450 ug/dL    Saturated Iron 23 20 - 50 %   FERRITIN    Collection Time: 12/13/23 10:00 AM   Result Value Ref Range    Ferritin 1,571 (H) 20.0 - 300.0 ng/mL          Imaging:       CT CAP 11/7/23:  Impression:     1. Enlarged, 3.0 cm hypodense mass in the pancreatic tail concerning for malignancy.  There is associated encasement of the splenic artery and occlusion of the splenic vein.  2. Interval increase in size and number of numerous hypodense masses throughout the liver, consistent with biopsy-proven adenocarcinoma.  3. Interval increase in number and size of multiple bilateral pulmonary nodules and masses concerning for metastases.  4. Sigmoid diverticulosis without diverticulitis.  5. Additional findings as above.  6. This report was flagged in Epic as abnormal.    Path:   Final Pathologic Diagnosis     Left hepatic lobe, biopsy:   Positive for malignancy, poorly differentiated adenocarcinoma (see comment)   Tumor necrosis present     Comment:  A review of the patient's imaging shows the presence of a 1.9 cm pancreatic tail mass and diffuse liver lesions.  These morphologic and immunophenotypic findings are supportive of a pancreatobiliary and/or upper gastrointestinal primary.  VC      Comment: Interp By Nataliia Peralta D.O., Signed on 10/05/2023 at 08:36   Supplemental Diagnosis     The purpose of this supplemental report is to report results of MMR panel in the tumor cells as follows:     Immunohistochemistry (IHC) Testing for Mismatch Repair (MMR) Proteins:      MLH1 - Intact nuclear expression    MSH2 - Intact nuclear expression    MSH6 - Intact nuclear expression    PMS2 -  Intact nuclear expression            Somatic Genomic Results       Pancreatic mass       Tumor NGS Tissue  Resulted: 10/10/23 Status: Preliminary result       Detected - Pathogenic (4)      Gene Variant VAF   BRAF p.[id number] - c.1458_[id number] Inframe deletion - GOF 12.00 %   CDKN2A CDKN2A - Copy number loss --   CDKN2B CDKN2B - Copy number loss --   TP53 p.Y163C - c.488A>G Missense variant - LOF 30.50 %              Detected - Uncertain significance (1)      Gene Variant VAF   DIS3 p.N671S - c.2012A>G Missense variant 23.10 %                                        Assessment:       1. Pancreatic adenocarcinoma    2. 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 Metastasis to liver    4. Immunodeficiency due to chemotherapy    5. Immunodeficiency secondary to neoplasm    6. Leg edema, left    7. Pruritic rash    8. 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 Age-related osteoporosis without current pathological fracture                Plan:             # Pancreatic cancer  We discussed with the patient and her family her biopsy-proven diagnosis of metastatic pancreatic adenocarcinoma with multifocal liver metastases.  Her tumor is pMMR and Tempus tissue showed a non V600E BRAF mutation, TP53 mutation and CDKN2A and CDKN2B loss.  QUINN.    We discussed her prognosis and potential treatment options.  Because of her age and discussion of tolerance concerns, I have recommended biweekly gemcitabine + nab-paclitaxel as palliative intent first-line therapy.  Dosing for gemcitabine + nab-paclitaxel will be 1000 mg/m2 and 125 mg/m2, respectively, and I will administer these on a biweekly basis.    Port was placed.  Consideration to BRAF non-V600E targeting trials in the future if available.    She began cycle 1 of biweekly gemcitabine + nab-paclitaxel on 11/1/23.  She experienced fevers/chills and rash. believe these are all chemotherapy related. Improved after the  administration of Dex.     Will continue to administer dexamethasone 12 mg IV prior to infusion for rash prevention.  May need to increase to 20mg for future cycles. Will continue to monitor.    Also gave her Medrol Dose Nghia to use in case rash recurs despite premed dex.    -Doing well today. Eating well and has a fair appetite. Swelling improved with compression stockings. Fever controlled.   -Lab work adequate to proceed with chemotherapy  -Proceed with cycle 4.    RTC in 2 weeks for next cycle with lab work, scans and treatment discussion. Will repeat imaging studies after cycle 4    Has Palliative Care appointment on 12/6  Genetic testing done outside - saw Julito Sosa here.    PGX showed DPYD nml and UGT1A1 intermediate metabolism.    # HLD, constipation, pruritus, fever  Not currently on therapy. Cholesterol stable from June 2023. Will continue to f/u with PCP regarding this   Constipation: stable.   Pruritus/fever: controlled with increased pre-meds and Benadryl. Continues to control fever alternating Motrin and Tylenol. Feels better today  Monitor.    # Osteoporosis, physical concerns  Monitor calcium levels on chemo.  Will continue to encourage maintaining activity.  Discussed physical contact precautions and concerns with patient. Pt voiced appreciation of discussion    Follow up: 2 weeks for cycle 5 with lab work and CT scan.     The above information has been reviewed with the patient and all questions have been answered to their apparent satisfaction.  They understand that they can call the clinic with any questions.      ESTER Rowan, PA-C  Physician Assistant Certified  Dept of Hematology/Oncology  PA-C to Dr. Willis, Dr. Briceño and Dr. Noyola       Route Chart for Scheduling.    Med Onc Chart Routing      Follow up with physician 4 weeks. See Dr. Briceño in 4 weeks for chemotherapy   Follow up with DONTE 2 weeks. See DONTE as scheduled with CT scan, lab work and next cycle of chemotherapy with  [place]/Abraxane   Infusion scheduling note    Injection scheduling note    Labs CBC, CMP and CA 19-9   Scheduling:  Preferred lab:  Lab interval: every 2 weeks     Imaging CT chest abdomen pelvis   Scheduled in 2 weeks.   Pharmacy appointment    Other referrals                  Treatment Plan Information   OP PANC NAB-PACLITAXEL + GEMCITABINE Q4W   David Briceño MD   Upcoming Treatment Dates - OP PANC NAB-PACLITAXEL + GEMCITABINE Q4W    11/30/2023       Chemotherapy       PACLitaxeL protein-bound (ABRAXANE) in 42 mL infusion       gemcitabine (GEMZAR) in sodium chloride 0.9% SolP 250 mL chemo infusion       Antiemetics       ondansetron (ZOFRAN) 8 mg, dexAMETHasone (DECADRON) 12 mg in sodium chloride 0.9% 50 mL IVPB  12/14/2023       Chemotherapy       PACLitaxeL protein-bound (ABRAXANE) in 42 mL infusion       gemcitabine (GEMZAR) in sodium chloride 0.9% SolP 250 mL chemo infusion       Antiemetics       ondansetron (ZOFRAN) 8 mg, dexAMETHasone (DECADRON) 12 mg in sodium chloride 0.9% 50 mL IVPB  12/28/2023       Chemotherapy       PACLitaxeL protein-bound (ABRAXANE) in 42 mL infusion       gemcitabine (GEMZAR) in sodium chloride 0.9% SolP 250 mL chemo infusion       Antiemetics       ondansetron (ZOFRAN) 8 mg, dexAMETHasone (DECADRON) 12 mg in sodium chloride 0.9% 50 mL IVPB  1/11/2024       Chemotherapy       PACLitaxeL protein-bound (ABRAXANE) in 42 mL infusion       gemcitabine (GEMZAR) in sodium chloride 0.9% SolP 250 mL chemo infusion       Antiemetics       ondansetron (ZOFRAN) 8 mg, dexAMETHasone (DECADRON) 12 mg in sodium chloride 0.9% 50 mL IVPB

## 2023-12-15 ENCOUNTER — PATIENT MESSAGE (OUTPATIENT)
Dept: ADMINISTRATIVE | Facility: OTHER | Age: 78
End: 2023-12-15
Payer: MEDICARE

## 2023-12-16 ENCOUNTER — PATIENT MESSAGE (OUTPATIENT)
Dept: ADMINISTRATIVE | Facility: OTHER | Age: 78
End: 2023-12-16
Payer: MEDICARE

## 2023-12-17 ENCOUNTER — PATIENT MESSAGE (OUTPATIENT)
Dept: ADMINISTRATIVE | Facility: OTHER | Age: 78
End: 2023-12-17
Payer: MEDICARE

## 2023-12-18 ENCOUNTER — PATIENT MESSAGE (OUTPATIENT)
Dept: ADMINISTRATIVE | Facility: OTHER | Age: 78
End: 2023-12-18
Payer: MEDICARE

## 2023-12-19 ENCOUNTER — PATIENT MESSAGE (OUTPATIENT)
Dept: ADMINISTRATIVE | Facility: OTHER | Age: 78
End: 2023-12-19
Payer: MEDICARE

## 2023-12-19 ENCOUNTER — TELEPHONE (OUTPATIENT)
Dept: HEMATOLOGY/ONCOLOGY | Facility: CLINIC | Age: 78
End: 2023-12-19
Payer: MEDICARE

## 2023-12-19 NOTE — TELEPHONE ENCOUNTER
"Called pt.  She reported feeling good after chemo on the 14th-- not feeling any side effects.  She asked if maybe the Infusion nurse missed the port.  She remembered nurse had to put cathflo as she wasn't getting much blood.  But nurse was able to get blood after that.  Checked Infusion notes to confirm good blood return. Explained to patient that that's usually a good sign that the needle was in the right place.      ----- Message from Niki Andres sent at 12/19/2023 12:02 PM CST -----  Regarding: Pt advice  Contact: Pt     Pt requesting call back in regards to last infusion. Pt stated she believe the portal was missed.   Please call and adv @       Confirmed contact below:   Contact Name: Deepali Alex  Phone Number: [phone]               Additional Notes:  "Thank you for all that you do for our patients"                                           "

## 2023-12-20 ENCOUNTER — PATIENT MESSAGE (OUTPATIENT)
Dept: ADMINISTRATIVE | Facility: OTHER | Age: 78
End: 2023-12-20
Payer: MEDICARE

## 2023-12-21 ENCOUNTER — PATIENT MESSAGE (OUTPATIENT)
Dept: ADMINISTRATIVE | Facility: OTHER | Age: 78
End: 2023-12-21
Payer: MEDICARE

## 2023-12-22 ENCOUNTER — PATIENT MESSAGE (OUTPATIENT)
Dept: ADMINISTRATIVE | Facility: OTHER | Age: 78
End: 2023-12-22
Payer: MEDICARE

## 2023-12-23 ENCOUNTER — PATIENT MESSAGE (OUTPATIENT)
Dept: ADMINISTRATIVE | Facility: OTHER | Age: 78
End: 2023-12-23
Payer: MEDICARE

## 2023-12-24 ENCOUNTER — PATIENT MESSAGE (OUTPATIENT)
Dept: ADMINISTRATIVE | Facility: OTHER | Age: 78
End: 2023-12-24
Payer: MEDICARE

## 2023-12-25 ENCOUNTER — PATIENT MESSAGE (OUTPATIENT)
Dept: ADMINISTRATIVE | Facility: OTHER | Age: 78
End: 2023-12-25
Payer: MEDICARE

## 2023-12-26 ENCOUNTER — HOSPITAL ENCOUNTER (OUTPATIENT)
Dept: RADIOLOGY | Facility: HOSPITAL | Age: 78
Discharge: HOME | End: 2023-12-26
Attending: PHYSICIAN ASSISTANT
Payer: MEDICARE

## 2023-12-26 ENCOUNTER — PATIENT MESSAGE (OUTPATIENT)
Dept: ADMINISTRATIVE | Facility: OTHER | Age: 78
End: 2023-12-26
Payer: MEDICARE

## 2023-12-26 DIAGNOSIS — C25.9 PANCREATIC ADENOCARCINOMA: ICD-10-CM

## 2023-12-26 PROCEDURE — 71260 CT THORAX DX C+: CPT | Mod: 26,HCNC,, | Performed by: RADIOLOGY

## 2023-12-26 PROCEDURE — 25500020 PHARM REV CODE 255: Mod: HCNC | Performed by: PHYSICIAN ASSISTANT

## 2023-12-26 PROCEDURE — 71260 CT CHEST ABDOMEN PELVIS WITH IV CONTRAST (XPD): ICD-10-PCS | Mod: 26,HCNC,, | Performed by: RADIOLOGY

## 2023-12-26 PROCEDURE — 74177 CT ABD & PELVIS W/CONTRAST: CPT | Mod: 26,HCNC,, | Performed by: RADIOLOGY

## 2023-12-26 PROCEDURE — 71260 CT THORAX DX C+: CPT | Mod: TC,HCNC

## 2023-12-26 PROCEDURE — 74177 CT CHEST ABDOMEN PELVIS WITH IV CONTRAST (XPD): ICD-10-PCS | Mod: 26,HCNC,, | Performed by: RADIOLOGY

## 2023-12-26 RX ADMIN — IOHEXOL 15 ML: 300 INJECTION, SOLUTION INTRAVENOUS at 11:12

## 2023-12-26 RX ADMIN — IOHEXOL 75 ML: 350 INJECTION, SOLUTION INTRAVENOUS at 11:12

## 2023-12-27 ENCOUNTER — OFFICE VISIT (OUTPATIENT)
Dept: HEMATOLOGY/ONCOLOGY | Facility: CLINIC | Age: 78
End: 2023-12-27
Payer: MEDICARE

## 2023-12-27 ENCOUNTER — PATIENT MESSAGE (OUTPATIENT)
Dept: ADMINISTRATIVE | Facility: OTHER | Age: 78
End: 2023-12-27
Payer: MEDICARE

## 2023-12-27 ENCOUNTER — INFUSION (OUTPATIENT)
Dept: INFUSION THERAPY | Facility: HOSPITAL | Age: 78
End: 2023-12-27
Payer: MEDICARE

## 2023-12-27 VITALS
OXYGEN SATURATION: 99 % | HEIGHT: 63 IN | RESPIRATION RATE: 18 BRPM | BODY MASS INDEX: 24.42 KG/M2 | HEART RATE: 76 BPM | WEIGHT: 137.81 LBS | DIASTOLIC BLOOD PRESSURE: 59 MMHG | SYSTOLIC BLOOD PRESSURE: 117 MMHG | TEMPERATURE: 98 F

## 2023-12-27 VITALS
TEMPERATURE: 98 F | RESPIRATION RATE: 18 BRPM | OXYGEN SATURATION: 99 % | BODY MASS INDEX: 24.42 KG/M2 | HEIGHT: 63 IN | SYSTOLIC BLOOD PRESSURE: 111 MMHG | WEIGHT: 137.81 LBS | HEART RATE: 72 BPM | DIASTOLIC BLOOD PRESSURE: 68 MMHG

## 2023-12-27 DIAGNOSIS — M81.0 AGE-RELATED OSTEOPOROSIS WITHOUT CURRENT PATHOLOGICAL FRACTURE: ICD-10-CM

## 2023-12-27 DIAGNOSIS — C78.01 MALIGNANT NEOPLASM METASTATIC TO BOTH LUNGS: ICD-10-CM

## 2023-12-27 DIAGNOSIS — D84.81 IMMUNODEFICIENCY SECONDARY TO NEOPLASM: ICD-10-CM

## 2023-12-27 DIAGNOSIS — K59.00 CONSTIPATION, UNSPECIFIED CONSTIPATION TYPE: ICD-10-CM

## 2023-12-27 DIAGNOSIS — C78.02 MALIGNANT NEOPLASM METASTATIC TO BOTH LUNGS: ICD-10-CM

## 2023-12-27 DIAGNOSIS — C78.7 METASTASIS TO LIVER: ICD-10-CM

## 2023-12-27 DIAGNOSIS — C25.9 PANCREATIC ADENOCARCINOMA: Primary | ICD-10-CM

## 2023-12-27 DIAGNOSIS — E78.5 DYSLIPIDEMIA: ICD-10-CM

## 2023-12-27 DIAGNOSIS — D63.0 ANEMIA IN NEOPLASTIC DISEASE: ICD-10-CM

## 2023-12-27 DIAGNOSIS — D49.9 IMMUNODEFICIENCY SECONDARY TO NEOPLASM: ICD-10-CM

## 2023-12-27 DIAGNOSIS — D84.821 IMMUNODEFICIENCY DUE TO CHEMOTHERAPY: ICD-10-CM

## 2023-12-27 DIAGNOSIS — Z79.899 IMMUNODEFICIENCY DUE TO CHEMOTHERAPY: ICD-10-CM

## 2023-12-27 DIAGNOSIS — L28.2 PRURITIC RASH: ICD-10-CM

## 2023-12-27 DIAGNOSIS — T45.1X5A IMMUNODEFICIENCY DUE TO CHEMOTHERAPY: ICD-10-CM

## 2023-12-27 PROCEDURE — 1160F PR REVIEW ALL MEDS BY PRESCRIBER/CLIN PHARMACIST DOCUMENTED: ICD-10-PCS | Mod: HCNC,CPTII,S$GLB, | Performed by: PHYSICIAN ASSISTANT

## 2023-12-27 PROCEDURE — 99999 PR PBB SHADOW E&M-EST. PATIENT-LVL IV: CPT | Mod: PBBFAC,HCNC,, | Performed by: PHYSICIAN ASSISTANT

## 2023-12-27 PROCEDURE — 3078F DIAST BP <80 MM HG: CPT | Mod: HCNC,CPTII,S$GLB, | Performed by: PHYSICIAN ASSISTANT

## 2023-12-27 PROCEDURE — 1159F PR MEDICATION LIST DOCUMENTED IN MEDICAL RECORD: ICD-10-PCS | Mod: HCNC,CPTII,S$GLB, | Performed by: PHYSICIAN ASSISTANT

## 2023-12-27 PROCEDURE — 99215 PR OFFICE/OUTPT VISIT, EST, LEVL V, 40-54 MIN: ICD-10-PCS | Mod: HCNC,S$GLB,, | Performed by: PHYSICIAN ASSISTANT

## 2023-12-27 PROCEDURE — 1126F PR PAIN SEVERITY QUANTIFIED, NO PAIN PRESENT: ICD-10-PCS | Mod: HCNC,CPTII,S$GLB, | Performed by: PHYSICIAN ASSISTANT

## 2023-12-27 PROCEDURE — 96413 CHEMO IV INFUSION 1 HR: CPT | Mod: HCNC

## 2023-12-27 PROCEDURE — 3074F PR MOST RECENT SYSTOLIC BLOOD PRESSURE < 130 MM HG: ICD-10-PCS | Mod: HCNC,CPTII,S$GLB, | Performed by: PHYSICIAN ASSISTANT

## 2023-12-27 PROCEDURE — 1101F PR PT FALLS ASSESS DOC 0-1 FALLS W/OUT INJ PAST YR: ICD-10-PCS | Mod: HCNC,CPTII,S$GLB, | Performed by: PHYSICIAN ASSISTANT

## 2023-12-27 PROCEDURE — 1101F PT FALLS ASSESS-DOCD LE1/YR: CPT | Mod: HCNC,CPTII,S$GLB, | Performed by: PHYSICIAN ASSISTANT

## 2023-12-27 PROCEDURE — 96367 TX/PROPH/DG ADDL SEQ IV INF: CPT | Mod: HCNC

## 2023-12-27 PROCEDURE — 1159F MED LIST DOCD IN RCRD: CPT | Mod: HCNC,CPTII,S$GLB, | Performed by: PHYSICIAN ASSISTANT

## 2023-12-27 PROCEDURE — 63600175 PHARM REV CODE 636 W HCPCS: Mod: HCNC | Performed by: PHYSICIAN ASSISTANT

## 2023-12-27 PROCEDURE — 1126F AMNT PAIN NOTED NONE PRSNT: CPT | Mod: HCNC,CPTII,S$GLB, | Performed by: PHYSICIAN ASSISTANT

## 2023-12-27 PROCEDURE — 96417 CHEMO IV INFUS EACH ADDL SEQ: CPT | Mod: HCNC

## 2023-12-27 PROCEDURE — 3288F FALL RISK ASSESSMENT DOCD: CPT | Mod: HCNC,CPTII,S$GLB, | Performed by: PHYSICIAN ASSISTANT

## 2023-12-27 PROCEDURE — 25000003 PHARM REV CODE 250: Mod: HCNC | Performed by: PHYSICIAN ASSISTANT

## 2023-12-27 PROCEDURE — 3078F PR MOST RECENT DIASTOLIC BLOOD PRESSURE < 80 MM HG: ICD-10-PCS | Mod: HCNC,CPTII,S$GLB, | Performed by: PHYSICIAN ASSISTANT

## 2023-12-27 PROCEDURE — 99215 OFFICE O/P EST HI 40 MIN: CPT | Mod: HCNC,S$GLB,, | Performed by: PHYSICIAN ASSISTANT

## 2023-12-27 PROCEDURE — 99999 PR PBB SHADOW E&M-EST. PATIENT-LVL IV: ICD-10-PCS | Mod: PBBFAC,HCNC,, | Performed by: PHYSICIAN ASSISTANT

## 2023-12-27 PROCEDURE — 1160F RVW MEDS BY RX/DR IN RCRD: CPT | Mod: HCNC,CPTII,S$GLB, | Performed by: PHYSICIAN ASSISTANT

## 2023-12-27 PROCEDURE — 3074F SYST BP LT 130 MM HG: CPT | Mod: HCNC,CPTII,S$GLB, | Performed by: PHYSICIAN ASSISTANT

## 2023-12-27 PROCEDURE — 3288F PR FALLS RISK ASSESSMENT DOCUMENTED: ICD-10-PCS | Mod: HCNC,CPTII,S$GLB, | Performed by: PHYSICIAN ASSISTANT

## 2023-12-27 RX ORDER — PROCHLORPERAZINE EDISYLATE 5 MG/ML
5 INJECTION INTRAMUSCULAR; INTRAVENOUS ONCE AS NEEDED
Status: DISCONTINUED | OUTPATIENT
Start: 2023-12-27 | End: 2023-12-27 | Stop reason: HOSPADM

## 2023-12-27 RX ORDER — EPINEPHRINE 0.3 MG/.3ML
0.3 INJECTION SUBCUTANEOUS ONCE AS NEEDED
Status: DISCONTINUED | OUTPATIENT
Start: 2023-12-27 | End: 2023-12-27 | Stop reason: HOSPADM

## 2023-12-27 RX ORDER — DIPHENHYDRAMINE HYDROCHLORIDE 50 MG/ML
50 INJECTION INTRAMUSCULAR; INTRAVENOUS ONCE AS NEEDED
Status: CANCELLED | OUTPATIENT
Start: 2023-12-27

## 2023-12-27 RX ORDER — PROCHLORPERAZINE EDISYLATE 5 MG/ML
5 INJECTION INTRAMUSCULAR; INTRAVENOUS ONCE AS NEEDED
Status: CANCELLED
Start: 2023-12-27

## 2023-12-27 RX ORDER — HEPARIN 100 UNIT/ML
500 SYRINGE INTRAVENOUS
Status: CANCELLED | OUTPATIENT
Start: 2023-12-27

## 2023-12-27 RX ORDER — SODIUM CHLORIDE 0.9 % (FLUSH) 0.9 %
10 SYRINGE (ML) INJECTION
Status: CANCELLED | OUTPATIENT
Start: 2023-12-27

## 2023-12-27 RX ORDER — EPINEPHRINE 0.3 MG/.3ML
0.3 INJECTION SUBCUTANEOUS ONCE AS NEEDED
Status: CANCELLED | OUTPATIENT
Start: 2023-12-27

## 2023-12-27 RX ORDER — DIPHENHYDRAMINE HYDROCHLORIDE 50 MG/ML
50 INJECTION INTRAMUSCULAR; INTRAVENOUS ONCE AS NEEDED
Status: DISCONTINUED | OUTPATIENT
Start: 2023-12-27 | End: 2023-12-27 | Stop reason: HOSPADM

## 2023-12-27 RX ORDER — SODIUM CHLORIDE 0.9 % (FLUSH) 0.9 %
10 SYRINGE (ML) INJECTION
Status: DISCONTINUED | OUTPATIENT
Start: 2023-12-27 | End: 2023-12-27 | Stop reason: HOSPADM

## 2023-12-27 RX ORDER — HEPARIN 100 UNIT/ML
500 SYRINGE INTRAVENOUS
Status: DISCONTINUED | OUTPATIENT
Start: 2023-12-27 | End: 2023-12-27 | Stop reason: HOSPADM

## 2023-12-27 RX ADMIN — ONDANSETRON 8 MG: 2 INJECTION INTRAMUSCULAR; INTRAVENOUS at 09:12

## 2023-12-27 RX ADMIN — SODIUM CHLORIDE: 9 INJECTION, SOLUTION INTRAVENOUS at 09:12

## 2023-12-27 RX ADMIN — HEPARIN 500 UNITS: 100 SYRINGE at 11:12

## 2023-12-27 RX ADMIN — GEMCITABINE HYDROCHLORIDE 1600 MG: 1 INJECTION, SOLUTION INTRAVENOUS at 11:12

## 2023-12-27 RX ADMIN — PACLITAXEL 200 MG: 100 INJECTION, POWDER, LYOPHILIZED, FOR SUSPENSION INTRAVENOUS at 10:12

## 2023-12-27 NOTE — PLAN OF CARE
Pt admitted for C3 D1 Abraxane/Gemzar. Labs reviewed and assessment performed. Pt tolerated treatment well. Port de-accessed and Pt discharged home wioth .

## 2023-12-27 NOTE — PROGRESS NOTES
MEDICAL ONCOLOGY - ESTABLISHED PATIENT VISIT    Reason for visit: Pancreatic adenocarcinoma    Best Contact Phone Number(s): [phone] (home)      Cancer/Stage/TNM:    Cancer Staging   Pancreatic adenocarcinoma  Staging form: Exocrine Pancreas, AJCC 8th Edition  - Clinical stage from 9/21/2023: Stage IV (cT1, cNX, pM1) - Signed by David Briceño MD on 10/11/2023       Oncology History   Pancreatic adenocarcinoma   9/5/2023 Initial Diagnosis    Pancreatic adenocarcinoma     9/21/2023 Cancer Staged    Staging form: Exocrine Pancreas, AJCC 8th Edition  - Clinical stage from 9/21/2023: Stage IV (cT1, cNX, pM1)     10/2/2023 Tumor Conference     [facility] UGI MULTIDISCIPLINARY TUMOR BOARD  PATIENT REVIEW FORM   ____________________________________________________________    CLINIC #: [id number]  DATE: 10/2/2023    DIAGNOSIS: pancreas CA    PRESENTER: Angela    PATIENT SUMMARY:   This 77 y/o female presented in August with abd pain, decreased appetite with weight loss. Reviewed CT and MRI imaging ~ 2 cm mass in tail of pancreas with diffuse metastatic liver disease, largest liver lesion in left lobe measuring 4.5 cm. IR performed liver bx and this pathology was reviewed - poorly differentiated adenocarcinoma, favoring upper GI origin.     BOARD RECOMMENDATIONS:   Stage IV pancreas CA with liver mets  Add MMR and TEMPUS  Proceed with palliative systemic chemotherapy    CONSULT NEEDED:     [] Surgery    [] Hem/Onc    [] Rad/Onc    [] Dietary     [] Social Service    [] Psychology       [] AES  [] Radiology     Clinical Stage: Tumor   Node(s)   Metastasis        GROUP STAGE:  [] O    [] 1A    [] IB    [] IIA    [] IIB     [] IIIA     [] IIIB     [] IIIC    [x]IV  [] Local recurrence     [] Regional recurrence     [] Distant recurrence   Metastatic site(s): liver         [x] Elidia'l Treatment Guidelines reviewed and care planned is consistent with guidelines.         (i.e., NCCN, NCI, PD, ACO, AUA,  etc.)    PRESENTATION AT CANCER CONFERENCE:         [x] Prospective    [] Retrospective     [] Follow-Up    Eligible for clinical trial : yes       11/1/2023 -  Chemotherapy    Treatment Summary   Plan Name: OP PANC NAB-PACLITAXEL + GEMCITABINE Q4W  Treatment Goal: Palliative  Status: Active  Start Date: 11/1/2023  End Date: 9/6/2024 (Planned)  Provider: David Briceño MD  Chemotherapy: gemcitabine (GEMZAR) 1,600 mg in sodium chloride 0.9% SolP 327.08 mL chemo infusion, 1,680 mg, Intravenous, Clinic/HOD 1 time, 2 of 12 cycles  Administration: 1,600 mg (11/1/2023), 1,600 mg (11/15/2023), 1,600 mg (11/29/2023), 1,600 mg (12/14/2023)          Interim History:   78 y.o. female with metastatic pancreatic cancer who presents for follow-up prior to cycle 5 of biweekly gemcitabine + nab-paclitaxel. She is feeling well and had a good holiday with her family. She is eating well and has a good appetite. She had a very tolerance to her last cycle of chemotherapy. She did not have any significant fatigue, nausea, vomiting or diarrhea. No fever. She did very well and continues to function well. Her tolerance is excellent. Had CT scan on 12/26 done.     She does report having some skin spots to the face. Wondering if this was due to chemotherapy. No signs of infection. Intermittent sharp pains to the abdomen but resolves on it's own. No heartburn. Overall, doing well. Needs to f/u with GYN/Urology for vaginal prolapse.     ECOG PS is 0. Presents with her  today.     History has been obtained by chart review and discussion with the patient.    ROS:   Review of Systems   Constitutional:  Positive for weight loss. Negative for chills, fever and malaise/fatigue.   HENT:  Negative for sore throat.    Eyes:  Negative for blurred vision and pain.   Respiratory:  Negative for cough and shortness of breath.    Cardiovascular:  Positive for leg swelling. Negative for chest pain.   Gastrointestinal:  Positive for abdominal pain  and constipation. Negative for diarrhea, nausea and vomiting.   Genitourinary:  Negative for dysuria and frequency.   Musculoskeletal:  Negative for back pain, falls and myalgias.   Skin:  Positive for itching. Negative for rash.   Neurological:  Negative for dizziness, weakness and headaches.   Endo/Heme/Allergies:  Does not bruise/bleed easily.   Psychiatric/Behavioral:  Negative for depression. The patient is not nervous/anxious.        Past Medical History:   Past Medical History:   Diagnosis Date    Age-related osteoporosis without current pathological fracture     Atherosclerosis of aortic arch     - noted on CXR 8/2017    Back pain     Diverticulosis of sigmoid colon 10/22/2019    Ectopic pregnancy     Fibrocystic breast     Hyperlipidemia     Inguinal hernia     Osteopenia     Polyp of ascending colon 10/22/2019    Urinary incontinence, mixed 05/24/2022        Past Surgical History:   Past Surgical History:   Procedure Laterality Date    ECTOPIC PREGNANCY SURGERY      HERNIA REPAIR      left inguinal    HYSTERECTOMY  1980    without BSO        Family History:   Family History   Problem Relation Age of Onset    Breast cancer Mother 69    Seizures Mother     Heart disease Father     Heart attack Father     Depression Father     Breast cancer Sister 73    Breast cancer Maternal Aunt 73    Prostate cancer Brother 81        no mets    Obesity Brother     Heart failure Maternal Grandmother     Heart attack Maternal Grandfather     Suicide Attempts Paternal Grandmother     Depression Paternal Grandmother     Mental illness Paternal Grandmother     Depression Paternal Aunt     Heart attack Paternal Uncle     Depression Paternal Uncle     Cancer Maternal Aunt 83        blood cancer (leukemia?)    Cancer Maternal Uncle         unk type; was COD    Suicide Paternal Cousin     Tongue cancer Paternal Cousin         smoking hx unk    Ovarian cancer Neg Hx         Social History:   Social History     Tobacco Use    Smoking  status: Never    Smokeless tobacco: Never   Substance Use Topics    Alcohol use: No        I have reviewed and updated the patient's past medical, surgical, family and social histories.    Allergies:   Review of patient's allergies indicates:   Allergen Reactions    Erythromycin (bulk)      Other reaction(s): Eye irritation        Medications:   Current Outpatient Medications   Medication Sig Dispense Refill    multivitamin (THERAGRAN) per tablet Take 1 tablet by mouth once daily.      vitamin D (VITAMIN D3) 1000 units Tab Take 1,000 Units by mouth once daily.      coconut oil, bulk, Oil by Misc.(Non-Drug; Combo Route) route.      diphenhydramine HCl (BENADRYL ORAL) Take 25 mg by mouth as needed (rash).      doxycycline (VIBRAMYCIN) 100 MG Cap Take 1 capsule (100 mg total) by mouth every 12 (twelve) hours. (Patient not taking: Reported on 12/27/2023) 14 capsule 0    GADAVIST 1 mmol/mL (604.72 mg/mL) Soln injection       HYDROcodone-acetaminophen (NORCO) 5-325 mg per tablet Take 1 tablet by mouth every 12 (twelve) hours as needed for Pain. (Patient not taking: Reported on 12/6/2023) 10 tablet 0    hydrOXYzine HCL (ATARAX) 25 MG tablet Take 1 tablet (25 mg total) by mouth 3 (three) times daily as needed for Itching. (Patient not taking: Reported on 12/27/2023) 60 tablet 2    LIDOcaine-prilocaine (EMLA) cream Apply topically as needed (place to port site 45-60 minutes prior to chemotherapy). (Patient not taking: Reported on 12/27/2023) 30 g 3    ondansetron (ZOFRAN-ODT) 4 MG TbDL Take 1 tablet (4 mg total) by mouth every 8 (eight) hours as needed (Nausea). (Patient not taking: Reported on 12/27/2023) 10 tablet 0    ondansetron (ZOFRAN-ODT) 8 MG TbDL TAKE 1 TABLET (8 MG TOTAL) BY MOUTH EVERY 6 HOURS AS NEEDED FOR NAUSEA (Patient not taking: Reported on 12/27/2023) 30 tablet 5    triamcinolone acetonide 0.1% (KENALOG) 0.1 % ointment Apply topically as needed (rash).       No current facility-administered medications for  "this visit.        Physical Exam:   BP (!) 117/59   Pulse 76   Temp 98.4 °F (36.9 °C) (Oral)   Resp 18   Ht 5' 3" (1.6 m)   Wt 62.5 kg (137 lb 12.6 oz)   SpO2 99%   BMI 24.41 kg/m²                Physical Exam  Vitals reviewed.   Constitutional:       General: She is not in acute distress.     Appearance: She is well-developed. She is not diaphoretic.      Comments: General physical exam was limited due to virtual visit presentation.    HENT:      Head: Normocephalic.   Eyes:      General: No scleral icterus.  Neck:      Trachea: No tracheal deviation.   Pulmonary:      Effort: Pulmonary effort is normal.   Musculoskeletal:         General: No deformity.   Skin:     Findings: No rash.   Neurological:      Mental Status: She is alert and oriented to person, place, and time.   Psychiatric:         Behavior: Behavior normal.         Thought Content: Thought content normal.         Judgment: Judgment normal.           Labs:   Recent Results (from the past 48 hour(s))   CBC Oncology    Collection Time: 12/26/23 12:05 PM   Result Value Ref Range    WBC 6.38 3.90 - 12.70 K/uL    RBC 3.47 (L) 4.00 - 5.40 M/uL    Hemoglobin 9.6 (L) 12.0 - 16.0 g/dL    Hematocrit 31.3 (L) 37.0 - 48.5 %    MCV 90 82 - 98 fL    MCH 27.7 27.0 - 31.0 pg    MCHC 30.7 (L) 32.0 - 36.0 g/dL    RDW 19.3 (H) 11.5 - 14.5 %    Platelets 191 150 - 450 K/uL    MPV 10.3 9.2 - 12.9 fL    Gran # (ANC) 4.1 1.8 - 7.7 K/uL    Immature Grans (Abs) 0.02 0.00 - 0.04 K/uL   Comprehensive Metabolic Panel    Collection Time: 12/26/23 12:05 PM   Result Value Ref Range    Sodium 138 136 - 145 mmol/L    Potassium 3.7 3.5 - 5.1 mmol/L    Chloride 104 95 - 110 mmol/L    CO2 27 23 - 29 mmol/L    Glucose 82 70 - 110 mg/dL    BUN 12 8 - 23 mg/dL    Creatinine 0.6 0.5 - 1.4 mg/dL    Calcium 8.9 8.7 - 10.5 mg/dL    Total Protein 6.7 6.0 - 8.4 g/dL    Albumin 3.6 3.5 - 5.2 g/dL    Total Bilirubin 0.5 0.1 - 1.0 mg/dL    Alkaline Phosphatase 125 55 - 135 U/L    AST 23 10 - " 40 U/L    ALT 20 10 - 44 U/L    eGFR >60 >60 mL/min/1.73 m^2    Anion Gap 7 (L) 8 - 16 mmol/L   Cancer Antigen 19-9    Collection Time: 12/26/23 12:05 PM   Result Value Ref Range    CA 19-9 >69668.0 (H) 0.0 - 40.0 U/mL     Imaging:   CT CAP 12/26/2023    Impression:     1.  History of pancreatic cancer.  Pancreatic tail mass measures 2.8 cm as compared to 3.0 cm previously.  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     2.  Numerous hypodense lesions throughout the liver appears similar in number when compared to prior exam but some of the lesions appear smaller.  The largest lesion on the previous study now measures 5.1 cm as compared to 6.4 cm previously.  Prior report indicates biopsy-proven adenocarcinoma.     3.  Numerous focal opacities are again seen throughout both lungs.  Some of the previously described lesions are smaller or no longer present.  However, focal opacities are seen bilaterally.  Once again, findings are concerning for metastases.      CT CAP 11/7/23:  Impression:     1. Enlarged, 3.0 cm hypodense mass in the pancreatic tail concerning for malignancy.  There is associated encasement of the splenic artery and occlusion of the splenic vein.  2. Interval increase in size and number of numerous hypodense masses throughout the liver, consistent with biopsy-proven adenocarcinoma.  3. Interval increase in number and size of multiple bilateral pulmonary nodules and masses concerning for metastases.  4. Sigmoid diverticulosis without diverticulitis.  5. Additional findings as above.  6. This report was flagged in Epic as abnormal.    Path:   Final Pathologic Diagnosis     Left hepatic lobe, biopsy:   Positive for malignancy, poorly differentiated adenocarcinoma (see comment)   Tumor necrosis present     Comment:  A review of the patient's imaging shows the presence of a 1.9 cm pancreatic tail mass and diffuse liver lesions.  These  morphologic and immunophenotypic findings are supportive of a pancreatobiliary and/or upper gastrointestinal primary.  VC      Comment: Interp By Nataliia Peralta D.O., Signed on 10/05/2023 at 08:36   Supplemental Diagnosis     The purpose of this supplemental report is to report results of MMR panel in the tumor cells as follows:     Immunohistochemistry (IHC) Testing for Mismatch Repair (MMR) Proteins:      MLH1 - Intact nuclear expression    MSH2 - Intact nuclear expression    MSH6 - Intact nuclear expression    PMS2 - Intact nuclear expression            Somatic Genomic Results       Pancreatic mass       Tumor NGS Tissue  Resulted: 10/10/23 Status: Preliminary result       Detected - Pathogenic (4)      Gene Variant VAF   BRAF p.[id number] - [id number] Inframe deletion - GOF 12.00 %   CDKN2A CDKN2A - Copy number loss --   CDKN2B CDKN2B - Copy number loss --   TP53 p.Y163C - c.488A>G Missense variant - LOF 30.50 %              Detected - Uncertain significance (1)      Gene Variant VAF   DIS3 p.[id number] - c.2012A>G Missense variant 23.10 %                                        Assessment:       1. Pancreatic adenocarcinoma    2. 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 Metastasis to liver    4. Immunodeficiency due to chemotherapy    5. Immunodeficiency secondary to neoplasm    6. Pruritic rash    7. Constipation, unspecified constipation type    8. 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 Dyslipidemia                  Plan:             # Pancreatic cancer  We discussed with the patient and her family her biopsy-proven diagnosis of metastatic pancreatic adenocarcinoma with multifocal liver metastases.  Her tumor is pMMR and Tempus tissue showed a non V600E BRAF mutation, TP53 mutation and CDKN2A and CDKN2B loss.  QUINN.    We discussed her prognosis and potential treatment options.  Because of her age and discussion of tolerance  concerns, I have recommended biweekly gemcitabine + nab-paclitaxel as palliative intent first-line therapy.  Dosing for gemcitabine + nab-paclitaxel will be 1000 mg/m2 and 125 mg/m2, respectively, and I will administer these on a biweekly basis.    Port was placed.  Consideration to BRAF non-V600E targeting trials in the future if available.    She began cycle 1 of biweekly gemcitabine + nab-paclitaxel on 11/1/23.  She experienced fevers/chills and rash. believe these are all chemotherapy related. Improved after the administration of Dex.     Will continue to administer dexamethasone 12 mg IV prior to infusion for rash prevention.  May need to increase to 20mg for future cycles. Will continue to monitor.    Also gave her Medrol Dose Nghia to use in case rash recurs despite premed dex.    -Doing well today. Eating well and has a fair appetite. Tolerance to chemotherapy has been very good. Fever controlled.   -Lab work adequate to proceed with chemotherapy  - CT scan reviewed that displays overall improvement within the lung and liver, with decreases in the number and size of the lesions. Recc to continue  -Proceed with cycle 5.    RTC in 2 weeks for next cycle with lab work, scans and treatment discussion. Will repeat imaging studies after cycle 8    Has Palliative Care appointment on 12/6  Genetic testing done outside - saw Julito oSsa here.    PGX showed DPYD nml and UGT1A1 intermediate metabolism.    # HLD, constipation, pruritus, fever  Not currently on therapy. Cholesterol stable from June 2023. Will continue to f/u with PCP regarding this   Constipation: stable.   Pruritus/fever: controlled with increased pre-meds and Benadryl. Continues to control fever alternating Motrin and Tylenol. Feels better today  Monitor.    # Osteoporosis, physical concerns, vaginal prolapse  Monitor calcium levels on chemo.  Will continue to encourage maintaining activity.  Discussed physical contact precautions and concerns with  patient. Pt voiced appreciation of discussion  Will f/u with GYN.     Follow up: 2 weeks for cycle 5 D15.     The above information has been reviewed with the patient and all questions have been answered to their apparent satisfaction.  They understand that they can call the clinic with any questions.      ESTER Rowan, PA-C  Physician Assistant Certified  Dept of Hematology/Oncology  PAColumb[place]C to Dr. Willis, Dr. Briceño and Dr. Noyola       Route Chart for Scheduling.    Med Onc Chart Routing      Follow up with physician 4 weeks and 6 weeks. See Dr. Briceño in 4 weeks for cycle 6 D1. See Dr. Briceño in 6 weeks for Day 15, C6 of Cocke/Abraxane.   Follow up with [facility]I 2 weeks. See Cami in 2 weeks as scheduled with lab work and cycle 5 day 15   Infusion scheduling note    Injection scheduling note    Labs CBC, CMP and CA 19-9   Scheduling:  Preferred lab:  Lab interval: every 2 weeks     Imaging    Pharmacy appointment    Other referrals                  Treatment Plan Information   OP PANC NAB-PACLITAXEL + GEMCITABINE Q4W   David Briceño MD   Upcoming Treatment Dates - OP PANC NAB-PACLITAXEL + GEMCITABINE Q4W    12/15/2023       Chemotherapy       PACLitaxeL protein-bound (ABRAXANE) in 42 mL infusion       gemcitabine (GEMZAR) in sodium chloride 0.9% SolP 250 mL chemo infusion       Antiemetics       ondansetron (ZOFRAN) 8 mg, dexAMETHasone (DECADRON) 12 mg in sodium chloride 0.9% 50 mL IVPB  12/29/2023       Chemotherapy       PACLitaxeL protein-bound (ABRAXANE) in 42 mL infusion       gemcitabine (GEMZAR) in sodium chloride 0.9% SolP 250 mL chemo infusion       Antiemetics       ondansetron (ZOFRAN) 8 mg, dexAMETHasone (DECADRON) 12 mg in sodium chloride 0.9% 50 mL IVPB  1/12/2024       Chemotherapy       PACLitaxeL protein-bound (ABRAXANE) in 42 mL infusion       gemcitabine (GEMZAR) in sodium chloride 0.9% SolP 250 mL chemo infusion       Antiemetics       ondansetron (ZOFRAN) 8 mg, dexAMETHasone (DECADRON) 12  mg in sodium chloride 0.9% 50 mL IVPB  1/26/2024       Chemotherapy       PACLitaxeL protein-bound (ABRAXANE) in 42 mL infusion       gemcitabine (GEMZAR) in sodium chloride 0.9% SolP 250 mL chemo infusion       Antiemetics       ondansetron (ZOFRAN) 8 mg, dexAMETHasone (DECADRON) 12 mg in sodium chloride 0.9% 50 mL IVPB

## 2023-12-28 ENCOUNTER — PATIENT MESSAGE (OUTPATIENT)
Dept: ADMINISTRATIVE | Facility: OTHER | Age: 78
End: 2023-12-28
Payer: MEDICARE

## 2023-12-29 ENCOUNTER — PATIENT MESSAGE (OUTPATIENT)
Dept: ADMINISTRATIVE | Facility: OTHER | Age: 78
End: 2023-12-29
Payer: MEDICARE

## 2023-12-30 ENCOUNTER — PATIENT MESSAGE (OUTPATIENT)
Dept: ADMINISTRATIVE | Facility: OTHER | Age: 78
End: 2023-12-30
Payer: MEDICARE

## 2023-12-31 ENCOUNTER — PATIENT MESSAGE (OUTPATIENT)
Dept: ADMINISTRATIVE | Facility: OTHER | Age: 78
End: 2023-12-31
Payer: MEDICARE

## 2024-01-01 ENCOUNTER — PATIENT MESSAGE (OUTPATIENT)
Dept: ADMINISTRATIVE | Facility: OTHER | Age: 79
End: 2024-01-01
Payer: MEDICARE

## 2024-01-02 ENCOUNTER — PATIENT MESSAGE (OUTPATIENT)
Dept: ADMINISTRATIVE | Facility: OTHER | Age: 79
End: 2024-01-02
Payer: MEDICARE

## 2024-01-03 ENCOUNTER — PATIENT MESSAGE (OUTPATIENT)
Dept: ADMINISTRATIVE | Facility: OTHER | Age: 79
End: 2024-01-03
Payer: MEDICARE

## 2024-01-04 ENCOUNTER — PATIENT MESSAGE (OUTPATIENT)
Dept: ADMINISTRATIVE | Facility: OTHER | Age: 79
End: 2024-01-04
Payer: MEDICARE

## 2024-01-05 ENCOUNTER — PATIENT MESSAGE (OUTPATIENT)
Dept: ADMINISTRATIVE | Facility: OTHER | Age: 79
End: 2024-01-05
Payer: MEDICARE

## 2024-01-06 ENCOUNTER — PATIENT MESSAGE (OUTPATIENT)
Dept: ADMINISTRATIVE | Facility: OTHER | Age: 79
End: 2024-01-06
Payer: MEDICARE

## 2024-01-07 ENCOUNTER — PATIENT MESSAGE (OUTPATIENT)
Dept: ADMINISTRATIVE | Facility: OTHER | Age: 79
End: 2024-01-07
Payer: MEDICARE

## 2024-01-08 ENCOUNTER — PATIENT MESSAGE (OUTPATIENT)
Dept: ADMINISTRATIVE | Facility: OTHER | Age: 79
End: 2024-01-08
Payer: MEDICARE

## 2024-01-08 ENCOUNTER — LAB VISIT (OUTPATIENT)
Dept: LAB | Facility: HOSPITAL | Age: 79
End: 2024-01-08
Payer: MEDICARE

## 2024-01-08 DIAGNOSIS — C25.9 PANCREATIC ADENOCARCINOMA: ICD-10-CM

## 2024-01-08 LAB
ALBUMIN SERPL BCP-MCNC: 3.6 G/DL (ref 3.5–5.2)
ALP SERPL-CCNC: 110 U/L (ref 55–135)
ALT SERPL W/O P-5'-P-CCNC: 20 U/L (ref 10–44)
ANION GAP SERPL CALC-SCNC: 8 MMOL/L (ref 8–16)
AST SERPL-CCNC: 24 U/L (ref 10–40)
BASOPHILS # BLD AUTO: 0.07 K/UL (ref 0–0.2)
BASOPHILS NFR BLD: 1.4 % (ref 0–1.9)
BILIRUB SERPL-MCNC: 0.4 MG/DL (ref 0.1–1)
BUN SERPL-MCNC: 17 MG/DL (ref 8–23)
CALCIUM SERPL-MCNC: 8.9 MG/DL (ref 8.7–10.5)
CANCER AG19-9 SERPL-ACNC: ABNORMAL U/ML (ref 0–40)
CHLORIDE SERPL-SCNC: 108 MMOL/L (ref 95–110)
CO2 SERPL-SCNC: 25 MMOL/L (ref 23–29)
CREAT SERPL-MCNC: 0.7 MG/DL (ref 0.5–1.4)
DIFFERENTIAL METHOD BLD: ABNORMAL
EOSINOPHIL # BLD AUTO: 0.2 K/UL (ref 0–0.5)
EOSINOPHIL NFR BLD: 4.3 % (ref 0–8)
ERYTHROCYTE [DISTWIDTH] IN BLOOD BY AUTOMATED COUNT: 18.6 % (ref 11.5–14.5)
EST. GFR  (NO RACE VARIABLE): >60 ML/MIN/1.73 M^2
GLUCOSE SERPL-MCNC: 122 MG/DL (ref 70–110)
HCT VFR BLD AUTO: 35.3 % (ref 37–48.5)
HGB BLD-MCNC: 11.1 G/DL (ref 12–16)
IMM GRANULOCYTES # BLD AUTO: 0.02 K/UL (ref 0–0.04)
IMM GRANULOCYTES NFR BLD AUTO: 0.4 % (ref 0–0.5)
LYMPHOCYTES # BLD AUTO: 0.8 K/UL (ref 1–4.8)
LYMPHOCYTES NFR BLD: 15.4 % (ref 18–48)
MCH RBC QN AUTO: 29.4 PG (ref 27–31)
MCHC RBC AUTO-ENTMCNC: 31.4 G/DL (ref 32–36)
MCV RBC AUTO: 94 FL (ref 82–98)
MONOCYTES # BLD AUTO: 0.5 K/UL (ref 0.3–1)
MONOCYTES NFR BLD: 9.6 % (ref 4–15)
NEUTROPHILS # BLD AUTO: 3.5 K/UL (ref 1.8–7.7)
NEUTROPHILS NFR BLD: 68.9 % (ref 38–73)
NRBC BLD-RTO: 0 /100 WBC
PLATELET # BLD AUTO: 206 K/UL (ref 150–450)
PMV BLD AUTO: 11 FL (ref 9.2–12.9)
POTASSIUM SERPL-SCNC: 4.2 MMOL/L (ref 3.5–5.1)
PROT SERPL-MCNC: 6.8 G/DL (ref 6–8.4)
RBC # BLD AUTO: 3.77 M/UL (ref 4–5.4)
SODIUM SERPL-SCNC: 141 MMOL/L (ref 136–145)
WBC # BLD AUTO: 5.08 K/UL (ref 3.9–12.7)

## 2024-01-08 PROCEDURE — 80053 COMPREHEN METABOLIC PANEL: CPT | Performed by: PHYSICIAN ASSISTANT

## 2024-01-08 PROCEDURE — 86301 IMMUNOASSAY TUMOR CA 19-9: CPT | Performed by: PHYSICIAN ASSISTANT

## 2024-01-08 PROCEDURE — 85025 COMPLETE CBC W/AUTO DIFF WBC: CPT | Performed by: PHYSICIAN ASSISTANT

## 2024-01-08 PROCEDURE — 36415 COLL VENOUS BLD VENIPUNCTURE: CPT | Mod: PO | Performed by: PHYSICIAN ASSISTANT

## 2024-01-09 ENCOUNTER — INFUSION (OUTPATIENT)
Dept: INFUSION THERAPY | Facility: HOSPITAL | Age: 79
End: 2024-01-09
Payer: MEDICARE

## 2024-01-09 ENCOUNTER — OFFICE VISIT (OUTPATIENT)
Dept: HEMATOLOGY/ONCOLOGY | Facility: CLINIC | Age: 79
End: 2024-01-09
Payer: MEDICARE

## 2024-01-09 ENCOUNTER — PATIENT MESSAGE (OUTPATIENT)
Dept: ADMINISTRATIVE | Facility: OTHER | Age: 79
End: 2024-01-09
Payer: MEDICARE

## 2024-01-09 VITALS
WEIGHT: 138 LBS | DIASTOLIC BLOOD PRESSURE: 62 MMHG | OXYGEN SATURATION: 99 % | TEMPERATURE: 98 F | BODY MASS INDEX: 24.45 KG/M2 | HEART RATE: 78 BPM | RESPIRATION RATE: 18 BRPM | SYSTOLIC BLOOD PRESSURE: 116 MMHG | HEIGHT: 63 IN

## 2024-01-09 VITALS — HEART RATE: 72 BPM | SYSTOLIC BLOOD PRESSURE: 126 MMHG | DIASTOLIC BLOOD PRESSURE: 74 MMHG

## 2024-01-09 DIAGNOSIS — D84.821 IMMUNODEFICIENCY DUE TO CHEMOTHERAPY: ICD-10-CM

## 2024-01-09 DIAGNOSIS — T45.1X5A IMMUNODEFICIENCY DUE TO CHEMOTHERAPY: ICD-10-CM

## 2024-01-09 DIAGNOSIS — L28.2 PRURITIC RASH: ICD-10-CM

## 2024-01-09 DIAGNOSIS — E78.5 DYSLIPIDEMIA: ICD-10-CM

## 2024-01-09 DIAGNOSIS — Z79.899 IMMUNODEFICIENCY DUE TO CHEMOTHERAPY: ICD-10-CM

## 2024-01-09 DIAGNOSIS — C78.01 MALIGNANT NEOPLASM METASTATIC TO BOTH LUNGS: ICD-10-CM

## 2024-01-09 DIAGNOSIS — C78.02 MALIGNANT NEOPLASM METASTATIC TO BOTH LUNGS: ICD-10-CM

## 2024-01-09 DIAGNOSIS — K59.00 CONSTIPATION, UNSPECIFIED CONSTIPATION TYPE: ICD-10-CM

## 2024-01-09 DIAGNOSIS — D49.9 IMMUNODEFICIENCY SECONDARY TO NEOPLASM: ICD-10-CM

## 2024-01-09 DIAGNOSIS — C78.7 METASTASIS TO LIVER: ICD-10-CM

## 2024-01-09 DIAGNOSIS — C25.9 PANCREATIC ADENOCARCINOMA: Primary | ICD-10-CM

## 2024-01-09 DIAGNOSIS — D84.81 IMMUNODEFICIENCY SECONDARY TO NEOPLASM: ICD-10-CM

## 2024-01-09 DIAGNOSIS — M81.0 AGE-RELATED OSTEOPOROSIS WITHOUT CURRENT PATHOLOGICAL FRACTURE: ICD-10-CM

## 2024-01-09 PROCEDURE — 99215 OFFICE O/P EST HI 40 MIN: CPT | Mod: S$GLB,,, | Performed by: REGISTERED NURSE

## 2024-01-09 PROCEDURE — 96367 TX/PROPH/DG ADDL SEQ IV INF: CPT

## 2024-01-09 PROCEDURE — 96413 CHEMO IV INFUSION 1 HR: CPT

## 2024-01-09 PROCEDURE — 99999 PR PBB SHADOW E&M-EST. PATIENT-LVL V: CPT | Mod: PBBFAC,,, | Performed by: REGISTERED NURSE

## 2024-01-09 PROCEDURE — A4216 STERILE WATER/SALINE, 10 ML: HCPCS | Performed by: REGISTERED NURSE

## 2024-01-09 PROCEDURE — 25000003 PHARM REV CODE 250: Performed by: REGISTERED NURSE

## 2024-01-09 PROCEDURE — 63600175 PHARM REV CODE 636 W HCPCS: Mod: JZ,JG | Performed by: REGISTERED NURSE

## 2024-01-09 PROCEDURE — 96417 CHEMO IV INFUS EACH ADDL SEQ: CPT

## 2024-01-09 RX ORDER — HEPARIN 100 UNIT/ML
500 SYRINGE INTRAVENOUS
Status: DISCONTINUED | OUTPATIENT
Start: 2024-01-09 | End: 2024-01-09 | Stop reason: HOSPADM

## 2024-01-09 RX ORDER — PROCHLORPERAZINE EDISYLATE 5 MG/ML
5 INJECTION INTRAMUSCULAR; INTRAVENOUS ONCE AS NEEDED
Status: CANCELLED
Start: 2024-01-09

## 2024-01-09 RX ORDER — EPINEPHRINE 0.3 MG/.3ML
0.3 INJECTION SUBCUTANEOUS ONCE AS NEEDED
Status: CANCELLED | OUTPATIENT
Start: 2024-01-09

## 2024-01-09 RX ORDER — PROCHLORPERAZINE EDISYLATE 5 MG/ML
5 INJECTION INTRAMUSCULAR; INTRAVENOUS ONCE AS NEEDED
Status: DISCONTINUED | OUTPATIENT
Start: 2024-01-09 | End: 2024-01-09 | Stop reason: HOSPADM

## 2024-01-09 RX ORDER — DIPHENHYDRAMINE HYDROCHLORIDE 50 MG/ML
50 INJECTION, SOLUTION INTRAMUSCULAR; INTRAVENOUS ONCE AS NEEDED
Status: CANCELLED | OUTPATIENT
Start: 2024-01-09

## 2024-01-09 RX ORDER — SODIUM CHLORIDE 0.9 % (FLUSH) 0.9 %
10 SYRINGE (ML) INJECTION
Status: DISCONTINUED | OUTPATIENT
Start: 2024-01-09 | End: 2024-01-09 | Stop reason: HOSPADM

## 2024-01-09 RX ORDER — EPINEPHRINE 0.3 MG/.3ML
0.3 INJECTION SUBCUTANEOUS ONCE AS NEEDED
Status: DISCONTINUED | OUTPATIENT
Start: 2024-01-09 | End: 2024-01-09 | Stop reason: HOSPADM

## 2024-01-09 RX ORDER — HEPARIN 100 UNIT/ML
500 SYRINGE INTRAVENOUS
Status: CANCELLED | OUTPATIENT
Start: 2024-01-09

## 2024-01-09 RX ORDER — DIPHENHYDRAMINE HYDROCHLORIDE 50 MG/ML
50 INJECTION, SOLUTION INTRAMUSCULAR; INTRAVENOUS ONCE AS NEEDED
Status: DISCONTINUED | OUTPATIENT
Start: 2024-01-09 | End: 2024-01-09 | Stop reason: HOSPADM

## 2024-01-09 RX ORDER — SODIUM CHLORIDE 0.9 % (FLUSH) 0.9 %
10 SYRINGE (ML) INJECTION
Status: CANCELLED | OUTPATIENT
Start: 2024-01-09

## 2024-01-09 RX ADMIN — GEMCITABINE 1600 MG: 100 INJECTION, SOLUTION INTRAVENOUS at 09:01

## 2024-01-09 RX ADMIN — Medication 10 ML: at 10:01

## 2024-01-09 RX ADMIN — HEPARIN 500 UNITS: 100 SYRINGE at 10:01

## 2024-01-09 RX ADMIN — PACLITAXEL 200 MG: 100 INJECTION, POWDER, LYOPHILIZED, FOR SUSPENSION INTRAVENOUS at 08:01

## 2024-01-09 RX ADMIN — SODIUM CHLORIDE: 9 INJECTION, SOLUTION INTRAVENOUS at 08:01

## 2024-01-09 RX ADMIN — ONDANSETRON 8 MG: 2 INJECTION INTRAMUSCULAR; INTRAVENOUS at 08:01

## 2024-01-09 NOTE — PROGRESS NOTES
MEDICAL ONCOLOGY - ESTABLISHED PATIENT VISIT    Reason for visit: Pancreatic adenocarcinoma    Best Contact Phone Number(s): [phone] (home)      Cancer/Stage/TNM:    Cancer Staging   Pancreatic adenocarcinoma  Staging form: Exocrine Pancreas, AJCC 8th Edition  - Clinical stage from 9/21/2023: Stage IV (cT1, cNX, pM1) - Signed by David Briceño MD on 10/11/2023       Oncology History   Pancreatic adenocarcinoma   9/5/2023 Initial Diagnosis    Pancreatic adenocarcinoma     9/21/2023 Cancer Staged    Staging form: Exocrine Pancreas, AJCC 8th Edition  - Clinical stage from 9/21/2023: Stage IV (cT1, cNX, pM1)     10/2/2023 Tumor Conference     [facility] UGI MULTIDISCIPLINARY TUMOR BOARD  PATIENT REVIEW FORM   ____________________________________________________________    CLINIC #: [id number]  DATE: 10/2/2023    DIAGNOSIS: pancreas CA    PRESENTER: Angela    PATIENT SUMMARY:   This 79 y/o female presented in August with abd pain, decreased appetite with weight loss. Reviewed CT and MRI imaging ~ 2 cm mass in tail of pancreas with diffuse metastatic liver disease, largest liver lesion in left lobe measuring 4.5 cm. IR performed liver bx and this pathology was reviewed - poorly differentiated adenocarcinoma, favoring upper GI origin.     BOARD RECOMMENDATIONS:   Stage IV pancreas CA with liver mets  Add MMR and TEMPUS  Proceed with palliative systemic chemotherapy    CONSULT NEEDED:     [] Surgery    [] Hem/Onc    [] Rad/Onc    [] Dietary     [] Social Service    [] Psychology       [] AES  [] Radiology     Clinical Stage: Tumor   Node(s)   Metastasis        GROUP STAGE:  [] O    [] 1A    [] IB    [] IIA    [] IIB     [] IIIA     [] IIIB     [] IIIC    [x]IV  [] Local recurrence     [] Regional recurrence     [] Distant recurrence   Metastatic site(s): liver         [x] Elidia'l Treatment Guidelines reviewed and care planned is consistent with guidelines.         (i.e., NCCN, NCI, PD, ACO, AUA,  etc.)    PRESENTATION AT CANCER CONFERENCE:         [x] Prospective    [] Retrospective     [] Follow-Up    Eligible for clinical trial : yes       11/1/2023 -  Chemotherapy    Treatment Summary   Plan Name: OP PANC NAB-PACLITAXEL + GEMCITABINE Q4W  Treatment Goal: Palliative  Status: Active  Start Date: 11/1/2023  End Date: 9/6/2024 (Planned)  Provider: David Briceño MD  Chemotherapy: gemcitabine (GEMZAR) 1,600 mg in sodium chloride 0.9% SolP 327.08 mL chemo infusion, 1,680 mg, Intravenous, Clinic/HOD 1 time, 3 of 12 cycles  Administration: 1,600 mg (11/1/2023), 1,600 mg (11/15/2023), 1,600 mg (11/29/2023), 1,600 mg (12/14/2023), 1,600 mg (12/27/2023)          Interim History:   78 y.o. female with metastatic pancreatic cancer who presents for follow-up prior to cycle 6 of biweekly gemcitabine + nab-paclitaxel. Doing well overall and tolerating chemo without significant issues. Eating well and weight stable. No major fatigue. No nausea or vomiting. Bowels regular. No fevers and pruritus improved. Continues with intermittent sharp pains to the abdomen after chemo, but this is self-limiting. No other concerns.     ECOG PS is 0. Presents with her  today.     History has been obtained by chart review and discussion with the patient.    ROS:   Review of Systems   Constitutional:  Negative for chills, fever, malaise/fatigue and weight loss.   HENT:  Negative for sore throat.    Eyes:  Negative for blurred vision and pain.   Respiratory:  Negative for cough and shortness of breath.    Cardiovascular:  Positive for leg swelling. Negative for chest pain.   Gastrointestinal:  Positive for abdominal pain. Negative for constipation, diarrhea, nausea and vomiting.   Genitourinary:  Negative for dysuria and frequency.   Musculoskeletal:  Negative for back pain, falls and myalgias.   Skin:  Negative for itching and rash.   Neurological:  Negative for dizziness, weakness and headaches.   Endo/Heme/Allergies:  Does  not bruise/bleed easily.   Psychiatric/Behavioral:  Negative for depression. The patient is not nervous/anxious.        Past Medical History:   Past Medical History:   Diagnosis Date    Age-related osteoporosis without current pathological fracture     Atherosclerosis of aortic arch     - noted on CXR 8/2017    Back pain     Diverticulosis of sigmoid colon 10/22/2019    Ectopic pregnancy     Fibrocystic breast     Hyperlipidemia     Inguinal hernia     Osteopenia     Polyp of ascending colon 10/22/2019    Urinary incontinence, mixed 05/24/2022        Past Surgical History:   Past Surgical History:   Procedure Laterality Date    ECTOPIC PREGNANCY SURGERY      HERNIA REPAIR      left inguinal    HYSTERECTOMY  1980    without BSO        Family History:   Family History   Problem Relation Age of Onset    Breast cancer Mother 69    Seizures Mother     Heart disease Father     Heart attack Father     Depression Father     Breast cancer Sister 73    Breast cancer Maternal Aunt 73    Prostate cancer Brother 81        no mets    Obesity Brother     Heart failure Maternal Grandmother     Heart attack Maternal Grandfather     Suicide Attempts Paternal Grandmother     Depression Paternal Grandmother     Mental illness Paternal Grandmother     Depression Paternal Aunt     Heart attack Paternal Uncle     Depression Paternal Uncle     Cancer Maternal Aunt 83        blood cancer (leukemia?)    Cancer Maternal Uncle         unk type; was COD    Suicide Paternal Cousin     Tongue cancer Paternal Cousin         smoking hx unk    Ovarian cancer Neg Hx         Social History:   Social History     Tobacco Use    Smoking status: Never    Smokeless tobacco: Never   Substance Use Topics    Alcohol use: No        I have reviewed and updated the patient's past medical, surgical, family and social histories.    Allergies:   Review of patient's allergies indicates:   Allergen Reactions    Erythromycin (bulk)      Other reaction(s): Eye  irritation        Medications:   Current Outpatient Medications   Medication Sig Dispense Refill    coconut oil, bulk, Oil by Misc.(Non-Drug; Combo Route) route.      diphenhydramine HCl (BENADRYL ORAL) Take 25 mg by mouth as needed (rash).      doxycycline (VIBRAMYCIN) 100 MG Cap Take 1 capsule (100 mg total) by mouth every 12 (twelve) hours. (Patient not taking: Reported on 12/27/2023) 14 capsule 0    GADAVIST 1 mmol/mL (604.72 mg/mL) Soln injection       HYDROcodone-acetaminophen (NORCO) 5-325 mg per tablet Take 1 tablet by mouth every 12 (twelve) hours as needed for Pain. (Patient not taking: Reported on 12/6/2023) 10 tablet 0    hydrOXYzine HCL (ATARAX) 25 MG tablet Take 1 tablet (25 mg total) by mouth 3 (three) times daily as needed for Itching. (Patient not taking: Reported on 12/27/2023) 60 tablet 2    LIDOcaine-prilocaine (EMLA) cream Apply topically as needed (place to port site 45-60 minutes prior to chemotherapy). (Patient not taking: Reported on 12/27/2023) 30 g 3    multivitamin (THERAGRAN) per tablet Take 1 tablet by mouth once daily.      ondansetron (ZOFRAN-ODT) 4 MG TbDL Take 1 tablet (4 mg total) by mouth every 8 (eight) hours as needed (Nausea). (Patient not taking: Reported on 12/27/2023) 10 tablet 0    ondansetron (ZOFRAN-ODT) 8 MG TbDL TAKE 1 TABLET (8 MG TOTAL) BY MOUTH EVERY 6 HOURS AS NEEDED FOR NAUSEA (Patient not taking: Reported on 12/27/2023) 30 tablet 5    triamcinolone acetonide 0.1% (KENALOG) 0.1 % ointment Apply topically as needed (rash).      vitamin D (VITAMIN D3) 1000 units Tab Take 1,000 Units by mouth once daily.       No current facility-administered medications for this visit.     Facility-Administered Medications Ordered in Other Visits   Medication Dose Route Frequency Provider Last Rate Last Admin    alteplase injection 2 mg  2 mg Intra-Catheter PRN Meredith Kowalski, CNS        diphenhydrAMINE injection 50 mg  50 mg Intravenous Once PRN Meredith Kowalski, CNS         "EPINEPHrine (EPIPEN) 0.3 mg/0.3 mL pen injection 0.3 mg  0.3 mg Intramuscular Once PRN Meredith Kowalski, SARAH        gemcitabine (GEMZAR) 1,600 mg in sodium chloride 0.9% SolP 301 mL chemo infusion  1,600 mg Intravenous 1 time in Clinic/HOD Meredith Kowalski, CNS        heparin, porcine (PF) 100 unit/mL injection flush 500 Units  500 Units Intravenous PRN Meredith Kowalski, SARAH        hydrocortisone sodium succinate injection 100 mg  100 mg Intravenous Once PRN Meredith Kowalski, CNS        PACLitaxeL protein-bound (ABRAXANE) 200 mg in 47 mL infusion  200 mg Intravenous 1 time in Clinic/HOD Meredith Kowalski, CNS 94 mL/hr at 01/09/24 0856 200 mg at 01/09/24 0856    prochlorperazine injection Soln 5 mg  5 mg Intravenous Once PRN Meredith Kowalski, CNS        sodium chloride 0.9% flush 10 mL  10 mL Intravenous PRN Meredith Kowalski, CNS            Physical Exam:   /62 (BP Location: Left arm, Patient Position: Sitting, BP Method: Medium (Automatic))   Pulse 78   Temp 97.8 °F (36.6 °C) (Oral)   Resp 18   Ht 5' 3" (1.6 m)   Wt 62.6 kg (138 lb 0.1 oz)   SpO2 99%   BMI 24.45 kg/m²        Physical Exam  Vitals reviewed.   Constitutional:       General: She is not in acute distress.     Appearance: Normal appearance. She is well-developed and normal weight. She is not ill-appearing, toxic-appearing or diaphoretic.   HENT:      Head: Normocephalic and atraumatic.      Right Ear: External ear normal.      Left Ear: External ear normal.      Nose: Nose normal.      Mouth/Throat:      Mouth: Mucous membranes are moist.      Pharynx: Oropharynx is clear. No posterior oropharyngeal erythema.   Eyes:      General: No scleral icterus.     Extraocular Movements: Extraocular movements intact.      Conjunctiva/sclera: Conjunctivae normal.      Pupils: Pupils are equal, round, and reactive to light.   Neck:      Trachea: No tracheal deviation.   Cardiovascular:      Rate and Rhythm: Normal rate and regular rhythm.      Pulses: " Normal pulses.      Heart sounds: Normal heart sounds.   Pulmonary:      Effort: Pulmonary effort is normal.      Breath sounds: Normal breath sounds. No wheezing or rales.   Chest:      Comments: RCW port  Abdominal:      General: Bowel sounds are normal. There is no distension.      Palpations: Abdomen is soft.      Tenderness: There is no abdominal tenderness.   Musculoskeletal:         General: No swelling or deformity. Normal range of motion.      Cervical back: Normal range of motion and neck supple.   Skin:     General: Skin is warm.      Coloration: Skin is not jaundiced.      Findings: No erythema or rash.   Neurological:      General: No focal deficit present.      Mental Status: She is alert and oriented to person, place, and time. Mental status is at baseline.      Gait: Gait normal.   Psychiatric:         Mood and Affect: Mood normal.         Behavior: Behavior normal.         Thought Content: Thought content normal.         Judgment: Judgment normal.         Labs:   Recent Results (from the past 48 hour(s))   CBC W/ AUTO DIFFERENTIAL    Collection Time: 01/08/24  8:29 AM   Result Value Ref Range    WBC 5.08 3.90 - 12.70 K/uL    RBC 3.77 (L) 4.00 - 5.40 M/uL    Hemoglobin 11.1 (L) 12.0 - 16.0 g/dL    Hematocrit 35.3 (L) 37.0 - 48.5 %    MCV 94 82 - 98 fL    MCH 29.4 27.0 - 31.0 pg    MCHC 31.4 (L) 32.0 - 36.0 g/dL    RDW 18.6 (H) 11.5 - 14.5 %    Platelets 206 150 - 450 K/uL    MPV 11.0 9.2 - 12.9 fL    Immature Granulocytes 0.4 0.0 - 0.5 %    Gran # (ANC) 3.5 1.8 - 7.7 K/uL    Immature Grans (Abs) 0.02 0.00 - 0.04 K/uL    Lymph # 0.8 (L) 1.0 - 4.8 K/uL    Mono # 0.5 0.3 - 1.0 K/uL    Eos # 0.2 0.0 - 0.5 K/uL    Baso # 0.07 0.00 - 0.20 K/uL    nRBC 0 0 /100 WBC    Gran % 68.9 38.0 - 73.0 %    Lymph % 15.4 (L) 18.0 - 48.0 %    Mono % 9.6 4.0 - 15.0 %    Eosinophil % 4.3 0.0 - 8.0 %    Basophil % 1.4 0.0 - 1.9 %    Differential Method Automated    Comprehensive Metabolic Panel    Collection Time: 01/08/24   8:29 AM   Result Value Ref Range    Sodium 141 136 - 145 mmol/L    Potassium 4.2 3.5 - 5.1 mmol/L    Chloride 108 95 - 110 mmol/L    CO2 25 23 - 29 mmol/L    Glucose 122 (H) 70 - 110 mg/dL    BUN 17 8 - 23 mg/dL    Creatinine 0.7 0.5 - 1.4 mg/dL    Calcium 8.9 8.7 - 10.5 mg/dL    Total Protein 6.8 6.0 - 8.4 g/dL    Albumin 3.6 3.5 - 5.2 g/dL    Total Bilirubin 0.4 0.1 - 1.0 mg/dL    Alkaline Phosphatase 110 55 - 135 U/L    AST 24 10 - 40 U/L    ALT 20 10 - 44 U/L    eGFR >60.0 >60 mL/min/1.73 m^2    Anion Gap 8 8 - 16 mmol/L   CANCER ANTIGEN 19-9    Collection Time: 01/08/24  8:29 AM   Result Value Ref Range    CA 19-9 >55314.0 (H) 0.0 - 40.0 U/mL     Imaging:   CT CAP 12/26/2023    Impression:     1.  History of pancreatic cancer.  Pancreatic tail mass measures 2.8 cm as compared to 3.0 cm previously.  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     2.  Numerous hypodense lesions throughout the liver appears similar in number when compared to prior exam but some of the lesions appear smaller.  The largest lesion on the previous study now measures 5.1 cm as compared to 6.4 cm previously.  Prior report indicates biopsy-proven adenocarcinoma.     3.  Numerous focal opacities are again seen throughout both lungs.  Some of the previously described lesions are smaller or no longer present.  However, focal opacities are seen bilaterally.  Once again, findings are concerning for metastases.      CT CAP 11/7/23:  Impression:     1. Enlarged, 3.0 cm hypodense mass in the pancreatic tail concerning for malignancy.  There is associated encasement of the splenic artery and occlusion of the splenic vein.  2. Interval increase in size and number of numerous hypodense masses throughout the liver, consistent with biopsy-proven adenocarcinoma.  3. Interval increase in number and size of multiple bilateral pulmonary nodules and masses concerning for metastases.  4.  Sigmoid diverticulosis without diverticulitis.  5. Additional findings as above.  6. This report was flagged in Epic as abnormal.    Path:   Final Pathologic Diagnosis     Left hepatic lobe, biopsy:   Positive for malignancy, poorly differentiated adenocarcinoma (see comment)   Tumor necrosis present     Comment:  A review of the patient's imaging shows the presence of a 1.9 cm pancreatic tail mass and diffuse liver lesions.  These morphologic and immunophenotypic findings are supportive of a pancreatobiliary and/or upper gastrointestinal primary.  VC      Comment: Interp By Nataliia Peralta D.O., Signed on 10/05/2023 at 08:36   Supplemental Diagnosis     The purpose of this supplemental report is to report results of MMR panel in the tumor cells as follows:     Immunohistochemistry (IHC) Testing for Mismatch Repair (MMR) Proteins:      MLH1 - Intact nuclear expression    MSH2 - Intact nuclear expression    MSH6 - Intact nuclear expression    PMS2 - Intact nuclear expression            Somatic Genomic Results       Pancreatic mass       Tumor NGS Tissue  Resulted: 10/10/23 Status: Preliminary result       Detected - Pathogenic (4)      Gene Variant VAF   BRAF p.[id number] - [id number]_[id number] Inframe deletion - GOF 12.00 %   CDKN2A CDKN2A - Copy number loss --   CDKN2B CDKN2B - Copy number loss --   TP53 p.Y163C - c.488A>G Missense variant - LOF 30.50 %              Detected - Uncertain significance (1)      Gene Variant VAF   DIS3 p.[id number] - c.2012A>G Missense variant 23.10 %                                  Assessment:       1. Pancreatic adenocarcinoma    2. 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 Metastasis to liver    4. Immunodeficiency due to chemotherapy    5. Immunodeficiency secondary to neoplasm    6. Pruritic rash    7. Dyslipidemia    8. Constipation, unspecified constipation type    9. Age-related osteoporosis without current pathological fracture       Plan:        # Pancreatic  cancer  We discussed with the patient and her family her biopsy-proven diagnosis of metastatic pancreatic adenocarcinoma with multifocal liver metastases.  Her tumor is pMMR and Tempus tissue showed a non V600E BRAF mutation, TP53 mutation and CDKN2A and CDKN2B loss.  QUINN.    We discussed her prognosis and potential treatment options.  Because of her age and discussion of tolerance concerns, I have recommended biweekly gemcitabine + nab-paclitaxel as palliative intent first-line therapy.  Dosing for gemcitabine + nab-paclitaxel will be 1000 mg/m2 and 125 mg/m2, respectively, and I will administer these on a biweekly basis.    Port was placed.  Consideration to BRAF non-V600E targeting trials in the future if available.    She began cycle 1 of biweekly gemcitabine + nab-paclitaxel on 11/1/23.  She experienced fevers/chills and rash. believe these are all chemotherapy related. Improved after the administration of Dex.     Will continue to administer dexamethasone 12 mg IV prior to infusion for rash prevention.  May need to increase to 20mg for future cycles. Will continue to monitor.    Also gave her Medrol Dose Nghia to use in case rash recurs despite premed dex.    - Lab work reviewed, adequate to proceed with chemotherapy  - CT scan after cycle 4 displays overall improvement within the lung and liver, with decreases in the number and size of the lesions. Continue current regimen.   -Proceed with cycle 6.    RTC in 2 weeks for next cycle with lab work and treatment. Will repeat imaging studies after cycle 8    Following with palliative care.   Genetic testing done outside - saw Julito Sosa here.    PGX showed DPYD nml and UGT1A1 intermediate metabolism.    # HLD, constipation, pruritus, fever  Not currently on therapy. Cholesterol stable from June 2023. Will continue to f/u with PCP regarding this   Constipation: stable.   Pruritus/fever: controlled with increased pre-meds and Benadryl. Continues to control fever  alternating Motrin and Tylenol. Feels better today  Monitor.    # Osteoporosis, physical concerns, vaginal prolapse  Monitor calcium levels on chemo.  Will continue to encourage maintaining activity.  Discussed physical contact precautions and concerns with patient. Pt voiced appreciation of discussion  Will f/u with GYN.     Follow up: 2 weeks for cycle 6 D1.     Patient is in agreement with the proposed treatment plan. All questions were answered to the patient's satisfaction. Pt knows to call clinic if anything is needed before the next clinic visit.    Patient discussed with collaborating physician, Dr. Briceño.    At least 40 minutes were spent today on this encounter including face to face time with the patient, data gathering/interpretation and documentation.       Meredith Kowalski, MSN, APRN, ACCNS-AG  Hematology and Medical Oncology  Clinical Nurse Specialist to Dr. Briceño, Dr. Hammer & Dr. Willis    Route Chart for Scheduling    Med Onc Chart Routing      Follow up with physician . Please change 2/19 visit to MD and schedule scans 1-2 days prior to review scans/have ongoing treatment discussion. prefers early AM appointments.   Follow up with DONTE    Infusion scheduling note   treatment every 2 weeks   Injection scheduling note    Labs CBC, CMP and CA 19-9   Scheduling:  Preferred lab:  Lab interval: every 2 weeks  [facility] lab the day prior to visits   Imaging CT chest abdomen pelvis   due 1-2 days prior to 2/19 visit - please change to MD visit to review   Pharmacy appointment    Other referrals              Treatment Plan Information   OP PANC NAB-PACLITAXEL + GEMCITABINE Q4W   David Briceño MD   Upcoming Treatment Dates - OP PANC NAB-PACLITAXEL + GEMCITABINE Q4W    1/12/2024       Chemotherapy       PACLitaxeL protein-bound (ABRAXANE) in 42 mL infusion       gemcitabine (GEMZAR) in sodium chloride 0.9% SolP 250 mL chemo infusion       Antiemetics       ondansetron (ZOFRAN) 8 mg, dexAMETHasone  (DECADRON) 12 mg in sodium chloride 0.9% 50 mL IVPB  1/26/2024       Chemotherapy       PACLitaxeL protein-bound (ABRAXANE) in 42 mL infusion       gemcitabine (GEMZAR) in sodium chloride 0.9% SolP 250 mL chemo infusion       Antiemetics       ondansetron (ZOFRAN) 8 mg, dexAMETHasone (DECADRON) 12 mg in sodium chloride 0.9% 50 mL IVPB  2/9/2024       Chemotherapy       PACLitaxeL protein-bound (ABRAXANE) in 42 mL infusion       gemcitabine (GEMZAR) in sodium chloride 0.9% SolP 250 mL chemo infusion       Antiemetics       ondansetron (ZOFRAN) 8 mg, dexAMETHasone (DECADRON) 12 mg in sodium chloride 0.9% 50 mL IVPB  2/23/2024       Chemotherapy       PACLitaxeL protein-bound (ABRAXANE) in 42 mL infusion       gemcitabine (GEMZAR) in sodium chloride 0.9% SolP 250 mL chemo infusion       Antiemetics       ondansetron (ZOFRAN) 8 mg, dexAMETHasone (DECADRON) 12 mg in sodium chloride 0.9% 50 mL IVPB

## 2024-01-10 ENCOUNTER — PATIENT MESSAGE (OUTPATIENT)
Dept: ADMINISTRATIVE | Facility: OTHER | Age: 79
End: 2024-01-10
Payer: MEDICARE

## 2024-01-11 ENCOUNTER — PATIENT MESSAGE (OUTPATIENT)
Dept: ADMINISTRATIVE | Facility: OTHER | Age: 79
End: 2024-01-11
Payer: MEDICARE

## 2024-01-12 ENCOUNTER — PATIENT MESSAGE (OUTPATIENT)
Dept: ADMINISTRATIVE | Facility: OTHER | Age: 79
End: 2024-01-12
Payer: MEDICARE

## 2024-01-17 ENCOUNTER — PATIENT MESSAGE (OUTPATIENT)
Dept: ADMINISTRATIVE | Facility: OTHER | Age: 79
End: 2024-01-17
Payer: MEDICARE

## 2024-01-18 ENCOUNTER — TELEPHONE (OUTPATIENT)
Dept: FAMILY MEDICINE | Facility: CLINIC | Age: 79
End: 2024-01-18
Payer: MEDICARE

## 2024-01-18 ENCOUNTER — LAB VISIT (OUTPATIENT)
Dept: LAB | Facility: HOSPITAL | Age: 79
End: 2024-01-18
Attending: INTERNAL MEDICINE
Payer: MEDICARE

## 2024-01-18 ENCOUNTER — PATIENT MESSAGE (OUTPATIENT)
Dept: ADMINISTRATIVE | Facility: OTHER | Age: 79
End: 2024-01-18
Payer: MEDICARE

## 2024-01-18 ENCOUNTER — E-VISIT (OUTPATIENT)
Dept: FAMILY MEDICINE | Facility: CLINIC | Age: 79
End: 2024-01-18
Payer: MEDICARE

## 2024-01-18 ENCOUNTER — PATIENT MESSAGE (OUTPATIENT)
Dept: HEMATOLOGY/ONCOLOGY | Facility: CLINIC | Age: 79
End: 2024-01-18
Payer: MEDICARE

## 2024-01-18 DIAGNOSIS — N30.00 ACUTE CYSTITIS WITHOUT HEMATURIA: Primary | ICD-10-CM

## 2024-01-18 DIAGNOSIS — N30.00 ACUTE CYSTITIS WITHOUT HEMATURIA: ICD-10-CM

## 2024-01-18 LAB
BACTERIA #/AREA URNS AUTO: ABNORMAL /HPF
BILIRUB UR QL STRIP: NEGATIVE
CLARITY UR REFRACT.AUTO: ABNORMAL
COLOR UR AUTO: YELLOW
GLUCOSE UR QL STRIP: NEGATIVE
HGB UR QL STRIP: NEGATIVE
KETONES UR QL STRIP: NEGATIVE
LEUKOCYTE ESTERASE UR QL STRIP: ABNORMAL
MICROSCOPIC COMMENT: ABNORMAL
NITRITE UR QL STRIP: POSITIVE
PH UR STRIP: 6 [PH] (ref 5–8)
PROT UR QL STRIP: NEGATIVE
RBC #/AREA URNS AUTO: 0 /HPF (ref 0–4)
SP GR UR STRIP: 1.01 (ref 1–1.03)
URN SPEC COLLECT METH UR: ABNORMAL
WBC #/AREA URNS AUTO: 53 /HPF (ref 0–5)
WBC CLUMPS UR QL AUTO: ABNORMAL

## 2024-01-18 PROCEDURE — 87086 URINE CULTURE/COLONY COUNT: CPT | Performed by: INTERNAL MEDICINE

## 2024-01-18 PROCEDURE — 99422 OL DIG E/M SVC 11-20 MIN: CPT | Mod: ,,, | Performed by: INTERNAL MEDICINE

## 2024-01-18 PROCEDURE — 87077 CULTURE AEROBIC IDENTIFY: CPT | Performed by: INTERNAL MEDICINE

## 2024-01-18 PROCEDURE — 81001 URINALYSIS AUTO W/SCOPE: CPT | Performed by: INTERNAL MEDICINE

## 2024-01-18 PROCEDURE — 87088 URINE BACTERIA CULTURE: CPT | Performed by: INTERNAL MEDICINE

## 2024-01-18 PROCEDURE — 87186 SC STD MICRODIL/AGAR DIL: CPT | Performed by: INTERNAL MEDICINE

## 2024-01-18 RX ORDER — NITROFURANTOIN 25; 75 MG/1; MG/1
100 CAPSULE ORAL 2 TIMES DAILY
Qty: 10 CAPSULE | Refills: 0 | Status: SHIPPED | OUTPATIENT
Start: 2024-01-18 | End: 2024-01-23

## 2024-01-18 NOTE — TELEPHONE ENCOUNTER
----- Message from Yolanda Díaz sent at 1/18/2024 12:55 PM CST -----  Regarding: self  Type: Lab    Caller is requesting to schedule their Lab appointment prior to annual appointment.    Order is not listed in EPIC.  Please enter order and contact patient to schedule.    Name of Caller: self     Where would they like the lab performed? [facility]    Would the patient rather a call back or a response via [facility]?  Call back     Best Call Back Number:[phone]    Additional Information: pt would like to know if provider can send urine order because she might have bladder infection, pt stated she have cancer and was trying to reach out to Hemo in regards to order but she didn't get response, pt would like call back         Thanks

## 2024-01-18 NOTE — PROGRESS NOTES
Patient ID: Deepali Alex is a 78 y.o. female.    This note was created by combination of typed  and M-Modal dictation.  Transcription errors may be present.  If there are any questions, please contact me.    Assessment and Plan:   1. Acute cystitis without hematuria  Start empiric treatment with macrobid  UA UCx prior to initiation  - Urinalysis; Future  - Urine culture; Future  - nitrofurantoin, macrocrystal-monohydrate, (MACROBID) 100 MG capsule; Take 1 capsule (100 mg total) by mouth 2 (two) times daily. for 5 days  Dispense: 10 capsule; Refill: 0        Orders Placed This Encounter   Procedures    Urine culture     Standing Status:   Future     Standing Expiration Date:   1/17/2025    Urinalysis     Standing Status:   Future     Standing Expiration Date:   3/18/2025     Order Specific Question:   Collection Type     Answer:   Urine, Clean Catch      Medications Ordered This Encounter   Medications    nitrofurantoin, macrocrystal-monohydrate, (MACROBID) 100 MG capsule     Sig: Take 1 capsule (100 mg total) by mouth 2 (two) times daily. for 5 days     Dispense:  10 capsule     Refill:  0        No follow-ups on file. or sooner as needed.        E-Visit Time Tracking:  Day 1 Time (in minutes): 11                274}    Follow Up: No follow-ups on file.    Subjective:   Patient ID: Deepali Alex is a 78 y.o. female.    Chief Complaint: Urinary Tract Infection (Entered automatically based on patient selection in Patient Portal.)                    274}    History of Present Illness:  The patient initiated a request through [email or web address] on 1/18/2024 for evaluation and management with a chief complaint of Urinary Tract Infection (Entered automatically based on patient selection in Patient Portal.)     Pancreatic adenocarcinoma followed by Heme-Onc.  biweekly gemcitabine + nab-paclitaxel.   History of aortic atherosclerosis.  Patient opted not to start statin  History of diastolic dysfunction on  echo.  Holter negative.  AAA screening negative.    History of bladder prolapse previously followed by Urogynecology.  History of recurrent UTI symptoms.  Urine cultures are either sterile or have had E coli in the past.    12/26/23 CT  1.  History of pancreatic cancer.  Pancreatic tail mass measures 2.8 cm as compared to 3.0 cm previously.  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  2.  Numerous hypodense lesions throughout the liver appears similar in number when compared to prior exam but some of the lesions appear smaller.  The largest lesion on the previous study now measures 5.1 cm as compared to 6.4 cm previously.  Prior report indicates biopsy-proven adenocarcinoma.  3.  Numerous focal opacities are again seen throughout both lungs.  Some of the previously described lesions are smaller or no longer present.  However, focal opacities are seen bilaterally.  Once again, findings are concerning for metastases.    C/o UTI type symptoms.   Message to heme/onc:  About 40 minutes ago I had frequent urges to urinate. Just now it was painful when I urinated.     I evaluated the questionnaire submission on 01/18/2024 .    E-visit Questionnaire:  [facility] Peq Evisit Uti Questionnaire    1/18/2024  2:21 PM CST - Filed by Patient   Do you agree to participate in an E-Visit? Yes   If you have any of the following problems, please present to your local ER or call 911:  I acknowledge   What is the main issue that you would like for your doctor to address today? I Probably have abladder infection   Are you able to take your vital signs? Yes   Systolic Blood Pressure: 138   Diastolic Blood Pressure: 75   Weight: 133   Height: 63   Pulse: 81   Temperature: 97.8   Respiration rate:    Pulse Oxygen:    What symptoms do you currently have? Pain while passing urine   When did your symptoms first appear? 1945   List what you have done or taken to help your symptoms.  Drinking more water   Please indicate whether you have had the following symptoms during the past 24 hours     Urgent urination (a sudden and uncontrollable urge to urinate) Moderate   Frequent urination of small amounts of urine (going to the toilet very often) Moderate   Burning pain when urinating Moderate   Incomplete bladder emptying (still feel like you need to urinate again after urination) None   Pain not associated with urination in the lower abdomen below the belly button) None   What does your urine look like? I am not sure   Blood seen in the urine None   Flank pain (pain in one or both sides of the lower back) None   Abnormal Vaginal Discharge (abnormal amount, color and/or odor) None   Discharge from the urethra (urinary opening) without urination None   High body temperature/fever? None-<99.5   Please rate how much discomfort you have experience because of the symptoms in the past 24 hours: Mild   Please indicate how the symptoms have interfered with your every day activities/work in the past 24 hours: Mild   Please indicate how these symptoms have interfered with your social activities (visiting people, meeting with friends, etc.) in the past 24 hours? None   Are you a diabetic? No   Please indicate whether you have the following at the time of completion of this questionnaire: None of the above   Provide any information you feel is important to your history not asked above Im scheduled to take chemo infusion on Monday.   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  FRAX score4.6/16%. osteoporosis.      AR (allergic rhinitis) 08/16/2017    Intermittent vertigo 08/16/2017    Aortic atherosclerosis      - noted on CXR 8/2017      Family history of breast cancer 07/21/2015    Recurrent urinary tract infection 05/02/2014    Dyslipidemia 10/17/2013     - no need for prescription medication at this time.  4/29/22 TTE LV normal size, systolic function LVEF 55%. Grade 1 diastolic dysfunction. Normal RV size and systolic function. PA pressure 25, CVP 3  4/29/22 holter normal  4/29/22 AAA US negative          Recent Labs Obtained:  No visits with results within 7 Day(s) from this visit.   Latest known visit with results is:   Lab Visit on 01/08/2024   Component Date Value Ref Range Status    WBC 01/08/2024 5.08  3.90 - 12.70 K/uL Final    RBC 01/08/2024 3.77 (L)  4.00 - 5.40 M/uL Final    Hemoglobin 01/08/2024 11.1 (L)  12.0 - 16.0 g/dL Final    Hematocrit 01/08/2024 35.3 (L)  37.0 - 48.5 % Final    MCV 01/08/2024 94  82 - 98 fL Final    MCH 01/08/2024 29.4  27.0 - 31.0 pg Final    MCHC 01/08/2024 31.4 (L)  32.0 - 36.0 g/dL Final    RDW 01/08/2024 18.6 (H)  11.5 - 14.5 % Final    Platelets 01/08/2024 206  150 - 450 K/uL Final    MPV 01/08/2024 11.0  9.2 - 12.9 fL Final    Immature Granulocytes 01/08/2024 0.4  0.0 - 0.5 % Final    Gran # (ANC) 01/08/2024 3.5  1.8 - 7.7  K/uL Final    Immature Grans (Abs) 01/08/2024 0.02  0.00 - 0.04 K/uL Final    Comment: Mild elevation in immature granulocytes is non specific and   can be seen in a variety of conditions including stress response,   acute inflammation, trauma and pregnancy. Correlation with other   laboratory and clinical findings is essential.      Lymph # 01/08/2024 0.8 (L)  1.0 - 4.8 K/uL Final    Mono # 01/08/2024 0.5  0.3 - 1.0 K/uL Final    Eos # 01/08/2024 0.2  0.0 - 0.5 K/uL Final    Baso # 01/08/2024 0.07  0.00 - 0.20 K/uL Final    nRBC 01/08/2024 0  0 /100 WBC Final    Gran % 01/08/2024 68.9  38.0 - 73.0 % Final    Lymph % 01/08/2024 15.4 (L)  18.0 - 48.0 % Final    Mono % 01/08/2024 9.6  4.0 - 15.0 % Final    Eosinophil % 01/08/2024 4.3  0.0 - 8.0 % Final    Basophil % 01/08/2024 1.4  0.0 - 1.9 % Final    Differential Method 01/08/2024 Automated   Final    Sodium 01/08/2024 141  136 - 145 mmol/L Final    Potassium 01/08/2024 4.2  3.5 - 5.1 mmol/L Final    Chloride 01/08/2024 108  95 - 110 mmol/L Final    CO2 01/08/2024 25  23 - 29 mmol/L Final    Glucose 01/08/2024 122 (H)  70 - 110 mg/dL Final    BUN 01/08/2024 17  8 - 23 mg/dL Final    Creatinine 01/08/2024 0.7  0.5 - 1.4 mg/dL Final    Calcium 01/08/2024 8.9  8.7 - 10.5 mg/dL Final    Total Protein 01/08/2024 6.8  6.0 - 8.4 g/dL Final    Albumin 01/08/2024 3.6  3.5 - 5.2 g/dL Final    Total Bilirubin 01/08/2024 0.4  0.1 - 1.0 mg/dL Final    Comment: For infants and newborns, interpretation of results should be based  on gestational age, weight and in agreement with clinical  observations.    Premature Infant recommended reference ranges:  Up to 24 hours.............<8.0 mg/dL  Up to 48 hours............<12.0 mg/dL  3-5 days..................<15.0 mg/dL  6-29 days.................<15.0 mg/dL      Alkaline Phosphatase 01/08/2024 110  55 - 135 U/L Final    AST 01/08/2024 24  10 - 40 U/L Final    ALT 01/08/2024 20  10 - 44 U/L Final    eGFR 01/08/2024 >60.0  >60  mL/min/1.73 m^2 Final    Anion Gap 01/08/2024 8  8 - 16 mmol/L Final    CA 19-9 01/08/2024 >73725.0 (H)  0.0 - 40.0 U/mL Final    Comment: The testing method is a chemiluminescent microparticle immunoassay   manufactured by [employer] and performed on the    or   [employer] system. Values obtained with different assay manufacturers   for   methods may be different and cannot be used interchangeably.           ALLERGIES AND MEDICATIONS: updated and reviewed.  Review of patient's allergies indicates:   Allergen Reactions    Erythromycin (bulk)      Other reaction(s): Eye irritation       Medication List with Changes/Refills   New Medications    NITROFURANTOIN, MACROCRYSTAL-MONOHYDRATE, (MACROBID) 100 MG CAPSULE    Take 1 capsule (100 mg total) by mouth 2 (two) times daily. for 5 days   Current Medications    COCONUT OIL, BULK, OIL    by Misc.(Non-Drug; Combo Route) route.    DIPHENHYDRAMINE HCL (BENADRYL ORAL)    Take 25 mg by mouth as needed (rash).    DOXYCYCLINE (VIBRAMYCIN) 100 MG CAP    Take 1 capsule (100 mg total) by mouth every 12 (twelve) hours.    GADAVIST 1 MMOL/ML (604.72 MG/ML) SOLN INJECTION        HYDROCODONE-ACETAMINOPHEN (NORCO) 5-325 MG PER TABLET    Take 1 tablet by mouth every 12 (twelve) hours as needed for Pain.    HYDROXYZINE HCL (ATARAX) 25 MG TABLET    Take 1 tablet (25 mg total) by mouth 3 (three) times daily as needed for Itching.    LIDOCAINE-PRILOCAINE (EMLA) CREAM    Apply topically as needed (place to port site 45-60 minutes prior to chemotherapy).    MULTIVITAMIN (THERAGRAN) PER TABLET    Take 1 tablet by mouth once daily.    ONDANSETRON (ZOFRAN-ODT) 4 MG TBDL    Take 1 tablet (4 mg total) by mouth every 8 (eight) hours as needed (Nausea).    ONDANSETRON (ZOFRAN-ODT) 8 MG TBDL    TAKE 1 TABLET (8 MG TOTAL) BY MOUTH EVERY 6 HOURS AS NEEDED FOR NAUSEA    TRIAMCINOLONE ACETONIDE 0.1% (KENALOG) 0.1 % OINTMENT    Apply topically as needed (rash).    VITAMIN D (VITAMIN D3)  1000 UNITS TAB    Take 1,000 Units by mouth once daily.

## 2024-01-19 ENCOUNTER — DOCUMENTATION ONLY (OUTPATIENT)
Dept: HEMATOLOGY/ONCOLOGY | Facility: CLINIC | Age: 79
End: 2024-01-19
Payer: MEDICARE

## 2024-01-19 ENCOUNTER — PATIENT MESSAGE (OUTPATIENT)
Dept: ADMINISTRATIVE | Facility: OTHER | Age: 79
End: 2024-01-19
Payer: MEDICARE

## 2024-01-19 ENCOUNTER — PATIENT MESSAGE (OUTPATIENT)
Dept: HEMATOLOGY/ONCOLOGY | Facility: CLINIC | Age: 79
End: 2024-01-19
Payer: MEDICARE

## 2024-01-19 NOTE — PROGRESS NOTES
Records Review    [facility] xG, xT, IHC, xR, and TO reports received from [employer].  Reports to be scanned into Epic under Media tab.    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    CC:  David Briceño MD

## 2024-01-20 ENCOUNTER — PATIENT MESSAGE (OUTPATIENT)
Dept: ADMINISTRATIVE | Facility: OTHER | Age: 79
End: 2024-01-20
Payer: MEDICARE

## 2024-01-21 ENCOUNTER — PATIENT MESSAGE (OUTPATIENT)
Dept: ADMINISTRATIVE | Facility: OTHER | Age: 79
End: 2024-01-21
Payer: MEDICARE

## 2024-01-21 LAB — BACTERIA UR CULT: ABNORMAL

## 2024-01-22 ENCOUNTER — INFUSION (OUTPATIENT)
Dept: INFUSION THERAPY | Facility: HOSPITAL | Age: 79
End: 2024-01-22
Payer: MEDICARE

## 2024-01-22 ENCOUNTER — OFFICE VISIT (OUTPATIENT)
Dept: HEMATOLOGY/ONCOLOGY | Facility: CLINIC | Age: 79
End: 2024-01-22
Payer: MEDICARE

## 2024-01-22 ENCOUNTER — PATIENT MESSAGE (OUTPATIENT)
Dept: ADMINISTRATIVE | Facility: OTHER | Age: 79
End: 2024-01-22
Payer: MEDICARE

## 2024-01-22 VITALS
HEART RATE: 74 BPM | BODY MASS INDEX: 24.14 KG/M2 | TEMPERATURE: 98 F | RESPIRATION RATE: 18 BRPM | HEIGHT: 63 IN | WEIGHT: 136.25 LBS | SYSTOLIC BLOOD PRESSURE: 122 MMHG | DIASTOLIC BLOOD PRESSURE: 64 MMHG

## 2024-01-22 VITALS
WEIGHT: 136.25 LBS | SYSTOLIC BLOOD PRESSURE: 119 MMHG | DIASTOLIC BLOOD PRESSURE: 59 MMHG | BODY MASS INDEX: 24.14 KG/M2 | HEART RATE: 75 BPM | TEMPERATURE: 98 F | HEIGHT: 63 IN | OXYGEN SATURATION: 99 %

## 2024-01-22 DIAGNOSIS — T45.1X5A IMMUNODEFICIENCY DUE TO CHEMOTHERAPY: ICD-10-CM

## 2024-01-22 DIAGNOSIS — C78.01 MALIGNANT NEOPLASM METASTATIC TO BOTH LUNGS: ICD-10-CM

## 2024-01-22 DIAGNOSIS — K59.00 CONSTIPATION, UNSPECIFIED CONSTIPATION TYPE: ICD-10-CM

## 2024-01-22 DIAGNOSIS — M81.0 AGE-RELATED OSTEOPOROSIS WITHOUT CURRENT PATHOLOGICAL FRACTURE: ICD-10-CM

## 2024-01-22 DIAGNOSIS — C78.7 METASTASIS TO LIVER: ICD-10-CM

## 2024-01-22 DIAGNOSIS — E78.5 DYSLIPIDEMIA: ICD-10-CM

## 2024-01-22 DIAGNOSIS — C25.9 PANCREATIC ADENOCARCINOMA: Primary | ICD-10-CM

## 2024-01-22 DIAGNOSIS — Z79.899 IMMUNODEFICIENCY DUE TO CHEMOTHERAPY: ICD-10-CM

## 2024-01-22 DIAGNOSIS — D84.81 IMMUNODEFICIENCY SECONDARY TO NEOPLASM: ICD-10-CM

## 2024-01-22 DIAGNOSIS — D84.821 IMMUNODEFICIENCY DUE TO CHEMOTHERAPY: ICD-10-CM

## 2024-01-22 DIAGNOSIS — D49.9 IMMUNODEFICIENCY SECONDARY TO NEOPLASM: ICD-10-CM

## 2024-01-22 DIAGNOSIS — C78.02 MALIGNANT NEOPLASM METASTATIC TO BOTH LUNGS: ICD-10-CM

## 2024-01-22 DIAGNOSIS — D63.0 ANEMIA IN NEOPLASTIC DISEASE: ICD-10-CM

## 2024-01-22 DIAGNOSIS — L28.2 PRURITIC RASH: ICD-10-CM

## 2024-01-22 PROCEDURE — 96367 TX/PROPH/DG ADDL SEQ IV INF: CPT

## 2024-01-22 PROCEDURE — 96417 CHEMO IV INFUS EACH ADDL SEQ: CPT

## 2024-01-22 PROCEDURE — A4216 STERILE WATER/SALINE, 10 ML: HCPCS | Performed by: INTERNAL MEDICINE

## 2024-01-22 PROCEDURE — 63600175 PHARM REV CODE 636 W HCPCS: Performed by: INTERNAL MEDICINE

## 2024-01-22 PROCEDURE — 96413 CHEMO IV INFUSION 1 HR: CPT

## 2024-01-22 PROCEDURE — 99215 OFFICE O/P EST HI 40 MIN: CPT | Mod: S$GLB,,, | Performed by: INTERNAL MEDICINE

## 2024-01-22 PROCEDURE — 99999 PR PBB SHADOW E&M-EST. PATIENT-LVL IV: CPT | Mod: PBBFAC,,, | Performed by: INTERNAL MEDICINE

## 2024-01-22 PROCEDURE — 25000003 PHARM REV CODE 250: Performed by: INTERNAL MEDICINE

## 2024-01-22 RX ORDER — PROCHLORPERAZINE EDISYLATE 5 MG/ML
5 INJECTION INTRAMUSCULAR; INTRAVENOUS ONCE AS NEEDED
Status: CANCELLED
Start: 2024-01-22

## 2024-01-22 RX ORDER — GEMCITABINE 38 MG/ML
INJECTION, SOLUTION INTRAVENOUS
COMMUNITY
Start: 2023-11-15

## 2024-01-22 RX ORDER — HEPARIN 100 UNIT/ML
500 SYRINGE INTRAVENOUS
Status: DISCONTINUED | OUTPATIENT
Start: 2024-01-22 | End: 2024-01-22 | Stop reason: HOSPADM

## 2024-01-22 RX ORDER — DIPHENHYDRAMINE HYDROCHLORIDE 50 MG/ML
50 INJECTION INTRAMUSCULAR; INTRAVENOUS ONCE AS NEEDED
Status: DISCONTINUED | OUTPATIENT
Start: 2024-01-22 | End: 2024-01-22 | Stop reason: HOSPADM

## 2024-01-22 RX ORDER — PROCHLORPERAZINE EDISYLATE 5 MG/ML
5 INJECTION INTRAMUSCULAR; INTRAVENOUS ONCE AS NEEDED
Status: DISCONTINUED | OUTPATIENT
Start: 2024-01-22 | End: 2024-01-22 | Stop reason: HOSPADM

## 2024-01-22 RX ORDER — EPINEPHRINE 0.3 MG/.3ML
0.3 INJECTION SUBCUTANEOUS ONCE AS NEEDED
Status: DISCONTINUED | OUTPATIENT
Start: 2024-01-22 | End: 2024-01-22 | Stop reason: HOSPADM

## 2024-01-22 RX ORDER — SODIUM CHLORIDE 0.9 % (FLUSH) 0.9 %
10 SYRINGE (ML) INJECTION
Status: CANCELLED | OUTPATIENT
Start: 2024-01-22

## 2024-01-22 RX ORDER — SODIUM CHLORIDE 0.9 % (FLUSH) 0.9 %
10 SYRINGE (ML) INJECTION
Status: DISCONTINUED | OUTPATIENT
Start: 2024-01-22 | End: 2024-01-22 | Stop reason: HOSPADM

## 2024-01-22 RX ORDER — HEPARIN 100 UNIT/ML
500 SYRINGE INTRAVENOUS
Status: CANCELLED | OUTPATIENT
Start: 2024-01-22

## 2024-01-22 RX ORDER — PACLITAXEL 100 MG/20ML
INJECTION, POWDER, LYOPHILIZED, FOR SUSPENSION INTRAVENOUS
COMMUNITY
Start: 2023-11-15

## 2024-01-22 RX ORDER — EPINEPHRINE 0.3 MG/.3ML
0.3 INJECTION SUBCUTANEOUS ONCE AS NEEDED
Status: CANCELLED | OUTPATIENT
Start: 2024-01-22

## 2024-01-22 RX ORDER — HEPARIN SODIUM 1000 [USP'U]/ML
INJECTION, SOLUTION INTRAVENOUS; SUBCUTANEOUS
COMMUNITY
Start: 2023-10-25

## 2024-01-22 RX ORDER — DEXAMETHASONE SODIUM PHOSPHATE 4 MG/ML
INJECTION, SOLUTION INTRA-ARTICULAR; INTRALESIONAL; INTRAMUSCULAR; INTRAVENOUS; SOFT TISSUE
COMMUNITY
Start: 2023-11-15

## 2024-01-22 RX ORDER — HEPARIN 100 UNIT/ML
SYRINGE INTRAVENOUS
COMMUNITY
Start: 2023-11-15

## 2024-01-22 RX ORDER — DIPHENHYDRAMINE HYDROCHLORIDE 50 MG/ML
50 INJECTION INTRAMUSCULAR; INTRAVENOUS ONCE AS NEEDED
Status: CANCELLED | OUTPATIENT
Start: 2024-01-22

## 2024-01-22 RX ADMIN — SODIUM CHLORIDE: 9 INJECTION, SOLUTION INTRAVENOUS at 02:01

## 2024-01-22 RX ADMIN — PACLITAXEL 200 MG: 100 INJECTION, POWDER, LYOPHILIZED, FOR SUSPENSION INTRAVENOUS at 03:01

## 2024-01-22 RX ADMIN — Medication 10 ML: at 04:01

## 2024-01-22 RX ADMIN — GEMCITABINE 1600 MG: 100 INJECTION, SOLUTION INTRAVENOUS at 03:01

## 2024-01-22 RX ADMIN — ONDANSETRON 8 MG: 2 INJECTION INTRAMUSCULAR; INTRAVENOUS at 02:01

## 2024-01-22 RX ADMIN — HEPARIN 500 UNITS: 100 SYRINGE at 04:01

## 2024-01-22 NOTE — PLAN OF CARE
[age] pt tolerated abraxane/Gemzar infusion without issue, pt to rtc 2/12/24, no distress noted upon d/c to home

## 2024-01-22 NOTE — PROGRESS NOTES
MEDICAL ONCOLOGY - ESTABLISHED PATIENT VISIT    Reason for visit: Pancreatic adenocarcinoma    Best Contact Phone Number(s): [phone] (home)      Cancer/Stage/TNM:    Cancer Staging   Pancreatic adenocarcinoma  Staging form: Exocrine Pancreas, AJCC 8th Edition  - Clinical stage from 9/21/2023: Stage IV (cT1, cNX, pM1) - Signed by David Briceño MD on 10/11/2023       Oncology History   Pancreatic adenocarcinoma   9/5/2023 Initial Diagnosis    Pancreatic adenocarcinoma     9/21/2023 Cancer Staged    Staging form: Exocrine Pancreas, AJCC 8th Edition  - Clinical stage from 9/21/2023: Stage IV (cT1, cNX, pM1)     10/2/2023 Tumor Conference     [facility] UGI MULTIDISCIPLINARY TUMOR BOARD  PATIENT REVIEW FORM   ____________________________________________________________    CLINIC #: [id number]  DATE: 10/2/2023    DIAGNOSIS: pancreas CA    PRESENTER: Angela    PATIENT SUMMARY:   This 77 y/o female presented in August with abd pain, decreased appetite with weight loss. Reviewed CT and MRI imaging ~ 2 cm mass in tail of pancreas with diffuse metastatic liver disease, largest liver lesion in left lobe measuring 4.5 cm. IR performed liver bx and this pathology was reviewed - poorly differentiated adenocarcinoma, favoring upper GI origin.     BOARD RECOMMENDATIONS:   Stage IV pancreas CA with liver mets  Add MMR and TEMPUS  Proceed with palliative systemic chemotherapy    CONSULT NEEDED:     [] Surgery    [] Hem/Onc    [] Rad/Onc    [] Dietary     [] Social Service    [] Psychology       [] AES  [] Radiology     Clinical Stage: Tumor   Node(s)   Metastasis        GROUP STAGE:  [] O    [] 1A    [] IB    [] IIA    [] IIB     [] IIIA     [] IIIB     [] IIIC    [x]IV  [] Local recurrence     [] Regional recurrence     [] Distant recurrence   Metastatic site(s): liver         [x] Elidia'l Treatment Guidelines reviewed and care planned is consistent with guidelines.         (i.e., NCCN, NCI, PD, ACO, AUA,  etc.)    PRESENTATION AT CANCER CONFERENCE:         [x] Prospective    [] Retrospective     [] Follow-Up    Eligible for clinical trial : yes       11/1/2023 -  Chemotherapy    Treatment Summary   Plan Name: OP PANC NAB-PACLITAXEL + GEMCITABINE Q4W  Treatment Goal: Palliative  Status: Active  Start Date: 11/1/2023  End Date: 9/6/2024 (Planned)  Provider: David Briceño MD  Chemotherapy: gemcitabine (GEMZAR) 1,600 mg in sodium chloride 0.9% SolP 327.08 mL chemo infusion, 1,680 mg, Intravenous, Clinic/HOD 1 time, 3 of 12 cycles  Administration: 1,600 mg (11/1/2023), 1,600 mg (11/15/2023), 1,600 mg (11/29/2023), 1,600 mg (12/14/2023), 1,600 mg (12/27/2023), 1,600 mg (1/9/2024)          Interim History:   78 y.o. female with metastatic pancreatic cancer who presents for follow-up prior to cycle 7 of biweekly gemcitabine + nab-paclitaxel.  She is feeling well.  Has minimal fatigue, no N/V.  She has occasional epigastric discomfort but this is transient and not currently present.    ECOG PS is 0. Presents with her sister today.     ROS:   Review of Systems   Constitutional:  Negative for chills, fever, malaise/fatigue and weight loss.   HENT:  Negative for sore throat.    Eyes:  Negative for blurred vision and pain.   Respiratory:  Negative for cough and shortness of breath.    Cardiovascular:  Positive for leg swelling. Negative for chest pain.   Gastrointestinal:  Positive for abdominal pain. Negative for constipation, diarrhea, nausea and vomiting.   Genitourinary:  Negative for dysuria and frequency.   Musculoskeletal:  Negative for back pain, falls and myalgias.   Skin:  Negative for itching and rash.   Neurological:  Negative for dizziness, weakness and headaches.   Endo/Heme/Allergies:  Does not bruise/bleed easily.   Psychiatric/Behavioral:  Negative for depression. The patient is not nervous/anxious.        Past Medical History:   Past Medical History:   Diagnosis Date    Age-related osteoporosis without  current pathological fracture     Atherosclerosis of aortic arch     - noted on CXR 8/2017    Back pain     Diverticulosis of sigmoid colon 10/22/2019    Ectopic pregnancy     Fibrocystic breast     Hyperlipidemia     Inguinal hernia     Osteopenia     Polyp of ascending colon 10/22/2019    Urinary incontinence, mixed 05/24/2022        Past Surgical History:   Past Surgical History:   Procedure Laterality Date    ECTOPIC PREGNANCY SURGERY      HERNIA REPAIR      left inguinal    HYSTERECTOMY  1980    without BSO        Family History:   Family History   Problem Relation Age of Onset    Breast cancer Mother 69    Seizures Mother     Heart disease Father     Heart attack Father     Depression Father     Breast cancer Sister 73    Breast cancer Maternal Aunt 73    Prostate cancer Brother 81        no mets    Obesity Brother     Heart failure Maternal Grandmother     Heart attack Maternal Grandfather     Suicide Attempts Paternal Grandmother     Depression Paternal Grandmother     Mental illness Paternal Grandmother     Depression Paternal Aunt     Heart attack Paternal Uncle     Depression Paternal Uncle     Cancer Maternal Aunt 83        blood cancer (leukemia?)    Cancer Maternal Uncle         unk type; was COD    Suicide Paternal Cousin     Tongue cancer Paternal Cousin         smoking hx unk    Ovarian cancer Neg Hx         Social History:   Social History     Tobacco Use    Smoking status: Never    Smokeless tobacco: Never   Substance Use Topics    Alcohol use: No        I have reviewed and updated the patient's past medical, surgical, family and social histories.    Allergies:   Review of patient's allergies indicates:   Allergen Reactions    Erythromycin (bulk)      Other reaction(s): Eye irritation        Medications:   Current Outpatient Medications   Medication Sig Dispense Refill    ABRAXANE 100 mg injection       dexAMETHasone (DECADRON) 4 mg/mL injection       gemcitabine (GEMZAR) 1 gram/26.3 mL (38 mg/mL)  Soln injection       gemcitabine 200 mg/5.26 mL (38 mg/mL) Soln       heparin sodium,porcine (HEPARIN, PORCINE,) 1,000 unit/mL injection       heparin, porcine, PF, (HEPARIN FLUSH 100 UNITS/ML) 100 unit/mL Syrg       multivitamin (THERAGRAN) per tablet Take 1 tablet by mouth once daily.      nitrofurantoin, macrocrystal-monohydrate, (MACROBID) 100 MG capsule Take 1 capsule (100 mg total) by mouth 2 (two) times daily. for 5 days 10 capsule 0    vitamin D (VITAMIN D3) 1000 units Tab Take 1,000 Units by mouth once daily.      coconut oil, bulk, Oil by Misc.(Non-Drug; Combo Route) route.      diphenhydramine HCl (BENADRYL ORAL) Take 25 mg by mouth as needed (rash).      doxycycline (VIBRAMYCIN) 100 MG Cap Take 1 capsule (100 mg total) by mouth every 12 (twelve) hours. (Patient not taking: Reported on 12/27/2023) 14 capsule 0    GADAVIST 1 mmol/mL (604.72 mg/mL) Soln injection       HYDROcodone-acetaminophen (NORCO) 5-325 mg per tablet Take 1 tablet by mouth every 12 (twelve) hours as needed for Pain. (Patient not taking: Reported on 12/6/2023) 10 tablet 0    hydrOXYzine HCL (ATARAX) 25 MG tablet Take 1 tablet (25 mg total) by mouth 3 (three) times daily as needed for Itching. (Patient not taking: Reported on 12/27/2023) 60 tablet 2    LIDOcaine-prilocaine (EMLA) cream Apply topically as needed (place to port site 45-60 minutes prior to chemotherapy). (Patient not taking: Reported on 12/27/2023) 30 g 3    ondansetron (ZOFRAN-ODT) 4 MG TbDL Take 1 tablet (4 mg total) by mouth every 8 (eight) hours as needed (Nausea). (Patient not taking: Reported on 12/27/2023) 10 tablet 0    ondansetron (ZOFRAN-ODT) 8 MG TbDL TAKE 1 TABLET (8 MG TOTAL) BY MOUTH EVERY 6 HOURS AS NEEDED FOR NAUSEA (Patient not taking: Reported on 12/27/2023) 30 tablet 5    triamcinolone acetonide 0.1% (KENALOG) 0.1 % ointment Apply topically as needed (rash).       No current facility-administered medications for this visit.        Physical Exam:   BP (!)  "119/59 (BP Location: Left arm, Patient Position: Sitting, BP Method: Medium (Automatic))   Pulse 75   Temp 98.3 °F (36.8 °C) (Oral)   Ht 5' 3" (1.6 m)   Wt 61.8 kg (136 lb 3.9 oz)   SpO2 99%   BMI 24.13 kg/m²        Physical Exam  Vitals reviewed.   Constitutional:       General: She is not in acute distress.     Appearance: Normal appearance. She is well-developed and normal weight. She is not ill-appearing, toxic-appearing or diaphoretic.   HENT:      Head: Normocephalic and atraumatic.      Right Ear: External ear normal.      Left Ear: External ear normal.      Nose: Nose normal.      Mouth/Throat:      Mouth: Mucous membranes are moist.      Pharynx: Oropharynx is clear. No posterior oropharyngeal erythema.   Eyes:      General: No scleral icterus.     Extraocular Movements: Extraocular movements intact.      Conjunctiva/sclera: Conjunctivae normal.      Pupils: Pupils are equal, round, and reactive to light.   Neck:      Trachea: No tracheal deviation.   Cardiovascular:      Rate and Rhythm: Normal rate and regular rhythm.      Pulses: Normal pulses.      Heart sounds: Normal heart sounds.   Pulmonary:      Effort: Pulmonary effort is normal.      Breath sounds: Normal breath sounds. No wheezing or rales.   Chest:      Comments: RCW port  Abdominal:      General: Bowel sounds are normal. There is no distension.      Palpations: Abdomen is soft.      Tenderness: There is no abdominal tenderness.   Musculoskeletal:         General: No swelling or deformity. Normal range of motion.      Cervical back: Normal range of motion and neck supple.   Skin:     General: Skin is warm.      Coloration: Skin is not jaundiced.      Findings: No erythema or rash.   Neurological:      General: No focal deficit present.      Mental Status: She is alert and oriented to person, place, and time. Mental status is at baseline.      Gait: Gait normal.   Psychiatric:         Mood and Affect: Mood normal.         Behavior: " Behavior normal.         Thought Content: Thought content normal.         Judgment: Judgment normal.         Labs:   Recent Results (from the past 48 hour(s))   CBC W/ AUTO DIFFERENTIAL    Collection Time: 01/22/24 12:26 PM   Result Value Ref Range    WBC 6.83 3.90 - 12.70 K/uL    RBC 3.82 (L) 4.00 - 5.40 M/uL    Hemoglobin 11.2 (L) 12.0 - 16.0 g/dL    Hematocrit 34.1 (L) 37.0 - 48.5 %    MCV 89 82 - 98 fL    MCH 29.3 27.0 - 31.0 pg    MCHC 32.8 32.0 - 36.0 g/dL    RDW 17.0 (H) 11.5 - 14.5 %    Platelets 198 150 - 450 K/uL    MPV 11.1 9.2 - 12.9 fL    Immature Granulocytes 0.3 0.0 - 0.5 %    Gran # (ANC) 4.8 1.8 - 7.7 K/uL    Immature Grans (Abs) 0.02 0.00 - 0.04 K/uL    Lymph # 1.0 1.0 - 4.8 K/uL    Mono # 0.7 0.3 - 1.0 K/uL    Eos # 0.2 0.0 - 0.5 K/uL    Baso # 0.07 0.00 - 0.20 K/uL    nRBC 0 0 /100 WBC    Gran % 70.3 38.0 - 73.0 %    Lymph % 14.6 (L) 18.0 - 48.0 %    Mono % 10.7 4.0 - 15.0 %    Eosinophil % 3.1 0.0 - 8.0 %    Basophil % 1.0 0.0 - 1.9 %    Differential Method Automated    Comprehensive Metabolic Panel    Collection Time: 01/22/24 12:26 PM   Result Value Ref Range    Sodium 140 136 - 145 mmol/L    Potassium 4.6 3.5 - 5.1 mmol/L    Chloride 107 95 - 110 mmol/L    CO2 25 23 - 29 mmol/L    Glucose 124 (H) 70 - 110 mg/dL    BUN 23 8 - 23 mg/dL    Creatinine 0.7 0.5 - 1.4 mg/dL    Calcium 9.2 8.7 - 10.5 mg/dL    Total Protein 7.0 6.0 - 8.4 g/dL    Albumin 3.7 3.5 - 5.2 g/dL    Total Bilirubin 0.3 0.1 - 1.0 mg/dL    Alkaline Phosphatase 110 55 - 135 U/L    AST 22 10 - 40 U/L    ALT 19 10 - 44 U/L    eGFR >60.0 >60 mL/min/1.73 m^2    Anion Gap 8 8 - 16 mmol/L       Imaging:   CT CAP 12/26/2023    Impression:     1.  History of pancreatic cancer.  Pancreatic tail mass measures 2.8 cm as compared to 3.0 cm previously.  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     2.  Numerous hypodense lesions throughout the liver appears similar in number  when compared to prior exam but some of the lesions appear smaller.  The largest lesion on the previous study now measures 5.1 cm as compared to 6.4 cm previously.  Prior report indicates biopsy-proven adenocarcinoma.     3.  Numerous focal opacities are again seen throughout both lungs.  Some of the previously described lesions are smaller or no longer present.  However, focal opacities are seen bilaterally.  Once again, findings are concerning for metastases.        Path:   Final Pathologic Diagnosis     Left hepatic lobe, biopsy:   Positive for malignancy, poorly differentiated adenocarcinoma (see comment)   Tumor necrosis present     Comment:  A review of the patient's imaging shows the presence of a 1.9 cm pancreatic tail mass and diffuse liver lesions.  These morphologic and immunophenotypic findings are supportive of a pancreatobiliary and/or upper gastrointestinal primary.  VC      Comment: Interp By Nataliia Peralta D.O., Signed on 10/05/2023 at 08:36   Supplemental Diagnosis     The purpose of this supplemental report is to report results of MMR panel in the tumor cells as follows:     Immunohistochemistry (IHC) Testing for Mismatch Repair (MMR) Proteins:      MLH1 - Intact nuclear expression    MSH2 - Intact nuclear expression    MSH6 - Intact nuclear expression    PMS2 - Intact nuclear expression            Somatic Genomic Results       Pancreatic mass       Tumor NGS Tissue  Resulted: 10/10/23 Status: Preliminary result       Detected - Pathogenic (4)      Gene Variant VAF   BRAF [id number] - [id number] Inframe deletion - GOF 12.00 %   CDKN2A CDKN2A - Copy number loss --   CDKN2B CDKN2B - Copy number loss --   TP53 p.Y163C - c.488A>G Missense variant - LOF 30.50 %              Detected - Uncertain significance (1)      Gene Variant VAF   DIS3 p.[id number] - c.2012A>G Missense variant 23.10 %                                  Assessment:       1. Pancreatic adenocarcinoma    2. Malignant  neoplasm metastatic to both lungs    3. Metastasis to liver    4. Immunodeficiency due to chemotherapy    5. Immunodeficiency secondary to neoplasm    6. Pruritic rash    7. Dyslipidemia    8. Constipation, unspecified constipation type    9. 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  Her tumor is pMMR and Tempus tissue showed a non V600E BRAF mutation, TP53 mutation and CDKN2A and CDKN2B loss.  QUINN.    We discussed her prognosis and potential treatment options.  Because of her age and discussion of tolerance concerns, I have recommended biweekly gemcitabine + nab-paclitaxel as palliative intent first-line therapy.  Dosing for gemcitabine + nab-paclitaxel will be 1000 mg/m2 and 125 mg/m2, respectively, and I will administer these on a biweekly basis.    Port was placed.  Consideration to BRAF non-V600E targeting trials in the future if available.    She began cycle 1 of biweekly gemcitabine + nab-paclitaxel on 11/1/23.  She experienced fevers/chills and rash. believe these are all chemotherapy related. Improved after the administration of Dex.     Will continue to administer dexamethasone 12 mg IV prior to infusion for rash prevention.  May need to increase to 20mg for future cycles. Will continue to monitor.    Also gave her Medrol Dose Nghia to use in case rash recurs despite premed dex.    - Lab work reviewed, adequate to proceed with chemotherapy  - CT scan after cycle 4 displays overall improvement within the lung and liver, with decreases in the number and size of the lesions. Continue current regimen.   -Proceed with cycle 7.    RTC in 2 weeks for next cycle with lab work and treatment. Will repeat imaging studies after cycle 8    Following with palliative care.    Genetic testing done outside - saw Julito Sosa here. FANCC mut present.    PGX showed DPYD nml and UGT1A1 intermediate metabolism.    # HLD, constipation, pruritus, fever  Not currently on therapy. Cholesterol stable from June 2023. Will continue to f/u with PCP regarding this   Constipation: stable.   Pruritus/fever: controlled with increased pre-meds and Benadryl. Continues to control fever alternating Motrin and Tylenol.     # Osteoporosis, vaginal prolapse  Monitor calcium levels on chemo.  Will continue to encourage maintaining activity.  Pt voiced appreciation of discussion  Will f/u with GYN.     Follow up: 2 weeks for cycle cycle 8.    Patient is in agreement with the proposed treatment plan. All questions were answered to the patient's satisfaction. Pt knows to call clinic if anything is needed before the next clinic visit.    David Briceño MD  Hematology/Oncology  [facility]      Route Chart for Scheduling    Med Onc Chart Routing      Follow up with physician 4 weeks. for gem/abraxane   Follow up with DONTE 2 weeks. for gem/abraxane   Infusion scheduling note    Injection scheduling note    Labs CBC, CMP and CA 19-9   Scheduling:  Preferred lab:  Lab interval: every 2 weeks  labs at [facility] day prior   Imaging    Pharmacy appointment    Other referrals              Treatment Plan Information   OP PANC NAB-PACLITAXEL + GEMCITABINE Q4W   David Briceño MD   Upcoming Treatment Dates - OP PANC NAB-PACLITAXEL + GEMCITABINE Q4W    1/12/2024       Chemotherapy       PACLitaxeL protein-bound (ABRAXANE) in 42 mL infusion       gemcitabine (GEMZAR) in sodium chloride 0.9% SolP 250 mL chemo infusion       Antiemetics       ondansetron (ZOFRAN) 8 mg, dexAMETHasone (DECADRON) 12 mg in sodium chloride 0.9% 50 mL IVPB  1/26/2024       Chemotherapy       PACLitaxeL protein-bound (ABRAXANE) in 42 mL infusion       gemcitabine (GEMZAR) in sodium chloride 0.9% SolP 250 mL chemo infusion        Antiemetics       ondansetron (ZOFRAN) 8 mg, dexAMETHasone (DECADRON) 12 mg in sodium chloride 0.9% 50 mL IVPB  2/9/2024       Chemotherapy       PACLitaxeL protein-bound (ABRAXANE) in 42 mL infusion       gemcitabine (GEMZAR) in sodium chloride 0.9% SolP 250 mL chemo infusion       Antiemetics       ondansetron (ZOFRAN) 8 mg, dexAMETHasone (DECADRON) 12 mg in sodium chloride 0.9% 50 mL IVPB  2/23/2024       Chemotherapy       PACLitaxeL protein-bound (ABRAXANE) in 42 mL infusion       gemcitabine (GEMZAR) in sodium chloride 0.9% SolP 250 mL chemo infusion       Antiemetics       ondansetron (ZOFRAN) 8 mg, dexAMETHasone (DECADRON) 12 mg in sodium chloride 0.9% 50 mL IVPB

## 2024-01-22 NOTE — PLAN OF CARE
1450 pt here for D15C4 abraxane/gemzar infusion, labs, hx, meds, allergies reviewed, pt with no new complaints at this time, joanna kennedy chair, continue to monitor

## 2024-01-23 ENCOUNTER — PATIENT MESSAGE (OUTPATIENT)
Dept: ADMINISTRATIVE | Facility: OTHER | Age: 79
End: 2024-01-23
Payer: MEDICARE

## 2024-01-24 ENCOUNTER — PATIENT MESSAGE (OUTPATIENT)
Dept: ADMINISTRATIVE | Facility: OTHER | Age: 79
End: 2024-01-24
Payer: MEDICARE

## 2024-01-25 ENCOUNTER — PATIENT MESSAGE (OUTPATIENT)
Dept: ADMINISTRATIVE | Facility: OTHER | Age: 79
End: 2024-01-25
Payer: MEDICARE

## 2024-01-26 ENCOUNTER — PATIENT MESSAGE (OUTPATIENT)
Dept: ADMINISTRATIVE | Facility: OTHER | Age: 79
End: 2024-01-26
Payer: MEDICARE

## 2024-01-27 ENCOUNTER — PATIENT MESSAGE (OUTPATIENT)
Dept: ADMINISTRATIVE | Facility: OTHER | Age: 79
End: 2024-01-27
Payer: MEDICARE

## 2024-01-28 ENCOUNTER — PATIENT MESSAGE (OUTPATIENT)
Dept: ADMINISTRATIVE | Facility: OTHER | Age: 79
End: 2024-01-28
Payer: MEDICARE

## 2024-01-29 ENCOUNTER — PATIENT MESSAGE (OUTPATIENT)
Dept: HEMATOLOGY/ONCOLOGY | Facility: CLINIC | Age: 79
End: 2024-01-29
Payer: MEDICARE

## 2024-01-29 ENCOUNTER — PATIENT MESSAGE (OUTPATIENT)
Dept: ADMINISTRATIVE | Facility: OTHER | Age: 79
End: 2024-01-29
Payer: MEDICARE

## 2024-01-30 ENCOUNTER — PATIENT MESSAGE (OUTPATIENT)
Dept: ADMINISTRATIVE | Facility: OTHER | Age: 79
End: 2024-01-30
Payer: MEDICARE

## 2024-01-31 ENCOUNTER — PATIENT MESSAGE (OUTPATIENT)
Dept: ADMINISTRATIVE | Facility: OTHER | Age: 79
End: 2024-01-31
Payer: MEDICARE

## 2024-02-01 ENCOUNTER — PATIENT MESSAGE (OUTPATIENT)
Dept: ADMINISTRATIVE | Facility: OTHER | Age: 79
End: 2024-02-01
Payer: MEDICARE

## 2024-02-02 ENCOUNTER — LAB VISIT (OUTPATIENT)
Dept: LAB | Facility: HOSPITAL | Age: 79
End: 2024-02-02
Payer: MEDICARE

## 2024-02-02 ENCOUNTER — PATIENT MESSAGE (OUTPATIENT)
Dept: ADMINISTRATIVE | Facility: OTHER | Age: 79
End: 2024-02-02
Payer: MEDICARE

## 2024-02-02 DIAGNOSIS — C25.9 PANCREATIC ADENOCARCINOMA: ICD-10-CM

## 2024-02-02 LAB
BASOPHILS # BLD AUTO: 0.04 K/UL (ref 0–0.2)
BASOPHILS NFR BLD: 0.6 % (ref 0–1.9)
DIFFERENTIAL METHOD BLD: ABNORMAL
EOSINOPHIL # BLD AUTO: 0.2 K/UL (ref 0–0.5)
EOSINOPHIL NFR BLD: 3.7 % (ref 0–8)
ERYTHROCYTE [DISTWIDTH] IN BLOOD BY AUTOMATED COUNT: 16.9 % (ref 11.5–14.5)
HCT VFR BLD AUTO: 35.8 % (ref 37–48.5)
HGB BLD-MCNC: 11.1 G/DL (ref 12–16)
IMM GRANULOCYTES # BLD AUTO: 0.04 K/UL (ref 0–0.04)
IMM GRANULOCYTES NFR BLD AUTO: 0.6 % (ref 0–0.5)
LYMPHOCYTES # BLD AUTO: 0.9 K/UL (ref 1–4.8)
LYMPHOCYTES NFR BLD: 14.5 % (ref 18–48)
MCH RBC QN AUTO: 28.7 PG (ref 27–31)
MCHC RBC AUTO-ENTMCNC: 31 G/DL (ref 32–36)
MCV RBC AUTO: 93 FL (ref 82–98)
MONOCYTES # BLD AUTO: 0.8 K/UL (ref 0.3–1)
MONOCYTES NFR BLD: 12 % (ref 4–15)
NEUTROPHILS # BLD AUTO: 4.5 K/UL (ref 1.8–7.7)
NEUTROPHILS NFR BLD: 68.6 % (ref 38–73)
NRBC BLD-RTO: 0 /100 WBC
PLATELET # BLD AUTO: 190 K/UL (ref 150–450)
PMV BLD AUTO: 10.4 FL (ref 9.2–12.9)
RBC # BLD AUTO: 3.87 M/UL (ref 4–5.4)
WBC # BLD AUTO: 6.5 K/UL (ref 3.9–12.7)

## 2024-02-02 PROCEDURE — 86301 IMMUNOASSAY TUMOR CA 19-9: CPT | Performed by: PHYSICIAN ASSISTANT

## 2024-02-02 PROCEDURE — 85025 COMPLETE CBC W/AUTO DIFF WBC: CPT | Performed by: PHYSICIAN ASSISTANT

## 2024-02-02 PROCEDURE — 80053 COMPREHEN METABOLIC PANEL: CPT | Performed by: PHYSICIAN ASSISTANT

## 2024-02-02 PROCEDURE — 36415 COLL VENOUS BLD VENIPUNCTURE: CPT | Mod: PO | Performed by: PHYSICIAN ASSISTANT

## 2024-02-03 ENCOUNTER — PATIENT MESSAGE (OUTPATIENT)
Dept: ADMINISTRATIVE | Facility: OTHER | Age: 79
End: 2024-02-03
Payer: MEDICARE

## 2024-02-03 LAB
ALBUMIN SERPL BCP-MCNC: 3.6 G/DL (ref 3.5–5.2)
ALP SERPL-CCNC: 112 U/L (ref 55–135)
ALT SERPL W/O P-5'-P-CCNC: 20 U/L (ref 10–44)
ANION GAP SERPL CALC-SCNC: 13 MMOL/L (ref 8–16)
AST SERPL-CCNC: 23 U/L (ref 10–40)
BILIRUB SERPL-MCNC: 0.3 MG/DL (ref 0.1–1)
BUN SERPL-MCNC: 16 MG/DL (ref 8–23)
CALCIUM SERPL-MCNC: 9.4 MG/DL (ref 8.7–10.5)
CANCER AG19-9 SERPL-ACNC: ABNORMAL U/ML (ref 0–40)
CHLORIDE SERPL-SCNC: 102 MMOL/L (ref 95–110)
CO2 SERPL-SCNC: 23 MMOL/L (ref 23–29)
CREAT SERPL-MCNC: 0.6 MG/DL (ref 0.5–1.4)
EST. GFR  (NO RACE VARIABLE): >60 ML/MIN/1.73 M^2
GLUCOSE SERPL-MCNC: 78 MG/DL (ref 70–110)
POTASSIUM SERPL-SCNC: 4 MMOL/L (ref 3.5–5.1)
PROT SERPL-MCNC: 6.8 G/DL (ref 6–8.4)
SODIUM SERPL-SCNC: 138 MMOL/L (ref 136–145)

## 2024-02-04 ENCOUNTER — PATIENT MESSAGE (OUTPATIENT)
Dept: ADMINISTRATIVE | Facility: OTHER | Age: 79
End: 2024-02-04
Payer: MEDICARE

## 2024-02-05 ENCOUNTER — INFUSION (OUTPATIENT)
Dept: INFUSION THERAPY | Facility: HOSPITAL | Age: 79
End: 2024-02-05
Payer: MEDICARE

## 2024-02-05 ENCOUNTER — OFFICE VISIT (OUTPATIENT)
Dept: HEMATOLOGY/ONCOLOGY | Facility: CLINIC | Age: 79
End: 2024-02-05
Payer: MEDICARE

## 2024-02-05 ENCOUNTER — PATIENT MESSAGE (OUTPATIENT)
Dept: ADMINISTRATIVE | Facility: OTHER | Age: 79
End: 2024-02-05
Payer: MEDICARE

## 2024-02-05 VITALS
TEMPERATURE: 98 F | BODY MASS INDEX: 23.83 KG/M2 | RESPIRATION RATE: 18 BRPM | HEIGHT: 63 IN | WEIGHT: 134.5 LBS | DIASTOLIC BLOOD PRESSURE: 58 MMHG | SYSTOLIC BLOOD PRESSURE: 101 MMHG | HEART RATE: 77 BPM

## 2024-02-05 VITALS
TEMPERATURE: 99 F | HEIGHT: 63 IN | OXYGEN SATURATION: 97 % | HEART RATE: 76 BPM | DIASTOLIC BLOOD PRESSURE: 58 MMHG | WEIGHT: 134.5 LBS | BODY MASS INDEX: 23.83 KG/M2 | SYSTOLIC BLOOD PRESSURE: 116 MMHG

## 2024-02-05 DIAGNOSIS — N81.9 VAGINAL VAULT PROLAPSE: ICD-10-CM

## 2024-02-05 DIAGNOSIS — M81.0 AGE-RELATED OSTEOPOROSIS WITHOUT CURRENT PATHOLOGICAL FRACTURE: ICD-10-CM

## 2024-02-05 DIAGNOSIS — C78.7 METASTASIS TO LIVER: ICD-10-CM

## 2024-02-05 DIAGNOSIS — D49.9 IMMUNODEFICIENCY SECONDARY TO NEOPLASM: ICD-10-CM

## 2024-02-05 DIAGNOSIS — D84.821 IMMUNODEFICIENCY DUE TO CHEMOTHERAPY: ICD-10-CM

## 2024-02-05 DIAGNOSIS — E78.5 DYSLIPIDEMIA: ICD-10-CM

## 2024-02-05 DIAGNOSIS — K59.00 CONSTIPATION, UNSPECIFIED CONSTIPATION TYPE: ICD-10-CM

## 2024-02-05 DIAGNOSIS — T45.1X5A IMMUNODEFICIENCY DUE TO CHEMOTHERAPY: ICD-10-CM

## 2024-02-05 DIAGNOSIS — Z79.899 IMMUNODEFICIENCY DUE TO CHEMOTHERAPY: ICD-10-CM

## 2024-02-05 DIAGNOSIS — C25.9 PANCREATIC ADENOCARCINOMA: Primary | ICD-10-CM

## 2024-02-05 DIAGNOSIS — L28.2 PRURITIC RASH: ICD-10-CM

## 2024-02-05 DIAGNOSIS — C78.02 MALIGNANT NEOPLASM METASTATIC TO BOTH LUNGS: ICD-10-CM

## 2024-02-05 DIAGNOSIS — D84.81 IMMUNODEFICIENCY SECONDARY TO NEOPLASM: ICD-10-CM

## 2024-02-05 DIAGNOSIS — C78.01 MALIGNANT NEOPLASM METASTATIC TO BOTH LUNGS: ICD-10-CM

## 2024-02-05 PROCEDURE — 96413 CHEMO IV INFUSION 1 HR: CPT

## 2024-02-05 PROCEDURE — 96367 TX/PROPH/DG ADDL SEQ IV INF: CPT

## 2024-02-05 PROCEDURE — 96417 CHEMO IV INFUS EACH ADDL SEQ: CPT

## 2024-02-05 PROCEDURE — 63600175 PHARM REV CODE 636 W HCPCS: Performed by: REGISTERED NURSE

## 2024-02-05 PROCEDURE — 99999 PR PBB SHADOW E&M-EST. PATIENT-LVL IV: CPT | Mod: PBBFAC,,, | Performed by: REGISTERED NURSE

## 2024-02-05 PROCEDURE — 99215 OFFICE O/P EST HI 40 MIN: CPT | Mod: S$GLB,,, | Performed by: REGISTERED NURSE

## 2024-02-05 PROCEDURE — 25000003 PHARM REV CODE 250: Performed by: REGISTERED NURSE

## 2024-02-05 RX ORDER — DIPHENHYDRAMINE HYDROCHLORIDE 50 MG/ML
50 INJECTION INTRAMUSCULAR; INTRAVENOUS ONCE AS NEEDED
Status: DISCONTINUED | OUTPATIENT
Start: 2024-02-05 | End: 2024-02-05 | Stop reason: HOSPADM

## 2024-02-05 RX ORDER — PROCHLORPERAZINE EDISYLATE 5 MG/ML
5 INJECTION INTRAMUSCULAR; INTRAVENOUS ONCE AS NEEDED
Status: DISCONTINUED | OUTPATIENT
Start: 2024-02-05 | End: 2024-02-05 | Stop reason: HOSPADM

## 2024-02-05 RX ORDER — SODIUM CHLORIDE 0.9 % (FLUSH) 0.9 %
10 SYRINGE (ML) INJECTION
Status: DISCONTINUED | OUTPATIENT
Start: 2024-02-05 | End: 2024-02-05 | Stop reason: HOSPADM

## 2024-02-05 RX ORDER — HEPARIN 100 UNIT/ML
500 SYRINGE INTRAVENOUS
Status: DISCONTINUED | OUTPATIENT
Start: 2024-02-05 | End: 2024-02-05 | Stop reason: HOSPADM

## 2024-02-05 RX ORDER — EPINEPHRINE 0.3 MG/.3ML
0.3 INJECTION SUBCUTANEOUS ONCE AS NEEDED
Status: CANCELLED | OUTPATIENT
Start: 2024-02-05

## 2024-02-05 RX ORDER — PROCHLORPERAZINE EDISYLATE 5 MG/ML
5 INJECTION INTRAMUSCULAR; INTRAVENOUS ONCE AS NEEDED
Status: CANCELLED
Start: 2024-02-05

## 2024-02-05 RX ORDER — HEPARIN 100 UNIT/ML
500 SYRINGE INTRAVENOUS
Status: CANCELLED | OUTPATIENT
Start: 2024-02-05

## 2024-02-05 RX ORDER — SODIUM CHLORIDE 0.9 % (FLUSH) 0.9 %
10 SYRINGE (ML) INJECTION
Status: CANCELLED | OUTPATIENT
Start: 2024-02-05

## 2024-02-05 RX ORDER — DIPHENHYDRAMINE HYDROCHLORIDE 50 MG/ML
50 INJECTION INTRAMUSCULAR; INTRAVENOUS ONCE AS NEEDED
Status: CANCELLED | OUTPATIENT
Start: 2024-02-05

## 2024-02-05 RX ORDER — EPINEPHRINE 0.3 MG/.3ML
0.3 INJECTION SUBCUTANEOUS ONCE AS NEEDED
Status: DISCONTINUED | OUTPATIENT
Start: 2024-02-05 | End: 2024-02-05 | Stop reason: HOSPADM

## 2024-02-05 RX ADMIN — SODIUM CHLORIDE: 9 INJECTION, SOLUTION INTRAVENOUS at 11:02

## 2024-02-05 RX ADMIN — HEPARIN 500 UNITS: 100 SYRINGE at 01:02

## 2024-02-05 RX ADMIN — GEMCITABINE HYDROCHLORIDE 1600 MG: 1 INJECTION, SOLUTION INTRAVENOUS at 01:02

## 2024-02-05 RX ADMIN — PACLITAXEL 200 MG: 100 INJECTION, POWDER, LYOPHILIZED, FOR SUSPENSION INTRAVENOUS at 12:02

## 2024-02-05 RX ADMIN — DEXAMETHASONE SODIUM PHOSPHATE 8 MG: 4 INJECTION, SOLUTION INTRA-ARTICULAR; INTRALESIONAL; INTRAMUSCULAR; INTRAVENOUS; SOFT TISSUE at 11:02

## 2024-02-05 NOTE — PROGRESS NOTES
MEDICAL ONCOLOGY - ESTABLISHED PATIENT VISIT    Reason for visit: Pancreatic adenocarcinoma    Best Contact Phone Number(s): [phone] (home)      Cancer/Stage/TNM:    Cancer Staging   Pancreatic adenocarcinoma  Staging form: Exocrine Pancreas, AJCC 8th Edition  - Clinical stage from 9/21/2023: Stage IV (cT1, cNX, pM1) - Signed by David Briceño MD on 10/11/2023       Oncology History   Pancreatic adenocarcinoma   9/5/2023 Initial Diagnosis    Pancreatic adenocarcinoma     9/21/2023 Cancer Staged    Staging form: Exocrine Pancreas, AJCC 8th Edition  - Clinical stage from 9/21/2023: Stage IV (cT1, cNX, pM1)     10/2/2023 Tumor Conference     [facility] UGI MULTIDISCIPLINARY TUMOR BOARD  PATIENT REVIEW FORM   ____________________________________________________________    CLINIC #: [id number]  DATE: 10/2/2023    DIAGNOSIS: pancreas CA    PRESENTER: Angela    PATIENT SUMMARY:   This 77 y/o female presented in August with abd pain, decreased appetite with weight loss. Reviewed CT and MRI imaging ~ 2 cm mass in tail of pancreas with diffuse metastatic liver disease, largest liver lesion in left lobe measuring 4.5 cm. IR performed liver bx and this pathology was reviewed - poorly differentiated adenocarcinoma, favoring upper GI origin.     BOARD RECOMMENDATIONS:   Stage IV pancreas CA with liver mets  Add MMR and TEMPUS  Proceed with palliative systemic chemotherapy    CONSULT NEEDED:     [] Surgery    [] Hem/Onc    [] Rad/Onc    [] Dietary     [] Social Service    [] Psychology       [] AES  [] Radiology     Clinical Stage: Tumor   Node(s)   Metastasis        GROUP STAGE:  [] O    [] 1A    [] IB    [] IIA    [] IIB     [] IIIA     [] IIIB     [] IIIC    [x]IV  [] Local recurrence     [] Regional recurrence     [] Distant recurrence   Metastatic site(s): liver         [x] Elidia'l Treatment Guidelines reviewed and care planned is consistent with guidelines.         (i.e., NCCN, NCI, PD, ACO, AUA,  etc.)    PRESENTATION AT CANCER CONFERENCE:         [x] Prospective    [] Retrospective     [] Follow-Up    Eligible for clinical trial : yes       11/1/2023 -  Chemotherapy    Treatment Summary   Plan Name: OP PANC NAB-PACLITAXEL + GEMCITABINE Q4W  Treatment Goal: Palliative  Status: Active  Start Date: 11/1/2023  End Date: 9/6/2024 (Planned)  Provider: David Briceño MD  Chemotherapy: gemcitabine (GEMZAR) 1,600 mg in sodium chloride 0.9% SolP 327.08 mL chemo infusion, 1,680 mg, Intravenous, Clinic/HOD 1 time, 4 of 12 cycles  Administration: 1,600 mg (11/1/2023), 1,600 mg (11/15/2023), 1,600 mg (11/29/2023), 1,600 mg (12/14/2023), 1,600 mg (12/27/2023), 1,600 mg (1/9/2024), 1,600 mg (1/22/2024)          Interim History:   78 y.o. female with metastatic pancreatic cancer who presents for follow-up prior to cycle 8 of biweekly gemcitabine + nab-paclitaxel. Doing well overall with minimal fatigue. Continues with occasional epigastric discomfort; this is transient and only very minimally present in clinic today.  She has noticed a few small scaly patches on her legs and one site near her right temple. No fevers, chills, SOB, CP, palpitations, nausea, vomiting, diarrhea, constipation, blood in urine or stool, paresthesias.     ECOG PS is 0. Presents with her  today.     ROS:   Review of Systems   Constitutional:  Negative for chills, fever, malaise/fatigue and weight loss.   HENT:  Negative for sore throat.    Eyes:  Negative for blurred vision and pain.   Respiratory:  Negative for cough and shortness of breath.    Cardiovascular:  Positive for leg swelling. Negative for chest pain.   Gastrointestinal:  Positive for abdominal pain. Negative for constipation, diarrhea, nausea and vomiting.   Genitourinary:  Negative for dysuria and frequency.   Musculoskeletal:  Negative for back pain, falls and myalgias.   Skin:  Negative for itching and rash.   Neurological:  Negative for dizziness, weakness and  headaches.   Endo/Heme/Allergies:  Does not bruise/bleed easily.   Psychiatric/Behavioral:  Negative for depression. The patient is not nervous/anxious.        Past Medical History:   Past Medical History:   Diagnosis Date    Age-related osteoporosis without current pathological fracture     Atherosclerosis of aortic arch     - noted on CXR 8/2017    Back pain     Diverticulosis of sigmoid colon 10/22/2019    Ectopic pregnancy     Fibrocystic breast     Hyperlipidemia     Inguinal hernia     Osteopenia     Polyp of ascending colon 10/22/2019    Urinary incontinence, mixed 05/24/2022        Past Surgical History:   Past Surgical History:   Procedure Laterality Date    ECTOPIC PREGNANCY SURGERY      HERNIA REPAIR      left inguinal    HYSTERECTOMY  1980    without BSO        Family History:   Family History   Problem Relation Age of Onset    Breast cancer Mother 69    Seizures Mother     Heart disease Father     Heart attack Father     Depression Father     Breast cancer Sister 73    Breast cancer Maternal Aunt 73    Prostate cancer Brother 81        no mets    Obesity Brother     Heart failure Maternal Grandmother     Heart attack Maternal Grandfather     Suicide Attempts Paternal Grandmother     Depression Paternal Grandmother     Mental illness Paternal Grandmother     Depression Paternal Aunt     Heart attack Paternal Uncle     Depression Paternal Uncle     Cancer Maternal Aunt 83        blood cancer (leukemia?)    Cancer Maternal Uncle         unk type; was COD    Suicide Paternal Cousin     Tongue cancer Paternal Cousin         smoking hx unk    Ovarian cancer Neg Hx         Social History:   Social History     Tobacco Use    Smoking status: Never    Smokeless tobacco: Never   Substance Use Topics    Alcohol use: No        I have reviewed and updated the patient's past medical, surgical, family and social histories.    Allergies:   Review of patient's allergies indicates:   Allergen Reactions    Erythromycin  (bulk)      Other reaction(s): Eye irritation        Medications:   Current Outpatient Medications   Medication Sig Dispense Refill    ABRAXANE 100 mg injection       coconut oil, bulk, Oil by Misc.(Non-Drug; Combo Route) route.      dexAMETHasone (DECADRON) 4 mg/mL injection       gemcitabine (GEMZAR) 1 gram/26.3 mL (38 mg/mL) Soln injection       gemcitabine 200 mg/5.26 mL (38 mg/mL) Soln       heparin sodium,porcine (HEPARIN, PORCINE,) 1,000 unit/mL injection       heparin, porcine, PF, (HEPARIN FLUSH 100 UNITS/ML) 100 unit/mL Syrg       LIDOcaine-prilocaine (EMLA) cream Apply topically as needed (place to port site 45-60 minutes prior to chemotherapy). 30 g 3    multivitamin (THERAGRAN) per tablet Take 1 tablet by mouth once daily.      triamcinolone acetonide 0.1% (KENALOG) 0.1 % ointment Apply topically as needed (rash).      vitamin D (VITAMIN D3) 1000 units Tab Take 1,000 Units by mouth once daily.      diphenhydramine HCl (BENADRYL ORAL) Take 25 mg by mouth as needed (rash).      doxycycline (VIBRAMYCIN) 100 MG Cap Take 1 capsule (100 mg total) by mouth every 12 (twelve) hours. (Patient not taking: Reported on 12/27/2023) 14 capsule 0    GADAVIST 1 mmol/mL (604.72 mg/mL) Soln injection       HYDROcodone-acetaminophen (NORCO) 5-325 mg per tablet Take 1 tablet by mouth every 12 (twelve) hours as needed for Pain. (Patient not taking: Reported on 12/6/2023) 10 tablet 0    hydrOXYzine HCL (ATARAX) 25 MG tablet Take 1 tablet (25 mg total) by mouth 3 (three) times daily as needed for Itching. (Patient not taking: Reported on 12/27/2023) 60 tablet 2    ondansetron (ZOFRAN-ODT) 4 MG TbDL Take 1 tablet (4 mg total) by mouth every 8 (eight) hours as needed (Nausea). (Patient not taking: Reported on 12/27/2023) 10 tablet 0    ondansetron (ZOFRAN-ODT) 8 MG TbDL TAKE 1 TABLET (8 MG TOTAL) BY MOUTH EVERY 6 HOURS AS NEEDED FOR NAUSEA (Patient not taking: Reported on 12/27/2023) 30 tablet 5     No current  "facility-administered medications for this visit.      Physical Exam:   BP (!) 116/58 (BP Location: Left arm, Patient Position: Sitting, BP Method: Medium (Automatic))   Pulse 76   Temp 98.6 °F (37 °C) (Oral)   Ht 5' 3" (1.6 m)   Wt 61 kg (134 lb 7.7 oz)   SpO2 97%   BMI 23.82 kg/m²        Physical Exam  Vitals reviewed.   Constitutional:       General: She is not in acute distress.     Appearance: Normal appearance. She is well-developed and normal weight. She is not ill-appearing, toxic-appearing or diaphoretic.   HENT:      Head: Normocephalic and atraumatic.      Right Ear: External ear normal.      Left Ear: External ear normal.      Nose: Nose normal.      Mouth/Throat:      Mouth: Mucous membranes are moist.      Pharynx: Oropharynx is clear. No posterior oropharyngeal erythema.   Eyes:      General: No scleral icterus.     Extraocular Movements: Extraocular movements intact.      Conjunctiva/sclera: Conjunctivae normal.      Pupils: Pupils are equal, round, and reactive to light.   Neck:      Trachea: No tracheal deviation.   Cardiovascular:      Rate and Rhythm: Normal rate and regular rhythm.      Pulses: Normal pulses.      Heart sounds: Normal heart sounds.   Pulmonary:      Effort: Pulmonary effort is normal. No respiratory distress.      Breath sounds: Normal breath sounds. No wheezing or rales.   Chest:      Comments: RCW port  Abdominal:      General: Bowel sounds are normal. There is no distension.      Palpations: Abdomen is soft.      Tenderness: There is no abdominal tenderness.   Musculoskeletal:         General: No swelling or deformity. Normal range of motion.      Cervical back: Normal range of motion and neck supple.   Skin:     General: Skin is warm.      Coloration: Skin is not jaundiced.      Findings: No erythema or rash.   Neurological:      General: No focal deficit present.      Mental Status: She is alert and oriented to person, place, and time. Mental status is at baseline.    "   Motor: No weakness.      Gait: Gait normal.   Psychiatric:         Mood and Affect: Mood normal.         Behavior: Behavior normal.         Thought Content: Thought content normal.         Judgment: Judgment normal.         Labs:   No results found for this or any previous visit (from the past 48 hour(s)).    Imaging:   CT CAP 12/26/2023    Impression:     1.  History of pancreatic cancer.  Pancreatic tail mass measures 2.8 cm as compared to 3.0 cm previously.  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     2.  Numerous hypodense lesions throughout the liver appears similar in number when compared to prior exam but some of the lesions appear smaller.  The largest lesion on the previous study now measures 5.1 cm as compared to 6.4 cm previously.  Prior report indicates biopsy-proven adenocarcinoma.     3.  Numerous focal opacities are again seen throughout both lungs.  Some of the previously described lesions are smaller or no longer present.  However, focal opacities are seen bilaterally.  Once again, findings are concerning for metastases.        Path:   Final Pathologic Diagnosis     Left hepatic lobe, biopsy:   Positive for malignancy, poorly differentiated adenocarcinoma (see comment)   Tumor necrosis present     Comment:  A review of the patient's imaging shows the presence of a 1.9 cm pancreatic tail mass and diffuse liver lesions.  These morphologic and immunophenotypic findings are supportive of a pancreatobiliary and/or upper gastrointestinal primary.  VC      Comment: Interp By Nataliia Peralta D.O., Signed on 10/05/2023 at 08:36   Supplemental Diagnosis     The purpose of this supplemental report is to report results of MMR panel in the tumor cells as follows:     Immunohistochemistry (IHC) Testing for Mismatch Repair (MMR) Proteins:      MLH1 - Intact nuclear expression    MSH2 - Intact nuclear expression    MSH6 - Intact nuclear  expression    PMS2 - Intact nuclear expression            Somatic Genomic Results       Pancreatic mass       Tumor NGS Tissue  Resulted: 10/10/23 Status: Preliminary result       Detected - Pathogenic (4)      Gene Variant VAF   BRAF [id number] - [id number]_[id number] Inframe deletion - GOF 12.00 %   CDKN2A CDKN2A - Copy number loss --   CDKN2B CDKN2B - Copy number loss --   TP53 p.Y163C - c.488A>G Missense variant - LOF 30.50 %              Detected - Uncertain significance (1)      Gene Variant VAF   DIS3 p.N671S - c.2012A>G Missense variant 23.10 %                   Assessment:       1. Pancreatic adenocarcinoma    2. 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 Metastasis to liver    4. Immunodeficiency due to chemotherapy    5. Immunodeficiency secondary to neoplasm    6. Dyslipidemia    7. Constipation, unspecified constipation type    8. Pruritic rash    9. Age-related osteoporosis without current pathological fracture    10. Vaginal vault prolapse       Plan:        # Pancreatic cancer  We discussed with the patient and her family her biopsy-proven diagnosis of metastatic pancreatic adenocarcinoma with multifocal liver metastases.  Her tumor is pMMR and Tempus tissue showed a non V600E BRAF mutation, TP53 mutation and CDKN2A and CDKN2B loss.  QUINN.    We discussed her prognosis and potential treatment options.  Because of her age and discussion of tolerance concerns, I have recommended biweekly gemcitabine + nab-paclitaxel as palliative intent first-line therapy.  Dosing for gemcitabine + nab-paclitaxel will be 1000 mg/m2 and 125 mg/m2, respectively, and I will administer these on a biweekly basis.    Port was placed.  Consideration to BRAF non-V600E targeting trials in the future if available.    She began cycle 1 of biweekly gemcitabine + nab-paclitaxel on 11/1/23.  She experienced fevers/chills and rash. believe these are all chemotherapy related. Improved after the administration of Dex.     Will  continue to administer dexamethasone 12 mg IV prior to infusion for rash prevention.  May need to increase to 20mg for future cycles. Will continue to monitor.    Also gave her Medrol Dose Nghia to use in case rash recurs despite premed dex.    - Lab work reviewed, adequate to proceed with chemotherapy  - CT scan after cycle 4 displays overall improvement within the lung and liver, with decreases in the number and size of the lesions. Continue current regimen.   -Proceed with cycle 8.    RTC in 2 weeks for next cycle with lab work and treatment.   Will repeat imaging studies after cycle 8    Following with palliative care.   Genetic testing done outside - saw Julito Sosa here. FANCC mut present.    PGX showed DPYD nml and UGT1A1 intermediate metabolism.    # HLD, constipation, pruritus, fever  Not currently on therapy. Cholesterol stable from June 2023. Will continue to f/u with PCP regarding this   Constipation: stable.   Pruritus/fever: controlled with increased pre-meds and Benadryl. Continues to control fever alternating Motrin and Tylenol.     # Osteoporosis, vaginal prolapse  Monitor calcium levels on chemo.  Will continue to encourage maintaining activity.  Pt voiced appreciation of discussion  Will f/u with GYN.     Follow up: 2 weeks for cycle 9.    Patient is in agreement with the proposed treatment plan. All questions were answered to the patient's satisfaction. Pt knows to call clinic if anything is needed before the next clinic visit.    Patient discussed with collaborating physician, Dr. Briceño.    At least 40 minutes were spent today on this encounter including face to face time with the patient, data gathering/interpretation and documentation.       Meredith Kowalski, MSN, APRN, ACCNS-AG  Hematology and Medical Oncology  Clinical Nurse Specialist to Dr. Briceño, Dr. Hammer & Dr. Willis    Route Chart for Scheduling    Med Onc Chart Routing      Follow up with physician 2 weeks and 4 weeks. as scheduled    Follow up with DONTE 6 weeks. with labs for gem/abraxane   Infusion scheduling note   treatment every 2 weeks   Injection scheduling note    Labs CBC, CMP and CA 19-9   Scheduling:  Preferred lab:  Lab interval: every 2 weeks  the Friday prior to Monday appointments/infusions - please schedule at [facility] location   Imaging    Pharmacy appointment    Other referrals              Treatment Plan Information   OP PANC NAB-PACLITAXEL + GEMCITABINE Q4W   David Briceño MD   Upcoming Treatment Dates - OP PANC NAB-PACLITAXEL + GEMCITABINE Q4W    1/26/2024       Chemotherapy       PACLitaxeL protein-bound (ABRAXANE) in 42 mL infusion       gemcitabine (GEMZAR) in sodium chloride 0.9% SolP 250 mL chemo infusion       Antiemetics       ondansetron (ZOFRAN) 8 mg, dexAMETHasone (DECADRON) 12 mg in sodium chloride 0.9% 50 mL IVPB  2/9/2024       Chemotherapy       PACLitaxeL protein-bound (ABRAXANE) in 42 mL infusion       gemcitabine (GEMZAR) in sodium chloride 0.9% SolP 250 mL chemo infusion       Antiemetics       ondansetron (ZOFRAN) 8 mg, dexAMETHasone (DECADRON) 12 mg in sodium chloride 0.9% 50 mL IVPB  2/23/2024       Chemotherapy       PACLitaxeL protein-bound (ABRAXANE) in 42 mL infusion       gemcitabine (GEMZAR) in sodium chloride 0.9% SolP 250 mL chemo infusion       Antiemetics       ondansetron (ZOFRAN) 8 mg, dexAMETHasone (DECADRON) 12 mg in sodium chloride 0.9% 50 mL IVPB  3/8/2024       Chemotherapy       PACLitaxeL protein-bound (ABRAXANE) in 42 mL infusion       gemcitabine (GEMZAR) in sodium chloride 0.9% SolP 250 mL chemo infusion       Antiemetics       ondansetron (ZOFRAN) 8 mg, dexAMETHasone (DECADRON) 12 mg in sodium chloride 0.9% 50 mL IVPB

## 2024-02-05 NOTE — PLAN OF CARE
1400-Pt tolerated Abraxande and Gemzar well today, no complaints or complications. VSS. Pt aware to call provider with any questions or concerns and is aware of upcoming appts. Pt ambulatory from clinic with steady gait, no distress noted.

## 2024-02-06 ENCOUNTER — PATIENT MESSAGE (OUTPATIENT)
Dept: ADMINISTRATIVE | Facility: OTHER | Age: 79
End: 2024-02-06
Payer: MEDICARE

## 2024-02-07 ENCOUNTER — PATIENT MESSAGE (OUTPATIENT)
Dept: ADMINISTRATIVE | Facility: OTHER | Age: 79
End: 2024-02-07
Payer: MEDICARE

## 2024-02-08 ENCOUNTER — PATIENT MESSAGE (OUTPATIENT)
Dept: ADMINISTRATIVE | Facility: OTHER | Age: 79
End: 2024-02-08
Payer: MEDICARE

## 2024-02-09 ENCOUNTER — PATIENT MESSAGE (OUTPATIENT)
Dept: ADMINISTRATIVE | Facility: OTHER | Age: 79
End: 2024-02-09
Payer: MEDICARE

## 2024-02-10 ENCOUNTER — PATIENT MESSAGE (OUTPATIENT)
Dept: ADMINISTRATIVE | Facility: OTHER | Age: 79
End: 2024-02-10
Payer: MEDICARE

## 2024-02-11 ENCOUNTER — PATIENT MESSAGE (OUTPATIENT)
Dept: ADMINISTRATIVE | Facility: OTHER | Age: 79
End: 2024-02-11
Payer: MEDICARE

## 2024-02-12 ENCOUNTER — PATIENT MESSAGE (OUTPATIENT)
Dept: ADMINISTRATIVE | Facility: OTHER | Age: 79
End: 2024-02-12
Payer: MEDICARE

## 2024-02-13 ENCOUNTER — PATIENT MESSAGE (OUTPATIENT)
Dept: ADMINISTRATIVE | Facility: OTHER | Age: 79
End: 2024-02-13
Payer: MEDICARE

## 2024-02-14 ENCOUNTER — PATIENT MESSAGE (OUTPATIENT)
Dept: ADMINISTRATIVE | Facility: OTHER | Age: 79
End: 2024-02-14
Payer: MEDICARE

## 2024-02-15 ENCOUNTER — PATIENT MESSAGE (OUTPATIENT)
Dept: ADMINISTRATIVE | Facility: OTHER | Age: 79
End: 2024-02-15
Payer: MEDICARE

## 2024-02-16 ENCOUNTER — HOSPITAL ENCOUNTER (OUTPATIENT)
Dept: RADIOLOGY | Facility: HOSPITAL | Age: 79
Discharge: HOME | End: 2024-02-16
Attending: REGISTERED NURSE
Payer: MEDICARE

## 2024-02-16 ENCOUNTER — PATIENT MESSAGE (OUTPATIENT)
Dept: ADMINISTRATIVE | Facility: OTHER | Age: 79
End: 2024-02-16
Payer: MEDICARE

## 2024-02-16 DIAGNOSIS — C25.9 PANCREATIC ADENOCARCINOMA: ICD-10-CM

## 2024-02-16 PROCEDURE — 71260 CT THORAX DX C+: CPT | Mod: 26,,, | Performed by: RADIOLOGY

## 2024-02-16 PROCEDURE — 25500020 PHARM REV CODE 255: Performed by: REGISTERED NURSE

## 2024-02-16 PROCEDURE — 74177 CT ABD & PELVIS W/CONTRAST: CPT | Mod: TC

## 2024-02-16 PROCEDURE — 74177 CT ABD & PELVIS W/CONTRAST: CPT | Mod: 26,,, | Performed by: RADIOLOGY

## 2024-02-16 RX ADMIN — IOHEXOL 80 ML: 350 INJECTION, SOLUTION INTRAVENOUS at 09:02

## 2024-02-17 ENCOUNTER — PATIENT MESSAGE (OUTPATIENT)
Dept: ADMINISTRATIVE | Facility: OTHER | Age: 79
End: 2024-02-17
Payer: MEDICARE

## 2024-02-18 ENCOUNTER — PATIENT MESSAGE (OUTPATIENT)
Dept: ADMINISTRATIVE | Facility: OTHER | Age: 79
End: 2024-02-18
Payer: MEDICARE

## 2024-02-19 ENCOUNTER — INFUSION (OUTPATIENT)
Dept: INFUSION THERAPY | Facility: HOSPITAL | Age: 79
End: 2024-02-19
Payer: MEDICARE

## 2024-02-19 ENCOUNTER — PATIENT MESSAGE (OUTPATIENT)
Dept: ADMINISTRATIVE | Facility: OTHER | Age: 79
End: 2024-02-19
Payer: MEDICARE

## 2024-02-19 ENCOUNTER — OFFICE VISIT (OUTPATIENT)
Dept: HEMATOLOGY/ONCOLOGY | Facility: CLINIC | Age: 79
End: 2024-02-19
Payer: MEDICARE

## 2024-02-19 VITALS
OXYGEN SATURATION: 98 % | SYSTOLIC BLOOD PRESSURE: 96 MMHG | TEMPERATURE: 98 F | WEIGHT: 137.81 LBS | RESPIRATION RATE: 16 BRPM | BODY MASS INDEX: 24.42 KG/M2 | HEIGHT: 63 IN | DIASTOLIC BLOOD PRESSURE: 52 MMHG | HEART RATE: 83 BPM

## 2024-02-19 VITALS
BODY MASS INDEX: 24.42 KG/M2 | TEMPERATURE: 98 F | HEIGHT: 63 IN | OXYGEN SATURATION: 97 % | HEART RATE: 83 BPM | DIASTOLIC BLOOD PRESSURE: 54 MMHG | WEIGHT: 137.81 LBS | SYSTOLIC BLOOD PRESSURE: 97 MMHG

## 2024-02-19 DIAGNOSIS — K59.00 CONSTIPATION, UNSPECIFIED CONSTIPATION TYPE: ICD-10-CM

## 2024-02-19 DIAGNOSIS — N81.9 VAGINAL VAULT PROLAPSE: ICD-10-CM

## 2024-02-19 DIAGNOSIS — C78.7 METASTASIS TO LIVER: ICD-10-CM

## 2024-02-19 DIAGNOSIS — D84.81 IMMUNODEFICIENCY SECONDARY TO NEOPLASM: ICD-10-CM

## 2024-02-19 DIAGNOSIS — L28.2 PRURITIC RASH: ICD-10-CM

## 2024-02-19 DIAGNOSIS — Z79.899 IMMUNODEFICIENCY DUE TO CHEMOTHERAPY: ICD-10-CM

## 2024-02-19 DIAGNOSIS — C78.01 MALIGNANT NEOPLASM METASTATIC TO BOTH LUNGS: ICD-10-CM

## 2024-02-19 DIAGNOSIS — C78.02 MALIGNANT NEOPLASM METASTATIC TO BOTH LUNGS: ICD-10-CM

## 2024-02-19 DIAGNOSIS — M81.0 AGE-RELATED OSTEOPOROSIS WITHOUT CURRENT PATHOLOGICAL FRACTURE: ICD-10-CM

## 2024-02-19 DIAGNOSIS — E78.5 DYSLIPIDEMIA: ICD-10-CM

## 2024-02-19 DIAGNOSIS — D49.9 IMMUNODEFICIENCY SECONDARY TO NEOPLASM: ICD-10-CM

## 2024-02-19 DIAGNOSIS — T45.1X5A IMMUNODEFICIENCY DUE TO CHEMOTHERAPY: ICD-10-CM

## 2024-02-19 DIAGNOSIS — C25.9 PANCREATIC ADENOCARCINOMA: Primary | ICD-10-CM

## 2024-02-19 DIAGNOSIS — D84.821 IMMUNODEFICIENCY DUE TO CHEMOTHERAPY: ICD-10-CM

## 2024-02-19 DIAGNOSIS — I70.0 AORTIC ATHEROSCLEROSIS: ICD-10-CM

## 2024-02-19 PROCEDURE — 96375 TX/PRO/DX INJ NEW DRUG ADDON: CPT

## 2024-02-19 PROCEDURE — 25000003 PHARM REV CODE 250: Performed by: INTERNAL MEDICINE

## 2024-02-19 PROCEDURE — 96415 CHEMO IV INFUSION ADDL HR: CPT

## 2024-02-19 PROCEDURE — 96367 TX/PROPH/DG ADDL SEQ IV INF: CPT

## 2024-02-19 PROCEDURE — 99999 PR PBB SHADOW E&M-EST. PATIENT-LVL IV: CPT | Mod: PBBFAC,,, | Performed by: INTERNAL MEDICINE

## 2024-02-19 PROCEDURE — 99215 OFFICE O/P EST HI 40 MIN: CPT | Mod: S$GLB,,, | Performed by: INTERNAL MEDICINE

## 2024-02-19 PROCEDURE — 96413 CHEMO IV INFUSION 1 HR: CPT

## 2024-02-19 PROCEDURE — A4216 STERILE WATER/SALINE, 10 ML: HCPCS | Performed by: INTERNAL MEDICINE

## 2024-02-19 PROCEDURE — 63600175 PHARM REV CODE 636 W HCPCS: Performed by: INTERNAL MEDICINE

## 2024-02-19 RX ORDER — EPINEPHRINE 0.3 MG/.3ML
0.3 INJECTION SUBCUTANEOUS ONCE AS NEEDED
Status: CANCELLED | OUTPATIENT
Start: 2024-02-19

## 2024-02-19 RX ORDER — SODIUM CHLORIDE 0.9 % (FLUSH) 0.9 %
10 SYRINGE (ML) INJECTION
Status: CANCELLED | OUTPATIENT
Start: 2024-02-19

## 2024-02-19 RX ORDER — EPINEPHRINE 0.3 MG/.3ML
0.3 INJECTION SUBCUTANEOUS ONCE AS NEEDED
Status: DISCONTINUED | OUTPATIENT
Start: 2024-02-19 | End: 2024-02-19 | Stop reason: HOSPADM

## 2024-02-19 RX ORDER — HEPARIN 100 UNIT/ML
500 SYRINGE INTRAVENOUS
Status: DISCONTINUED | OUTPATIENT
Start: 2024-02-19 | End: 2024-02-19 | Stop reason: HOSPADM

## 2024-02-19 RX ORDER — PROCHLORPERAZINE EDISYLATE 5 MG/ML
5 INJECTION INTRAMUSCULAR; INTRAVENOUS ONCE AS NEEDED
Status: CANCELLED
Start: 2024-02-19

## 2024-02-19 RX ORDER — HEPARIN 100 UNIT/ML
500 SYRINGE INTRAVENOUS
Status: CANCELLED | OUTPATIENT
Start: 2024-02-19

## 2024-02-19 RX ORDER — DIPHENHYDRAMINE HYDROCHLORIDE 50 MG/ML
50 INJECTION INTRAMUSCULAR; INTRAVENOUS ONCE AS NEEDED
Status: DISCONTINUED | OUTPATIENT
Start: 2024-02-19 | End: 2024-02-19 | Stop reason: HOSPADM

## 2024-02-19 RX ORDER — DIPHENHYDRAMINE HYDROCHLORIDE 50 MG/ML
50 INJECTION INTRAMUSCULAR; INTRAVENOUS ONCE AS NEEDED
Status: CANCELLED | OUTPATIENT
Start: 2024-02-19

## 2024-02-19 RX ORDER — PROCHLORPERAZINE EDISYLATE 5 MG/ML
5 INJECTION INTRAMUSCULAR; INTRAVENOUS ONCE AS NEEDED
Status: DISCONTINUED | OUTPATIENT
Start: 2024-02-19 | End: 2024-02-19 | Stop reason: HOSPADM

## 2024-02-19 RX ORDER — SODIUM CHLORIDE 0.9 % (FLUSH) 0.9 %
10 SYRINGE (ML) INJECTION
Status: DISCONTINUED | OUTPATIENT
Start: 2024-02-19 | End: 2024-02-19 | Stop reason: HOSPADM

## 2024-02-19 RX ADMIN — PACLITAXEL 200 MG: 100 INJECTION, POWDER, LYOPHILIZED, FOR SUSPENSION INTRAVENOUS at 03:02

## 2024-02-19 RX ADMIN — DEXAMETHASONE SODIUM PHOSPHATE 8 MG: 4 INJECTION, SOLUTION INTRA-ARTICULAR; INTRALESIONAL; INTRAMUSCULAR; INTRAVENOUS; SOFT TISSUE at 02:02

## 2024-02-19 RX ADMIN — Medication 10 ML: at 04:02

## 2024-02-19 RX ADMIN — SODIUM CHLORIDE: 9 INJECTION, SOLUTION INTRAVENOUS at 02:02

## 2024-02-19 RX ADMIN — GEMCITABINE HYDROCHLORIDE 1600 MG: 1 INJECTION, SOLUTION INTRAVENOUS at 04:02

## 2024-02-19 RX ADMIN — HEPARIN 500 UNITS: 100 SYRINGE at 04:02

## 2024-02-19 NOTE — PROGRESS NOTES
MEDICAL ONCOLOGY - ESTABLISHED PATIENT VISIT    Reason for visit: Pancreatic adenocarcinoma    Best Contact Phone Number(s): [phone] (home)      Cancer/Stage/TNM:    Cancer Staging   Pancreatic adenocarcinoma  Staging form: Exocrine Pancreas, AJCC 8th Edition  - Clinical stage from 9/21/2023: Stage IV (cT1, cNX, pM1) - Signed by David Briceño MD on 10/11/2023       Oncology History   Pancreatic adenocarcinoma   9/5/2023 Initial Diagnosis    Pancreatic adenocarcinoma     9/21/2023 Cancer Staged    Staging form: Exocrine Pancreas, AJCC 8th Edition  - Clinical stage from 9/21/2023: Stage IV (cT1, cNX, pM1)     10/2/2023 Tumor Conference     [facility] UGI MULTIDISCIPLINARY TUMOR BOARD  PATIENT REVIEW FORM   ____________________________________________________________    CLINIC #: [id number]  DATE: 10/2/2023    DIAGNOSIS: pancreas CA    PRESENTER: Angela    PATIENT SUMMARY:   This 79 y/o female presented in August with abd pain, decreased appetite with weight loss. Reviewed CT and MRI imaging ~ 2 cm mass in tail of pancreas with diffuse metastatic liver disease, largest liver lesion in left lobe measuring 4.5 cm. IR performed liver bx and this pathology was reviewed - poorly differentiated adenocarcinoma, favoring upper GI origin.     BOARD RECOMMENDATIONS:   Stage IV pancreas CA with liver mets  Add MMR and TEMPUS  Proceed with palliative systemic chemotherapy    CONSULT NEEDED:     [] Surgery    [] Hem/Onc    [] Rad/Onc    [] Dietary     [] Social Service    [] Psychology       [] AES  [] Radiology     Clinical Stage: Tumor   Node(s)   Metastasis        GROUP STAGE:  [] O    [] 1A    [] IB    [] IIA    [] IIB     [] IIIA     [] IIIB     [] IIIC    [x]IV  [] Local recurrence     [] Regional recurrence     [] Distant recurrence   Metastatic site(s): liver         [x] Elidia'[facility] Treatment Guidelines reviewed and care planned is consistent with guidelines.         (i.e., NCCN, NCI, PD, ACO, AUA,  etc.)    PRESENTATION AT CANCER CONFERENCE:         [x] Prospective    [] Retrospective     [] Follow-Up    Eligible for clinical trial : yes       11/1/2023 -  Chemotherapy    Treatment Summary   Plan Name: OP PANC NAB-PACLITAXEL + GEMCITABINE Q4W  Treatment Goal: Palliative  Status: Active  Start Date: 11/1/2023  End Date: 9/6/2024 (Planned)  Provider: David Briceño MD  Chemotherapy: gemcitabine (GEMZAR) 1,600 mg in sodium chloride 0.9% SolP 327.08 mL chemo infusion, 1,680 mg, Intravenous, Clinic/HOD 1 time, 4 of 12 cycles  Administration: 1,600 mg (11/1/2023), 1,600 mg (11/15/2023), 1,600 mg (11/29/2023), 1,600 mg (12/14/2023), 1,600 mg (12/27/2023), 1,600 mg (1/9/2024), 1,600 mg (1/22/2024), 1,600 mg (2/5/2024)          Interim History:   78 y.o. female with metastatic pancreatic cancer who presents for follow-up prior to cycle 9 of biweekly gemcitabine + nab-paclitaxel.  She continues to feel well.  Still has the occasional epigastric discomfort under her breasts.  No rash as long as she receives the dexamethasone on day 1.  Denies nausea.    ECOG PS is 0. Presents with her  today.     ROS:   Review of Systems   Constitutional:  Negative for chills, fever, malaise/fatigue and weight loss.   HENT:  Negative for sore throat.    Eyes:  Negative for blurred vision and pain.   Respiratory:  Negative for cough and shortness of breath.    Cardiovascular:  Positive for leg swelling. Negative for chest pain.   Gastrointestinal:  Positive for abdominal pain. Negative for constipation, diarrhea, nausea and vomiting.   Genitourinary:  Negative for dysuria and frequency.   Musculoskeletal:  Negative for back pain, falls and myalgias.   Skin:  Negative for itching and rash.   Neurological:  Negative for dizziness, weakness and headaches.   Endo/Heme/Allergies:  Does not bruise/bleed easily.   Psychiatric/Behavioral:  Negative for depression. The patient is not nervous/anxious.        Past Medical History:    Past Medical History:   Diagnosis Date    Age-related osteoporosis without current pathological fracture     Atherosclerosis of aortic arch     - noted on CXR 8/2017    Back pain     Diverticulosis of sigmoid colon 10/22/2019    Ectopic pregnancy     Fibrocystic breast     Hyperlipidemia     Inguinal hernia     Osteopenia     Polyp of ascending colon 10/22/2019    Urinary incontinence, mixed 05/24/2022        Past Surgical History:   Past Surgical History:   Procedure Laterality Date    ECTOPIC PREGNANCY SURGERY      HERNIA REPAIR      left inguinal    HYSTERECTOMY  1980    without BSO        Family History:   Family History   Problem Relation Age of Onset    Breast cancer Mother 69    Seizures Mother     Heart disease Father     Heart attack Father     Depression Father     Breast cancer Sister 73    Breast cancer Maternal Aunt 73    Prostate cancer Brother 81        no mets    Obesity Brother     Heart failure Maternal Grandmother     Heart attack Maternal Grandfather     Suicide Attempts Paternal Grandmother     Depression Paternal Grandmother     Mental illness Paternal Grandmother     Depression Paternal Aunt     Heart attack Paternal Uncle     Depression Paternal Uncle     Cancer Maternal Aunt 83        blood cancer (leukemia?)    Cancer Maternal Uncle         unk type; was COD    Suicide Paternal Cousin     Tongue cancer Paternal Cousin         smoking hx unk    Ovarian cancer Neg Hx         Social History:   Social History     Tobacco Use    Smoking status: Never    Smokeless tobacco: Never   Substance Use Topics    Alcohol use: No        I have reviewed and updated the patient's past medical, surgical, family and social histories.    Allergies:   Review of patient's allergies indicates:   Allergen Reactions    Erythromycin (bulk)      Other reaction(s): Eye irritation        Medications:   Current Outpatient Medications   Medication Sig Dispense Refill    coconut oil, bulk, Oil by Misc.(Non-Drug; Combo  Route) route.      multivitamin (THERAGRAN) per tablet Take 1 tablet by mouth once daily.      vitamin D (VITAMIN D3) 1000 units Tab Take 1,000 Units by mouth once daily.      ABRAXANE 100 mg injection       dexAMETHasone (DECADRON) 4 mg/mL injection       diphenhydramine HCl (BENADRYL ORAL) Take 25 mg by mouth as needed (rash).      doxycycline (VIBRAMYCIN) 100 MG Cap Take 1 capsule (100 mg total) by mouth every 12 (twelve) hours. (Patient not taking: Reported on 12/27/2023) 14 capsule 0    GADAVIST 1 mmol/mL (604.72 mg/mL) Soln injection       gemcitabine (GEMZAR) 1 gram/26.3 mL (38 mg/mL) Soln injection       gemcitabine 200 mg/5.26 mL (38 mg/mL) Soln       heparin sodium,porcine (HEPARIN, PORCINE,) 1,000 unit/mL injection       heparin, porcine, PF, (HEPARIN FLUSH 100 UNITS/ML) 100 unit/mL Syrg       HYDROcodone-acetaminophen (NORCO) 5-325 mg per tablet Take 1 tablet by mouth every 12 (twelve) hours as needed for Pain. (Patient not taking: Reported on 12/6/2023) 10 tablet 0    hydrOXYzine HCL (ATARAX) 25 MG tablet Take 1 tablet (25 mg total) by mouth 3 (three) times daily as needed for Itching. (Patient not taking: Reported on 12/27/2023) 60 tablet 2    LIDOcaine-prilocaine (EMLA) cream Apply topically as needed (place to port site 45-60 minutes prior to chemotherapy). (Patient not taking: Reported on 2/19/2024) 30 g 3    ondansetron (ZOFRAN-ODT) 4 MG TbDL Take 1 tablet (4 mg total) by mouth every 8 (eight) hours as needed (Nausea). (Patient not taking: Reported on 12/27/2023) 10 tablet 0    ondansetron (ZOFRAN-ODT) 8 MG TbDL TAKE 1 TABLET (8 MG TOTAL) BY MOUTH EVERY 6 HOURS AS NEEDED FOR NAUSEA (Patient not taking: Reported on 12/27/2023) 30 tablet 5    triamcinolone acetonide 0.1% (KENALOG) 0.1 % ointment Apply topically as needed (rash).       No current facility-administered medications for this visit.      Physical Exam:   BP (!) 97/54 (BP Location: Left arm, Patient Position: Sitting, BP Method: Medium  "(Automatic))   Pulse 83   Temp 98.4 °F (36.9 °C) (Oral)   Ht 5' 3" (1.6 m)   Wt 62.5 kg (137 lb 12.6 oz)   SpO2 97%   BMI 24.41 kg/m²        Physical Exam  Vitals reviewed.   Constitutional:       General: She is not in acute distress.     Appearance: Normal appearance. She is well-developed and normal weight. She is not ill-appearing, toxic-appearing or diaphoretic.   HENT:      Head: Normocephalic and atraumatic.      Right Ear: External ear normal.      Left Ear: External ear normal.      Nose: Nose normal.      Mouth/Throat:      Mouth: Mucous membranes are moist.      Pharynx: Oropharynx is clear. No posterior oropharyngeal erythema.   Eyes:      General: No scleral icterus.     Extraocular Movements: Extraocular movements intact.      Conjunctiva/sclera: Conjunctivae normal.      Pupils: Pupils are equal, round, and reactive to light.   Neck:      Trachea: No tracheal deviation.   Cardiovascular:      Rate and Rhythm: Normal rate and regular rhythm.      Pulses: Normal pulses.      Heart sounds: Normal heart sounds.   Pulmonary:      Effort: Pulmonary effort is normal. No respiratory distress.      Breath sounds: Normal breath sounds. No wheezing or rales.   Chest:      Comments: RCW port  Abdominal:      General: Bowel sounds are normal. There is no distension.      Palpations: Abdomen is soft.      Tenderness: There is no abdominal tenderness.   Musculoskeletal:         General: No swelling or deformity. Normal range of motion.      Cervical back: Normal range of motion and neck supple.   Skin:     General: Skin is warm.      Coloration: Skin is not jaundiced.      Findings: No erythema or rash.   Neurological:      General: No focal deficit present.      Mental Status: She is alert and oriented to person, place, and time. Mental status is at baseline.      Motor: No weakness.      Gait: Gait normal.   Psychiatric:         Mood and Affect: Mood normal.         Behavior: Behavior normal.         Thought " Content: Thought content normal.         Judgment: Judgment normal.         Labs:   No results found for this or any previous visit (from the past 48 hour(s)).    Imaging:   CT CAP 2/16/24:    Impression:     1. In this patient with reported history of pancreatic cancer, there has been a generalized interval positive response to therapy including interval decrease in size to complete resolution of previously noted ground-glass and solid pulmonary nodules, innumerable hepatic parenchymal nodules/masses and pancreatic tail lesion.          Path:   Final Pathologic Diagnosis     Left hepatic lobe, biopsy:   Positive for malignancy, poorly differentiated adenocarcinoma (see comment)   Tumor necrosis present     Comment:  A review of the patient's imaging shows the presence of a 1.9 cm pancreatic tail mass and diffuse liver lesions.  These morphologic and immunophenotypic findings are supportive of a pancreatobiliary and/or upper gastrointestinal primary.  VC      Comment: Interp By Nataliia Peralta D.O., Signed on 10/05/2023 at 08:36   Supplemental Diagnosis     The purpose of this supplemental report is to report results of MMR panel in the tumor cells as follows:     Immunohistochemistry (IHC) Testing for Mismatch Repair (MMR) Proteins:      MLH1 - Intact nuclear expression    MSH2 - Intact nuclear expression    MSH6 - Intact nuclear expression    PMS2 - Intact nuclear expression            Somatic Genomic Results       Pancreatic mass       Tumor NGS Tissue  Resulted: 10/10/23 Status: Preliminary result       Detected - Pathogenic (4)      Gene Variant VAF   BRAF p.[id number] - c.1458_[id number] Inframe deletion - GOF 12.00 %   CDKN2A CDKN2A - Copy number loss --   CDKN2B CDKN2B - Copy number loss --   TP53 p.Y163C - c.488A>G Missense variant - LOF 30.50 %              Detected - Uncertain significance (1)      Gene Variant VAF   DIS3 p.N671S - c.2012A>G Missense variant 23.10 %                   Assessment:        1. Pancreatic adenocarcinoma    2. 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 Metastasis to liver    4. Immunodeficiency due to chemotherapy    5. Immunodeficiency secondary to neoplasm    6. Dyslipidemia    7. Constipation, unspecified constipation type    8. Pruritic rash    9. Age-related osteoporosis without current pathological fracture    10. Vaginal vault prolapse    11. Aortic atherosclerosis         Plan:        # Pancreatic cancer  We discussed with the patient and her family her biopsy-proven diagnosis of metastatic pancreatic adenocarcinoma with multifocal liver metastases.  Her tumor is pMMR and Tempus tissue showed a non V600E BRAF mutation, TP53 mutation and CDKN2A and CDKN2B loss.  QUINN.    We discussed her prognosis and potential treatment options.  Because of her age and discussion of tolerance concerns, I have recommended biweekly gemcitabine + nab-paclitaxel as palliative intent first-line therapy.  Dosing for gemcitabine + nab-paclitaxel will be 1000 mg/m2 and 125 mg/m2, respectively, and I will administer these on a biweekly basis.    Port was placed.  Consideration to BRAF non-V600E targeting trials in the future if available.    She began cycle 1 of biweekly gemcitabine + nab-paclitaxel on 11/1/23.  She experienced fevers/chills and rash. believe these are all chemotherapy related. Improved after the administration of Dex.     Will continue to administer dexamethasone 12 mg IV prior to infusion for rash prevention.  May need to increase to 20mg for future cycles. Will continue to monitor.    Also gave her Medrol Dose Nghia to use in case rash recurs despite premed dex.    - CT scan after cycle 4 displays overall improvement within the lung and liver, with decreases in the number and size of the lesions. Continue current regimen.   - CT scan after cycle 8 displays continued improvement in disease in liver and lung and pancreatic tail.  - proceed with cycle 9.    RTC in 2 weeks for  next cycle with lab work and treatment.   Will repeat imaging studies after cycle 12    Following with palliative care.   Genetic testing done outside - saw Julito Sosa here. FANCC mut present.    PGX showed DPYD nml and UGT1A1 intermediate metabolism.    # HLD, aortic atherosclerosis, constipation, pruritus, fever  Not currently on therapy for HLD, atherosclerosis. Cholesterol stable from June 2023.   Constipation: stable.   Pruritus/fever: controlled with increased pre-meds and Benadryl. Continues to control fever alternating Motrin and Tylenol.     # Osteoporosis, vaginal prolapse  Monitor calcium levels on chemo.  Will continue to encourage maintaining activity.  Will f/u with GYN.     Follow up: 2 weeks for cycle 10.    Patient is in agreement with the proposed treatment plan. All questions were answered to the patient's satisfaction. Pt knows to call clinic if anything is needed before the next clinic visit.    David Briceño MD  Hematology/Oncology  [facility]      Route Chart for Scheduling    Med Onc Chart Routing      Follow up with physician 4 weeks. for gem/abraxane   Follow up with DONTE 2 weeks. for gem/abraxane   Infusion scheduling note    Injection scheduling note    Labs CBC, CMP and CA 19-9   Scheduling:  Preferred lab:  Lab interval: every 2 weeks     Imaging    Pharmacy appointment    Other referrals              Treatment Plan Information   OP PANC NAB-PACLITAXEL + GEMCITABINE Q4W   David Briceño MD   Upcoming Treatment Dates - OP PANC NAB-PACLITAXEL + GEMCITABINE Q4W    2/9/2024       Chemotherapy       PACLitaxeL protein-bound (ABRAXANE) in 42 mL infusion       gemcitabine (GEMZAR) in sodium chloride 0.9% SolP 250 mL chemo infusion       Antiemetics       ondansetron (ZOFRAN) 8 mg, dexAMETHasone (DECADRON) 12 mg in sodium chloride 0.9% 50 mL IVPB  2/23/2024       Chemotherapy       PACLitaxeL protein-bound (ABRAXANE) in 42 mL infusion       gemcitabine (GEMZAR)  in sodium chloride 0.9% SolP 250 mL chemo infusion       Antiemetics       ondansetron (ZOFRAN) 8 mg, dexAMETHasone (DECADRON) 12 mg in sodium chloride 0.9% 50 mL IVPB  3/8/2024       Chemotherapy       PACLitaxeL protein-bound (ABRAXANE) in 42 mL infusion       gemcitabine (GEMZAR) in sodium chloride 0.9% SolP 250 mL chemo infusion       Antiemetics       ondansetron (ZOFRAN) 8 mg, dexAMETHasone (DECADRON) 12 mg in sodium chloride 0.9% 50 mL IVPB  3/22/2024       Chemotherapy       PACLitaxeL protein-bound (ABRAXANE) in 42 mL infusion       gemcitabine (GEMZAR) in sodium chloride 0.9% SolP 250 mL chemo infusion       Antiemetics       ondansetron (ZOFRAN) 8 mg, dexAMETHasone (DECADRON) 12 mg in sodium chloride 0.9% 50 mL IVPB

## 2024-02-19 NOTE — PLAN OF CARE
1656- pt completed gemzar/abraxane infusion. Pt tolerated well. Ambulated off floor independently, NAD.

## 2024-02-20 ENCOUNTER — PATIENT MESSAGE (OUTPATIENT)
Dept: ADMINISTRATIVE | Facility: OTHER | Age: 79
End: 2024-02-20
Payer: MEDICARE

## 2024-02-21 ENCOUNTER — PATIENT MESSAGE (OUTPATIENT)
Dept: ADMINISTRATIVE | Facility: OTHER | Age: 79
End: 2024-02-21
Payer: MEDICARE

## 2024-02-22 ENCOUNTER — PATIENT MESSAGE (OUTPATIENT)
Dept: ADMINISTRATIVE | Facility: OTHER | Age: 79
End: 2024-02-22
Payer: MEDICARE

## 2024-02-23 ENCOUNTER — PATIENT MESSAGE (OUTPATIENT)
Dept: ADMINISTRATIVE | Facility: OTHER | Age: 79
End: 2024-02-23
Payer: MEDICARE

## 2024-02-24 ENCOUNTER — PATIENT MESSAGE (OUTPATIENT)
Dept: ADMINISTRATIVE | Facility: OTHER | Age: 79
End: 2024-02-24
Payer: MEDICARE

## 2024-02-25 ENCOUNTER — PATIENT MESSAGE (OUTPATIENT)
Dept: ADMINISTRATIVE | Facility: OTHER | Age: 79
End: 2024-02-25
Payer: MEDICARE

## 2024-02-26 ENCOUNTER — PATIENT MESSAGE (OUTPATIENT)
Dept: ADMINISTRATIVE | Facility: OTHER | Age: 79
End: 2024-02-26
Payer: MEDICARE

## 2024-02-27 ENCOUNTER — PATIENT MESSAGE (OUTPATIENT)
Dept: ADMINISTRATIVE | Facility: OTHER | Age: 79
End: 2024-02-27
Payer: MEDICARE

## 2024-02-28 ENCOUNTER — PATIENT MESSAGE (OUTPATIENT)
Dept: ADMINISTRATIVE | Facility: OTHER | Age: 79
End: 2024-02-28
Payer: MEDICARE

## 2024-02-29 ENCOUNTER — PATIENT MESSAGE (OUTPATIENT)
Dept: ADMINISTRATIVE | Facility: OTHER | Age: 79
End: 2024-02-29
Payer: MEDICARE

## 2024-03-01 ENCOUNTER — LAB VISIT (OUTPATIENT)
Dept: LAB | Facility: HOSPITAL | Age: 79
End: 2024-03-01
Attending: INTERNAL MEDICINE
Payer: MEDICARE

## 2024-03-01 ENCOUNTER — PATIENT MESSAGE (OUTPATIENT)
Dept: ADMINISTRATIVE | Facility: OTHER | Age: 79
End: 2024-03-01
Payer: MEDICARE

## 2024-03-01 DIAGNOSIS — C25.9 PANCREATIC ADENOCARCINOMA: ICD-10-CM

## 2024-03-01 LAB
ALBUMIN SERPL BCP-MCNC: 3.4 G/DL (ref 3.5–5.2)
ALP SERPL-CCNC: 102 U/L (ref 55–135)
ALT SERPL W/O P-5'-P-CCNC: 20 U/L (ref 10–44)
ANION GAP SERPL CALC-SCNC: 9 MMOL/L (ref 8–16)
AST SERPL-CCNC: 25 U/L (ref 10–40)
BILIRUB SERPL-MCNC: 0.3 MG/DL (ref 0.1–1)
BUN SERPL-MCNC: 17 MG/DL (ref 8–23)
CALCIUM SERPL-MCNC: 9.1 MG/DL (ref 8.7–10.5)
CANCER AG19-9 SERPL-ACNC: ABNORMAL U/ML (ref 0–40)
CHLORIDE SERPL-SCNC: 108 MMOL/L (ref 95–110)
CO2 SERPL-SCNC: 24 MMOL/L (ref 23–29)
CREAT SERPL-MCNC: 0.7 MG/DL (ref 0.5–1.4)
ERYTHROCYTE [DISTWIDTH] IN BLOOD BY AUTOMATED COUNT: 15.7 % (ref 11.5–14.5)
EST. GFR  (NO RACE VARIABLE): >60 ML/MIN/1.73 M^2
GLUCOSE SERPL-MCNC: 124 MG/DL (ref 70–110)
HCT VFR BLD AUTO: 35.8 % (ref 37–48.5)
HGB BLD-MCNC: 11.2 G/DL (ref 12–16)
IMM GRANULOCYTES # BLD AUTO: 0.02 K/UL (ref 0–0.04)
MCH RBC QN AUTO: 29.2 PG (ref 27–31)
MCHC RBC AUTO-ENTMCNC: 31.3 G/DL (ref 32–36)
MCV RBC AUTO: 94 FL (ref 82–98)
NEUTROPHILS # BLD AUTO: 3 K/UL (ref 1.8–7.7)
PLATELET # BLD AUTO: 181 K/UL (ref 150–450)
PMV BLD AUTO: 10.7 FL (ref 9.2–12.9)
POTASSIUM SERPL-SCNC: 4.7 MMOL/L (ref 3.5–5.1)
PROT SERPL-MCNC: 6.6 G/DL (ref 6–8.4)
RBC # BLD AUTO: 3.83 M/UL (ref 4–5.4)
SODIUM SERPL-SCNC: 141 MMOL/L (ref 136–145)
WBC # BLD AUTO: 4.51 K/UL (ref 3.9–12.7)

## 2024-03-01 PROCEDURE — 85027 COMPLETE CBC AUTOMATED: CPT | Performed by: INTERNAL MEDICINE

## 2024-03-01 PROCEDURE — 86301 IMMUNOASSAY TUMOR CA 19-9: CPT | Performed by: INTERNAL MEDICINE

## 2024-03-01 PROCEDURE — 36415 COLL VENOUS BLD VENIPUNCTURE: CPT | Mod: PO | Performed by: INTERNAL MEDICINE

## 2024-03-01 PROCEDURE — 80053 COMPREHEN METABOLIC PANEL: CPT | Performed by: INTERNAL MEDICINE

## 2024-03-02 ENCOUNTER — PATIENT MESSAGE (OUTPATIENT)
Dept: ADMINISTRATIVE | Facility: OTHER | Age: 79
End: 2024-03-02
Payer: MEDICARE

## 2024-03-03 ENCOUNTER — PATIENT MESSAGE (OUTPATIENT)
Dept: ADMINISTRATIVE | Facility: OTHER | Age: 79
End: 2024-03-03
Payer: MEDICARE

## 2024-03-04 ENCOUNTER — PATIENT MESSAGE (OUTPATIENT)
Dept: ADMINISTRATIVE | Facility: OTHER | Age: 79
End: 2024-03-04
Payer: MEDICARE

## 2024-03-04 ENCOUNTER — OFFICE VISIT (OUTPATIENT)
Dept: HEMATOLOGY/ONCOLOGY | Facility: CLINIC | Age: 79
End: 2024-03-04
Payer: MEDICARE

## 2024-03-04 ENCOUNTER — INFUSION (OUTPATIENT)
Dept: INFUSION THERAPY | Facility: HOSPITAL | Age: 79
End: 2024-03-04
Payer: MEDICARE

## 2024-03-04 VITALS
HEART RATE: 71 BPM | WEIGHT: 139.31 LBS | SYSTOLIC BLOOD PRESSURE: 138 MMHG | HEIGHT: 63 IN | BODY MASS INDEX: 24.68 KG/M2 | DIASTOLIC BLOOD PRESSURE: 62 MMHG

## 2024-03-04 VITALS
OXYGEN SATURATION: 96 % | WEIGHT: 139.31 LBS | BODY MASS INDEX: 24.68 KG/M2 | DIASTOLIC BLOOD PRESSURE: 59 MMHG | TEMPERATURE: 98 F | HEART RATE: 75 BPM | SYSTOLIC BLOOD PRESSURE: 117 MMHG | HEIGHT: 63 IN

## 2024-03-04 DIAGNOSIS — D49.9 IMMUNODEFICIENCY SECONDARY TO NEOPLASM: ICD-10-CM

## 2024-03-04 DIAGNOSIS — C25.9 PANCREATIC ADENOCARCINOMA: Primary | ICD-10-CM

## 2024-03-04 DIAGNOSIS — N81.9 VAGINAL VAULT PROLAPSE: ICD-10-CM

## 2024-03-04 DIAGNOSIS — Z79.899 IMMUNODEFICIENCY DUE TO CHEMOTHERAPY: ICD-10-CM

## 2024-03-04 DIAGNOSIS — T45.1X5A IMMUNODEFICIENCY DUE TO CHEMOTHERAPY: ICD-10-CM

## 2024-03-04 DIAGNOSIS — C78.01 MALIGNANT NEOPLASM METASTATIC TO BOTH LUNGS: ICD-10-CM

## 2024-03-04 DIAGNOSIS — E78.5 DYSLIPIDEMIA: ICD-10-CM

## 2024-03-04 DIAGNOSIS — D84.81 IMMUNODEFICIENCY SECONDARY TO NEOPLASM: ICD-10-CM

## 2024-03-04 DIAGNOSIS — I70.0 AORTIC ATHEROSCLEROSIS: ICD-10-CM

## 2024-03-04 DIAGNOSIS — D84.821 IMMUNODEFICIENCY DUE TO CHEMOTHERAPY: ICD-10-CM

## 2024-03-04 DIAGNOSIS — L28.2 PRURITIC RASH: ICD-10-CM

## 2024-03-04 DIAGNOSIS — C78.7 METASTASIS TO LIVER: ICD-10-CM

## 2024-03-04 DIAGNOSIS — C78.02 MALIGNANT NEOPLASM METASTATIC TO BOTH LUNGS: ICD-10-CM

## 2024-03-04 DIAGNOSIS — M81.0 AGE-RELATED OSTEOPOROSIS WITHOUT CURRENT PATHOLOGICAL FRACTURE: ICD-10-CM

## 2024-03-04 DIAGNOSIS — K59.00 CONSTIPATION, UNSPECIFIED CONSTIPATION TYPE: ICD-10-CM

## 2024-03-04 PROCEDURE — 63600175 PHARM REV CODE 636 W HCPCS: Performed by: REGISTERED NURSE

## 2024-03-04 PROCEDURE — 96413 CHEMO IV INFUSION 1 HR: CPT

## 2024-03-04 PROCEDURE — 25000003 PHARM REV CODE 250: Performed by: REGISTERED NURSE

## 2024-03-04 PROCEDURE — 96375 TX/PRO/DX INJ NEW DRUG ADDON: CPT

## 2024-03-04 PROCEDURE — A4216 STERILE WATER/SALINE, 10 ML: HCPCS | Performed by: REGISTERED NURSE

## 2024-03-04 PROCEDURE — 96417 CHEMO IV INFUS EACH ADDL SEQ: CPT

## 2024-03-04 PROCEDURE — 96367 TX/PROPH/DG ADDL SEQ IV INF: CPT

## 2024-03-04 PROCEDURE — 99215 OFFICE O/P EST HI 40 MIN: CPT | Mod: S$GLB,,, | Performed by: REGISTERED NURSE

## 2024-03-04 PROCEDURE — 99999 PR PBB SHADOW E&M-EST. PATIENT-LVL IV: CPT | Mod: PBBFAC,,, | Performed by: REGISTERED NURSE

## 2024-03-04 RX ORDER — SODIUM CHLORIDE 0.9 % (FLUSH) 0.9 %
10 SYRINGE (ML) INJECTION
Status: DISCONTINUED | OUTPATIENT
Start: 2024-03-04 | End: 2024-03-04 | Stop reason: HOSPADM

## 2024-03-04 RX ORDER — PROCHLORPERAZINE EDISYLATE 5 MG/ML
5 INJECTION INTRAMUSCULAR; INTRAVENOUS ONCE AS NEEDED
Status: CANCELLED
Start: 2024-03-04

## 2024-03-04 RX ORDER — SODIUM CHLORIDE 0.9 % (FLUSH) 0.9 %
10 SYRINGE (ML) INJECTION
Status: CANCELLED | OUTPATIENT
Start: 2024-03-04

## 2024-03-04 RX ORDER — HEPARIN 100 UNIT/ML
500 SYRINGE INTRAVENOUS
Status: CANCELLED | OUTPATIENT
Start: 2024-03-04

## 2024-03-04 RX ORDER — EPINEPHRINE 0.3 MG/.3ML
0.3 INJECTION SUBCUTANEOUS ONCE AS NEEDED
Status: CANCELLED | OUTPATIENT
Start: 2024-03-04

## 2024-03-04 RX ORDER — PROCHLORPERAZINE EDISYLATE 5 MG/ML
5 INJECTION INTRAMUSCULAR; INTRAVENOUS ONCE AS NEEDED
Status: DISCONTINUED | OUTPATIENT
Start: 2024-03-04 | End: 2024-03-04 | Stop reason: HOSPADM

## 2024-03-04 RX ORDER — EPINEPHRINE 0.3 MG/.3ML
0.3 INJECTION SUBCUTANEOUS ONCE AS NEEDED
Status: DISCONTINUED | OUTPATIENT
Start: 2024-03-04 | End: 2024-03-04 | Stop reason: HOSPADM

## 2024-03-04 RX ORDER — DIPHENHYDRAMINE HYDROCHLORIDE 50 MG/ML
50 INJECTION INTRAMUSCULAR; INTRAVENOUS ONCE AS NEEDED
Status: DISCONTINUED | OUTPATIENT
Start: 2024-03-04 | End: 2024-03-04 | Stop reason: HOSPADM

## 2024-03-04 RX ORDER — DIPHENHYDRAMINE HYDROCHLORIDE 50 MG/ML
50 INJECTION INTRAMUSCULAR; INTRAVENOUS ONCE AS NEEDED
Status: CANCELLED | OUTPATIENT
Start: 2024-03-04

## 2024-03-04 RX ORDER — HEPARIN 100 UNIT/ML
500 SYRINGE INTRAVENOUS
Status: DISCONTINUED | OUTPATIENT
Start: 2024-03-04 | End: 2024-03-04 | Stop reason: HOSPADM

## 2024-03-04 RX ADMIN — GEMCITABINE HYDROCHLORIDE 1600 MG: 1 INJECTION, SOLUTION INTRAVENOUS at 11:03

## 2024-03-04 RX ADMIN — Medication 10 ML: at 12:03

## 2024-03-04 RX ADMIN — SODIUM CHLORIDE: 9 INJECTION, SOLUTION INTRAVENOUS at 10:03

## 2024-03-04 RX ADMIN — HEPARIN 500 UNITS: 100 SYRINGE at 12:03

## 2024-03-04 RX ADMIN — DEXAMETHASONE SODIUM PHOSPHATE 8 MG: 4 INJECTION, SOLUTION INTRA-ARTICULAR; INTRALESIONAL; INTRAMUSCULAR; INTRAVENOUS; SOFT TISSUE at 10:03

## 2024-03-04 RX ADMIN — PACLITAXEL 200 MG: 100 INJECTION, POWDER, LYOPHILIZED, FOR SUSPENSION INTRAVENOUS at 11:03

## 2024-03-04 NOTE — PROGRESS NOTES
MEDICAL ONCOLOGY - ESTABLISHED PATIENT VISIT    Reason for visit: Pancreatic adenocarcinoma    Best Contact Phone Number(s): [phone] (home)      Cancer/Stage/TNM:    Cancer Staging   Pancreatic adenocarcinoma  Staging form: Exocrine Pancreas, AJCC 8th Edition  - Clinical stage from 9/21/2023: Stage IV (cT1, cNX, pM1) - Signed by David Briceño MD on 10/11/2023       Oncology History   Pancreatic adenocarcinoma   9/5/2023 Initial Diagnosis    Pancreatic adenocarcinoma     9/21/2023 Cancer Staged    Staging form: Exocrine Pancreas, AJCC 8th Edition  - Clinical stage from 9/21/2023: Stage IV (cT1, cNX, pM1)     10/2/2023 Tumor Conference     [facility] UGI MULTIDISCIPLINARY TUMOR BOARD  PATIENT REVIEW FORM   ____________________________________________________________    CLINIC #: [id number]  DATE: 10/2/2023    DIAGNOSIS: pancreas CA    PRESENTER: Angela    PATIENT SUMMARY:   This 77 y/o female presented in August with abd pain, decreased appetite with weight loss. Reviewed CT and MRI imaging ~ 2 cm mass in tail of pancreas with diffuse metastatic liver disease, largest liver lesion in left lobe measuring 4.5 cm. IR performed liver bx and this pathology was reviewed - poorly differentiated adenocarcinoma, favoring upper GI origin.     BOARD RECOMMENDATIONS:   Stage IV pancreas CA with liver mets  Add MMR and TEMPUS  Proceed with palliative systemic chemotherapy    CONSULT NEEDED:     [] Surgery    [] Hem/Onc    [] Rad/Onc    [] Dietary     [] Social Service    [] Psychology       [] AES  [] Radiology     Clinical Stage: Tumor   Node(s)   Metastasis        GROUP STAGE:  [] O    [] 1A    [] IB    [] IIA    [] IIB     [] IIIA     [] IIIB     [] IIIC    [x]IV  [] Local recurrence     [] Regional recurrence     [] Distant recurrence   Metastatic site(s): liver         [x] Elidia'[facility] Treatment Guidelines reviewed and care planned is consistent with guidelines.         (i.e., NCCN, NCI, PD, ACO, AUA,  "etc.)    PRESENTATION AT CANCER CONFERENCE:         [x] Prospective    [] Retrospective     [] Follow-Up    Eligible for clinical trial : yes       11/1/2023 -  Chemotherapy    Treatment Summary   Plan Name: OP PANC NAB-PACLITAXEL + GEMCITABINE Q4W  Treatment Goal: Palliative  Status: Active  Start Date: 11/1/2023  End Date: 9/6/2024 (Planned)  Provider: David Briceño MD  Chemotherapy: gemcitabine (GEMZAR) 1,600 mg in sodium chloride 0.9% SolP 327.08 mL chemo infusion, 1,680 mg, Intravenous, Clinic/HOD 1 time, 5 of 12 cycles  Administration: 1,600 mg (11/1/2023), 1,600 mg (11/15/2023), 1,600 mg (11/29/2023), 1,600 mg (12/14/2023), 1,600 mg (12/27/2023), 1,600 mg (1/9/2024), 1,600 mg (1/22/2024), 1,600 mg (2/5/2024), 1,600 mg (2/19/2024)          Interim History:   78 y.o. female with metastatic pancreatic cancer who presents for follow-up prior to cycle 10 of biweekly gemcitabine + nab-paclitaxel. Continues to do well overall. No significant epigastric discomfort under her breasts since last visit. Has a a slow healing lesion to right shin, no significant changes. Notes increased "pinkness" to cheeks. Has dry skin to buttocks area, feels this is related to sacral positioning and weight loss. No other rashes or skin changes, but may decide to see her outside dermatologist. No other concerns.     ECOG PS is 0. Presents with her  today.     ROS:   Review of Systems   Constitutional:  Negative for chills, fever, malaise/fatigue and weight loss.   HENT:  Negative for sore throat.    Eyes:  Negative for blurred vision and pain.   Respiratory:  Negative for cough and shortness of breath.    Cardiovascular:  Positive for leg swelling. Negative for chest pain.   Gastrointestinal:  Positive for abdominal pain. Negative for constipation, diarrhea, nausea and vomiting.   Genitourinary:  Negative for dysuria and frequency.   Musculoskeletal:  Negative for back pain, falls and myalgias.   Skin:  Negative for itching " and rash.   Neurological:  Negative for dizziness, weakness and headaches.   Endo/Heme/Allergies:  Does not bruise/bleed easily.   Psychiatric/Behavioral:  Negative for depression. The patient is not nervous/anxious.        Past Medical History:   Past Medical History:   Diagnosis Date    Age-related osteoporosis without current pathological fracture     Atherosclerosis of aortic arch     - noted on CXR 8/2017    Back pain     Diverticulosis of sigmoid colon 10/22/2019    Ectopic pregnancy     Fibrocystic breast     Hyperlipidemia     Inguinal hernia     Osteopenia     Polyp of ascending colon 10/22/2019    Urinary incontinence, mixed 05/24/2022        Past Surgical History:   Past Surgical History:   Procedure Laterality Date    ECTOPIC PREGNANCY SURGERY      HERNIA REPAIR      left inguinal    HYSTERECTOMY  1980    without BSO        Family History:   Family History   Problem Relation Age of Onset    Breast cancer Mother 69    Seizures Mother     Heart disease Father     Heart attack Father     Depression Father     Breast cancer Sister 73    Breast cancer Maternal Aunt 73    Prostate cancer Brother 81        no mets    Obesity Brother     Heart failure Maternal Grandmother     Heart attack Maternal Grandfather     Suicide Attempts Paternal Grandmother     Depression Paternal Grandmother     Mental illness Paternal Grandmother     Depression Paternal Aunt     Heart attack Paternal Uncle     Depression Paternal Uncle     Cancer Maternal Aunt 83        blood cancer (leukemia?)    Cancer Maternal Uncle         unk type; was COD    Suicide Paternal Cousin     Tongue cancer Paternal Cousin         smoking hx unk    Ovarian cancer Neg Hx         Social History:   Social History     Tobacco Use    Smoking status: Never    Smokeless tobacco: Never   Substance Use Topics    Alcohol use: No        I have reviewed and updated the patient's past medical, surgical, family and social histories.    Allergies:   Review of  patient's allergies indicates:   Allergen Reactions    Erythromycin (bulk)      Other reaction(s): Eye irritation        Medications:   Current Outpatient Medications   Medication Sig Dispense Refill    coconut oil, bulk, Oil by Misc.(Non-Drug; Combo Route) route.      multivitamin (THERAGRAN) per tablet Take 1 tablet by mouth once daily.      vitamin D (VITAMIN D3) 1000 units Tab Take 1,000 Units by mouth once daily.      ABRAXANE 100 mg injection       dexAMETHasone (DECADRON) 4 mg/mL injection       diphenhydramine HCl (BENADRYL ORAL) Take 25 mg by mouth as needed (rash).      doxycycline (VIBRAMYCIN) 100 MG Cap Take 1 capsule (100 mg total) by mouth every 12 (twelve) hours. (Patient not taking: Reported on 12/27/2023) 14 capsule 0    GADAVIST 1 mmol/mL (604.72 mg/mL) Soln injection       gemcitabine (GEMZAR) 1 gram/26.3 mL (38 mg/mL) Soln injection       gemcitabine 200 mg/5.26 mL (38 mg/mL) Soln       heparin sodium,porcine (HEPARIN, PORCINE,) 1,000 unit/mL injection       heparin, porcine, PF, (HEPARIN FLUSH 100 UNITS/ML) 100 unit/mL Syrg       HYDROcodone-acetaminophen (NORCO) 5-325 mg per tablet Take 1 tablet by mouth every 12 (twelve) hours as needed for Pain. (Patient not taking: Reported on 12/6/2023) 10 tablet 0    hydrOXYzine HCL (ATARAX) 25 MG tablet Take 1 tablet (25 mg total) by mouth 3 (three) times daily as needed for Itching. (Patient not taking: Reported on 12/27/2023) 60 tablet 2    LIDOcaine-prilocaine (EMLA) cream Apply topically as needed (place to port site 45-60 minutes prior to chemotherapy). (Patient not taking: Reported on 2/19/2024) 30 g 3    ondansetron (ZOFRAN-ODT) 4 MG TbDL Take 1 tablet (4 mg total) by mouth every 8 (eight) hours as needed (Nausea). (Patient not taking: Reported on 12/27/2023) 10 tablet 0    ondansetron (ZOFRAN-ODT) 8 MG TbDL TAKE 1 TABLET (8 MG TOTAL) BY MOUTH EVERY 6 HOURS AS NEEDED FOR NAUSEA (Patient not taking: Reported on 12/27/2023) 30 tablet 5     "triamcinolone acetonide 0.1% (KENALOG) 0.1 % ointment Apply topically as needed (rash).       No current facility-administered medications for this visit.     Facility-Administered Medications Ordered in Other Visits   Medication Dose Route Frequency Provider Last Rate Last Admin    alteplase injection 2 mg  2 mg Intra-Catheter PRN Meredith Kowalski, CNS        diphenhydrAMINE injection 50 mg  50 mg Intravenous Once PRN Meredith Kowalski, CNS        EPINEPHrine (EPIPEN) 0.3 mg/0.3 mL pen injection 0.3 mg  0.3 mg Intramuscular Once PRN Meredith Kowalski, CNS        gemcitabine (GEMZAR) 1,600 mg in sodium chloride 0.9% SolP 270.1 mL chemo infusion  1,600 mg Intravenous 1 time in Clinic/[facility] Meredith Kowalski, CNS        heparin, porcine (PF) 100 unit/mL injection flush 500 Units  500 Units Intravenous PRN Meredith Kowalski, CNS        hydrocortisone sodium succinate injection 100 mg  100 mg Intravenous Once PRN Meredith Kowalski, CNS        ondansetron (ZOFRAN) 8 mg, dexAMETHasone (DECADRON) 12 mg in sodium chloride 0.9% 50 mL IVPB  8 mg Intravenous 1 time in Clinic/[facility] Meredith Kowalski, CNS        PACLitaxeL protein-bound (ABRAXANE) 200 mg in 47 mL infusion  200 mg Intravenous 1 time in [facility] Meredith Kowalski, CNS        prochlorperazine injection Soln 5 mg  5 mg Intravenous Once PRN Meredith Kowalski, CNS        sodium chloride 0.9% 250 mL flush bag   Intravenous 1 time in Clinic/HOD Meredith Kowalski, CNS        sodium chloride 0.9% flush 10 mL  10 mL Intravenous PRN Meredith Kowalski, CNS          Physical Exam:   BP (!) 117/59 (BP Location: Left arm, Patient Position: Sitting, BP Method: Medium (Automatic))   Pulse 75   Temp 98.3 °F (36.8 °C) (Oral)   Ht 5' 3" (1.6 m)   Wt 63.2 kg (139 lb 5.3 oz)   SpO2 96%   BMI 24.68 kg/m²        Physical Exam  Vitals reviewed.   Constitutional:       General: She is not in acute distress.     Appearance: Normal appearance. She is well-developed and normal weight. She is not " ill-appearing, toxic-appearing or diaphoretic.   HENT:      Head: Normocephalic and atraumatic.      Right Ear: External ear normal.      Left Ear: External ear normal.      Nose: Nose normal.      Mouth/Throat:      Mouth: Mucous membranes are moist.      Pharynx: Oropharynx is clear. No posterior oropharyngeal erythema.   Eyes:      General: No scleral icterus.     Extraocular Movements: Extraocular movements intact.      Conjunctiva/sclera: Conjunctivae normal.      Pupils: Pupils are equal, round, and reactive to light.   Neck:      Trachea: No tracheal deviation.   Cardiovascular:      Rate and Rhythm: Normal rate and regular rhythm.      Pulses: Normal pulses.      Heart sounds: Normal heart sounds.   Pulmonary:      Effort: Pulmonary effort is normal. No respiratory distress.      Breath sounds: Normal breath sounds. No wheezing or rales.   Chest:      Comments: RCW port  Abdominal:      General: Bowel sounds are normal. There is no distension.      Palpations: Abdomen is soft.      Tenderness: There is no abdominal tenderness.   Musculoskeletal:         General: No swelling or deformity. Normal range of motion.      Cervical back: Normal range of motion and neck supple.   Skin:     General: Skin is warm.      Coloration: Skin is not jaundiced.      Findings: Lesion (healing lesion to right shin, no signs of infection) present. No erythema or rash.   Neurological:      General: No focal deficit present.      Mental Status: She is alert and oriented to person, place, and time. Mental status is at baseline.      Motor: No weakness.      Gait: Gait normal.   Psychiatric:         Mood and Affect: Mood normal.         Behavior: Behavior normal.         Thought Content: Thought content normal.         Judgment: Judgment normal.         Labs:   No results found for this or any previous visit (from the past 48 hour(s)).    Imaging:   CT CAP 2/16/24:    Impression:     1. In this patient with reported history of  pancreatic cancer, there has been a generalized interval positive response to therapy including interval decrease in size to complete resolution of previously noted ground-glass and solid pulmonary nodules, innumerable hepatic parenchymal nodules/masses and pancreatic tail lesion.          Path:   Final Pathologic Diagnosis     Left hepatic lobe, biopsy:   Positive for malignancy, poorly differentiated adenocarcinoma (see comment)   Tumor necrosis present     Comment:  A review of the patient's imaging shows the presence of a 1.9 cm pancreatic tail mass and diffuse liver lesions.  These morphologic and immunophenotypic findings are supportive of a pancreatobiliary and/or upper gastrointestinal primary.  VC      Comment: Interp By Nataliia Peralta D.O., Signed on 10/05/2023 at 08:36   Supplemental Diagnosis     The purpose of this supplemental report is to report results of MMR panel in the tumor cells as follows:     Immunohistochemistry (IHC) Testing for Mismatch Repair (MMR) Proteins:      MLH1 - Intact nuclear expression    MSH2 - Intact nuclear expression    MSH6 - Intact nuclear expression    PMS2 - Intact nuclear expression          Somatic Genomic Results       Pancreatic mass       Tumor NGS Tissue  Resulted: 10/10/23 Status: Preliminary result       Detected - Pathogenic (4)      Gene Variant VAF   BRAF [id number] - c.1458_[id number] Inframe deletion - GOF 12.00 %   CDKN2A CDKN2A - Copy number loss --   CDKN2B CDKN2B - Copy number loss --   TP53 p.Y163C - c.488A>G Missense variant - LOF 30.50 %              Detected - Uncertain significance (1)      Gene Variant VAF   DIS3 p.[id number] - c.2012A>G Missense variant 23.10 %                   Assessment:       1. Pancreatic adenocarcinoma    2. 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 Metastasis to liver    4. Immunodeficiency due to chemotherapy    5. Immunodeficiency secondary to neoplasm    6. Dyslipidemia    7. Aortic atherosclerosis    8.  Constipation, unspecified constipation type    9. Pruritic rash    10. Age-related osteoporosis without current pathological fracture    11. Vaginal vault prolapse       Plan:        # Pancreatic cancer  We discussed with the patient and her family her biopsy-proven diagnosis of metastatic pancreatic adenocarcinoma with multifocal liver metastases.  Her tumor is pMMR and Tempus tissue showed a non V600E BRAF mutation, TP53 mutation and CDKN2A and CDKN2B loss.  QUINN.    We discussed her prognosis and potential treatment options.  Because of her age and discussion of tolerance concerns, I have recommended biweekly gemcitabine + nab-paclitaxel as palliative intent first-line therapy.  Dosing for gemcitabine + nab-paclitaxel will be 1000 mg/m2 and 125 mg/m2, respectively, and I will administer these on a biweekly basis.    Port was placed.  Consideration to BRAF non-V600E targeting trials in the future if available.    She began cycle 1 of biweekly gemcitabine + nab-paclitaxel on 11/1/23.  She experienced fevers/chills and rash. believe these are all chemotherapy related. Improved after the administration of Dex.     Will continue to administer dexamethasone 12 mg IV prior to infusion for rash prevention.  May need to increase to 20mg for future cycles. Will continue to monitor.    Also gave her Medrol Dose Nghia to use in case rash recurs despite premed dex.    - CT scan after cycle 4 displays overall improvement within the lung and liver, with decreases in the number and size of the lesions. Continue current regimen.   - CT scan after cycle 8 displays continued improvement in disease in liver and lung and pancreatic tail.  - Labs adequate for treatment. Proceed with cycle 10.    RTC in 2 weeks for next cycle with lab work and treatment.   Will repeat imaging studies after cycle 12    Following with palliative care.   Genetic testing done outside - saw Julito Sosa here. FANCC mut present.    PGX showed DPYD nml and UGT1A1  intermediate metabolism.    # HLD, aortic atherosclerosis, constipation, pruritus, fever  Not currently on therapy for HLD, atherosclerosis. Cholesterol stable from June 2023.   Constipation: stable.   Pruritus/fever: controlled with increased pre-meds and Benadryl. Continues to control fever alternating Motrin and Tylenol.     # Osteoporosis, vaginal prolapse  Monitor calcium levels on chemo.  Will continue to encourage maintaining activity.  Per PCP, due for repeat DXA scan. Encouraged to follow PCP recommendations for monitoring and treatment.   Will f/u with GYN.     Follow up: 2 weeks for cycle 11.    Patient is in agreement with the proposed treatment plan. All questions were answered to the patient's satisfaction. Pt knows to call clinic if anything is needed before the next clinic visit.    Patient discussed with collaborating physician, Dr. Briceño.    At least 40 minutes were spent today on this encounter including face to face time with the patient, data gathering/interpretation and documentation.       Meredith Kowalski, MSN, APRN, [facility]  Hematology and Medical Oncology  Clinical Nurse Specialist to Dr. Briceño, Dr. Hammer & Dr. Willis      Route Chart for Scheduling    Med Onc Chart Routing      Follow up with physician 4 weeks and 6 weeks. with labs and chemo in 4 weeks. with labs and scans done 1-2 days prior to visit and chemo in 6 weeks.   Follow up with DONTE    Infusion scheduling note    Injection scheduling note    Labs CBC, CMP and CA 19-9   Scheduling:  Preferred lab:  Lab interval: every 2 weeks     Imaging CT chest abdomen pelvis   1-2 days prior to visit in 6 weeks   Pharmacy appointment    Other referrals              Treatment Plan Information   OP PANC NAB-PACLITAXEL + GEMCITABINE Q4W   David Briceño MD   Upcoming Treatment Dates - OP PANC NAB-PACLITAXEL + GEMCITABINE Q4W    3/8/2024       Chemotherapy       PACLitaxeL protein-bound (ABRAXANE) in 42 mL infusion       gemcitabine  (GEMZAR) in sodium chloride 0.9% SolP 250 mL chemo infusion       Antiemetics       ondansetron (ZOFRAN) 8 mg, dexAMETHasone (DECADRON) 12 mg in sodium chloride 0.9% 50 mL IVPB  3/22/2024       Chemotherapy       PACLitaxeL protein-bound (ABRAXANE) in 42 mL infusion       gemcitabine (GEMZAR) in sodium chloride 0.9% SolP 250 mL chemo infusion       Antiemetics       ondansetron (ZOFRAN) 8 mg, dexAMETHasone (DECADRON) 12 mg in sodium chloride 0.9% 50 mL IVPB  4/5/2024       Chemotherapy       PACLitaxeL protein-bound (ABRAXANE) in 42 mL infusion       gemcitabine (GEMZAR) in sodium chloride 0.9% SolP 250 mL chemo infusion       Antiemetics       ondansetron (ZOFRAN) 8 mg, dexAMETHasone (DECADRON) 12 mg in sodium chloride 0.9% 50 mL IVPB  4/19/2024       Chemotherapy       PACLitaxeL protein-bound (ABRAXANE) in 42 mL infusion       gemcitabine (GEMZAR) in sodium chloride 0.9% SolP 250 mL chemo infusion       Antiemetics       ondansetron (ZOFRAN) 8 mg, dexAMETHasone (DECADRON) 12 mg in sodium chloride 0.9% 50 mL IVPB

## 2024-03-04 NOTE — PLAN OF CARE
1000   Patient seated in chair, VSS, assessment done.  Port accessed, flushed, blood return noted, started NS @ 25 cc/hr KVO while waiting for Abraxane, Gemzar from pharmacy.  [facility] for safety

## 2024-03-04 NOTE — PLAN OF CARE
1210  Patient completed Abraxane/Gemzar infusions, tolerated well.  Port de accessed, flushed, blood return noted, heparin locked.  Patient ambulated off floor accompanied by .

## 2024-03-05 ENCOUNTER — PATIENT MESSAGE (OUTPATIENT)
Dept: ADMINISTRATIVE | Facility: OTHER | Age: 79
End: 2024-03-05
Payer: MEDICARE

## 2024-03-06 ENCOUNTER — PATIENT MESSAGE (OUTPATIENT)
Dept: ADMINISTRATIVE | Facility: OTHER | Age: 79
End: 2024-03-06
Payer: MEDICARE

## 2024-03-07 ENCOUNTER — PATIENT MESSAGE (OUTPATIENT)
Dept: ADMINISTRATIVE | Facility: OTHER | Age: 79
End: 2024-03-07
Payer: MEDICARE

## 2024-03-08 ENCOUNTER — PATIENT MESSAGE (OUTPATIENT)
Dept: ADMINISTRATIVE | Facility: OTHER | Age: 79
End: 2024-03-08
Payer: MEDICARE

## 2024-03-09 ENCOUNTER — PATIENT MESSAGE (OUTPATIENT)
Dept: ADMINISTRATIVE | Facility: OTHER | Age: 79
End: 2024-03-09
Payer: MEDICARE

## 2024-03-10 ENCOUNTER — PATIENT MESSAGE (OUTPATIENT)
Dept: ADMINISTRATIVE | Facility: OTHER | Age: 79
End: 2024-03-10
Payer: MEDICARE

## 2024-03-11 ENCOUNTER — PATIENT MESSAGE (OUTPATIENT)
Dept: ADMINISTRATIVE | Facility: OTHER | Age: 79
End: 2024-03-11
Payer: MEDICARE

## 2024-03-12 ENCOUNTER — PATIENT MESSAGE (OUTPATIENT)
Dept: ADMINISTRATIVE | Facility: OTHER | Age: 79
End: 2024-03-12
Payer: MEDICARE

## 2024-03-13 ENCOUNTER — PATIENT MESSAGE (OUTPATIENT)
Dept: ADMINISTRATIVE | Facility: OTHER | Age: 79
End: 2024-03-13
Payer: MEDICARE

## 2024-03-14 ENCOUNTER — PATIENT MESSAGE (OUTPATIENT)
Dept: ADMINISTRATIVE | Facility: OTHER | Age: 79
End: 2024-03-14
Payer: MEDICARE

## 2024-03-15 ENCOUNTER — PATIENT MESSAGE (OUTPATIENT)
Dept: ADMINISTRATIVE | Facility: OTHER | Age: 79
End: 2024-03-15
Payer: MEDICARE

## 2024-03-15 ENCOUNTER — LAB VISIT (OUTPATIENT)
Dept: LAB | Facility: HOSPITAL | Age: 79
End: 2024-03-15
Attending: INTERNAL MEDICINE
Payer: MEDICARE

## 2024-03-15 ENCOUNTER — PATIENT MESSAGE (OUTPATIENT)
Dept: HEMATOLOGY/ONCOLOGY | Facility: CLINIC | Age: 79
End: 2024-03-15
Payer: MEDICARE

## 2024-03-15 DIAGNOSIS — C25.9 PANCREATIC ADENOCARCINOMA: ICD-10-CM

## 2024-03-15 LAB
ALBUMIN SERPL BCP-MCNC: 3.6 G/DL (ref 3.5–5.2)
ALP SERPL-CCNC: 104 U/L (ref 55–135)
ALT SERPL W/O P-5'-P-CCNC: 18 U/L (ref 10–44)
ANION GAP SERPL CALC-SCNC: 6 MMOL/L (ref 8–16)
AST SERPL-CCNC: 22 U/L (ref 10–40)
BILIRUB SERPL-MCNC: 0.2 MG/DL (ref 0.1–1)
BUN SERPL-MCNC: 23 MG/DL (ref 8–23)
CALCIUM SERPL-MCNC: 9 MG/DL (ref 8.7–10.5)
CANCER AG19-9 SERPL-ACNC: ABNORMAL U/ML (ref 0–40)
CHLORIDE SERPL-SCNC: 106 MMOL/L (ref 95–110)
CO2 SERPL-SCNC: 28 MMOL/L (ref 23–29)
CREAT SERPL-MCNC: 0.7 MG/DL (ref 0.5–1.4)
ERYTHROCYTE [DISTWIDTH] IN BLOOD BY AUTOMATED COUNT: 15.9 % (ref 11.5–14.5)
EST. GFR  (NO RACE VARIABLE): >60 ML/MIN/1.73 M^2
GLUCOSE SERPL-MCNC: 97 MG/DL (ref 70–110)
HCT VFR BLD AUTO: 33 % (ref 37–48.5)
HGB BLD-MCNC: 10.5 G/DL (ref 12–16)
IMM GRANULOCYTES # BLD AUTO: 0.04 K/UL (ref 0–0.04)
MCH RBC QN AUTO: 28.4 PG (ref 27–31)
MCHC RBC AUTO-ENTMCNC: 31.8 G/DL (ref 32–36)
MCV RBC AUTO: 89 FL (ref 82–98)
NEUTROPHILS # BLD AUTO: 4.2 K/UL (ref 1.8–7.7)
PLATELET # BLD AUTO: 180 K/UL (ref 150–450)
PMV BLD AUTO: 10.4 FL (ref 9.2–12.9)
POTASSIUM SERPL-SCNC: 5.1 MMOL/L (ref 3.5–5.1)
PROT SERPL-MCNC: 6.4 G/DL (ref 6–8.4)
RBC # BLD AUTO: 3.7 M/UL (ref 4–5.4)
SODIUM SERPL-SCNC: 140 MMOL/L (ref 136–145)
WBC # BLD AUTO: 6.05 K/UL (ref 3.9–12.7)

## 2024-03-15 PROCEDURE — 86301 IMMUNOASSAY TUMOR CA 19-9: CPT | Performed by: INTERNAL MEDICINE

## 2024-03-15 PROCEDURE — 36415 COLL VENOUS BLD VENIPUNCTURE: CPT | Mod: PO | Performed by: INTERNAL MEDICINE

## 2024-03-15 PROCEDURE — 85027 COMPLETE CBC AUTOMATED: CPT | Performed by: INTERNAL MEDICINE

## 2024-03-15 PROCEDURE — 80053 COMPREHEN METABOLIC PANEL: CPT | Performed by: INTERNAL MEDICINE

## 2024-03-16 ENCOUNTER — PATIENT MESSAGE (OUTPATIENT)
Dept: ADMINISTRATIVE | Facility: OTHER | Age: 79
End: 2024-03-16
Payer: MEDICARE

## 2024-03-17 ENCOUNTER — PATIENT MESSAGE (OUTPATIENT)
Dept: ADMINISTRATIVE | Facility: OTHER | Age: 79
End: 2024-03-17
Payer: MEDICARE

## 2024-03-18 ENCOUNTER — OFFICE VISIT (OUTPATIENT)
Dept: HEMATOLOGY/ONCOLOGY | Facility: CLINIC | Age: 79
End: 2024-03-18
Payer: MEDICARE

## 2024-03-18 ENCOUNTER — INFUSION (OUTPATIENT)
Dept: INFUSION THERAPY | Facility: HOSPITAL | Age: 79
End: 2024-03-18
Payer: MEDICARE

## 2024-03-18 VITALS
RESPIRATION RATE: 18 BRPM | RESPIRATION RATE: 18 BRPM | OXYGEN SATURATION: 98 % | BODY MASS INDEX: 24.83 KG/M2 | DIASTOLIC BLOOD PRESSURE: 60 MMHG | WEIGHT: 140.19 LBS | HEART RATE: 81 BPM | HEIGHT: 63 IN | TEMPERATURE: 98 F | HEART RATE: 73 BPM | SYSTOLIC BLOOD PRESSURE: 123 MMHG | SYSTOLIC BLOOD PRESSURE: 131 MMHG | DIASTOLIC BLOOD PRESSURE: 60 MMHG | WEIGHT: 140.13 LBS | HEIGHT: 63 IN | BODY MASS INDEX: 24.84 KG/M2

## 2024-03-18 DIAGNOSIS — Z79.899 IMMUNODEFICIENCY DUE TO CHEMOTHERAPY: ICD-10-CM

## 2024-03-18 DIAGNOSIS — I70.0 AORTIC ATHEROSCLEROSIS: ICD-10-CM

## 2024-03-18 DIAGNOSIS — C78.01 MALIGNANT NEOPLASM METASTATIC TO BOTH LUNGS: ICD-10-CM

## 2024-03-18 DIAGNOSIS — D49.9 IMMUNODEFICIENCY SECONDARY TO NEOPLASM: ICD-10-CM

## 2024-03-18 DIAGNOSIS — C25.9 PANCREATIC ADENOCARCINOMA: Primary | ICD-10-CM

## 2024-03-18 DIAGNOSIS — M81.0 AGE-RELATED OSTEOPOROSIS WITHOUT CURRENT PATHOLOGICAL FRACTURE: ICD-10-CM

## 2024-03-18 DIAGNOSIS — K59.00 CONSTIPATION, UNSPECIFIED CONSTIPATION TYPE: ICD-10-CM

## 2024-03-18 DIAGNOSIS — L28.2 PRURITIC RASH: ICD-10-CM

## 2024-03-18 DIAGNOSIS — H00.012 HORDEOLUM EXTERNUM OF RIGHT LOWER EYELID: ICD-10-CM

## 2024-03-18 DIAGNOSIS — D84.821 IMMUNODEFICIENCY DUE TO CHEMOTHERAPY: ICD-10-CM

## 2024-03-18 DIAGNOSIS — N81.9 VAGINAL VAULT PROLAPSE: ICD-10-CM

## 2024-03-18 DIAGNOSIS — E78.5 DYSLIPIDEMIA: ICD-10-CM

## 2024-03-18 DIAGNOSIS — C78.7 METASTASIS TO LIVER: ICD-10-CM

## 2024-03-18 DIAGNOSIS — B30.9 ACUTE VIRAL CONJUNCTIVITIS OF BOTH EYES: ICD-10-CM

## 2024-03-18 DIAGNOSIS — C78.02 MALIGNANT NEOPLASM METASTATIC TO BOTH LUNGS: ICD-10-CM

## 2024-03-18 DIAGNOSIS — T45.1X5A IMMUNODEFICIENCY DUE TO CHEMOTHERAPY: ICD-10-CM

## 2024-03-18 DIAGNOSIS — D84.81 IMMUNODEFICIENCY SECONDARY TO NEOPLASM: ICD-10-CM

## 2024-03-18 PROCEDURE — A4216 STERILE WATER/SALINE, 10 ML: HCPCS | Performed by: PHYSICIAN ASSISTANT

## 2024-03-18 PROCEDURE — 63600175 PHARM REV CODE 636 W HCPCS: Mod: JZ,JG | Performed by: PHYSICIAN ASSISTANT

## 2024-03-18 PROCEDURE — 99999 PR PBB SHADOW E&M-EST. PATIENT-LVL IV: CPT | Mod: PBBFAC,,, | Performed by: PHYSICIAN ASSISTANT

## 2024-03-18 PROCEDURE — 99215 OFFICE O/P EST HI 40 MIN: CPT | Mod: S$GLB,,, | Performed by: PHYSICIAN ASSISTANT

## 2024-03-18 PROCEDURE — 96367 TX/PROPH/DG ADDL SEQ IV INF: CPT

## 2024-03-18 PROCEDURE — 25000003 PHARM REV CODE 250: Performed by: PHYSICIAN ASSISTANT

## 2024-03-18 PROCEDURE — 96413 CHEMO IV INFUSION 1 HR: CPT

## 2024-03-18 PROCEDURE — 96417 CHEMO IV INFUS EACH ADDL SEQ: CPT

## 2024-03-18 RX ORDER — SODIUM CHLORIDE 0.9 % (FLUSH) 0.9 %
10 SYRINGE (ML) INJECTION
Status: CANCELLED | OUTPATIENT
Start: 2024-03-18

## 2024-03-18 RX ORDER — PROCHLORPERAZINE EDISYLATE 5 MG/ML
5 INJECTION INTRAMUSCULAR; INTRAVENOUS ONCE AS NEEDED
Status: DISCONTINUED | OUTPATIENT
Start: 2024-03-18 | End: 2024-03-18 | Stop reason: HOSPADM

## 2024-03-18 RX ORDER — HEPARIN 100 UNIT/ML
500 SYRINGE INTRAVENOUS
Status: CANCELLED | OUTPATIENT
Start: 2024-03-18

## 2024-03-18 RX ORDER — HEPARIN 100 UNIT/ML
500 SYRINGE INTRAVENOUS
Status: DISCONTINUED | OUTPATIENT
Start: 2024-03-18 | End: 2024-03-18 | Stop reason: HOSPADM

## 2024-03-18 RX ORDER — SODIUM CHLORIDE 0.9 % (FLUSH) 0.9 %
10 SYRINGE (ML) INJECTION
Status: DISCONTINUED | OUTPATIENT
Start: 2024-03-18 | End: 2024-03-18 | Stop reason: HOSPADM

## 2024-03-18 RX ORDER — PROCHLORPERAZINE EDISYLATE 5 MG/ML
5 INJECTION INTRAMUSCULAR; INTRAVENOUS ONCE AS NEEDED
Status: CANCELLED
Start: 2024-03-18

## 2024-03-18 RX ORDER — DIPHENHYDRAMINE HYDROCHLORIDE 50 MG/ML
50 INJECTION INTRAMUSCULAR; INTRAVENOUS ONCE AS NEEDED
Status: DISCONTINUED | OUTPATIENT
Start: 2024-03-18 | End: 2024-03-18 | Stop reason: HOSPADM

## 2024-03-18 RX ORDER — EPINEPHRINE 0.3 MG/.3ML
0.3 INJECTION SUBCUTANEOUS ONCE AS NEEDED
Status: CANCELLED | OUTPATIENT
Start: 2024-03-18

## 2024-03-18 RX ORDER — EPINEPHRINE 0.3 MG/.3ML
0.3 INJECTION SUBCUTANEOUS ONCE AS NEEDED
Status: DISCONTINUED | OUTPATIENT
Start: 2024-03-18 | End: 2024-03-18 | Stop reason: HOSPADM

## 2024-03-18 RX ORDER — DIPHENHYDRAMINE HYDROCHLORIDE 50 MG/ML
50 INJECTION INTRAMUSCULAR; INTRAVENOUS ONCE AS NEEDED
Status: CANCELLED | OUTPATIENT
Start: 2024-03-18

## 2024-03-18 RX ADMIN — Medication 10 ML: at 12:03

## 2024-03-18 RX ADMIN — HEPARIN 500 UNITS: 100 SYRINGE at 12:03

## 2024-03-18 RX ADMIN — GEMCITABINE HYDROCHLORIDE 1600 MG: 1 INJECTION, SOLUTION INTRAVENOUS at 11:03

## 2024-03-18 RX ADMIN — SODIUM CHLORIDE: 0.9 INJECTION, SOLUTION INTRAVENOUS at 09:03

## 2024-03-18 RX ADMIN — ONDANSETRON HYDROCHLORIDE 8 MG: 2 INJECTION INTRAMUSCULAR; INTRAVENOUS at 10:03

## 2024-03-18 RX ADMIN — PACLITAXEL 200 MG: 100 INJECTION, POWDER, LYOPHILIZED, FOR SUSPENSION INTRAVENOUS at 11:03

## 2024-03-18 NOTE — PROGRESS NOTES
MEDICAL ONCOLOGY - ESTABLISHED PATIENT VISIT    Reason for visit: Pancreatic adenocarcinoma    Best Contact Phone Number(s): [phone] (home)      Cancer/Stage/TNM:    Cancer Staging   Pancreatic adenocarcinoma  Staging form: Exocrine Pancreas, AJCC 8th Edition  - Clinical stage from 9/21/2023: Stage IV (cT1, cNX, pM1) - Signed by David Briceño MD on 10/11/2023       Oncology History   Pancreatic adenocarcinoma   9/5/2023 Initial Diagnosis    Pancreatic adenocarcinoma     9/21/2023 Cancer Staged    Staging form: Exocrine Pancreas, AJCC 8th Edition  - Clinical stage from 9/21/2023: Stage IV (cT1, cNX, pM1)     10/2/2023 Tumor Conference     [facility] UGI MULTIDISCIPLINARY TUMOR BOARD  PATIENT REVIEW FORM   ____________________________________________________________    CLINIC #: [id number]  DATE: 10/2/2023    DIAGNOSIS: pancreas CA    PRESENTER: Angela    PATIENT SUMMARY:   This 79 y/o female presented in August with abd pain, decreased appetite with weight loss. Reviewed CT and MRI imaging ~ 2 cm mass in tail of pancreas with diffuse metastatic liver disease, largest liver lesion in left lobe measuring 4.5 cm. IR performed liver bx and this pathology was reviewed - poorly differentiated adenocarcinoma, favoring upper GI origin.     BOARD RECOMMENDATIONS:   Stage IV pancreas CA with liver mets  Add MMR and TEMPUS  Proceed with palliative systemic chemotherapy    CONSULT NEEDED:     [] Surgery    [] Hem/Onc    [] Rad/Onc    [] Dietary     [] Social Service    [] Psychology       [] AES  [] Radiology     Clinical Stage: Tumor   Node(s)   Metastasis        GROUP STAGE:  [] O    [] 1A    [] IB    [] IIA    [] IIB     [] IIIA     [] IIIB     [] IIIC    [x]IV  [] Local recurrence     [] Regional recurrence     [] Distant recurrence   Metastatic site(s): liver         [x] Elidia'l Treatment Guidelines reviewed and care planned is consistent with guidelines.         (i.e., NCCN, NCI, PD, ACO, AUA,  etc.)    PRESENTATION AT CANCER CONFERENCE:         [x] Prospective    [] Retrospective     [] Follow-Up    Eligible for clinical trial : yes       11/1/2023 -  Chemotherapy    Treatment Summary   Plan Name: OP PANC NAB-PACLITAXEL + GEMCITABINE Q4W  Treatment Goal: Palliative  Status: Active  Start Date: 11/1/2023  End Date: 9/6/2024 (Planned)  Provider: David Briceño MD  Chemotherapy: gemcitabine (GEMZAR) 1,600 mg in sodium chloride 0.9% SolP 327.08 mL chemo infusion, 1,680 mg, Intravenous, Clinic/HOD 1 time, 5 of 12 cycles  Administration: 1,600 mg (11/1/2023), 1,600 mg (11/15/2023), 1,600 mg (11/29/2023), 1,600 mg (12/14/2023), 1,600 mg (12/27/2023), 1,600 mg (1/9/2024), 1,600 mg (1/22/2024), 1,600 mg (2/5/2024), 1,600 mg (2/19/2024), 1,600 mg (3/4/2024)          Interim History:   79 y.o. female with metastatic pancreatic cancer who presents for follow-up prior to cycle 11 of biweekly gemcitabine + nab-paclitaxel. She is doing fairly. She presents with bilateral redness to the eyes, and a single nodule to the eyelid. She has been applying warm compresses to the area with some relief. She has had this occur before, and it resolved. Her vision is stable. Otherwise, she is doing pretty well. She continues to feel well overall.  Still has the occasional epigastric discomfort under her breasts.  No rash as long as she receives the dexamethasone on day 1.  Denies nausea or vomiting.    ECOG PS is 0. Presents with her  today.     ROS:   Review of Systems   Constitutional:  Negative for chills, fever, malaise/fatigue and weight loss.   HENT:  Negative for sore throat.    Eyes:  Positive for pain and redness. Negative for blurred vision.   Respiratory:  Negative for cough and shortness of breath.    Cardiovascular:  Positive for leg swelling. Negative for chest pain.   Gastrointestinal:  Positive for abdominal pain. Negative for constipation, diarrhea, nausea and vomiting.   Genitourinary:  Negative for  dysuria and frequency.   Musculoskeletal:  Negative for back pain, falls and myalgias.   Skin:  Negative for itching and rash.   Neurological:  Negative for dizziness, weakness and headaches.   Endo/Heme/Allergies:  Does not bruise/bleed easily.   Psychiatric/Behavioral:  Negative for depression. The patient is not nervous/anxious.        Past Medical History:   Past Medical History:   Diagnosis Date    Age-related osteoporosis without current pathological fracture     Atherosclerosis of aortic arch     - noted on CXR 8/2017    Back pain     Diverticulosis of sigmoid colon 10/22/2019    Ectopic pregnancy     Fibrocystic breast     Hyperlipidemia     Inguinal hernia     Osteopenia     Polyp of ascending colon 10/22/2019    Urinary incontinence, mixed 05/24/2022        Past Surgical History:   Past Surgical History:   Procedure Laterality Date    ECTOPIC PREGNANCY SURGERY      HERNIA REPAIR      left inguinal    HYSTERECTOMY  1980    without BSO        Family History:   Family History   Problem Relation Age of Onset    Breast cancer Mother 69    Seizures Mother     Heart disease Father     Heart attack Father     Depression Father     Breast cancer Sister 73    Breast cancer Maternal Aunt 73    Prostate cancer Brother 81        no mets    Obesity Brother     Heart failure Maternal Grandmother     Heart attack Maternal Grandfather     Suicide Attempts Paternal Grandmother     Depression Paternal Grandmother     Mental illness Paternal Grandmother     Depression Paternal Aunt     Heart attack Paternal Uncle     Depression Paternal Uncle     Cancer Maternal Aunt 83        blood cancer (leukemia?)    Cancer Maternal Uncle         unk type; was COD    Suicide Paternal Cousin     Tongue cancer Paternal Cousin         smoking hx unk    Ovarian cancer Neg Hx         Social History:   Social History     Tobacco Use    Smoking status: Never    Smokeless tobacco: Never   Substance Use Topics    Alcohol use: No        I have  reviewed and updated the patient's past medical, surgical, family and social histories.    Allergies:   Review of patient's allergies indicates:   Allergen Reactions    Erythromycin (bulk)      Other reaction(s): Eye irritation        Medications:   Current Outpatient Medications   Medication Sig Dispense Refill    ABRAXANE 100 mg injection       coconut oil, bulk, Oil by Misc.(Non-Drug; Combo Route) route.      dexAMETHasone (DECADRON) 4 mg/mL injection       diphenhydramine HCl (BENADRYL ORAL) Take 25 mg by mouth as needed (rash).      doxycycline (VIBRAMYCIN) 100 MG Cap Take 1 capsule (100 mg total) by mouth every 12 (twelve) hours. (Patient not taking: Reported on 12/27/2023) 14 capsule 0    GADAVIST 1 mmol/mL (604.72 mg/mL) Soln injection       gemcitabine (GEMZAR) 1 gram/26.3 mL (38 mg/mL) Soln injection       gemcitabine 200 mg/5.26 mL (38 mg/mL) Soln       heparin sodium,porcine (HEPARIN, PORCINE,) 1,000 unit/mL injection       heparin, porcine, PF, (HEPARIN FLUSH 100 UNITS/ML) 100 unit/mL Syrg       HYDROcodone-acetaminophen (NORCO) 5-325 mg per tablet Take 1 tablet by mouth every 12 (twelve) hours as needed for Pain. (Patient not taking: Reported on 12/6/2023) 10 tablet 0    hydrOXYzine HCL (ATARAX) 25 MG tablet Take 1 tablet (25 mg total) by mouth 3 (three) times daily as needed for Itching. (Patient not taking: Reported on 12/27/2023) 60 tablet 2    LIDOcaine-prilocaine (EMLA) cream Apply topically as needed (place to port site 45-60 minutes prior to chemotherapy). (Patient not taking: Reported on 2/19/2024) 30 g 3    multivitamin (THERAGRAN) per tablet Take 1 tablet by mouth once daily.      ondansetron (ZOFRAN-ODT) 4 MG TbDL Take 1 tablet (4 mg total) by mouth every 8 (eight) hours as needed (Nausea). (Patient not taking: Reported on 12/27/2023) 10 tablet 0    ondansetron (ZOFRAN-ODT) 8 MG TbDL TAKE 1 TABLET (8 MG TOTAL) BY MOUTH EVERY 6 HOURS AS NEEDED FOR NAUSEA (Patient not taking: Reported on  "12/27/2023) 30 tablet 5    triamcinolone acetonide 0.1% (KENALOG) 0.1 % ointment Apply topically as needed (rash).      vitamin D (VITAMIN D3) 1000 units Tab Take 1,000 Units by mouth once daily.       No current facility-administered medications for this visit.      Physical Exam:   /60 (BP Location: Left arm, Patient Position: Sitting, BP Method: Large (Automatic))   Pulse 81   Resp 18   Ht 5' 3" (1.6 m)   Wt 63.6 kg (140 lb 1.6 oz)   SpO2 98%   BMI 24.82 kg/m²        Physical Exam  Vitals reviewed.   Constitutional:       General: She is in acute distress (Mild discomfort to the R eye).      Appearance: Normal appearance. She is well-developed and normal weight. She is not ill-appearing, toxic-appearing or diaphoretic.   HENT:      Head: Normocephalic and atraumatic.      Right Ear: External ear normal.      Left Ear: External ear normal.      Nose: Nose normal.      Mouth/Throat:      Mouth: Mucous membranes are moist.      Pharynx: Oropharynx is clear. No posterior oropharyngeal erythema.   Eyes:      General: No scleral icterus.     Extraocular Movements: Extraocular movements intact.      Pupils: Pupils are equal, round, and reactive to light.      Comments: Erythema to the conjunctivitis, bilateral. Small superficial nodule to the R eye, of the lower eyelid. Mild soreness.     No significant discharge is noted of the eye   Neck:      Trachea: No tracheal deviation.   Cardiovascular:      Rate and Rhythm: Normal rate and regular rhythm.      Pulses: Normal pulses.      Heart sounds: Normal heart sounds.   Pulmonary:      Effort: Pulmonary effort is normal. No respiratory distress.      Breath sounds: Normal breath sounds. No wheezing or rales.   Chest:      Comments: RCW port  Abdominal:      General: Bowel sounds are normal. There is no distension.      Palpations: Abdomen is soft.      Tenderness: There is no abdominal tenderness.   Musculoskeletal:         General: No swelling or deformity. " Normal range of motion.      Cervical back: Normal range of motion and neck supple.   Skin:     General: Skin is warm.      Coloration: Skin is not jaundiced.      Findings: No erythema or rash.   Neurological:      General: No focal deficit present.      Mental Status: She is alert and oriented to person, place, and time. Mental status is at baseline.      Motor: No weakness.      Gait: Gait normal.   Psychiatric:         Mood and Affect: Mood normal.         Behavior: Behavior normal.         Thought Content: Thought content normal.         Judgment: Judgment normal.         Labs:   Lab work reviewed. CA 19-9 increased to 41,427 from 67139    Imaging:   CT CAP 2/16/24:    Impression:     1. In this patient with reported history of pancreatic cancer, there has been a generalized interval positive response to therapy including interval decrease in size to complete resolution of previously noted ground-glass and solid pulmonary nodules, innumerable hepatic parenchymal nodules/masses and pancreatic tail lesion.          Path:   Final Pathologic Diagnosis     Left hepatic lobe, biopsy:   Positive for malignancy, poorly differentiated adenocarcinoma (see comment)   Tumor necrosis present     Comment:  A review of the patient's imaging shows the presence of a 1.9 cm pancreatic tail mass and diffuse liver lesions.  These morphologic and immunophenotypic findings are supportive of a pancreatobiliary and/or upper gastrointestinal primary.  VC      Comment: Interp By Nataliia Peralta D.O., Signed on 10/05/2023 at 08:36   Supplemental Diagnosis     The purpose of this supplemental report is to report results of MMR panel in the tumor cells as follows:     Immunohistochemistry (IHC) Testing for Mismatch Repair (MMR) Proteins:      MLH1 - Intact nuclear expression    MSH2 - Intact nuclear expression    MSH6 - Intact nuclear expression    PMS2 - Intact nuclear expression            Somatic Genomic Results       Pancreatic  mass       Tumor NGS Tissue  Resulted: 10/10/23 Status: Preliminary result       Detected - Pathogenic (4)      Gene Variant VAF   BRAF p.[id number] - c.1458_[id number] Inframe deletion - GOF 12.00 %   CDKN2A CDKN2A - Copy number loss --   CDKN2B CDKN2B - Copy number loss --   TP53 p.Y163C - c.488A>G Missense variant - LOF 30.50 %              Detected - Uncertain significance (1)      Gene Variant VAF   DIS3 p.N671S - c.2012A>G Missense variant 23.10 %                   Assessment:       1. Pancreatic adenocarcinoma    2. 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 Metastasis to liver    4. Immunodeficiency due to chemotherapy    5. Immunodeficiency secondary to neoplasm    6. Dyslipidemia    7. Aortic atherosclerosis    8. Constipation, unspecified constipation type    9. Pruritic rash    10. Age-related osteoporosis without current pathological fracture    11. Vaginal vault prolapse    12. Acute viral conjunctivitis of both eyes    13. Hordeolum externum of right lower eyelid           Plan:        # Pancreatic cancer  We discussed with the patient and her family her biopsy-proven diagnosis of metastatic pancreatic adenocarcinoma with multifocal liver metastases.  Her tumor is pMMR and Tempus tissue showed a non V600E BRAF mutation, TP53 mutation and CDKN2A and CDKN2B loss.  QUINN.    We discussed her prognosis and potential treatment options.  Because of her age and discussion of tolerance concerns, I have recommended biweekly gemcitabine + nab-paclitaxel as palliative intent first-line therapy.  Dosing for gemcitabine + nab-paclitaxel will be 1000 mg/m2 and 125 mg/m2, respectively, and I will administer these on a biweekly basis.    Port was placed.  Consideration to BRAF non-V600E targeting trials in the future if available.    She began cycle 1 of biweekly gemcitabine + nab-paclitaxel on 11/1/23.  She experienced fevers/chills and rash. believe these are all chemotherapy related. Improved after the  administration of Dex.     Will continue to administer dexamethasone 12 mg IV prior to infusion for rash prevention.  May need to increase to 20mg for future cycles. Will continue to monitor.    Also gave her Medrol Dose Nghia to use in case rash recurs despite premed dex.    - CT scan after cycle 4 displays overall improvement within the lung and liver, with decreases in the number and size of the lesions. Continue current regimen.   - CT scan after cycle 8 displays continued improvement in disease in liver and lung and pancreatic tail.    - Doing pretty well. Has bilateral conjunctivitis that appears to be viral. In addition has a single stye to the R eye, applying warm compresses. Vision is stable.   - Eating well. No nausea or vomiting.   - Lab work stable. Unfortunately, CA 19-9 has risen for this visit. However, given that she had a very good radiological response to treatment with her last scan, we are going to just monitor for now and continue to trend. Will have repeat CT scan in 4 weeks.   - proceed with cycle 11.    RTC in 2 weeks for next cycle with lab work and treatment.   Will repeat imaging studies after cycle 12    Following with palliative care.   Genetic testing done outside - saw Julito Soas here. FANCC mut present.    PGX showed DPYD nml and UGT1A1 intermediate metabolism.    # HLD, aortic atherosclerosis, constipation, pruritus, fever  Not currently on therapy for HLD, atherosclerosis. Cholesterol stable from June 2023.   Constipation: stable.   Pruritus/fever: controlled with increased pre-meds and Benadryl. Continues to control fever alternating Motrin and Tylenol.     # Osteoporosis, vaginal prolapse, bilateral conjunctivitis, R eye lower lid stye  Monitor calcium levels on chemo.  Will continue to encourage maintaining activity.  Will f/u with GYN.     Advised patient to continue with warm compresses to the eye and use normal saline drops.   If her symptoms do not improve within the week,  advised her to see the Ophthalmologist     Follow up: 2 weeks for cycle 12.    Patient is in agreement with the proposed treatment plan. All questions were answered to the patient's satisfaction. Pt knows to call clinic if anything is needed before the next clinic visit.      ESTER Rowan, PA-C  Physician Assistant Certified  Dept of Hematology/Oncology  PAColumbaC to Dr. Willis, Dr. Briceño and Dr. Noyola     Route Chart for Scheduling    Med Onc Chart Routing      Follow up with physician 4 weeks and 6 weeks. See Dr Briceño as scheduled in 4 weeks for lab work, CT scan and treatment discussion. See Dr. Briceño in 6 weeks with lab work and chemotherapy   Follow up with DONTE 2 weeks. See DONTE as schedueld in 2 weeks for cycle 12   Infusion scheduling note    Injection scheduling note    Labs CBC, CMP and CA 19-9   Scheduling:  Preferred lab:  Lab interval: every 2 weeks  Please schedule lab work at the [place] location   Imaging CT chest abdomen pelvis   Scheduled in 2 weeks. Thank you   Pharmacy appointment    Other referrals              Treatment Plan Information   OP PANC NAB-PACLITAXEL + GEMCITABINE Q4W   David Briceño MD   Upcoming Treatment Dates - OP PANC NAB-PACLITAXEL + GEMCITABINE Q4W    3/8/2024       Chemotherapy       PACLitaxeL protein-bound (ABRAXANE) in 42 mL infusion       gemcitabine (GEMZAR) in sodium chloride 0.9% SolP 250 mL chemo infusion       Antiemetics       ondansetron (ZOFRAN) 8 mg, dexAMETHasone (DECADRON) 12 mg in sodium chloride 0.9% 50 mL IVPB  3/22/2024       Chemotherapy       PACLitaxeL protein-bound (ABRAXANE) in 42 mL infusion       gemcitabine (GEMZAR) in sodium chloride 0.9% SolP 250 mL chemo infusion       Antiemetics       ondansetron (ZOFRAN) 8 mg, dexAMETHasone (DECADRON) 12 mg in sodium chloride 0.9% 50 mL IVPB  4/5/2024       Chemotherapy       PACLitaxeL protein-bound (ABRAXANE) in 42 mL infusion       gemcitabine (GEMZAR) in sodium chloride 0.9% SolP 250 mL chemo  infusion       Antiemetics       ondansetron (ZOFRAN) 8 mg, dexAMETHasone (DECADRON) 12 mg in sodium chloride 0.9% 50 mL IVPB  4/19/2024       Chemotherapy       PACLitaxeL protein-bound (ABRAXANE) in 42 mL infusion       gemcitabine (GEMZAR) in sodium chloride 0.9% SolP 250 mL chemo infusion       Antiemetics       ondansetron (ZOFRAN) 8 mg, dexAMETHasone (DECADRON) 12 mg in sodium chloride 0.9% 50 mL IVPB

## 2024-03-19 ENCOUNTER — PATIENT MESSAGE (OUTPATIENT)
Dept: ADMINISTRATIVE | Facility: OTHER | Age: 79
End: 2024-03-19
Payer: MEDICARE

## 2024-03-20 ENCOUNTER — PATIENT MESSAGE (OUTPATIENT)
Dept: ADMINISTRATIVE | Facility: OTHER | Age: 79
End: 2024-03-20
Payer: MEDICARE

## 2024-03-21 ENCOUNTER — PATIENT MESSAGE (OUTPATIENT)
Dept: ADMINISTRATIVE | Facility: OTHER | Age: 79
End: 2024-03-21
Payer: MEDICARE

## 2024-03-22 ENCOUNTER — PATIENT MESSAGE (OUTPATIENT)
Dept: ADMINISTRATIVE | Facility: OTHER | Age: 79
End: 2024-03-22
Payer: MEDICARE

## 2024-03-23 ENCOUNTER — PATIENT MESSAGE (OUTPATIENT)
Dept: ADMINISTRATIVE | Facility: OTHER | Age: 79
End: 2024-03-23
Payer: MEDICARE

## 2024-03-24 ENCOUNTER — PATIENT MESSAGE (OUTPATIENT)
Dept: ADMINISTRATIVE | Facility: OTHER | Age: 79
End: 2024-03-24
Payer: MEDICARE

## 2024-03-25 ENCOUNTER — PATIENT MESSAGE (OUTPATIENT)
Dept: ADMINISTRATIVE | Facility: OTHER | Age: 79
End: 2024-03-25
Payer: MEDICARE

## 2024-03-26 ENCOUNTER — PATIENT MESSAGE (OUTPATIENT)
Dept: ADMINISTRATIVE | Facility: OTHER | Age: 79
End: 2024-03-26
Payer: MEDICARE

## 2024-03-27 ENCOUNTER — PATIENT MESSAGE (OUTPATIENT)
Dept: ADMINISTRATIVE | Facility: OTHER | Age: 79
End: 2024-03-27
Payer: MEDICARE

## 2024-03-28 ENCOUNTER — LAB VISIT (OUTPATIENT)
Dept: LAB | Facility: HOSPITAL | Age: 79
End: 2024-03-28
Attending: INTERNAL MEDICINE
Payer: MEDICARE

## 2024-03-28 ENCOUNTER — PATIENT MESSAGE (OUTPATIENT)
Dept: ADMINISTRATIVE | Facility: OTHER | Age: 79
End: 2024-03-28
Payer: MEDICARE

## 2024-03-28 DIAGNOSIS — C25.9 PANCREATIC ADENOCARCINOMA: ICD-10-CM

## 2024-03-28 LAB
ALBUMIN SERPL BCP-MCNC: 3.4 G/DL (ref 3.5–5.2)
ALP SERPL-CCNC: 105 U/L (ref 55–135)
ALT SERPL W/O P-5'-P-CCNC: 18 U/L (ref 10–44)
ANION GAP SERPL CALC-SCNC: 8 MMOL/L (ref 8–16)
AST SERPL-CCNC: 24 U/L (ref 10–40)
BASOPHILS # BLD AUTO: 0.04 K/UL (ref 0–0.2)
BASOPHILS NFR BLD: 0.6 % (ref 0–1.9)
BILIRUB SERPL-MCNC: 0.3 MG/DL (ref 0.1–1)
BUN SERPL-MCNC: 19 MG/DL (ref 8–23)
CALCIUM SERPL-MCNC: 9.2 MG/DL (ref 8.7–10.5)
CHLORIDE SERPL-SCNC: 106 MMOL/L (ref 95–110)
CO2 SERPL-SCNC: 25 MMOL/L (ref 23–29)
CREAT SERPL-MCNC: 0.7 MG/DL (ref 0.5–1.4)
DIFFERENTIAL METHOD BLD: ABNORMAL
EOSINOPHIL # BLD AUTO: 0.1 K/UL (ref 0–0.5)
EOSINOPHIL NFR BLD: 1.6 % (ref 0–8)
ERYTHROCYTE [DISTWIDTH] IN BLOOD BY AUTOMATED COUNT: 16.5 % (ref 11.5–14.5)
EST. GFR  (NO RACE VARIABLE): >60 ML/MIN/1.73 M^2
GLUCOSE SERPL-MCNC: 76 MG/DL (ref 70–110)
HCT VFR BLD AUTO: 34.2 % (ref 37–48.5)
HGB BLD-MCNC: 10.7 G/DL (ref 12–16)
IMM GRANULOCYTES # BLD AUTO: 0.03 K/UL (ref 0–0.04)
IMM GRANULOCYTES NFR BLD AUTO: 0.5 % (ref 0–0.5)
LYMPHOCYTES # BLD AUTO: 0.9 K/UL (ref 1–4.8)
LYMPHOCYTES NFR BLD: 15 % (ref 18–48)
MCH RBC QN AUTO: 28.8 PG (ref 27–31)
MCHC RBC AUTO-ENTMCNC: 31.3 G/DL (ref 32–36)
MCV RBC AUTO: 92 FL (ref 82–98)
MONOCYTES # BLD AUTO: 0.7 K/UL (ref 0.3–1)
MONOCYTES NFR BLD: 10.8 % (ref 4–15)
NEUTROPHILS # BLD AUTO: 4.5 K/UL (ref 1.8–7.7)
NEUTROPHILS NFR BLD: 71.5 % (ref 38–73)
NRBC BLD-RTO: 0 /100 WBC
PLATELET # BLD AUTO: 195 K/UL (ref 150–450)
PMV BLD AUTO: 11.3 FL (ref 9.2–12.9)
POTASSIUM SERPL-SCNC: 4.4 MMOL/L (ref 3.5–5.1)
PROT SERPL-MCNC: 6.5 G/DL (ref 6–8.4)
RBC # BLD AUTO: 3.72 M/UL (ref 4–5.4)
SODIUM SERPL-SCNC: 139 MMOL/L (ref 136–145)
WBC # BLD AUTO: 6.28 K/UL (ref 3.9–12.7)

## 2024-03-28 PROCEDURE — 86301 IMMUNOASSAY TUMOR CA 19-9: CPT | Performed by: PHYSICIAN ASSISTANT

## 2024-03-28 PROCEDURE — 36415 COLL VENOUS BLD VENIPUNCTURE: CPT | Mod: PO | Performed by: PHYSICIAN ASSISTANT

## 2024-03-28 PROCEDURE — 85025 COMPLETE CBC W/AUTO DIFF WBC: CPT | Performed by: PHYSICIAN ASSISTANT

## 2024-03-28 PROCEDURE — 80053 COMPREHEN METABOLIC PANEL: CPT | Performed by: PHYSICIAN ASSISTANT

## 2024-03-29 ENCOUNTER — PATIENT MESSAGE (OUTPATIENT)
Dept: ADMINISTRATIVE | Facility: OTHER | Age: 79
End: 2024-03-29
Payer: MEDICARE

## 2024-03-29 LAB — CANCER AG19-9 SERPL-ACNC: ABNORMAL U/ML (ref 0–40)

## 2024-04-01 ENCOUNTER — OFFICE VISIT (OUTPATIENT)
Dept: HEMATOLOGY/ONCOLOGY | Facility: CLINIC | Age: 79
End: 2024-04-01
Payer: MEDICARE

## 2024-04-01 ENCOUNTER — INFUSION (OUTPATIENT)
Dept: INFUSION THERAPY | Facility: HOSPITAL | Age: 79
End: 2024-04-01
Payer: MEDICARE

## 2024-04-01 VITALS
BODY MASS INDEX: 25.39 KG/M2 | WEIGHT: 143.31 LBS | HEIGHT: 63 IN | OXYGEN SATURATION: 96 % | RESPIRATION RATE: 16 BRPM | DIASTOLIC BLOOD PRESSURE: 59 MMHG | SYSTOLIC BLOOD PRESSURE: 127 MMHG | HEART RATE: 78 BPM

## 2024-04-01 VITALS
WEIGHT: 143.31 LBS | SYSTOLIC BLOOD PRESSURE: 111 MMHG | BODY MASS INDEX: 25.39 KG/M2 | OXYGEN SATURATION: 96 % | HEIGHT: 63 IN | RESPIRATION RATE: 18 BRPM | HEART RATE: 64 BPM | DIASTOLIC BLOOD PRESSURE: 58 MMHG | TEMPERATURE: 98 F

## 2024-04-01 DIAGNOSIS — C25.9 PANCREATIC ADENOCARCINOMA: Primary | ICD-10-CM

## 2024-04-01 DIAGNOSIS — L28.2 PRURITIC RASH: ICD-10-CM

## 2024-04-01 DIAGNOSIS — I70.0 AORTIC ATHEROSCLEROSIS: ICD-10-CM

## 2024-04-01 DIAGNOSIS — N81.9 VAGINAL VAULT PROLAPSE: ICD-10-CM

## 2024-04-01 DIAGNOSIS — C78.01 MALIGNANT NEOPLASM METASTATIC TO BOTH LUNGS: ICD-10-CM

## 2024-04-01 DIAGNOSIS — E78.5 DYSLIPIDEMIA: ICD-10-CM

## 2024-04-01 DIAGNOSIS — M81.0 AGE-RELATED OSTEOPOROSIS WITHOUT CURRENT PATHOLOGICAL FRACTURE: ICD-10-CM

## 2024-04-01 DIAGNOSIS — B30.9 ACUTE VIRAL CONJUNCTIVITIS OF BOTH EYES: ICD-10-CM

## 2024-04-01 DIAGNOSIS — H00.012 HORDEOLUM EXTERNUM OF RIGHT LOWER EYELID: ICD-10-CM

## 2024-04-01 DIAGNOSIS — D49.9 IMMUNODEFICIENCY SECONDARY TO NEOPLASM: ICD-10-CM

## 2024-04-01 DIAGNOSIS — T45.1X5A IMMUNODEFICIENCY DUE TO CHEMOTHERAPY: ICD-10-CM

## 2024-04-01 DIAGNOSIS — Z79.899 IMMUNODEFICIENCY DUE TO CHEMOTHERAPY: ICD-10-CM

## 2024-04-01 DIAGNOSIS — C78.7 METASTASIS TO LIVER: ICD-10-CM

## 2024-04-01 DIAGNOSIS — D84.81 IMMUNODEFICIENCY SECONDARY TO NEOPLASM: ICD-10-CM

## 2024-04-01 DIAGNOSIS — D84.821 IMMUNODEFICIENCY DUE TO CHEMOTHERAPY: ICD-10-CM

## 2024-04-01 DIAGNOSIS — K59.00 CONSTIPATION, UNSPECIFIED CONSTIPATION TYPE: ICD-10-CM

## 2024-04-01 DIAGNOSIS — C78.02 MALIGNANT NEOPLASM METASTATIC TO BOTH LUNGS: ICD-10-CM

## 2024-04-01 PROCEDURE — A4216 STERILE WATER/SALINE, 10 ML: HCPCS | Performed by: REGISTERED NURSE

## 2024-04-01 PROCEDURE — 96367 TX/PROPH/DG ADDL SEQ IV INF: CPT

## 2024-04-01 PROCEDURE — 96417 CHEMO IV INFUS EACH ADDL SEQ: CPT

## 2024-04-01 PROCEDURE — 25000003 PHARM REV CODE 250: Performed by: REGISTERED NURSE

## 2024-04-01 PROCEDURE — 99999 PR PBB SHADOW E&M-EST. PATIENT-LVL IV: CPT | Mod: PBBFAC,,, | Performed by: REGISTERED NURSE

## 2024-04-01 PROCEDURE — 63600175 PHARM REV CODE 636 W HCPCS: Performed by: REGISTERED NURSE

## 2024-04-01 PROCEDURE — 96413 CHEMO IV INFUSION 1 HR: CPT

## 2024-04-01 PROCEDURE — 99215 OFFICE O/P EST HI 40 MIN: CPT | Mod: S$GLB,,, | Performed by: REGISTERED NURSE

## 2024-04-01 RX ORDER — SODIUM CHLORIDE 0.9 % (FLUSH) 0.9 %
10 SYRINGE (ML) INJECTION
Status: DISCONTINUED | OUTPATIENT
Start: 2024-04-01 | End: 2024-04-01 | Stop reason: HOSPADM

## 2024-04-01 RX ORDER — HEPARIN 100 UNIT/ML
500 SYRINGE INTRAVENOUS
Status: CANCELLED | OUTPATIENT
Start: 2024-04-01

## 2024-04-01 RX ORDER — PROCHLORPERAZINE EDISYLATE 5 MG/ML
5 INJECTION INTRAMUSCULAR; INTRAVENOUS ONCE AS NEEDED
Status: DISCONTINUED | OUTPATIENT
Start: 2024-04-01 | End: 2024-04-01 | Stop reason: HOSPADM

## 2024-04-01 RX ORDER — EPINEPHRINE 0.3 MG/.3ML
0.3 INJECTION SUBCUTANEOUS ONCE AS NEEDED
Status: DISCONTINUED | OUTPATIENT
Start: 2024-04-01 | End: 2024-04-01 | Stop reason: HOSPADM

## 2024-04-01 RX ORDER — HEPARIN 100 UNIT/ML
500 SYRINGE INTRAVENOUS
Status: DISCONTINUED | OUTPATIENT
Start: 2024-04-01 | End: 2024-04-01 | Stop reason: HOSPADM

## 2024-04-01 RX ORDER — SODIUM CHLORIDE 0.9 % (FLUSH) 0.9 %
10 SYRINGE (ML) INJECTION
Status: CANCELLED | OUTPATIENT
Start: 2024-04-01

## 2024-04-01 RX ORDER — EPINEPHRINE 0.3 MG/.3ML
0.3 INJECTION SUBCUTANEOUS ONCE AS NEEDED
Status: CANCELLED | OUTPATIENT
Start: 2024-04-01

## 2024-04-01 RX ORDER — DIPHENHYDRAMINE HYDROCHLORIDE 50 MG/ML
50 INJECTION INTRAMUSCULAR; INTRAVENOUS ONCE AS NEEDED
Status: CANCELLED | OUTPATIENT
Start: 2024-04-01

## 2024-04-01 RX ORDER — PROCHLORPERAZINE EDISYLATE 5 MG/ML
5 INJECTION INTRAMUSCULAR; INTRAVENOUS ONCE AS NEEDED
Status: CANCELLED
Start: 2024-04-01

## 2024-04-01 RX ORDER — DIPHENHYDRAMINE HYDROCHLORIDE 50 MG/ML
50 INJECTION INTRAMUSCULAR; INTRAVENOUS ONCE AS NEEDED
Status: DISCONTINUED | OUTPATIENT
Start: 2024-04-01 | End: 2024-04-01 | Stop reason: HOSPADM

## 2024-04-01 RX ADMIN — SODIUM CHLORIDE: 9 INJECTION, SOLUTION INTRAVENOUS at 08:04

## 2024-04-01 RX ADMIN — ONDANSETRON 8 MG: 2 INJECTION INTRAMUSCULAR; INTRAVENOUS at 08:04

## 2024-04-01 RX ADMIN — PACLITAXEL 200 MG: 100 INJECTION, POWDER, LYOPHILIZED, FOR SUSPENSION INTRAVENOUS at 09:04

## 2024-04-01 RX ADMIN — GEMCITABINE HYDROCHLORIDE 1700 MG: 1 INJECTION, SOLUTION INTRAVENOUS at 10:04

## 2024-04-01 RX ADMIN — HEPARIN 500 UNITS: 100 SYRINGE at 10:04

## 2024-04-01 RX ADMIN — Medication 10 ML: at 10:04

## 2024-04-01 NOTE — PLAN OF CARE
"  Problem: Fatigue  Goal: Improved Activity Tolerance  Outcome: Ongoing, Progressing  Intervention: Promote Improved Energy  Flowsheets (Taken 4/1/2024 4488)  Fatigue Management:   activity schedule adjusted   activity assistance provided   fatigue-related activity identified   frequent rest breaks encouraged   paced activity encouraged  Sleep/Rest Enhancement:   natural light exposure provided   family presence promoted   consistent schedule promoted   awakenings minimized   noise level reduced   reading promoted   regular sleep/rest pattern promoted   relaxation techniques promoted  Activity Management:   Ambulated -L4   Up in chair - L3  BP (!) 127/59 (Patient Position: Sitting)   Pulse 78   Temp 97.8 °F (36.6 °C)   Resp 16   Ht 5' 3" (1.6 m)   Wt 65 kg (143 lb 4.8 oz)   SpO2 96%   BMI 25.38 kg/m² Pleasant, alert and oriented patient to Chemo Infusion per self for C6D15 Abraxane/Gemzar - VSS and RCW Port accessed with blood return observed, flushed with NS, dressing applied, and patient tolerated procedure well - patient tolerated infusion with no AVE's, port flushed with NS/Heparin, blood return observed, port de-accessed, band-aide applied and patient discharged to home with no concerns - RTC on 4/15/24       "

## 2024-04-01 NOTE — PROGRESS NOTES
MEDICAL ONCOLOGY - ESTABLISHED PATIENT VISIT    Reason for visit: Pancreatic adenocarcinoma    Best Contact Phone Number(s): [phone] (home)      Cancer/Stage/TNM:    Cancer Staging   Pancreatic adenocarcinoma  Staging form: Exocrine Pancreas, AJCC 8th Edition  - Clinical stage from 9/21/2023: Stage IV (cT1, cNX, pM1) - Signed by David Briceño MD on 10/11/2023       Oncology History   Pancreatic adenocarcinoma   9/5/2023 Initial Diagnosis    Pancreatic adenocarcinoma     9/21/2023 Cancer Staged    Staging form: Exocrine Pancreas, AJCC 8th Edition  - Clinical stage from 9/21/2023: Stage IV (cT1, cNX, pM1)     10/2/2023 Tumor Conference     [facility] UGI MULTIDISCIPLINARY TUMOR BOARD  PATIENT REVIEW FORM   ____________________________________________________________    CLINIC #: [id number]  DATE: 10/2/2023    DIAGNOSIS: pancreas CA    PRESENTER: Angela    PATIENT SUMMARY:   This 77 y/o female presented in August with abd pain, decreased appetite with weight loss. Reviewed CT and MRI imaging ~ 2 cm mass in tail of pancreas with diffuse metastatic liver disease, largest liver lesion in left lobe measuring 4.5 cm. IR performed liver bx and this pathology was reviewed - poorly differentiated adenocarcinoma, favoring upper GI origin.     BOARD RECOMMENDATIONS:   Stage IV pancreas CA with liver mets  Add MMR and TEMPUS  Proceed with palliative systemic chemotherapy    CONSULT NEEDED:     [] Surgery    [] Hem/Onc    [] Rad/Onc    [] Dietary     [] Social Service    [] Psychology       [] AES  [] Radiology     Clinical Stage: Tumor   Node(s)   Metastasis        GROUP STAGE:  [] O    [] 1A    [] IB    [] IIA    [] IIB     [] IIIA     [] IIIB     [] IIIC    [x]IV  [] Local recurrence     [] Regional recurrence     [] Distant recurrence   Metastatic site(s): liver         [x] Elidia'l Treatment Guidelines reviewed and care planned is consistent with guidelines.         (i.e., NCCN, NCI, PD, ACO, AUA,  etc.)    PRESENTATION AT CANCER CONFERENCE:         [x] Prospective    [] Retrospective     [] Follow-Up    Eligible for clinical trial : yes       11/1/2023 -  Chemotherapy    Treatment Summary   Plan Name: OP PANC NAB-PACLITAXEL + GEMCITABINE Q4W  Treatment Goal: Palliative  Status: Active  Start Date: 11/1/2023  End Date: 9/6/2024 (Planned)  Provider: David Briceño MD  Chemotherapy: gemcitabine (GEMZAR) 1,600 mg in sodium chloride 0.9% SolP 327.08 mL chemo infusion, 1,680 mg, Intravenous, Clinic/HOD 1 time, 6 of 12 cycles  Administration: 1,600 mg (11/1/2023), 1,600 mg (11/15/2023), 1,600 mg (11/29/2023), 1,600 mg (12/14/2023), 1,600 mg (12/27/2023), 1,600 mg (1/9/2024), 1,600 mg (1/22/2024), 1,600 mg (2/5/2024), 1,600 mg (2/19/2024), 1,600 mg (3/4/2024), 1,600 mg (3/18/2024)          Interim History:   79 y.o. female with metastatic pancreatic cancer who presents for follow-up prior to cycle 12 of biweekly gemcitabine + nab-paclitaxel. Continues to do well overall. Noted mild stomach discomfort a few days ago for ~2 days, resolved without intervention. Still with redness to bilateral eyes, intermittent itching, and increased tearing. Vision stable, due for eye exam. No rash at this time. Mild paresthesias to feet and very minimal to hands. No nausea or vomiting. Had a great Easter crawfish boil with her family.     ECOG PS is 0. Presents with her  today.     ROS:   Review of Systems   Constitutional:  Negative for chills, fever, malaise/fatigue and weight loss.   HENT:  Negative for sore throat.    Eyes:  Positive for redness. Negative for blurred vision and pain.        +itchy, watery eyes   Respiratory:  Negative for cough and shortness of breath.    Cardiovascular:  Positive for leg swelling (L>R). Negative for chest pain.   Gastrointestinal:  Positive for abdominal pain. Negative for constipation, diarrhea, nausea and vomiting.   Genitourinary:  Negative for dysuria and frequency.    Musculoskeletal:  Negative for back pain, falls and myalgias.   Skin:  Negative for itching and rash.   Neurological:  Negative for dizziness, weakness and headaches.   Endo/Heme/Allergies:  Does not bruise/bleed easily.   Psychiatric/Behavioral:  Negative for depression. The patient is not nervous/anxious.        Past Medical History:   Past Medical History:   Diagnosis Date    Age-related osteoporosis without current pathological fracture     Atherosclerosis of aortic arch     - noted on CXR 8/2017    Back pain     Diverticulosis of sigmoid colon 10/22/2019    Ectopic pregnancy     Fibrocystic breast     Hyperlipidemia     Inguinal hernia     Osteopenia     Polyp of ascending colon 10/22/2019    Urinary incontinence, mixed 05/24/2022        Past Surgical History:   Past Surgical History:   Procedure Laterality Date    ECTOPIC PREGNANCY SURGERY      HERNIA REPAIR      left inguinal    HYSTERECTOMY  1980    without BSO        Family History:   Family History   Problem Relation Age of Onset    Breast cancer Mother 69    Seizures Mother     Heart disease Father     Heart attack Father     Depression Father     Breast cancer Sister 73    Breast cancer Maternal Aunt 73    Prostate cancer Brother 81        no mets    Obesity Brother     Heart failure Maternal Grandmother     Heart attack Maternal Grandfather     Suicide Attempts Paternal Grandmother     Depression Paternal Grandmother     Mental illness Paternal Grandmother     Depression Paternal Aunt     Heart attack Paternal Uncle     Depression Paternal Uncle     Cancer Maternal Aunt 83        blood cancer (leukemia?)    Cancer Maternal Uncle         unk type; was COD    Suicide Paternal Cousin     Tongue cancer Paternal Cousin         smoking hx unk    Ovarian cancer Neg Hx         Social History:   Social History     Tobacco Use    Smoking status: Never    Smokeless tobacco: Never   Substance Use Topics    Alcohol use: No        I have reviewed and updated the  patient's past medical, surgical, family and social histories.    Allergies:   Review of patient's allergies indicates:   Allergen Reactions    Erythromycin (bulk)      Other reaction(s): Eye irritation        Medications:   Current Outpatient Medications   Medication Sig Dispense Refill    ABRAXANE 100 mg injection       coconut oil, bulk, Oil by Misc.(Non-Drug; Combo Route) route.      dexAMETHasone (DECADRON) 4 mg/mL injection       diphenhydramine HCl (BENADRYL ORAL) Take 25 mg by mouth as needed (rash).      doxycycline (VIBRAMYCIN) 100 MG Cap Take 1 capsule (100 mg total) by mouth every 12 (twelve) hours. (Patient not taking: Reported on 12/27/2023) 14 capsule 0    GADAVIST 1 mmol/mL (604.72 mg/mL) Soln injection       gemcitabine (GEMZAR) 1 gram/26.3 mL (38 mg/mL) Soln injection       gemcitabine 200 mg/5.26 mL (38 mg/mL) Soln       heparin sodium,porcine (HEPARIN, PORCINE,) 1,000 unit/mL injection       heparin, porcine, PF, (HEPARIN FLUSH 100 UNITS/ML) 100 unit/mL Syrg       HYDROcodone-acetaminophen (NORCO) 5-325 mg per tablet Take 1 tablet by mouth every 12 (twelve) hours as needed for Pain. (Patient not taking: Reported on 12/6/2023) 10 tablet 0    hydrOXYzine HCL (ATARAX) 25 MG tablet Take 1 tablet (25 mg total) by mouth 3 (three) times daily as needed for Itching. (Patient not taking: Reported on 12/27/2023) 60 tablet 2    LIDOcaine-prilocaine (EMLA) cream Apply topically as needed (place to port site 45-60 minutes prior to chemotherapy). (Patient not taking: Reported on 2/19/2024) 30 g 3    multivitamin (THERAGRAN) per tablet Take 1 tablet by mouth once daily.      ondansetron (ZOFRAN-ODT) 4 MG TbDL Take 1 tablet (4 mg total) by mouth every 8 (eight) hours as needed (Nausea). (Patient not taking: Reported on 12/27/2023) 10 tablet 0    ondansetron (ZOFRAN-ODT) 8 MG TbDL TAKE 1 TABLET (8 MG TOTAL) BY MOUTH EVERY 6 HOURS AS NEEDED FOR NAUSEA (Patient not taking: Reported on 12/27/2023) 30 tablet 5     "triamcinolone acetonide 0.1% (KENALOG) 0.1 % ointment Apply topically as needed (rash).      vitamin D (VITAMIN D3) 1000 units Tab Take 1,000 Units by mouth once daily.       No current facility-administered medications for this visit.      Physical Exam:   BP (!) 127/59 (BP Location: Left arm, Patient Position: Sitting, BP Method: Medium (Automatic))   Pulse 78   Resp 16   Ht 5' 3" (1.6 m)   Wt 65 kg (143 lb 4.8 oz)   SpO2 96%   BMI 25.38 kg/m²        Physical Exam  Vitals reviewed.   Constitutional:       General: She is not in acute distress.     Appearance: Normal appearance. She is well-developed and normal weight. She is not ill-appearing, toxic-appearing or diaphoretic.   HENT:      Head: Normocephalic and atraumatic.      Right Ear: External ear normal.      Left Ear: External ear normal.      Nose: Nose normal.      Mouth/Throat:      Mouth: Mucous membranes are moist.      Pharynx: Oropharynx is clear. No posterior oropharyngeal erythema.   Eyes:      General: No scleral icterus.     Extraocular Movements: Extraocular movements intact.      Pupils: Pupils are equal, round, and reactive to light.      Comments: Erythema to the conjunctivitis, bilateral. Increased tearing noted.    Neck:      Trachea: No tracheal deviation.   Cardiovascular:      Rate and Rhythm: Normal rate and regular rhythm.      Pulses: Normal pulses.      Heart sounds: Normal heart sounds.   Pulmonary:      Effort: Pulmonary effort is normal. No respiratory distress.      Breath sounds: Normal breath sounds. No wheezing or rales.   Chest:      Comments: RCW port  Abdominal:      General: Bowel sounds are normal. There is no distension.      Palpations: Abdomen is soft.      Tenderness: There is no abdominal tenderness.   Musculoskeletal:         General: No swelling or deformity. Normal range of motion.      Cervical back: Normal range of motion and neck supple.   Skin:     General: Skin is warm.      Coloration: Skin is not " jaundiced.      Findings: No erythema or rash.   Neurological:      General: No focal deficit present.      Mental Status: She is alert and oriented to person, place, and time. Mental status is at baseline.      Motor: No weakness.      Gait: Gait normal.   Psychiatric:         Mood and Affect: Mood normal.         Behavior: Behavior normal.         Thought Content: Thought content normal.         Judgment: Judgment normal.         Labs:   Lab work reviewed.     Imaging:   CT CAP 2/16/24:    Impression:     1. In this patient with reported history of pancreatic cancer, there has been a generalized interval positive response to therapy including interval decrease in size to complete resolution of previously noted ground-glass and solid pulmonary nodules, innumerable hepatic parenchymal nodules/masses and pancreatic tail lesion.        Path:   Final Pathologic Diagnosis     Left hepatic lobe, biopsy:   Positive for malignancy, poorly differentiated adenocarcinoma (see comment)   Tumor necrosis present     Comment:  A review of the patient's imaging shows the presence of a 1.9 cm pancreatic tail mass and diffuse liver lesions.  These morphologic and immunophenotypic findings are supportive of a pancreatobiliary and/or upper gastrointestinal primary.  VC      Comment: Interp By Nataliia Peralta D.O., Signed on 10/05/2023 at 08:36   Supplemental Diagnosis     The purpose of this supplemental report is to report results of MMR panel in the tumor cells as follows:     Immunohistochemistry (IHC) Testing for Mismatch Repair (MMR) Proteins:      MLH1 - Intact nuclear expression    MSH2 - Intact nuclear expression    MSH6 - Intact nuclear expression    PMS2 - Intact nuclear expression            Somatic Genomic Results       Pancreatic mass       Tumor NGS Tissue  Resulted: 10/10/23 Status: Preliminary result       Detected - Pathogenic (4)      Gene Variant VAF   BRAF p.[id number] - c.1458_[id number] Inframe deletion  - GOF 12.00 %   CDKN2A CDKN2A - Copy number loss --   CDKN2B CDKN2B - Copy number loss --   TP53 p.Y163C - c.488A>G Missense variant - LOF 30.50 %              Detected - Uncertain significance (1)      Gene Variant VAF   DIS3 p.N671S - c.2012A>G Missense variant 23.10 %                   Assessment:       No diagnosis found.   Plan:        # Pancreatic cancer  We discussed with the patient and her family her biopsy-proven diagnosis of metastatic pancreatic adenocarcinoma with multifocal liver metastases.  Her tumor is pMMR and Tempus tissue showed a non V600E BRAF mutation, TP53 mutation and CDKN2A and CDKN2B loss.  QUINN.    We discussed her prognosis and potential treatment options.  Because of her age and discussion of tolerance concerns, I have recommended biweekly gemcitabine + nab-paclitaxel as palliative intent first-line therapy.  Dosing for gemcitabine + nab-paclitaxel will be 1000 mg/m2 and 125 mg/m2, respectively, and I will administer these on a biweekly basis.    Port was placed.  Consideration to BRAF non-V600E targeting trials in the future if available.    She began cycle 1 of biweekly gemcitabine + nab-paclitaxel on 11/1/23.  She experienced fevers/chills and rash. believe these are all chemotherapy related. Improved after the administration of Dex.     Will continue to administer dexamethasone 12 mg IV prior to infusion for rash prevention.  May need to increase to 20mg for future cycles. Will continue to monitor.    Also gave her Medrol Dose Nghia to use in case rash recurs despite premed dex.    - CT scan after cycle 4 displays overall improvement within the lung and liver, with decreases in the number and size of the lesions. Continue current regimen.   - CT scan after cycle 8 displays continued improvement in disease in liver and lung and pancreatic tail.    - Lab work stable and adequate for treatment.   - proceed with cycle 12.    RTC in 2 weeks for next cycle with lab work, scans and treatment.    Will repeat imaging studies after cycle 12.     Following with palliative care.   Genetic testing done outside - saw Julito Sosa here. FANCC mut present.    PGX showed DPYD nml and UGT1A1 intermediate metabolism.    # HLD, aortic atherosclerosis, constipation, pruritus, fever  Not currently on therapy for HLD, atherosclerosis. Cholesterol stable from June 2023.   Constipation: stable.   Pruritus/fever: controlled with increased pre-meds and Benadryl. Continues to control fever alternating Motrin and Tylenol.     # Osteoporosis, vaginal prolapse, bilateral conjunctivitis, R eye lower lid stye  Monitor calcium levels on chemo.  Will continue to encourage maintaining activity.  Will f/u with GYN.     Symptoms have improved, continues with redness, itching and watery eyes. Using Pataday drops with some relief. Encouraged to f/u with ophthalmologist as well - states she is due for annual eye exam.     Follow up: 2 weeks for cycle 13 with scans.    Patient is in agreement with the proposed treatment plan. All questions were answered to the patient's satisfaction. Pt knows to call clinic if anything is needed before the next clinic visit.    Patient discussed with collaborating physician, Dr. Briceño.    At least 40 minutes were spent today on this encounter including face to face time with the patient, data gathering/interpretation and documentation.       Meredith Kowalski, MSN, APRN, ACCNS-AG  Hematology and Medical Oncology  Clinical Nurse Specialist to Dr. Briceño, Dr. Hammer & Dr. Willis    Route Chart for Scheduling    Med Onc Chart Routing      Follow up with physician    Follow up with DONTE 6 weeks. with labs for chemo   Infusion scheduling note   chemo every 2 weeks   Injection scheduling note    Labs CBC, CMP and CA 19-9   Scheduling:  Preferred lab:  Lab interval: every 2 weeks     Imaging    Pharmacy appointment    Other referrals              Treatment Plan Information   OP PANC NAB-PACLITAXEL + GEMCITABINE Q4W    David Briceño MD   Upcoming Treatment Dates - OP PANC NAB-PACLITAXEL + GEMCITABINE Q4W    3/22/2024       Chemotherapy       PACLitaxeL protein-bound (ABRAXANE) in 42 mL infusion       gemcitabine (GEMZAR) in sodium chloride 0.9% SolP 250 mL chemo infusion       Antiemetics       ondansetron (ZOFRAN) 8 mg, dexAMETHasone (DECADRON) 12 mg in sodium chloride 0.9% 50 mL IVPB  4/5/2024       Chemotherapy       PACLitaxeL protein-bound (ABRAXANE) in 42 mL infusion       gemcitabine (GEMZAR) in sodium chloride 0.9% SolP 250 mL chemo infusion       Antiemetics       ondansetron (ZOFRAN) 8 mg, dexAMETHasone (DECADRON) 12 mg in sodium chloride 0.9% 50 mL IVPB  4/19/2024       Chemotherapy       PACLitaxeL protein-bound (ABRAXANE) in 42 mL infusion       gemcitabine (GEMZAR) in sodium chloride 0.9% SolP 250 mL chemo infusion       Antiemetics       ondansetron (ZOFRAN) 8 mg, dexAMETHasone (DECADRON) 12 mg in sodium chloride 0.9% 50 mL IVPB  5/3/2024       Chemotherapy       PACLitaxeL protein-bound (ABRAXANE) in 42 mL infusion       gemcitabine (GEMZAR) in sodium chloride 0.9% SolP 250 mL chemo infusion       Antiemetics       ondansetron (ZOFRAN) 8 mg, dexAMETHasone (DECADRON) 12 mg in sodium chloride 0.9% 50 mL IVPB

## 2024-04-03 ENCOUNTER — PATIENT MESSAGE (OUTPATIENT)
Dept: ADMINISTRATIVE | Facility: OTHER | Age: 79
End: 2024-04-03
Payer: MEDICARE

## 2024-04-04 ENCOUNTER — PATIENT MESSAGE (OUTPATIENT)
Dept: ADMINISTRATIVE | Facility: OTHER | Age: 79
End: 2024-04-04
Payer: MEDICARE

## 2024-04-05 ENCOUNTER — PATIENT MESSAGE (OUTPATIENT)
Dept: ADMINISTRATIVE | Facility: OTHER | Age: 79
End: 2024-04-05
Payer: MEDICARE

## 2024-04-06 ENCOUNTER — PATIENT MESSAGE (OUTPATIENT)
Dept: ADMINISTRATIVE | Facility: OTHER | Age: 79
End: 2024-04-06
Payer: MEDICARE

## 2024-04-07 ENCOUNTER — PATIENT MESSAGE (OUTPATIENT)
Dept: ADMINISTRATIVE | Facility: OTHER | Age: 79
End: 2024-04-07
Payer: MEDICARE

## 2024-04-08 ENCOUNTER — PATIENT MESSAGE (OUTPATIENT)
Dept: ADMINISTRATIVE | Facility: OTHER | Age: 79
End: 2024-04-08
Payer: MEDICARE

## 2024-04-09 ENCOUNTER — PATIENT MESSAGE (OUTPATIENT)
Dept: ADMINISTRATIVE | Facility: OTHER | Age: 79
End: 2024-04-09
Payer: MEDICARE

## 2024-04-10 ENCOUNTER — PATIENT MESSAGE (OUTPATIENT)
Dept: ADMINISTRATIVE | Facility: OTHER | Age: 79
End: 2024-04-10
Payer: MEDICARE

## 2024-04-11 ENCOUNTER — PATIENT MESSAGE (OUTPATIENT)
Dept: ADMINISTRATIVE | Facility: OTHER | Age: 79
End: 2024-04-11
Payer: MEDICARE

## 2024-04-12 ENCOUNTER — HOSPITAL ENCOUNTER (OUTPATIENT)
Dept: RADIOLOGY | Facility: HOSPITAL | Age: 79
Discharge: HOME | End: 2024-04-12
Attending: REGISTERED NURSE
Payer: MEDICARE

## 2024-04-12 ENCOUNTER — PATIENT MESSAGE (OUTPATIENT)
Dept: ADMINISTRATIVE | Facility: OTHER | Age: 79
End: 2024-04-12
Payer: MEDICARE

## 2024-04-12 DIAGNOSIS — C78.01 MALIGNANT NEOPLASM METASTATIC TO BOTH LUNGS: ICD-10-CM

## 2024-04-12 DIAGNOSIS — C25.9 PANCREATIC ADENOCARCINOMA: ICD-10-CM

## 2024-04-12 DIAGNOSIS — C78.7 METASTASIS TO LIVER: ICD-10-CM

## 2024-04-12 DIAGNOSIS — C78.02 MALIGNANT NEOPLASM METASTATIC TO BOTH LUNGS: ICD-10-CM

## 2024-04-12 PROCEDURE — 74177 CT ABD & PELVIS W/CONTRAST: CPT | Mod: TC

## 2024-04-12 PROCEDURE — 74177 CT ABD & PELVIS W/CONTRAST: CPT | Mod: 26,,, | Performed by: RADIOLOGY

## 2024-04-12 PROCEDURE — 71260 CT THORAX DX C+: CPT | Mod: 26,,, | Performed by: RADIOLOGY

## 2024-04-12 PROCEDURE — 25500020 PHARM REV CODE 255: Performed by: REGISTERED NURSE

## 2024-04-12 RX ADMIN — IOHEXOL 75 ML: 350 INJECTION, SOLUTION INTRAVENOUS at 02:04

## 2024-04-12 NOTE — PROGRESS NOTES
MEDICAL ONCOLOGY - ESTABLISHED PATIENT VISIT    Reason for visit: Pancreatic adenocarcinoma    Best Contact Phone Number(s): [phone] (home)      Cancer/Stage/TNM:    Cancer Staging   Pancreatic adenocarcinoma  Staging form: Exocrine Pancreas, AJCC 8th Edition  - Clinical stage from 9/21/2023: Stage IV (cT1, cNX, pM1) - Signed by David Briceño MD on 10/11/2023       Oncology History   Pancreatic adenocarcinoma   9/5/2023 Initial Diagnosis    Pancreatic adenocarcinoma     9/21/2023 Cancer Staged    Staging form: Exocrine Pancreas, AJCC 8th Edition  - Clinical stage from 9/21/2023: Stage IV (cT1, cNX, pM1)     10/2/2023 Tumor Conference     [facility] UGI MULTIDISCIPLINARY TUMOR BOARD  PATIENT REVIEW FORM   ____________________________________________________________    CLINIC #: [id number]  DATE: 10/2/2023    DIAGNOSIS: pancreas CA    PRESENTER: Angela    PATIENT SUMMARY:   This 79 y/o female presented in August with abd pain, decreased appetite with weight loss. Reviewed CT and MRI imaging ~ 2 cm mass in tail of pancreas with diffuse metastatic liver disease, largest liver lesion in left lobe measuring 4.5 cm. IR performed liver bx and this pathology was reviewed - poorly differentiated adenocarcinoma, favoring upper GI origin.     BOARD RECOMMENDATIONS:   Stage IV pancreas CA with liver mets  Add MMR and TEMPUS  Proceed with palliative systemic chemotherapy    CONSULT NEEDED:     [] Surgery    [] Hem/Onc    [] Rad/Onc    [] Dietary     [] Social Service    [] Psychology       [] AES  [] Radiology     Clinical Stage: Tumor   Node(s)   Metastasis        GROUP STAGE:  [] O    [] 1A    [] IB    [] IIA    [] IIB     [] IIIA     [] IIIB     [] IIIC    [x]IV  [] Local recurrence     [] Regional recurrence     [] Distant recurrence   Metastatic site(s): liver         [x] Elidia'l Treatment Guidelines reviewed and care planned is consistent with guidelines.         (i.e., NCCN, NCI, PD, ACO, AUA,  etc.)    PRESENTATION AT CANCER CONFERENCE:         [x] Prospective    [] Retrospective     [] Follow-Up    Eligible for clinical trial : yes       11/1/2023 -  Chemotherapy    Treatment Summary   Plan Name: OP PANC NAB-PACLITAXEL + GEMCITABINE Q4W  Treatment Goal: Palliative  Status: Active  Start Date: 11/1/2023  End Date: 9/6/2024 (Planned)  Provider: David Briceño MD  Chemotherapy: gemcitabine (GEMZAR) 1,600 mg in sodium chloride 0.9% SolP 327.08 mL chemo infusion, 1,680 mg, Intravenous, Clinic/HOD 1 time, 6 of 12 cycles  Administration: 1,600 mg (11/1/2023), 1,600 mg (11/15/2023), 1,600 mg (11/29/2023), 1,600 mg (12/14/2023), 1,600 mg (12/27/2023), 1,600 mg (1/9/2024), 1,600 mg (1/22/2024), 1,600 mg (2/5/2024), 1,600 mg (2/19/2024), 1,600 mg (3/4/2024), 1,600 mg (3/18/2024)          Interim History:   79 y.o. female with metastatic pancreatic cancer who presents for follow-up prior to cycle 13 of biweekly gemcitabine + nab-paclitaxel. Feeling well overall and chemotherapy remains tolerable. Mild paresthesias in feet and has noticed nail changes as well. She is scheduled with podiatry. Very slight paresthesias to fingertips. Reports improving stye to right eye and redness to bilateral eyes.     ECOG PS is 0. Presents with her sister-in-law today.     ROS:   Review of Systems   Constitutional:  Negative for chills, fever, malaise/fatigue and weight loss.   HENT:  Negative for sore throat.    Eyes:  Positive for redness. Negative for blurred vision and pain.        +itchy, watery eyes   Respiratory:  Negative for cough and shortness of breath.    Cardiovascular:  Positive for leg swelling (L>R). Negative for chest pain.   Gastrointestinal:  Positive for abdominal pain. Negative for constipation, diarrhea, nausea and vomiting.   Genitourinary:  Negative for dysuria and frequency.   Musculoskeletal:  Negative for back pain, falls and myalgias.   Skin:  Negative for itching and rash.   Neurological:  Negative  for dizziness, weakness and headaches.   Endo/Heme/Allergies:  Does not bruise/bleed easily.   Psychiatric/Behavioral:  Negative for depression. The patient is not nervous/anxious.        Past Medical History:   Past Medical History:   Diagnosis Date    Age-related osteoporosis without current pathological fracture     Atherosclerosis of aortic arch     - noted on CXR 8/2017    Back pain     Diverticulosis of sigmoid colon 10/22/2019    Ectopic pregnancy     Fibrocystic breast     Hyperlipidemia     Inguinal hernia     Osteopenia     Polyp of ascending colon 10/22/2019    Urinary incontinence, mixed 05/24/2022        Past Surgical History:   Past Surgical History:   Procedure Laterality Date    ECTOPIC PREGNANCY SURGERY      HERNIA REPAIR      left inguinal    HYSTERECTOMY  1980    without BSO        Family History:   Family History   Problem Relation Name Age of Onset    Breast cancer Mother  69    Seizures Mother      Heart disease Father      Heart attack Father      Depression Father      Breast cancer Sister Monika 73    Breast cancer Maternal Aunt Kathleen 73    Prostate cancer Brother Lamine 81        no mets    Obesity Brother Lamine     Heart failure Maternal Grandmother      Heart attack Maternal Grandfather      Suicide Attempts Paternal Grandmother      Depression Paternal Grandmother      Mental illness Paternal Grandmother      Depression Paternal Aunt Jenniffer     Heart attack Paternal Uncle Leodan     Depression Paternal Uncle Orange     Cancer Maternal Aunt [place] 83        blood cancer (leukemia?)    Cancer Maternal Uncle Bill         unk type; was COD    Suicide Paternal Cousin      Tongue cancer Paternal Cousin Jimi         smoking hx unk    Ovarian cancer Neg Hx          Social History:   Social History     Tobacco Use    Smoking status: Never    Smokeless tobacco: Never   Substance Use Topics    Alcohol use: No        I have reviewed and updated the patient's past medical, surgical, family and social  histories.    Allergies:   Review of patient's allergies indicates:   Allergen Reactions    Erythromycin (bulk)      Other reaction(s): Eye irritation        Medications:   Current Outpatient Medications   Medication Sig Dispense Refill    ABRAXANE 100 mg injection       coconut oil, bulk, Oil by Misc.(Non-Drug; Combo Route) route.      dexAMETHasone (DECADRON) 4 mg/mL injection       doxycycline (VIBRAMYCIN) 100 MG Cap Take 1 capsule (100 mg total) by mouth every 12 (twelve) hours. 14 capsule 0    GADAVIST 1 mmol/mL (604.72 mg/mL) Soln injection       gemcitabine (GEMZAR) 1 gram/26.3 mL (38 mg/mL) Soln injection       gemcitabine 200 mg/5.26 mL (38 mg/mL) Soln       heparin sodium,porcine (HEPARIN, PORCINE,) 1,000 unit/mL injection       heparin, porcine, PF, (HEPARIN FLUSH 100 UNITS/ML) 100 unit/mL Syrg       HYDROcodone-acetaminophen (NORCO) 5-325 mg per tablet Take 1 tablet by mouth every 12 (twelve) hours as needed for Pain. 10 tablet 0    multivitamin (THERAGRAN) per tablet Take 1 tablet by mouth once daily.      ondansetron (ZOFRAN-ODT) 4 MG TbDL Take 1 tablet (4 mg total) by mouth every 8 (eight) hours as needed (Nausea). 10 tablet 0    ondansetron (ZOFRAN-ODT) 8 MG TbDL TAKE 1 TABLET (8 MG TOTAL) BY MOUTH EVERY 6 HOURS AS NEEDED FOR NAUSEA 30 tablet 5    vitamin D (VITAMIN D3) 1000 units Tab Take 1,000 Units by mouth once daily.      diphenhydramine HCl (BENADRYL ORAL) Take 25 mg by mouth as needed (rash). (Patient not taking: Reported on 4/15/2024)      hydrOXYzine HCL (ATARAX) 25 MG tablet Take 1 tablet (25 mg total) by mouth 3 (three) times daily as needed for Itching. (Patient not taking: Reported on 4/15/2024) 60 tablet 2    LIDOcaine-prilocaine (EMLA) cream Apply topically as needed (place to port site 45-60 minutes prior to chemotherapy). (Patient not taking: Reported on 4/15/2024) 30 g 3    triamcinolone acetonide 0.1% (KENALOG) 0.1 % ointment Apply topically as needed (rash). (Patient not taking:  "Reported on 4/15/2024)       No current facility-administered medications for this visit.      Physical Exam:   BP (!) 111/56 (BP Location: Left arm, Patient Position: Sitting, BP Method: Medium (Automatic))   Pulse 87   Temp 98.3 °F (36.8 °C) (Oral)   Ht 5' 3" (1.6 m)   Wt 63.4 kg (139 lb 12.4 oz)   SpO2 97%   BMI 24.76 kg/m²        Physical Exam  Vitals reviewed.   Constitutional:       General: She is not in acute distress.     Appearance: Normal appearance. She is well-developed and normal weight. She is not ill-appearing, toxic-appearing or diaphoretic.   HENT:      Head: Normocephalic and atraumatic.      Right Ear: External ear normal.      Left Ear: External ear normal.      Nose: Nose normal.      Mouth/Throat:      Mouth: Mucous membranes are moist.      Pharynx: Oropharynx is clear. No posterior oropharyngeal erythema.   Eyes:      General: No scleral icterus.     Extraocular Movements: Extraocular movements intact.      Pupils: Pupils are equal, round, and reactive to light.      Comments: Erythema to the conjunctivitis, bilateral. Increased tearing noted.    Neck:      Trachea: No tracheal deviation.   Cardiovascular:      Rate and Rhythm: Normal rate and regular rhythm.      Pulses: Normal pulses.      Heart sounds: Normal heart sounds.   Pulmonary:      Effort: Pulmonary effort is normal. No respiratory distress.      Breath sounds: Normal breath sounds. No wheezing or rales.   Chest:      Comments: RCW port  Abdominal:      General: Bowel sounds are normal. There is no distension.      Palpations: Abdomen is soft.      Tenderness: There is no abdominal tenderness.   Musculoskeletal:         General: No swelling or deformity. Normal range of motion.      Cervical back: Normal range of motion and neck supple.   Skin:     General: Skin is warm.      Coloration: Skin is not jaundiced.      Findings: No erythema or rash.   Neurological:      General: No focal deficit present.      Mental Status: She " is alert and oriented to person, place, and time. Mental status is at baseline.      Motor: No weakness.      Gait: Gait normal.   Psychiatric:         Mood and Affect: Mood normal.         Behavior: Behavior normal.         Thought Content: Thought content normal.         Judgment: Judgment normal.         Labs:   Lab work reviewed.     Imaging:   CT CAP 4/12/24:    Impression:     In this patient with pancreatic adenocarcinoma, the nonenhancing pancreatic tail mass appears smaller with continued abutment of the splenic artery and occlusion of the splenic vein.     Mixed response in the liver.  Most lesions appear stable in size with some smaller and some larger as compared to prior.     No evidence of new disease.        Path:   Final Pathologic Diagnosis     Left hepatic lobe, biopsy:   Positive for malignancy, poorly differentiated adenocarcinoma (see comment)   Tumor necrosis present     Comment:  A review of the patient's imaging shows the presence of a 1.9 cm pancreatic tail mass and diffuse liver lesions.  These morphologic and immunophenotypic findings are supportive of a pancreatobiliary and/or upper gastrointestinal primary.  VC      Comment: Interp By Nataliia Peralta D.O., Signed on 10/05/2023 at 08:36   Supplemental Diagnosis     The purpose of this supplemental report is to report results of MMR panel in the tumor cells as follows:     Immunohistochemistry (IHC) Testing for Mismatch Repair (MMR) Proteins:      MLH1 - Intact nuclear expression    MSH2 - Intact nuclear expression    MSH6 - Intact nuclear expression    PMS2 - Intact nuclear expression            Somatic Genomic Results       Pancreatic mass       Tumor NGS Tissue  Resulted: 10/10/23 Status: Preliminary result       Detected - Pathogenic (4)      Gene Variant VAF   BRAF p.[id number] - c.1458_[id number] Inframe deletion - GOF 12.00 %   CDKN2A CDKN2A - Copy number loss --   CDKN2B CDKN2B - Copy number loss --   TP53 p.Y163C -  c.488A>G Missense variant - LOF 30.50 %              Detected - Uncertain significance (1)      Gene Variant VAF   DIS3 p.N671S - c.2012A>G Missense variant 23.10 %                   Assessment:       1. Pancreatic adenocarcinoma    2. 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 Metastasis to liver    4. Immunodeficiency due to chemotherapy    5. Immunodeficiency secondary to neoplasm    6. Dyslipidemia    7. Aortic atherosclerosis    8. Constipation, unspecified constipation type    9. Pruritic rash    10. Age-related osteoporosis without current pathological fracture    11. Vaginal vault prolapse    12. Acute viral conjunctivitis of both eyes    13. Hordeolum externum of right lower eyelid       Plan:        # Pancreatic cancer  We discussed with the patient and her family her biopsy-proven diagnosis of metastatic pancreatic adenocarcinoma with multifocal liver metastases.  Her tumor is pMMR and Tempus tissue showed a non V600E BRAF mutation, TP53 mutation and CDKN2A and CDKN2B loss.  QUINN.    We discussed her prognosis and potential treatment options.  Because of her age and discussion of tolerance concerns, I have recommended biweekly gemcitabine + nab-paclitaxel as palliative intent first-line therapy.  Dosing for gemcitabine + nab-paclitaxel will be 1000 mg/m2 and 125 mg/m2, respectively, and I will administer these on a biweekly basis.    Port was placed.  Consideration to BRAF non-V600E targeting trials in the future if available.    She began cycle 1 of biweekly gemcitabine + nab-paclitaxel on 11/1/23.  She experienced fevers/chills and rash. believe these are all chemotherapy related. Improved after the administration of Dex.     Will continue to administer dexamethasone 12 mg IV prior to infusion for rash prevention.  May need to increase to 20mg for future cycles. Will continue to monitor.    Also gave her Medrol Dose Nghia to use in case rash recurs despite premed dex.    - CT scan after cycle 4  displays overall improvement within the lung and liver, with decreases in the number and size of the lesions. Continue current regimen.   - CT scan after cycle 8 displays continued improvement in disease in liver and lung and pancreatic tail.  - CT scan after cycle 12 displays continued improvement of pancreatic tail mass but mixed response in liver. Discussed with patient and family recommendation to continue current regimen at this time rather than transition to new regimen. Should next scans show worsening disease, will consider transition to 5-FU + Onivyde.     - Lab work stable and adequate for treatment.   - Proceed with cycle 13.    RTC in 2 weeks for next cycle with lab work and treatment.   Will repeat imaging studies after cycle 16.     Following with palliative care.   Genetic testing done outside - saw Julito Fox here. FANCC mut present.    PGX showed DPYD nml and UGT1A1 intermediate metabolism.    # HLD, aortic atherosclerosis, constipation, pruritus, fever  Not currently on therapy for HLD, atherosclerosis. Cholesterol stable from June 2023.   Constipation: stable.   Pruritus/fever: controlled with increased pre-meds and Benadryl. Continues to control fever alternating Motrin and Tylenol.     # Osteoporosis, vaginal prolapse, bilateral conjunctivitis, R eye lower lid stye  Monitor calcium levels on chemo.  Will continue to encourage maintaining activity.  Will f/u with GYN.   Symptoms have improved, continues with redness, itching and watery eyes. Using Pataday drops with some relief. Encouraged to f/u with ophthalmologist as well - she is due for annual eye exam.     Follow up: 2 weeks for cycle 14.    Patient is in agreement with the proposed treatment plan. All questions were answered to the patient's satisfaction. Pt knows to call clinic if anything is needed before the next clinic visit.    Patient discussed with and seen in conjunction with collaborating physician, Dr. Briceño.    At least 40  minutes were spent today on this encounter including face to face time with the patient, data gathering/interpretation and documentation.       Meredith Kowalski, MSN, APRN, ACCNS-AG  Hematology and Medical Oncology  Clinical Nurse Specialist to Dr. Briceño, Dr. Hammer & Dr. Orlando Chart for Scheduling    Med Onc Chart Routing      Follow up with physician . 8 weeks with labs and scans done 1-2 days prior to see Dr. Briceño then get chemo   Follow up with DONTE    Infusion scheduling note   chemo every 2 weeks   Injection scheduling note    Labs CBC, CMP and CA 19-9   Scheduling:  Preferred lab:  Lab interval: every 2 weeks     Imaging CT chest abdomen pelvis   1-2 days prior to visit in 8 weeks   Pharmacy appointment    Other referrals              Treatment Plan Information   OP PANC NAB-PACLITAXEL + GEMCITABINE Q4W   David Briceño MD   Upcoming Treatment Dates - OP PANC NAB-PACLITAXEL + GEMCITABINE Q4W    4/5/2024       Chemotherapy       PACLitaxeL protein-bound (ABRAXANE) in 42 mL infusion       gemcitabine (GEMZAR) in sodium chloride 0.9% SolP 250 mL chemo infusion       Antiemetics       ondansetron (ZOFRAN) 8 mg, dexAMETHasone (DECADRON) 12 mg in sodium chloride 0.9% 50 mL IVPB  4/19/2024       Chemotherapy       PACLitaxeL protein-bound (ABRAXANE) in 42 mL infusion       gemcitabine (GEMZAR) in sodium chloride 0.9% SolP 250 mL chemo infusion       Antiemetics       ondansetron (ZOFRAN) 8 mg, dexAMETHasone (DECADRON) 12 mg in sodium chloride 0.9% 50 mL IVPB  5/3/2024       Chemotherapy       PACLitaxeL protein-bound (ABRAXANE) in 42 mL infusion       gemcitabine (GEMZAR) in sodium chloride 0.9% SolP 250 mL chemo infusion       Antiemetics       ondansetron (ZOFRAN) 8 mg, dexAMETHasone (DECADRON) 12 mg in sodium chloride 0.9% 50 mL IVPB  5/17/2024       Chemotherapy       PACLitaxeL protein-bound (ABRAXANE) in 42 mL infusion       gemcitabine (GEMZAR) in sodium chloride 0.9% SolP 250 mL chemo  infusion       Antiemetics       ondansetron (ZOFRAN) 8 mg, dexAMETHasone (DECADRON) 12 mg in sodium chloride 0.9% 50 mL IVPB

## 2024-04-13 ENCOUNTER — PATIENT MESSAGE (OUTPATIENT)
Dept: ADMINISTRATIVE | Facility: OTHER | Age: 79
End: 2024-04-13
Payer: MEDICARE

## 2024-04-14 ENCOUNTER — PATIENT MESSAGE (OUTPATIENT)
Dept: ADMINISTRATIVE | Facility: OTHER | Age: 79
End: 2024-04-14
Payer: MEDICARE

## 2024-04-15 ENCOUNTER — OFFICE VISIT (OUTPATIENT)
Dept: HEMATOLOGY/ONCOLOGY | Facility: CLINIC | Age: 79
End: 2024-04-15
Payer: MEDICARE

## 2024-04-15 ENCOUNTER — INFUSION (OUTPATIENT)
Dept: INFUSION THERAPY | Facility: HOSPITAL | Age: 79
End: 2024-04-15
Payer: MEDICARE

## 2024-04-15 VITALS
OXYGEN SATURATION: 97 % | HEIGHT: 63 IN | DIASTOLIC BLOOD PRESSURE: 56 MMHG | HEART RATE: 87 BPM | WEIGHT: 139.75 LBS | TEMPERATURE: 98 F | SYSTOLIC BLOOD PRESSURE: 111 MMHG | BODY MASS INDEX: 24.76 KG/M2

## 2024-04-15 VITALS
RESPIRATION RATE: 18 BRPM | HEART RATE: 66 BPM | DIASTOLIC BLOOD PRESSURE: 58 MMHG | HEIGHT: 64 IN | BODY MASS INDEX: 23.86 KG/M2 | TEMPERATURE: 98 F | WEIGHT: 139.75 LBS | SYSTOLIC BLOOD PRESSURE: 106 MMHG

## 2024-04-15 DIAGNOSIS — T45.1X5A IMMUNODEFICIENCY DUE TO CHEMOTHERAPY: ICD-10-CM

## 2024-04-15 DIAGNOSIS — C78.7 METASTASIS TO LIVER: ICD-10-CM

## 2024-04-15 DIAGNOSIS — C78.01 MALIGNANT NEOPLASM METASTATIC TO BOTH LUNGS: ICD-10-CM

## 2024-04-15 DIAGNOSIS — D84.821 IMMUNODEFICIENCY DUE TO CHEMOTHERAPY: ICD-10-CM

## 2024-04-15 DIAGNOSIS — D84.81 IMMUNODEFICIENCY SECONDARY TO NEOPLASM: ICD-10-CM

## 2024-04-15 DIAGNOSIS — B30.9 ACUTE VIRAL CONJUNCTIVITIS OF BOTH EYES: ICD-10-CM

## 2024-04-15 DIAGNOSIS — C25.9 PANCREATIC ADENOCARCINOMA: Primary | ICD-10-CM

## 2024-04-15 DIAGNOSIS — N81.9 VAGINAL VAULT PROLAPSE: ICD-10-CM

## 2024-04-15 DIAGNOSIS — H00.012 HORDEOLUM EXTERNUM OF RIGHT LOWER EYELID: ICD-10-CM

## 2024-04-15 DIAGNOSIS — I70.0 AORTIC ATHEROSCLEROSIS: ICD-10-CM

## 2024-04-15 DIAGNOSIS — M81.0 AGE-RELATED OSTEOPOROSIS WITHOUT CURRENT PATHOLOGICAL FRACTURE: ICD-10-CM

## 2024-04-15 DIAGNOSIS — L28.2 PRURITIC RASH: ICD-10-CM

## 2024-04-15 DIAGNOSIS — Z79.899 IMMUNODEFICIENCY DUE TO CHEMOTHERAPY: ICD-10-CM

## 2024-04-15 DIAGNOSIS — D49.9 IMMUNODEFICIENCY SECONDARY TO NEOPLASM: ICD-10-CM

## 2024-04-15 DIAGNOSIS — E78.5 DYSLIPIDEMIA: ICD-10-CM

## 2024-04-15 DIAGNOSIS — K59.00 CONSTIPATION, UNSPECIFIED CONSTIPATION TYPE: ICD-10-CM

## 2024-04-15 DIAGNOSIS — C78.02 MALIGNANT NEOPLASM METASTATIC TO BOTH LUNGS: ICD-10-CM

## 2024-04-15 PROCEDURE — 3288F FALL RISK ASSESSMENT DOCD: CPT | Mod: CPTII,S$GLB,, | Performed by: INTERNAL MEDICINE

## 2024-04-15 PROCEDURE — 1159F MED LIST DOCD IN RCRD: CPT | Mod: CPTII,S$GLB,, | Performed by: INTERNAL MEDICINE

## 2024-04-15 PROCEDURE — 99999 PR PBB SHADOW E&M-EST. PATIENT-LVL IV: CPT | Mod: PBBFAC,,, | Performed by: INTERNAL MEDICINE

## 2024-04-15 PROCEDURE — 1101F PT FALLS ASSESS-DOCD LE1/YR: CPT | Mod: CPTII,S$GLB,, | Performed by: INTERNAL MEDICINE

## 2024-04-15 PROCEDURE — 3078F DIAST BP <80 MM HG: CPT | Mod: CPTII,S$GLB,, | Performed by: INTERNAL MEDICINE

## 2024-04-15 PROCEDURE — 63600175 PHARM REV CODE 636 W HCPCS: Mod: JZ,JG | Performed by: REGISTERED NURSE

## 2024-04-15 PROCEDURE — 96367 TX/PROPH/DG ADDL SEQ IV INF: CPT

## 2024-04-15 PROCEDURE — 1157F ADVNC CARE PLAN IN RCRD: CPT | Mod: CPTII,S$GLB,, | Performed by: INTERNAL MEDICINE

## 2024-04-15 PROCEDURE — 1126F AMNT PAIN NOTED NONE PRSNT: CPT | Mod: CPTII,S$GLB,, | Performed by: INTERNAL MEDICINE

## 2024-04-15 PROCEDURE — G2211 COMPLEX E/M VISIT ADD ON: HCPCS | Mod: S$GLB,,, | Performed by: INTERNAL MEDICINE

## 2024-04-15 PROCEDURE — 25000003 PHARM REV CODE 250: Performed by: REGISTERED NURSE

## 2024-04-15 PROCEDURE — 99215 OFFICE O/P EST HI 40 MIN: CPT | Mod: S$GLB,,, | Performed by: INTERNAL MEDICINE

## 2024-04-15 PROCEDURE — 36593 DECLOT VASCULAR DEVICE: CPT

## 2024-04-15 PROCEDURE — 3074F SYST BP LT 130 MM HG: CPT | Mod: CPTII,S$GLB,, | Performed by: INTERNAL MEDICINE

## 2024-04-15 PROCEDURE — 96417 CHEMO IV INFUS EACH ADDL SEQ: CPT

## 2024-04-15 PROCEDURE — 1160F RVW MEDS BY RX/DR IN RCRD: CPT | Mod: CPTII,S$GLB,, | Performed by: INTERNAL MEDICINE

## 2024-04-15 PROCEDURE — A4216 STERILE WATER/SALINE, 10 ML: HCPCS | Performed by: REGISTERED NURSE

## 2024-04-15 PROCEDURE — 96413 CHEMO IV INFUSION 1 HR: CPT

## 2024-04-15 RX ORDER — PROCHLORPERAZINE EDISYLATE 5 MG/ML
5 INJECTION INTRAMUSCULAR; INTRAVENOUS ONCE AS NEEDED
Status: DISCONTINUED | OUTPATIENT
Start: 2024-04-15 | End: 2024-04-15 | Stop reason: HOSPADM

## 2024-04-15 RX ORDER — DIPHENHYDRAMINE HYDROCHLORIDE 50 MG/ML
50 INJECTION INTRAMUSCULAR; INTRAVENOUS ONCE AS NEEDED
Status: DISCONTINUED | OUTPATIENT
Start: 2024-04-15 | End: 2024-04-15 | Stop reason: HOSPADM

## 2024-04-15 RX ORDER — DIPHENHYDRAMINE HYDROCHLORIDE 50 MG/ML
50 INJECTION INTRAMUSCULAR; INTRAVENOUS ONCE AS NEEDED
Status: CANCELLED | OUTPATIENT
Start: 2024-04-15

## 2024-04-15 RX ORDER — SODIUM CHLORIDE 0.9 % (FLUSH) 0.9 %
10 SYRINGE (ML) INJECTION
Status: CANCELLED | OUTPATIENT
Start: 2024-04-15

## 2024-04-15 RX ORDER — PROCHLORPERAZINE EDISYLATE 5 MG/ML
5 INJECTION INTRAMUSCULAR; INTRAVENOUS ONCE AS NEEDED
Status: CANCELLED
Start: 2024-04-15

## 2024-04-15 RX ORDER — EPINEPHRINE 0.3 MG/.3ML
0.3 INJECTION SUBCUTANEOUS ONCE AS NEEDED
Status: CANCELLED | OUTPATIENT
Start: 2024-04-15

## 2024-04-15 RX ORDER — SODIUM CHLORIDE 0.9 % (FLUSH) 0.9 %
10 SYRINGE (ML) INJECTION
Status: DISCONTINUED | OUTPATIENT
Start: 2024-04-15 | End: 2024-04-15 | Stop reason: HOSPADM

## 2024-04-15 RX ORDER — EPINEPHRINE 0.3 MG/.3ML
0.3 INJECTION SUBCUTANEOUS ONCE AS NEEDED
Status: DISCONTINUED | OUTPATIENT
Start: 2024-04-15 | End: 2024-04-15 | Stop reason: HOSPADM

## 2024-04-15 RX ORDER — HEPARIN 100 UNIT/ML
500 SYRINGE INTRAVENOUS
Status: DISCONTINUED | OUTPATIENT
Start: 2024-04-15 | End: 2024-04-15 | Stop reason: HOSPADM

## 2024-04-15 RX ORDER — HEPARIN 100 UNIT/ML
500 SYRINGE INTRAVENOUS
Status: CANCELLED | OUTPATIENT
Start: 2024-04-15

## 2024-04-15 RX ADMIN — ONDANSETRON 8 MG: 2 INJECTION INTRAMUSCULAR; INTRAVENOUS at 09:04

## 2024-04-15 RX ADMIN — ALTEPLASE 2 MG: 2.2 INJECTION, POWDER, LYOPHILIZED, FOR SOLUTION INTRAVENOUS at 09:04

## 2024-04-15 RX ADMIN — SODIUM CHLORIDE: 0.9 INJECTION, SOLUTION INTRAVENOUS at 09:04

## 2024-04-15 RX ADMIN — PACLITAXEL 200 MG: 100 INJECTION, POWDER, LYOPHILIZED, FOR SUSPENSION INTRAVENOUS at 10:04

## 2024-04-15 RX ADMIN — GEMCITABINE HYDROCHLORIDE 1690 MG: 1 INJECTION, SOLUTION INTRAVENOUS at 10:04

## 2024-04-15 RX ADMIN — HEPARIN 500 UNITS: 100 SYRINGE at 11:04

## 2024-04-15 RX ADMIN — Medication 10 ML: at 11:04

## 2024-04-16 ENCOUNTER — PATIENT MESSAGE (OUTPATIENT)
Dept: ADMINISTRATIVE | Facility: OTHER | Age: 79
End: 2024-04-16
Payer: MEDICARE

## 2024-04-17 ENCOUNTER — PATIENT MESSAGE (OUTPATIENT)
Dept: ADMINISTRATIVE | Facility: OTHER | Age: 79
End: 2024-04-17
Payer: MEDICARE

## 2024-04-18 ENCOUNTER — PATIENT MESSAGE (OUTPATIENT)
Dept: ADMINISTRATIVE | Facility: OTHER | Age: 79
End: 2024-04-18
Payer: MEDICARE

## 2024-04-19 ENCOUNTER — PATIENT MESSAGE (OUTPATIENT)
Dept: ADMINISTRATIVE | Facility: OTHER | Age: 79
End: 2024-04-19
Payer: MEDICARE

## 2024-04-19 ENCOUNTER — OFFICE VISIT (OUTPATIENT)
Dept: PODIATRY | Facility: CLINIC | Age: 79
End: 2024-04-19
Payer: MEDICARE

## 2024-04-19 VITALS
HEART RATE: 83 BPM | WEIGHT: 139.75 LBS | DIASTOLIC BLOOD PRESSURE: 55 MMHG | HEIGHT: 64 IN | SYSTOLIC BLOOD PRESSURE: 118 MMHG | BODY MASS INDEX: 23.86 KG/M2

## 2024-04-19 DIAGNOSIS — B35.1 ONYCHOMYCOSIS DUE TO DERMATOPHYTE: Primary | ICD-10-CM

## 2024-04-19 LAB — KOH PREP SPEC: NORMAL

## 2024-04-19 PROCEDURE — 1157F ADVNC CARE PLAN IN RCRD: CPT | Mod: CPTII,S$GLB,, | Performed by: PODIATRIST

## 2024-04-19 PROCEDURE — 99213 OFFICE O/P EST LOW 20 MIN: CPT | Mod: S$GLB,,, | Performed by: PODIATRIST

## 2024-04-19 PROCEDURE — 87210 SMEAR WET MOUNT SALINE/INK: CPT | Performed by: PODIATRIST

## 2024-04-19 PROCEDURE — 1159F MED LIST DOCD IN RCRD: CPT | Mod: CPTII,S$GLB,, | Performed by: PODIATRIST

## 2024-04-19 PROCEDURE — 3288F FALL RISK ASSESSMENT DOCD: CPT | Mod: CPTII,S$GLB,, | Performed by: PODIATRIST

## 2024-04-19 PROCEDURE — 3078F DIAST BP <80 MM HG: CPT | Mod: CPTII,S$GLB,, | Performed by: PODIATRIST

## 2024-04-19 PROCEDURE — 99999 PR PBB SHADOW E&M-EST. PATIENT-LVL IV: CPT | Mod: PBBFAC,,, | Performed by: PODIATRIST

## 2024-04-19 PROCEDURE — 1101F PT FALLS ASSESS-DOCD LE1/YR: CPT | Mod: CPTII,S$GLB,, | Performed by: PODIATRIST

## 2024-04-19 PROCEDURE — 3074F SYST BP LT 130 MM HG: CPT | Mod: CPTII,S$GLB,, | Performed by: PODIATRIST

## 2024-04-19 PROCEDURE — 1126F AMNT PAIN NOTED NONE PRSNT: CPT | Mod: CPTII,S$GLB,, | Performed by: PODIATRIST

## 2024-04-19 PROCEDURE — 87101 SKIN FUNGI CULTURE: CPT | Performed by: PODIATRIST

## 2024-04-19 NOTE — PROGRESS NOTES
Subjective:      Patient ID: Deepali Alex is a 79 y.o. female.    Chief Complaint: Nail Problem    Discolroed thickening toenails with debris.  Gradual onset, worsening over past several weeks, aggravated by increased weight bearing, shoe gear, pressure.  No previous medical treatment.  OTC pain med not helping. Possibly from chemotherapy.    Review of Systems   Constitutional: Negative for chills, diaphoresis, fever, malaise/fatigue and night sweats.   Cardiovascular:  Negative for claudication, cyanosis, leg swelling and syncope.   Skin:  Positive for nail changes. Negative for color change, dry skin, rash, suspicious lesions and unusual hair distribution.   Musculoskeletal:  Negative for falls, joint pain, joint swelling, muscle cramps, muscle weakness and stiffness.   Gastrointestinal:  Negative for constipation, diarrhea, nausea and vomiting.   Neurological:  Negative for brief paralysis, disturbances in coordination, focal weakness, numbness, paresthesias, sensory change and tremors.         Objective:      Physical Exam  Constitutional:       General: She is not in acute distress.     Appearance: She is well-developed. She is not diaphoretic.   Cardiovascular:      Pulses:           Popliteal pulses are 2+ on the right side and 2+ on the left side.        Dorsalis pedis pulses are 2+ on the right side and 2+ on the left side.        Posterior tibial pulses are 2+ on the right side and 2+ on the left side.      Comments: Capillary refill 3 seconds all toes/distal feet, all toes/both feet warm to touch.      Negative lymphadenopathy bilateral popliteal fossa and tarsal tunnel.      Negavie lower extremity edema bilateral.    Musculoskeletal:      Right ankle: No swelling, deformity, ecchymosis or lacerations. Normal range of motion. Normal pulse.      Right Achilles Tendon: Normal. No defects. Watkins's test negative.      Comments: Pain to palpation and end range of motion left 1st mtpj with visible  and palpable periarticular exostoses, and reduced range of motion with mild crepitus.      Lymphadenopathy:      Lower Body: No right inguinal adenopathy. No left inguinal adenopathy.      Comments: Negative lymphadenopathy bilateral popliteal fossa and tarsal tunnel.    Negative lymphangitic streaking bilateral feet/ankles/legs.   Skin:     General: Skin is warm and dry.      Capillary Refill: Capillary refill takes 2 to 3 seconds.      Coloration: Skin is not pale.      Findings: No abrasion, bruising, burn, ecchymosis, erythema, laceration, lesion or rash.      Nails: There is no clubbing.      Comments:   Skin is normal age and health appropriate color, turgor, texture, and temperature bilateral lower extremities without ulceration, hyperpigmentation, discoloration, masses nodules or cords palpated.  No ecchymosis, erythema, edema, or cardinal signs of infection bilateral lower extremities.    Toenails 1st, 2nd, 3rd, 4th, 5th  bilateral are hypertrophic thickened , dystrophic, discolored tanish brown with tan, gray crumbly subungual debris.  Tender to distal nail plate pressure, without periungual skin abnormality of each.     Neurological:      Mental Status: She is alert and oriented to person, place, and time.      Sensory: No sensory deficit.      Motor: No tremor, atrophy or abnormal muscle tone.      Gait: Gait normal.      Comments: Intermittent Paresthesias, and burning bilateral feet with no clearly identified trigger or source.     Psychiatric:         Behavior: Behavior is cooperative.           Assessment:       Encounter Diagnosis   Name Primary?    Onychomycosis due to dermatophyte Yes         Plan:       Deepali was seen today for nail problem.    Diagnoses and all orders for this visit:    Onychomycosis due to dermatophyte  -     KOH prep  -     CULTURE, FUNGUS - SKIN, HAIR, OR NAILS      I counseled the patient on her conditions, their implications and medical management.        Fungal mely  prep    Fungal culture    Penlac    Discussed conservative treatment with shoes of adequate dimensions, material, and style to alleviate symptoms and delay or prevent surgical intervention.    Dry well after hygiene    prn          No follow-ups on file.

## 2024-04-20 ENCOUNTER — PATIENT MESSAGE (OUTPATIENT)
Dept: ADMINISTRATIVE | Facility: OTHER | Age: 79
End: 2024-04-20
Payer: MEDICARE

## 2024-04-21 ENCOUNTER — PATIENT MESSAGE (OUTPATIENT)
Dept: ADMINISTRATIVE | Facility: OTHER | Age: 79
End: 2024-04-21
Payer: MEDICARE

## 2024-04-22 ENCOUNTER — PATIENT MESSAGE (OUTPATIENT)
Dept: ADMINISTRATIVE | Facility: HOSPITAL | Age: 79
End: 2024-04-22
Payer: MEDICARE

## 2024-04-22 ENCOUNTER — PATIENT MESSAGE (OUTPATIENT)
Dept: ADMINISTRATIVE | Facility: OTHER | Age: 79
End: 2024-04-22
Payer: MEDICARE

## 2024-04-22 ENCOUNTER — PATIENT OUTREACH (OUTPATIENT)
Dept: ADMINISTRATIVE | Facility: HOSPITAL | Age: 79
End: 2024-04-22
Payer: MEDICARE

## 2024-04-23 ENCOUNTER — PATIENT MESSAGE (OUTPATIENT)
Dept: ADMINISTRATIVE | Facility: OTHER | Age: 79
End: 2024-04-23
Payer: MEDICARE

## 2024-04-24 ENCOUNTER — PATIENT MESSAGE (OUTPATIENT)
Dept: ADMINISTRATIVE | Facility: OTHER | Age: 79
End: 2024-04-24
Payer: MEDICARE

## 2024-04-25 ENCOUNTER — PATIENT MESSAGE (OUTPATIENT)
Dept: ADMINISTRATIVE | Facility: OTHER | Age: 79
End: 2024-04-25
Payer: MEDICARE

## 2024-04-26 ENCOUNTER — LAB VISIT (OUTPATIENT)
Dept: LAB | Facility: HOSPITAL | Age: 79
End: 2024-04-26
Attending: INTERNAL MEDICINE
Payer: MEDICARE

## 2024-04-26 ENCOUNTER — PATIENT MESSAGE (OUTPATIENT)
Dept: ADMINISTRATIVE | Facility: OTHER | Age: 79
End: 2024-04-26
Payer: MEDICARE

## 2024-04-26 DIAGNOSIS — C25.9 PANCREATIC ADENOCARCINOMA: ICD-10-CM

## 2024-04-26 LAB
ALBUMIN SERPL BCP-MCNC: 3.5 G/DL (ref 3.5–5.2)
ALP SERPL-CCNC: 139 U/L (ref 55–135)
ALT SERPL W/O P-5'-P-CCNC: 18 U/L (ref 10–44)
ANION GAP SERPL CALC-SCNC: 6 MMOL/L (ref 8–16)
AST SERPL-CCNC: 22 U/L (ref 10–40)
BILIRUB SERPL-MCNC: 0.4 MG/DL (ref 0.1–1)
BUN SERPL-MCNC: 18 MG/DL (ref 8–23)
CALCIUM SERPL-MCNC: 9.7 MG/DL (ref 8.7–10.5)
CANCER AG19-9 SERPL-ACNC: ABNORMAL U/ML (ref 0–40)
CHLORIDE SERPL-SCNC: 106 MMOL/L (ref 95–110)
CO2 SERPL-SCNC: 26 MMOL/L (ref 23–29)
CREAT SERPL-MCNC: 0.7 MG/DL (ref 0.5–1.4)
ERYTHROCYTE [DISTWIDTH] IN BLOOD BY AUTOMATED COUNT: 16.1 % (ref 11.5–14.5)
EST. GFR  (NO RACE VARIABLE): >60 ML/MIN/1.73 M^2
GLUCOSE SERPL-MCNC: 101 MG/DL (ref 70–110)
HCT VFR BLD AUTO: 33.7 % (ref 37–48.5)
HGB BLD-MCNC: 10.6 G/DL (ref 12–16)
IMM GRANULOCYTES # BLD AUTO: 0.02 K/UL (ref 0–0.04)
MCH RBC QN AUTO: 27.9 PG (ref 27–31)
MCHC RBC AUTO-ENTMCNC: 31.5 G/DL (ref 32–36)
MCV RBC AUTO: 89 FL (ref 82–98)
NEUTROPHILS # BLD AUTO: 4.7 K/UL (ref 1.8–7.7)
PLATELET # BLD AUTO: 206 K/UL (ref 150–450)
PMV BLD AUTO: 10.7 FL (ref 9.2–12.9)
POTASSIUM SERPL-SCNC: 4.4 MMOL/L (ref 3.5–5.1)
PROT SERPL-MCNC: 6.8 G/DL (ref 6–8.4)
RBC # BLD AUTO: 3.8 M/UL (ref 4–5.4)
SODIUM SERPL-SCNC: 138 MMOL/L (ref 136–145)
WBC # BLD AUTO: 6.57 K/UL (ref 3.9–12.7)

## 2024-04-26 PROCEDURE — 80053 COMPREHEN METABOLIC PANEL: CPT | Performed by: INTERNAL MEDICINE

## 2024-04-26 PROCEDURE — 86301 IMMUNOASSAY TUMOR CA 19-9: CPT | Performed by: INTERNAL MEDICINE

## 2024-04-26 PROCEDURE — 36415 COLL VENOUS BLD VENIPUNCTURE: CPT | Mod: PO | Performed by: INTERNAL MEDICINE

## 2024-04-26 PROCEDURE — 85027 COMPLETE CBC AUTOMATED: CPT | Performed by: INTERNAL MEDICINE

## 2024-04-27 ENCOUNTER — PATIENT MESSAGE (OUTPATIENT)
Dept: ADMINISTRATIVE | Facility: OTHER | Age: 79
End: 2024-04-27
Payer: MEDICARE

## 2024-04-29 ENCOUNTER — INFUSION (OUTPATIENT)
Dept: INFUSION THERAPY | Facility: HOSPITAL | Age: 79
End: 2024-04-29
Payer: MEDICARE

## 2024-04-29 ENCOUNTER — OFFICE VISIT (OUTPATIENT)
Dept: HEMATOLOGY/ONCOLOGY | Facility: CLINIC | Age: 79
End: 2024-04-29
Payer: MEDICARE

## 2024-04-29 VITALS
SYSTOLIC BLOOD PRESSURE: 113 MMHG | BODY MASS INDEX: 24.2 KG/M2 | HEIGHT: 63 IN | DIASTOLIC BLOOD PRESSURE: 56 MMHG | HEART RATE: 72 BPM | WEIGHT: 136.56 LBS | TEMPERATURE: 98 F | OXYGEN SATURATION: 97 %

## 2024-04-29 VITALS
OXYGEN SATURATION: 97 % | BODY MASS INDEX: 24.2 KG/M2 | DIASTOLIC BLOOD PRESSURE: 68 MMHG | WEIGHT: 136.56 LBS | SYSTOLIC BLOOD PRESSURE: 120 MMHG | HEART RATE: 70 BPM | RESPIRATION RATE: 18 BRPM | HEIGHT: 63 IN | TEMPERATURE: 98 F

## 2024-04-29 DIAGNOSIS — C78.7 METASTASIS TO LIVER: ICD-10-CM

## 2024-04-29 DIAGNOSIS — K59.00 CONSTIPATION, UNSPECIFIED CONSTIPATION TYPE: ICD-10-CM

## 2024-04-29 DIAGNOSIS — Z79.899 IMMUNODEFICIENCY DUE TO CHEMOTHERAPY: ICD-10-CM

## 2024-04-29 DIAGNOSIS — N81.9 VAGINAL VAULT PROLAPSE: ICD-10-CM

## 2024-04-29 DIAGNOSIS — M81.0 AGE-RELATED OSTEOPOROSIS WITHOUT CURRENT PATHOLOGICAL FRACTURE: ICD-10-CM

## 2024-04-29 DIAGNOSIS — D84.821 IMMUNODEFICIENCY DUE TO CHEMOTHERAPY: ICD-10-CM

## 2024-04-29 DIAGNOSIS — L28.2 PRURITIC RASH: ICD-10-CM

## 2024-04-29 DIAGNOSIS — C25.9 PANCREATIC ADENOCARCINOMA: Primary | ICD-10-CM

## 2024-04-29 DIAGNOSIS — T45.1X5A IMMUNODEFICIENCY DUE TO CHEMOTHERAPY: ICD-10-CM

## 2024-04-29 DIAGNOSIS — E78.5 DYSLIPIDEMIA: ICD-10-CM

## 2024-04-29 DIAGNOSIS — D84.81 IMMUNODEFICIENCY SECONDARY TO NEOPLASM: ICD-10-CM

## 2024-04-29 DIAGNOSIS — H00.012 HORDEOLUM EXTERNUM OF RIGHT LOWER EYELID: ICD-10-CM

## 2024-04-29 DIAGNOSIS — D49.9 IMMUNODEFICIENCY SECONDARY TO NEOPLASM: ICD-10-CM

## 2024-04-29 DIAGNOSIS — C78.01 MALIGNANT NEOPLASM METASTATIC TO BOTH LUNGS: ICD-10-CM

## 2024-04-29 DIAGNOSIS — C78.02 MALIGNANT NEOPLASM METASTATIC TO BOTH LUNGS: ICD-10-CM

## 2024-04-29 DIAGNOSIS — B30.9 ACUTE VIRAL CONJUNCTIVITIS OF BOTH EYES: ICD-10-CM

## 2024-04-29 DIAGNOSIS — I70.0 AORTIC ATHEROSCLEROSIS: ICD-10-CM

## 2024-04-29 PROCEDURE — 63600175 PHARM REV CODE 636 W HCPCS: Performed by: INTERNAL MEDICINE

## 2024-04-29 PROCEDURE — 1101F PT FALLS ASSESS-DOCD LE1/YR: CPT | Mod: CPTII,S$GLB,, | Performed by: INTERNAL MEDICINE

## 2024-04-29 PROCEDURE — 1159F MED LIST DOCD IN RCRD: CPT | Mod: CPTII,S$GLB,, | Performed by: INTERNAL MEDICINE

## 2024-04-29 PROCEDURE — 3288F FALL RISK ASSESSMENT DOCD: CPT | Mod: CPTII,S$GLB,, | Performed by: INTERNAL MEDICINE

## 2024-04-29 PROCEDURE — 96367 TX/PROPH/DG ADDL SEQ IV INF: CPT

## 2024-04-29 PROCEDURE — 99215 OFFICE O/P EST HI 40 MIN: CPT | Mod: S$GLB,,, | Performed by: INTERNAL MEDICINE

## 2024-04-29 PROCEDURE — 1126F AMNT PAIN NOTED NONE PRSNT: CPT | Mod: CPTII,S$GLB,, | Performed by: INTERNAL MEDICINE

## 2024-04-29 PROCEDURE — 96417 CHEMO IV INFUS EACH ADDL SEQ: CPT

## 2024-04-29 PROCEDURE — 99999 PR PBB SHADOW E&M-EST. PATIENT-LVL IV: CPT | Mod: PBBFAC,,, | Performed by: INTERNAL MEDICINE

## 2024-04-29 PROCEDURE — 25000003 PHARM REV CODE 250: Performed by: INTERNAL MEDICINE

## 2024-04-29 PROCEDURE — 1157F ADVNC CARE PLAN IN RCRD: CPT | Mod: CPTII,S$GLB,, | Performed by: INTERNAL MEDICINE

## 2024-04-29 PROCEDURE — 96413 CHEMO IV INFUSION 1 HR: CPT

## 2024-04-29 PROCEDURE — 3074F SYST BP LT 130 MM HG: CPT | Mod: CPTII,S$GLB,, | Performed by: INTERNAL MEDICINE

## 2024-04-29 PROCEDURE — 3078F DIAST BP <80 MM HG: CPT | Mod: CPTII,S$GLB,, | Performed by: INTERNAL MEDICINE

## 2024-04-29 RX ORDER — CYCLOSPORINE 0.5 MG/ML
EMULSION OPHTHALMIC
COMMUNITY
Start: 2024-04-17 | End: 2024-06-07

## 2024-04-29 RX ORDER — DIPHENHYDRAMINE HYDROCHLORIDE 50 MG/ML
50 INJECTION INTRAMUSCULAR; INTRAVENOUS ONCE AS NEEDED
Status: DISCONTINUED | OUTPATIENT
Start: 2024-04-29 | End: 2024-04-29 | Stop reason: HOSPADM

## 2024-04-29 RX ORDER — HEPARIN 100 UNIT/ML
500 SYRINGE INTRAVENOUS
Status: CANCELLED | OUTPATIENT
Start: 2024-04-29

## 2024-04-29 RX ORDER — DIPHENHYDRAMINE HYDROCHLORIDE 50 MG/ML
50 INJECTION INTRAMUSCULAR; INTRAVENOUS ONCE AS NEEDED
Status: CANCELLED | OUTPATIENT
Start: 2024-04-29

## 2024-04-29 RX ORDER — EPINEPHRINE 0.3 MG/.3ML
0.3 INJECTION SUBCUTANEOUS ONCE AS NEEDED
Status: DISCONTINUED | OUTPATIENT
Start: 2024-04-29 | End: 2024-04-29 | Stop reason: HOSPADM

## 2024-04-29 RX ORDER — EPINEPHRINE 0.3 MG/.3ML
0.3 INJECTION SUBCUTANEOUS ONCE AS NEEDED
Status: CANCELLED | OUTPATIENT
Start: 2024-04-29

## 2024-04-29 RX ORDER — OLOPATADINE HYDROCHLORIDE 1 MG/ML
1 SOLUTION/ DROPS OPHTHALMIC 2 TIMES DAILY
COMMUNITY
End: 2024-06-07

## 2024-04-29 RX ORDER — HEPARIN 100 UNIT/ML
500 SYRINGE INTRAVENOUS
Status: DISCONTINUED | OUTPATIENT
Start: 2024-04-29 | End: 2024-04-29 | Stop reason: HOSPADM

## 2024-04-29 RX ORDER — PROCHLORPERAZINE EDISYLATE 5 MG/ML
5 INJECTION INTRAMUSCULAR; INTRAVENOUS ONCE AS NEEDED
Status: CANCELLED
Start: 2024-04-29

## 2024-04-29 RX ORDER — PROCHLORPERAZINE EDISYLATE 5 MG/ML
5 INJECTION INTRAMUSCULAR; INTRAVENOUS ONCE AS NEEDED
Status: DISCONTINUED | OUTPATIENT
Start: 2024-04-29 | End: 2024-04-29 | Stop reason: HOSPADM

## 2024-04-29 RX ORDER — SODIUM CHLORIDE 0.9 % (FLUSH) 0.9 %
10 SYRINGE (ML) INJECTION
Status: CANCELLED | OUTPATIENT
Start: 2024-04-29

## 2024-04-29 RX ORDER — SODIUM CHLORIDE 0.9 % (FLUSH) 0.9 %
10 SYRINGE (ML) INJECTION
Status: DISCONTINUED | OUTPATIENT
Start: 2024-04-29 | End: 2024-04-29 | Stop reason: HOSPADM

## 2024-04-29 RX ADMIN — SODIUM CHLORIDE: 9 INJECTION, SOLUTION INTRAVENOUS at 09:04

## 2024-04-29 RX ADMIN — ONDANSETRON HYDROCHLORIDE 8 MG: 2 INJECTION INTRAMUSCULAR; INTRAVENOUS at 09:04

## 2024-04-29 RX ADMIN — HEPARIN 500 UNITS: 100 SYRINGE at 11:04

## 2024-04-29 RX ADMIN — PACLITAXEL 200 MG: 100 INJECTION, POWDER, LYOPHILIZED, FOR SUSPENSION INTRAVENOUS at 10:04

## 2024-04-29 RX ADMIN — GEMCITABINE 1600 MG: 38 INJECTION, SOLUTION INTRAVENOUS at 10:04

## 2024-04-29 NOTE — PLAN OF CARE
Pt admitted for C7 D15 Abraxane/Gemzar. Lab reviewed and assessment performed. ( NOTE- Pt did not have blood return immediately, took several flushes and position changes to obtain blood return from port. Cath Garcia was used on 4/15/24 to obtain blood flow, just to be aware.) Pt tolerated treatments well. Port de-accessed and Pt discharged home

## 2024-04-29 NOTE — PROGRESS NOTES
MEDICAL ONCOLOGY - ESTABLISHED PATIENT VISIT    Reason for visit: Pancreatic adenocarcinoma    Best Contact Phone Number(s): [phone] (home)      Cancer/Stage/TNM:    Cancer Staging   Pancreatic adenocarcinoma  Staging form: Exocrine Pancreas, AJCC 8th Edition  - Clinical stage from 9/21/2023: Stage IV (cT1, cNX, pM1) - Signed by David Briceño MD on 10/11/2023       Oncology History   Pancreatic adenocarcinoma   9/5/2023 Initial Diagnosis    Pancreatic adenocarcinoma     9/21/2023 Cancer Staged    Staging form: Exocrine Pancreas, AJCC 8th Edition  - Clinical stage from 9/21/2023: Stage IV (cT1, cNX, pM1)     10/2/2023 Tumor Conference     [facility] UGI MULTIDISCIPLINARY TUMOR BOARD  PATIENT REVIEW FORM   ____________________________________________________________    CLINIC #: [id number]  DATE: 10/2/2023    DIAGNOSIS: pancreas CA    PRESENTER: Angela    PATIENT SUMMARY:   This 79 y/o female presented in August with abd pain, decreased appetite with weight loss. Reviewed CT and MRI imaging ~ 2 cm mass in tail of pancreas with diffuse metastatic liver disease, largest liver lesion in left lobe measuring 4.5 cm. IR performed liver bx and this pathology was reviewed - poorly differentiated adenocarcinoma, favoring upper GI origin.     BOARD RECOMMENDATIONS:   Stage IV pancreas CA with liver mets  Add MMR and TEMPUS  Proceed with palliative systemic chemotherapy    CONSULT NEEDED:     [] Surgery    [] Hem/Onc    [] Rad/Onc    [] Dietary     [] Social Service    [] Psychology       [] AES  [] Radiology     Clinical Stage: Tumor   Node(s)   Metastasis        GROUP STAGE:  [] O    [] 1A    [] IB    [] IIA    [] IIB     [] IIIA     [] IIIB     [] IIIC    [x]IV  [] Local recurrence     [] Regional recurrence     [] Distant recurrence   Metastatic site(s): liver         [x] [facility]idia[facility] Treatment Guidelines reviewed and care planned is consistent with guidelines.         (i.e., NCCN, NCI, PD, ACO, AUA,  etc.)    PRESENTATION AT CANCER CONFERENCE:         [x] Prospective    [] Retrospective     [] Follow-Up    Eligible for clinical trial : yes       11/1/2023 -  Chemotherapy    Treatment Summary   Plan Name: OP PANC NAB-PACLITAXEL + GEMCITABINE Q4W  Treatment Goal: Palliative  Status: Active  Start Date: 11/1/2023  End Date: 9/6/2024 (Planned)  Provider: David Briceño MD  Chemotherapy: gemcitabine (GEMZAR) 1,600 mg in sodium chloride 0.9% SolP 327.08 mL chemo infusion, 1,680 mg, Intravenous, Clinic/HOD 1 time, 7 of 12 cycles  Administration: 1,600 mg (11/1/2023), 1,600 mg (11/15/2023), 1,600 mg (11/29/2023), 1,600 mg (12/14/2023), 1,600 mg (12/27/2023), 1,600 mg (1/9/2024), 1,600 mg (1/22/2024), 1,600 mg (2/5/2024), 1,600 mg (2/19/2024), 1,600 mg (3/4/2024), 1,600 mg (3/18/2024), 1,700 mg (4/1/2024), 1,690 mg (4/15/2024)          Interim History:   79 y.o. female with metastatic pancreatic cancer who presents for follow-up prior to cycle 14 of biweekly gemcitabine + nab-paclitaxel. She is feeling well today.  She has some intermittent pain in her right shoulder with deep inspiration, not as bad as it was 1-2 weeks ago.  She states that one night she did have to take an Aleve to help her sleep. Mild paresthesias in her feet are unchanged; hardly noticeable in her fingertips.     ECOG PS is 0. Presents with her  today.     ROS:   Review of Systems   Constitutional:  Negative for chills, fever, malaise/fatigue and weight loss.   HENT:  Negative for sore throat.    Eyes:  Negative for blurred vision and pain.   Respiratory:  Negative for cough and shortness of breath.    Cardiovascular:  Positive for leg swelling (L>R). Negative for chest pain.   Gastrointestinal:  Negative for constipation, diarrhea, nausea and vomiting.   Genitourinary:  Negative for dysuria and frequency.   Musculoskeletal:  Positive for joint pain (R shoulder). Negative for back pain, falls and myalgias.   Skin:  Negative for itching  and rash.   Neurological:  Negative for dizziness, weakness and headaches.   Endo/Heme/Allergies:  Does not bruise/bleed easily.   Psychiatric/Behavioral:  Negative for depression. The patient is not nervous/anxious.        Past Medical History:   Past Medical History:   Diagnosis Date    Age-related osteoporosis without current pathological fracture     Atherosclerosis of aortic arch     - noted on CXR 8/2017    Back pain     Diverticulosis of sigmoid colon 10/22/2019    Ectopic pregnancy     Fibrocystic breast     Hyperlipidemia     Inguinal hernia     Osteopenia     Polyp of ascending colon 10/22/2019    Urinary incontinence, mixed 05/24/2022        Past Surgical History:   Past Surgical History:   Procedure Laterality Date    ECTOPIC PREGNANCY SURGERY      HERNIA REPAIR      left inguinal    HYSTERECTOMY  1980    without BSO        Family History:   Family History   Problem Relation Name Age of Onset    Breast cancer Mother  69    Seizures Mother      Heart disease Father      Heart attack Father      Depression Father      Breast cancer Sister Monika 73    Breast cancer Maternal Aunt Kathleen 73    Prostate cancer Brother Lamine 81        no mets    Obesity Brother Lamine     Heart failure Maternal Grandmother      Heart attack Maternal Grandfather      Suicide Attempts Paternal Grandmother      Depression Paternal Grandmother      Mental illness Paternal Grandmother      Depression Paternal Aunt Jenniffer     Heart attack Paternal Uncle Leodan     Depression Paternal Uncle [place]     Cancer Maternal Aunt Rosio 83        blood cancer (leukemia?)    Cancer Maternal Uncle Bill         unk type; was COD    Suicide Paternal Cousin      Tongue cancer Paternal Cousin Jimi         smoking hx unk    Ovarian cancer Neg Hx          Social History:   Social History     Tobacco Use    Smoking status: Never    Smokeless tobacco: Never   Substance Use Topics    Alcohol use: No        I have reviewed and updated the patient's past  medical, surgical, family and social histories.    Allergies:   Review of patient's allergies indicates:   Allergen Reactions    Erythromycin (bulk)      Other reaction(s): Eye irritation        Medications:   Current Outpatient Medications   Medication Sig Dispense Refill    ABRAXANE 100 mg injection       coconut oil, bulk, Oil by Misc.(Non-Drug; Combo Route) route.      gemcitabine (GEMZAR) 1 gram/26.3 mL (38 mg/mL) Soln injection       gemcitabine 200 mg/5.26 mL (38 mg/mL) Soln       heparin sodium,porcine (HEPARIN, PORCINE,) 1,000 unit/mL injection       heparin, porcine, PF, (HEPARIN FLUSH 100 UNITS/ML) 100 unit/mL Syrg       multivitamin (THERAGRAN) per tablet Take 1 tablet by mouth once daily.      olopatadine (PATANOL) 0.1 % ophthalmic solution 1 drop 2 (two) times daily.      RESTASIS 0.05 % ophthalmic emulsion       vitamin D (VITAMIN D3) 1000 units Tab Take 1,000 Units by mouth once daily.      dexAMETHasone (DECADRON) 4 mg/mL injection  (Patient not taking: Reported on 4/29/2024)      diphenhydramine HCl (BENADRYL ORAL) Take 25 mg by mouth as needed (rash). (Patient not taking: Reported on 4/15/2024)      doxycycline (VIBRAMYCIN) 100 MG Cap Take 1 capsule (100 mg total) by mouth every 12 (twelve) hours. (Patient not taking: Reported on 4/29/2024) 14 capsule 0    GADAVIST 1 mmol/mL (604.72 mg/mL) Soln injection  (Patient not taking: Reported on 4/29/2024)      HYDROcodone-acetaminophen (NORCO) 5-325 mg per tablet Take 1 tablet by mouth every 12 (twelve) hours as needed for Pain. (Patient not taking: Reported on 4/29/2024) 10 tablet 0    hydrOXYzine HCL (ATARAX) 25 MG tablet Take 1 tablet (25 mg total) by mouth 3 (three) times daily as needed for Itching. (Patient not taking: Reported on 4/15/2024) 60 tablet 2    LIDOcaine-prilocaine (EMLA) cream Apply topically as needed (place to port site 45-60 minutes prior to chemotherapy). (Patient not taking: Reported on 4/15/2024) 30 g 3    ondansetron  "(ZOFRAN-ODT) 4 MG TbDL Take 1 tablet (4 mg total) by mouth every 8 (eight) hours as needed (Nausea). (Patient not taking: Reported on 4/29/2024) 10 tablet 0    ondansetron (ZOFRAN-ODT) 8 MG TbDL TAKE 1 TABLET (8 MG TOTAL) BY MOUTH EVERY 6 HOURS AS NEEDED FOR NAUSEA (Patient not taking: Reported on 4/29/2024) 30 tablet 5    triamcinolone acetonide 0.1% (KENALOG) 0.1 % ointment Apply topically as needed (rash). (Patient not taking: Reported on 4/15/2024)       No current facility-administered medications for this visit.      Physical Exam:   BP (!) 113/56 (BP Location: Left arm, Patient Position: Sitting, BP Method: Medium (Automatic))   Pulse 72   Temp 98.2 °F (36.8 °C) (Oral)   Ht 5' 3" (1.6 m)   Wt 62 kg (136 lb 9.2 oz)   SpO2 97%   BMI 24.19 kg/m²        Physical Exam  Vitals reviewed.   Constitutional:       General: She is not in acute distress.     Appearance: Normal appearance. She is well-developed and normal weight. She is not ill-appearing, toxic-appearing or diaphoretic.   HENT:      Head: Normocephalic and atraumatic.      Right Ear: External ear normal.      Left Ear: External ear normal.      Nose: Nose normal.      Mouth/Throat:      Mouth: Mucous membranes are moist.      Pharynx: Oropharynx is clear. No posterior oropharyngeal erythema.   Eyes:      General: No scleral icterus.     Extraocular Movements: Extraocular movements intact.      Pupils: Pupils are equal, round, and reactive to light.   Neck:      Trachea: No tracheal deviation.   Cardiovascular:      Rate and Rhythm: Normal rate and regular rhythm.      Pulses: Normal pulses.      Heart sounds: Normal heart sounds.   Pulmonary:      Effort: Pulmonary effort is normal. No respiratory distress.      Breath sounds: Normal breath sounds. No wheezing or rales.   Chest:      Comments: RCW port  Abdominal:      General: Bowel sounds are normal. There is no distension.      Palpations: Abdomen is soft.      Tenderness: There is no abdominal " tenderness.   Musculoskeletal:         General: No swelling or deformity. Normal range of motion.      Cervical back: Normal range of motion and neck supple.   Skin:     General: Skin is warm.      Coloration: Skin is not jaundiced.      Findings: No erythema or rash.   Neurological:      General: No focal deficit present.      Mental Status: She is alert and oriented to person, place, and time. Mental status is at baseline.      Motor: No weakness.      Gait: Gait normal.   Psychiatric:         Mood and Affect: Mood normal.         Behavior: Behavior normal.         Thought Content: Thought content normal.         Judgment: Judgment normal.         Labs:   Lab work reviewed.  Mild anemia.    Imaging:   CT CAP 4/12/24:    Impression:     In this patient with pancreatic adenocarcinoma, the nonenhancing pancreatic tail mass appears smaller with continued abutment of the splenic artery and occlusion of the splenic vein.     Mixed response in the liver.  Most lesions appear stable in size with some smaller and some larger as compared to prior.     No evidence of new disease.        Path:   Final Pathologic Diagnosis     Left hepatic lobe, biopsy:   Positive for malignancy, poorly differentiated adenocarcinoma (see comment)   Tumor necrosis present     Comment:  A review of the patient's imaging shows the presence of a 1.9 cm pancreatic tail mass and diffuse liver lesions.  These morphologic and immunophenotypic findings are supportive of a pancreatobiliary and/or upper gastrointestinal primary.  VC      Comment: Interp By Nataliia Peralta D.O., Signed on 10/05/2023 at 08:36   Supplemental Diagnosis     The purpose of this supplemental report is to report results of MMR panel in the tumor cells as follows:     Immunohistochemistry (IHC) Testing for Mismatch Repair (MMR) Proteins:      MLH1 - Intact nuclear expression    MSH2 - Intact nuclear expression    MSH6 - Intact nuclear expression    PMS2 - Intact nuclear  expression            Somatic Genomic Results       Pancreatic mass       Tumor NGS Tissue  Resulted: 10/10/23 Status: Preliminary result       Detected - Pathogenic (4)      Gene Variant VAF   BRAF p.[id number] - c.1458_1466del Inframe deletion - GOF 12.00 %   CDKN2A CDKN2A - Copy number loss --   CDKN2B CDKN2B - Copy number loss --   TP53 p.Y163C - c.488A>G Missense variant - LOF 30.50 %              Detected - Uncertain significance (1)      Gene Variant VAF   DIS3 p.N671S - c.2012A>G Missense variant 23.10 %                   Assessment:       1. Pancreatic adenocarcinoma    2. 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 Metastasis to liver    4. Immunodeficiency due to chemotherapy    5. Immunodeficiency secondary to neoplasm    6. Dyslipidemia    7. Aortic atherosclerosis    8. Constipation, unspecified constipation type    9. Pruritic rash    10. Age-related osteoporosis without current pathological fracture    11. Vaginal vault prolapse    12. Acute viral conjunctivitis of both eyes    13. Hordeolum externum of right lower eyelid         Plan:        # Pancreatic cancer  We discussed with the patient and her family her biopsy-proven diagnosis of metastatic pancreatic adenocarcinoma with multifocal liver metastases.  Her tumor is pMMR and Tempus tissue showed a non V600E BRAF mutation, TP53 mutation and CDKN2A and CDKN2B loss.  QUINN.    We discussed her prognosis and potential treatment options.  Because of her age and discussion of tolerance concerns, I have recommended biweekly gemcitabine + nab-paclitaxel as palliative intent first-line therapy.  Dosing for gemcitabine + nab-paclitaxel will be 1000 mg/m2 and 125 mg/m2, respectively, and I will administer these on a biweekly basis.    Port was placed.  Consideration to BRAF non-V600E targeting trials in the future if available.    She began cycle 1 of biweekly gemcitabine + nab-paclitaxel on 11/1/23.  She experienced fevers/chills and rash.  believe these are all chemotherapy related. Improved after the administration of Dex.     Will continue to administer dexamethasone 12 mg IV prior to infusion for rash prevention.  May need to increase to 20mg for future cycles. Will continue to monitor.    Also gave her Medrol Dose Nghia to use in case rash recurs despite premed dex.    - CT scan after cycle 4 displays overall improvement within the lung and liver, with decreases in the number and size of the lesions. Continue current regimen.   - CT scan after cycle 8 displays continued improvement in disease in liver and lung and pancreatic tail.  - CT scan after cycle 12 displays continued improvement of pancreatic tail mass but mixed response in liver. Discussed with patient and family recommendation to continue current regimen at this time rather than transition to new regimen. Should next scans show worsening disease, will consider transition to 5-FU + Onivyde.     - Lab work stable and adequate for treatment.   - Proceed with cycle 14.    RTC in 2 weeks for next cycle with lab work and treatment.   Will repeat imaging studies after cycle 16.     Following with palliative care.   Genetic testing done outside - saw Julito Sosa here. FANCC mut present.    PGX showed DPYD nml and UGT1A1 intermediate metabolism.    # HLD, aortic atherosclerosis, constipation, pruritus, fever  Not currently on therapy for HLD, atherosclerosis. Cholesterol stable from June 2023.   Constipation: stable.   Pruritus/fever: controlled with increased pre-meds and Benadryl. Continues to control fever alternating Motrin and Tylenol.     # Osteoporosis, vaginal prolapse, bilateral conjunctivitis, R eye lower lid stye  Monitor calcium levels on chemo.  Will continue to encourage maintaining activity.  Will f/u with GYN.   Eye symptoms have improved.  Continue ophtho f/u.    Follow up: 2 weeks for cycle 15.    Patient is in agreement with the proposed treatment plan. All questions were answered  to the patient's satisfaction. Pt knows to call clinic if anything is needed before the next clinic visit.    David Briceño MD  Hematology/Oncology  [facility]      Route Chart for Scheduling    Med Onc Chart Routing      Follow up with physician 4 weeks. for gem/abraxane   Follow up with DONTE 2 weeks. for gem/abraxane   Infusion scheduling note    Injection scheduling note    Labs CBC, CMP and CA 19-9   Scheduling:  Preferred lab:  Lab interval: every 2 weeks     Imaging    Pharmacy appointment    Other referrals              Treatment Plan Information   OP PANC NAB-PACLITAXEL + GEMCITABINE Q4W   David Briceño MD   Upcoming Treatment Dates - OP PANC NAB-PACLITAXEL + GEMCITABINE Q4W    4/19/2024       Chemotherapy       PACLitaxeL protein-bound (ABRAXANE) in 42 mL infusion       gemcitabine (GEMZAR) in sodium chloride 0.9% SolP 250 mL chemo infusion       Antiemetics       ondansetron (ZOFRAN) 8 mg, dexAMETHasone (DECADRON) 12 mg in sodium chloride 0.9% 50 mL IVPB  5/3/2024       Chemotherapy       PACLitaxeL protein-bound (ABRAXANE) in 42 mL infusion       gemcitabine (GEMZAR) in sodium chloride 0.9% SolP 250 mL chemo infusion       Antiemetics       ondansetron (ZOFRAN) 8 mg, dexAMETHasone (DECADRON) 12 mg in sodium chloride 0.9% 50 mL IVPB  5/17/2024       Chemotherapy       PACLitaxeL protein-bound (ABRAXANE) in 42 mL infusion       gemcitabine (GEMZAR) in sodium chloride 0.9% SolP 250 mL chemo infusion       Antiemetics       ondansetron (ZOFRAN) 8 mg, dexAMETHasone (DECADRON) 12 mg in sodium chloride 0.9% 50 mL IVPB  5/31/2024       Chemotherapy       PACLitaxeL protein-bound (ABRAXANE) in 42 mL infusion       gemcitabine (GEMZAR) in sodium chloride 0.9% SolP 250 mL chemo infusion       Antiemetics       ondansetron (ZOFRAN) 8 mg, dexAMETHasone (DECADRON) 12 mg in sodium chloride 0.9% 50 mL IVPB

## 2024-04-30 ENCOUNTER — PATIENT MESSAGE (OUTPATIENT)
Dept: ADMINISTRATIVE | Facility: OTHER | Age: 79
End: 2024-04-30
Payer: MEDICARE

## 2024-05-01 ENCOUNTER — PATIENT MESSAGE (OUTPATIENT)
Dept: ADMINISTRATIVE | Facility: OTHER | Age: 79
End: 2024-05-01
Payer: MEDICARE

## 2024-05-02 ENCOUNTER — PATIENT MESSAGE (OUTPATIENT)
Dept: ADMINISTRATIVE | Facility: OTHER | Age: 79
End: 2024-05-02
Payer: MEDICARE

## 2024-05-03 ENCOUNTER — PATIENT MESSAGE (OUTPATIENT)
Dept: HEMATOLOGY/ONCOLOGY | Facility: CLINIC | Age: 79
End: 2024-05-03
Payer: MEDICARE

## 2024-05-03 ENCOUNTER — PATIENT MESSAGE (OUTPATIENT)
Dept: ADMINISTRATIVE | Facility: OTHER | Age: 79
End: 2024-05-03
Payer: MEDICARE

## 2024-05-04 ENCOUNTER — PATIENT MESSAGE (OUTPATIENT)
Dept: ADMINISTRATIVE | Facility: OTHER | Age: 79
End: 2024-05-04
Payer: MEDICARE

## 2024-05-05 ENCOUNTER — PATIENT MESSAGE (OUTPATIENT)
Dept: ADMINISTRATIVE | Facility: OTHER | Age: 79
End: 2024-05-05
Payer: MEDICARE

## 2024-05-06 ENCOUNTER — PATIENT MESSAGE (OUTPATIENT)
Dept: ADMINISTRATIVE | Facility: OTHER | Age: 79
End: 2024-05-06
Payer: MEDICARE

## 2024-05-07 ENCOUNTER — PATIENT MESSAGE (OUTPATIENT)
Dept: ADMINISTRATIVE | Facility: OTHER | Age: 79
End: 2024-05-07
Payer: MEDICARE

## 2024-05-08 ENCOUNTER — PATIENT MESSAGE (OUTPATIENT)
Dept: ADMINISTRATIVE | Facility: OTHER | Age: 79
End: 2024-05-08
Payer: MEDICARE

## 2024-05-09 ENCOUNTER — PATIENT MESSAGE (OUTPATIENT)
Dept: ADMINISTRATIVE | Facility: OTHER | Age: 79
End: 2024-05-09
Payer: MEDICARE

## 2024-05-10 ENCOUNTER — LAB VISIT (OUTPATIENT)
Dept: LAB | Facility: HOSPITAL | Age: 79
End: 2024-05-10
Attending: INTERNAL MEDICINE
Payer: MEDICARE

## 2024-05-10 ENCOUNTER — PATIENT MESSAGE (OUTPATIENT)
Dept: ADMINISTRATIVE | Facility: OTHER | Age: 79
End: 2024-05-10
Payer: MEDICARE

## 2024-05-10 DIAGNOSIS — C25.9 PANCREATIC ADENOCARCINOMA: ICD-10-CM

## 2024-05-10 LAB
ALBUMIN SERPL BCP-MCNC: 3.5 G/DL (ref 3.5–5.2)
ALP SERPL-CCNC: 148 U/L (ref 55–135)
ALT SERPL W/O P-5'-P-CCNC: 20 U/L (ref 10–44)
ANION GAP SERPL CALC-SCNC: 5 MMOL/L (ref 8–16)
AST SERPL-CCNC: 24 U/L (ref 10–40)
BILIRUB SERPL-MCNC: 0.4 MG/DL (ref 0.1–1)
BUN SERPL-MCNC: 14 MG/DL (ref 8–23)
CALCIUM SERPL-MCNC: 9.5 MG/DL (ref 8.7–10.5)
CANCER AG19-9 SERPL-ACNC: ABNORMAL U/ML (ref 0–40)
CHLORIDE SERPL-SCNC: 107 MMOL/L (ref 95–110)
CO2 SERPL-SCNC: 29 MMOL/L (ref 23–29)
CREAT SERPL-MCNC: 0.7 MG/DL (ref 0.5–1.4)
ERYTHROCYTE [DISTWIDTH] IN BLOOD BY AUTOMATED COUNT: 16 % (ref 11.5–14.5)
EST. GFR  (NO RACE VARIABLE): >60 ML/MIN/1.73 M^2
GLUCOSE SERPL-MCNC: 100 MG/DL (ref 70–110)
HCT VFR BLD AUTO: 34.3 % (ref 37–48.5)
HGB BLD-MCNC: 10.6 G/DL (ref 12–16)
IMM GRANULOCYTES # BLD AUTO: 0.03 K/UL (ref 0–0.04)
MCH RBC QN AUTO: 28 PG (ref 27–31)
MCHC RBC AUTO-ENTMCNC: 30.9 G/DL (ref 32–36)
MCV RBC AUTO: 91 FL (ref 82–98)
NEUTROPHILS # BLD AUTO: 4.7 K/UL (ref 1.8–7.7)
PLATELET # BLD AUTO: 210 K/UL (ref 150–450)
PMV BLD AUTO: 10.7 FL (ref 9.2–12.9)
POTASSIUM SERPL-SCNC: 4.6 MMOL/L (ref 3.5–5.1)
PROT SERPL-MCNC: 7 G/DL (ref 6–8.4)
RBC # BLD AUTO: 3.78 M/UL (ref 4–5.4)
SODIUM SERPL-SCNC: 141 MMOL/L (ref 136–145)
WBC # BLD AUTO: 6.99 K/UL (ref 3.9–12.7)

## 2024-05-10 PROCEDURE — 80053 COMPREHEN METABOLIC PANEL: CPT | Performed by: INTERNAL MEDICINE

## 2024-05-10 PROCEDURE — 36415 COLL VENOUS BLD VENIPUNCTURE: CPT | Mod: PO | Performed by: INTERNAL MEDICINE

## 2024-05-10 PROCEDURE — 86301 IMMUNOASSAY TUMOR CA 19-9: CPT | Performed by: INTERNAL MEDICINE

## 2024-05-10 PROCEDURE — 85027 COMPLETE CBC AUTOMATED: CPT | Performed by: INTERNAL MEDICINE

## 2024-05-12 ENCOUNTER — PATIENT MESSAGE (OUTPATIENT)
Dept: ADMINISTRATIVE | Facility: OTHER | Age: 79
End: 2024-05-12
Payer: MEDICARE

## 2024-05-13 ENCOUNTER — PATIENT MESSAGE (OUTPATIENT)
Dept: ADMINISTRATIVE | Facility: OTHER | Age: 79
End: 2024-05-13
Payer: MEDICARE

## 2024-05-13 ENCOUNTER — OFFICE VISIT (OUTPATIENT)
Dept: HEMATOLOGY/ONCOLOGY | Facility: CLINIC | Age: 79
End: 2024-05-13
Payer: MEDICARE

## 2024-05-13 ENCOUNTER — INFUSION (OUTPATIENT)
Dept: INFUSION THERAPY | Facility: HOSPITAL | Age: 79
End: 2024-05-13
Payer: MEDICARE

## 2024-05-13 VITALS
BODY MASS INDEX: 24.61 KG/M2 | OXYGEN SATURATION: 96 % | RESPIRATION RATE: 18 BRPM | WEIGHT: 138.88 LBS | HEIGHT: 63 IN | SYSTOLIC BLOOD PRESSURE: 100 MMHG | WEIGHT: 138.88 LBS | DIASTOLIC BLOOD PRESSURE: 59 MMHG | HEART RATE: 79 BPM | HEIGHT: 63 IN | DIASTOLIC BLOOD PRESSURE: 58 MMHG | BODY MASS INDEX: 24.61 KG/M2 | HEART RATE: 79 BPM | TEMPERATURE: 98 F | RESPIRATION RATE: 16 BRPM | SYSTOLIC BLOOD PRESSURE: 123 MMHG | TEMPERATURE: 98 F

## 2024-05-13 DIAGNOSIS — C25.9 PANCREATIC ADENOCARCINOMA: Primary | ICD-10-CM

## 2024-05-13 DIAGNOSIS — B30.9 ACUTE VIRAL CONJUNCTIVITIS OF BOTH EYES: ICD-10-CM

## 2024-05-13 DIAGNOSIS — C78.7 METASTASIS TO LIVER: ICD-10-CM

## 2024-05-13 DIAGNOSIS — D84.81 IMMUNODEFICIENCY SECONDARY TO NEOPLASM: ICD-10-CM

## 2024-05-13 DIAGNOSIS — M81.0 AGE-RELATED OSTEOPOROSIS WITHOUT CURRENT PATHOLOGICAL FRACTURE: ICD-10-CM

## 2024-05-13 DIAGNOSIS — N81.9 VAGINAL VAULT PROLAPSE: ICD-10-CM

## 2024-05-13 DIAGNOSIS — I70.0 AORTIC ATHEROSCLEROSIS: ICD-10-CM

## 2024-05-13 DIAGNOSIS — D49.9 IMMUNODEFICIENCY SECONDARY TO NEOPLASM: ICD-10-CM

## 2024-05-13 DIAGNOSIS — H00.012 HORDEOLUM EXTERNUM OF RIGHT LOWER EYELID: ICD-10-CM

## 2024-05-13 DIAGNOSIS — C78.02 MALIGNANT NEOPLASM METASTATIC TO BOTH LUNGS: ICD-10-CM

## 2024-05-13 DIAGNOSIS — L28.2 PRURITIC RASH: ICD-10-CM

## 2024-05-13 DIAGNOSIS — C78.01 MALIGNANT NEOPLASM METASTATIC TO BOTH LUNGS: ICD-10-CM

## 2024-05-13 DIAGNOSIS — E78.5 DYSLIPIDEMIA: ICD-10-CM

## 2024-05-13 DIAGNOSIS — T45.1X5A IMMUNODEFICIENCY DUE TO CHEMOTHERAPY: ICD-10-CM

## 2024-05-13 DIAGNOSIS — K59.00 CONSTIPATION, UNSPECIFIED CONSTIPATION TYPE: ICD-10-CM

## 2024-05-13 DIAGNOSIS — Z79.899 IMMUNODEFICIENCY DUE TO CHEMOTHERAPY: ICD-10-CM

## 2024-05-13 DIAGNOSIS — D84.821 IMMUNODEFICIENCY DUE TO CHEMOTHERAPY: ICD-10-CM

## 2024-05-13 PROCEDURE — 1101F PT FALLS ASSESS-DOCD LE1/YR: CPT | Mod: CPTII,S$GLB,, | Performed by: REGISTERED NURSE

## 2024-05-13 PROCEDURE — G2211 COMPLEX E/M VISIT ADD ON: HCPCS | Mod: S$GLB,,, | Performed by: REGISTERED NURSE

## 2024-05-13 PROCEDURE — 63600175 PHARM REV CODE 636 W HCPCS: Performed by: REGISTERED NURSE

## 2024-05-13 PROCEDURE — 96367 TX/PROPH/DG ADDL SEQ IV INF: CPT

## 2024-05-13 PROCEDURE — 3078F DIAST BP <80 MM HG: CPT | Mod: CPTII,S$GLB,, | Performed by: REGISTERED NURSE

## 2024-05-13 PROCEDURE — 3288F FALL RISK ASSESSMENT DOCD: CPT | Mod: CPTII,S$GLB,, | Performed by: REGISTERED NURSE

## 2024-05-13 PROCEDURE — 1157F ADVNC CARE PLAN IN RCRD: CPT | Mod: CPTII,S$GLB,, | Performed by: REGISTERED NURSE

## 2024-05-13 PROCEDURE — 3074F SYST BP LT 130 MM HG: CPT | Mod: CPTII,S$GLB,, | Performed by: REGISTERED NURSE

## 2024-05-13 PROCEDURE — 96417 CHEMO IV INFUS EACH ADDL SEQ: CPT

## 2024-05-13 PROCEDURE — 1159F MED LIST DOCD IN RCRD: CPT | Mod: CPTII,S$GLB,, | Performed by: REGISTERED NURSE

## 2024-05-13 PROCEDURE — 99215 OFFICE O/P EST HI 40 MIN: CPT | Mod: S$GLB,,, | Performed by: REGISTERED NURSE

## 2024-05-13 PROCEDURE — 99999 PR PBB SHADOW E&M-EST. PATIENT-LVL V: CPT | Mod: PBBFAC,,, | Performed by: REGISTERED NURSE

## 2024-05-13 PROCEDURE — 1160F RVW MEDS BY RX/DR IN RCRD: CPT | Mod: CPTII,S$GLB,, | Performed by: REGISTERED NURSE

## 2024-05-13 PROCEDURE — A4216 STERILE WATER/SALINE, 10 ML: HCPCS | Performed by: REGISTERED NURSE

## 2024-05-13 PROCEDURE — 96413 CHEMO IV INFUSION 1 HR: CPT

## 2024-05-13 PROCEDURE — 1125F AMNT PAIN NOTED PAIN PRSNT: CPT | Mod: CPTII,S$GLB,, | Performed by: REGISTERED NURSE

## 2024-05-13 PROCEDURE — 25000003 PHARM REV CODE 250: Performed by: REGISTERED NURSE

## 2024-05-13 RX ORDER — HEPARIN 100 UNIT/ML
500 SYRINGE INTRAVENOUS
Status: DISCONTINUED | OUTPATIENT
Start: 2024-05-13 | End: 2024-05-13 | Stop reason: HOSPADM

## 2024-05-13 RX ORDER — SODIUM CHLORIDE 0.9 % (FLUSH) 0.9 %
10 SYRINGE (ML) INJECTION
Status: CANCELLED | OUTPATIENT
Start: 2024-05-13

## 2024-05-13 RX ORDER — HEPARIN 100 UNIT/ML
500 SYRINGE INTRAVENOUS
Status: CANCELLED | OUTPATIENT
Start: 2024-05-13

## 2024-05-13 RX ORDER — EPINEPHRINE 0.3 MG/.3ML
0.3 INJECTION SUBCUTANEOUS ONCE AS NEEDED
Status: CANCELLED | OUTPATIENT
Start: 2024-05-13

## 2024-05-13 RX ORDER — SODIUM CHLORIDE 0.9 % (FLUSH) 0.9 %
10 SYRINGE (ML) INJECTION
Status: DISCONTINUED | OUTPATIENT
Start: 2024-05-13 | End: 2024-05-13 | Stop reason: HOSPADM

## 2024-05-13 RX ORDER — EPINEPHRINE 0.3 MG/.3ML
0.3 INJECTION SUBCUTANEOUS ONCE AS NEEDED
Status: DISCONTINUED | OUTPATIENT
Start: 2024-05-13 | End: 2024-05-13 | Stop reason: HOSPADM

## 2024-05-13 RX ORDER — DIPHENHYDRAMINE HYDROCHLORIDE 50 MG/ML
50 INJECTION INTRAMUSCULAR; INTRAVENOUS ONCE AS NEEDED
Status: CANCELLED | OUTPATIENT
Start: 2024-05-13

## 2024-05-13 RX ORDER — PROCHLORPERAZINE EDISYLATE 5 MG/ML
5 INJECTION INTRAMUSCULAR; INTRAVENOUS ONCE AS NEEDED
Status: DISCONTINUED | OUTPATIENT
Start: 2024-05-13 | End: 2024-05-13 | Stop reason: HOSPADM

## 2024-05-13 RX ORDER — PROCHLORPERAZINE EDISYLATE 5 MG/ML
5 INJECTION INTRAMUSCULAR; INTRAVENOUS ONCE AS NEEDED
Status: CANCELLED
Start: 2024-05-13

## 2024-05-13 RX ORDER — DIPHENHYDRAMINE HYDROCHLORIDE 50 MG/ML
50 INJECTION INTRAMUSCULAR; INTRAVENOUS ONCE AS NEEDED
Status: DISCONTINUED | OUTPATIENT
Start: 2024-05-13 | End: 2024-05-13 | Stop reason: HOSPADM

## 2024-05-13 RX ADMIN — DEXAMETHASONE SODIUM PHOSPHATE 8 MG: 4 INJECTION, SOLUTION INTRA-ARTICULAR; INTRALESIONAL; INTRAMUSCULAR; INTRAVENOUS; SOFT TISSUE at 08:05

## 2024-05-13 RX ADMIN — GEMCITABINE 1600 MG: 38 INJECTION, SOLUTION INTRAVENOUS at 09:05

## 2024-05-13 RX ADMIN — PACLITAXEL 200 MG: 100 INJECTION, POWDER, LYOPHILIZED, FOR SUSPENSION INTRAVENOUS at 09:05

## 2024-05-13 RX ADMIN — Medication 10 ML: at 10:05

## 2024-05-13 RX ADMIN — HEPARIN 500 UNITS: 100 SYRINGE at 10:05

## 2024-05-13 NOTE — PROGRESS NOTES
MEDICAL ONCOLOGY - ESTABLISHED PATIENT VISIT    Reason for visit: Pancreatic adenocarcinoma    Best Contact Phone Number(s): [phone] (home)      Cancer/Stage/TNM:    Cancer Staging   Pancreatic adenocarcinoma  Staging form: Exocrine Pancreas, AJCC 8th Edition  - Clinical stage from 9/21/2023: Stage IV (cT1, cNX, pM1) - Signed by David Briceño MD on 10/11/2023       Oncology History   Pancreatic adenocarcinoma   9/5/2023 Initial Diagnosis    Pancreatic adenocarcinoma     9/21/2023 Cancer Staged    Staging form: Exocrine Pancreas, AJCC 8th Edition  - Clinical stage from 9/21/2023: Stage IV (cT1, cNX, pM1)     10/2/2023 Tumor Conference     [facility] UGI MULTIDISCIPLINARY TUMOR BOARD  PATIENT REVIEW FORM   ____________________________________________________________    CLINIC #: [id number]  DATE: 10/2/2023    DIAGNOSIS: pancreas CA    PRESENTER: Angela    PATIENT SUMMARY:   This 79 y/o female presented in August with abd pain, decreased appetite with weight loss. Reviewed CT and MRI imaging ~ 2 cm mass in tail of pancreas with diffuse metastatic liver disease, largest liver lesion in left lobe measuring 4.5 cm. IR performed liver bx and this pathology was reviewed - poorly differentiated adenocarcinoma, favoring upper GI origin.     BOARD RECOMMENDATIONS:   Stage IV pancreas CA with liver mets  Add MMR and TEMPUS  Proceed with palliative systemic chemotherapy    CONSULT NEEDED:     [] Surgery    [] Hem/Onc    [] Rad/Onc    [] Dietary     [] Social Service    [] Psychology       [] AES  [] Radiology     Clinical Stage: Tumor   Node(s)   Metastasis        GROUP STAGE:  [] O    [] 1A    [] IB    [] IIA    [] IIB     [] IIIA     [] IIIB     [] IIIC    [x]IV  [] Local recurrence     [] Regional recurrence     [] Distant recurrence   Metastatic site(s): liver         [x] Elidia'[facility] Treatment Guidelines reviewed and care planned is consistent with guidelines.         (i.e., NCCN, NCI, PD, ACO, AUA,  etc.)    PRESENTATION AT CANCER CONFERENCE:         [x] Prospective    [] Retrospective     [] Follow-Up    Eligible for clinical trial : yes       11/1/2023 -  Chemotherapy    Treatment Summary   Plan Name: OP PANC NAB-PACLITAXEL + GEMCITABINE Q4W  Treatment Goal: Palliative  Status: Active  Start Date: 11/1/2023  End Date: 9/6/2024 (Planned)  Provider: David Briceño MD  Chemotherapy: gemcitabine (GEMZAR) 1,600 mg in sodium chloride 0.9% SolP 327.08 mL chemo infusion, 1,680 mg, Intravenous, Clinic/HOD 1 time, 7 of 12 cycles  Administration: 1,600 mg (11/1/2023), 1,600 mg (11/15/2023), 1,600 mg (11/29/2023), 1,600 mg (12/14/2023), 1,600 mg (12/27/2023), 1,600 mg (1/9/2024), 1,600 mg (1/22/2024), 1,600 mg (2/5/2024), 1,600 mg (2/19/2024), 1,600 mg (3/4/2024), 1,600 mg (3/18/2024), 1,700 mg (4/1/2024), 1,690 mg (4/15/2024), 1,600 mg (4/29/2024)          Interim History:   79 y.o. female with metastatic pancreatic cancer who presents for follow-up prior to cycle 15 of biweekly gemcitabine + nab-paclitaxel. Doing well overall. Appetite is good. BP has been running lower, though she is asymptomatic. Continues with intermittent pain in right shoulder with deep inspiration, stable. Only required Advil 1-2x. Mild paresthesias in feet unchanged. No changes in bowel habits.     ECOG PS is 0. Presents with her  today.     ROS:   Review of Systems   Constitutional:  Negative for chills, fever, malaise/fatigue and weight loss.   HENT:  Negative for sore throat.    Eyes:  Negative for blurred vision and pain.   Respiratory:  Negative for cough and shortness of breath.    Cardiovascular:  Positive for leg swelling (L>R). Negative for chest pain.   Gastrointestinal:  Negative for constipation, diarrhea, nausea and vomiting.   Genitourinary:  Negative for dysuria and frequency.   Musculoskeletal:  Positive for joint pain (R shoulder). Negative for back pain, falls and myalgias.   Skin:  Negative for itching and rash.    Neurological:  Negative for dizziness, weakness and headaches.   Endo/Heme/Allergies:  Does not bruise/bleed easily.   Psychiatric/Behavioral:  Negative for depression. The patient is not nervous/anxious.        Past Medical History:   Past Medical History:   Diagnosis Date    Age-related osteoporosis without current pathological fracture     Atherosclerosis of aortic arch     - noted on CXR 8/2017    Back pain     Diverticulosis of sigmoid colon 10/22/2019    Ectopic pregnancy     Fibrocystic breast     Hyperlipidemia     Inguinal hernia     Osteopenia     Polyp of ascending colon 10/22/2019    Urinary incontinence, mixed 05/24/2022        Past Surgical History:   Past Surgical History:   Procedure Laterality Date    ECTOPIC PREGNANCY SURGERY      HERNIA REPAIR      left inguinal    HYSTERECTOMY  1980    without BSO        Family History:   Family History   Problem Relation Name Age of Onset    Breast cancer Mother  69    Seizures Mother      Heart disease Father      Heart attack Father      Depression Father      Breast cancer Sister Monika 73    Breast cancer Maternal Aunt Kathleen 73    Prostate cancer Brother Lamine 81        no mets    Obesity Brother Lamine     Heart failure Maternal Grandmother      Heart attack Maternal Grandfather      Suicide Attempts Paternal Grandmother      Depression Paternal Grandmother      Mental illness Paternal Grandmother      Depression Paternal Aunt Jenniffer     Heart attack Paternal Uncle Leodan     Depression Paternal Uncle Leodan     Cancer Maternal Aunt [place] 83        blood cancer (leukemia?)    Cancer Maternal Uncle Bill         unk type; was COD    Suicide Paternal Cousin      Tongue cancer Paternal Cousin Jimi         smoking hx unk    Ovarian cancer Neg Hx          Social History:   Social History     Tobacco Use    Smoking status: Never    Smokeless tobacco: Never   Substance Use Topics    Alcohol use: No        I have reviewed and updated the patient's past medical,  surgical, family and social histories.    Allergies:   Review of patient's allergies indicates:   Allergen Reactions    Erythromycin (bulk)      Other reaction(s): Eye irritation        Medications:   Current Outpatient Medications   Medication Sig Dispense Refill    coconut oil, bulk, Oil by Misc.(Non-Drug; Combo Route) route.      multivitamin (THERAGRAN) per tablet Take 1 tablet by mouth once daily.      vitamin D (VITAMIN D3) 1000 units Tab Take 1,000 Units by mouth once daily.      ABRAXANE 100 mg injection  (Patient not taking: Reported on 5/13/2024)      dexAMETHasone (DECADRON) 4 mg/mL injection  (Patient not taking: Reported on 5/13/2024)      diphenhydramine HCl (BENADRYL ORAL) Take 25 mg by mouth as needed (rash). (Patient not taking: Reported on 5/13/2024)      doxycycline (VIBRAMYCIN) 100 MG Cap Take 1 capsule (100 mg total) by mouth every 12 (twelve) hours. (Patient not taking: Reported on 5/13/2024) 14 capsule 0    GADAVIST 1 mmol/mL (604.72 mg/mL) Soln injection  (Patient not taking: Reported on 5/13/2024)      gemcitabine (GEMZAR) 1 gram/26.3 mL (38 mg/mL) Soln injection  (Patient not taking: Reported on 5/13/2024)      gemcitabine 200 mg/5.26 mL (38 mg/mL) Soln  (Patient not taking: Reported on 5/13/2024)      heparin sodium,porcine (HEPARIN, PORCINE,) 1,000 unit/mL injection  (Patient not taking: Reported on 5/13/2024)      heparin, porcine, PF, (HEPARIN FLUSH 100 UNITS/ML) 100 unit/mL Syrg  (Patient not taking: Reported on 5/13/2024)      HYDROcodone-acetaminophen (NORCO) 5-325 mg per tablet Take 1 tablet by mouth every 12 (twelve) hours as needed for Pain. (Patient not taking: Reported on 5/13/2024) 10 tablet 0    hydrOXYzine HCL (ATARAX) 25 MG tablet Take 1 tablet (25 mg total) by mouth 3 (three) times daily as needed for Itching. (Patient not taking: Reported on 5/13/2024) 60 tablet 2    LIDOcaine-prilocaine (EMLA) cream Apply topically as needed (place to port site 45-60 minutes prior to  "chemotherapy). (Patient not taking: Reported on 5/13/2024) 30 g 3    olopatadine (PATANOL) 0.1 % ophthalmic solution 1 drop 2 (two) times daily. (Patient not taking: Reported on 5/13/2024)      ondansetron (ZOFRAN-ODT) 4 MG TbDL Take 1 tablet (4 mg total) by mouth every 8 (eight) hours as needed (Nausea). (Patient not taking: Reported on 5/13/2024) 10 tablet 0    ondansetron (ZOFRAN-ODT) 8 MG TbDL TAKE 1 TABLET (8 MG TOTAL) BY MOUTH EVERY 6 HOURS AS NEEDED FOR NAUSEA (Patient not taking: Reported on 5/13/2024) 30 tablet 5    RESTASIS 0.05 % ophthalmic emulsion  (Patient not taking: Reported on 5/13/2024)      triamcinolone acetonide 0.1% (KENALOG) 0.1 % ointment Apply topically as needed (rash). (Patient not taking: Reported on 5/13/2024)       No current facility-administered medications for this visit.      Physical Exam:   BP (!) 100/59   Pulse 79   Temp 97.8 °F (36.6 °C) (Oral)   Resp 16   Ht 5' 3" (1.6 m)   Wt 63 kg (138 lb 14.2 oz)   SpO2 96%   BMI 24.60 kg/m²        Physical Exam  Vitals reviewed.   Constitutional:       General: She is not in acute distress.     Appearance: Normal appearance. She is well-developed and normal weight. She is not ill-appearing, toxic-appearing or diaphoretic.   HENT:      Head: Normocephalic and atraumatic.      Right Ear: External ear normal.      Left Ear: External ear normal.      Nose: Nose normal.      Mouth/Throat:      Mouth: Mucous membranes are moist.      Pharynx: Oropharynx is clear. No posterior oropharyngeal erythema.   Eyes:      General: No scleral icterus.     Extraocular Movements: Extraocular movements intact.      Pupils: Pupils are equal, round, and reactive to light.   Neck:      Trachea: No tracheal deviation.   Cardiovascular:      Rate and Rhythm: Normal rate and regular rhythm.      Pulses: Normal pulses.      Heart sounds: Normal heart sounds.   Pulmonary:      Effort: Pulmonary effort is normal. No respiratory distress.      Breath sounds: " Normal breath sounds. No wheezing or rales.   Chest:      Comments: RCW port  Abdominal:      General: Bowel sounds are normal. There is no distension.      Palpations: Abdomen is soft.      Tenderness: There is no abdominal tenderness.   Musculoskeletal:         General: No swelling or deformity. Normal range of motion.      Cervical back: Normal range of motion and neck supple.   Skin:     General: Skin is warm.      Coloration: Skin is not jaundiced.      Findings: No erythema or rash.   Neurological:      General: No focal deficit present.      Mental Status: She is alert and oriented to person, place, and time. Mental status is at baseline.      Motor: No weakness.      Gait: Gait normal.   Psychiatric:         Mood and Affect: Mood normal.         Behavior: Behavior normal.         Thought Content: Thought content normal.         Judgment: Judgment normal.         Labs:   Lab work reviewed.  Mild anemia.    Imaging:   CT CAP 4/12/24:    Impression:     In this patient with pancreatic adenocarcinoma, the nonenhancing pancreatic tail mass appears smaller with continued abutment of the splenic artery and occlusion of the splenic vein.     Mixed response in the liver.  Most lesions appear stable in size with some smaller and some larger as compared to prior.     No evidence of new disease.        Path:   Final Pathologic Diagnosis     Left hepatic lobe, biopsy:   Positive for malignancy, poorly differentiated adenocarcinoma (see comment)   Tumor necrosis present     Comment:  A review of the patient's imaging shows the presence of a 1.9 cm pancreatic tail mass and diffuse liver lesions.  These morphologic and immunophenotypic findings are supportive of a pancreatobiliary and/or upper gastrointestinal primary.  VC      Comment: Interp By Nataliia Peralta D.O., Signed on 10/05/2023 at 08:36   Supplemental Diagnosis     The purpose of this supplemental report is to report results of MMR panel in the tumor cells  as follows:     Immunohistochemistry (IHC) Testing for Mismatch Repair (MMR) Proteins:      MLH1 - Intact nuclear expression    MSH2 - Intact nuclear expression    MSH6 - Intact nuclear expression    PMS2 - Intact nuclear expression            Somatic Genomic Results       Pancreatic mass       Tumor NGS Tissue  Resulted: 10/10/23 Status: Preliminary result       Detected - Pathogenic (4)      Gene Variant VAF   BRAF [id number] - c.1458_[id number] Inframe deletion - GOF 12.00 %   CDKN2A CDKN2A - Copy number loss --   CDKN2B CDKN2B - Copy number loss --   TP53 p.Y163C - c.488A>G Missense variant - LOF 30.50 %              Detected - Uncertain significance (1)      Gene Variant VAF   DIS3 p.[id number] - c.2012A>G Missense variant 23.10 %                   Assessment:       1. Pancreatic adenocarcinoma    2. 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 Metastasis to liver    4. Immunodeficiency due to chemotherapy    5. Immunodeficiency secondary to neoplasm    6. Dyslipidemia    7. Aortic atherosclerosis    8. Constipation, unspecified constipation type    9. Pruritic rash    10. Age-related osteoporosis without current pathological fracture    11. Vaginal vault prolapse    12. Acute viral conjunctivitis of both eyes    13. Hordeolum externum of right lower eyelid       Plan:        # Pancreatic cancer  We discussed with the patient and her family her biopsy-proven diagnosis of metastatic pancreatic adenocarcinoma with multifocal liver metastases.  Her tumor is pMMR and Tempus tissue showed a non V600E BRAF mutation, TP53 mutation and CDKN2A and CDKN2B loss.  QUINN.    We discussed her prognosis and potential treatment options.  Because of her age and discussion of tolerance concerns, I have recommended biweekly gemcitabine + nab-paclitaxel as palliative intent first-line therapy.  Dosing for gemcitabine + nab-paclitaxel will be 1000 mg/m2 and 125 mg/m2, respectively, and I will administer these on a biweekly  basis.    Port was placed.  Consideration to BRAF non-V600E targeting trials in the future if available.    She began cycle 1 of biweekly gemcitabine + nab-paclitaxel on 11/1/23.  She experienced fevers/chills and rash. believe these are all chemotherapy related. Improved after the administration of Dex.     Will continue to administer dexamethasone 12 mg IV prior to infusion for rash prevention.  May need to increase to 20mg for future cycles. Will continue to monitor.    Also gave her Medrol Dose Nghia to use in case rash recurs despite premed dex.    - CT scan after cycle 4 displays overall improvement within the lung and liver, with decreases in the number and size of the lesions. Continue current regimen.   - CT scan after cycle 8 displays continued improvement in disease in liver and lung and pancreatic tail.  - CT scan after cycle 12 displays continued improvement of pancreatic tail mass but mixed response in liver. Discussed with patient and family recommendation to continue current regimen at this time rather than transition to new regimen. Should next scans show worsening disease, will consider transition to 5-FU + Onivyde.     - Lab work stable and adequate for treatment.   - Proceed with cycle 15.    RTC in 2 weeks for next cycle with lab work and treatment.   Will repeat imaging studies after cycle 16.     Following with palliative care.   Genetic testing done outside - saw Julito Sosa here. FANCC mut present.    PGX showed DPYD nml and UGT1A1 intermediate metabolism.    # HLD, aortic atherosclerosis, constipation, pruritus, fever  Not currently on therapy for HLD, atherosclerosis. Cholesterol stable from June 2023.   Constipation: stable.   Pruritus/fever: controlled with increased pre-meds and Benadryl. Continues to control fever alternating Motrin and Tylenol.     # Osteoporosis, vaginal prolapse, bilateral conjunctivitis, R eye lower lid stye  Monitor calcium levels on chemo.  Will continue to  encourage maintaining activity.  Will f/u with GYN.   Eye symptoms have improved. Continue ophtho f/u.    Follow up: 2 weeks for cycle 16.    Patient is in agreement with the proposed treatment plan. All questions were answered to the patient's satisfaction. Pt knows to call clinic if anything is needed before the next clinic visit.    Patient discussed with collaborating physician, Dr. Briceño.    At least 40 minutes were spent today on this encounter including face to face time with the patient, data gathering/interpretation and documentation.       Meredith Kowalski, MSN, APRN, [facility]-  Hematology and Medical Oncology  Clinical Nurse Specialist to Dr. Briceño, Dr. Hammer & Dr. Willis    Route Chart for Scheduling    Med Onc Chart Routing      Follow up with physician 2 weeks and 4 weeks. keep as scheduled   Follow up with DONTE    Infusion scheduling note    Injection scheduling note    Labs    Imaging    Pharmacy appointment    Other referrals              Treatment Plan Information   OP PANC NAB-PACLITAXEL + GEMCITABINE Q4W   David Briceño MD   Upcoming Treatment Dates - OP PANC NAB-PACLITAXEL + GEMCITABINE Q4W    5/3/2024       Chemotherapy       PACLitaxeL protein-bound (ABRAXANE) in 42 mL infusion       gemcitabine (GEMZAR) in sodium chloride 0.9% SolP 250 mL chemo infusion       Antiemetics       ondansetron (ZOFRAN) 8 mg, dexAMETHasone (DECADRON) 12 mg in sodium chloride 0.9% 50 mL IVPB  5/17/2024       Chemotherapy       PACLitaxeL protein-bound (ABRAXANE) in 42 mL infusion       gemcitabine (GEMZAR) in sodium chloride 0.9% SolP 250 mL chemo infusion       Antiemetics       ondansetron (ZOFRAN) 8 mg, dexAMETHasone (DECADRON) 12 mg in sodium chloride 0.9% 50 mL IVPB  5/31/2024       Chemotherapy       PACLitaxeL protein-bound (ABRAXANE) in 42 mL infusion       gemcitabine (GEMZAR) in sodium chloride 0.9% SolP 250 mL chemo infusion       Antiemetics       ondansetron (ZOFRAN) 8 mg, dexAMETHasone (DECADRON)  12 mg in sodium chloride 0.9% 50 mL IVPB  6/14/2024       Chemotherapy       PACLitaxeL protein-bound (ABRAXANE) in 42 mL infusion       gemcitabine (GEMZAR) in sodium chloride 0.9% SolP 250 mL chemo infusion       Antiemetics       ondansetron (ZOFRAN) 8 mg, dexAMETHasone (DECADRON) 12 mg in sodium chloride 0.9% 50 mL IVPB

## 2024-05-14 ENCOUNTER — PATIENT MESSAGE (OUTPATIENT)
Dept: ADMINISTRATIVE | Facility: OTHER | Age: 79
End: 2024-05-14
Payer: MEDICARE

## 2024-05-16 ENCOUNTER — PATIENT MESSAGE (OUTPATIENT)
Dept: ADMINISTRATIVE | Facility: OTHER | Age: 79
End: 2024-05-16
Payer: MEDICARE

## 2024-05-17 ENCOUNTER — PATIENT MESSAGE (OUTPATIENT)
Dept: ADMINISTRATIVE | Facility: OTHER | Age: 79
End: 2024-05-17
Payer: MEDICARE

## 2024-05-18 ENCOUNTER — PATIENT MESSAGE (OUTPATIENT)
Dept: ADMINISTRATIVE | Facility: OTHER | Age: 79
End: 2024-05-18
Payer: MEDICARE

## 2024-05-19 ENCOUNTER — PATIENT MESSAGE (OUTPATIENT)
Dept: ADMINISTRATIVE | Facility: OTHER | Age: 79
End: 2024-05-19
Payer: MEDICARE

## 2024-05-20 ENCOUNTER — PATIENT MESSAGE (OUTPATIENT)
Dept: ADMINISTRATIVE | Facility: OTHER | Age: 79
End: 2024-05-20
Payer: MEDICARE

## 2024-05-20 LAB — FUNGUS BLD CULT: NORMAL

## 2024-05-21 ENCOUNTER — PATIENT MESSAGE (OUTPATIENT)
Dept: ADMINISTRATIVE | Facility: OTHER | Age: 79
End: 2024-05-21
Payer: MEDICARE

## 2024-05-22 ENCOUNTER — PATIENT MESSAGE (OUTPATIENT)
Dept: ADMINISTRATIVE | Facility: OTHER | Age: 79
End: 2024-05-22
Payer: MEDICARE

## 2024-05-23 ENCOUNTER — PATIENT MESSAGE (OUTPATIENT)
Dept: ADMINISTRATIVE | Facility: OTHER | Age: 79
End: 2024-05-23
Payer: MEDICARE

## 2024-05-24 ENCOUNTER — LAB VISIT (OUTPATIENT)
Dept: LAB | Facility: HOSPITAL | Age: 79
End: 2024-05-24
Attending: INTERNAL MEDICINE
Payer: MEDICARE

## 2024-05-24 ENCOUNTER — PATIENT MESSAGE (OUTPATIENT)
Dept: ADMINISTRATIVE | Facility: OTHER | Age: 79
End: 2024-05-24
Payer: MEDICARE

## 2024-05-24 DIAGNOSIS — C25.9 PANCREATIC ADENOCARCINOMA: ICD-10-CM

## 2024-05-24 LAB
ALBUMIN SERPL BCP-MCNC: 3.3 G/DL (ref 3.5–5.2)
ALP SERPL-CCNC: 163 U/L (ref 55–135)
ALT SERPL W/O P-5'-P-CCNC: 20 U/L (ref 10–44)
ANION GAP SERPL CALC-SCNC: 7 MMOL/L (ref 8–16)
AST SERPL-CCNC: 23 U/L (ref 10–40)
BILIRUB SERPL-MCNC: 0.4 MG/DL (ref 0.1–1)
BUN SERPL-MCNC: 13 MG/DL (ref 8–23)
CALCIUM SERPL-MCNC: 9.3 MG/DL (ref 8.7–10.5)
CANCER AG19-9 SERPL-ACNC: ABNORMAL U/ML (ref 0–40)
CHLORIDE SERPL-SCNC: 106 MMOL/L (ref 95–110)
CO2 SERPL-SCNC: 28 MMOL/L (ref 23–29)
CREAT SERPL-MCNC: 0.7 MG/DL (ref 0.5–1.4)
ERYTHROCYTE [DISTWIDTH] IN BLOOD BY AUTOMATED COUNT: 15.6 % (ref 11.5–14.5)
EST. GFR  (NO RACE VARIABLE): >60 ML/MIN/1.73 M^2
GLUCOSE SERPL-MCNC: 132 MG/DL (ref 70–110)
HCT VFR BLD AUTO: 33 % (ref 37–48.5)
HGB BLD-MCNC: 10.1 G/DL (ref 12–16)
IMM GRANULOCYTES # BLD AUTO: 0.04 K/UL (ref 0–0.04)
MCH RBC QN AUTO: 27.6 PG (ref 27–31)
MCHC RBC AUTO-ENTMCNC: 30.6 G/DL (ref 32–36)
MCV RBC AUTO: 90 FL (ref 82–98)
NEUTROPHILS # BLD AUTO: 4.6 K/UL (ref 1.8–7.7)
PLATELET # BLD AUTO: 221 K/UL (ref 150–450)
PMV BLD AUTO: 10.2 FL (ref 9.2–12.9)
POTASSIUM SERPL-SCNC: 4.1 MMOL/L (ref 3.5–5.1)
PROT SERPL-MCNC: 6.8 G/DL (ref 6–8.4)
RBC # BLD AUTO: 3.66 M/UL (ref 4–5.4)
SODIUM SERPL-SCNC: 141 MMOL/L (ref 136–145)
WBC # BLD AUTO: 6.63 K/UL (ref 3.9–12.7)

## 2024-05-24 PROCEDURE — 86301 IMMUNOASSAY TUMOR CA 19-9: CPT | Performed by: INTERNAL MEDICINE

## 2024-05-24 PROCEDURE — 36415 COLL VENOUS BLD VENIPUNCTURE: CPT | Mod: PO | Performed by: INTERNAL MEDICINE

## 2024-05-24 PROCEDURE — 85027 COMPLETE CBC AUTOMATED: CPT | Performed by: INTERNAL MEDICINE

## 2024-05-24 PROCEDURE — 80053 COMPREHEN METABOLIC PANEL: CPT | Performed by: INTERNAL MEDICINE

## 2024-05-25 ENCOUNTER — PATIENT MESSAGE (OUTPATIENT)
Dept: ADMINISTRATIVE | Facility: OTHER | Age: 79
End: 2024-05-25
Payer: MEDICARE

## 2024-05-26 ENCOUNTER — PATIENT MESSAGE (OUTPATIENT)
Dept: ADMINISTRATIVE | Facility: OTHER | Age: 79
End: 2024-05-26
Payer: MEDICARE

## 2024-05-27 ENCOUNTER — PATIENT MESSAGE (OUTPATIENT)
Dept: HEMATOLOGY/ONCOLOGY | Facility: CLINIC | Age: 79
End: 2024-05-27

## 2024-05-27 ENCOUNTER — INFUSION (OUTPATIENT)
Dept: INFUSION THERAPY | Facility: HOSPITAL | Age: 79
End: 2024-05-27
Payer: MEDICARE

## 2024-05-27 ENCOUNTER — OFFICE VISIT (OUTPATIENT)
Dept: HEMATOLOGY/ONCOLOGY | Facility: CLINIC | Age: 79
End: 2024-05-27
Payer: MEDICARE

## 2024-05-27 ENCOUNTER — PATIENT MESSAGE (OUTPATIENT)
Dept: HEMATOLOGY/ONCOLOGY | Facility: CLINIC | Age: 79
End: 2024-05-27
Payer: MEDICARE

## 2024-05-27 VITALS
TEMPERATURE: 98 F | RESPIRATION RATE: 18 BRPM | HEART RATE: 83 BPM | SYSTOLIC BLOOD PRESSURE: 141 MMHG | DIASTOLIC BLOOD PRESSURE: 65 MMHG | HEIGHT: 63 IN | BODY MASS INDEX: 25.06 KG/M2 | OXYGEN SATURATION: 98 % | WEIGHT: 141.44 LBS

## 2024-05-27 VITALS
OXYGEN SATURATION: 98 % | DIASTOLIC BLOOD PRESSURE: 51 MMHG | SYSTOLIC BLOOD PRESSURE: 95 MMHG | HEART RATE: 82 BPM | TEMPERATURE: 98 F | BODY MASS INDEX: 25.05 KG/M2 | WEIGHT: 141.44 LBS

## 2024-05-27 DIAGNOSIS — H00.012 HORDEOLUM EXTERNUM OF RIGHT LOWER EYELID: ICD-10-CM

## 2024-05-27 DIAGNOSIS — K59.00 CONSTIPATION, UNSPECIFIED CONSTIPATION TYPE: ICD-10-CM

## 2024-05-27 DIAGNOSIS — E78.5 DYSLIPIDEMIA: ICD-10-CM

## 2024-05-27 DIAGNOSIS — M81.0 AGE-RELATED OSTEOPOROSIS WITHOUT CURRENT PATHOLOGICAL FRACTURE: ICD-10-CM

## 2024-05-27 DIAGNOSIS — D49.9 IMMUNODEFICIENCY SECONDARY TO NEOPLASM: ICD-10-CM

## 2024-05-27 DIAGNOSIS — N81.9 VAGINAL VAULT PROLAPSE: ICD-10-CM

## 2024-05-27 DIAGNOSIS — I70.0 AORTIC ATHEROSCLEROSIS: ICD-10-CM

## 2024-05-27 DIAGNOSIS — C78.02 MALIGNANT NEOPLASM METASTATIC TO BOTH LUNGS: ICD-10-CM

## 2024-05-27 DIAGNOSIS — D84.81 IMMUNODEFICIENCY SECONDARY TO NEOPLASM: ICD-10-CM

## 2024-05-27 DIAGNOSIS — T45.1X5A IMMUNODEFICIENCY DUE TO CHEMOTHERAPY: ICD-10-CM

## 2024-05-27 DIAGNOSIS — D84.821 IMMUNODEFICIENCY DUE TO CHEMOTHERAPY: ICD-10-CM

## 2024-05-27 DIAGNOSIS — N30.00 ACUTE CYSTITIS WITHOUT HEMATURIA: ICD-10-CM

## 2024-05-27 DIAGNOSIS — L28.2 PRURITIC RASH: ICD-10-CM

## 2024-05-27 DIAGNOSIS — Z79.899 IMMUNODEFICIENCY DUE TO CHEMOTHERAPY: ICD-10-CM

## 2024-05-27 DIAGNOSIS — C25.9 PANCREATIC ADENOCARCINOMA: Primary | ICD-10-CM

## 2024-05-27 DIAGNOSIS — C78.01 MALIGNANT NEOPLASM METASTATIC TO BOTH LUNGS: ICD-10-CM

## 2024-05-27 DIAGNOSIS — B30.9 ACUTE VIRAL CONJUNCTIVITIS OF BOTH EYES: ICD-10-CM

## 2024-05-27 DIAGNOSIS — C78.7 METASTASIS TO LIVER: ICD-10-CM

## 2024-05-27 PROCEDURE — 99215 OFFICE O/P EST HI 40 MIN: CPT | Mod: S$GLB,,, | Performed by: INTERNAL MEDICINE

## 2024-05-27 PROCEDURE — 3074F SYST BP LT 130 MM HG: CPT | Mod: CPTII,S$GLB,, | Performed by: INTERNAL MEDICINE

## 2024-05-27 PROCEDURE — 63600175 PHARM REV CODE 636 W HCPCS: Performed by: INTERNAL MEDICINE

## 2024-05-27 PROCEDURE — 1157F ADVNC CARE PLAN IN RCRD: CPT | Mod: CPTII,S$GLB,, | Performed by: INTERNAL MEDICINE

## 2024-05-27 PROCEDURE — G2211 COMPLEX E/M VISIT ADD ON: HCPCS | Mod: S$GLB,,, | Performed by: INTERNAL MEDICINE

## 2024-05-27 PROCEDURE — 1159F MED LIST DOCD IN RCRD: CPT | Mod: CPTII,S$GLB,, | Performed by: INTERNAL MEDICINE

## 2024-05-27 PROCEDURE — 96417 CHEMO IV INFUS EACH ADDL SEQ: CPT

## 2024-05-27 PROCEDURE — 1126F AMNT PAIN NOTED NONE PRSNT: CPT | Mod: CPTII,S$GLB,, | Performed by: INTERNAL MEDICINE

## 2024-05-27 PROCEDURE — 99999 PR PBB SHADOW E&M-EST. PATIENT-LVL IV: CPT | Mod: PBBFAC,,, | Performed by: INTERNAL MEDICINE

## 2024-05-27 PROCEDURE — 25000003 PHARM REV CODE 250: Performed by: INTERNAL MEDICINE

## 2024-05-27 PROCEDURE — 96361 HYDRATE IV INFUSION ADD-ON: CPT

## 2024-05-27 PROCEDURE — 3078F DIAST BP <80 MM HG: CPT | Mod: CPTII,S$GLB,, | Performed by: INTERNAL MEDICINE

## 2024-05-27 PROCEDURE — A4216 STERILE WATER/SALINE, 10 ML: HCPCS | Performed by: INTERNAL MEDICINE

## 2024-05-27 PROCEDURE — 1101F PT FALLS ASSESS-DOCD LE1/YR: CPT | Mod: CPTII,S$GLB,, | Performed by: INTERNAL MEDICINE

## 2024-05-27 PROCEDURE — 3288F FALL RISK ASSESSMENT DOCD: CPT | Mod: CPTII,S$GLB,, | Performed by: INTERNAL MEDICINE

## 2024-05-27 PROCEDURE — 96367 TX/PROPH/DG ADDL SEQ IV INF: CPT

## 2024-05-27 PROCEDURE — 96413 CHEMO IV INFUSION 1 HR: CPT

## 2024-05-27 RX ORDER — PROCHLORPERAZINE EDISYLATE 5 MG/ML
5 INJECTION INTRAMUSCULAR; INTRAVENOUS ONCE AS NEEDED
Status: CANCELLED
Start: 2024-05-27

## 2024-05-27 RX ORDER — PROCHLORPERAZINE EDISYLATE 5 MG/ML
5 INJECTION INTRAMUSCULAR; INTRAVENOUS ONCE AS NEEDED
Status: DISCONTINUED | OUTPATIENT
Start: 2024-05-27 | End: 2024-05-27 | Stop reason: HOSPADM

## 2024-05-27 RX ORDER — HEPARIN 100 UNIT/ML
500 SYRINGE INTRAVENOUS
Status: CANCELLED | OUTPATIENT
Start: 2024-05-27

## 2024-05-27 RX ORDER — NITROFURANTOIN 25; 75 MG/1; MG/1
100 CAPSULE ORAL 2 TIMES DAILY
Qty: 10 CAPSULE | Refills: 0 | Status: SHIPPED | OUTPATIENT
Start: 2024-05-27 | End: 2024-06-01

## 2024-05-27 RX ORDER — SODIUM CHLORIDE 0.9 % (FLUSH) 0.9 %
10 SYRINGE (ML) INJECTION
Status: DISCONTINUED | OUTPATIENT
Start: 2024-05-27 | End: 2024-05-27 | Stop reason: HOSPADM

## 2024-05-27 RX ORDER — DIPHENHYDRAMINE HYDROCHLORIDE 50 MG/ML
50 INJECTION INTRAMUSCULAR; INTRAVENOUS ONCE AS NEEDED
Status: CANCELLED | OUTPATIENT
Start: 2024-05-27

## 2024-05-27 RX ORDER — SODIUM CHLORIDE 0.9 % (FLUSH) 0.9 %
10 SYRINGE (ML) INJECTION
Status: CANCELLED | OUTPATIENT
Start: 2024-05-27

## 2024-05-27 RX ORDER — DIPHENHYDRAMINE HYDROCHLORIDE 50 MG/ML
50 INJECTION INTRAMUSCULAR; INTRAVENOUS ONCE AS NEEDED
Status: DISCONTINUED | OUTPATIENT
Start: 2024-05-27 | End: 2024-05-27 | Stop reason: HOSPADM

## 2024-05-27 RX ORDER — HEPARIN 100 UNIT/ML
500 SYRINGE INTRAVENOUS
Status: DISCONTINUED | OUTPATIENT
Start: 2024-05-27 | End: 2024-05-27 | Stop reason: HOSPADM

## 2024-05-27 RX ORDER — EPINEPHRINE 0.3 MG/.3ML
0.3 INJECTION SUBCUTANEOUS ONCE AS NEEDED
Status: DISCONTINUED | OUTPATIENT
Start: 2024-05-27 | End: 2024-05-27 | Stop reason: HOSPADM

## 2024-05-27 RX ORDER — EPINEPHRINE 0.3 MG/.3ML
0.3 INJECTION SUBCUTANEOUS ONCE AS NEEDED
Status: CANCELLED | OUTPATIENT
Start: 2024-05-27

## 2024-05-27 RX ADMIN — GEMCITABINE 1690 MG: 38 INJECTION, SOLUTION INTRAVENOUS at 11:05

## 2024-05-27 RX ADMIN — ONDANSETRON 8 MG: 2 INJECTION INTRAMUSCULAR; INTRAVENOUS at 10:05

## 2024-05-27 RX ADMIN — SODIUM CHLORIDE, PRESERVATIVE FREE 10 ML: 5 INJECTION INTRAVENOUS at 12:05

## 2024-05-27 RX ADMIN — SODIUM CHLORIDE 500 ML: 9 INJECTION, SOLUTION INTRAVENOUS at 09:05

## 2024-05-27 RX ADMIN — HEPARIN 500 UNITS: 100 SYRINGE at 12:05

## 2024-05-27 RX ADMIN — SODIUM CHLORIDE: 9 INJECTION, SOLUTION INTRAVENOUS at 09:05

## 2024-05-27 RX ADMIN — PACLITAXEL 200 MG: 100 INJECTION, POWDER, LYOPHILIZED, FOR SUSPENSION INTRAVENOUS at 11:05

## 2024-05-27 NOTE — PROGRESS NOTES
MEDICAL ONCOLOGY - ESTABLISHED PATIENT VISIT    Reason for visit: Pancreatic adenocarcinoma    Best Contact Phone Number(s): [phone] (home)      Cancer/Stage/TNM:    Cancer Staging   Pancreatic adenocarcinoma  Staging form: Exocrine Pancreas, AJCC 8th Edition  - Clinical stage from 9/21/2023: Stage IV (cT1, cNX, pM1) - Signed by David Briceño MD on 10/11/2023       Oncology History   Pancreatic adenocarcinoma   9/5/2023 Initial Diagnosis    Pancreatic adenocarcinoma     9/21/2023 Cancer Staged    Staging form: Exocrine Pancreas, AJCC 8th Edition  - Clinical stage from 9/21/2023: Stage IV (cT1, cNX, pM1)     10/2/2023 Tumor Conference     [facility] UGI MULTIDISCIPLINARY TUMOR BOARD  PATIENT REVIEW FORM   ____________________________________________________________    CLINIC #: [id number]  DATE: 10/2/2023    DIAGNOSIS: pancreas CA    PRESENTER: Angela    PATIENT SUMMARY:   This 77 y/o female presented in August with abd pain, decreased appetite with weight loss. Reviewed CT and MRI imaging ~ 2 cm mass in tail of pancreas with diffuse metastatic liver disease, largest liver lesion in left lobe measuring 4.5 cm. IR performed liver bx and this pathology was reviewed - poorly differentiated adenocarcinoma, favoring upper GI origin.     BOARD RECOMMENDATIONS:   Stage IV pancreas CA with liver mets  Add MMR and TEMPUS  Proceed with palliative systemic chemotherapy    CONSULT NEEDED:     [] Surgery    [] Hem/Onc    [] Rad/Onc    [] Dietary     [] Social Service    [] Psychology       [] AES  [] Radiology     Clinical Stage: Tumor   Node(s)   Metastasis        GROUP STAGE:  [] O    [] 1A    [] IB    [] IIA    [] IIB     [] IIIA     [] IIIB     [] IIIC    [x]IV  [] Local recurrence     [] Regional recurrence     [] Distant recurrence   Metastatic site(s): liver         [x] Elidia'[facility] Treatment Guidelines reviewed and care planned is consistent with guidelines.         (i.e., NCCN, NCI, PD, ACO, AUA,  etc.)    PRESENTATION AT CANCER CONFERENCE:         [x] Prospective    [] Retrospective     [] Follow-Up    Eligible for clinical trial : yes       11/1/2023 -  Chemotherapy    Treatment Summary   Plan Name: OP PANC NAB-PACLITAXEL + GEMCITABINE Q4W  Treatment Goal: Palliative  Status: Active  Start Date: 11/1/2023  End Date: 9/6/2024 (Planned)  Provider: David Briceño MD  Chemotherapy: gemcitabine (GEMZAR) 1,600 mg in sodium chloride 0.9% SolP 327.08 mL chemo infusion, 1,680 mg, Intravenous, Clinic/HOD 1 time, 8 of 12 cycles  Administration: 1,600 mg (11/1/2023), 1,600 mg (11/15/2023), 1,600 mg (11/29/2023), 1,600 mg (12/14/2023), 1,600 mg (12/27/2023), 1,600 mg (1/9/2024), 1,600 mg (1/22/2024), 1,600 mg (2/5/2024), 1,600 mg (2/19/2024), 1,600 mg (3/4/2024), 1,600 mg (3/18/2024), 1,700 mg (4/1/2024), 1,690 mg (4/15/2024), 1,600 mg (4/29/2024), 1,600 mg (5/13/2024)          Interim History:   79 y.o. female with metastatic pancreatic cancer who presents for follow-up prior to cycle 16 of biweekly gemcitabine + nab-paclitaxel. She is feeling well today.  BP low and has been running low but denies any lightheadedness.  States she is staying hydrated. She did have a day of low grade fever and chills after her last infusion.  She noticed mild dysuria this morning, characteristic of prior UTIs, no back pain, fevers of late.  No nausea/vomiting.     ECOG PS is 0. Presents with her  today.     ROS:   Review of Systems   Constitutional:  Negative for chills, fever, malaise/fatigue and weight loss.   HENT:  Negative for sore throat.    Eyes:  Negative for blurred vision and pain.   Respiratory:  Negative for cough and shortness of breath.    Cardiovascular:  Positive for leg swelling (L>R). Negative for chest pain.   Gastrointestinal:  Negative for constipation, diarrhea, nausea and vomiting.   Genitourinary:  Positive for dysuria and frequency.   Musculoskeletal:  Positive for joint pain (R shoulder). Negative  for back pain, falls and myalgias.   Skin:  Negative for itching and rash.   Neurological:  Negative for dizziness, weakness and headaches.   Endo/Heme/Allergies:  Does not bruise/bleed easily.   Psychiatric/Behavioral:  Negative for depression. The patient is not nervous/anxious.        Past Medical History:   Past Medical History:   Diagnosis Date    Age-related osteoporosis without current pathological fracture     Atherosclerosis of aortic arch     - noted on CXR 8/2017    Back pain     Diverticulosis of sigmoid colon 10/22/2019    Ectopic pregnancy     Fibrocystic breast     Hyperlipidemia     Inguinal hernia     Osteopenia     Polyp of ascending colon 10/22/2019    Urinary incontinence, mixed 05/24/2022        Past Surgical History:   Past Surgical History:   Procedure Laterality Date    ECTOPIC PREGNANCY SURGERY      HERNIA REPAIR      left inguinal    HYSTERECTOMY  1980    without BSO        Family History:   Family History   Problem Relation Name Age of Onset    Breast cancer Mother  69    Seizures Mother      Heart disease Father      Heart attack Father      Depression Father      Breast cancer Sister Monika 73    Breast cancer Maternal Aunt Kathleen 73    Prostate cancer Brother Lamine 81        no mets    Obesity Brother Lamine     Heart failure Maternal Grandmother      Heart attack Maternal Grandfather      Suicide Attempts Paternal Grandmother      Depression Paternal Grandmother      Mental illness Paternal Grandmother      Depression Paternal Aunt Jenniffer     Heart attack Paternal Uncle Leodan     Depression Paternal Uncle Leodan     Cancer Maternal Aunt Rosio 83        blood cancer (leukemia?)    Cancer Maternal Uncle Bill         unk type; was COD    Suicide Paternal Cousin      Tongue cancer Paternal Cousin Jimi         smoking hx unk    Ovarian cancer Neg Hx          Social History:   Social History     Tobacco Use    Smoking status: Never    Smokeless tobacco: Never   Substance Use Topics    Alcohol  use: No        I have reviewed and updated the patient's past medical, surgical, family and social histories.    Allergies:   Review of patient's allergies indicates:   Allergen Reactions    Erythromycin (bulk)      Other reaction(s): Eye irritation        Medications:   Current Outpatient Medications   Medication Sig Dispense Refill    coconut oil, bulk, Oil by Misc.(Non-Drug; Combo Route) route.      multivitamin (THERAGRAN) per tablet Take 1 tablet by mouth once daily.      vitamin D (VITAMIN D3) 1000 units Tab Take 1,000 Units by mouth once daily.      ABRAXANE 100 mg injection  (Patient not taking: Reported on 5/13/2024)      dexAMETHasone (DECADRON) 4 mg/mL injection  (Patient not taking: Reported on 5/13/2024)      diphenhydramine HCl (BENADRYL ORAL) Take 25 mg by mouth as needed (rash). (Patient not taking: Reported on 5/13/2024)      doxycycline (VIBRAMYCIN) 100 MG Cap Take 1 capsule (100 mg total) by mouth every 12 (twelve) hours. (Patient not taking: Reported on 5/13/2024) 14 capsule 0    GADAVIST 1 mmol/mL (604.72 mg/mL) Soln injection  (Patient not taking: Reported on 5/13/2024)      gemcitabine (GEMZAR) 1 gram/26.3 mL (38 mg/mL) Soln injection  (Patient not taking: Reported on 5/13/2024)      gemcitabine 200 mg/5.26 mL (38 mg/mL) Soln  (Patient not taking: Reported on 5/13/2024)      heparin sodium,porcine (HEPARIN, PORCINE,) 1,000 unit/mL injection  (Patient not taking: Reported on 5/13/2024)      heparin, porcine, PF, (HEPARIN FLUSH 100 UNITS/ML) 100 unit/mL Syrg  (Patient not taking: Reported on 5/13/2024)      HYDROcodone-acetaminophen (NORCO) 5-325 mg per tablet Take 1 tablet by mouth every 12 (twelve) hours as needed for Pain. (Patient not taking: Reported on 5/13/2024) 10 tablet 0    hydrOXYzine HCL (ATARAX) 25 MG tablet Take 1 tablet (25 mg total) by mouth 3 (three) times daily as needed for Itching. (Patient not taking: Reported on 5/13/2024) 60 tablet 2    LIDOcaine-prilocaine (EMLA) cream  Apply topically as needed (place to port site 45-60 minutes prior to chemotherapy). (Patient not taking: Reported on 5/13/2024) 30 g 3    nitrofurantoin, macrocrystal-monohydrate, (MACROBID) 100 MG capsule Take 1 capsule (100 mg total) by mouth 2 (two) times daily. for 5 days 10 capsule 0    olopatadine (PATANOL) 0.1 % ophthalmic solution 1 drop 2 (two) times daily. (Patient not taking: Reported on 5/13/2024)      ondansetron (ZOFRAN-ODT) 4 MG TbDL Take 1 tablet (4 mg total) by mouth every 8 (eight) hours as needed (Nausea). (Patient not taking: Reported on 5/13/2024) 10 tablet 0    ondansetron (ZOFRAN-ODT) 8 MG TbDL TAKE 1 TABLET (8 MG TOTAL) BY MOUTH EVERY 6 HOURS AS NEEDED FOR NAUSEA (Patient not taking: Reported on 5/13/2024) 30 tablet 5    RESTASIS 0.05 % ophthalmic emulsion  (Patient not taking: Reported on 5/13/2024)      triamcinolone acetonide 0.1% (KENALOG) 0.1 % ointment Apply topically as needed (rash). (Patient not taking: Reported on 5/13/2024)       No current facility-administered medications for this visit.      Physical Exam:   BP (!) 95/51 (BP Location: Left arm, Patient Position: Sitting, BP Method: Medium (Automatic))   Pulse 82   Temp 97.8 °F (36.6 °C) (Oral)   Wt 64.1 kg (141 lb 6.8 oz)   SpO2 98%   BMI 25.05 kg/m²        Physical Exam  Vitals reviewed.   Constitutional:       General: She is not in acute distress.     Appearance: Normal appearance. She is well-developed and normal weight. She is not ill-appearing, toxic-appearing or diaphoretic.   HENT:      Head: Normocephalic and atraumatic.      Right Ear: External ear normal.      Left Ear: External ear normal.      Nose: Nose normal.      Mouth/Throat:      Mouth: Mucous membranes are moist.      Pharynx: Oropharynx is clear. No posterior oropharyngeal erythema.   Eyes:      General: No scleral icterus.     Extraocular Movements: Extraocular movements intact.      Pupils: Pupils are equal, round, and reactive to light.   Neck:       Trachea: No tracheal deviation.   Cardiovascular:      Rate and Rhythm: Normal rate and regular rhythm.      Pulses: Normal pulses.      Heart sounds: Normal heart sounds.   Pulmonary:      Effort: Pulmonary effort is normal. No respiratory distress.      Breath sounds: Normal breath sounds. No wheezing or rales.   Chest:      Comments: RCW port  Abdominal:      General: Bowel sounds are normal. There is no distension.      Palpations: Abdomen is soft.      Tenderness: There is no abdominal tenderness.   Musculoskeletal:         General: No swelling or deformity. Normal range of motion.      Cervical back: Normal range of motion and neck supple.   Skin:     General: Skin is warm.      Coloration: Skin is not jaundiced.      Findings: No erythema or rash.   Neurological:      General: No focal deficit present.      Mental Status: She is alert and oriented to person, place, and time. Mental status is at baseline.      Motor: No weakness.      Gait: Gait normal.   Psychiatric:         Mood and Affect: Mood normal.         Behavior: Behavior normal.         Thought Content: Thought content normal.         Judgment: Judgment normal.         Labs:   Lab work reviewed.  Mild anemia. CA 19-9 uptrending.    Imaging:   CT CAP 4/12/24:    Impression:     In this patient with pancreatic adenocarcinoma, the nonenhancing pancreatic tail mass appears smaller with continued abutment of the splenic artery and occlusion of the splenic vein.     Mixed response in the liver.  Most lesions appear stable in size with some smaller and some larger as compared to prior.     No evidence of new disease.        Path:   Final Pathologic Diagnosis     Left hepatic lobe, biopsy:   Positive for malignancy, poorly differentiated adenocarcinoma (see comment)   Tumor necrosis present     Comment:  A review of the patient's imaging shows the presence of a 1.9 cm pancreatic tail mass and diffuse liver lesions.  These morphologic and immunophenotypic  findings are supportive of a pancreatobiliary and/or upper gastrointestinal primary.  VC      Comment: Interp By Nataliia Peralta D.O., Signed on 10/05/2023 at 08:36   Supplemental Diagnosis     The purpose of this supplemental report is to report results of MMR panel in the tumor cells as follows:     Immunohistochemistry (IHC) Testing for Mismatch Repair (MMR) Proteins:      MLH1 - Intact nuclear expression    MSH2 - Intact nuclear expression    MSH6 - Intact nuclear expression    PMS2 - Intact nuclear expression            Somatic Genomic Results       Pancreatic mass       Tumor NGS Tissue  Resulted: 10/10/23 Status: Preliminary result       Detected - Pathogenic (4)      Gene Variant VAF   BRAF [id number] - c.1458_[id number] Inframe deletion - GOF 12.00 %   CDKN2A CDKN2A - Copy number loss --   CDKN2B CDKN2B - Copy number loss --   TP53 p.Y163C - c.488A>G Missense variant - LOF 30.50 %              Detected - Uncertain significance (1)      Gene Variant VAF   DIS3 p.N671S - c.2012A>G Missense variant 23.10 %                   Assessment:       1. Pancreatic adenocarcinoma    2. 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 Metastasis to liver    4. Immunodeficiency due to chemotherapy    5. Immunodeficiency secondary to neoplasm    6. Dyslipidemia    7. Aortic atherosclerosis    8. Constipation, unspecified constipation type    9. Pruritic rash    10. Age-related osteoporosis without current pathological fracture    11. Vaginal vault prolapse    12. Acute viral conjunctivitis of both eyes    13. Hordeolum externum of right lower eyelid    14. Acute cystitis without hematuria         Plan:        # Pancreatic cancer  We discussed with the patient and her family her biopsy-proven diagnosis of metastatic pancreatic adenocarcinoma with multifocal liver metastases.  Her tumor is pMMR and Tempus tissue showed a non V600E BRAF mutation, TP53 mutation and CDKN2A and CDKN2B loss.  QUINN.    We discussed her  prognosis and potential treatment options.  Because of her age and discussion of tolerance concerns, I have recommended biweekly gemcitabine + nab-paclitaxel as palliative intent first-line therapy.  Dosing for gemcitabine + nab-paclitaxel will be 1000 mg/m2 and 125 mg/m2, respectively, and I will administer these on a biweekly basis.    Port was placed.  Consideration to BRAF non-V600E targeting trials in the future if available.    She began cycle 1 of biweekly gemcitabine + nab-paclitaxel on 11/1/23.  She experienced fevers/chills and rash. believe these are all chemotherapy related. Improved after the administration of Dex.     Will continue to administer dexamethasone 12 mg IV prior to infusion for rash prevention.  May need to increase to 20mg for future cycles. Will continue to monitor.    Also gave her Medrol Dose Nghia to use in case rash recurs despite premed dex.    - CT scan after cycle 4 displays overall improvement within the lung and liver, with decreases in the number and size of the lesions. Continue current regimen.   - CT scan after cycle 8 displays continued improvement in disease in liver and lung and pancreatic tail.  - CT scan after cycle 12 displays continued improvement of pancreatic tail mass but mixed response in liver. Discussed with patient and family recommendation to continue current regimen at this time rather than transition to new regimen. Should next scans show worsening disease, will consider transition to 5-FU + Onivyde.     - Lab work stable and adequate for treatment.   - Proceed with cycle 16.    RTC in 2 weeks for next cycle with scans.    Following with palliative care.   Genetic testing done outside - saw Julito Sosa here. FANCC mut present.    PGX showed DPYD nml and UGT1A1 intermediate metabolism.    # HLD, aortic atherosclerosis, constipation, pruritus, fever  Not currently on therapy for HLD, atherosclerosis. Cholesterol stable from June 2023.   Constipation: stable.    Pruritus/fever: controlled with increased pre-meds and Benadryl. Continues to control fever alternating Motrin and Tylenol.     # Osteoporosis, vaginal prolapse, bilateral conjunctivitis, R eye lower lid stye  Monitor calcium levels on chemo.  Will continue to encourage maintaining activity.  Will f/u with GYN.   Eye symptoms have improved. Continue ophtho f/u.    # Dysuria  Mild symptoms of cystitis.  Discussed pros and cons of treating with chemo and antibiotics. She is agreeable with proceeding with chemotherapy.  Sent Macrobid to start today.    Follow up: 2 weeks for cycle 17.    Patient is in agreement with the proposed treatment plan. All questions were answered to the patient's satisfaction. Pt knows to call clinic if anything is needed before the next clinic visit.    David Briceño MD  Hematology/Oncology  [facility]      Route Chart for Scheduling  Med Onc Route Chart for SchedulingTreatment Plan Information   OP PANC NAB-PACLITAXEL + GEMCITABINE Q4W   David Briceño MD   Upcoming Treatment Dates - OP PANC NAB-PACLITAXEL + GEMCITABINE Q4W    5/17/2024       Chemotherapy       PACLitaxeL protein-bound (ABRAXANE) in 42 mL infusion       gemcitabine (GEMZAR) in sodium chloride 0.9% SolP 250 mL chemo infusion       Antiemetics       ondansetron (ZOFRAN) 8 mg, dexAMETHasone (DECADRON) 12 mg in sodium chloride 0.9% 50 mL IVPB  5/31/2024       Chemotherapy       PACLitaxeL protein-bound (ABRAXANE) in 42 mL infusion       gemcitabine (GEMZAR) in sodium chloride 0.9% SolP 250 mL chemo infusion       Antiemetics       ondansetron (ZOFRAN) 8 mg, dexAMETHasone (DECADRON) 12 mg in sodium chloride 0.9% 50 mL IVPB  6/14/2024       Chemotherapy       PACLitaxeL protein-bound (ABRAXANE) in 42 mL infusion       gemcitabine (GEMZAR) in sodium chloride 0.9% SolP 250 mL chemo infusion       Antiemetics       ondansetron (ZOFRAN) 8 mg, dexAMETHasone (DECADRON) 12 mg in sodium chloride 0.9% 50  mL IVPB  6/28/2024       Chemotherapy       PACLitaxeL protein-bound (ABRAXANE) in 42 mL infusion       gemcitabine (GEMZAR) in sodium chloride 0.9% SolP 250 mL chemo infusion       Antiemetics       ondansetron (ZOFRAN) 8 mg, dexAMETHasone (DECADRON) 12 mg in sodium chloride 0.9% 50 mL IVPB

## 2024-05-27 NOTE — PLAN OF CARE
Pt tolerated Abraxane and Gemzar well. No adverse reaction noted. Pt education reinforced on chemo regimen, side effects, what to expect, and when to call Dr. Briceño. Pt verbalized understanding. I reviewed pt calendar w/ pt and understanding verbalized.

## 2024-05-27 NOTE — TELEPHONE ENCOUNTER
Spoke with patient at appointment and relayed info to MD.  UA and culture ordered.  Patient to collect.

## 2024-05-27 NOTE — TELEPHONE ENCOUNTER
Deepali Hernandez Staff (supporting David Briceño MD)56 minutes ago (7:41 AM)       Text or call me on my cell phone as I am leaving now to keep my appointment since I havent heard from your office yet.  [phone].

## 2024-05-28 ENCOUNTER — PATIENT MESSAGE (OUTPATIENT)
Dept: ADMINISTRATIVE | Facility: OTHER | Age: 79
End: 2024-05-28
Payer: MEDICARE

## 2024-05-29 ENCOUNTER — PATIENT MESSAGE (OUTPATIENT)
Dept: ADMINISTRATIVE | Facility: OTHER | Age: 79
End: 2024-05-29
Payer: MEDICARE

## 2024-05-29 ENCOUNTER — NURSE TRIAGE (OUTPATIENT)
Dept: ADMINISTRATIVE | Facility: CLINIC | Age: 79
End: 2024-05-29
Payer: MEDICARE

## 2024-05-29 ENCOUNTER — PATIENT MESSAGE (OUTPATIENT)
Dept: HEMATOLOGY/ONCOLOGY | Facility: CLINIC | Age: 79
End: 2024-05-29
Payer: MEDICARE

## 2024-05-29 NOTE — TELEPHONE ENCOUNTER
Patient states she's tired but feeling ok.  Started with chills and fever around 11 pm last night.  She took one 200 mg ibuprofen at 1 am for a temp of 100 or close to it.  Same thing happened after her last chemo two weeks ago.  Around 3 am, Temp was 100.9.  Called on call and took 1 extra strength tylenol.  No more fever since this AM.  Has been sleeping for most of the day.  Questioning if she should take 1 or 2 ibuprofen 200 mg if she has fever.  Informed patient to call our office if she has any fever.  Patient verbalized understanding.  Will clarify instructions for fever with provider and return call to patient.

## 2024-05-29 NOTE — TELEPHONE ENCOUNTER
Fever spiked to 100.9 patient is taking tylenol and alternating with ibuprofen.  Patient is wanting dr zambrano to be informed an to know if she can take additional tylenol during temp elevation.  Reason for Disposition   [1] Fever > 100.0 F (37.8 C) AND [2] port (portacath), central line, or PICC line    Additional Information   Negative: Shock suspected (e.g., cold/pale/clammy skin, too weak to stand, low BP, rapid pulse)   Negative: Difficult to awaken or acting confused (e.g., disoriented, slurred speech)   Negative: [1] Difficulty breathing AND [2] bluish lips, tongue or face   Negative: New-onset rash with multiple purple (or blood-colored) spots or dots   Negative: Sounds like a life-threatening emergency to the triager   Fever in a cancer patient who is currently (or recently) receiving chemotherapy or radiation therapy, or cancer patient who has metastatic or end-stage cancer and is receiving palliative care   Negative: Shock suspected (e.g., cold/pale/clammy skin, too weak to stand, low BP, rapid pulse)   Negative: Difficult to awaken or acting confused (e.g., disoriented, slurred speech)   Negative: Bluish (or gray) lips or face now   Negative: New-onset rash with many purple (or blood-colored) spots or dots   Negative: Sounds like a life-threatening emergency to the triager   Negative: Other symptom is present, see that guideline (e.g., symptoms of cough, runny nose, sore throat, earache, abdominal pain, diarrhea, vomiting)   Negative: Fever > 103 F (39.4 C)   Negative: [1] Neutropenia known or suspected (e.g., recent cancer chemotherapy) AND [2] fever > 100.4 F (38.0 C)   Negative: [1] Neutropenia known or suspected (e.g., recent cancer chemotherapy) AND [2] signs or symptoms of suspected infection are present   Negative: [1] Headache AND [2] stiff neck (can't touch chin to chest)   Negative: Difficulty breathing   Negative: [1] Drinking very little AND [2] dehydration suspected (e.g., no urine > 12  hours, very dry mouth, very lightheaded)   Negative: Patient sounds very sick or weak to the triager  (Exception: Mild weakness and hasn't taken fever medicine.)   Negative: [1] Fever > 101 F (38.3 C) AND [2] age > 60 years   Negative: [1] Fever > 101 F (38.3 C) AND [2] co-morbidity risk factor for serious infection (e.g., COPD, heart failure, liver failure, renal failure with dialysis)   Negative: [1] Fever > 100.0 F (37.8 C) AND [2] bedridden (e.g., CVA, chronic illness, recovering from surgery)   Negative: [1] Fever > 100.0 F (37.8 C) AND [2] diabetes mellitus or weak immune system (e.g., HIV positive, cancer chemo, splenectomy, organ transplant, chronic steroids)   Negative: [1] Fever > 100.0 F (37.8 C) AND [2] indwelling urinary catheter (e.g., coude, Melendrez)    Protocols used: Fever-A-AH, Cancer - Fever-A-AH

## 2024-05-29 NOTE — TELEPHONE ENCOUNTER
Spoke with provider and returned call to patient.  Informed patient to please call our clinic if she has a fever and she verbalized understanding.  Also discussed if she gets a slight temperature after each chemo to take appropriate medication as she felt needed.  If for some reason, her fever at the time felt different, had different accompanying symptoms, or continues to climb with medication on board to go to ED.  Patient verbalized understanding.

## 2024-05-30 ENCOUNTER — PATIENT MESSAGE (OUTPATIENT)
Dept: ADMINISTRATIVE | Facility: OTHER | Age: 79
End: 2024-05-30
Payer: MEDICARE

## 2024-05-31 ENCOUNTER — PATIENT MESSAGE (OUTPATIENT)
Dept: ADMINISTRATIVE | Facility: OTHER | Age: 79
End: 2024-05-31
Payer: MEDICARE

## 2024-06-01 ENCOUNTER — PATIENT MESSAGE (OUTPATIENT)
Dept: ADMINISTRATIVE | Facility: OTHER | Age: 79
End: 2024-06-01
Payer: MEDICARE

## 2024-06-02 ENCOUNTER — PATIENT MESSAGE (OUTPATIENT)
Dept: ADMINISTRATIVE | Facility: OTHER | Age: 79
End: 2024-06-02
Payer: MEDICARE

## 2024-06-03 ENCOUNTER — PATIENT MESSAGE (OUTPATIENT)
Dept: ADMINISTRATIVE | Facility: OTHER | Age: 79
End: 2024-06-03
Payer: MEDICARE

## 2024-06-03 ENCOUNTER — PATIENT MESSAGE (OUTPATIENT)
Dept: HEMATOLOGY/ONCOLOGY | Facility: CLINIC | Age: 79
End: 2024-06-03
Payer: MEDICARE

## 2024-06-04 ENCOUNTER — PATIENT MESSAGE (OUTPATIENT)
Dept: ADMINISTRATIVE | Facility: OTHER | Age: 79
End: 2024-06-04
Payer: MEDICARE

## 2024-06-05 ENCOUNTER — PATIENT MESSAGE (OUTPATIENT)
Dept: ADMINISTRATIVE | Facility: OTHER | Age: 79
End: 2024-06-05
Payer: MEDICARE

## 2024-06-06 ENCOUNTER — PATIENT MESSAGE (OUTPATIENT)
Dept: ADMINISTRATIVE | Facility: OTHER | Age: 79
End: 2024-06-06
Payer: MEDICARE

## 2024-06-07 ENCOUNTER — PATIENT MESSAGE (OUTPATIENT)
Dept: ADMINISTRATIVE | Facility: OTHER | Age: 79
End: 2024-06-07
Payer: MEDICARE

## 2024-06-07 ENCOUNTER — OFFICE VISIT (OUTPATIENT)
Dept: UROGYNECOLOGY | Facility: CLINIC | Age: 79
End: 2024-06-07
Payer: MEDICARE

## 2024-06-07 VITALS
HEIGHT: 63 IN | BODY MASS INDEX: 24.84 KG/M2 | DIASTOLIC BLOOD PRESSURE: 66 MMHG | WEIGHT: 140.19 LBS | HEART RATE: 76 BPM | SYSTOLIC BLOOD PRESSURE: 115 MMHG

## 2024-06-07 DIAGNOSIS — N81.6 RECTOCELE, FEMALE: ICD-10-CM

## 2024-06-07 DIAGNOSIS — K59.00 CONSTIPATION, UNSPECIFIED CONSTIPATION TYPE: ICD-10-CM

## 2024-06-07 DIAGNOSIS — N81.11 MIDLINE CYSTOCELE: Primary | ICD-10-CM

## 2024-06-07 DIAGNOSIS — N39.41 URGE INCONTINENCE: ICD-10-CM

## 2024-06-07 DIAGNOSIS — Z12.31 SCREENING MAMMOGRAM FOR BREAST CANCER: ICD-10-CM

## 2024-06-07 DIAGNOSIS — N95.2 VAGINAL ATROPHY: ICD-10-CM

## 2024-06-07 DIAGNOSIS — N81.9 VAGINAL VAULT PROLAPSE: ICD-10-CM

## 2024-06-07 PROCEDURE — 99213 OFFICE O/P EST LOW 20 MIN: CPT | Mod: S$GLB,,, | Performed by: NURSE PRACTITIONER

## 2024-06-07 PROCEDURE — 3288F FALL RISK ASSESSMENT DOCD: CPT | Mod: CPTII,S$GLB,, | Performed by: NURSE PRACTITIONER

## 2024-06-07 PROCEDURE — 1101F PT FALLS ASSESS-DOCD LE1/YR: CPT | Mod: CPTII,S$GLB,, | Performed by: NURSE PRACTITIONER

## 2024-06-07 PROCEDURE — 1159F MED LIST DOCD IN RCRD: CPT | Mod: CPTII,S$GLB,, | Performed by: NURSE PRACTITIONER

## 2024-06-07 PROCEDURE — 1126F AMNT PAIN NOTED NONE PRSNT: CPT | Mod: CPTII,S$GLB,, | Performed by: NURSE PRACTITIONER

## 2024-06-07 PROCEDURE — 3078F DIAST BP <80 MM HG: CPT | Mod: CPTII,S$GLB,, | Performed by: NURSE PRACTITIONER

## 2024-06-07 PROCEDURE — 1160F RVW MEDS BY RX/DR IN RCRD: CPT | Mod: CPTII,S$GLB,, | Performed by: NURSE PRACTITIONER

## 2024-06-07 PROCEDURE — 1157F ADVNC CARE PLAN IN RCRD: CPT | Mod: CPTII,S$GLB,, | Performed by: NURSE PRACTITIONER

## 2024-06-07 PROCEDURE — 3074F SYST BP LT 130 MM HG: CPT | Mod: CPTII,S$GLB,, | Performed by: NURSE PRACTITIONER

## 2024-06-07 PROCEDURE — 99999 PR PBB SHADOW E&M-EST. PATIENT-LVL IV: CPT | Mod: PBBFAC,,, | Performed by: NURSE PRACTITIONER

## 2024-06-07 NOTE — PROGRESS NOTES
Urogyn follow up  06/07/2023  .  [facility] - UROGYNECOLOGY  [address] [address]    Deepali Alex  [id number]  1945      Deepali Alex is a 79 y.o.  here for a urogyn follow up for pessary check    Last HPI from 05/24/2022  1)  UI:  (+) JOEL (occasionally) > (+) UUI (occasionally).  (+) pads (liner):1/day, usually minimum wetness and 1/night usually dry. Daytime frequency: Q 2 hours.  Nocturia: Yes: 1/night.   (--) dysuria,  (--) hematuria,  (--) frequent UTIs. 3/2022 urine C&S+  (+) complete bladder emptying.     2)  POP:  Present--feels worsening x last year. below introitus.  Symptoms: (--).  (--) vaginal bleeding. (--) vaginal discharge. (+) sexually active--mostly gives oral sex,  has some ED.  (--) dyspareunia.  (--)  Vaginal dryness.  (--) vaginal estrogen use. Estrogen cream was recc'd in past--did not want to use due to +FH BR cancer.      3)  BM:  (--) constipation/straining.  (--) chronic diarrhea. (--) hematochezia.  (--) fecal incontinence.  (+) fecal smearing/urgency--notices some smudges on pad sometimes.  (+) complete evacuation.      4)  H/o L inguinal hernia repair:  --notices some burning/pulling when raises leg   --started after heavy lifting last year       08/5/2022  1)  Stage 2 cystocele/rectocele, stage 1 vaginal vault prolapse:  --here for pessary check       2)  Mixed urinary incontinence, urge < stress:    --rare UUI with full bladder or rare JOEL with change in position  --did one session with PT     3)  Fecal smearing:  --improved with metamucil     4)  Incomplete emptying:  --retroperitoneal ultrasound normal    5)  Vaginal atrophy (dryness):    --using REPLENS or REFRESH OTC: 1/2 applicator full in vagina twice a week.    --OR can also use coconut oil: dime-sized amount with finger in vagina up nightly and as needed    07/31/2023  Using #2 ring with support --independent in use. Has one episode of spotting a few weeks ago after  using coconut oil  Rare UUI if ignores urge  Denies constipation--eating high fiber diet    Changes since last visit:  Rare JOEL.  Voiding every 3 hours during the day and 3/ night. Limits fluids prior to bedtime.  Using #2 ring with support pessary--independent in use. Had pain in December--likely due to constipation  Using coconut oil for vaginal dryness  Intermittent constipation        06/30/2022  Retroperitoneal ultrasound  Right kidney: The right kidney measures 9.5 cm in length.  No cortical thinning. No loss of corticomedullary distinction. Resistive index measures 0.72.  No mass. No renal stone. No hydronephrosis.   Left kidney: The left kidney measures 11.3 cm in length.  No cortical thinning. No loss of corticomedullary distinction. Resistive index measures 0.74.  No mass. No renal stone. No hydronephrosis.   The bladder is partially distended at the time of scanning and has an unremarkable appearance.   Impression:   Unremarkable sonographic appearance of the kidneys and urinary bladder.       Past Medical History:   Diagnosis Date    Age-related osteoporosis without current pathological fracture     Atherosclerosis of aortic arch     - noted on CXR 8/2017    Back pain     Diverticulosis of sigmoid colon 10/22/2019    Ectopic pregnancy     Fibrocystic breast     Hyperlipidemia     Inguinal hernia     Osteopenia     Polyp of ascending colon 10/22/2019    Urinary incontinence, mixed 05/24/2022       Past Surgical History:   Procedure Laterality Date    ECTOPIC PREGNANCY SURGERY      HERNIA REPAIR      left inguinal    HYSTERECTOMY  1980    without BSO       Family History   Problem Relation Name Age of Onset    Breast cancer Mother  69    Seizures Mother      Heart disease Father      Heart attack Father      Depression Father      Breast cancer Sister Monika 73    Breast cancer Maternal Aunt Kathleen 73    Prostate cancer Brother Lamine 81        no mets    Obesity Brother Lamine     Heart failure Maternal  Grandmother      Heart attack Maternal Grandfather      Suicide Attempts Paternal Grandmother      Depression Paternal Grandmother      Mental illness Paternal Grandmother      Depression Paternal Aunt Jenniffer     Heart attack Paternal Uncle [place]     Depression Paternal Uncle Leodan     Cancer Maternal Aunt [place] 83        blood cancer (leukemia?)    Cancer Maternal Uncle Bill         unk type; was COD    Suicide Paternal Cousin      Tongue cancer Paternal Cousin Jimi         smoking hx unk    Ovarian cancer Neg Hx         Social History     Socioeconomic History    Marital status:     Number of children: 2   Occupational History    Occupation: retired   Tobacco Use    Smoking status: Never    Smokeless tobacco: Never   Substance and Sexual Activity    Alcohol use: No    Drug use: No    Sexual activity: Not Currently     Social Determinants of Health     Financial Resource Strain: Low Risk  (11/12/2023)    Overall Financial Resource Strain (CARDIA)     Difficulty of Paying Living Expenses: Not hard at all   Food Insecurity: No Food Insecurity (11/12/2023)    Hunger Vital Sign     Worried About Running Out of Food in the Last Year: Never true     Ran Out of Food in the Last Year: Never true   Transportation Needs: No Transportation Needs (11/12/2023)    PRAPARE - Transportation     Lack of Transportation (Medical): No     Lack of Transportation (Non-Medical): No   Physical Activity: Inactive (11/12/2023)    Exercise Vital Sign     Days of Exercise per Week: 0 days     Minutes of Exercise per Session: 0 min   Stress: No Stress Concern Present (11/12/2023)    [employer] - Occupational Stress Questionnaire     Feeling of Stress : Not at all   Recent Concern: Stress - Stress Concern Present (11/5/2023)    [employer] - Occupational Stress Questionnaire     Feeling of Stress : To some extent   Housing Stability: Low Risk  (11/12/2023)    Housing Stability  Vital Sign     Unable to Pay for Housing in the Last Year: No     Number of Places Lived in the Last Year: 1     Unstable Housing in the Last Year: No       Current Outpatient Medications   Medication Sig Dispense Refill    coconut oil, bulk, Oil by Misc.(Non-Drug; Combo Route) route.      multivitamin (THERAGRAN) per tablet Take 1 tablet by mouth once daily.      vitamin D (VITAMIN D3) 1000 units Tab Take 1,000 Units by mouth once daily.      ABRAXANE 100 mg injection  (Patient not taking: Reported on 5/13/2024)      dexAMETHasone (DECADRON) 4 mg/mL injection  (Patient not taking: Reported on 5/13/2024)      diphenhydramine HCl (BENADRYL ORAL) Take 25 mg by mouth as needed (rash). (Patient not taking: Reported on 5/13/2024)      doxycycline (VIBRAMYCIN) 100 MG Cap Take 1 capsule (100 mg total) by mouth every 12 (twelve) hours. (Patient not taking: Reported on 5/13/2024) 14 capsule 0    GADAVIST 1 mmol/mL (604.72 mg/mL) Soln injection  (Patient not taking: Reported on 5/13/2024)      gemcitabine (GEMZAR) 1 gram/26.3 mL (38 mg/mL) Soln injection  (Patient not taking: Reported on 5/13/2024)      gemcitabine 200 mg/5.26 mL (38 mg/mL) Soln  (Patient not taking: Reported on 5/13/2024)      heparin sodium,porcine (HEPARIN, PORCINE,) 1,000 unit/mL injection  (Patient not taking: Reported on 5/13/2024)      heparin, porcine, PF, (HEPARIN FLUSH 100 UNITS/ML) 100 unit/mL Syrg  (Patient not taking: Reported on 5/13/2024)      HYDROcodone-acetaminophen (NORCO) 5-325 mg per tablet Take 1 tablet by mouth every 12 (twelve) hours as needed for Pain. (Patient not taking: Reported on 5/13/2024) 10 tablet 0    hydrOXYzine HCL (ATARAX) 25 MG tablet Take 1 tablet (25 mg total) by mouth 3 (three) times daily as needed for Itching. (Patient not taking: Reported on 5/13/2024) 60 tablet 2    LIDOcaine-prilocaine (EMLA) cream Apply topically as needed (place to port site 45-60 minutes prior to chemotherapy). (Patient not taking: Reported on  "5/13/2024) 30 g 3    olopatadine (PATANOL) 0.1 % ophthalmic solution 1 drop 2 (two) times daily. (Patient not taking: Reported on 5/13/2024)      ondansetron (ZOFRAN-ODT) 4 MG TbDL Take 1 tablet (4 mg total) by mouth every 8 (eight) hours as needed (Nausea). (Patient not taking: Reported on 5/13/2024) 10 tablet 0    ondansetron (ZOFRAN-ODT) 8 MG TbDL TAKE 1 TABLET (8 MG TOTAL) BY MOUTH EVERY 6 HOURS AS NEEDED FOR NAUSEA (Patient not taking: Reported on 5/13/2024) 30 tablet 5    RESTASIS 0.05 % ophthalmic emulsion  (Patient not taking: Reported on 5/13/2024)      triamcinolone acetonide 0.1% (KENALOG) 0.1 % ointment Apply topically as needed (rash). (Patient not taking: Reported on 5/13/2024)       No current facility-administered medications for this visit.       Review of patient's allergies indicates:   Allergen Reactions    Erythromycin (bulk)      Other reaction(s): Eye irritation       ROS:  As per HPI.      Pap test: post hyst.  History of abnormal paps: No.  History of STIs:  No  Mammogram: Date of last: 07/2023.  Result: Normal  Colonoscopy: Date of last: 2019.  Result:  Diverticulosis, polyp.  Repeat due:  2024.    DEXA:  Date of last: 5/2021.  Result:  Osteoporosis--fosamax started 2021.  Repeat due:  2026 per PCP.     Exam  /66 (BP Location: Right arm, Patient Position: Sitting, BP Method: Medium (Automatic))   Pulse 76   Ht 5' 3" (1.6 m)   Wt 63.6 kg (140 lb 3.4 oz)   BMI 24.84 kg/m²   General: alert and oriented, no acute distress  Respiratory: normal respiratory effort  Abd: soft, non-tender, non-distended    Pelvic  Ext. Genitalia: normal external genitalia. Normal bartholin's and skeens glands  Vagina: + atrophy. Normal vaginal mucosa without lesions. No discharge noted.   Non-tender bladder base without palpable mass.  #2 ring with support   Cervix: absent  Uterus:  absent   Urethra: no masses or tenderness  Urethral meatus: no lesions, caruncle or prolapse.      Pessary removed without " difficulty. Washed with soap and water. Reinserted without difficulty    Impression  1. Midline cystocele        2. Rectocele, female        3. Vaginal vault prolapse        4. Vaginal atrophy        5. Urge incontinence        6. Constipation, unspecified constipation type              We reviewed the above issues and discussed options for short-term versus long-term management of her problems.   Plan:       1) Stage 2 cystocele/rectocele, stage 1 vaginal vault prolapse:  --Empty bladder every 3 hours.  Empty well: wait a minute, lean forward on toilet.  Use pessary.  Try not to hover--makes harder to empty.   --continue #2 ring with support  ----to insert:  Fold in half, push down behind cervix and back to back of vagina. Lift up under pubic bone.  --to remove: grab rim with forefinger and thumb, pull down and twist out  PESSARY CARE: Remove pessary as frequently as nightly and as infrequently as every 2-3 weeks.  Remove before intercourse.  May need to remove before bowel movements if straining dislodges.   Wash with soap and water (no ).  Replace using small amount of water-based lubricant (like KY jelly or coconut oil) at end being introduced into vagina              --pessary vs surgery (sacral colpopexy)                          --if surgery: would need bladder testing to see if need sling for stress leakage                          --if surgery: need clearance per PCP + labs (CBC, BMP, T&S)/EKG        4)  Mixed urinary incontinence, urge < stress:    --Empty bladder every 3 hours.  Empty well: wait a minute, lean forward on toilet.    --Avoid dietary irritants (see sheet).  Keep diary x 3-5 days to determine your irritants.  --continue pelvic floor PT home exercise program  --URGE: consider medication in future. Takes 2-4 weeks to see if will have effect.  For dry mouth: get sour, sugar free lozenge or gum.                --see if pessary helps  --STRESS:  Pessary vs. Sling.      5)   constipation  --hydrate well  --start colace twice daily  --work on stool consistency  --continue high fiber diet  --continue pelvic floor PT home exercise program     6)  Incomplete emptying:  --improved with pessary in place  --Empty bladder every 3 hours.  Empty well: wait a minute, lean forward on toilet.  Use pessary.  Try not to hover--makes harder to empty.   --renal US normal     7)  Vaginal atrophy (dryness):   -- use coconut oil: dime-sized amount with finger in vagina up nightly and as needed     8) RTC 1 year for pc         I spent a total of 20 minutes on the day of the visit.  This includes face to face time and non-face to face time preparing to see the patient (eg, review of tests), obtaining and/or reviewing separately obtained history, documenting clinical information in the electronic or other health record, independently interpreting results and communicating results to the patient/family/caregiver, or care coordinator.    Graciela Joy, FNP-BC  [facility]  Division of Female Pelvic Medicine and Reconstructive Surgery  Department of Obstetrics & Gynecology

## 2024-06-07 NOTE — PATIENT INSTRUCTIONS
1) Stage 2 cystocele/rectocele, stage 1 vaginal vault prolapse:  --Empty bladder every 3 hours.  Empty well: wait a minute, lean forward on toilet.  Use pessary.  Try not to hover--makes harder to empty.   --continue #2 ring with support  ----to insert:  Fold in half, push down behind cervix and back to back of vagina. Lift up under pubic bone.  --to remove: grab rim with forefinger and thumb, pull down and twist out  PESSARY CARE: Remove pessary as frequently as nightly and as infrequently as every 2-3 weeks.  Remove before intercourse.  May need to remove before bowel movements if straining dislodges.   Wash with soap and water (no ).  Replace using small amount of water-based lubricant (like KY jelly or coconut oil) at end being introduced into vagina              --pessary vs surgery (sacral colpopexy)                          --if surgery: would need bladder testing to see if need sling for stress leakage                          --if surgery: need clearance per PCP + labs (CBC, BMP, T&S)/EKG        4)  Mixed urinary incontinence, urge < stress:    --Empty bladder every 3 hours.  Empty well: wait a minute, lean forward on toilet.    --Avoid dietary irritants (see sheet).  Keep diary x 3-5 days to determine your irritants.  --continue pelvic floor PT home exercise program  --URGE: consider medication in future. Takes 2-4 weeks to see if will have effect.  For dry mouth: get sour, sugar free lozenge or gum.                --see if pessary helps  --STRESS:  Pessary vs. Sling.      5)  constipation  --hydrate well  --start colace twice daily  --work on stool consistency  --continue high fiber diet  --continue pelvic floor PT home exercise program     6)  Incomplete emptying:  --improved with pessary in place  --Empty bladder every 3 hours.  Empty well: wait a minute, lean forward on toilet.  Use pessary.  Try not to hover--makes harder to empty.   --renal US normal     7)  Vaginal atrophy (dryness):   --  use coconut oil: dime-sized amount with finger in vagina up nightly and as needed     8) RTC 1 year for pc

## 2024-06-08 ENCOUNTER — PATIENT MESSAGE (OUTPATIENT)
Dept: ADMINISTRATIVE | Facility: OTHER | Age: 79
End: 2024-06-08
Payer: MEDICARE

## 2024-06-09 ENCOUNTER — PATIENT MESSAGE (OUTPATIENT)
Dept: ADMINISTRATIVE | Facility: OTHER | Age: 79
End: 2024-06-09
Payer: MEDICARE

## 2024-06-10 ENCOUNTER — HOSPITAL ENCOUNTER (OUTPATIENT)
Dept: RADIOLOGY | Facility: HOSPITAL | Age: 79
Discharge: HOME | End: 2024-06-10
Attending: REGISTERED NURSE
Payer: MEDICARE

## 2024-06-10 DIAGNOSIS — C78.7 METASTASIS TO LIVER: ICD-10-CM

## 2024-06-10 DIAGNOSIS — C25.9 PANCREATIC ADENOCARCINOMA: ICD-10-CM

## 2024-06-10 DIAGNOSIS — C78.01 MALIGNANT NEOPLASM METASTATIC TO BOTH LUNGS: ICD-10-CM

## 2024-06-10 DIAGNOSIS — C78.02 MALIGNANT NEOPLASM METASTATIC TO BOTH LUNGS: ICD-10-CM

## 2024-06-10 PROCEDURE — 74177 CT ABD & PELVIS W/CONTRAST: CPT | Mod: TC

## 2024-06-10 PROCEDURE — 25500020 PHARM REV CODE 255: Performed by: REGISTERED NURSE

## 2024-06-10 PROCEDURE — 71260 CT THORAX DX C+: CPT | Mod: 26,,, | Performed by: STUDENT IN AN ORGANIZED HEALTH CARE EDUCATION/TRAINING PROGRAM

## 2024-06-10 PROCEDURE — 74177 CT ABD & PELVIS W/CONTRAST: CPT | Mod: 26,,, | Performed by: STUDENT IN AN ORGANIZED HEALTH CARE EDUCATION/TRAINING PROGRAM

## 2024-06-10 RX ADMIN — IOHEXOL 75 ML: 350 INJECTION, SOLUTION INTRAVENOUS at 09:06

## 2024-06-11 ENCOUNTER — PATIENT MESSAGE (OUTPATIENT)
Dept: ADMINISTRATIVE | Facility: OTHER | Age: 79
End: 2024-06-11
Payer: MEDICARE

## 2024-06-12 ENCOUNTER — PATIENT MESSAGE (OUTPATIENT)
Dept: ADMINISTRATIVE | Facility: OTHER | Age: 79
End: 2024-06-12
Payer: MEDICARE

## 2024-06-13 ENCOUNTER — OFFICE VISIT (OUTPATIENT)
Dept: HEMATOLOGY/ONCOLOGY | Facility: CLINIC | Age: 79
End: 2024-06-13
Payer: MEDICARE

## 2024-06-13 VITALS
HEART RATE: 83 BPM | BODY MASS INDEX: 24.14 KG/M2 | TEMPERATURE: 98 F | SYSTOLIC BLOOD PRESSURE: 96 MMHG | OXYGEN SATURATION: 98 % | HEIGHT: 63 IN | WEIGHT: 136.25 LBS | DIASTOLIC BLOOD PRESSURE: 55 MMHG

## 2024-06-13 DIAGNOSIS — Z79.899 IMMUNODEFICIENCY DUE TO CHEMOTHERAPY: ICD-10-CM

## 2024-06-13 DIAGNOSIS — D49.9 IMMUNODEFICIENCY SECONDARY TO NEOPLASM: ICD-10-CM

## 2024-06-13 DIAGNOSIS — T45.1X5A IMMUNODEFICIENCY DUE TO CHEMOTHERAPY: ICD-10-CM

## 2024-06-13 DIAGNOSIS — D84.821 IMMUNODEFICIENCY DUE TO CHEMOTHERAPY: ICD-10-CM

## 2024-06-13 DIAGNOSIS — D84.81 IMMUNODEFICIENCY SECONDARY TO NEOPLASM: ICD-10-CM

## 2024-06-13 DIAGNOSIS — C78.01 MALIGNANT NEOPLASM METASTATIC TO BOTH LUNGS: ICD-10-CM

## 2024-06-13 DIAGNOSIS — C25.9 PANCREATIC ADENOCARCINOMA: Primary | ICD-10-CM

## 2024-06-13 DIAGNOSIS — C78.02 MALIGNANT NEOPLASM METASTATIC TO BOTH LUNGS: ICD-10-CM

## 2024-06-13 DIAGNOSIS — C78.7 METASTASIS TO LIVER: ICD-10-CM

## 2024-06-13 PROCEDURE — G2211 COMPLEX E/M VISIT ADD ON: HCPCS | Mod: S$GLB,,, | Performed by: INTERNAL MEDICINE

## 2024-06-13 PROCEDURE — 3288F FALL RISK ASSESSMENT DOCD: CPT | Mod: CPTII,S$GLB,, | Performed by: INTERNAL MEDICINE

## 2024-06-13 PROCEDURE — 99999 PR PBB SHADOW E&M-EST. PATIENT-LVL IV: CPT | Mod: PBBFAC,,, | Performed by: INTERNAL MEDICINE

## 2024-06-13 PROCEDURE — 1160F RVW MEDS BY RX/DR IN RCRD: CPT | Mod: CPTII,S$GLB,, | Performed by: INTERNAL MEDICINE

## 2024-06-13 PROCEDURE — 1157F ADVNC CARE PLAN IN RCRD: CPT | Mod: CPTII,S$GLB,, | Performed by: INTERNAL MEDICINE

## 2024-06-13 PROCEDURE — 1125F AMNT PAIN NOTED PAIN PRSNT: CPT | Mod: CPTII,S$GLB,, | Performed by: INTERNAL MEDICINE

## 2024-06-13 PROCEDURE — 3074F SYST BP LT 130 MM HG: CPT | Mod: CPTII,S$GLB,, | Performed by: INTERNAL MEDICINE

## 2024-06-13 PROCEDURE — 1101F PT FALLS ASSESS-DOCD LE1/YR: CPT | Mod: CPTII,S$GLB,, | Performed by: INTERNAL MEDICINE

## 2024-06-13 PROCEDURE — 99215 OFFICE O/P EST HI 40 MIN: CPT | Mod: S$GLB,,, | Performed by: INTERNAL MEDICINE

## 2024-06-13 PROCEDURE — 3078F DIAST BP <80 MM HG: CPT | Mod: CPTII,S$GLB,, | Performed by: INTERNAL MEDICINE

## 2024-06-13 PROCEDURE — 1159F MED LIST DOCD IN RCRD: CPT | Mod: CPTII,S$GLB,, | Performed by: INTERNAL MEDICINE

## 2024-06-13 RX ORDER — OLANZAPINE 5 MG/1
TABLET ORAL
Qty: 30 TABLET | Refills: 2 | Status: SHIPPED | OUTPATIENT
Start: 2024-06-13

## 2024-06-13 NOTE — PROGRESS NOTES
MEDICAL ONCOLOGY - ESTABLISHED PATIENT VISIT    Reason for visit: Pancreatic adenocarcinoma    Best Contact Phone Number(s): [phone] (home)      Cancer/Stage/TNM:    Cancer Staging   Pancreatic adenocarcinoma  Staging form: Exocrine Pancreas, AJCC 8th Edition  - Clinical stage from 9/21/2023: Stage IV (cT1, cNX, pM1) - Signed by David Briceño MD on 10/11/2023       Oncology History   Pancreatic adenocarcinoma   9/5/2023 Initial Diagnosis    Pancreatic adenocarcinoma     9/21/2023 Cancer Staged    Staging form: Exocrine Pancreas, AJCC 8th Edition  - Clinical stage from 9/21/2023: Stage IV (cT1, cNX, pM1)     10/2/2023 Tumor Conference     [facility] UGI MULTIDISCIPLINARY TUMOR BOARD  PATIENT REVIEW FORM   ____________________________________________________________    CLINIC #: [id number]  DATE: 10/2/2023    DIAGNOSIS: pancreas CA    PRESENTER: Angela    PATIENT SUMMARY:   This 77 y/o female presented in August with abd pain, decreased appetite with weight loss. Reviewed CT and MRI imaging ~ 2 cm mass in tail of pancreas with diffuse metastatic liver disease, largest liver lesion in left lobe measuring 4.5 cm. IR performed liver bx and this pathology was reviewed - poorly differentiated adenocarcinoma, favoring upper GI origin.     BOARD RECOMMENDATIONS:   Stage IV pancreas CA with liver mets  Add MMR and TEMPUS  Proceed with palliative systemic chemotherapy    CONSULT NEEDED:     [] Surgery    [] Hem/Onc    [] Rad/Onc    [] Dietary     [] Social Service    [] Psychology       [] AES  [] Radiology     Clinical Stage: Tumor   Node(s)   Metastasis        GROUP STAGE:  [] O    [] 1A    [] IB    [] IIA    [] IIB     [] IIIA     [] IIIB     [] IIIC    [x]IV  [] Local recurrence     [] Regional recurrence     [] Distant recurrence   Metastatic site(s): liver         [x] Elidia'l Treatment Guidelines reviewed and care planned is consistent with guidelines.         (i.e., NCCN, NCI, PD, ACO, AUA,  etc.)    PRESENTATION AT CANCER CONFERENCE:         [x] Prospective    [] Retrospective     [] Follow-Up    Eligible for clinical trial : yes       11/1/2023 - 5/27/2024 Chemotherapy    Treatment Summary   Plan Name: OP PANC NAB-PACLITAXEL + GEMCITABINE Q4W  Treatment Goal: Palliative  Status: Inactive  Start Date: 11/1/2023  End Date: 5/27/2024  Provider: David Briceño MD  Chemotherapy: gemcitabine (GEMZAR) 1,600 mg in sodium chloride 0.9% SolP 327.08 mL chemo infusion, 1,680 mg, Intravenous, Clinic/HOD 1 time, 8 of 12 cycles  Administration: 1,600 mg (11/1/2023), 1,600 mg (11/15/2023), 1,600 mg (11/29/2023), 1,600 mg (12/14/2023), 1,600 mg (12/27/2023), 1,600 mg (1/9/2024), 1,600 mg (1/22/2024), 1,600 mg (2/5/2024), 1,600 mg (2/19/2024), 1,600 mg (3/4/2024), 1,600 mg (3/18/2024), 1,700 mg (4/1/2024), 1,690 mg (4/15/2024), 1,600 mg (4/29/2024), 1,600 mg (5/13/2024), 1,690 mg (5/27/2024)     6/27/2024 -  Chemotherapy    Treatment Summary   Plan Name: OP GI liposomal irinotecan leucovorin fluorouracil Q2W  Treatment Goal: Palliative  Status: Active  Start Date: 6/27/2024 (Planned)  End Date: 5/17/2025 (Planned)  Provider: David Briceño MD  Chemotherapy: irinotecan liposomaL (ONIVYDE) 70 mg/m2 = 116.186 mg in sodium chloride 0.9% 527.02 mL chemo infusion, 70 mg/m2, Intravenous, Clinic/HOD 1 time, 0 of 24 cycles  fluorouracil (Adrucil) 2,400 mg/m2 = 3,985 mg in sodium chloride 0.9% 100 mL chemo infusion, 2,400 mg/m2, Intravenous, Over 46 hours, 0 of 24 cycles          Interim History:   79 y.o. female with metastatic pancreatic cancer who presents for follow-up. Her CT scan prior to this visit showed progression of disease. She is generally feeling well. She had temperature of 100.9 F after last cycle of chemotherapy. She had some fatigue at the time. Otherwise no new complaints and no further fevers.     ECOG PS is 0. Presents with her  today.     ROS:   Review of Systems   Constitutional:   Negative for chills, fever, malaise/fatigue and weight loss.   HENT:  Negative for sore throat.    Eyes:  Negative for blurred vision and pain.   Respiratory:  Negative for cough and shortness of breath.    Cardiovascular:  Negative for chest pain and leg swelling.   Gastrointestinal:  Negative for constipation, diarrhea, nausea and vomiting.   Genitourinary:  Negative for dysuria and frequency.   Musculoskeletal:  Negative for back pain, falls and myalgias.   Skin:  Negative for itching and rash.   Neurological:  Negative for dizziness, weakness and headaches.   Endo/Heme/Allergies:  Does not bruise/bleed easily.   Psychiatric/Behavioral:  Negative for depression. The patient is not nervous/anxious.        Past Medical History:   Past Medical History:   Diagnosis Date    Age-related osteoporosis without current pathological fracture     Atherosclerosis of aortic arch     - noted on CXR 8/2017    Back pain     Diverticulosis of sigmoid colon 10/22/2019    Ectopic pregnancy     Fibrocystic breast     Hyperlipidemia     Inguinal hernia     Osteopenia     Polyp of ascending colon 10/22/2019    Urinary incontinence, mixed 05/24/2022        Past Surgical History:   Past Surgical History:   Procedure Laterality Date    ECTOPIC PREGNANCY SURGERY      HERNIA REPAIR      left inguinal    HYSTERECTOMY  1980    without BSO        Family History:   Family History   Problem Relation Name Age of Onset    Breast cancer Mother  69    Seizures Mother      Heart disease Father      Heart attack Father      Depression Father      Breast cancer Sister Monika 73    Breast cancer Maternal Aunt Kathleen 73    Prostate cancer Brother Lamine 81        no mets    Obesity Brother Lamine     Heart failure Maternal Grandmother      Heart attack Maternal Grandfather      Suicide Attempts Paternal Grandmother      Depression Paternal Grandmother      Mental illness Paternal Grandmother      Depression Paternal Aunt Jenniffer     Heart attack Paternal  Uncle Leodan     Depression Paternal Uncle [place]     Cancer Maternal Aunt [place] 83        blood cancer (leukemia?)    Cancer Maternal Uncle Bill         unk type; was COD    Suicide Paternal Cousin      Tongue cancer Paternal Cousin Jimi         smoking hx unk    Ovarian cancer Neg Hx          Social History:   Social History     Tobacco Use    Smoking status: Never    Smokeless tobacco: Never   Substance Use Topics    Alcohol use: No        I have reviewed and updated the patient's past medical, surgical, family and social histories.    Allergies:   Review of patient's allergies indicates:   Allergen Reactions    Erythromycin (bulk)      Other reaction(s): Eye irritation        Medications:   Current Outpatient Medications   Medication Sig Dispense Refill    ABRAXANE 100 mg injection  (Patient not taking: Reported on 5/13/2024)      coconut oil, bulk, Oil by Misc.(Non-Drug; Combo Route) route. (Patient not taking: Reported on 6/13/2024)      dexAMETHasone (DECADRON) 4 mg/mL injection  (Patient not taking: Reported on 5/13/2024)      GADAVIST 1 mmol/mL (604.72 mg/mL) Soln injection  (Patient not taking: Reported on 5/13/2024)      gemcitabine (GEMZAR) 1 gram/26.3 mL (38 mg/mL) Soln injection  (Patient not taking: Reported on 5/13/2024)      gemcitabine 200 mg/5.26 mL (38 mg/mL) Soln  (Patient not taking: Reported on 5/13/2024)      heparin sodium,porcine (HEPARIN, PORCINE,) 1,000 unit/mL injection  (Patient not taking: Reported on 5/13/2024)      heparin, porcine, PF, (HEPARIN FLUSH 100 UNITS/ML) 100 unit/mL Syrg  (Patient not taking: Reported on 5/13/2024)      multivitamin (THERAGRAN) per tablet Take 1 tablet by mouth once daily. (Patient not taking: Reported on 6/13/2024)      OLANZapine (ZYPREXA) 5 MG tablet Take 1 tab at nighttime on nights 1 thru 3 of each chemo cycle 30 tablet 2    ondansetron (ZOFRAN-ODT) 4 MG TbDL Take 1 tablet (4 mg total) by mouth every 8 (eight) hours as needed (Nausea). (Patient not  "taking: Reported on 5/13/2024) 10 tablet 0    ondansetron (ZOFRAN-ODT) 8 MG TbDL TAKE 1 TABLET (8 MG TOTAL) BY MOUTH EVERY 6 HOURS AS NEEDED FOR NAUSEA (Patient not taking: Reported on 5/13/2024) 30 tablet 5    vitamin D (VITAMIN D3) 1000 units Tab Take 1,000 Units by mouth once daily. (Patient not taking: Reported on 6/13/2024)       No current facility-administered medications for this visit.      Physical Exam:   BP (!) 96/55 (BP Location: Left arm, Patient Position: Sitting, BP Method: Medium (Automatic))   Pulse 83   Temp 98.2 °F (36.8 °C) (Oral)   Ht 5' 3" (1.6 m)   Wt 61.8 kg (136 lb 3.9 oz)   SpO2 98%   BMI 24.13 kg/m²        Physical Exam  Vitals reviewed.   Constitutional:       General: She is not in acute distress.     Appearance: Normal appearance. She is well-developed and normal weight. She is not ill-appearing, toxic-appearing or diaphoretic.   HENT:      Head: Normocephalic and atraumatic.      Right Ear: External ear normal.      Left Ear: External ear normal.      Nose: Nose normal.      Mouth/Throat:      Mouth: Mucous membranes are moist.      Pharynx: Oropharynx is clear. No posterior oropharyngeal erythema.   Eyes:      General: No scleral icterus.     Extraocular Movements: Extraocular movements intact.      Pupils: Pupils are equal, round, and reactive to light.   Neck:      Trachea: No tracheal deviation.   Cardiovascular:      Rate and Rhythm: Normal rate and regular rhythm.      Pulses: Normal pulses.      Heart sounds: Normal heart sounds.   Pulmonary:      Effort: Pulmonary effort is normal. No respiratory distress.      Breath sounds: Normal breath sounds. No wheezing or rales.   Chest:      Comments: RCW port  Abdominal:      General: Bowel sounds are normal. There is no distension.      Palpations: Abdomen is soft.      Tenderness: There is no abdominal tenderness.   Musculoskeletal:         General: No swelling or deformity. Normal range of motion.      Cervical back: Normal " range of motion and neck supple.   Skin:     General: Skin is warm.      Coloration: Skin is not jaundiced.      Findings: No erythema or rash.   Neurological:      General: No focal deficit present.      Mental Status: She is alert and oriented to person, place, and time. Mental status is at baseline.      Motor: No weakness.      Gait: Gait normal.   Psychiatric:         Mood and Affect: Mood normal.         Behavior: Behavior normal.         Thought Content: Thought content normal.         Judgment: Judgment normal.         Labs:   Lab work reviewed.  Mild anemia. CA 19-9 >60,000.    Imaging:   CT CAP 6/10/24  CT 04/12/2024     FINDINGS:  Right chest port transvenous catheter tip terminates in the SVC.     Thoracic aorta is normal caliber contains mild atherosclerosis.  Heart is normal size.  No pericardial effusion.  Coronary artery calcific atherosclerosis.     No pathologically enlarged thoracic lymph nodes.     Mild biapical fibronodular changes.  Stable small region of pleuroparenchymal thickening in the anterior right lung (series 3, image 68).  Stable right lower lobe micronodule (series 3, image 84).  No new pulmonary nodules.  Few thin linear densities in the bilateral lungs suggestive for platelike atelectasis or scarring.  No new consolidation or pleural effusion.     Interval progression of hepatic metastasis with new and enlarging lesions. Two right hepatic lobe lesions have increased in size and appears as one continuous mass on today's exam measuring 5.6 x 4.5 cm (series 3, image 95), previously the largest individual lesion measured 3.2 cm.  New right hepatic lobe lesion measuring 2.2 x 2.1 cm (series 3, image 119).  Left hepatic lobe lesion measures 4.8 cm (series 3, image 108), previously 4.3 cm.  Left hepatic lobe lesion measures 3.7 x 3.7 cm (series 3, image 131), previously 3.1 x 3.2 cm.  Portal veins are patent.     Gallbladder is unremarkable.  No biliary ductal dilatation.     Decreased  size of a pancreatic tail mass which is not well visualized on today's exam.  No new pancreatic lesion.  Stable minimal prominence of the pancreatic duct.  Chronic occlusion of the splenic vein.     Spleen is upper limits of normal size.  Adrenal glands are unremarkable.     No focal renal abnormality.  No hydronephrosis or ureteral dilatation.     Bladder is unremarkable.  Hysterectomy.     Stomach is unremarkable.  Small and large bowel are normal caliber.  Colonic diverticulosis.  No evidence of bowel obstruction or inflammation.     Trace pelvic free fluid, nonspecific.  No free intraperitoneal air.     No pathologically enlarged abdominopelvic lymph nodes.     Abdominal aorta is normal caliber.  Mild calcific atherosclerosis.     Degenerative changes of the osseous structures.  Stable indeterminate small sclerotic focus in the L1 vertebral body.  No acute fracture or new aggressive bone lesion.     Impression:     1. Decreased size of a pancreatic tail mass which is not well visualized on today's exam.  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  2. Interval progression of hepatic metastasis with new and enlarging lesions.  3. Stable indeterminate small sclerotic focus in the L1 vertebral body.  4. Additional findings as above.    Path:   Final Pathologic Diagnosis     Left hepatic lobe, biopsy:   Positive for malignancy, poorly differentiated adenocarcinoma (see comment)   Tumor necrosis present     Comment:  A review of the patient's imaging shows the presence of a 1.9 cm pancreatic tail mass and diffuse liver lesions.  These morphologic and immunophenotypic findings are supportive of a pancreatobiliary and/or upper gastrointestinal primary.  VC      Comment: Interp By Nataliia Peralta D.O., Signed on 10/05/2023 at 08:36   Supplemental Diagnosis     The purpose of this supplemental report is to report results of MMR panel in the tumor cells as follows:     Immunohistochemistry (IHC) Testing for  Mismatch Repair (MMR) Proteins:      MLH1 - Intact nuclear expression    MSH2 - Intact nuclear expression    MSH6 - Intact nuclear expression    PMS2 - Intact nuclear expression            Somatic Genomic Results       Pancreatic mass       Tumor NGS Tissue  Resulted: 10/10/23 Status: Preliminary result       Detected - Pathogenic (4)      Gene Variant VAF   BRAF [id number] - c.1458_[id number] Inframe deletion - GOF 12.00 %   CDKN2A CDKN2A - Copy number loss --   CDKN2B CDKN2B - Copy number loss --   TP53 p.Y163C - c.488A>G Missense variant - LOF 30.50 %              Detected - Uncertain significance (1)      Gene Variant VAF   DIS3 p.[id number] - c.2012A>G Missense variant 23.10 %                   Assessment:       1. Pancreatic adenocarcinoma    2. Metastasis to liver    3. Malignant neoplasm metastatic to both lungs    4. Immunodeficiency due to chemotherapy    5. Immunodeficiency secondary to neoplasm           Plan:        # Pancreatic cancer  We discussed with the patient and her family her biopsy-proven diagnosis of metastatic pancreatic adenocarcinoma with multifocal liver metastases.  Her tumor is pMMR and Tempus tissue showed a non V600E BRAF mutation, TP53 mutation and CDKN2A and CDKN2B loss.  QUINN.    We discussed her prognosis and potential treatment options.  Because of her age and discussion of tolerance concerns, I have recommended biweekly gemcitabine + nab-paclitaxel as palliative intent first-line therapy.  Dosing for gemcitabine + nab-paclitaxel will be 1000 mg/m2 and 125 mg/m2, respectively, and I will administer these on a biweekly basis.    Port was placed.  Consideration to BRAF non-V600E targeting trials in the future if available.    She began cycle 1 of biweekly gemcitabine + nab-paclitaxel on 11/1/23.  She experienced fevers/chills and rash. believe these are all chemotherapy related. Improved after the administration of Dex.     Will continue to administer dexamethasone 12 mg IV prior to  infusion for rash prevention.  May need to increase to 20mg for future cycles. Will continue to monitor.    Also gave her Medrol Dose Nghia to use in case rash recurs despite premed dex.    - CT scan after cycle 4 displays overall improvement within the lung and liver, with decreases in the number and size of the lesions. Continue current regimen.   - CT scan after cycle 8 displays continued improvement in disease in liver and lung and pancreatic tail.  - CT scan after cycle 12 displays continued improvement of pancreatic tail mass but mixed response in liver. Discussed with patient and family recommendation to continue current regimen at this time rather than transition to new regimen.   - CT scan after cycle 16 showed progression of disease with worsening of liver mets. CA 19-19 increasing as well    - discussed transition to next line of chemotherapy with 5-FU and Onyvide. Consented today in clinic and provided with chemocare sheets  - return to clinic in 2 weeks to start 5-FU/Onyvide    Following with palliative care.   Genetic testing done outside - saw Julito Sosa here. FANCC mut present.    PGX showed DPYD nml and UGT1A1 intermediate metabolism.    # HLD, aortic atherosclerosis, constipation, pruritus, fever  Not currently on therapy for HLD, atherosclerosis. Cholesterol stable from June 2023.   Constipation: stable.   Pruritus/fever: controlled with increased pre-meds and Benadryl. Continues to control fever alternating Motrin and Tylenol.     # Osteoporosis, vaginal prolapse, bilateral conjunctivitis, R eye lower lid stye  Monitor calcium levels on chemo.  Will continue to encourage maintaining activity.  Will f/u with GYN.   Eye symptoms have improved. Continue ophtho f/u.    Patient is in agreement with the proposed treatment plan. All questions were answered to the patient's satisfaction. Pt knows to call clinic if anything is needed before the next clinic visit.    Discussed with Dr. Keyanna Gross  DO Cristóbal  PGY-VI  Hematology/Oncology Fellow    Route Chart for Scheduling    Med Onc Chart Routing      Follow up with physician . Change 6/28 follow up to 7/1, 7/2, or 7/3   Follow up with DONTE    Infusion scheduling note New or changed treatment   schedule 5-FU/onyvide when auth'd   Injection scheduling note    Labs   Scheduling:  Preferred lab:  Lab interval:  Change labs 6/28 to be done at [facility] prior to appt   Imaging    Pharmacy appointment    Other referrals              Treatment Plan Information   OP GI liposomal irinotecan leucovorin fluorouracil Q2W   David Briceño MD   Upcoming Treatment Dates - OP GI liposomal irinotecan leucovorin fluorouracil Q2W    6/27/2024       Chemotherapy       irinotecan liposomaL (ONIVYDE) 70 mg/m2 = 116.186 mg in sodium chloride 0.9% 527.02 mL chemo infusion       fluorouracil (Adrucil) 2,400 mg/m2 = 3,985 mg in sodium chloride 0.9% 100 mL chemo infusion       Antiemetics       palonosetron 0.25mg/dexAMETHasone 12mg in NS IVPB 0.25 mg 50 mL  7/11/2024       Chemotherapy       irinotecan liposomaL (ONIVYDE) 70 mg/m2 = 116.186 mg in sodium chloride 0.9% 527.02 mL chemo infusion       fluorouracil (Adrucil) 2,400 mg/m2 = 3,985 mg in sodium chloride 0.9% 100 mL chemo infusion       Antiemetics       palonosetron 0.25mg/dexAMETHasone 12mg in NS IVPB 0.25 mg 50 mL  7/25/2024       Chemotherapy       irinotecan liposomaL (ONIVYDE) 70 mg/m2 = 116.186 mg in sodium chloride 0.9% 527.02 mL chemo infusion       fluorouracil (Adrucil) 2,400 mg/m2 = 3,985 mg in sodium chloride 0.9% 100 mL chemo infusion       Antiemetics       palonosetron 0.25mg/dexAMETHasone 12mg in NS IVPB 0.25 mg 50 mL  8/8/2024       Chemotherapy       irinotecan liposomaL (ONIVYDE) 70 mg/m2 = 116.186 mg in sodium chloride 0.9% 527.02 mL chemo infusion       fluorouracil (Adrucil) 2,400 mg/m2 = 3,985 mg in sodium chloride 0.9% 100 mL chemo infusion       Antiemetics       palonosetron  0.25mg/dexAMETHasone 12mg in NS IVPB 0.25 mg 50 mL

## 2024-06-14 ENCOUNTER — PATIENT MESSAGE (OUTPATIENT)
Dept: ADMINISTRATIVE | Facility: OTHER | Age: 79
End: 2024-06-14
Payer: MEDICARE

## 2024-06-17 ENCOUNTER — PATIENT MESSAGE (OUTPATIENT)
Dept: HEMATOLOGY/ONCOLOGY | Facility: CLINIC | Age: 79
End: 2024-06-17
Payer: MEDICARE

## 2024-06-17 DIAGNOSIS — R11.0 CHEMOTHERAPY-INDUCED NAUSEA: Primary | ICD-10-CM

## 2024-06-17 DIAGNOSIS — T45.1X5A CHEMOTHERAPY-INDUCED NAUSEA: Primary | ICD-10-CM

## 2024-06-17 RX ORDER — PROMETHAZINE HYDROCHLORIDE 12.5 MG/1
12.5 TABLET ORAL EVERY 6 HOURS PRN
Qty: 60 TABLET | Refills: 2 | Status: SHIPPED | OUTPATIENT
Start: 2024-06-17

## 2024-06-19 ENCOUNTER — PATIENT MESSAGE (OUTPATIENT)
Dept: ADMINISTRATIVE | Facility: OTHER | Age: 79
End: 2024-06-19
Payer: MEDICARE

## 2024-06-20 ENCOUNTER — PATIENT MESSAGE (OUTPATIENT)
Dept: ADMINISTRATIVE | Facility: OTHER | Age: 79
End: 2024-06-20
Payer: MEDICARE

## 2024-06-20 ENCOUNTER — TELEPHONE (OUTPATIENT)
Dept: HEMATOLOGY/ONCOLOGY | Facility: CLINIC | Age: 79
End: 2024-06-20
Payer: MEDICARE

## 2024-06-21 ENCOUNTER — PATIENT MESSAGE (OUTPATIENT)
Dept: ADMINISTRATIVE | Facility: OTHER | Age: 79
End: 2024-06-21
Payer: MEDICARE

## 2024-06-22 ENCOUNTER — PATIENT MESSAGE (OUTPATIENT)
Dept: ADMINISTRATIVE | Facility: OTHER | Age: 79
End: 2024-06-22
Payer: MEDICARE

## 2024-06-23 ENCOUNTER — PATIENT MESSAGE (OUTPATIENT)
Dept: ADMINISTRATIVE | Facility: OTHER | Age: 79
End: 2024-06-23
Payer: MEDICARE

## 2024-06-24 ENCOUNTER — PATIENT MESSAGE (OUTPATIENT)
Dept: ADMINISTRATIVE | Facility: OTHER | Age: 79
End: 2024-06-24
Payer: MEDICARE

## 2024-06-26 ENCOUNTER — PATIENT MESSAGE (OUTPATIENT)
Dept: ADMINISTRATIVE | Facility: OTHER | Age: 79
End: 2024-06-26
Payer: MEDICARE

## 2024-06-26 DIAGNOSIS — Z78.0 MENOPAUSE: ICD-10-CM

## 2024-06-28 ENCOUNTER — PATIENT MESSAGE (OUTPATIENT)
Dept: ADMINISTRATIVE | Facility: OTHER | Age: 79
End: 2024-06-28
Payer: MEDICARE

## 2024-06-29 ENCOUNTER — PATIENT MESSAGE (OUTPATIENT)
Dept: ADMINISTRATIVE | Facility: OTHER | Age: 79
End: 2024-06-29
Payer: MEDICARE

## 2024-06-30 ENCOUNTER — PATIENT MESSAGE (OUTPATIENT)
Dept: ADMINISTRATIVE | Facility: OTHER | Age: 79
End: 2024-06-30
Payer: MEDICARE

## 2024-07-01 ENCOUNTER — PATIENT MESSAGE (OUTPATIENT)
Dept: ADMINISTRATIVE | Facility: OTHER | Age: 79
End: 2024-07-01
Payer: MEDICARE

## 2024-07-01 ENCOUNTER — HOSPITAL ENCOUNTER (INPATIENT)
Facility: HOSPITAL | Age: 79
LOS: 10 days | Discharge: HOME | DRG: 871 | End: 2024-07-12
Attending: STUDENT IN AN ORGANIZED HEALTH CARE EDUCATION/TRAINING PROGRAM | Admitting: STUDENT IN AN ORGANIZED HEALTH CARE EDUCATION/TRAINING PROGRAM
Payer: MEDICARE

## 2024-07-01 ENCOUNTER — HOSPITAL ENCOUNTER (OUTPATIENT)
Dept: RADIOLOGY | Facility: HOSPITAL | Age: 79
Discharge: HOME | End: 2024-07-01
Attending: PHYSICIAN ASSISTANT
Payer: MEDICARE

## 2024-07-01 DIAGNOSIS — A41.9 SEPSIS, DUE TO UNSPECIFIED ORGANISM, UNSPECIFIED WHETHER ACUTE ORGAN DYSFUNCTION PRESENT: ICD-10-CM

## 2024-07-01 DIAGNOSIS — R00.0 TACHYCARDIA: ICD-10-CM

## 2024-07-01 DIAGNOSIS — C25.9 PANCREATIC ADENOCARCINOMA: ICD-10-CM

## 2024-07-01 DIAGNOSIS — R79.89 LFT ELEVATION: ICD-10-CM

## 2024-07-01 DIAGNOSIS — R07.9 CHEST PAIN: ICD-10-CM

## 2024-07-01 DIAGNOSIS — C78.7 METASTASIS TO LIVER: ICD-10-CM

## 2024-07-01 DIAGNOSIS — K83.1 BILIARY STRICTURE: Primary | ICD-10-CM

## 2024-07-01 DIAGNOSIS — R50.9 FUO (FEVER OF UNKNOWN ORIGIN): ICD-10-CM

## 2024-07-01 PROCEDURE — 71046 X-RAY EXAM CHEST 2 VIEWS: CPT | Mod: 26,,, | Performed by: RADIOLOGY

## 2024-07-01 PROCEDURE — 99285 EMERGENCY DEPT VISIT HI MDM: CPT | Mod: 25

## 2024-07-01 PROCEDURE — 93010 ELECTROCARDIOGRAM REPORT: CPT | Mod: ,,, | Performed by: INTERNAL MEDICINE

## 2024-07-01 PROCEDURE — 71046 X-RAY EXAM CHEST 2 VIEWS: CPT | Mod: TC,FY,PO

## 2024-07-01 PROCEDURE — 93005 ELECTROCARDIOGRAM TRACING: CPT

## 2024-07-02 PROBLEM — R74.01 TRANSAMINITIS: Status: ACTIVE | Noted: 2024-07-02

## 2024-07-02 PROBLEM — A41.9 SEPSIS: Status: ACTIVE | Noted: 2024-07-02

## 2024-07-02 PROBLEM — E87.1 HYPONATREMIA: Status: ACTIVE | Noted: 2024-07-02

## 2024-07-02 PROBLEM — K83.1 BILIARY STRICTURE: Status: ACTIVE | Noted: 2024-07-02

## 2024-07-02 LAB
ADENOVIRUS: NOT DETECTED
ALBUMIN SERPL BCP-MCNC: 2.8 G/DL (ref 3.5–5.2)
ALLENS TEST: ABNORMAL
ALLENS TEST: NORMAL
ALP SERPL-CCNC: 881 U/L (ref 55–135)
ALT SERPL W/O P-5'-P-CCNC: 51 U/L (ref 10–44)
ANION GAP SERPL CALC-SCNC: 12 MMOL/L (ref 8–16)
AST SERPL-CCNC: 64 U/L (ref 10–40)
BASOPHILS # BLD AUTO: 0.08 K/UL (ref 0–0.2)
BASOPHILS NFR BLD: 0.4 % (ref 0–1.9)
BILIRUB SERPL-MCNC: 2 MG/DL (ref 0.1–1)
BILIRUB UR QL STRIP: NEGATIVE
BNP SERPL-MCNC: 32 PG/ML (ref 0–99)
BORDETELLA PARAPERTUSSIS (IS1001): NOT DETECTED
BORDETELLA PERTUSSIS (PTXP): NOT DETECTED
BUN SERPL-MCNC: 14 MG/DL (ref 8–23)
CALCIUM SERPL-MCNC: 9 MG/DL (ref 8.7–10.5)
CHLAMYDIA PNEUMONIAE: NOT DETECTED
CHLORIDE SERPL-SCNC: 100 MMOL/L (ref 95–110)
CLARITY UR REFRACT.AUTO: CLEAR
CO2 SERPL-SCNC: 20 MMOL/L (ref 23–29)
COLOR UR AUTO: YELLOW
CORONAVIRUS 229E, COMMON COLD VIRUS: NOT DETECTED
CORONAVIRUS HKU1, COMMON COLD VIRUS: NOT DETECTED
CORONAVIRUS NL63, COMMON COLD VIRUS: NOT DETECTED
CORONAVIRUS OC43, COMMON COLD VIRUS: NOT DETECTED
CREAT SERPL-MCNC: 0.7 MG/DL (ref 0.5–1.4)
DIFFERENTIAL METHOD BLD: ABNORMAL
EOSINOPHIL # BLD AUTO: 0.1 K/UL (ref 0–0.5)
EOSINOPHIL NFR BLD: 0.7 % (ref 0–8)
ERYTHROCYTE [DISTWIDTH] IN BLOOD BY AUTOMATED COUNT: 17.2 % (ref 11.5–14.5)
EST. GFR  (NO RACE VARIABLE): >60 ML/MIN/1.73 M^2
FLUBV RNA NPH QL NAA+NON-PROBE: NOT DETECTED
GLUCOSE SERPL-MCNC: 96 MG/DL (ref 70–110)
GLUCOSE UR QL STRIP: NEGATIVE
HCO3 UR-SCNC: 26.3 MMOL/L (ref 24–28)
HCT VFR BLD AUTO: 26.3 % (ref 37–48.5)
HCV AB SERPL QL IA: NORMAL
HGB BLD-MCNC: 8.6 G/DL (ref 12–16)
HGB UR QL STRIP: ABNORMAL
HPIV1 RNA NPH QL NAA+NON-PROBE: NOT DETECTED
HPIV2 RNA NPH QL NAA+NON-PROBE: NOT DETECTED
HPIV3 RNA NPH QL NAA+NON-PROBE: NOT DETECTED
HPIV4 RNA NPH QL NAA+NON-PROBE: NOT DETECTED
HUMAN METAPNEUMOVIRUS: NOT DETECTED
IMM GRANULOCYTES # BLD AUTO: 0.11 K/UL (ref 0–0.04)
IMM GRANULOCYTES NFR BLD AUTO: 0.6 % (ref 0–0.5)
INFLUENZA A (SUBTYPES H1,H1-2009,H3): NOT DETECTED
INFLUENZA A, MOLECULAR: NEGATIVE
INFLUENZA B, MOLECULAR: NEGATIVE
KETONES UR QL STRIP: ABNORMAL
LDH SERPL L TO P-CCNC: 1.78 MMOL/L (ref 0.5–2.2)
LEUKOCYTE ESTERASE UR QL STRIP: ABNORMAL
LIPASE SERPL-CCNC: 11 U/L (ref 4–60)
LYMPHOCYTES # BLD AUTO: 0.9 K/UL (ref 1–4.8)
LYMPHOCYTES NFR BLD: 4.5 % (ref 18–48)
MCH RBC QN AUTO: 27.2 PG (ref 27–31)
MCHC RBC AUTO-ENTMCNC: 32.7 G/DL (ref 32–36)
MCV RBC AUTO: 83 FL (ref 82–98)
MICROSCOPIC COMMENT: NORMAL
MONOCYTES # BLD AUTO: 1.8 K/UL (ref 0.3–1)
MONOCYTES NFR BLD: 9.3 % (ref 4–15)
MYCOPLASMA PNEUMONIAE: NOT DETECTED
NEUTROPHILS # BLD AUTO: 16 K/UL (ref 1.8–7.7)
NEUTROPHILS NFR BLD: 84.5 % (ref 38–73)
NITRITE UR QL STRIP: NEGATIVE
NRBC BLD-RTO: 0 /100 WBC
OHS QRS DURATION: 82 MS
OHS QTC CALCULATION: 429 MS
PCO2 BLDA: 36.5 MMHG (ref 35–45)
PH SMN: 7.47 [PH] (ref 7.35–7.45)
PH UR STRIP: 6 [PH] (ref 5–8)
PLATELET # BLD AUTO: 290 K/UL (ref 150–450)
PMV BLD AUTO: 11.7 FL (ref 9.2–12.9)
PO2 BLDA: 26 MMHG (ref 40–60)
POC BE: 3 MMOL/L
POC SATURATED O2: 52 % (ref 95–100)
POC TCO2: 27 MMOL/L (ref 24–29)
POTASSIUM SERPL-SCNC: 4.4 MMOL/L (ref 3.5–5.1)
PROT SERPL-MCNC: 7.1 G/DL (ref 6–8.4)
PROT UR QL STRIP: NEGATIVE
RBC # BLD AUTO: 3.16 M/UL (ref 4–5.4)
RBC #/AREA URNS AUTO: 3 /HPF (ref 0–4)
RESPIRATORY INFECTION PANEL SOURCE: NORMAL
RSV RNA NPH QL NAA+NON-PROBE: NOT DETECTED
RV+EV RNA NPH QL NAA+NON-PROBE: NOT DETECTED
SAMPLE: ABNORMAL
SAMPLE: NORMAL
SARS-COV-2 RDRP RESP QL NAA+PROBE: NEGATIVE
SARS-COV-2 RNA RESP QL NAA+PROBE: NOT DETECTED
SITE: ABNORMAL
SITE: NORMAL
SODIUM SERPL-SCNC: 132 MMOL/L (ref 136–145)
SP GR UR STRIP: 1.01 (ref 1–1.03)
SPECIMEN SOURCE: NORMAL
SQUAMOUS #/AREA URNS AUTO: 0 /HPF
TROPONIN I SERPL DL<=0.01 NG/ML-MCNC: <0.006 NG/ML (ref 0–0.03)
URN SPEC COLLECT METH UR: ABNORMAL
WBC # BLD AUTO: 18.9 K/UL (ref 3.9–12.7)
WBC #/AREA URNS AUTO: 1 /HPF (ref 0–5)

## 2024-07-02 PROCEDURE — 83690 ASSAY OF LIPASE: CPT | Performed by: EMERGENCY MEDICINE

## 2024-07-02 PROCEDURE — 25500020 PHARM REV CODE 255: Performed by: STUDENT IN AN ORGANIZED HEALTH CARE EDUCATION/TRAINING PROGRAM

## 2024-07-02 PROCEDURE — 81001 URINALYSIS AUTO W/SCOPE: CPT | Performed by: STUDENT IN AN ORGANIZED HEALTH CARE EDUCATION/TRAINING PROGRAM

## 2024-07-02 PROCEDURE — 83880 ASSAY OF NATRIURETIC PEPTIDE: CPT | Performed by: EMERGENCY MEDICINE

## 2024-07-02 PROCEDURE — 25000003 PHARM REV CODE 250

## 2024-07-02 PROCEDURE — 87502 INFLUENZA DNA AMP PROBE: CPT | Performed by: EMERGENCY MEDICINE

## 2024-07-02 PROCEDURE — U0002 COVID-19 LAB TEST NON-CDC: HCPCS | Performed by: EMERGENCY MEDICINE

## 2024-07-02 PROCEDURE — 25000003 PHARM REV CODE 250: Performed by: STUDENT IN AN ORGANIZED HEALTH CARE EDUCATION/TRAINING PROGRAM

## 2024-07-02 PROCEDURE — 83605 ASSAY OF LACTIC ACID: CPT

## 2024-07-02 PROCEDURE — 63600175 PHARM REV CODE 636 W HCPCS: Performed by: STUDENT IN AN ORGANIZED HEALTH CARE EDUCATION/TRAINING PROGRAM

## 2024-07-02 PROCEDURE — 84484 ASSAY OF TROPONIN QUANT: CPT | Performed by: EMERGENCY MEDICINE

## 2024-07-02 PROCEDURE — 99900035 HC TECH TIME PER 15 MIN (STAT)

## 2024-07-02 PROCEDURE — 80053 COMPREHEN METABOLIC PANEL: CPT | Performed by: EMERGENCY MEDICINE

## 2024-07-02 PROCEDURE — 96365 THER/PROPH/DIAG IV INF INIT: CPT

## 2024-07-02 PROCEDURE — 85025 COMPLETE CBC W/AUTO DIFF WBC: CPT | Performed by: EMERGENCY MEDICINE

## 2024-07-02 PROCEDURE — 20600001 HC STEP DOWN PRIVATE ROOM

## 2024-07-02 PROCEDURE — 82803 BLOOD GASES ANY COMBINATION: CPT

## 2024-07-02 PROCEDURE — 99222 1ST HOSP IP/OBS MODERATE 55: CPT | Mod: GC,,, | Performed by: INTERNAL MEDICINE

## 2024-07-02 PROCEDURE — 99223 1ST HOSP IP/OBS HIGH 75: CPT | Mod: GC,,, | Performed by: STUDENT IN AN ORGANIZED HEALTH CARE EDUCATION/TRAINING PROGRAM

## 2024-07-02 PROCEDURE — 96366 THER/PROPH/DIAG IV INF ADDON: CPT

## 2024-07-02 PROCEDURE — 87581 M.PNEUMON DNA AMP PROBE: CPT | Performed by: STUDENT IN AN ORGANIZED HEALTH CARE EDUCATION/TRAINING PROGRAM

## 2024-07-02 PROCEDURE — 86803 HEPATITIS C AB TEST: CPT | Performed by: PHYSICIAN ASSISTANT

## 2024-07-02 PROCEDURE — 87040 BLOOD CULTURE FOR BACTERIA: CPT | Mod: 59 | Performed by: EMERGENCY MEDICINE

## 2024-07-02 PROCEDURE — A9585 GADOBUTROL INJECTION: HCPCS | Performed by: STUDENT IN AN ORGANIZED HEALTH CARE EDUCATION/TRAINING PROGRAM

## 2024-07-02 PROCEDURE — 63600175 PHARM REV CODE 636 W HCPCS

## 2024-07-02 PROCEDURE — 96367 TX/PROPH/DG ADDL SEQ IV INF: CPT

## 2024-07-02 PROCEDURE — 94761 N-INVAS EAR/PLS OXIMETRY MLT: CPT | Mod: XB

## 2024-07-02 RX ORDER — IBUPROFEN 200 MG
24 TABLET ORAL
Status: DISCONTINUED | OUTPATIENT
Start: 2024-07-02 | End: 2024-07-12 | Stop reason: HOSPADM

## 2024-07-02 RX ORDER — ACETAMINOPHEN 325 MG/1
650 TABLET ORAL EVERY 4 HOURS PRN
Status: DISCONTINUED | OUTPATIENT
Start: 2024-07-02 | End: 2024-07-12 | Stop reason: HOSPADM

## 2024-07-02 RX ORDER — HEPARIN SODIUM 5000 [USP'U]/ML
5000 INJECTION, SOLUTION INTRAVENOUS; SUBCUTANEOUS EVERY 8 HOURS
Status: DISCONTINUED | OUTPATIENT
Start: 2024-07-02 | End: 2024-07-08

## 2024-07-02 RX ORDER — IBUPROFEN 200 MG
16 TABLET ORAL
Status: DISCONTINUED | OUTPATIENT
Start: 2024-07-02 | End: 2024-07-12 | Stop reason: HOSPADM

## 2024-07-02 RX ORDER — GLUCAGON 1 MG
1 KIT INJECTION
Status: DISCONTINUED | OUTPATIENT
Start: 2024-07-02 | End: 2024-07-05 | Stop reason: SDUPTHER

## 2024-07-02 RX ORDER — ACETAMINOPHEN 500 MG
1000 TABLET ORAL
Status: COMPLETED | OUTPATIENT
Start: 2024-07-02 | End: 2024-07-02

## 2024-07-02 RX ORDER — IBUPROFEN 200 MG
400 TABLET ORAL EVERY 6 HOURS PRN
Status: DISCONTINUED | OUTPATIENT
Start: 2024-07-02 | End: 2024-07-12 | Stop reason: HOSPADM

## 2024-07-02 RX ORDER — OXYCODONE HYDROCHLORIDE 10 MG/1
10 TABLET ORAL EVERY 6 HOURS PRN
Status: DISCONTINUED | OUTPATIENT
Start: 2024-07-02 | End: 2024-07-12 | Stop reason: HOSPADM

## 2024-07-02 RX ORDER — MUPIROCIN 20 MG/G
OINTMENT TOPICAL 2 TIMES DAILY
Status: COMPLETED | OUTPATIENT
Start: 2024-07-02 | End: 2024-07-06

## 2024-07-02 RX ORDER — ONDANSETRON HYDROCHLORIDE 2 MG/ML
4 INJECTION, SOLUTION INTRAVENOUS EVERY 8 HOURS PRN
Status: DISCONTINUED | OUTPATIENT
Start: 2024-07-02 | End: 2024-07-12 | Stop reason: HOSPADM

## 2024-07-02 RX ORDER — SODIUM CHLORIDE 0.9 % (FLUSH) 0.9 %
10 SYRINGE (ML) INJECTION EVERY 12 HOURS PRN
Status: DISCONTINUED | OUTPATIENT
Start: 2024-07-02 | End: 2024-07-12 | Stop reason: HOSPADM

## 2024-07-02 RX ORDER — OXYCODONE HYDROCHLORIDE 5 MG/1
5 TABLET ORAL EVERY 6 HOURS PRN
Status: DISCONTINUED | OUTPATIENT
Start: 2024-07-02 | End: 2024-07-12 | Stop reason: HOSPADM

## 2024-07-02 RX ORDER — ACETAMINOPHEN 325 MG/1
650 TABLET ORAL EVERY 4 HOURS PRN
Status: DISCONTINUED | OUTPATIENT
Start: 2024-07-02 | End: 2024-07-02

## 2024-07-02 RX ORDER — NALOXONE HCL 0.4 MG/ML
0.02 VIAL (ML) INJECTION
Status: DISCONTINUED | OUTPATIENT
Start: 2024-07-02 | End: 2024-07-12 | Stop reason: HOSPADM

## 2024-07-02 RX ORDER — POLYETHYLENE GLYCOL 3350 17 G/17G
17 POWDER, FOR SOLUTION ORAL DAILY
Status: DISCONTINUED | OUTPATIENT
Start: 2024-07-02 | End: 2024-07-11

## 2024-07-02 RX ORDER — GADOBUTROL 604.72 MG/ML
7 INJECTION INTRAVENOUS
Status: COMPLETED | OUTPATIENT
Start: 2024-07-02 | End: 2024-07-02

## 2024-07-02 RX ADMIN — MUPIROCIN: 20 OINTMENT TOPICAL at 09:07

## 2024-07-02 RX ADMIN — VANCOMYCIN HYDROCHLORIDE 1250 MG: 1.25 INJECTION, POWDER, LYOPHILIZED, FOR SOLUTION INTRAVENOUS at 02:07

## 2024-07-02 RX ADMIN — HEPARIN SODIUM 5000 UNITS: 5000 INJECTION INTRAVENOUS; SUBCUTANEOUS at 09:07

## 2024-07-02 RX ADMIN — PIPERACILLIN SODIUM AND TAZOBACTAM SODIUM 4.5 G: 4; .5 INJECTION, POWDER, FOR SOLUTION INTRAVENOUS at 10:07

## 2024-07-02 RX ADMIN — IBUPROFEN 400 MG: 200 TABLET, FILM COATED ORAL at 05:07

## 2024-07-02 RX ADMIN — IOHEXOL 75 ML: 350 INJECTION, SOLUTION INTRAVENOUS at 02:07

## 2024-07-02 RX ADMIN — MUPIROCIN: 20 OINTMENT TOPICAL at 10:07

## 2024-07-02 RX ADMIN — SODIUM CHLORIDE, POTASSIUM CHLORIDE, SODIUM LACTATE AND CALCIUM CHLORIDE 1000 ML: 600; 310; 30; 20 INJECTION, SOLUTION INTRAVENOUS at 12:07

## 2024-07-02 RX ADMIN — PIPERACILLIN SODIUM AND TAZOBACTAM SODIUM 4.5 G: 4; .5 INJECTION, POWDER, FOR SOLUTION INTRAVENOUS at 12:07

## 2024-07-02 RX ADMIN — ACETAMINOPHEN 1000 MG: 500 TABLET ORAL at 03:07

## 2024-07-02 RX ADMIN — HEPARIN SODIUM 5000 UNITS: 5000 INJECTION INTRAVENOUS; SUBCUTANEOUS at 02:07

## 2024-07-02 RX ADMIN — PIPERACILLIN SODIUM AND TAZOBACTAM SODIUM 4.5 G: 4; .5 INJECTION, POWDER, FOR SOLUTION INTRAVENOUS at 04:07

## 2024-07-02 RX ADMIN — GADOBUTROL 7 ML: 604.72 INJECTION INTRAVENOUS at 10:07

## 2024-07-02 NOTE — ED NOTES
Nurses Note -- 4 Eyes      7/2/2024   3:44 AM      Skin assessed during: Admit      [x] No Altered Skin Integrity Present    []Prevention Measures Documented      [] Yes- Altered Skin Integrity Present or Discovered   [] LDA Added if Not in Epic (Describe Wound)   [] New Altered Skin Integrity was Present on Admit and Documented in LDA   [] Wound Image Taken    Wound Care Consulted? No    Attending Nurse:  Monika Dawson RN/Staff Member:  Jeanie

## 2024-07-02 NOTE — PLAN OF CARE
Admitted with Sepsis. Pt and sister involved in plan of care and communicating needs throughout shift.  Up in room and to bathroom independently and occasionally with 1 person assist. PRN pain medication given and moderate relief.  Tolerating diet, voiding without difficulty. Pt now on regular diet.  Zosyn infusing at this time.  All VSS; no acute events so far this shift.  Pt remaining free from falls or injury throughout shift; bed locked and in lowest position; call light within reach.  Pt instructed to call for assistance as needed.  Q1H rounding completed on pt.

## 2024-07-02 NOTE — H&P
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 Briceño. She finished chemo(gemcitabine + nab-paclitaxel) and is expected to start new regimen due to progression (5-FU and Onyvide) on 7/9. Pt admitted to medical oncology for fever and abdominal pain in setting of metastatic pancreatic adenocarcinoma. She has been experiencing fever x 1 week (Tmax of 101 at home) with worsening right sided abdominal pain. She denies CP, SOB, loss of appetite, diarrhea.      ED Course  Pt presented to the ED with abdominal pain and fevers.  Labs significant for ALP-881, AST-64, ALT-51, increased WBCs at 18.90. Preliminary blood cultures drawn. CT Abd shows "stable appearance of the heterogeneous hypoattenuating pancreatic tail lesion with continued abutment of the splenic artery." CT chest ruled out PE. Pt empirically started on IV zosyn for possible abdominal infection and sepsis protocol was initiated. Admitted to med onc for further management.      Oncology Treatment Plan:   OP GI liposomal irinotecan leucovorin fluorouracil Q2W    Medications:  Continuous Infusions:  Scheduled Meds:   heparin (porcine)  5,000 Units Subcutaneous Q8H    mupirocin   Nasal BID    piperacillin-tazobactam (Zosyn) IV (PEDS and ADULTS) (extended infusion is not appropriate)  4.5 g Intravenous Q8H    polyethylene glycol  17 g Oral Daily     PRN Meds:  Current Facility-Administered Medications:     acetaminophen, 650 mg, Oral, Q4H PRN    dextrose 10%, 12.5 g, Intravenous, PRN    dextrose 10%, 25 g, Intravenous, PRN    glucagon (human recombinant), 1 mg, Intramuscular, PRN    glucose, 16 g, " Oral, PRN    glucose, 24 g, Oral, PRN    naloxone, 0.02 mg, Intravenous, PRN    ondansetron, 4 mg, Intravenous, Q8H PRN    oxyCODONE, 10 mg, Oral, Q6H PRN    oxyCODONE, 5 mg, Oral, Q6H PRN    sodium chloride 0.9%, 10 mL, Intravenous, Q12H PRN     Review of patient's allergies indicates:   Allergen Reactions    Erythromycin (bulk)      Other reaction(s): Eye irritation        Past Medical History:   Diagnosis Date    Age-related osteoporosis without current pathological fracture     Atherosclerosis of aortic arch     - noted on CXR 8/2017    Back pain     Diverticulosis of sigmoid colon 10/22/2019    Ectopic pregnancy     Fibrocystic breast     Hyperlipidemia     Inguinal hernia     Osteopenia     Polyp of ascending colon 10/22/2019    Sepsis 7/2/2024    Urinary incontinence, mixed 05/24/2022     Past Surgical History:   Procedure Laterality Date    ECTOPIC PREGNANCY SURGERY      HERNIA REPAIR      left inguinal    HYSTERECTOMY  1980    without BSO     Family History       Problem Relation (Age of Onset)    Breast cancer Mother (69), Sister (73), Maternal Aunt (73)    Cancer Maternal Aunt (83), Maternal Uncle    Depression Father, Paternal Grandmother, Paternal Aunt, Paternal Uncle    Heart attack Father, Maternal Grandfather, Paternal Uncle    Heart disease Father    Heart failure Maternal Grandmother    Mental illness Paternal Grandmother    Obesity Brother    Prostate cancer Brother (81)    Seizures Mother    Suicide Paternal Cousin    Suicide Attempts Paternal Grandmother    Tongue cancer Paternal Cousin          Tobacco Use    Smoking status: Never    Smokeless tobacco: Never   Substance and Sexual Activity    Alcohol use: No    Drug use: No    Sexual activity: Not Currently       Review of Systems   Constitutional:  Positive for chills and fever. Negative for activity change and appetite change.   Respiratory:  Negative for shortness of breath.    Cardiovascular:  Negative for chest pain.   Gastrointestinal:   Positive for abdominal pain. Negative for diarrhea, nausea and vomiting.     Objective:     Vital Signs (Most Recent):  Temp: 98.6 °F (37 °C) (07/02/24 0702)  Pulse: 69 (07/02/24 0902)  Resp: 20 (07/02/24 0902)  BP: (!) 146/67 (07/02/24 0901)  SpO2: 97 % (07/02/24 0901) Vital Signs (24h Range):  Temp:  [98.5 °F (36.9 °C)-100.3 °F (37.9 °C)] 98.6 °F (37 °C)  Pulse:  [] 69  Resp:  [15-32] 20  SpO2:  [93 %-99 %] 97 %  BP: (129-166)/(59-77) 146/67     Weight: 59 kg (130 lb)  Body mass index is 23.03 kg/m².  Body surface area is 1.62 meters squared.    No intake or output data in the 24 hours ending 07/02/24 1339     Physical Exam  Vitals and nursing note reviewed.   Constitutional:       Appearance: She is ill-appearing.   Eyes:      General: No scleral icterus.     Conjunctiva/sclera: Conjunctivae normal.   Cardiovascular:      Rate and Rhythm: Normal rate and regular rhythm.      Pulses: Normal pulses.      Heart sounds: Normal heart sounds.   Pulmonary:      Effort: Pulmonary effort is normal.      Breath sounds: Normal breath sounds.   Abdominal:      Tenderness: There is abdominal tenderness (epigastric).   Skin:     General: Skin is warm and dry.   Neurological:      Mental Status: She is alert.          Significant Labs:   CBC:   Recent Labs   Lab 07/01/24  1305 07/02/24  0013   WBC 17.22* 18.90*   HGB 9.0* 8.6*   HCT 28.9* 26.3*    290    and CMP:   Recent Labs   Lab 07/01/24  1305 07/02/24  0013   * 132*   K 4.1 4.4    100   CO2 22* 20*   * 96   BUN 16 14   CREATININE 0.7 0.7   CALCIUM 9.1 9.0   PROT 6.9 7.1   ALBUMIN 2.8* 2.8*   BILITOT 1.6* 2.0*   ALKPHOS 748* 881*   AST 60* 64*   ALT 51* 51*   ANIONGAP 10 12       Diagnostic Results:  CT: In this patient with pancreatic adenocarcinoma there is stable appearance of the heterogeneous hypoattenuating pancreatic tail lesion with continued abutment of the splenic artery..  MRI: Stricture without evidence of discrete mass at the  "confluence of the intrahepatic biliary radicles, with mild-moderate upstream biliary dilatation.  Cholangiocarcinoma or postinflammatory stricture could have this appearance.  Assessment/Plan:     * Sepsis  Pt has been experiencing abdominal pain and fevers x 1 week. ALP- 881, AST-64, ALT-51, Total Bili-2. WBCs elevated at 18.90. MRCP significant for evidence of a stricture causing bile duct dilation. In setting of fever with elevated LFT and alk phos, concerned for cholangitis. On PE, pt not jaundiced but is TTP to epigastric and RUQ area. UA normal. RIP unremarkable.  - Consult [facility], surgical oncology for ERCP vs  IR PCT  - Continue IV zosyn  - Fluids PRN    Transaminitis  See plan for sepsis    Hyponatremia  Sodium on admission 132  - Daily CMP, monitor    Biliary stricture  Pt has been experiencing abdominal pain and fevers x 1 week. ALP- 881, AST-64, ALT-51, Total Bili-2. MRCP significant for evidence of a stricture causing bile duct dilation. In setting of fever with elevated LFT and alk phos, concerned for cholangitis. On PE, pt not jaundiced but is TTP to epigastric and RUQ area.   - Consult [facility], surgical oncology for ERCP vs  IR PCT  - Continue IV zosyn  - Fluids PRN    Pancreatic adenocarcinoma  History of Pancreatic adenocarcinoma with metastasis to liver/lungs. Currently follows up with Dr. Briceño. Pt completed chemo cycle (gemcitabine + nab-paclitaxel) in May and is expected to start new 5-FU and Onyvide due to disease progression. CT abdomen shows "stable appearance of the heterogeneous hypoattenuating pancreatic tail lesion with continued abutment of the splenic artery" and "mildly worse innumerable hypoattenuating hepatic lesions.  Findings remain concerning for hepatic metastatic disease."         Solomon Hyde MD  Hematology/Oncology  [facility] - Emergency Dept        "

## 2024-07-02 NOTE — ASSESSMENT & PLAN NOTE
"History of Pancreatic adenocarcinoma with metastasis to liver/lungs. Currently follows up with Dr. Briceño. Pt completed chemo cycle (gemcitabine + nab-paclitaxel) in May and is expected to start new 5-FU and Onyvide due to disease progression. CT abdomen shows "stable appearance of the heterogeneous hypoattenuating pancreatic tail lesion with continued abutment of the splenic artery" and "mildly worse innumerable hypoattenuating hepatic lesions.  Findings remain concerning for hepatic metastatic disease."   "

## 2024-07-02 NOTE — NURSING
Pt admitted to floor at 1630.  AAOx4, ambulate with assist.  Up to bathroom with assist.  Pt is NPO at this time.

## 2024-07-02 NOTE — ASSESSMENT & PLAN NOTE
Pt has been experiencing abdominal pain and fevers x 1 week. ALP- 881, AST-64, ALT-51, Total Bili-2. MRCP significant for evidence of a stricture causing bile duct dilation. In setting of fever with elevated LFT and alk phos, concerned for cholangitis. On PE, pt not jaundiced but is TTP to epigastric and RUQ area.   - Consult [facility], surgical oncology for ERCP vs  IR PCT  - Continue IV zosyn  - Fluids PRN

## 2024-07-02 NOTE — SUBJECTIVE & OBJECTIVE
No current facility-administered medications on file prior to encounter.     Current Outpatient Medications on File Prior to Encounter   Medication Sig    multivitamin (THERAGRAN) per tablet Take 1 tablet by mouth once daily. (Patient not taking: Reported on 6/13/2024)    OLANZapine (ZYPREXA) 5 MG tablet Take 1 tab at nighttime on nights 1 thru 3 of each chemo cycle    ondansetron (ZOFRAN-ODT) 8 MG TbDL TAKE 1 TABLET (8 MG TOTAL) BY MOUTH EVERY 6 HOURS AS NEEDED FOR NAUSEA (Patient not taking: Reported on 5/13/2024)    promethazine (PHENERGAN) 12.5 MG Tab Take 1 tablet (12.5 mg total) by mouth every 6 (six) hours as needed for Nausea.    vitamin D (VITAMIN D3) 1000 units Tab Take 1,000 Units by mouth once daily. (Patient not taking: Reported on 6/13/2024)    [DISCONTINUED] ABRAXANE 100 mg injection  (Patient not taking: Reported on 5/13/2024)    [DISCONTINUED] coconut oil, bulk, Oil by Misc.(Non-Drug; Combo Route) route. (Patient not taking: Reported on 6/13/2024)    [DISCONTINUED] dexAMETHasone (DECADRON) 4 mg/mL injection  (Patient not taking: Reported on 5/13/2024)    [DISCONTINUED] GADAVIST 1 mmol/mL (604.72 mg/mL) Soln injection  (Patient not taking: Reported on 5/13/2024)    [DISCONTINUED] gemcitabine (GEMZAR) 1 gram/26.3 mL (38 mg/mL) Soln injection  (Patient not taking: Reported on 5/13/2024)    [DISCONTINUED] gemcitabine 200 mg/5.26 mL (38 mg/mL) Soln  (Patient not taking: Reported on 5/13/2024)    [DISCONTINUED] heparin sodium,porcine (HEPARIN, PORCINE,) 1,000 unit/mL injection  (Patient not taking: Reported on 5/13/2024)    [DISCONTINUED] heparin, porcine, PF, (HEPARIN FLUSH 100 UNITS/ML) 100 unit/mL Syrg  (Patient not taking: Reported on 5/13/2024)    [DISCONTINUED] ondansetron (ZOFRAN-ODT) 4 MG TbDL Take 1 tablet (4 mg total) by mouth every 8 (eight) hours as needed (Nausea). (Patient not taking: Reported on 5/13/2024)       Review of patient's allergies indicates:   Allergen Reactions    Erythromycin  (bulk)      Other reaction(s): Eye irritation       Past Medical History:   Diagnosis Date    Age-related osteoporosis without current pathological fracture     Atherosclerosis of aortic arch     - noted on CXR 8/2017    Back pain     Diverticulosis of sigmoid colon 10/22/2019    Ectopic pregnancy     Fibrocystic breast     Hyperlipidemia     Inguinal hernia     Osteopenia     Polyp of ascending colon 10/22/2019    Sepsis 7/2/2024    Urinary incontinence, mixed 05/24/2022     Past Surgical History:   Procedure Laterality Date    ECTOPIC PREGNANCY SURGERY      HERNIA REPAIR      left inguinal    HYSTERECTOMY  1980    without BSO     Family History       Problem Relation (Age of Onset)    Breast cancer Mother (69), Sister (73), Maternal Aunt (73)    Cancer Maternal Aunt (83), Maternal Uncle    Depression Father, Paternal Grandmother, Paternal Aunt, Paternal Uncle    Heart attack Father, Maternal Grandfather, Paternal Uncle    Heart disease Father    Heart failure Maternal Grandmother    Mental illness Paternal Grandmother    Obesity Brother    Prostate cancer Brother (81)    Seizures Mother    Suicide Paternal Cousin    Suicide Attempts Paternal Grandmother    Tongue cancer Paternal Cousin          Tobacco Use    Smoking status: Never    Smokeless tobacco: Never   Substance and Sexual Activity    Alcohol use: No    Drug use: No    Sexual activity: Not Currently     Review of Systems  Objective:     Vital Signs (Most Recent):  Temp: 100.3 °F (37.9 °C) (07/02/24 1443)  Pulse: 86 (07/02/24 1600)  Resp: 20 (07/02/24 1600)  BP: 134/68 (07/02/24 1600)  SpO2: 95 % (07/02/24 1600) Vital Signs (24h Range):  Temp:  [98.5 °F (36.9 °C)-100.3 °F (37.9 °C)] 100.3 °F (37.9 °C)  Pulse:  [] 86  Resp:  [15-32] 20  SpO2:  [93 %-99 %] 95 %  BP: (129-166)/(59-77) 134/68     Weight: 59 kg (130 lb)  Body mass index is 23.03 kg/m².     Physical Exam  Vitals and nursing note reviewed.   Constitutional:       Appearance: She is  ill-appearing.   Eyes:      General: No scleral icterus.     Conjunctiva/sclera: Conjunctivae normal.   Cardiovascular:      Rate and Rhythm: Normal rate and regular rhythm.   Pulmonary:      Effort: Pulmonary effort is normal.   Abdominal:      Palpations: Abdomen is soft.      Tenderness: There is abdominal tenderness (epigastric) in the right upper quadrant and epigastric area. There is no guarding or rebound.   Skin:     General: Skin is warm and dry.   Neurological:      Mental Status: She is alert.            I have reviewed all pertinent lab results within the past 24 hours.  CBC:   Recent Labs   Lab 07/02/24  0013   WBC 18.90*   RBC 3.16*   HGB 8.6*   HCT 26.3*      MCV 83   MCH 27.2   MCHC 32.7     CMP:   Recent Labs   Lab 07/02/24  0013   GLU 96   CALCIUM 9.0   ALBUMIN 2.8*   PROT 7.1   *   K 4.4   CO2 20*      BUN 14   CREATININE 0.7   ALKPHOS 881*   ALT 51*   AST 64*   BILITOT 2.0*       Significant Diagnostics:  I have reviewed all pertinent imaging results/findings within the past 24 hours.

## 2024-07-02 NOTE — ASSESSMENT & PLAN NOTE
Pt has been experiencing abdominal pain and fevers x 1 week. ALP- 881, AST-64, ALT-51, Total Bili-2. WBCs elevated at 18.90. MRCP significant for evidence of a stricture causing bile duct dilation. In setting of fever with elevated LFT and alk phos, concerned for cholangitis. On PE, pt not jaundiced but is TTP to epigastric and RUQ area. UA normal. RIP unremarkable.  - Consult [facility], surgical oncology for ERCP vs  IR PCT  - Continue IV zosyn  - Fluids PRN

## 2024-07-02 NOTE — ED PROVIDER NOTES
Encounter Date: 7/1/2024       History     Chief Complaint   Patient presents with    Fever     Hx of pancreatic cancer with liver mets. Last chemo May 27. Tmax 101 at home     Patient is a 79-year-old female with past medical history of metastatic pancreatic cancer  to liver and lung finished chemo in May, new chemo starting 7/9 presenting with a fever and chills. Fever x 1 week 101F at home.  States that she was discussed with Hematology and Oncology via telephone and they ordered basic labs and chest x-ray, and told her to go to the ER if she has worsening fever. Patient has felt worse since calling and now with worsening right sided abdominal pain.  State that she has had lack of appetite and nausea.  Admits no bowel movements states that they are brown.  Denies melena or hematochezia.  Admits normal urine output with no urinary symptoms.  Admits body aches and chills denies chest pain.    The history is provided by the patient and medical records.     Review of patient's allergies indicates:   Allergen Reactions    Erythromycin (bulk)      Other reaction(s): Eye irritation     Past Medical History:   Diagnosis Date    Age-related osteoporosis without current pathological fracture     Atherosclerosis of aortic arch     - noted on CXR 8/2017    Back pain     Diverticulosis of sigmoid colon 10/22/2019    Ectopic pregnancy     Fibrocystic breast     Hyperlipidemia     Inguinal hernia     Osteopenia     Polyp of ascending colon 10/22/2019    Urinary incontinence, mixed 05/24/2022     Past Surgical History:   Procedure Laterality Date    ECTOPIC PREGNANCY SURGERY      HERNIA REPAIR      left inguinal    HYSTERECTOMY  1980    without BSO     Family History   Problem Relation Name Age of Onset    Breast cancer Mother  69    Seizures Mother      Heart disease Father      Heart attack Father      Depression Father      Breast cancer Sister Monika 73    Breast cancer Maternal Aunt Kathleen Damon    Prostate cancer Brother  Lamine 81        no mets    Obesity Brother Lamine     Heart failure Maternal Grandmother      Heart attack Maternal Grandfather      Suicide Attempts Paternal Grandmother      Depression Paternal Grandmother      Mental illness Paternal Grandmother      Depression Paternal Aunt Jenniffer     Heart attack Paternal Uncle Leodan     Depression Paternal Uncle Trenton     Cancer Maternal Aunt Rosio 83        blood cancer (leukemia?)    Cancer Maternal Uncle Bill         unk type; was COD    Suicide Paternal Cousin      Tongue cancer Paternal Cousin Jimi         smoking hx unk    Ovarian cancer Neg Hx       Social History     Tobacco Use    Smoking status: Never    Smokeless tobacco: Never   Substance Use Topics    Alcohol use: No    Drug use: No     Review of Systems  ROS negative except as noted in HPI     Physical Exam     Initial Vitals [07/01/24 2058]   BP Pulse Resp Temp SpO2   (!) 150/77 100 15 99.7 °F (37.6 °C) 97 %      MAP       --         Physical Exam    Nursing note and vitals reviewed.  Constitutional: Vital signs are normal. She is cooperative. She has a sickly appearance. No distress.   HENT:   Head: Normocephalic.   Right Ear: External ear normal.   Left Ear: External ear normal.   Nose: Nose normal.   Mouth/Throat: Oropharynx is clear and moist.   Eyes: Conjunctivae are normal.   Neck: No JVD present.   Cardiovascular:  Normal rate, regular rhythm, normal heart sounds and intact distal pulses.           Pulmonary/Chest: Breath sounds normal.   Abdominal: Abdomen is soft. She exhibits no distension. There is abdominal tenderness (RUQ). There is no rebound and no guarding.     Neurological: She is alert and oriented to person, place, and time. She has normal strength. No cranial nerve deficit or sensory deficit. GCS score is 15. GCS eye subscore is 4. GCS verbal subscore is 5. GCS motor subscore is 6.   Skin: Skin is warm. Capillary refill takes less than 2 seconds.   Psychiatric: She has a normal mood and affect.          ED Course   Procedures  Labs Reviewed   CBC W/ AUTO DIFFERENTIAL - Abnormal; Notable for the following components:       Result Value    WBC 18.90 (*)     RBC 3.16 (*)     Hemoglobin 8.6 (*)     Hematocrit 26.3 (*)     RDW 17.2 (*)     Immature Granulocytes 0.6 (*)     Gran # (ANC) 16.0 (*)     Immature Grans (Abs) 0.11 (*)     Lymph # 0.9 (*)     Mono # 1.8 (*)     Gran % 84.5 (*)     Lymph % 4.5 (*)     All other components within normal limits    Narrative:     Release to patient->Immediate   COMPREHENSIVE METABOLIC PANEL - Abnormal; Notable for the following components:    Sodium 132 (*)     CO2 20 (*)     Albumin 2.8 (*)     Total Bilirubin 2.0 (*)     Alkaline Phosphatase 881 (*)     AST 64 (*)     ALT 51 (*)     All other components within normal limits    Narrative:     Release to patient->Immediate   URINALYSIS, REFLEX TO URINE CULTURE - Abnormal; Notable for the following components:    Ketones, UA 1+ (*)     Occult Blood UA Trace (*)     Leukocytes, UA Trace (*)     All other components within normal limits    Narrative:     Specimen Source->Urine   ISTAT PROCEDURE - Abnormal; Notable for the following components:    POC PH 7.467 (*)     POC PO2 26 (*)     POC BE 3 (*)     All other components within normal limits   INFLUENZA A & B BY MOLECULAR   CULTURE, BLOOD   CULTURE, BLOOD   HEPATITIS C ANTIBODY    Narrative:     Release to patient->Immediate  Add on Trop per Dr. To @ 00:56 am to order # [id number]   SARS-COV-2 RNA AMPLIFICATION, QUAL   LIPASE   B-TYPE NATRIURETIC PEPTIDE   TROPONIN I   LIPASE    Narrative:     Release to patient->Immediate  Add on Trop per Dr. To @ 00:56 am to order # [id number]   TROPONIN I    Narrative:     Release to patient->Immediate  Add on Trop per Dr. To @ 00:56 am to order # [id number]   B-TYPE NATRIURETIC PEPTIDE    Narrative:     Release to patient->Immediate  Add on Trop per Dr. To @ 00:56 am to order # [id number]   URINALYSIS MICROSCOPIC     Narrative:     Specimen Source->Urine   ISTAT LACTATE          Imaging Results              CTA Chest Non-Coronary (PE Studies) (Final result)  Result time 07/02/24 06:59:28      Final result by Juancho Torres MD (07/02/24 06:59:28)                   Impression:      No large central luminal filling defect or saddle pulmonary embolus.  No evidence of pulmonary thromboembolism to the proximal segmental pulmonary arterial level.  No evidence of right heart strain.    Subtle scattered patchy ground-glass opacities may represent developing atypical infectious process or pulmonary edema.    No acute intra-abdominal process.    In this patient with pancreatic adenocarcinoma there is stable appearance of the heterogeneous hypoattenuating pancreatic tail lesion with continued abutment of the splenic artery.    Similar but mildly worse innumerable hypoattenuating hepatic lesions.  Findings remain concerning for hepatic metastatic disease.    Electronically signed by resident: Marivel Can  Date:    07/02/2024  Time:    05:25    Electronically signed by: Juancho Torres MD  Date:    07/02/2024  Time:    06:59               Narrative:    EXAMINATION:  CTA CHEST NON CORONARY (PE STUDIES); CT ABDOMEN PELVIS WITH IV CONTRAST    CLINICAL HISTORY:  Pulmonary embolism (PE) suspected, high prob;; Epigastric pain;    TECHNIQUE:  During intravenous bolus injection of 75 ml of Omnipaque 350 contrast medium and using low dose technique, the chest was surveyed from above the pulmonary apices through the posterior costophrenic angles data was reconstructed for multiplanar images in axial, sagittal and coronal planes and for maximal intensity projection images in the the axial, sagittal and coronal planes.    Low dose axial images, sagittal and coronal reformations were obtained from the lung bases to the pubic symphysis following the IV administration of 75 mL of Omnipaque 350 intravenous contrast. Oral contrast was not  administered.    COMPARISON:  CT chest abdomen pelvis 06/10/2024    FINDINGS:  Respiratory motion artifact and streak artifact from contrast bolus limit examination.    Base of Neck/thoracic soft tissues: Partially obscured by streak artifact from contrast bolus.  Right IJ Port-A-Cath with tip in the SVC..    Aorta: Left-sided aortic arch. Nonaneurysmal.  Mild scattered calcific atherosclerosis of the thoracic aorta.    Heart/pericardium: Normal size.  Multi-vessel calcific atherosclerosis of the coronary arteries.  No pericardial effusion.    Pulmonary arteries: Main pulmonary artery is normal caliber.  Pulmonary arteries distribute normally.  No large central luminal filling defect or saddle pulmonary embolus.  No evidence of pulmonary thromboembolism to the proximal segmental pulmonary arterial level.    Pulmonary veins: No significant abnormality.    Giana/Mediastinum: No pathologic lymph node enlargement.    Airways/Lungs/Pleura: Respiratory motion artifact significantly limits evaluation.  Central airways are patent.  Biapical pleuroparenchymal scarring.  Subtle scattered patchy ground-glass opacities, may represent developing atypical infectious process or pulmonary edema.  Dependent bibasilar subsegmental atelectasis and/or scarring.  Stable 1.2 cm pleural based opacity in the inferior right upper lobe (series 3, image 260).  No pleural effusion.  No pneumothorax.    Esophagus: No significant abnormality.    Liver: Normal in size and contour with multiple hepatic hypodense lesions.  Many of these lesions are slightly larger in size to the prior study, and there may be worsening necrotic changes and coalescence of some of the lesions when compared with the recent prior study.  Index mass in the superior right hepatic lobe measures 6.3 cm AP diameter (axial image 24; previously 5.6 cm).  Index mass in the left hepatic lobe measures 4.1 cm in diameter (axial image 73; previously 3.7 cm).    Gallbladder: Normally  distended without calcified gallstones.  No pericholecystic inflammatory changes.    Bile ducts: No intrahepatic or extrahepatic biliary ductal dilatation.    Spleen: Normal size.    Pancreas: Stable ill-defined heterogeneous hypoattenuating pancreatic tail lesion measuring up to 1.7 cm (coronal series, image 87), previously 1.5 cm when measured similarly.  Continued abutment of the splenic artery.  Similar irregularity of the pancreatic duct with mild dilatation.    Adrenals: No significant abnormality    Renal/Ureters: The kidneys are normal in size with bilateral symmetric enhancement.  No obstructing nephrolithiasis.  No hydronephrosis.  The bilateral pelvocaliceal systems are distended allowing for the fact that there is significant distension of the urinary bladder..  Incidental left posterolateral bladder diverticulum.    Reproductive: Uterus is surgically absent.  No adnexal mass.    Stomach/Bowel: Stomach is unremarkable.  Small bowel is normal caliber without obstruction or evidence of inflammation.  Appendix is normal.  Large bowel is normal caliber without obstruction, focal wall thickening or evidence of inflammation.  Scattered colonic diverticulosis.    Peritoneum: No abdominopelvic free fluid.  No intraperitoneal free air.    Lymph Nodes: No pathologic ramón enlargement in the abdomen or pelvis.    Vasculature: Abdominal aorta tapers normally.  Mild scattered calcific atherosclerosis of the abdominal aorta and its branches. Portal veins and SMV are patent.  Chronic occlusion of the splenic vein..    Bones: Degenerative changes of the spine.  No acute fractures or osseous destructive lesions.    Soft Tissues: No significant abnormality.                                       CT Abdomen Pelvis With IV Contrast NO Oral Contrast (Final result)  Result time 07/02/24 06:59:28      Final result by Juancho Torres MD (07/02/24 06:59:28)                   Impression:      No large central luminal filling  defect or saddle pulmonary embolus.  No evidence of pulmonary thromboembolism to the proximal segmental pulmonary arterial level.  No evidence of right heart strain.    Subtle scattered patchy ground-glass opacities may represent developing atypical infectious process or pulmonary edema.    No acute intra-abdominal process.    In this patient with pancreatic adenocarcinoma there is stable appearance of the heterogeneous hypoattenuating pancreatic tail lesion with continued abutment of the splenic artery.    Similar but mildly worse innumerable hypoattenuating hepatic lesions.  Findings remain concerning for hepatic metastatic disease.    Electronically signed by resident: Marivel Can  Date:    07/02/2024  Time:    05:25    Electronically signed by: Juancho Torres MD  Date:    07/02/2024  Time:    06:59               Narrative:    EXAMINATION:  CTA CHEST NON CORONARY (PE STUDIES); CT ABDOMEN PELVIS WITH IV CONTRAST    CLINICAL HISTORY:  Pulmonary embolism (PE) suspected, high prob;; Epigastric pain;    TECHNIQUE:  During intravenous bolus injection of 75 ml of Omnipaque 350 contrast medium and using low dose technique, the chest was surveyed from above the pulmonary apices through the posterior costophrenic angles data was reconstructed for multiplanar images in axial, sagittal and coronal planes and for maximal intensity projection images in the the axial, sagittal and coronal planes.    Low dose axial images, sagittal and coronal reformations were obtained from the lung bases to the pubic symphysis following the IV administration of 75 mL of Omnipaque 350 intravenous contrast. Oral contrast was not administered.    COMPARISON:  CT chest abdomen pelvis 06/10/2024    FINDINGS:  Respiratory motion artifact and streak artifact from contrast bolus limit examination.    Base of Neck/thoracic soft tissues: Partially obscured by streak artifact from contrast bolus.  Right IJ Port-A-Cath with tip in the SVC..    Aorta:  Left-sided aortic arch. Nonaneurysmal.  Mild scattered calcific atherosclerosis of the thoracic aorta.    Heart/pericardium: Normal size.  Multi-vessel calcific atherosclerosis of the coronary arteries.  No pericardial effusion.    Pulmonary arteries: Main pulmonary artery is normal caliber.  Pulmonary arteries distribute normally.  No large central luminal filling defect or saddle pulmonary embolus.  No evidence of pulmonary thromboembolism to the proximal segmental pulmonary arterial level.    Pulmonary veins: No significant abnormality.    Giana/Mediastinum: No pathologic lymph node enlargement.    Airways/Lungs/Pleura: Respiratory motion artifact significantly limits evaluation.  Central airways are patent.  Biapical pleuroparenchymal scarring.  Subtle scattered patchy ground-glass opacities, may represent developing atypical infectious process or pulmonary edema.  Dependent bibasilar subsegmental atelectasis and/or scarring.  Stable 1.2 cm pleural based opacity in the inferior right upper lobe (series 3, image 260).  No pleural effusion.  No pneumothorax.    Esophagus: No significant abnormality.    Liver: Normal in size and contour with multiple hepatic hypodense lesions.  Many of these lesions are slightly larger in size to the prior study, and there may be worsening necrotic changes and coalescence of some of the lesions when compared with the recent prior study.  Index mass in the superior right hepatic lobe measures 6.3 cm AP diameter (axial image 24; previously 5.6 cm).  Index mass in the left hepatic lobe measures 4.1 cm in diameter (axial image 73; previously 3.7 cm).    Gallbladder: Normally distended without calcified gallstones.  No pericholecystic inflammatory changes.    Bile ducts: No intrahepatic or extrahepatic biliary ductal dilatation.    Spleen: Normal size.    Pancreas: Stable ill-defined heterogeneous hypoattenuating pancreatic tail lesion measuring up to 1.7 cm (coronal series, image 87),  previously 1.5 cm when measured similarly.  Continued abutment of the splenic artery.  Similar irregularity of the pancreatic duct with mild dilatation.    Adrenals: No significant abnormality    Renal/Ureters: The kidneys are normal in size with bilateral symmetric enhancement.  No obstructing nephrolithiasis.  No hydronephrosis.  The bilateral pelvocaliceal systems are distended allowing for the fact that there is significant distension of the urinary bladder..  Incidental left posterolateral bladder diverticulum.    Reproductive: Uterus is surgically absent.  No adnexal mass.    Stomach/Bowel: Stomach is unremarkable.  Small bowel is normal caliber without obstruction or evidence of inflammation.  Appendix is normal.  Large bowel is normal caliber without obstruction, focal wall thickening or evidence of inflammation.  Scattered colonic diverticulosis.    Peritoneum: No abdominopelvic free fluid.  No intraperitoneal free air.    Lymph Nodes: No pathologic ramón enlargement in the abdomen or pelvis.    Vasculature: Abdominal aorta tapers normally.  Mild scattered calcific atherosclerosis of the abdominal aorta and its branches. Portal veins and SMV are patent.  Chronic occlusion of the splenic vein..    Bones: Degenerative changes of the spine.  No acute fractures or osseous destructive lesions.    Soft Tissues: No significant abnormality.                                       Medications   piperacillin-tazobactam (ZOSYN) 4.5 g in D5W 100 mL IVPB (MB+) (0 g Intravenous Stopped 7/2/24 0311)   lactated ringers bolus 1,000 mL (0 mLs Intravenous Stopped 7/2/24 0205)   vancomycin 1,250 mg in D5W 250 mL IVPB (Vial-Mate) (0 mg Intravenous Stopped 7/2/24 0451)   iohexoL (OMNIPAQUE 350) injection 75 mL (75 mLs Intravenous Given 7/2/24 0211)   acetaminophen tablet 1,000 mg (1,000 mg Oral Given 7/2/24 0321)     Medical Decision Making  Patient is a 79-year-old female with past medical history of metastatic pancreatic cancer   to liver and lung finished chemo in May, new chemo starting 7/9 presenting with a fever and chills.     On initial evaluation patient's vitals are significant for low-grade fever and mild tachycardia, she was speaking in full sentences and cooperative with the history and physical examination.    Workup for patient's presentation is concerning for sepsis.  Immunocompromised with history of cancer and and chemotherapy last done in May.  Patient has abdominal symptoms could be concerning for obstruction, biliary obstruction or progression of cancer.    See ED course.    CT scan demonstrates stable interval appearance hepatic lesions.  Continues have right upper quadrant and right flank symptoms.  Discussed with on-call Hematology who will admit at this time.  Patient updated and had no further questions.    Amount and/or Complexity of Data Reviewed  External Data Reviewed: radiology.     Details: Last Echo 2024:  · The estimated ejection fraction is 55%.  · The left ventricle is normal in size with normal systolic function.  · Grade I left ventricular diastolic dysfunction.  · Normal right ventricular size with normal right ventricular systolic function.  · Mild mitral regurgitation.  · The estimated PA systolic pressure is 25 mmHg.  · Normal central venous pressure (3 mmHg).    Labs: ordered. Decision-making details documented in ED Course.  Radiology: ordered.    Risk  OTC drugs.  Prescription drug management.  Decision regarding hospitalization.               ED Course as of 07/02/24 0734   Mon Jul 01, 2024   2335 EKG with NSR, rate 96, no STEMI [NN]   Tue Jul 02, 2024   0055 WBC(!): 18.90 [NN]   0125 Sodium(!): 132 [TK]   0125 BILIRUBIN TOTAL(!): 2.0 [TK]   0125 ALP(!): 881  Concerning for obstructive process with worsening LFTs   [TK]   0337 POC Lactate: 1.78 [TK]   0337 POC PH(!): 7.467 [TK]   0731 Discussed with Hematology/oncology after CT scan resulted. They will admit for further work up and treatment.  [TK]       ED Course User Index  [NN] Mamie To MD  [TK] Rm Yi DO                           Clinical Impression:  Final diagnoses:  [R00.0] Tachycardia  [C25.9] Pancreatic adenocarcinoma  [C78.7] Metastasis to liver  [A41.9] Sepsis, due to unspecified organism, unspecified whether acute organ dysfunction present (Primary)  [R79.89] LFT elevation          ED Disposition Condition    Admit                 Rm Yi DO  Resident  07/02/24 0774

## 2024-07-02 NOTE — CONSULTS
[facility] Consult Note    Attending: Tami Ware MD   Admit Date: 7/1/2024  Today's Date: 07/02/2024    SUBJECTIVE:     HPI:  79-year-old female with past medical history of metastatic pancreatic cancer to liver and lung (finished chemo in May) presenting with a fever and chills (Tmax 101 F at home). Labs on arrival notable for WBC 17, T bili 1.6 (up from 0.5), , AST 60, and ALT 51. UA +ve for leukocytes.  MRI MRCP showed innumerable solid and cystic  in liver. There is dilatation of the intrahepatic biliary radicles and absent signal/abrupt change in caliber and obliteration of the confluence of the intrahepatic biliary radicles.     When seen today, patient was Aox3. Was complaining of abdominal pain and chills. Denies nausea, vomiting, or scleral icterus. Not on blood thinners. No hx of prior gastric surgeries.     She was diagnosed in September 2023. IR performed liver bx and this pathology was reviewed - poorly differentiated adenocarcinoma, favoring upper GI origin. Palliative chemo started 11/2023 but disease progressed. She follows up with Dr. Briceño in Oncology clinic.     Most Recent Endoscopic Procedures:   Colonoscopy (2019): Polyp in ascending colon. Diverticulosis in descending and sigmoid colon.     Review of Systems   Constitutional:  Positive for chills, fever and malaise/fatigue.   Gastrointestinal:  Positive for abdominal pain. Negative for blood in stool and vomiting.   Psychiatric/Behavioral:  Negative for substance abuse.        OBJECTIVE:     Vital Signs Trends/History Reviewed  Vitals:    07/02/24 0615 07/02/24 0702 07/02/24 0901 07/02/24 0902   BP:  138/68 (!) 146/67    Pulse: 69 70  69   Resp: 20 16  20   Temp:  98.6 °F (37 °C)     TempSrc:  Oral     SpO2: 96% 95% 97%    Weight:           Physical Exam  Constitutional:       Appearance: She is ill-appearing.   HENT:      Head: Normocephalic and atraumatic.   Eyes:      General: No scleral  icterus.  Abdominal:      General: Abdomen is flat. Bowel sounds are normal.      Palpations: Abdomen is soft.      Tenderness: There is abdominal tenderness. There is no guarding or rebound.   Musculoskeletal:      Cervical back: Normal range of motion and neck supple.   Skin:     General: Skin is warm and dry.      Coloration: Skin is not jaundiced.   Neurological:      General: No focal deficit present.      Mental Status: She is alert and oriented to person, place, and time.   Psychiatric:         Mood and Affect: Mood normal.         Behavior: Behavior normal.         Thought Content: Thought content normal.         Judgment: Judgment normal.           Laboratory: All labs results within last 24 hours have been reviewed.     Imaging: All imaging results within the last 24 hours have been reviewed.       ASSESSMENT:    79-year-old female with past medical history of metastatic pancreatic cancer to liver and lung (diagnosed by IR guided liver biopsy, finished chemo in May) presenting with a fever and chills (Tmax 101 F at home). Labs on arrival notable for WBC 17, T bili 1.6 (up from 0.5), , AST 60, and ALT 51. UA +ve for leukocytes.  MRI MRCP showed innumerable solid and cystic  in liver. There is dilatation of the intrahepatic biliary radicles and absent signal/abrupt change in caliber and obliteration of the confluence of the intrahepatic biliary radicles. Not on blood thinners. No hx of prior gastric surgeries.     AES consulted for possible ERCP.     #biliary obstruction - likely 2/2 hilar mass v/s compression from hepatic mets    RECOMMENDATIONS:    - Discussed case with Dr. Alas. Consult Surgical Oncology to see if they would prefer IR guided PTC vs ERCP.   - Keep NPO for now.   - Continue IV Zosyn.   - Follow blood cx.   - Daily CBC, CMP, INR.     Thank you for allowing us to participate in the care of this patient. Please call with questions.        Manuel Hearn MD , PGY-VI  Gastroenterology  Fellow  [facility]

## 2024-07-02 NOTE — HPI
79y.o. 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 Briceño. She finished chemo(gemcitabine + nab-paclitaxel) and is expected to start new regimen due to progression (5-FU and Onyvide) on 7/9 presenting to the ED complaining of intermittent fevers for the past week max temp 101F these fevers are associated with intermittent RUQ pain and generalized upper abdominal pain. Denies any nausea, vomiting, and has normal bowel movements.     In the ED she was found to have an elevated Alk Phos (748 --> 881) and Tbili (1.6 --> 2) originally worrisome for possible biliary obstruction and possible cholangitis. MRCP showed innumerable solid and cystic masses in the liver with dilation of the intrahepatic billiary radicles and absent signal/abrupt change in caliber and obliteration of the confluence of the intrahepatic biliary radicles. Due to patient's history of fever, labs, and MRCP the surgical oncology team was consulted for input on possible interventions for biliary obstruction in the setting of metastatic pancreatic adenocarcinoma.

## 2024-07-02 NOTE — ED NOTES
Patient identifiers for Deepali Alex 79 y.o. female checked and correct.  Chief Complaint   Patient presents with    Fever     Hx of pancreatic cancer with liver mets. Last chemo May 27. Tmax 101 at home     Past Medical History:   Diagnosis Date    Age-related osteoporosis without current pathological fracture     Atherosclerosis of aortic arch     - noted on CXR 8/2017    Back pain     Diverticulosis of sigmoid colon 10/22/2019    Ectopic pregnancy     Fibrocystic breast     Hyperlipidemia     Inguinal hernia     Osteopenia     Polyp of ascending colon 10/22/2019    Urinary incontinence, mixed 05/24/2022     Allergies reported:   Review of patient's allergies indicates:   Allergen Reactions    Erythromycin (bulk)      Other reaction(s): Eye irritation         LOC: Patient is awake, alert, and aware of environment with an appropriate affect. Patient is oriented x 4 and speaking appropriately.  APPEARANCE: Patient resting comfortably and in no acute distress. Patient is clean and well groomed, patient's clothing is properly fastened.   SKIN: The skin is warm and dry. Patient has normal skin turgor and moist mucus membranes.   MUSKULOSKELETAL: Patient is moving all extremities well, no obvious deformities noted. Pulses intact.   RESPIRATORY: Airway is open and patent. Respirations are spontaneous and non-labored with normal effort and rate.  CARDIAC: Patient has a normal rate and rhythm. Normal sinus on cardiac monitor. No peripheral edema noted.   ABDOMEN: No distention noted. Soft and non-tender upon palpation.  NEUROLOGICAL: PERRL. Facial expression is symmetrical. Hand grasps are equal bilaterally. Normal sensation in all extremities when touched with finger.

## 2024-07-02 NOTE — ED TRIAGE NOTES
"Deepali Alex, a 79 y.o. female presents to the ED w/ complaint of fever. T-max at home was 102. She has pancreatic cancer with mets to the liver. Last chemo session was in May and she is due to start another soon. Endorses "tickling cough" for "months." Endorses chronic SOB, no chest pain. No other symptoms.  AAOx4, GCS 15    Triage note:  Chief Complaint   Patient presents with    Fever     Hx of pancreatic cancer with liver mets. Last chemo May 27. Tmax 101 at home     Review of patient's allergies indicates:   Allergen Reactions    Erythromycin (bulk)      Other reaction(s): Eye irritation     Past Medical History:   Diagnosis Date    Age-related osteoporosis without current pathological fracture     Atherosclerosis of aortic arch     - noted on CXR 8/2017    Back pain     Diverticulosis of sigmoid colon 10/22/2019    Ectopic pregnancy     Fibrocystic breast     Hyperlipidemia     Inguinal hernia     Osteopenia     Polyp of ascending colon 10/22/2019    Urinary incontinence, mixed 05/24/2022       "

## 2024-07-02 NOTE — ASSESSMENT & PLAN NOTE
79 y.o. female with history of metastic pancreatic adenocarcinoma with metastasis to the lung and extensive metastasis to the liver presenting with intermittent fevers for one week (Tmax 101 F) associated with RUQ pain, elevated Tbili, Alk Phos, AST, and ALT, also MRCP concerning for partial biliary obstruction and cholangitis. Patient's Tbili is mildly elevated at 2 but trending up (1.2-->2), low grade fevers, and stable vitals is not the typical cholangitis picture. It is possible due to the extensive and necrotic nature of her liver metastasis that her elevated labs are a result of her disease process. It is also possible that due to treatment and scarring of liver mets there is partial biliary obstruction that could progress to cholangitis.     Plan:  - At this time, we do not recommend a PCT or other intervention.   - would recommend IR looking at the MRCP in the event intervention is needed  - Patient can have a diet  - Continue to trend Tbili; if it continues to rise over the next few days would recommend PCT in bilateral hepatic biliary ducts.    - F/u on sepsis work up and blood   - surgical intervention no recommended at this time   - surg onc will continue to follow

## 2024-07-02 NOTE — H&P
"    [facility] - Emergency Dept  Hematology/Oncology  H&P    Patient Name: Deepali Alex  MRN: [id number]  Admission Date: 7/1/2024  Code Status: Full Code   Attending Provider: Tami Ware MD  Primary Care Physician: Raul Rivera MD  Principal Problem:Sepsis    Subjective:     HPI: Patient is a 79-year-old female with history of metastatic pancreatic adenocarcinoma to liver/lung who follows with Dr. Briceño. She finished chemo(gemcitabine + nab-paclitaxel) and is expected to start new regimen due to progression (5-FU and Onyvide) on 7/9. Pt admitted to medical oncology for fever and abdominal pain in setting of metastatic pancreatic adenocarcinoma. She has been experiencing fever x 1 week (Tmax of 101 at home) with worsening right sided abdominal pain. She denies CP, SOB, loss of appetite, diarrhea.      ED Course  Pt presented to the ED with abdominal pain and fevers.  Labs significant for ALP-881, AST-64, ALT-51, increased WBCs at 18.90. Preliminary blood cultures drawn. CT Abd shows "stable appearance of the heterogeneous hypoattenuating pancreatic tail lesion with continued abutment of the splenic artery." CT chest ruled out PE. Pt empirically started on IV zosyn for possible abdominal infection and sepsis protocol was initiated. Admitted to med onc for further management.      Oncology Treatment Plan:   OP GI liposomal irinotecan leucovorin fluorouracil Q2W    Medications:  Continuous Infusions:  Scheduled Meds:   heparin (porcine)  5,000 Units Subcutaneous Q8H    mupirocin   Nasal BID    piperacillin-tazobactam (Zosyn) IV (PEDS and ADULTS) (extended infusion is not appropriate)  4.5 g Intravenous Q8H    polyethylene glycol  17 g Oral Daily     PRN Meds:  Current Facility-Administered Medications:     acetaminophen, 650 mg, Oral, Q4H PRN    dextrose 10%, 12.5 g, Intravenous, PRN    dextrose 10%, 25 g, Intravenous, PRN    glucagon (human recombinant), 1 mg, Intramuscular, PRN    glucose, 16 g, " Oral, PRN    glucose, 24 g, Oral, PRN    naloxone, 0.02 mg, Intravenous, PRN    ondansetron, 4 mg, Intravenous, Q8H PRN    oxyCODONE, 10 mg, Oral, Q6H PRN    oxyCODONE, 5 mg, Oral, Q6H PRN    sodium chloride 0.9%, 10 mL, Intravenous, Q12H PRN     Review of patient's allergies indicates:   Allergen Reactions    Erythromycin (bulk)      Other reaction(s): Eye irritation        Past Medical History:   Diagnosis Date    Age-related osteoporosis without current pathological fracture     Atherosclerosis of aortic arch     - noted on CXR 8/2017    Back pain     Diverticulosis of sigmoid colon 10/22/2019    Ectopic pregnancy     Fibrocystic breast     Hyperlipidemia     Inguinal hernia     Osteopenia     Polyp of ascending colon 10/22/2019    Sepsis 7/2/2024    Urinary incontinence, mixed 05/24/2022     Past Surgical History:   Procedure Laterality Date    ECTOPIC PREGNANCY SURGERY      HERNIA REPAIR      left inguinal    HYSTERECTOMY  1980    without BSO     Family History       Problem Relation (Age of Onset)    Breast cancer Mother (69), Sister (73), Maternal Aunt (73)    Cancer Maternal Aunt (83), Maternal Uncle    Depression Father, Paternal Grandmother, Paternal Aunt, Paternal Uncle    Heart attack Father, Maternal Grandfather, Paternal Uncle    Heart disease Father    Heart failure Maternal Grandmother    Mental illness Paternal Grandmother    Obesity Brother    Prostate cancer Brother (81)    Seizures Mother    Suicide Paternal Cousin    Suicide Attempts Paternal Grandmother    Tongue cancer Paternal Cousin          Tobacco Use    Smoking status: Never    Smokeless tobacco: Never   Substance and Sexual Activity    Alcohol use: No    Drug use: No    Sexual activity: Not Currently       Review of Systems   Constitutional:  Positive for chills and fever. Negative for activity change and appetite change.   Respiratory:  Negative for shortness of breath.    Cardiovascular:  Negative for chest pain.   Gastrointestinal:   Positive for abdominal pain. Negative for diarrhea, nausea and vomiting.     Objective:     Vital Signs (Most Recent):  Temp: 98.6 °F (37 °C) (07/02/24 0702)  Pulse: 69 (07/02/24 0902)  Resp: 20 (07/02/24 0902)  BP: (!) 146/67 (07/02/24 0901)  SpO2: 97 % (07/02/24 0901) Vital Signs (24h Range):  Temp:  [98.5 °F (36.9 °C)-100.3 °F (37.9 °C)] 98.6 °F (37 °C)  Pulse:  [] 69  Resp:  [15-32] 20  SpO2:  [93 %-99 %] 97 %  BP: (129-166)/(59-77) 146/67     Weight: 59 kg (130 lb)  Body mass index is 23.03 kg/m².  Body surface area is 1.62 meters squared.    No intake or output data in the 24 hours ending 07/02/24 1339     Physical Exam  Vitals and nursing note reviewed.   Constitutional:       Appearance: She is ill-appearing.   Eyes:      General: No scleral icterus.     Conjunctiva/sclera: Conjunctivae normal.   Cardiovascular:      Rate and Rhythm: Normal rate and regular rhythm.      Pulses: Normal pulses.      Heart sounds: Normal heart sounds.   Pulmonary:      Effort: Pulmonary effort is normal.      Breath sounds: Normal breath sounds.   Abdominal:      Tenderness: There is abdominal tenderness (epigastric).   Skin:     General: Skin is warm and dry.   Neurological:      Mental Status: She is alert.          Significant Labs:   CBC:   Recent Labs   Lab 07/01/24  1305 07/02/24  0013   WBC 17.22* 18.90*   HGB 9.0* 8.6*   HCT 28.9* 26.3*    290    and CMP:   Recent Labs   Lab 07/01/24  1305 07/02/24  0013   * 132*   K 4.1 4.4    100   CO2 22* 20*   * 96   BUN 16 14   CREATININE 0.7 0.7   CALCIUM 9.1 9.0   PROT 6.9 7.1   ALBUMIN 2.8* 2.8*   BILITOT 1.6* 2.0*   ALKPHOS 748* 881*   AST 60* 64*   ALT 51* 51*   ANIONGAP 10 12       Diagnostic Results:  CT: In this patient with pancreatic adenocarcinoma there is stable appearance of the heterogeneous hypoattenuating pancreatic tail lesion with continued abutment of the splenic artery..  MRI: Stricture without evidence of discrete mass at the  confluence of the intrahepatic biliary radicles, with mild-moderate upstream biliary dilatation.  Cholangiocarcinoma or postinflammatory stricture could have this appearance.  Assessment/Plan:     * Sepsis  Pt has been experiencing abdominal pain and fevers x 1 week. ALP- 881, AST-64, ALT-51, Total Bili-2. WBCs elevated at 18.90. MRCP significant for evidence of a stricture causing bile duct dilation. In setting of fever with elevated LFT and alk phos, concerned for cholangitis. On PE, pt not jaundiced but is TTP to epigastric and RUQ area.   - Consult [facility], surgical oncology for ERCP vs  IR PCT  - Continue IV zosyn  - Fluids PRN    Transaminitis  See plan for sepsis    Hyponatremia  Sodium on admission 132  - Daily CMP, monitor    Biliary stricture  Pt has been experiencing abdominal pain and fevers x 1 week. ALP- 881, AST-64, ALT-51, Total Bili-2. MRCP significant for evidence of a stricture causing bile duct dilation. In setting of fever with elevated LFT and alk phos, concerned for cholangitis. On PE, pt not jaundiced but is TTP to epigastric and RUQ area.   - Consult [facility], surgical oncology for ERCP vs  IR PCT  - Continue IV zosyn  - Fluids PRN        Solomon Hyde MD  Hematology/Oncology  [facility] Dept

## 2024-07-02 NOTE — CONSULTS
[facility] - Emergency Dept  General Surgery  Consult Note    Patient Name: Deepali Alex  MRN: [id number]  Code Status: Full Code  Admission Date: 7/1/2024  Hospital Length of Stay: 0 days  Attending Physician: Tami Ware MD  Primary Care Provider: Raul Rivera MD    Patient information was obtained from patient, past medical records, ER records, and primary team.     Inpatient consult to Surgical Oncology  Consult performed by: Eric Gonzalez MD  Consult ordered by: Solomon Hyde MD  Reason for consult: concerns for biliary obstruction        Subjective:     Principal Problem: Sepsis    History of Present Illness: 79y.o. 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 Briceño. She finished chemo(gemcitabine + nab-paclitaxel) and is expected to start new regimen due to progression (5-FU and Onyvide) on 7/9 presenting to the ED complaining of intermittent fevers for the past week max temp 101F these fevers are associated with intermittent RUQ pain and generalized upper abdominal pain. Denies any nausea, vomiting, and has normal bowel movements.     In the ED she was found to have an elevated Alk Phos (748 --> 881) and Tbili (1.6 --> 2) originally worrisome for possible biliary obstruction and possible cholangitis. MRCP showed innumerable solid and cystic masses in the liver with dilation of the intrahepatic billiary radicles and absent signal/abrupt change in caliber and obliteration of the confluence of the intrahepatic biliary radicles. Due to patient's history of fever, labs, and MRCP the surgical oncology team was consulted for input on possible interventions for biliary obstruction in the setting of metastatic pancreatic adenocarcinoma.       No current facility-administered medications on file prior to encounter.     Current Outpatient Medications on File Prior to Encounter   Medication Sig    multivitamin (THERAGRAN) per tablet Take 1  tablet by mouth once daily. (Patient not taking: Reported on 6/13/2024)    OLANZapine (ZYPREXA) 5 MG tablet Take 1 tab at nighttime on nights 1 thru 3 of each chemo cycle    ondansetron (ZOFRAN-ODT) 8 MG TbDL TAKE 1 TABLET (8 MG TOTAL) BY MOUTH EVERY 6 HOURS AS NEEDED FOR NAUSEA (Patient not taking: Reported on 5/13/2024)    promethazine (PHENERGAN) 12.5 MG Tab Take 1 tablet (12.5 mg total) by mouth every 6 (six) hours as needed for Nausea.    vitamin D (VITAMIN D3) 1000 units Tab Take 1,000 Units by mouth once daily. (Patient not taking: Reported on 6/13/2024)    [DISCONTINUED] ABRAXANE 100 mg injection  (Patient not taking: Reported on 5/13/2024)    [DISCONTINUED] coconut oil, bulk, Oil by Misc.(Non-Drug; Combo Route) route. (Patient not taking: Reported on 6/13/2024)    [DISCONTINUED] dexAMETHasone (DECADRON) 4 mg/mL injection  (Patient not taking: Reported on 5/13/2024)    [DISCONTINUED] GADAVIST 1 mmol/mL (604.72 mg/mL) Soln injection  (Patient not taking: Reported on 5/13/2024)    [DISCONTINUED] gemcitabine (GEMZAR) 1 gram/26.3 mL (38 mg/mL) Soln injection  (Patient not taking: Reported on 5/13/2024)    [DISCONTINUED] gemcitabine 200 mg/5.26 mL (38 mg/mL) Soln  (Patient not taking: Reported on 5/13/2024)    [DISCONTINUED] heparin sodium,porcine (HEPARIN, PORCINE,) 1,000 unit/mL injection  (Patient not taking: Reported on 5/13/2024)    [DISCONTINUED] heparin, porcine, PF, (HEPARIN FLUSH 100 UNITS/ML) 100 unit/mL Syrg  (Patient not taking: Reported on 5/13/2024)    [DISCONTINUED] ondansetron (ZOFRAN-ODT) 4 MG TbDL Take 1 tablet (4 mg total) by mouth every 8 (eight) hours as needed (Nausea). (Patient not taking: Reported on 5/13/2024)       Review of patient's allergies indicates:   Allergen Reactions    Erythromycin (bulk)      Other reaction(s): Eye irritation       Past Medical History:   Diagnosis Date    Age-related osteoporosis without current pathological fracture     Atherosclerosis of aortic arch     -  noted on CXR 8/2017    Back pain     Diverticulosis of sigmoid colon 10/22/2019    Ectopic pregnancy     Fibrocystic breast     Hyperlipidemia     Inguinal hernia     Osteopenia     Polyp of ascending colon 10/22/2019    Sepsis 7/2/2024    Urinary incontinence, mixed 05/24/2022     Past Surgical History:   Procedure Laterality Date    ECTOPIC PREGNANCY SURGERY      HERNIA REPAIR      left inguinal    HYSTERECTOMY  1980    without BSO     Family History       Problem Relation (Age of Onset)    Breast cancer Mother (69), Sister (73), Maternal Aunt (73)    Cancer Maternal Aunt (83), Maternal Uncle    Depression Father, Paternal Grandmother, Paternal Aunt, Paternal Uncle    Heart attack Father, Maternal Grandfather, Paternal Uncle    Heart disease Father    Heart failure Maternal Grandmother    Mental illness Paternal Grandmother    Obesity Brother    Prostate cancer Brother (81)    Seizures Mother    Suicide Paternal Cousin    Suicide Attempts Paternal Grandmother    Tongue cancer Paternal Cousin          Tobacco Use    Smoking status: Never    Smokeless tobacco: Never   Substance and Sexual Activity    Alcohol use: No    Drug use: No    Sexual activity: Not Currently     Review of Systems  Objective:     Vital Signs (Most Recent):  Temp: 100.3 °F (37.9 °C) (07/02/24 1443)  Pulse: 86 (07/02/24 1600)  Resp: 20 (07/02/24 1600)  BP: 134/68 (07/02/24 1600)  SpO2: 95 % (07/02/24 1600) Vital Signs (24h Range):  Temp:  [98.5 °F (36.9 °C)-100.3 °F (37.9 °C)] 100.3 °F (37.9 °C)  Pulse:  [] 86  Resp:  [15-32] 20  SpO2:  [93 %-99 %] 95 %  BP: (129-166)/(59-77) 134/68     Weight: 59 kg (130 lb)  Body mass index is 23.03 kg/m².     Physical Exam  Vitals and nursing note reviewed.   Constitutional:       Appearance: She is ill-appearing.   Eyes:      General: No scleral icterus.     Conjunctiva/sclera: Conjunctivae normal.   Cardiovascular:      Rate and Rhythm: Normal rate and regular rhythm.   Pulmonary:      Effort:  Pulmonary effort is normal.   Abdominal:      Palpations: Abdomen is soft.      Tenderness: There is abdominal tenderness (epigastric) in the right upper quadrant and epigastric area. There is no guarding or rebound.   Skin:     General: Skin is warm and dry.   Neurological:      Mental Status: She is alert.            I have reviewed all pertinent lab results within the past 24 hours.  CBC:   Recent Labs   Lab 07/02/24  0013   WBC 18.90*   RBC 3.16*   HGB 8.6*   HCT 26.3*      MCV 83   MCH 27.2   MCHC 32.7     CMP:   Recent Labs   Lab 07/02/24  0013   GLU 96   CALCIUM 9.0   ALBUMIN 2.8*   PROT 7.1   *   K 4.4   CO2 20*      BUN 14   CREATININE 0.7   ALKPHOS 881*   ALT 51*   AST 64*   BILITOT 2.0*       Significant Diagnostics:  I have reviewed all pertinent imaging results/findings within the past 24 hours.    Assessment/Plan:     Pancreatic adenocarcinoma  79 y.o. female with history of metastic pancreatic adenocarcinoma with metastasis to the lung and extensive metastasis to the liver presenting with intermittent fevers for one week (Tmax 101 F) associated with RUQ pain, elevated Tbili, Alk Phos, AST, and ALT, also MRCP concerning for partial biliary obstruction and cholangitis. Patient's Tbili is mildly elevated at 2 but trending up (1.2-->2), low grade fevers, and stable vitals is not the typical cholangitis picture. It is possible due to the extensive and necrotic nature of her liver metastasis that her elevated labs are a result of her disease process. It is also possible that due to treatment and scarring of liver mets there is partial biliary obstruction that could progress to cholangitis.     Plan:  - At this time, we do not recommend a PCT or other intervention.   - would recommend IR looking at the MRCP in the event intervention is needed  - Patient can have a diet  - Continue to trend Tbili; if it continues to rise over the next few days would recommend PCT in bilateral hepatic  biliary ducts.    - F/u on sepsis work up and blood   - surgical intervention no recommended at this time   - surg onc will continue to follow       VTE Risk Mitigation (From admission, onward)           Ordered     heparin (porcine) injection 5,000 Units  Every 8 hours         07/02/24 0738     IP VTE HIGH RISK PATIENT  Once         07/02/24 0738     Place sequential compression device  Until discontinued         07/02/24 0738                    Thank you for your consult. I will follow-up with patient. Please contact us if you have any additional questions.    Josefa Medina MD  General Surgery  [facility]

## 2024-07-02 NOTE — SUBJECTIVE & OBJECTIVE
Oncology Treatment Plan:   OP GI liposomal irinotecan leucovorin fluorouracil Q2W    Medications:  Continuous Infusions:  Scheduled Meds:   heparin (porcine)  5,000 Units Subcutaneous Q8H    mupirocin   Nasal BID    piperacillin-tazobactam (Zosyn) IV (PEDS and ADULTS) (extended infusion is not appropriate)  4.5 g Intravenous Q8H    polyethylene glycol  17 g Oral Daily     PRN Meds:  Current Facility-Administered Medications:     acetaminophen, 650 mg, Oral, Q4H PRN    dextrose 10%, 12.5 g, Intravenous, PRN    dextrose 10%, 25 g, Intravenous, PRN    glucagon (human recombinant), 1 mg, Intramuscular, PRN    glucose, 16 g, Oral, PRN    glucose, 24 g, Oral, PRN    naloxone, 0.02 mg, Intravenous, PRN    ondansetron, 4 mg, Intravenous, Q8H PRN    oxyCODONE, 10 mg, Oral, Q6H PRN    oxyCODONE, 5 mg, Oral, Q6H PRN    sodium chloride 0.9%, 10 mL, Intravenous, Q12H PRN     Review of patient's allergies indicates:   Allergen Reactions    Erythromycin (bulk)      Other reaction(s): Eye irritation        Past Medical History:   Diagnosis Date    Age-related osteoporosis without current pathological fracture     Atherosclerosis of aortic arch     - noted on CXR 8/2017    Back pain     Diverticulosis of sigmoid colon 10/22/2019    Ectopic pregnancy     Fibrocystic breast     Hyperlipidemia     Inguinal hernia     Osteopenia     Polyp of ascending colon 10/22/2019    Sepsis 7/2/2024    Urinary incontinence, mixed 05/24/2022     Past Surgical History:   Procedure Laterality Date    ECTOPIC PREGNANCY SURGERY      HERNIA REPAIR      left inguinal    HYSTERECTOMY  1980    without BSO     Family History       Problem Relation (Age of Onset)    Breast cancer Mother (69), Sister (73), Maternal Aunt (73)    Cancer Maternal Aunt (83), Maternal Uncle    Depression Father, Paternal Grandmother, Paternal Aunt, Paternal Uncle    Heart attack Father, Maternal Grandfather, Paternal Uncle    Heart disease Father    Heart failure Maternal  Grandmother    Mental illness Paternal Grandmother    Obesity Brother    Prostate cancer Brother (81)    Seizures Mother    Suicide Paternal Cousin    Suicide Attempts Paternal Grandmother    Tongue cancer Paternal Cousin          Tobacco Use    Smoking status: Never    Smokeless tobacco: Never   Substance and Sexual Activity    Alcohol use: No    Drug use: No    Sexual activity: Not Currently       Review of Systems   Constitutional:  Positive for chills and fever. Negative for activity change and appetite change.   Respiratory:  Negative for shortness of breath.    Cardiovascular:  Negative for chest pain.   Gastrointestinal:  Positive for abdominal pain. Negative for diarrhea, nausea and vomiting.     Objective:     Vital Signs (Most Recent):  Temp: 98.6 °F (37 °C) (07/02/24 0702)  Pulse: 69 (07/02/24 0902)  Resp: 20 (07/02/24 0902)  BP: (!) 146/67 (07/02/24 0901)  SpO2: 97 % (07/02/24 0901) Vital Signs (24h Range):  Temp:  [98.5 °F (36.9 °C)-100.3 °F (37.9 °C)] 98.6 °F (37 °C)  Pulse:  [] 69  Resp:  [15-32] 20  SpO2:  [93 %-99 %] 97 %  BP: (129-166)/(59-77) 146/67     Weight: 59 kg (130 lb)  Body mass index is 23.03 kg/m².  Body surface area is 1.62 meters squared.    No intake or output data in the 24 hours ending 07/02/24 1339     Physical Exam  Vitals and nursing note reviewed.   Constitutional:       Appearance: She is ill-appearing.   Eyes:      General: No scleral icterus.     Conjunctiva/sclera: Conjunctivae normal.   Cardiovascular:      Rate and Rhythm: Normal rate and regular rhythm.      Pulses: Normal pulses.      Heart sounds: Normal heart sounds.   Pulmonary:      Effort: Pulmonary effort is normal.      Breath sounds: Normal breath sounds.   Abdominal:      Tenderness: There is abdominal tenderness (epigastric).   Skin:     General: Skin is warm and dry.   Neurological:      Mental Status: She is alert.          Significant Labs:   CBC:   Recent Labs   Lab 07/01/24  1305 07/02/24  0013   WBC  17.22* 18.90*   HGB 9.0* 8.6*   HCT 28.9* 26.3*    290    and CMP:   Recent Labs   Lab 07/01/24  1305 07/02/24  0013   * 132*   K 4.1 4.4    100   CO2 22* 20*   * 96   BUN 16 14   CREATININE 0.7 0.7   CALCIUM 9.1 9.0   PROT 6.9 7.1   ALBUMIN 2.8* 2.8*   BILITOT 1.6* 2.0*   ALKPHOS 748* 881*   AST 60* 64*   ALT 51* 51*   ANIONGAP 10 12       Diagnostic Results:  CT: In this patient with pancreatic adenocarcinoma there is stable appearance of the heterogeneous hypoattenuating pancreatic tail lesion with continued abutment of the splenic artery..  MRI: Stricture without evidence of discrete mass at the confluence of the intrahepatic biliary radicles, with mild-moderate upstream biliary dilatation.  Cholangiocarcinoma or postinflammatory stricture could have this appearance.

## 2024-07-02 NOTE — ED NOTES
Tele box TTX# [id number] applied to pt. Yuniel in war room states able to see pt on monitor, rhythm normal sinus with HR 87.

## 2024-07-02 NOTE — HPI
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 Briceño. She finished chemo(gemcitabine + nab-paclitaxel) and is expected to start new regimen due to progression (5-FU and Onyvide) on 7/9. Pt admitted to medical oncology for fever and abdominal pain in setting of metastatic pancreatic adenocarcinoma. She has been experiencing fever x 1 week (Tmax of 101 at home) with worsening right sided abdominal pain. She denies CP, SOB, loss of appetite, diarrhea.      ED Course  Pt presented to the ED with abdominal pain and fevers.  Labs significant for ALP-881, AST-64, ALT-51, increased WBCs at 18.90. Preliminary blood cultures drawn. CT Abd shows "stable appearance of the heterogeneous hypoattenuating pancreatic tail lesion with continued abutment of the splenic artery." CT chest ruled out PE. Pt empirically started on IV zosyn for possible abdominal infection and sepsis protocol was initiated. Admitted to med onc for further management.   "

## 2024-07-02 NOTE — CARE UPDATE
I have reviewed the chart of Deepali Alex who is hospitalized for the following:    Active Hospital Problems    Diagnosis    *Sepsis    Biliary stricture    Hyponatremia     POA, Na 132  Needs daily BMP      Transaminitis    Metastasis to liver    Pancreatic adenocarcinoma        Julito Benitez PA-C  [facility]

## 2024-07-02 NOTE — ED NOTES
Assumed care of the patient. Report received from ERNESTINE Pascual. Pt on continuous cardiac monitoring, continuous pulse oximetry, and automatic BP cuff cycling Q30 min. Pt in hospital gown, side rails up X2, bed low and locked, and call light is placed within reach. No family/visitors at bedside at this time. Pt denies any complaints or needs. Whiteboard updated with patient's plan of care.

## 2024-07-02 NOTE — FIRST PROVIDER EVALUATION
Medical screening examination initiated.  I have conducted a focused provider triage encounter, findings are as follows:    Brief history of present illness:  80yo F with PMH pancreatic cancer with mets to liver and lung finished chemo in May, new chemo starting 7/9 presenting with a fever and chills. Fever x 1 week 101F at home. No other symptoms.     Vitals:    07/01/24 2058   BP: (!) 150/77   Pulse: 100   Resp: 15   Temp: 99.7 °F (37.6 °C)   TempSrc: Oral   SpO2: 97%   Weight: 59 kg (130 lb)       Pertinent physical exam:  chronically ill appearing, no respiratory distress, steady gait    Brief workup plan:  sepsis w/u    Preliminary workup initiated; this workup will be continued and followed by the physician or advanced practice provider that is assigned to the patient when roomed.

## 2024-07-03 LAB
ALBUMIN SERPL BCP-MCNC: 2.2 G/DL (ref 3.5–5.2)
ALP SERPL-CCNC: 666 U/L (ref 55–135)
ALT SERPL W/O P-5'-P-CCNC: 34 U/L (ref 10–44)
ANION GAP SERPL CALC-SCNC: 8 MMOL/L (ref 8–16)
AST SERPL-CCNC: 48 U/L (ref 10–40)
BASOPHILS # BLD AUTO: 0.06 K/UL (ref 0–0.2)
BASOPHILS NFR BLD: 0.4 % (ref 0–1.9)
BILIRUB SERPL-MCNC: 2.3 MG/DL (ref 0.1–1)
BUN SERPL-MCNC: 12 MG/DL (ref 8–23)
CALCIUM SERPL-MCNC: 8.5 MG/DL (ref 8.7–10.5)
CHLORIDE SERPL-SCNC: 105 MMOL/L (ref 95–110)
CO2 SERPL-SCNC: 26 MMOL/L (ref 23–29)
CREAT SERPL-MCNC: 0.7 MG/DL (ref 0.5–1.4)
DIFFERENTIAL METHOD BLD: ABNORMAL
EOSINOPHIL # BLD AUTO: 0.6 K/UL (ref 0–0.5)
EOSINOPHIL NFR BLD: 4 % (ref 0–8)
ERYTHROCYTE [DISTWIDTH] IN BLOOD BY AUTOMATED COUNT: 17.6 % (ref 11.5–14.5)
EST. GFR  (NO RACE VARIABLE): >60 ML/MIN/1.73 M^2
GLUCOSE SERPL-MCNC: 87 MG/DL (ref 70–110)
HCT VFR BLD AUTO: 27.3 % (ref 37–48.5)
HGB BLD-MCNC: 8.9 G/DL (ref 12–16)
IMM GRANULOCYTES # BLD AUTO: 0.1 K/UL (ref 0–0.04)
IMM GRANULOCYTES NFR BLD AUTO: 0.7 % (ref 0–0.5)
LYMPHOCYTES # BLD AUTO: 1.2 K/UL (ref 1–4.8)
LYMPHOCYTES NFR BLD: 8.5 % (ref 18–48)
MAGNESIUM SERPL-MCNC: 2.1 MG/DL (ref 1.6–2.6)
MCH RBC QN AUTO: 26.7 PG (ref 27–31)
MCHC RBC AUTO-ENTMCNC: 32.6 G/DL (ref 32–36)
MCV RBC AUTO: 82 FL (ref 82–98)
MONOCYTES # BLD AUTO: 1.2 K/UL (ref 0.3–1)
MONOCYTES NFR BLD: 8.8 % (ref 4–15)
NEUTROPHILS # BLD AUTO: 11 K/UL (ref 1.8–7.7)
NEUTROPHILS NFR BLD: 77.6 % (ref 38–73)
NRBC BLD-RTO: 0 /100 WBC
PHOSPHATE SERPL-MCNC: 3 MG/DL (ref 2.7–4.5)
PLATELET # BLD AUTO: 293 K/UL (ref 150–450)
PMV BLD AUTO: 11 FL (ref 9.2–12.9)
POTASSIUM SERPL-SCNC: 4 MMOL/L (ref 3.5–5.1)
PROT SERPL-MCNC: 5.8 G/DL (ref 6–8.4)
RBC # BLD AUTO: 3.33 M/UL (ref 4–5.4)
SODIUM SERPL-SCNC: 139 MMOL/L (ref 136–145)
WBC # BLD AUTO: 14.17 K/UL (ref 3.9–12.7)

## 2024-07-03 PROCEDURE — 20600001 HC STEP DOWN PRIVATE ROOM

## 2024-07-03 PROCEDURE — 63600175 PHARM REV CODE 636 W HCPCS: Performed by: STUDENT IN AN ORGANIZED HEALTH CARE EDUCATION/TRAINING PROGRAM

## 2024-07-03 PROCEDURE — 25000003 PHARM REV CODE 250: Performed by: STUDENT IN AN ORGANIZED HEALTH CARE EDUCATION/TRAINING PROGRAM

## 2024-07-03 PROCEDURE — 85025 COMPLETE CBC W/AUTO DIFF WBC: CPT | Performed by: STUDENT IN AN ORGANIZED HEALTH CARE EDUCATION/TRAINING PROGRAM

## 2024-07-03 PROCEDURE — 99233 SBSQ HOSP IP/OBS HIGH 50: CPT | Mod: GC,,, | Performed by: STUDENT IN AN ORGANIZED HEALTH CARE EDUCATION/TRAINING PROGRAM

## 2024-07-03 PROCEDURE — 36415 COLL VENOUS BLD VENIPUNCTURE: CPT | Performed by: STUDENT IN AN ORGANIZED HEALTH CARE EDUCATION/TRAINING PROGRAM

## 2024-07-03 PROCEDURE — 84100 ASSAY OF PHOSPHORUS: CPT | Performed by: STUDENT IN AN ORGANIZED HEALTH CARE EDUCATION/TRAINING PROGRAM

## 2024-07-03 PROCEDURE — 80053 COMPREHEN METABOLIC PANEL: CPT | Performed by: STUDENT IN AN ORGANIZED HEALTH CARE EDUCATION/TRAINING PROGRAM

## 2024-07-03 PROCEDURE — 83735 ASSAY OF MAGNESIUM: CPT | Performed by: STUDENT IN AN ORGANIZED HEALTH CARE EDUCATION/TRAINING PROGRAM

## 2024-07-03 RX ADMIN — MUPIROCIN: 20 OINTMENT TOPICAL at 09:07

## 2024-07-03 RX ADMIN — HEPARIN SODIUM 5000 UNITS: 5000 INJECTION INTRAVENOUS; SUBCUTANEOUS at 06:07

## 2024-07-03 RX ADMIN — PIPERACILLIN SODIUM AND TAZOBACTAM SODIUM 4.5 G: 4; .5 INJECTION, POWDER, FOR SOLUTION INTRAVENOUS at 09:07

## 2024-07-03 RX ADMIN — HEPARIN SODIUM 5000 UNITS: 5000 INJECTION INTRAVENOUS; SUBCUTANEOUS at 01:07

## 2024-07-03 RX ADMIN — PIPERACILLIN SODIUM AND TAZOBACTAM SODIUM 4.5 G: 4; .5 INJECTION, POWDER, FOR SOLUTION INTRAVENOUS at 01:07

## 2024-07-03 RX ADMIN — ACETAMINOPHEN 650 MG: 325 TABLET ORAL at 11:07

## 2024-07-03 RX ADMIN — PIPERACILLIN SODIUM AND TAZOBACTAM SODIUM 4.5 G: 4; .5 INJECTION, POWDER, FOR SOLUTION INTRAVENOUS at 05:07

## 2024-07-03 RX ADMIN — HEPARIN SODIUM 5000 UNITS: 5000 INJECTION INTRAVENOUS; SUBCUTANEOUS at 09:07

## 2024-07-03 NOTE — ASSESSMENT & PLAN NOTE
Pt has been experiencing abdominal pain and fevers x 1 week. ALP- 881, AST-64, ALT-51, Total Bili-2. WBCs elevated at 18.90. MRCP significant for evidence of a stricture causing bile duct dilation. In setting of fever with elevated LFT and alk phos, concerned for cholangitis. On PE, pt not jaundiced but is TTP to epigastric and RUQ area. UA normal. RIP unremarkable.    We had both AES and Dr. Ayala see her and they agreed that IF she needed an intervention it would be a PTC drain but they also agree that it's appropriate for her to just trial abx. Clinically looking great    - Continue IV zosyn for now, managing conservatively  - Fluids PRN

## 2024-07-03 NOTE — PROGRESS NOTES
"[facility] ([facility])  Hematology/Oncology  Progress Note    Patient Name: Deepali Alex  Admission Date: 7/1/2024  Hospital Length of Stay: 1 days  Code Status: Full Code     Subjective:     HPI:  Patient is a 79-year-old female with history of metastatic pancreatic adenocarcinoma to liver/lung who follows with Dr. Briceño. She finished chemo(gemcitabine + nab-paclitaxel) and is expected to start new regimen due to progression (5-FU and Onyvide) on 7/9. Pt admitted to medical oncology for fever and abdominal pain in setting of metastatic pancreatic adenocarcinoma. She has been experiencing fever x 1 week (Tmax of 101 at home) with worsening right sided abdominal pain. She denies CP, SOB, loss of appetite, diarrhea.      ED Course  Pt presented to the ED with abdominal pain and fevers.  Labs significant for ALP-881, AST-64, ALT-51, increased WBCs at 18.90. Preliminary blood cultures drawn. CT Abd shows "stable appearance of the heterogeneous hypoattenuating pancreatic tail lesion with continued abutment of the splenic artery." CT chest ruled out PE. Pt empirically started on IV zosyn for possible abdominal infection and sepsis protocol was initiated. Admitted to med onc for further management.     Interval History: We had both AES and Dr. Ayala see her and they agreed that IF she needed an intervention it would be a PTC drain but they also agree that it's appropriate for her to just trial abx. Clinically looking great    Oncology Treatment Plan:   OP GI liposomal irinotecan leucovorin fluorouracil Q2W    Medications:  Continuous Infusions:  Scheduled Meds:   heparin (porcine)  5,000 Units Subcutaneous Q8H    mupirocin   Nasal BID    piperacillin-tazobactam (Zosyn) IV (PEDS and ADULTS) (extended infusion is not appropriate)  4.5 g Intravenous Q8H    polyethylene glycol  17 g Oral Daily     PRN Meds:  Current Facility-Administered Medications:     acetaminophen, 650 mg, Oral, Q4H PRN    dextrose " 10%, 12.5 g, Intravenous, PRN    dextrose 10%, 25 g, Intravenous, PRN    glucagon (human recombinant), 1 mg, Intramuscular, PRN    glucose, 16 g, Oral, PRN    glucose, 24 g, Oral, PRN    ibuprofen, 400 mg, Oral, Q6H PRN    naloxone, 0.02 mg, Intravenous, PRN    ondansetron, 4 mg, Intravenous, Q8H PRN    oxyCODONE, 5 mg, Oral, Q6H PRN    oxyCODONE, 10 mg, Oral, Q6H PRN    sodium chloride 0.9%, 10 mL, Intravenous, Q12H PRN     Review of Systems   Constitutional:  Negative for activity change, appetite change, chills and fever.   Respiratory:  Negative for shortness of breath.    Cardiovascular:  Negative for chest pain.   Gastrointestinal:  Positive for abdominal pain. Negative for diarrhea, nausea and vomiting.     Objective:     Vital Signs (Most Recent):  Temp: 98.2 °F (36.8 °C) (07/03/24 0746)  Pulse: 90 (07/03/24 0746)  Resp: 18 (07/03/24 0746)  BP: 127/77 (07/03/24 0746)  SpO2: 97 % (07/03/24 0900) Vital Signs (24h Range):  Temp:  [97.4 °F (36.3 °C)-100.3 °F (37.9 °C)] 98.2 °F (36.8 °C)  Pulse:  [] 90  Resp:  [16-21] 18  SpO2:  [94 %-100 %] 97 %  BP: (109-160)/(57-77) 127/77     Weight: 61.7 kg (136 lb 2.1 oz)  Body mass index is 24.9 kg/m².  Body surface area is 1.64 meters squared.      Intake/Output Summary (Last 24 hours) at 7/3/2024 1048  Last data filed at 7/2/2024 2130  Gross per 24 hour   Intake 616.39 ml   Output 500 ml   Net 116.39 ml        Physical Exam  Vitals and nursing note reviewed.   Constitutional:       Appearance: She is not ill-appearing.   Eyes:      General: No scleral icterus.     Conjunctiva/sclera: Conjunctivae normal.   Cardiovascular:      Rate and Rhythm: Normal rate and regular rhythm.      Pulses: Normal pulses.      Heart sounds: Normal heart sounds.   Pulmonary:      Effort: Pulmonary effort is normal.      Breath sounds: Normal breath sounds.   Abdominal:      Tenderness: There is abdominal tenderness (epigastric).   Skin:     General: Skin is warm and dry.  "  Neurological:      Mental Status: She is alert.          Significant Labs:   All pertinent labs from the last 24 hours have been reviewed.    Diagnostic Results:  I have reviewed all pertinent imaging results/findings within the past 24 hours.  Assessment/Plan:     * Sepsis  Pt has been experiencing abdominal pain and fevers x 1 week. ALP- 881, AST-64, ALT-51, Total Bili-2. WBCs elevated at 18.90. MRCP significant for evidence of a stricture causing bile duct dilation. In setting of fever with elevated LFT and alk phos, concerned for cholangitis. On PE, pt not jaundiced but is TTP to epigastric and RUQ area. UA normal. RIP unremarkable.    We had both AES and Dr. Ayala see her and they agreed that IF she needed an intervention it would be a PTC drain but they also agree that it's appropriate for her to just trial abx. Clinically looking great    - Continue IV zosyn for now, managing conservatively  - Fluids PRN    Transaminitis  See plan for sepsis    Hyponatremia  Sodium on admission 132  - Daily CMP, monitor    Biliary stricture  Pt has been experiencing abdominal pain and fevers x 1 week. ALP- 881, AST-64, ALT-51, Total Bili-2. MRCP significant for evidence of a stricture causing bile duct dilation. In setting of fever with elevated LFT and alk phos, concerned for cholangitis. On PE, pt not jaundiced but is TTP to epigastric and RUQ area.   - Consult AES, surgical oncology for ERCP vs  IR PCT  - Continue IV zosyn for now  - Fluids PRN    Pancreatic adenocarcinoma  History of Pancreatic adenocarcinoma with metastasis to liver/lungs. Currently follows up with Dr. Briceño. Pt completed chemo cycle (gemcitabine + nab-paclitaxel) in May and is expected to start new 5-FU and Onyvide due to disease progression. CT abdomen shows "stable appearance of the heterogeneous hypoattenuating pancreatic tail lesion with continued abutment of the splenic artery" and "mildly worse innumerable hypoattenuating hepatic lesions.  " "Findings remain concerning for hepatic metastatic disease."              Simon Rooney MD  Hematology/Oncology  [facility] ([facility])      "

## 2024-07-03 NOTE — ASSESSMENT & PLAN NOTE
Pt has been experiencing abdominal pain and fevers x 1 week. ALP- 881, AST-64, ALT-51, Total Bili-2. MRCP significant for evidence of a stricture causing bile duct dilation. In setting of fever with elevated LFT and alk phos, concerned for cholangitis. On PE, pt not jaundiced but is TTP to epigastric and RUQ area.   - Consult AES, surgical oncology for ERCP vs  IR PCT  - Continue IV zosyn for now  - Fluids PRN

## 2024-07-03 NOTE — PROGRESS NOTES
Admit Assessment    Patient Identification  Deepali Alex   :  1945  Admit Date:  2024  Attending Provider:  Tami Ware MD              Referral:   Pt was admitted to MED ONC with a diagnosis of Sepsis, and was admitted this hospital stay due to Metastasis to liver [C78.7]  Tachycardia [R00.0]  Pancreatic adenocarcinoma [C25.9]  Chest pain [R07.9]  LFT elevation [R79.89]  Sepsis, due to unspecified organism, unspecified whether acute organ dysfunction present [A41.9].   is involved was referred to the Social Work Department via (Routine referral).  Patient presents as a 79 y.o. year old  female. She and spouse Deshawn have been  for 58 years and share two sons together.     Persons interviewed: Patient and her spouse Deshawn at beside. Patient is alert and oriented, pleasant demeanor. Able to engage in meaningful conversation.    Living Situation:    Resides at [address] [address]   Phone: [phone] (home).    Patient resides with her spouse at their one story home. No steps to enter or within dwelling.     (RETIRED) Functional Status Prior  Ambulation Prior: 0-->independent  Transferrin-->independent  Toiletin-->independent    Current or Past Agencies and Description of Services/Supplies    DME  None    Home Health  None    IV Infusion  None    Nutrition: Oral    Outpatient Pharmacy:     [id number] - [place] -  [address]   [address]  [facility] [address]  Phone: [phone] Fax: [phone]    [id number] [place] - [address] AT [facility]  [address]  [facility] [address]  Phone: [phone] Fax: [phone]      Patient Preference of agencies include: N/A    Patient/Caregiver informed of right to choose providers or agencies.  Patient provides permission to release any necessary information to [facility] and to Non-[facility] agencies as needed to  facilitate patient care, treatment planning, and patient discharge planning.  Written and verbal resources provided.      Coping  Patient is in good spirits and coping well. She shares special milestones over last year that she was able to witness and more she looks forward to such as graduations and weddings.     Adjustment to Diagnosis and Treatment  Patient is adjusting well. Strong family support and involvement noted.    Emotional/Behavioral/Cognitive Issues  Affect is warm and pleasant.    History/Current Symptoms of Anxiety/Depression: No:   History/Current Substance Use:   Social History     Tobacco Use    Smoking status: Never    Smokeless tobacco: Never   Substance and Sexual Activity    Alcohol use: No    Drug use: No    Sexual activity: Not Currently       Indications of Abuse/Neglect: No:   Abuse Screen (yes response referral indicated)  Feels Unsafe at Home or Work/School: no  Feels Threatened by Someone: no  Does anyone try to keep you from having contact with others or doing things outside your home?: no  Physical Signs of Abuse Present: no    Financial:  Payer/Plan Subscr  Sex Relation Sub. Ins. ID Effective Group Num   1. [employer]* PO [place]* 1945 Female Self [id number]                                    [id number]        Plan: TBD pending bili levels. No d/c needs identified.     Interventions/Referrals: None    Patient/caregiver engaged in treatment planning process.     providing psychosocial and supportive counseling, resources, education, assistance and discharge planning as appropriate.  Patient/caregiver state understanding of  available resources,  following, remains available.      Marta De La Rosa MSW, \[facility]\""W  Oncology Social Worker  [facility]   [phone]

## 2024-07-03 NOTE — SUBJECTIVE & OBJECTIVE
Interval History: NO significant clinical changes. Remains comfortable, hemodynamically stable, WBC downtrending. Afebrile. Tbili 2.3 from 2.0    Medications:  Continuous Infusions:  Scheduled Meds:   heparin (porcine)  5,000 Units Subcutaneous Q8H    mupirocin   Nasal BID    piperacillin-tazobactam (Zosyn) IV (PEDS and ADULTS) (extended infusion is not appropriate)  4.5 g Intravenous Q8H    polyethylene glycol  17 g Oral Daily     PRN Meds:  Current Facility-Administered Medications:     acetaminophen, 650 mg, Oral, Q4H PRN    dextrose 10%, 12.5 g, Intravenous, PRN    dextrose 10%, 25 g, Intravenous, PRN    glucagon (human recombinant), 1 mg, Intramuscular, PRN    glucose, 16 g, Oral, PRN    glucose, 24 g, Oral, PRN    ibuprofen, 400 mg, Oral, Q6H PRN    naloxone, 0.02 mg, Intravenous, PRN    ondansetron, 4 mg, Intravenous, Q8H PRN    oxyCODONE, 5 mg, Oral, Q6H PRN    oxyCODONE, 10 mg, Oral, Q6H PRN    sodium chloride 0.9%, 10 mL, Intravenous, Q12H PRN     Review of patient's allergies indicates:   Allergen Reactions    Erythromycin (bulk)      Other reaction(s): Eye irritation     Objective:     Vital Signs (Most Recent):  Temp: 98.7 °F (37.1 °C) (07/03/24 1113)  Pulse: 93 (07/03/24 1113)  Resp: 18 (07/03/24 1113)  BP: (!) 103/59 (07/03/24 1113)  SpO2: (!) 94 % (07/03/24 1113) Vital Signs (24h Range):  Temp:  [97.4 °F (36.3 °C)-100.3 °F (37.9 °C)] 98.7 °F (37.1 °C)  Pulse:  [] 93  Resp:  [16-21] 18  SpO2:  [94 %-100 %] 94 %  BP: (103-160)/(57-77) 103/59     Weight: 61.7 kg (136 lb 2.1 oz)  Body mass index is 24.9 kg/m².    Intake/Output - Last 3 Shifts         07/01 0700 07/02 0659 07/02 0700 07/03 0659 07/03 0700 07/04 0659    P.O.  480 354    IV Piggyback  136.4     Total Intake(mL/kg)  616.4 (10) 354 (5.7)    Urine (mL/kg/hr)  500 (0.3) 200 (0.7)    Stool   0    Total Output  500 200    Net  +116.4 +154           Stool Occurrence   1 x             Physical Exam  Vitals and nursing note reviewed.    Eyes:      General: No scleral icterus.     Conjunctiva/sclera: Conjunctivae normal.   Cardiovascular:      Rate and Rhythm: Normal rate and regular rhythm.   Pulmonary:      Effort: Pulmonary effort is normal.   Abdominal:      Palpations: Abdomen is soft.      Tenderness: There is abdominal tenderness (epigastric) in the right upper quadrant and epigastric area. There is no guarding or rebound.   Skin:     General: Skin is warm and dry.   Neurological:      Mental Status: She is alert.          Significant Labs:  I have reviewed all pertinent lab results within the past 24 hours.  CBC:   Recent Labs   Lab 07/03/24 0524   WBC 14.17*   RBC 3.33*   HGB 8.9*   HCT 27.3*      MCV 82   MCH 26.7*   MCHC 32.6     CMP:   Recent Labs   Lab 07/03/24 0524   GLU 87   CALCIUM 8.5*   ALBUMIN 2.2*   PROT 5.8*      K 4.0   CO2 26      BUN 12   CREATININE 0.7   ALKPHOS 666*   ALT 34   AST 48*   BILITOT 2.3*       Significant Diagnostics:  I have reviewed all pertinent imaging results/findings within the past 24 hours.  I have reviewed and interpreted all pertinent imaging results/findings within the past 24 hours.

## 2024-07-03 NOTE — PROGRESS NOTES
[facility] - Oncology ([facility])  General Surgery  Progress Note    Subjective:     History of Present Illness:  79y.o. 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 Briceño. She finished chemo(gemcitabine + nab-paclitaxel) and is expected to start new regimen due to progression (5-FU and Onyvide) on 7/9 presenting to the ED complaining of intermittent fevers for the past week max temp 101F these fevers are associated with intermittent RUQ pain and generalized upper abdominal pain. Denies any nausea, vomiting, and has normal bowel movements.     In the ED she was found to have an elevated Alk Phos (748 --> 881) and Tbili (1.6 --> 2) originally worrisome for possible biliary obstruction and possible cholangitis. MRCP showed innumerable solid and cystic masses in the liver with dilation of the intrahepatic billiary radicles and absent signal/abrupt change in caliber and obliteration of the confluence of the intrahepatic biliary radicles. Due to patient's history of fever, labs, and MRCP the surgical oncology team was consulted for input on possible interventions for biliary obstruction in the setting of metastatic pancreatic adenocarcinoma.       Post-Op Info:  * No surgery found *         Interval History: NO significant clinical changes. Remains comfortable, hemodynamically stable, WBC downtrending. Afebrile. Tbili 2.3 from 2.0    Medications:  Continuous Infusions:  Scheduled Meds:   heparin (porcine)  5,000 Units Subcutaneous Q8H    mupirocin   Nasal BID    piperacillin-tazobactam (Zosyn) IV (PEDS and ADULTS) (extended infusion is not appropriate)  4.5 g Intravenous Q8H    polyethylene glycol  17 g Oral Daily     PRN Meds:  Current Facility-Administered Medications:     acetaminophen, 650 mg, Oral, Q4H PRN    dextrose 10%, 12.5 g, Intravenous, PRN    dextrose 10%, 25 g, Intravenous, PRN    glucagon (human recombinant), 1 mg, Intramuscular, PRN    glucose, 16 g,  Oral, PRN    glucose, 24 g, Oral, PRN    ibuprofen, 400 mg, Oral, Q6H PRN    naloxone, 0.02 mg, Intravenous, PRN    ondansetron, 4 mg, Intravenous, Q8H PRN    oxyCODONE, 5 mg, Oral, Q6H PRN    oxyCODONE, 10 mg, Oral, Q6H PRN    sodium chloride 0.9%, 10 mL, Intravenous, Q12H PRN     Review of patient's allergies indicates:   Allergen Reactions    Erythromycin (bulk)      Other reaction(s): Eye irritation     Objective:     Vital Signs (Most Recent):  Temp: 98.7 °F (37.1 °C) (07/03/24 1113)  Pulse: 93 (07/03/24 1113)  Resp: 18 (07/03/24 1113)  BP: (!) 103/59 (07/03/24 1113)  SpO2: (!) 94 % (07/03/24 1113) Vital Signs (24h Range):  Temp:  [97.4 °F (36.3 °C)-100.3 °F (37.9 °C)] 98.7 °F (37.1 °C)  Pulse:  [] 93  Resp:  [16-21] 18  SpO2:  [94 %-100 %] 94 %  BP: (103-160)/(57-77) 103/59     Weight: 61.7 kg (136 lb 2.1 oz)  Body mass index is 24.9 kg/m².    Intake/Output - Last 3 Shifts         07/01 0700 07/02 0659 07/02 0700 07/03 0659 07/03 0700  07/04 0659    P.O.  480 354    IV Piggyback  136.4     Total Intake(mL/kg)  616.4 (10) 354 (5.7)    Urine (mL/kg/hr)  500 (0.3) 200 (0.7)    Stool   0    Total Output  500 200    Net  +116.4 +154           Stool Occurrence   1 x             Physical Exam  Vitals and nursing note reviewed.   Eyes:      General: No scleral icterus.     Conjunctiva/sclera: Conjunctivae normal.   Cardiovascular:      Rate and Rhythm: Normal rate and regular rhythm.   Pulmonary:      Effort: Pulmonary effort is normal.   Abdominal:      Palpations: Abdomen is soft.      Tenderness: There is abdominal tenderness (epigastric) in the right upper quadrant and epigastric area. There is no guarding or rebound.   Skin:     General: Skin is warm and dry.   Neurological:      Mental Status: She is alert.          Significant Labs:  I have reviewed all pertinent lab results within the past 24 hours.  CBC:   Recent Labs   Lab 07/03/24  0524   WBC 14.17*   RBC 3.33*   HGB 8.9*   HCT 27.3*      MCV  82   MCH 26.7*   MCHC 32.6     CMP:   Recent Labs   Lab 07/03/24  0524   GLU 87   CALCIUM 8.5*   ALBUMIN 2.2*   PROT 5.8*      K 4.0   CO2 26      BUN 12   CREATININE 0.7   ALKPHOS 666*   ALT 34   AST 48*   BILITOT 2.3*       Significant Diagnostics:  I have reviewed all pertinent imaging results/findings within the past 24 hours.  I have reviewed and interpreted all pertinent imaging results/findings within the past 24 hours.  Assessment/Plan:     Pancreatic adenocarcinoma  79 y.o. female with history of metastic pancreatic adenocarcinoma with metastasis to the lung and extensive metastasis to the liver presenting with intermittent fevers for one week (Tmax 101 F) associated with RUQ pain, elevated Tbili, Alk Phos, AST, and ALT, also MRCP concerning for partial biliary obstruction and cholangitis. Patient's Tbili is mildly elevated at 2 but trending up (1.2-->2), low grade fevers, and stable vitals is not the typical cholangitis picture. It is possible due to the extensive and necrotic nature of her liver metastasis that her elevated labs are a result of her disease process. It is also possible that due to treatment and scarring of liver mets there is partial biliary obstruction that could progress to cholangitis.     Plan:  - would continue conservative management at this point.   - would recommend IR looking at the MRCP in the event intervention is needed  - Patient can have a diet  - trend tbili, if continues to rise significantly or she deteriorates clinically, would consider bilateral PTC    - surgical intervention no recommended at this time   - surg onc will continue to follow         Mehdi Reese MD  General Surgery  [facility] - Oncology ([facility])

## 2024-07-03 NOTE — NURSING
Patient is AAOX4. Up, independent,family at the bed side. Call light and personal items within reach. regular diet with good intake. Patient involved in care. Telemetry and continuous pulse ox monitored. Iv antibiotic provided. New PIV placed. Patient stable at this time.

## 2024-07-03 NOTE — NURSING
Pt with Sepsis. Pt and sister involved in plan of care and communicating needs throughout shift.. Up in room and to bathroom independently and occasionally require stand by assist.  PRN pain medication given with moderate effectiveness.  Tolerating diet, voiding without difficulty. Pt with zosyn infusing at this time to left hand IV.  All VSS; no acute events so far this shift.  Pt remaining free from falls or injury throughout shift; bed locked and in lowest position; call light within reach.  Pt instructed to call for assistance as needed.  Q1H rounding completed on pt.

## 2024-07-03 NOTE — PLAN OF CARE
Patient with no fever or abdominal pain throughout the night. Vital signs stable. Family member remained at the bedside.      Problem: Adult Inpatient Plan of Care  Goal: Plan of Care Review  Outcome: Progressing  Goal: Patient-Specific Goal (Individualized)  Outcome: Progressing  Goal: Absence of Hospital-Acquired Illness or Injury  Outcome: Progressing  Goal: Optimal Comfort and Wellbeing  Outcome: Progressing

## 2024-07-03 NOTE — SUBJECTIVE & OBJECTIVE
Interval History: We had both AES and Dr. Ayala see her and they agreed that IF she needed an intervention it would be a PTC drain but they also agree that it's appropriate for her to just trial abx. Clinically looking great    Oncology Treatment Plan:   OP GI liposomal irinotecan leucovorin fluorouracil Q2W    Medications:  Continuous Infusions:  Scheduled Meds:   heparin (porcine)  5,000 Units Subcutaneous Q8H    mupirocin   Nasal BID    piperacillin-tazobactam (Zosyn) IV (PEDS and ADULTS) (extended infusion is not appropriate)  4.5 g Intravenous Q8H    polyethylene glycol  17 g Oral Daily     PRN Meds:  Current Facility-Administered Medications:     acetaminophen, 650 mg, Oral, Q4H PRN    dextrose 10%, 12.5 g, Intravenous, PRN    dextrose 10%, 25 g, Intravenous, PRN    glucagon (human recombinant), 1 mg, Intramuscular, PRN    glucose, 16 g, Oral, PRN    glucose, 24 g, Oral, PRN    ibuprofen, 400 mg, Oral, Q6H PRN    naloxone, 0.02 mg, Intravenous, PRN    ondansetron, 4 mg, Intravenous, Q8H PRN    oxyCODONE, 5 mg, Oral, Q6H PRN    oxyCODONE, 10 mg, Oral, Q6H PRN    sodium chloride 0.9%, 10 mL, Intravenous, Q12H PRN     Review of Systems   Constitutional:  Negative for activity change, appetite change, chills and fever.   Respiratory:  Negative for shortness of breath.    Cardiovascular:  Negative for chest pain.   Gastrointestinal:  Positive for abdominal pain. Negative for diarrhea, nausea and vomiting.     Objective:     Vital Signs (Most Recent):  Temp: 98.2 °F (36.8 °C) (07/03/24 0746)  Pulse: 90 (07/03/24 0746)  Resp: 18 (07/03/24 0746)  BP: 127/77 (07/03/24 0746)  SpO2: 97 % (07/03/24 0900) Vital Signs (24h Range):  Temp:  [97.4 °F (36.3 °C)-100.3 °F (37.9 °C)] 98.2 °F (36.8 °C)  Pulse:  [] 90  Resp:  [16-21] 18  SpO2:  [94 %-100 %] 97 %  BP: (109-160)/(57-77) 127/77     Weight: 61.7 kg (136 lb 2.1 oz)  Body mass index is 24.9 kg/m².  Body surface area is 1.64 meters squared.      Intake/Output  Summary (Last 24 hours) at 7/3/2024 1048  Last data filed at 7/2/2024 2130  Gross per 24 hour   Intake 616.39 ml   Output 500 ml   Net 116.39 ml        Physical Exam  Vitals and nursing note reviewed.   Constitutional:       Appearance: She is not ill-appearing.   Eyes:      General: No scleral icterus.     Conjunctiva/sclera: Conjunctivae normal.   Cardiovascular:      Rate and Rhythm: Normal rate and regular rhythm.      Pulses: Normal pulses.      Heart sounds: Normal heart sounds.   Pulmonary:      Effort: Pulmonary effort is normal.      Breath sounds: Normal breath sounds.   Abdominal:      Tenderness: There is abdominal tenderness (epigastric).   Skin:     General: Skin is warm and dry.   Neurological:      Mental Status: She is alert.          Significant Labs:   All pertinent labs from the last 24 hours have been reviewed.    Diagnostic Results:  I have reviewed all pertinent imaging results/findings within the past 24 hours.

## 2024-07-03 NOTE — NURSING
Nurses Note -- 4 Eyes      7/2/2024   7:01 PM      Skin assessed during: Admit      [x] No Altered Skin Integrity Present    []Prevention Measures Documented      [] Yes- Altered Skin Integrity Present or Discovered   [] LDA Added if Not in Epic (Describe Wound)   [] New Altered Skin Integrity was Present on Admit and Documented in LDA   [] Wound Image Taken    Wound Care Consulted? No    Attending Nurse:  ERNESTINE Dee    Second RN/Staff Member:  ERNESTINE Pereira

## 2024-07-03 NOTE — ASSESSMENT & PLAN NOTE
79 y.o. female with history of metastic pancreatic adenocarcinoma with metastasis to the lung and extensive metastasis to the liver presenting with intermittent fevers for one week (Tmax 101 F) associated with RUQ pain, elevated Tbili, Alk Phos, AST, and ALT, also MRCP concerning for partial biliary obstruction and cholangitis. Patient's Tbili is mildly elevated at 2 but trending up (1.2-->2), low grade fevers, and stable vitals is not the typical cholangitis picture. It is possible due to the extensive and necrotic nature of her liver metastasis that her elevated labs are a result of her disease process. It is also possible that due to treatment and scarring of liver mets there is partial biliary obstruction that could progress to cholangitis.     Plan:  - would continue conservative management at this point.   - would recommend IR looking at the MRCP in the event intervention is needed  - Patient can have a diet  - trend tbili, if continues to rise significantly or she deteriorates clinically, would consider bilateral PTC    - surgical intervention no recommended at this time   - surg onc will continue to follow

## 2024-07-03 NOTE — HOSPITAL COURSE
She came in with a fever at home, bili of 2.0 and a new ALP of 888 in the setting of very mild upstream biliary dilatation on MRCP. We had both AES and Dr. Ayala see her and they agreed that if she needed an intervention, it would be a PTC drain, but to manage conservatively on abx. Initially started on IV zosyn, but pt had fevers overnight in the 101s and liver function tests worsened. With overnight fevers and worsening labs, patient underwent ERCP (7/5/24) and had two stents placed in left and right hepatic duct. ERCP s/p 1 day, patient feeling much better with down trending LFTs. but ERCP s/p day 3, pt's T-Bili increased and appeared jaundiced on PE. IR consulted for possible PCT but determined that intervention is not beneficial at this time. With her worsening fevers and T-Bili, she is underwent ERCP again on 7/10/2024. She is still experiencing overnight fevers and T-bili decreased to 5.2 from 6.0. Blood cultures negative. Palliative care discussed goals with her. She is scheduled to meet with Dr. Briceño to figure out next options in one week. Discharging her on oral cipro/flagyll to take for 7 days and pain meds prn. Plan discussed with patient and/or family. Patient and/or family are in agreement with plan, verbalized understanding, and all questions were answered.

## 2024-07-04 LAB
ALBUMIN SERPL BCP-MCNC: 2.4 G/DL (ref 3.5–5.2)
ALP SERPL-CCNC: 819 U/L (ref 55–135)
ALT SERPL W/O P-5'-P-CCNC: 47 U/L (ref 10–44)
ANION GAP SERPL CALC-SCNC: 8 MMOL/L (ref 8–16)
AST SERPL-CCNC: 69 U/L (ref 10–40)
BASOPHILS # BLD AUTO: 0.09 K/UL (ref 0–0.2)
BASOPHILS NFR BLD: 0.5 % (ref 0–1.9)
BILIRUB SERPL-MCNC: 2.2 MG/DL (ref 0.1–1)
BUN SERPL-MCNC: 13 MG/DL (ref 8–23)
CALCIUM SERPL-MCNC: 8.4 MG/DL (ref 8.7–10.5)
CHLORIDE SERPL-SCNC: 102 MMOL/L (ref 95–110)
CO2 SERPL-SCNC: 22 MMOL/L (ref 23–29)
CREAT SERPL-MCNC: 0.8 MG/DL (ref 0.5–1.4)
DIFFERENTIAL METHOD BLD: ABNORMAL
EOSINOPHIL # BLD AUTO: 0.4 K/UL (ref 0–0.5)
EOSINOPHIL NFR BLD: 2.3 % (ref 0–8)
ERYTHROCYTE [DISTWIDTH] IN BLOOD BY AUTOMATED COUNT: 17.8 % (ref 11.5–14.5)
EST. GFR  (NO RACE VARIABLE): >60 ML/MIN/1.73 M^2
GLUCOSE SERPL-MCNC: 122 MG/DL (ref 70–110)
HCT VFR BLD AUTO: 28.7 % (ref 37–48.5)
HGB BLD-MCNC: 9 G/DL (ref 12–16)
IMM GRANULOCYTES # BLD AUTO: 0.19 K/UL (ref 0–0.04)
IMM GRANULOCYTES NFR BLD AUTO: 1 % (ref 0–0.5)
LYMPHOCYTES # BLD AUTO: 1.1 K/UL (ref 1–4.8)
LYMPHOCYTES NFR BLD: 6.1 % (ref 18–48)
MAGNESIUM SERPL-MCNC: 2 MG/DL (ref 1.6–2.6)
MCH RBC QN AUTO: 26.2 PG (ref 27–31)
MCHC RBC AUTO-ENTMCNC: 31.4 G/DL (ref 32–36)
MCV RBC AUTO: 83 FL (ref 82–98)
MONOCYTES # BLD AUTO: 1.7 K/UL (ref 0.3–1)
MONOCYTES NFR BLD: 9.6 % (ref 4–15)
NEUTROPHILS # BLD AUTO: 14.6 K/UL (ref 1.8–7.7)
NEUTROPHILS NFR BLD: 80.5 % (ref 38–73)
NRBC BLD-RTO: 0 /100 WBC
PHOSPHATE SERPL-MCNC: 2.8 MG/DL (ref 2.7–4.5)
PLATELET # BLD AUTO: 356 K/UL (ref 150–450)
PMV BLD AUTO: 10.9 FL (ref 9.2–12.9)
POTASSIUM SERPL-SCNC: 3.6 MMOL/L (ref 3.5–5.1)
PROT SERPL-MCNC: 6.4 G/DL (ref 6–8.4)
RBC # BLD AUTO: 3.44 M/UL (ref 4–5.4)
SODIUM SERPL-SCNC: 132 MMOL/L (ref 136–145)
WBC # BLD AUTO: 18.11 K/UL (ref 3.9–12.7)

## 2024-07-04 PROCEDURE — 85025 COMPLETE CBC W/AUTO DIFF WBC: CPT | Performed by: STUDENT IN AN ORGANIZED HEALTH CARE EDUCATION/TRAINING PROGRAM

## 2024-07-04 PROCEDURE — 63600175 PHARM REV CODE 636 W HCPCS: Performed by: STUDENT IN AN ORGANIZED HEALTH CARE EDUCATION/TRAINING PROGRAM

## 2024-07-04 PROCEDURE — 25000003 PHARM REV CODE 250: Performed by: STUDENT IN AN ORGANIZED HEALTH CARE EDUCATION/TRAINING PROGRAM

## 2024-07-04 PROCEDURE — 80053 COMPREHEN METABOLIC PANEL: CPT | Performed by: STUDENT IN AN ORGANIZED HEALTH CARE EDUCATION/TRAINING PROGRAM

## 2024-07-04 PROCEDURE — 84100 ASSAY OF PHOSPHORUS: CPT | Performed by: STUDENT IN AN ORGANIZED HEALTH CARE EDUCATION/TRAINING PROGRAM

## 2024-07-04 PROCEDURE — 83735 ASSAY OF MAGNESIUM: CPT | Performed by: STUDENT IN AN ORGANIZED HEALTH CARE EDUCATION/TRAINING PROGRAM

## 2024-07-04 PROCEDURE — 36415 COLL VENOUS BLD VENIPUNCTURE: CPT | Performed by: STUDENT IN AN ORGANIZED HEALTH CARE EDUCATION/TRAINING PROGRAM

## 2024-07-04 PROCEDURE — 20600001 HC STEP DOWN PRIVATE ROOM

## 2024-07-04 PROCEDURE — 99233 SBSQ HOSP IP/OBS HIGH 50: CPT | Mod: GC,,, | Performed by: STUDENT IN AN ORGANIZED HEALTH CARE EDUCATION/TRAINING PROGRAM

## 2024-07-04 RX ORDER — HYDROXYZINE HYDROCHLORIDE 25 MG/1
25 TABLET, FILM COATED ORAL 3 TIMES DAILY PRN
Status: DISCONTINUED | OUTPATIENT
Start: 2024-07-04 | End: 2024-07-11

## 2024-07-04 RX ADMIN — PIPERACILLIN SODIUM AND TAZOBACTAM SODIUM 4.5 G: 4; .5 INJECTION, POWDER, FOR SOLUTION INTRAVENOUS at 05:07

## 2024-07-04 RX ADMIN — MUPIROCIN: 20 OINTMENT TOPICAL at 09:07

## 2024-07-04 RX ADMIN — HEPARIN SODIUM 5000 UNITS: 5000 INJECTION INTRAVENOUS; SUBCUTANEOUS at 09:07

## 2024-07-04 RX ADMIN — ACETAMINOPHEN 650 MG: 325 TABLET ORAL at 04:07

## 2024-07-04 RX ADMIN — HEPARIN SODIUM 5000 UNITS: 5000 INJECTION INTRAVENOUS; SUBCUTANEOUS at 05:07

## 2024-07-04 RX ADMIN — MUPIROCIN: 20 OINTMENT TOPICAL at 08:07

## 2024-07-04 RX ADMIN — HEPARIN SODIUM 5000 UNITS: 5000 INJECTION INTRAVENOUS; SUBCUTANEOUS at 01:07

## 2024-07-04 RX ADMIN — PIPERACILLIN SODIUM AND TAZOBACTAM SODIUM 4.5 G: 4; .5 INJECTION, POWDER, FOR SOLUTION INTRAVENOUS at 08:07

## 2024-07-04 RX ADMIN — PIPERACILLIN SODIUM AND TAZOBACTAM SODIUM 4.5 G: 4; .5 INJECTION, POWDER, FOR SOLUTION INTRAVENOUS at 12:07

## 2024-07-04 RX ADMIN — POLYETHYLENE GLYCOL 3350 17 G: 17 POWDER, FOR SOLUTION ORAL at 12:07

## 2024-07-04 RX ADMIN — POLYETHYLENE GLYCOL 3350 17 G: 17 POWDER, FOR SOLUTION ORAL at 08:07

## 2024-07-04 NOTE — PROGRESS NOTES
"[facility] - Oncology ([facility])  Hematology/Oncology  Progress Note    Patient Name: Deepali Alex  Admission Date: 7/1/2024  Hospital Length of Stay: 2 days  Code Status: Full Code     Subjective:     HPI:  Patient is a 79-year-old female with history of metastatic pancreatic adenocarcinoma to liver/lung who follows with Dr. Briceño. She finished chemo(gemcitabine + nab-paclitaxel) and is expected to start new regimen due to progression (5-FU and Onyvide) on 7/9. Pt admitted to medical oncology for fever and abdominal pain in setting of metastatic pancreatic adenocarcinoma. She has been experiencing fever x 1 week (Tmax of 101 at home) with worsening right sided abdominal pain. She denies CP, SOB, loss of appetite, diarrhea.      ED Course  Pt presented to the ED with abdominal pain and fevers.  Labs significant for ALP-881, AST-64, ALT-51, increased WBCs at 18.90. Preliminary blood cultures drawn. CT Abd shows "stable appearance of the heterogeneous hypoattenuating pancreatic tail lesion with continued abutment of the splenic artery." CT chest ruled out PE. Pt empirically started on IV zosyn for possible abdominal infection and sepsis protocol was initiated. Admitted to med onc for further management.     Interval History:   Overnight, patient had fevers as high as 101.1.  Patient's WBC increased to 18.11 from 14.17.  Her liver enzymes are trending upwards.  ALP -819 increased from 666, AST-69 increased from 48, ALT-47 increased from 34. T-bili at 2.2.     On visit with patient today, she said she had fevers overnight, but is currently feeling much better. Still has slight abdominal pain but has improved from yesterday.      Oncology Treatment Plan:   OP GI liposomal irinotecan leucovorin fluorouracil Q2W    Medications:  Continuous Infusions:  Scheduled Meds:   heparin (porcine)  5,000 Units Subcutaneous Q8H    mupirocin   Nasal BID    piperacillin-tazobactam (Zosyn) IV (PEDS and ADULTS) (extended " infusion is not appropriate)  4.5 g Intravenous Q8H    polyethylene glycol  17 g Oral Daily     PRN Meds:  Current Facility-Administered Medications:     acetaminophen, 650 mg, Oral, Q4H PRN    dextrose 10%, 12.5 g, Intravenous, PRN    dextrose 10%, 25 g, Intravenous, PRN    glucagon (human recombinant), 1 mg, Intramuscular, PRN    glucose, 16 g, Oral, PRN    glucose, 24 g, Oral, PRN    hydrOXYzine HCL, 25 mg, Oral, TID PRN    ibuprofen, 400 mg, Oral, Q6H PRN    naloxone, 0.02 mg, Intravenous, PRN    ondansetron, 4 mg, Intravenous, Q8H PRN    oxyCODONE, 5 mg, Oral, Q6H PRN    oxyCODONE, 10 mg, Oral, Q6H PRN    sodium chloride 0.9%, 10 mL, Intravenous, Q12H PRN     Review of Systems   Constitutional:  Negative for activity change, appetite change, chills and fever.   Respiratory:  Negative for shortness of breath.    Cardiovascular:  Negative for chest pain.   Gastrointestinal:  Positive for abdominal pain. Negative for diarrhea, nausea and vomiting.     Objective:     Vital Signs (Most Recent):  Temp: 97.8 °F (36.6 °C) (07/04/24 0741)  Pulse: 76 (07/04/24 0741)  Resp: 18 (07/04/24 0741)  BP: 114/71 (07/04/24 0741)  SpO2: 95 % (07/04/24 0824) Vital Signs (24h Range):  Temp:  [97.8 °F (36.6 °C)-101.1 °F (38.4 °C)] 97.8 °F (36.6 °C)  Pulse:  [76-98] 76  Resp:  [18-19] 18  SpO2:  [92 %-98 %] 95 %  BP: (103-130)/(59-71) 114/71     Weight: 61.7 kg (136 lb 2.1 oz)  Body mass index is 24.9 kg/m².  Body surface area is 1.64 meters squared.      Intake/Output Summary (Last 24 hours) at 7/4/2024 0969  Last data filed at 7/4/2024 0743  Gross per 24 hour   Intake 1689.11 ml   Output 1400 ml   Net 289.11 ml        Physical Exam  Vitals and nursing note reviewed.   Constitutional:       Appearance: She is not ill-appearing.   Eyes:      General: No scleral icterus.     Conjunctiva/sclera: Conjunctivae normal.   Cardiovascular:      Rate and Rhythm: Normal rate and regular rhythm.      Pulses: Normal pulses.      Heart sounds:  Normal heart sounds.   Pulmonary:      Effort: Pulmonary effort is normal.      Breath sounds: Normal breath sounds.   Abdominal:      Tenderness: There is abdominal tenderness (epigastric).   Skin:     General: Skin is warm and dry.   Neurological:      Mental Status: She is alert.          Significant Labs:   All pertinent labs from the last 24 hours have been reviewed.    Diagnostic Results:  I have reviewed all pertinent imaging results/findings within the past 24 hours.  Assessment/Plan:     * Sepsis  Pt has been experiencing abdominal pain and fevers x 1 week. LFTs increased from previous, Total Bili-2. WBCs elevated. MRCP significant for evidence of a stricture causing bile duct dilation. In setting of fever with elevated LFT and alk phos, concerned for cholangitis. On PE, pt not jaundiced but is TTP to epigastric and RUQ area. UA normal. RIP unremarkable.    We had both AES and Dr. Ayala see her and they agreed that IF she needed an intervention it would be a PTC drain but they also agree that it's appropriate for her to just trial abx. However, with worsening labs and fevers overnight, will manage conservatively but either repeat imaging or get an MRI for better visualization.     - Continue IV zosyn for now, managing conservatively  - Fluids PRN  - Trend Bili    Transaminitis  See plan for sepsis    Hyponatremia  Sodium on admission 132  - Daily CMP, monitor    Biliary stricture  Pt has been experiencing abdominal pain and fevers x 1 week. ALP- 881, AST-64, ALT-51, Total Bili-2. MRCP significant for evidence of a stricture causing bile duct dilation. In setting of fever with elevated LFT and alk phos, concerned for cholangitis. On PE, pt not jaundiced but is TTP to epigastric and RUQ area.   - Surgical oncology following pt   - Continue IV zosyn for now  - Fluids PRN    Pancreatic adenocarcinoma  History of Pancreatic adenocarcinoma with metastasis to liver/lungs. Currently follows up with Dr. Briceño. Pt  "completed chemo cycle (gemcitabine + nab-paclitaxel) in May and is expected to start new 5-FU and Onyvide due to disease progression. CT abdomen shows "stable appearance of the heterogeneous hypoattenuating pancreatic tail lesion with continued abutment of the splenic artery" and "mildly worse innumerable hypoattenuating hepatic lesions.  Findings remain concerning for hepatic metastatic disease."              Solomon Hyde MD  Hematology/Oncology  [facility] - Oncology ([facility])      "

## 2024-07-04 NOTE — NURSING
MD Beth ordered Hydroxyzine PRN for itching. Pt says she does not feel the need to take the hydroxyzine for now as the itching is tolerable and not bothering at this time. Redness on the bilateral anterior upper arms noted to have lightened a little. Pt now afebrile at 98.9. Pt not in pain and not in distress. MD Beth made aware via secured chat.

## 2024-07-04 NOTE — ASSESSMENT & PLAN NOTE
Pt has been experiencing abdominal pain and fevers x 1 week. ALP- 881, AST-64, ALT-51, Total Bili-2. MRCP significant for evidence of a stricture causing bile duct dilation. In setting of fever with elevated LFT and alk phos, concerned for cholangitis. On PE, pt not jaundiced but is TTP to epigastric and RUQ area.   - Surgical oncology following pt   - Continue IV zosyn for now  - Fluids PRN

## 2024-07-04 NOTE — PROGRESS NOTES
07/04/24 1558   Vital Signs   Temp (!) 102.9 °F (39.4 °C)   Temp Source Oral   Pulse 95   Heart Rate Source Monitor   Resp 18   SpO2 (!) 94 %   Pulse Oximetry Type Continuous   Device (Oxygen Therapy) room air   BP (!) 169/74   MAP (mmHg) 107   BP Method Automatic   Patient Position Lying   Assessments (Pre/Post)   Level of Consciousness (AVPU) alert     Notified scott Courtney tylenol provided.

## 2024-07-04 NOTE — NURSING
Patient is AAOX4. Up, independent, family at the bed side. Call light and personal items within reach. Regular diet with fair intake changed to low sodium diet. MNPO to be kept for possible procedure tomorrow. Patient involved in care. Telemetry and continuous pulse ox monitored. Iv antibiotic provided. TMAX 102.9, Notified Dr. Braswell, prn tylenol provided. Patient stable at this time.

## 2024-07-04 NOTE — SUBJECTIVE & OBJECTIVE
Interval History:   Overnight, patient had fevers as high as 101.1.  Patient's WBC increased to 18.11 from 14.17.  Her liver enzymes are trending upwards.  ALP -819 increased from 666, AST-69 increased from 48, ALT-47 increased from 34. T-bili at 2.2.     On visit with patient today, she said she had fevers overnight, but is currently feeling much better. Still has slight abdominal pain but has improved from yesterday.      Oncology Treatment Plan:   OP GI liposomal irinotecan leucovorin fluorouracil Q2W    Medications:  Continuous Infusions:  Scheduled Meds:   heparin (porcine)  5,000 Units Subcutaneous Q8H    mupirocin   Nasal BID    piperacillin-tazobactam (Zosyn) IV (PEDS and ADULTS) (extended infusion is not appropriate)  4.5 g Intravenous Q8H    polyethylene glycol  17 g Oral Daily     PRN Meds:  Current Facility-Administered Medications:     acetaminophen, 650 mg, Oral, Q4H PRN    dextrose 10%, 12.5 g, Intravenous, PRN    dextrose 10%, 25 g, Intravenous, PRN    glucagon (human recombinant), 1 mg, Intramuscular, PRN    glucose, 16 g, Oral, PRN    glucose, 24 g, Oral, PRN    hydrOXYzine HCL, 25 mg, Oral, TID PRN    ibuprofen, 400 mg, Oral, Q6H PRN    naloxone, 0.02 mg, Intravenous, PRN    ondansetron, 4 mg, Intravenous, Q8H PRN    oxyCODONE, 5 mg, Oral, Q6H PRN    oxyCODONE, 10 mg, Oral, Q6H PRN    sodium chloride 0.9%, 10 mL, Intravenous, Q12H PRN     Review of Systems   Constitutional:  Negative for activity change, appetite change, chills and fever.   Respiratory:  Negative for shortness of breath.    Cardiovascular:  Negative for chest pain.   Gastrointestinal:  Positive for abdominal pain. Negative for diarrhea, nausea and vomiting.     Objective:     Vital Signs (Most Recent):  Temp: 97.8 °F (36.6 °C) (07/04/24 0741)  Pulse: 76 (07/04/24 0741)  Resp: 18 (07/04/24 0741)  BP: 114/71 (07/04/24 0741)  SpO2: 95 % (07/04/24 0824) Vital Signs (24h Range):  Temp:  [97.8 °F (36.6 °C)-101.1 °F (38.4 °C)] 97.8 °F  (36.6 °C)  Pulse:  [76-98] 76  Resp:  [18-19] 18  SpO2:  [92 %-98 %] 95 %  BP: (103-130)/(59-71) 114/71     Weight: 61.7 kg (136 lb 2.1 oz)  Body mass index is 24.9 kg/m².  Body surface area is 1.64 meters squared.      Intake/Output Summary (Last 24 hours) at 7/4/2024 0951  Last data filed at 7/4/2024 0743  Gross per 24 hour   Intake 1689.11 ml   Output 1400 ml   Net 289.11 ml        Physical Exam  Vitals and nursing note reviewed.   Constitutional:       Appearance: She is not ill-appearing.   Eyes:      General: No scleral icterus.     Conjunctiva/sclera: Conjunctivae normal.   Cardiovascular:      Rate and Rhythm: Normal rate and regular rhythm.      Pulses: Normal pulses.      Heart sounds: Normal heart sounds.   Pulmonary:      Effort: Pulmonary effort is normal.      Breath sounds: Normal breath sounds.   Abdominal:      Tenderness: There is abdominal tenderness (epigastric).   Skin:     General: Skin is warm and dry.   Neurological:      Mental Status: She is alert.          Significant Labs:   All pertinent labs from the last 24 hours have been reviewed.    Diagnostic Results:  I have reviewed all pertinent imaging results/findings within the past 24 hours.

## 2024-07-04 NOTE — PROGRESS NOTES
[facility] ([facility])  General Surgery  Progress Note    Subjective:     History of Present Illness:  79y.o. 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 Briceño. She finished chemo(gemcitabine + nab-paclitaxel) and is expected to start new regimen due to progression (5-FU and Onyvide) on 7/9 presenting to the ED complaining of intermittent fevers for the past week max temp 101F these fevers are associated with intermittent RUQ pain and generalized upper abdominal pain. Denies any nausea, vomiting, and has normal bowel movements.     In the ED she was found to have an elevated Alk Phos (748 --> 881) and Tbili (1.6 --> 2) originally worrisome for possible biliary obstruction and possible cholangitis. MRCP showed innumerable solid and cystic masses in the liver with dilation of the intrahepatic billiary radicles and absent signal/abrupt change in caliber and obliteration of the confluence of the intrahepatic biliary radicles. Due to patient's history of fever, labs, and MRCP the surgical oncology team was consulted for input on possible interventions for biliary obstruction in the setting of metastatic pancreatic adenocarcinoma.       Post-Op Info:  * No surgery found *         Interval History:  Yesterday had persistent low-grade fever and overall malaise.  White blood cell count back up marginally, but bilirubin level is stable.    Medications:  Continuous Infusions:  Scheduled Meds:   heparin (porcine)  5,000 Units Subcutaneous Q8H    mupirocin   Nasal BID    piperacillin-tazobactam (Zosyn) IV (PEDS and ADULTS) (extended infusion is not appropriate)  4.5 g Intravenous Q8H    polyethylene glycol  17 g Oral Daily     PRN Meds:  Current Facility-Administered Medications:     acetaminophen, 650 mg, Oral, Q4H PRN    dextrose 10%, 12.5 g, Intravenous, PRN    dextrose 10%, 25 g, Intravenous, PRN    glucagon (human recombinant), 1 mg, Intramuscular, PRN     glucose, 16 g, Oral, PRN    glucose, 24 g, Oral, PRN    hydrOXYzine HCL, 25 mg, Oral, TID PRN    ibuprofen, 400 mg, Oral, Q6H PRN    naloxone, 0.02 mg, Intravenous, PRN    ondansetron, 4 mg, Intravenous, Q8H PRN    oxyCODONE, 5 mg, Oral, Q6H PRN    oxyCODONE, 10 mg, Oral, Q6H PRN    sodium chloride 0.9%, 10 mL, Intravenous, Q12H PRN     Review of patient's allergies indicates:   Allergen Reactions    Erythromycin (bulk)      Other reaction(s): Eye irritation     Objective:     Vital Signs (Most Recent):  Temp: 97.8 °F (36.6 °C) (07/04/24 0741)  Pulse: 76 (07/04/24 0741)  Resp: 18 (07/04/24 0741)  BP: 114/71 (07/04/24 0741)  SpO2: 95 % (07/04/24 0824) Vital Signs (24h Range):  Temp:  [97.8 °F (36.6 °C)-101.1 °F (38.4 °C)] 97.8 °F (36.6 °C)  Pulse:  [76-98] 76  Resp:  [18-19] 18  SpO2:  [92 %-98 %] 95 %  BP: (103-130)/(59-71) 114/71     Weight: 61.7 kg (136 lb 2.1 oz)  Body mass index is 24.9 kg/m².    Intake/Output - Last 3 Shifts         07/02 0700 07/03 0659 07/03 0700 07/04 0659 07/04 0700 07/05 0659    P.O. 480 1228 360    IV Piggyback 136.4 101.1     Total Intake(mL/kg) 616.4 (10) 1329.1 (21.5) 360 (5.8)    Urine (mL/kg/hr) 500 (0.3) 1100 (0.7) 300 (1.6)    Stool  0     Total Output 500 1100 300    Net +116.4 +229.1 +60           Stool Occurrence  1 x              Physical Exam  Vitals and nursing note reviewed.   Eyes:      General: No scleral icterus.     Conjunctiva/sclera: Conjunctivae normal.   Cardiovascular:      Rate and Rhythm: Normal rate and regular rhythm.   Pulmonary:      Effort: Pulmonary effort is normal.   Abdominal:      Palpations: Abdomen is soft.      Tenderness: There is abdominal tenderness (epigastric) in the right upper quadrant and epigastric area. There is no guarding or rebound.   Skin:     General: Skin is warm and dry.   Neurological:      General: No focal deficit present.      Mental Status: She is alert and oriented to person, place, and time.   Psychiatric:         Mood and  Affect: Mood normal.          Significant Labs:  I have reviewed all pertinent lab results within the past 24 hours.  CBC:   Recent Labs   Lab 07/04/24  0435   WBC 18.11*   RBC 3.44*   HGB 9.0*   HCT 28.7*      MCV 83   MCH 26.2*   MCHC 31.4*     CMP:   Recent Labs   Lab 07/04/24  0435   *   CALCIUM 8.4*   ALBUMIN 2.4*   PROT 6.4   *   K 3.6   CO2 22*      BUN 13   CREATININE 0.8   ALKPHOS 819*   ALT 47*   AST 69*   BILITOT 2.2*       Significant Diagnostics:  I have reviewed all pertinent imaging results/findings within the past 24 hours.  Assessment/Plan:     Pancreatic adenocarcinoma  79 y.o. female with history of metastic pancreatic adenocarcinoma with metastasis to the lung and extensive metastasis to the liver presenting with intermittent fevers for one week (Tmax 101 F) associated with RUQ pain, elevated Tbili, Alk Phos, AST, and ALT, also MRCP concerning for partial biliary obstruction and cholangitis. Patient's Tbili is mildly elevated at 2 but trending up (1.2-->2), low grade fevers, and stable vitals is not the typical cholangitis picture. It is possible due to the extensive and necrotic nature of her liver metastasis that her elevated labs are a result of her disease process. It is also possible that due to treatment and scarring of liver mets there is partial biliary obstruction that could progress to cholangitis.     Plan:  - given recurrent fevers and persistent white cell count despite broad coverage antibiotic therapy I do recommend assessment for possible biliary decompression with percutaneous drainage.  I placed a consulted Interventional Radiology and we will speak with them regarding review of her previous CT scan.  She may benefit from repeat CT versus MRI for better evaluation of her ductal system.  - Patient can have a diet  - trend tbili  - surgical intervention not recommended at this time   - surg onc will continue to follow         Eric Gonzalez  MD  General Surgery  [facility] ([facility])

## 2024-07-04 NOTE — SUBJECTIVE & OBJECTIVE
Interval History:  Yesterday had persistent low-grade fever and overall malaise.  White blood cell count back up marginally, but bilirubin level is stable.    Medications:  Continuous Infusions:  Scheduled Meds:   heparin (porcine)  5,000 Units Subcutaneous Q8H    mupirocin   Nasal BID    piperacillin-tazobactam (Zosyn) IV (PEDS and ADULTS) (extended infusion is not appropriate)  4.5 g Intravenous Q8H    polyethylene glycol  17 g Oral Daily     PRN Meds:  Current Facility-Administered Medications:     acetaminophen, 650 mg, Oral, Q4H PRN    dextrose 10%, 12.5 g, Intravenous, PRN    dextrose 10%, 25 g, Intravenous, PRN    glucagon (human recombinant), 1 mg, Intramuscular, PRN    glucose, 16 g, Oral, PRN    glucose, 24 g, Oral, PRN    hydrOXYzine HCL, 25 mg, Oral, TID PRN    ibuprofen, 400 mg, Oral, Q6H PRN    naloxone, 0.02 mg, Intravenous, PRN    ondansetron, 4 mg, Intravenous, Q8H PRN    oxyCODONE, 5 mg, Oral, Q6H PRN    oxyCODONE, 10 mg, Oral, Q6H PRN    sodium chloride 0.9%, 10 mL, Intravenous, Q12H PRN     Review of patient's allergies indicates:   Allergen Reactions    Erythromycin (bulk)      Other reaction(s): Eye irritation     Objective:     Vital Signs (Most Recent):  Temp: 97.8 °F (36.6 °C) (07/04/24 0741)  Pulse: 76 (07/04/24 0741)  Resp: 18 (07/04/24 0741)  BP: 114/71 (07/04/24 0741)  SpO2: 95 % (07/04/24 0824) Vital Signs (24h Range):  Temp:  [97.8 °F (36.6 °C)-101.1 °F (38.4 °C)] 97.8 °F (36.6 °C)  Pulse:  [76-98] 76  Resp:  [18-19] 18  SpO2:  [92 %-98 %] 95 %  BP: (103-130)/(59-71) 114/71     Weight: 61.7 kg (136 lb 2.1 oz)  Body mass index is 24.9 kg/m².    Intake/Output - Last 3 Shifts         07/02 0700 07/03 0659 07/03 0700 07/04 0659 07/04 0700 07/05 0659    P.O. 480 1228 360    IV Piggyback 136.4 101.1     Total Intake(mL/kg) 616.4 (10) 1329.1 (21.5) 360 (5.8)    Urine (mL/kg/hr) 500 (0.3) 1100 (0.7) 300 (1.6)    Stool  0     Total Output 500 1100 300    Net +116.4 +229.1 +60           Stool  Occurrence  1 x              Physical Exam  Vitals and nursing note reviewed.   Eyes:      General: No scleral icterus.     Conjunctiva/sclera: Conjunctivae normal.   Cardiovascular:      Rate and Rhythm: Normal rate and regular rhythm.   Pulmonary:      Effort: Pulmonary effort is normal.   Abdominal:      Palpations: Abdomen is soft.      Tenderness: There is abdominal tenderness (epigastric) in the right upper quadrant and epigastric area. There is no guarding or rebound.   Skin:     General: Skin is warm and dry.   Neurological:      General: No focal deficit present.      Mental Status: She is alert and oriented to person, place, and time.   Psychiatric:         Mood and Affect: Mood normal.          Significant Labs:  I have reviewed all pertinent lab results within the past 24 hours.  CBC:   Recent Labs   Lab 07/04/24  0435   WBC 18.11*   RBC 3.44*   HGB 9.0*   HCT 28.7*      MCV 83   MCH 26.2*   MCHC 31.4*     CMP:   Recent Labs   Lab 07/04/24  0435   *   CALCIUM 8.4*   ALBUMIN 2.4*   PROT 6.4   *   K 3.6   CO2 22*      BUN 13   CREATININE 0.8   ALKPHOS 819*   ALT 47*   AST 69*   BILITOT 2.2*       Significant Diagnostics:  I have reviewed all pertinent imaging results/findings within the past 24 hours.

## 2024-07-04 NOTE — ASSESSMENT & PLAN NOTE
79 y.o. female with history of metastic pancreatic adenocarcinoma with metastasis to the lung and extensive metastasis to the liver presenting with intermittent fevers for one week (Tmax 101 F) associated with RUQ pain, elevated Tbili, Alk Phos, AST, and ALT, also MRCP concerning for partial biliary obstruction and cholangitis. Patient's Tbili is mildly elevated at 2 but trending up (1.2-->2), low grade fevers, and stable vitals is not the typical cholangitis picture. It is possible due to the extensive and necrotic nature of her liver metastasis that her elevated labs are a result of her disease process. It is also possible that due to treatment and scarring of liver mets there is partial biliary obstruction that could progress to cholangitis.     Plan:  - given recurrent fevers and persistent white cell count despite broad coverage antibiotic therapy I do recommend assessment for possible biliary decompression with percutaneous drainage.  I placed a consulted Interventional Radiology and we will speak with them regarding review of her previous CT scan.  She may benefit from repeat CT versus MRI for better evaluation of her ductal system.  - Patient can have a diet  - trend tbili  - surgical intervention not recommended at this time   - surg onc will continue to follow

## 2024-07-04 NOTE — PLAN OF CARE
"Pt AAOx4. Pt febrile overnight w/ max temp of 101.1. Pt w/ episode of itchiness and redness on the bilateral anterior upper extremities, source unknown. Pt reports continued mild RUQ abdominal pain. Pt reports constipation, although last BM noted was during the day, pt says she thinks it's not enough since she "skipped" moving her bowel the day before. Miralax dose given since pt was not able to get her dose during the day. Zosyn continued as ordered. Temperature monitored. Side rails raised, height of bed lowered, adequate lighting provided, and environmental surveillance done.    Problem: Adult Inpatient Plan of Care  Goal: Plan of Care Review  Outcome: Progressing  Goal: Optimal Comfort and Wellbeing  Outcome: Progressing     Problem: Sepsis/Septic Shock  Goal: Optimal Coping  Outcome: Progressing  Goal: Absence of Infection Signs and Symptoms  Outcome: Progressing  Goal: Optimal Nutrition Intake  Outcome: Progressing     Problem: Fall Injury Risk  Goal: Absence of Fall and Fall-Related Injury  Outcome: Progressing     Problem: Infection  Goal: Absence of Infection Signs and Symptoms  Outcome: Progressing     Problem: Skin Injury Risk Increased  Goal: Skin Health and Integrity  Outcome: Progressing     Problem: Pain Acute  Goal: Optimal Pain Control and Function  Outcome: Progressing     "

## 2024-07-04 NOTE — ASSESSMENT & PLAN NOTE
Pt has been experiencing abdominal pain and fevers x 1 week. LFTs increased from previous, Total Bili-2. WBCs elevated. MRCP significant for evidence of a stricture causing bile duct dilation. In setting of fever with elevated LFT and alk phos, concerned for cholangitis. On PE, pt not jaundiced but is TTP to epigastric and RUQ area. UA normal. RIP unremarkable.    We had both [facility] and Dr. Ayala see her and they agreed that IF she needed an intervention it would be a PTC drain but they also agree that it's appropriate for her to just trial abx. However, with worsening labs and fevers overnight, will manage conservatively but either repeat imaging or get an MRI for better visualization.     - Continue IV zosyn for now, managing conservatively  - Fluids PRN  - Trend Bili

## 2024-07-04 NOTE — NURSING
"Pt currently reports itchiness on both of the arms. Noted redness on the anterior aspect of the bilateral upper extremities. Pt says she's only been consuming water and the last medication taken was Tylenol d/t temp of 101.1. Pt also has Zosyn currently running however, pt says she does not think it's the antibiotic as she already had this dose earlier today w/ no issues. Pt is not sure if it's the Tylenol but pt says she takes Tylenol at home but only 500mg, and the dose she had was 650mg. Pt claims itchiness is "tolerable" and pt denied all other symptoms - SOB/, n/v, lightheadedness, headache and new pain. MD Beth informed via secured chat.  "

## 2024-07-05 ENCOUNTER — ANESTHESIA (OUTPATIENT)
Dept: ENDOSCOPY | Facility: HOSPITAL | Age: 79
End: 2024-07-05
Payer: MEDICARE

## 2024-07-05 ENCOUNTER — ANESTHESIA EVENT (OUTPATIENT)
Dept: ENDOSCOPY | Facility: HOSPITAL | Age: 79
End: 2024-07-05
Payer: MEDICARE

## 2024-07-05 LAB
ALBUMIN SERPL BCP-MCNC: 2.1 G/DL (ref 3.5–5.2)
ALP SERPL-CCNC: 737 U/L (ref 55–135)
ALT SERPL W/O P-5'-P-CCNC: 41 U/L (ref 10–44)
ANION GAP SERPL CALC-SCNC: 10 MMOL/L (ref 8–16)
AST SERPL-CCNC: 57 U/L (ref 10–40)
BASOPHILS # BLD AUTO: 0.07 K/UL (ref 0–0.2)
BASOPHILS NFR BLD: 0.4 % (ref 0–1.9)
BILIRUB SERPL-MCNC: 3.5 MG/DL (ref 0.1–1)
BILIRUB UR QL STRIP: NEGATIVE
BUN SERPL-MCNC: 10 MG/DL (ref 8–23)
CALCIUM SERPL-MCNC: 8.3 MG/DL (ref 8.7–10.5)
CHLORIDE SERPL-SCNC: 104 MMOL/L (ref 95–110)
CLARITY UR REFRACT.AUTO: CLEAR
CO2 SERPL-SCNC: 21 MMOL/L (ref 23–29)
COLOR UR AUTO: YELLOW
CREAT SERPL-MCNC: 0.6 MG/DL (ref 0.5–1.4)
DIFFERENTIAL METHOD BLD: ABNORMAL
EOSINOPHIL # BLD AUTO: 0.3 K/UL (ref 0–0.5)
EOSINOPHIL NFR BLD: 1.4 % (ref 0–8)
ERYTHROCYTE [DISTWIDTH] IN BLOOD BY AUTOMATED COUNT: 18.6 % (ref 11.5–14.5)
EST. GFR  (NO RACE VARIABLE): >60 ML/MIN/1.73 M^2
GLUCOSE SERPL-MCNC: 99 MG/DL (ref 70–110)
GLUCOSE UR QL STRIP: NEGATIVE
HCT VFR BLD AUTO: 23.8 % (ref 37–48.5)
HGB BLD-MCNC: 8.1 G/DL (ref 12–16)
HGB UR QL STRIP: NEGATIVE
IMM GRANULOCYTES # BLD AUTO: 0.16 K/UL (ref 0–0.04)
IMM GRANULOCYTES NFR BLD AUTO: 0.9 % (ref 0–0.5)
KETONES UR QL STRIP: NEGATIVE
LEUKOCYTE ESTERASE UR QL STRIP: NEGATIVE
LYMPHOCYTES # BLD AUTO: 0.9 K/UL (ref 1–4.8)
LYMPHOCYTES NFR BLD: 4.8 % (ref 18–48)
MAGNESIUM SERPL-MCNC: 2 MG/DL (ref 1.6–2.6)
MCH RBC QN AUTO: 27.4 PG (ref 27–31)
MCHC RBC AUTO-ENTMCNC: 34 G/DL (ref 32–36)
MCV RBC AUTO: 80 FL (ref 82–98)
MONOCYTES # BLD AUTO: 1.7 K/UL (ref 0.3–1)
MONOCYTES NFR BLD: 9.1 % (ref 4–15)
NEUTROPHILS # BLD AUTO: 15.5 K/UL (ref 1.8–7.7)
NEUTROPHILS NFR BLD: 83.4 % (ref 38–73)
NITRITE UR QL STRIP: NEGATIVE
NRBC BLD-RTO: 0 /100 WBC
PH UR STRIP: 7 [PH] (ref 5–8)
PHOSPHATE SERPL-MCNC: 2.5 MG/DL (ref 2.7–4.5)
PLATELET # BLD AUTO: 288 K/UL (ref 150–450)
PMV BLD AUTO: 11.2 FL (ref 9.2–12.9)
POTASSIUM SERPL-SCNC: 3.5 MMOL/L (ref 3.5–5.1)
PROT SERPL-MCNC: 5.8 G/DL (ref 6–8.4)
PROT UR QL STRIP: NEGATIVE
RBC # BLD AUTO: 2.96 M/UL (ref 4–5.4)
SODIUM SERPL-SCNC: 135 MMOL/L (ref 136–145)
SP GR UR STRIP: 1.01 (ref 1–1.03)
URN SPEC COLLECT METH UR: NORMAL
WBC # BLD AUTO: 18.53 K/UL (ref 3.9–12.7)

## 2024-07-05 PROCEDURE — 43274 ERCP DUCT STENT PLACEMENT: CPT | Mod: ,,, | Performed by: INTERNAL MEDICINE

## 2024-07-05 PROCEDURE — 74328 X-RAY BILE DUCT ENDOSCOPY: CPT | Mod: TC | Performed by: INTERNAL MEDICINE

## 2024-07-05 PROCEDURE — 0F763DZ DILATION OF LEFT HEPATIC DUCT WITH INTRALUMINAL DEVICE, PERCUTANEOUS APPROACH: ICD-10-PCS | Performed by: INTERNAL MEDICINE

## 2024-07-05 PROCEDURE — 27202125 HC BALLOON, EXTRACTION (ANY): Performed by: INTERNAL MEDICINE

## 2024-07-05 PROCEDURE — 83735 ASSAY OF MAGNESIUM: CPT | Performed by: STUDENT IN AN ORGANIZED HEALTH CARE EDUCATION/TRAINING PROGRAM

## 2024-07-05 PROCEDURE — 84100 ASSAY OF PHOSPHORUS: CPT | Performed by: STUDENT IN AN ORGANIZED HEALTH CARE EDUCATION/TRAINING PROGRAM

## 2024-07-05 PROCEDURE — 80053 COMPREHEN METABOLIC PANEL: CPT | Performed by: STUDENT IN AN ORGANIZED HEALTH CARE EDUCATION/TRAINING PROGRAM

## 2024-07-05 PROCEDURE — 25000003 PHARM REV CODE 250: Performed by: NURSE ANESTHETIST, CERTIFIED REGISTERED

## 2024-07-05 PROCEDURE — 20600001 HC STEP DOWN PRIVATE ROOM

## 2024-07-05 PROCEDURE — 81003 URINALYSIS AUTO W/O SCOPE: CPT | Performed by: HOSPITALIST

## 2024-07-05 PROCEDURE — C1769 GUIDE WIRE: HCPCS | Performed by: INTERNAL MEDICINE

## 2024-07-05 PROCEDURE — 63600175 PHARM REV CODE 636 W HCPCS: Performed by: NURSE ANESTHETIST, CERTIFIED REGISTERED

## 2024-07-05 PROCEDURE — 43274 ERCP DUCT STENT PLACEMENT: CPT | Performed by: INTERNAL MEDICINE

## 2024-07-05 PROCEDURE — 74328 X-RAY BILE DUCT ENDOSCOPY: CPT | Mod: 26,,, | Performed by: INTERNAL MEDICINE

## 2024-07-05 PROCEDURE — 27000221 HC OXYGEN, UP TO 24 HOURS

## 2024-07-05 PROCEDURE — 99233 SBSQ HOSP IP/OBS HIGH 50: CPT | Mod: GC,,, | Performed by: STUDENT IN AN ORGANIZED HEALTH CARE EDUCATION/TRAINING PROGRAM

## 2024-07-05 PROCEDURE — 25000003 PHARM REV CODE 250: Performed by: STUDENT IN AN ORGANIZED HEALTH CARE EDUCATION/TRAINING PROGRAM

## 2024-07-05 PROCEDURE — 94761 N-INVAS EAR/PLS OXIMETRY MLT: CPT

## 2024-07-05 PROCEDURE — 25000003 PHARM REV CODE 250: Performed by: ANESTHESIOLOGY

## 2024-07-05 PROCEDURE — 37000008 HC ANESTHESIA 1ST 15 MINUTES: Performed by: INTERNAL MEDICINE

## 2024-07-05 PROCEDURE — 85025 COMPLETE CBC W/AUTO DIFF WBC: CPT | Performed by: STUDENT IN AN ORGANIZED HEALTH CARE EDUCATION/TRAINING PROGRAM

## 2024-07-05 PROCEDURE — 37000009 HC ANESTHESIA EA ADD 15 MINS: Performed by: INTERNAL MEDICINE

## 2024-07-05 PROCEDURE — 25000003 PHARM REV CODE 250: Performed by: INTERNAL MEDICINE

## 2024-07-05 PROCEDURE — C2617 STENT, NON-COR, TEM W/O DEL: HCPCS | Performed by: INTERNAL MEDICINE

## 2024-07-05 PROCEDURE — 27201674 HC SPHINCTERTOME: Performed by: INTERNAL MEDICINE

## 2024-07-05 PROCEDURE — 87040 BLOOD CULTURE FOR BACTERIA: CPT | Performed by: HOSPITALIST

## 2024-07-05 PROCEDURE — 0F753DZ DILATION OF RIGHT HEPATIC DUCT WITH INTRALUMINAL DEVICE, PERCUTANEOUS APPROACH: ICD-10-PCS | Performed by: INTERNAL MEDICINE

## 2024-07-05 PROCEDURE — 99223 1ST HOSP IP/OBS HIGH 75: CPT | Mod: 25,GC,, | Performed by: INTERNAL MEDICINE

## 2024-07-05 PROCEDURE — 63600175 PHARM REV CODE 636 W HCPCS: Performed by: STUDENT IN AN ORGANIZED HEALTH CARE EDUCATION/TRAINING PROGRAM

## 2024-07-05 PROCEDURE — 25500020 PHARM REV CODE 255: Performed by: INTERNAL MEDICINE

## 2024-07-05 DEVICE — ZIMMON PANCREATIC STENT
Type: IMPLANTABLE DEVICE | Site: BILE DUCT | Status: NON-FUNCTIONAL
Brand: ZIMMON
Removed: 2024-07-10

## 2024-07-05 RX ORDER — MEPERIDINE HYDROCHLORIDE 50 MG/ML
12.5 INJECTION INTRAMUSCULAR; INTRAVENOUS; SUBCUTANEOUS ONCE AS NEEDED
Status: DISCONTINUED | OUTPATIENT
Start: 2024-07-05 | End: 2024-07-05 | Stop reason: HOSPADM

## 2024-07-05 RX ORDER — INDOMETHACIN 50 MG/1
SUPPOSITORY RECTAL
Status: COMPLETED | OUTPATIENT
Start: 2024-07-05 | End: 2024-07-05

## 2024-07-05 RX ORDER — SODIUM CHLORIDE 9 MG/ML
INJECTION, SOLUTION INTRAVENOUS CONTINUOUS
Status: DISCONTINUED | OUTPATIENT
Start: 2024-07-05 | End: 2024-07-07

## 2024-07-05 RX ORDER — HYDROMORPHONE HYDROCHLORIDE 1 MG/ML
0.2 INJECTION, SOLUTION INTRAMUSCULAR; INTRAVENOUS; SUBCUTANEOUS EVERY 5 MIN PRN
Status: DISCONTINUED | OUTPATIENT
Start: 2024-07-05 | End: 2024-07-05 | Stop reason: HOSPADM

## 2024-07-05 RX ORDER — LIDOCAINE HYDROCHLORIDE 20 MG/ML
INJECTION INTRAVENOUS
Status: DISCONTINUED | OUTPATIENT
Start: 2024-07-05 | End: 2024-07-05

## 2024-07-05 RX ORDER — ONDANSETRON HYDROCHLORIDE 2 MG/ML
INJECTION, SOLUTION INTRAVENOUS
Status: DISCONTINUED | OUTPATIENT
Start: 2024-07-05 | End: 2024-07-05

## 2024-07-05 RX ORDER — FENTANYL CITRATE 50 UG/ML
INJECTION, SOLUTION INTRAMUSCULAR; INTRAVENOUS
Status: DISCONTINUED | OUTPATIENT
Start: 2024-07-05 | End: 2024-07-05

## 2024-07-05 RX ORDER — PROPOFOL 10 MG/ML
VIAL (ML) INTRAVENOUS
Status: DISCONTINUED | OUTPATIENT
Start: 2024-07-05 | End: 2024-07-05

## 2024-07-05 RX ORDER — GLUCAGON 1 MG
1 KIT INJECTION
Status: DISCONTINUED | OUTPATIENT
Start: 2024-07-05 | End: 2024-07-11

## 2024-07-05 RX ADMIN — FENTANYL CITRATE 25 MCG: 50 INJECTION, SOLUTION INTRAMUSCULAR; INTRAVENOUS at 12:07

## 2024-07-05 RX ADMIN — HEPARIN SODIUM 5000 UNITS: 5000 INJECTION INTRAVENOUS; SUBCUTANEOUS at 09:07

## 2024-07-05 RX ADMIN — PIPERACILLIN SODIUM AND TAZOBACTAM SODIUM 4.5 G: 4; .5 INJECTION, POWDER, FOR SOLUTION INTRAVENOUS at 05:07

## 2024-07-05 RX ADMIN — MUPIROCIN: 20 OINTMENT TOPICAL at 08:07

## 2024-07-05 RX ADMIN — SODIUM CHLORIDE: 9 INJECTION, SOLUTION INTRAVENOUS at 12:07

## 2024-07-05 RX ADMIN — PIPERACILLIN SODIUM AND TAZOBACTAM SODIUM 4.5 G: 4; .5 INJECTION, POWDER, FOR SOLUTION INTRAVENOUS at 12:07

## 2024-07-05 RX ADMIN — HEPARIN SODIUM 5000 UNITS: 5000 INJECTION INTRAVENOUS; SUBCUTANEOUS at 05:07

## 2024-07-05 RX ADMIN — ONDANSETRON 4 MG: 2 INJECTION INTRAMUSCULAR; INTRAVENOUS at 01:07

## 2024-07-05 RX ADMIN — PIPERACILLIN SODIUM AND TAZOBACTAM SODIUM 4.5 G: 4; .5 INJECTION, POWDER, FOR SOLUTION INTRAVENOUS at 08:07

## 2024-07-05 RX ADMIN — ONDANSETRON 4 MG: 2 INJECTION INTRAMUSCULAR; INTRAVENOUS at 12:07

## 2024-07-05 RX ADMIN — SODIUM CHLORIDE: 0.9 INJECTION, SOLUTION INTRAVENOUS at 12:07

## 2024-07-05 RX ADMIN — ACETAMINOPHEN 650 MG: 325 TABLET ORAL at 01:07

## 2024-07-05 RX ADMIN — LIDOCAINE HYDROCHLORIDE 50 MG: 20 INJECTION INTRAVENOUS at 12:07

## 2024-07-05 RX ADMIN — MUPIROCIN: 20 OINTMENT TOPICAL at 09:07

## 2024-07-05 RX ADMIN — PROPOFOL 60 MG: 10 INJECTION, EMULSION INTRAVENOUS at 12:07

## 2024-07-05 NOTE — PROVATION PATIENT INSTRUCTIONS
Discharge Summary/Instructions after an Endoscopic Procedure  Patient Name: Deepali Alex  Patient MRN: [id number]  Patient YOB: 1945  Friday, July 5, 2024  Chance Mccann MD  Dear patient,  As a result of recent federal legislation (The Federal Cures Act), you may   receive lab or pathology results from your procedure in your [facility]   account before your physician is able to contact you. Your physician or   their representative will relay the results to you with their   recommendations at their soonest availability.  Thank you,  RESTRICTIONS:  During your procedure today, you received medications for sedation.  These   medications may affect your judgment, balance and coordination.  Therefore,   for 24 hours, you have the following restrictions:   - DO NOT drive a car, operate machinery, make legal/financial decisions,   sign important papers or drink alcohol.    ACTIVITY:  Today: no heavy lifting, straining or running due to procedural   sedation/anesthesia.  The following day: return to full activity including work.  DIET:  Eat and drink normally unless instructed otherwise.     TREATMENT FOR COMMON SIDE EFFECTS:  - Mild abdominal pain, nausea, belching, bloating or excessive gas:  rest,   eat lightly and use a heating pad.  - Sore Throat: treat with throat lozenges and/or gargle with warm salt   water.  - Because air was used during the procedure, expelling large amounts of air   from your rectum or belching is normal.  - If a bowel prep was taken, you may not have a bowel movement for 1-3 days.    This is normal.  SYMPTOMS TO WATCH FOR AND REPORT TO YOUR PHYSICIAN:  1. Abdominal pain or bloating, other than gas cramps.  2. Chest pain.  3. Back pain.  4. Signs of infection such as: chills or fever occurring within 24 hours   after the procedure.  5. Rectal bleeding, which would show as bright red, maroon, or black stools.   (A tablespoon of blood from the rectum is not serious, especially if    hemorrhoids are present.)  6. Vomiting.  7. Weakness or dizziness.  GO DIRECTLY TO THE NEAREST EMERGENCY ROOM IF YOU HAVE ANY OF THE FOLLOWING:      Difficulty breathing              Chills and/or fever over 101 F   Persistent vomiting and/or vomiting blood   Severe abdominal pain   Severe chest pain   Black, tarry stools   Bleeding- more than one tablespoon   Any other symptom or condition that you feel may need urgent attention  Your doctor recommends these additional instructions:  If any biopsies were taken, your doctors clinic will contact you in 1 to 2   weeks with any results.  - [facility] for [facility].   - Resume previous diet.   - Continue present medications.   - Follow liver function tests, if they improve, repeat ERCP in 6 weeks to   exchange stent.  For questions, problems or results please call your physician - Chance Mccann MD at Work:  [phone].  [facility] [place] [facility], EMERGENCY ROOM PHONE NUMBER: [phone]  IF A COMPLICATION OR EMERGENCY SITUATION ARISES AND YOU ARE UNABLE TO REACH   YOUR PHYSICIAN - GO DIRECTLY TO THE EMERGENCY ROOM.  Chance Mccann MD  7/5/2024 12:45:23 PM  This report has been verified and signed electronically.  Dear patient,  As a result of recent federal legislation (The Federal Cures Act), you may   receive lab or pathology results from your procedure in your [facility]   account before your physician is able to contact you. Your physician or   their representative will relay the results to you with their   recommendations at their soonest availability.  Thank you,  PROVATION

## 2024-07-05 NOTE — PROGRESS NOTES
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 Briceño. She finished chemo(gemcitabine + nab-paclitaxel) and is expected to start new regimen due to progression (5-FU and Onyvide) on 7/9. Pt admitted to medical oncology for fever and abdominal pain in setting of metastatic pancreatic adenocarcinoma. She has been experiencing fever x 1 week (Tmax of 101 at home) with worsening right sided abdominal pain. She denies CP, SOB, loss of appetite, diarrhea.      ED Course  Pt presented to the ED with abdominal pain and fevers.  Labs significant for ALP-881, AST-64, ALT-51, increased WBCs at 18.90. Preliminary blood cultures drawn. CT Abd shows "stable appearance of the heterogeneous hypoattenuating pancreatic tail lesion with continued abutment of the splenic artery." CT chest ruled out PE. Pt empirically started on IV zosyn for possible abdominal infection and sepsis protocol was initiated. Admitted to med onc for further management.     Interval History:        Overnight, patient had fevers up to 102.9. She is now scheduled for ERCP with [facility].. Currently endorsing RUQ abdominal pain.  WBC has increased to 18.53 from 18.11.  ALP-737, AST -57, ALT-41. T-Bili 3.5 increased from 2.2.    Oncology Treatment Plan:   OP GI liposomal irinotecan leucovorin fluorouracil Q2W    Medications:  Continuous Infusions:  Scheduled Meds:   heparin (porcine)  5,000 Units Subcutaneous Q8H    mupirocin   Nasal BID    piperacillin-tazobactam (Zosyn) IV (PEDS and ADULTS) (extended infusion is not appropriate)  4.5 g Intravenous Q8H    polyethylene glycol  17 g Oral Daily     PRN Meds:  Current Facility-Administered Medications:     acetaminophen, 650 mg, Oral, " Q4H PRN    dextrose 10%, 12.5 g, Intravenous, PRN    dextrose 10%, 25 g, Intravenous, PRN    glucagon (human recombinant), 1 mg, Intramuscular, PRN    glucose, 16 g, Oral, PRN    glucose, 24 g, Oral, PRN    hydrOXYzine HCL, 25 mg, Oral, TID PRN    ibuprofen, 400 mg, Oral, Q6H PRN    naloxone, 0.02 mg, Intravenous, PRN    ondansetron, 4 mg, Intravenous, Q8H PRN    oxyCODONE, 5 mg, Oral, Q6H PRN    oxyCODONE, 10 mg, Oral, Q6H PRN    sodium chloride 0.9%, 10 mL, Intravenous, Q12H PRN     Review of Systems   Constitutional:  Negative for activity change, appetite change, chills and fever.   Respiratory:  Negative for shortness of breath.    Cardiovascular:  Negative for chest pain.   Gastrointestinal:  Positive for abdominal pain. Negative for diarrhea, nausea and vomiting.     Objective:     Vital Signs (Most Recent):  Temp: 97.7 °F (36.5 °C) (07/05/24 0721)  Pulse: 77 (07/05/24 0721)  Resp: 18 (07/05/24 0721)  BP: (!) 115/58 (07/05/24 0721)  SpO2: (!) 94 % (07/05/24 0721) Vital Signs (24h Range):  Temp:  [97.4 °F (36.3 °C)-102.9 °F (39.4 °C)] 97.7 °F (36.5 °C)  Pulse:  [] 77  Resp:  [17-18] 18  SpO2:  [94 %-97 %] 94 %  BP: (115-169)/(58-74) 115/58     Weight: 61.7 kg (136 lb 2.1 oz)  Body mass index is 24.9 kg/m².  Body surface area is 1.64 meters squared.      Intake/Output Summary (Last 24 hours) at 7/5/2024 0813  Last data filed at 7/5/2024 0216  Gross per 24 hour   Intake 697 ml   Output 1300 ml   Net -603 ml        Physical Exam  Vitals and nursing note reviewed.   Constitutional:       Appearance: She is not ill-appearing.   Eyes:      General: No scleral icterus.     Conjunctiva/sclera: Conjunctivae normal.   Cardiovascular:      Rate and Rhythm: Normal rate and regular rhythm.      Pulses: Normal pulses.      Heart sounds: Normal heart sounds.   Pulmonary:      Effort: Pulmonary effort is normal.      Breath sounds: Normal breath sounds.   Abdominal:      Tenderness: There is abdominal tenderness in the  "right upper quadrant and epigastric area.   Skin:     General: Skin is warm and dry.   Neurological:      Mental Status: She is alert.          Significant Labs:   All pertinent labs from the last 24 hours have been reviewed.    Diagnostic Results:  I have reviewed all pertinent imaging results/findings within the past 24 hours.  Assessment/Plan:     * Sepsis  Pt has been experiencing abdominal pain and fevers x 1 week. LFTs increased from previous, Total Bili-2. WBCs elevated. MRCP significant for evidence of a stricture causing bile duct dilation. In setting of fever with elevated LFT and alk phos, concerned for cholangitis. On PE, pt not jaundiced but is TTP to epigastric and RUQ area. UA normal. RIP unremarkable.    We had both [facility] and Dr. Ayala see her and they agreed that IF she needed an intervention it would be a PTC drain but they also agree that it's appropriate for her to just trial abx. However, with worsening labs and fevers overnight, she is scheduled for ERCP.  - ERCP  - Continue IV zosyn for now, managing conservatively  - Fluids PRN  - Trend Bili    Transaminitis  See plan for sepsis    Hyponatremia  Sodium on admission 132  - Daily CMP, monitor    Biliary stricture  Pt has been experiencing abdominal pain and fevers x 1 week. ALP- 881, AST-64, ALT-51, Total Bili-2. MRCP significant for evidence of a stricture causing bile duct dilation. In setting of fever with elevated LFT and alk phos, concerned for cholangitis. On PE, pt not jaundiced but is TTP to epigastric and RUQ area. With her overnight fevers and worsening labs, she is scheduled for ERCP.   - ERCP  - Continue IV zosyn for now  - Fluids PRN    Pancreatic adenocarcinoma  History of Pancreatic adenocarcinoma with metastasis to liver/lungs. Currently follows up with Dr. Briceño. Pt completed chemo cycle (gemcitabine + nab-paclitaxel) in May and is expected to start new 5-FU and Onyvide due to disease progression. CT abdomen shows "stable " "appearance of the heterogeneous hypoattenuating pancreatic tail lesion with continued abutment of the splenic artery" and "mildly worse innumerable hypoattenuating hepatic lesions.  Findings remain concerning for hepatic metastatic disease."              Solomon Hyde MD  Hematology/Oncology  [facility] - Oncology ([facility])      "

## 2024-07-05 NOTE — PROGRESS NOTES
[facility] - Oncology ([facility])  General Surgery  Progress Note    Subjective:     History of Present Illness:  79y.o. 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 Briceño. She finished chemo(gemcitabine + nab-paclitaxel) and is expected to start new regimen due to progression (5-FU and Onyvide) on 7/9 presenting to the ED complaining of intermittent fevers for the past week max temp 101F these fevers are associated with intermittent RUQ pain and generalized upper abdominal pain. Denies any nausea, vomiting, and has normal bowel movements.     In the ED she was found to have an elevated Alk Phos (748 --> 881) and Tbili (1.6 --> 2) originally worrisome for possible biliary obstruction and possible cholangitis. MRCP showed innumerable solid and cystic masses in the liver with dilation of the intrahepatic billiary radicles and absent signal/abrupt change in caliber and obliteration of the confluence of the intrahepatic biliary radicles. Due to patient's history of fever, labs, and MRCP the surgical oncology team was consulted for input on possible interventions for biliary obstruction in the setting of metastatic pancreatic adenocarcinoma.       Post-Op Info:  Procedure(s) (LRB):  ERCP (ENDOSCOPIC RETROGRADE CHOLANGIOPANCREATOGRAPHY) (N/A)   Day of Surgery     Interval History: NAEO. Ongoing fevers, bilirubin increasing, now 3.5. WBC remains elevated to 18. HDS     Medications:  Continuous Infusions:  Scheduled Meds:   heparin (porcine)  5,000 Units Subcutaneous Q8H    mupirocin   Nasal BID    piperacillin-tazobactam (Zosyn) IV (PEDS and ADULTS) (extended infusion is not appropriate)  4.5 g Intravenous Q8H    polyethylene glycol  17 g Oral Daily     PRN Meds:  Current Facility-Administered Medications:     acetaminophen, 650 mg, Oral, Q4H PRN    dextrose 10%, 12.5 g, Intravenous, PRN    dextrose 10%, 25 g, Intravenous, PRN    glucagon (human recombinant), 1 mg,  Intramuscular, PRN    glucose, 16 g, Oral, PRN    glucose, 24 g, Oral, PRN    hydrOXYzine HCL, 25 mg, Oral, TID PRN    ibuprofen, 400 mg, Oral, Q6H PRN    naloxone, 0.02 mg, Intravenous, PRN    ondansetron, 4 mg, Intravenous, Q8H PRN    oxyCODONE, 5 mg, Oral, Q6H PRN    oxyCODONE, 10 mg, Oral, Q6H PRN    sodium chloride 0.9%, 10 mL, Intravenous, Q12H PRN     Review of patient's allergies indicates:   Allergen Reactions    Erythromycin (bulk)      Other reaction(s): Eye irritation     Objective:     Vital Signs (Most Recent):  Temp: 97.7 °F (36.5 °C) (07/05/24 0721)  Pulse: 77 (07/05/24 0721)  Resp: 18 (07/05/24 0721)  BP: (!) 115/58 (07/05/24 0721)  SpO2: (!) 94 % (07/05/24 0851) Vital Signs (24h Range):  Temp:  [97.4 °F (36.3 °C)-102.9 °F (39.4 °C)] 97.7 °F (36.5 °C)  Pulse:  [] 77  Resp:  [17-18] 18  SpO2:  [94 %-97 %] 94 %  BP: (115-169)/(58-74) 115/58     Weight: 61.7 kg (136 lb 2.1 oz)  Body mass index is 24.9 kg/m².    Intake/Output - Last 3 Shifts         07/03 0700 07/04 0659 07/04 0700 07/05 0659 07/05 0700 07/06 0659    P.O. 1228 1057     IV Piggyback 101.1      Total Intake(mL/kg) 1329.1 (21.5) 1057 (17.1)     Urine (mL/kg/hr) 1100 (0.7) 1600 (1.1)     Stool 0 0     Total Output 1100 1600     Net +229.1 -543            Stool Occurrence 1 x 1 x              Physical Exam  Vitals and nursing note reviewed.   Eyes:      General: No scleral icterus.     Conjunctiva/sclera: Conjunctivae normal.   Cardiovascular:      Rate and Rhythm: Normal rate and regular rhythm.   Pulmonary:      Effort: Pulmonary effort is normal.   Abdominal:      Palpations: Abdomen is soft.      Tenderness: There is abdominal tenderness (epigastric) in the right upper quadrant and epigastric area. There is no guarding or rebound.   Skin:     General: Skin is warm and dry.   Neurological:      General: No focal deficit present.      Mental Status: She is alert and oriented to person, place, and time.   Psychiatric:         Mood  and Affect: Mood normal.          Significant Labs:  I have reviewed all pertinent lab results within the past 24 hours.  CBC:   Recent Labs   Lab 07/05/24  0459   WBC 18.53*   RBC 2.96*   HGB 8.1*   HCT 23.8*      MCV 80*   MCH 27.4   MCHC 34.0     CMP:   Recent Labs   Lab 07/05/24  0459   GLU 99   CALCIUM 8.3*   ALBUMIN 2.1*   PROT 5.8*   *   K 3.5   CO2 21*      BUN 10   CREATININE 0.6   ALKPHOS 737*   ALT 41   AST 57*   BILITOT 3.5*       Significant Diagnostics:  I have reviewed all pertinent imaging results/findings within the past 24 hours.  Assessment/Plan:     Pancreatic adenocarcinoma  79 y.o. female with history of metastic pancreatic adenocarcinoma with metastasis to the lung and extensive metastasis to the liver presenting with intermittent fevers for one week (Tmax 101 F) associated with RUQ pain, elevated Tbili, Alk Phos, AST, and ALT, also MRCP concerning for partial biliary obstruction and cholangitis. Patient's Tbili is mildly elevated at 2 but trending up (1.2-->2), low grade fevers, and stable vitals is not the typical cholangitis picture. It is possible due to the extensive and necrotic nature of her liver metastasis that her elevated labs are a result of her disease process. It is also possible that due to treatment and scarring of liver mets there is partial biliary obstruction that could progress to cholangitis.     Plan:  - AES planning for stent placement   - trend tbili  - surgical intervention not recommended at this time   - surg onc will sign off        Mehdi Reese MD  General Surgery  [facility] - Oncology ([facility])

## 2024-07-05 NOTE — TRANSFER OF CARE
"Anesthesia Transfer of Care Note    Patient: Deepali Alex    Procedure(s) Performed: Procedure(s) (LRB):  ERCP (ENDOSCOPIC RETROGRADE CHOLANGIOPANCREATOGRAPHY) (N/A)    Patient location: PACU    Anesthesia Type: general    Transport from OR: Transported from OR on 2-3 L/min O2 by NC with adequate spontaneous ventilation    Post pain: adequate analgesia    Post assessment: no apparent anesthetic complications and tolerated procedure well    Post vital signs: stable    Level of consciousness: awake    Nausea/Vomiting: no nausea/vomiting    Complications: none    Transfer of care protocol was followed      Last vitals: Visit Vitals  BP (!) 142/64   Pulse 84   Temp 36.6 °C (97.9 °F)   Resp 16   Ht 5' 2" (1.575 m)   Wt 61.7 kg (136 lb 2.1 oz)   SpO2 96%   Breastfeeding No   BMI 24.90 kg/m²     "

## 2024-07-05 NOTE — NURSING
Patient is AAOX4. Up, independent, family at the bed side. Call light and personal items within reach. Low sodium diet with fair intake. MNPO this morning for ERCP, ERCP done, stent placed, mets found to GB.  Patient involved in care. Telemetry and continuous pulse ox monitored. Iv antibiotic provided. Patient stable at this time.

## 2024-07-05 NOTE — ASSESSMENT & PLAN NOTE
Pt has been experiencing abdominal pain and fevers x 1 week. ALP- 881, AST-64, ALT-51, Total Bili-2. MRCP significant for evidence of a stricture causing bile duct dilation. In setting of fever with elevated LFT and alk phos, concerned for cholangitis. On PE, pt not jaundiced but is TTP to epigastric and RUQ area. With her overnight fevers and worsening labs, she is scheduled for ERCP.   - ERCP  - Continue IV zosyn for now  - Fluids PRN

## 2024-07-05 NOTE — ANESTHESIA PREPROCEDURE EVALUATION
07/05/2024  Deepali Alex is a 79 y.o., female.      Pre-op Assessment    I have reviewed the Patient Summary Reports.     I have reviewed the Nursing Notes.       Review of Systems  Anesthesia Hx:  No problems with previous Anesthesia                Hematology/Oncology:  Hematology Normal   Oncology Normal                                   EENT/Dental:  EENT/Dental Normal           Cardiovascular:         Dysrhythmias           Atherosclerosis of aortic arch                         Pulmonary:  Pulmonary Normal                       Renal/:  Renal/ Normal                 Hepatic/GI:      Liver Disease,            Musculoskeletal:  Musculoskeletal Normal                Neurological:    Neuromuscular Disease,                                   Endocrine:  Endocrine Normal            Dermatological:  Skin Normal    Psych:  Psychiatric Normal                    Physical Exam  General: Well nourished    Airway:  Mallampati: II   Mouth Opening: Normal  TM Distance: Normal  Tongue: Normal  Neck ROM: Normal ROM    Dental:  Intact        Anesthesia Plan  Type of Anesthesia, risks & benefits discussed:    Anesthesia Type: Gen Natural Airway  Intra-op Monitoring Plan: Standard ASA Monitors  Post Op Pain Control Plan: multimodal analgesia  Induction:  IV  Airway Plan: Direct  Informed Consent: Informed consent signed with the Patient and all parties understand the risks and agree with anesthesia plan.  All questions answered. Patient consented to blood products? No  ASA Score: 2  Day of Surgery Review of History & Physical: H&P Update referred to the surgeon/provider.    Ready For Surgery From Anesthesia Perspective.     .

## 2024-07-05 NOTE — CONSULTS
80 y/o F with PMHx of metastatic pancreatic cancer to liver/lung who was admitted on 7/1/24 for RUQ pain and fevers, found to have biliary obstruction and possible cholangitis. IR consult received for PTC. Case discussed with staff. Could perform PTC, however would involve placing both a right and left biliary drain which would likely need to stay in place for life. [facility] also consulted for biliary decompression. Per [facility], plan to attempt ERCP today. Please re-consult IR for PTC if ERCP is unsuccessful. Discussed plan with surgical oncology and [facility] via secure chat.     Maria Eugenia Foote PA-C  Interventional Radiology   Spectra: [phone]

## 2024-07-05 NOTE — PLAN OF CARE
Problem: Adult Inpatient Plan of Care  Goal: Plan of Care Review  Outcome: Progressing  Goal: Optimal Comfort and Wellbeing  Outcome: Progressing     Problem: Infection  Goal: Absence of Infection Signs and Symptoms  Outcome: Progressing     Problem: Skin Injury Risk Increased  Goal: Skin Health and Integrity  Outcome: Progressing     Problem: Pain Acute  Goal: Optimal Pain Control and Function  Outcome: Progressing

## 2024-07-05 NOTE — ASSESSMENT & PLAN NOTE
79 y.o. female with history of metastic pancreatic adenocarcinoma with metastasis to the lung and extensive metastasis to the liver presenting with intermittent fevers for one week (Tmax 101 F) associated with RUQ pain, elevated Tbili, Alk Phos, AST, and ALT, also MRCP concerning for partial biliary obstruction and cholangitis. Patient's Tbili is mildly elevated at 2 but trending up (1.2-->2), low grade fevers, and stable vitals is not the typical cholangitis picture. It is possible due to the extensive and necrotic nature of her liver metastasis that her elevated labs are a result of her disease process. It is also possible that due to treatment and scarring of liver mets there is partial biliary obstruction that could progress to cholangitis.     Plan:  - AES planning for stent placement   - trend tbili  - surgical intervention not recommended at this time   - surg onc will sign off

## 2024-07-05 NOTE — SUBJECTIVE & OBJECTIVE
Interval History: NAEO. Ongoing fevers, bilirubin increasing, now 3.5. WBC remains elevated to 18. HDS     Medications:  Continuous Infusions:  Scheduled Meds:   heparin (porcine)  5,000 Units Subcutaneous Q8H    mupirocin   Nasal BID    piperacillin-tazobactam (Zosyn) IV (PEDS and ADULTS) (extended infusion is not appropriate)  4.5 g Intravenous Q8H    polyethylene glycol  17 g Oral Daily     PRN Meds:  Current Facility-Administered Medications:     acetaminophen, 650 mg, Oral, Q4H PRN    dextrose 10%, 12.5 g, Intravenous, PRN    dextrose 10%, 25 g, Intravenous, PRN    glucagon (human recombinant), 1 mg, Intramuscular, PRN    glucose, 16 g, Oral, PRN    glucose, 24 g, Oral, PRN    hydrOXYzine HCL, 25 mg, Oral, TID PRN    ibuprofen, 400 mg, Oral, Q6H PRN    naloxone, 0.02 mg, Intravenous, PRN    ondansetron, 4 mg, Intravenous, Q8H PRN    oxyCODONE, 5 mg, Oral, Q6H PRN    oxyCODONE, 10 mg, Oral, Q6H PRN    sodium chloride 0.9%, 10 mL, Intravenous, Q12H PRN     Review of patient's allergies indicates:   Allergen Reactions    Erythromycin (bulk)      Other reaction(s): Eye irritation     Objective:     Vital Signs (Most Recent):  Temp: 97.7 °F (36.5 °C) (07/05/24 0721)  Pulse: 77 (07/05/24 0721)  Resp: 18 (07/05/24 0721)  BP: (!) 115/58 (07/05/24 0721)  SpO2: (!) 94 % (07/05/24 0851) Vital Signs (24h Range):  Temp:  [97.4 °F (36.3 °C)-102.9 °F (39.4 °C)] 97.7 °F (36.5 °C)  Pulse:  [] 77  Resp:  [17-18] 18  SpO2:  [94 %-97 %] 94 %  BP: (115-169)/(58-74) 115/58     Weight: 61.7 kg (136 lb 2.1 oz)  Body mass index is 24.9 kg/m².    Intake/Output - Last 3 Shifts         07/03 0700  07/04 0659 07/04 0700 07/05 0659 07/05 0700 07/06 0659    P.O. 1228 1057     IV Piggyback 101.1      Total Intake(mL/kg) 1329.1 (21.5) 1057 (17.1)     Urine (mL/kg/hr) 1100 (0.7) 1600 (1.1)     Stool 0 0     Total Output 1100 1600     Net +229.1 -543            Stool Occurrence 1 x 1 x              Physical Exam  Vitals and nursing note  reviewed.   Eyes:      General: No scleral icterus.     Conjunctiva/sclera: Conjunctivae normal.   Cardiovascular:      Rate and Rhythm: Normal rate and regular rhythm.   Pulmonary:      Effort: Pulmonary effort is normal.   Abdominal:      Palpations: Abdomen is soft.      Tenderness: There is abdominal tenderness (epigastric) in the right upper quadrant and epigastric area. There is no guarding or rebound.   Skin:     General: Skin is warm and dry.   Neurological:      General: No focal deficit present.      Mental Status: She is alert and oriented to person, place, and time.   Psychiatric:         Mood and Affect: Mood normal.          Significant Labs:  I have reviewed all pertinent lab results within the past 24 hours.  CBC:   Recent Labs   Lab 07/05/24 0459   WBC 18.53*   RBC 2.96*   HGB 8.1*   HCT 23.8*      MCV 80*   MCH 27.4   MCHC 34.0     CMP:   Recent Labs   Lab 07/05/24 0459   GLU 99   CALCIUM 8.3*   ALBUMIN 2.1*   PROT 5.8*   *   K 3.5   CO2 21*      BUN 10   CREATININE 0.6   ALKPHOS 737*   ALT 41   AST 57*   BILITOT 3.5*       Significant Diagnostics:  I have reviewed all pertinent imaging results/findings within the past 24 hours.

## 2024-07-05 NOTE — SUBJECTIVE & OBJECTIVE
Interval History:        Overnight, patient had fevers up to 102.9. She is now scheduled for ERCP with [facility].. Currently endorsing RUQ abdominal pain.  WBC has increased to 18.53 from 18.11.  ALP-737, AST -57, ALT-41. T-Bili 3.5 increased from 2.2.    Oncology Treatment Plan:   OP GI liposomal irinotecan leucovorin fluorouracil Q2W    Medications:  Continuous Infusions:  Scheduled Meds:   heparin (porcine)  5,000 Units Subcutaneous Q8H    mupirocin   Nasal BID    piperacillin-tazobactam (Zosyn) IV (PEDS and ADULTS) (extended infusion is not appropriate)  4.5 g Intravenous Q8H    polyethylene glycol  17 g Oral Daily     PRN Meds:  Current Facility-Administered Medications:     acetaminophen, 650 mg, Oral, Q4H PRN    dextrose 10%, 12.5 g, Intravenous, PRN    dextrose 10%, 25 g, Intravenous, PRN    glucagon (human recombinant), 1 mg, Intramuscular, PRN    glucose, 16 g, Oral, PRN    glucose, 24 g, Oral, PRN    hydrOXYzine HCL, 25 mg, Oral, TID PRN    ibuprofen, 400 mg, Oral, Q6H PRN    naloxone, 0.02 mg, Intravenous, PRN    ondansetron, 4 mg, Intravenous, Q8H PRN    oxyCODONE, 5 mg, Oral, Q6H PRN    oxyCODONE, 10 mg, Oral, Q6H PRN    sodium chloride 0.9%, 10 mL, Intravenous, Q12H PRN     Review of Systems   Constitutional:  Negative for activity change, appetite change, chills and fever.   Respiratory:  Negative for shortness of breath.    Cardiovascular:  Negative for chest pain.   Gastrointestinal:  Positive for abdominal pain. Negative for diarrhea, nausea and vomiting.     Objective:     Vital Signs (Most Recent):  Temp: 97.7 °F (36.5 °C) (07/05/24 0721)  Pulse: 77 (07/05/24 0721)  Resp: 18 (07/05/24 0721)  BP: (!) 115/58 (07/05/24 0721)  SpO2: (!) 94 % (07/05/24 0721) Vital Signs (24h Range):  Temp:  [97.4 °F (36.3 °C)-102.9 °F (39.4 °C)] 97.7 °F (36.5 °C)  Pulse:  [] 77  Resp:  [17-18] 18  SpO2:  [94 %-97 %] 94 %  BP: (115-169)/(58-74) 115/58     Weight: 61.7 kg (136 lb 2.1 oz)  Body mass index is 24.9  kg/m².  Body surface area is 1.64 meters squared.      Intake/Output Summary (Last 24 hours) at 7/5/2024 0813  Last data filed at 7/5/2024 0216  Gross per 24 hour   Intake 697 ml   Output 1300 ml   Net -603 ml        Physical Exam  Vitals and nursing note reviewed.   Constitutional:       Appearance: She is not ill-appearing.   Eyes:      General: No scleral icterus.     Conjunctiva/sclera: Conjunctivae normal.   Cardiovascular:      Rate and Rhythm: Normal rate and regular rhythm.      Pulses: Normal pulses.      Heart sounds: Normal heart sounds.   Pulmonary:      Effort: Pulmonary effort is normal.      Breath sounds: Normal breath sounds.   Abdominal:      Tenderness: There is abdominal tenderness in the right upper quadrant and epigastric area.   Skin:     General: Skin is warm and dry.   Neurological:      Mental Status: She is alert.          Significant Labs:   All pertinent labs from the last 24 hours have been reviewed.    Diagnostic Results:  I have reviewed all pertinent imaging results/findings within the past 24 hours.

## 2024-07-05 NOTE — PROGRESS NOTES
AES Progress Note     Deepali Alex is a 79 y.o. female admitted to hospital 7/1/2024 (Hospital Day: 5) due to Sepsis.     Interval History  IR recommends ERCP with stenting. Patient has been NPO since MN.     T max yesterday 102.9 F.     Objective  Temp:  [97.4 °F (36.3 °C)-102.9 °F (39.4 °C)] 98.8 °F (37.1 °C) (07/05 0415)  Pulse:  [] 94 (07/05 0415)  BP: (114-169)/(61-74) 120/67 (07/05 0415)  Resp:  [17-18] 17 (07/05 0415)  SpO2:  [94 %-97 %] 94 % (07/05 0415)    General: Alert, Oriented x3, no distress  Abdomen: Non-distended. Normal tympany. Soft. Non-tender. No peritoneal signs.    Laboratory  Lab Results   Component Value Date    WBC 18.11 (H) 07/04/2024    HGB 9.0 (L) 07/04/2024    HCT 28.7 (L) 07/04/2024    MCV 83 07/04/2024     07/04/2024       Lab Results   Component Value Date    ALT 47 (H) 07/04/2024    AST 69 (H) 07/04/2024    ALKPHOS 819 (H) 07/04/2024    BILITOT 2.2 (H) 07/04/2024       Assessment  79-year-old female with past medical history of metastatic pancreatic cancer to liver and lung (diagnosed by IR guided liver biopsy, finished chemo in May) presenting with a fever and chills (Tmax 101 F at home). Labs on arrival notable for WBC 17, T bili 1.6 (up from 0.5), , AST 60, and ALT 51. UA +ve for leukocytes.  MRI MRCP showed innumerable solid and cystic  in liver. There is dilatation of the intrahepatic biliary radicles and absent signal/abrupt change in caliber and obliteration of the confluence of the intrahepatic biliary radicles. Not on blood thinners. No hx of prior gastric surgeries.      AES consulted for ERCP. Patient declined PTC (recommended by Surg Onc) if ERCP was possible.      #biliary obstruction - likely 2/2 hilar mass v/s compression from hepatic mets    Plan  - ERCP today. Discussed risks and benefits of procedure with patient.   - Keep NPO for now.   - Continue IV Zosyn.   - Follow blood cx.   - Daily CBC, CMP, INR.   - Plan of care was discussed  with primary team.  - We will continue to follow.    Thank you for involving us in the care of Deepali Alex. Please call with any additional questions, concerns or changes in the patient's clinical status. Discussed with Dr. Mccann.     Manuel Hearn MD, PGY-VI  Gastroenterology Fellow  [facility]

## 2024-07-05 NOTE — NURSING TRANSFER
Nursing Transfer Note      7/5/2024   1:38 PM    Nurse giving handoff:Rm MARCUS RN  Nurse receiving handoff:Soraya BLACKWELL    Reason patient is being transferred: meets criteria    Transfer To: 855    Transfer via stretcher    Transfer with cardiac monitoring    Transported by PCT    Transfer Vital Signs:  Blood Pressure:150/69  Heart Rate:76  O2:94  Temperature:97.3  Respirations:20    Telemetry: Rhythm SR  Order for Tele Monitor? Yes    Additional Lines: IV pole    4eyes on Skin: yes    Medicines sent: none    Any special needs or follow-up needed: no    Patient belongings transferred with patient: No    Chart send with patient: Yes    Notified: no family on file    Patient reassessed at: 7/5

## 2024-07-05 NOTE — ANESTHESIA POSTPROCEDURE EVALUATION
Anesthesia Post Evaluation    Patient: Deepali Alex    Procedure(s) Performed: Procedure(s) (LRB):  ERCP (ENDOSCOPIC RETROGRADE CHOLANGIOPANCREATOGRAPHY) (N/A)    Final Anesthesia Type: general      Patient location during evaluation: PACU  Patient participation: Yes- Able to Participate  Level of consciousness: awake and alert  Post-procedure vital signs: reviewed and stable  Pain management: adequate  Airway patency: patent    PONV status at discharge: No PONV  Anesthetic complications: no      Cardiovascular status: blood pressure returned to baseline  Respiratory status: unassisted  Hydration status: euvolemic  Follow-up not needed.              Vitals Value Taken Time   /74 07/05/24 1254   Temp 36.1 °C (97 °F) 07/05/24 1237   Pulse 77 07/05/24 1304   Resp 11 07/05/24 1304   SpO2 94 % 07/05/24 1304   Vitals shown include unfiled device data.      No case tracking events are documented in the log.      Pain/Regina Score: Pain Rating Prior to Med Admin: 0 (7/5/2024  1:58 AM)  Pain Rating Post Med Admin: 0 (7/4/2024  5:07 PM)  Regina Score: 10 (7/5/2024 12:45 PM)

## 2024-07-05 NOTE — PLAN OF CARE
Pt IV flushed. Pt reports nausea relief following administration of medication, pt does not c/o pain. AAOx4

## 2024-07-05 NOTE — PROGRESS NOTES
Dx: Sepsis    Shift Events: Febrile - tylenol administered, urine sample collected. Pending blood cx and CXR at this time.     Neuro: A/Ox4    Vital Signs: Febrile - tmax 101.9F. Intermittent tachycardia w/ fever and ambulation. NSR/ST on tele. Temp down to 98.8F after tylenol. All other VSS.    Respiratory: Room air    Diet: NPO for procedure    Urine Output/Bowel Movement: voiding; LBM 7/4    PIV/Central Lines: PIV    Gtts: n/a    Activity Level: independent    Bed is in lowest position and locked. Call bell within reach. Frequent rounding performed.

## 2024-07-05 NOTE — ASSESSMENT & PLAN NOTE
Pt has been experiencing abdominal pain and fevers x 1 week. LFTs increased from previous, Total Bili-2. WBCs elevated. MRCP significant for evidence of a stricture causing bile duct dilation. In setting of fever with elevated LFT and alk phos, concerned for cholangitis. On PE, pt not jaundiced but is TTP to epigastric and RUQ area. UA normal. RIP unremarkable.    We had both AES and Dr. Ayala see her and they agreed that IF she needed an intervention it would be a PTC drain but they also agree that it's appropriate for her to just trial abx. However, with worsening labs and fevers overnight, she is scheduled for ERCP.  - ERCP  - Continue IV zosyn for now, managing conservatively  - Fluids PRN  - Trend Bili

## 2024-07-06 LAB
ALBUMIN SERPL BCP-MCNC: 1.9 G/DL (ref 3.5–5.2)
ALP SERPL-CCNC: 624 U/L (ref 55–135)
ALT SERPL W/O P-5'-P-CCNC: 35 U/L (ref 10–44)
ANION GAP SERPL CALC-SCNC: 7 MMOL/L (ref 8–16)
AST SERPL-CCNC: 45 U/L (ref 10–40)
BASOPHILS # BLD AUTO: 0.06 K/UL (ref 0–0.2)
BASOPHILS NFR BLD: 0.3 % (ref 0–1.9)
BILIRUB SERPL-MCNC: 2.8 MG/DL (ref 0.1–1)
BUN SERPL-MCNC: 13 MG/DL (ref 8–23)
CALCIUM SERPL-MCNC: 8.5 MG/DL (ref 8.7–10.5)
CHLORIDE SERPL-SCNC: 105 MMOL/L (ref 95–110)
CO2 SERPL-SCNC: 24 MMOL/L (ref 23–29)
CREAT SERPL-MCNC: 0.6 MG/DL (ref 0.5–1.4)
DIFFERENTIAL METHOD BLD: ABNORMAL
EOSINOPHIL # BLD AUTO: 0.6 K/UL (ref 0–0.5)
EOSINOPHIL NFR BLD: 3.3 % (ref 0–8)
ERYTHROCYTE [DISTWIDTH] IN BLOOD BY AUTOMATED COUNT: 18.7 % (ref 11.5–14.5)
EST. GFR  (NO RACE VARIABLE): >60 ML/MIN/1.73 M^2
GLUCOSE SERPL-MCNC: 104 MG/DL (ref 70–110)
HCT VFR BLD AUTO: 26 % (ref 37–48.5)
HGB BLD-MCNC: 8.2 G/DL (ref 12–16)
IMM GRANULOCYTES # BLD AUTO: 0.14 K/UL (ref 0–0.04)
IMM GRANULOCYTES NFR BLD AUTO: 0.8 % (ref 0–0.5)
LYMPHOCYTES # BLD AUTO: 1.3 K/UL (ref 1–4.8)
LYMPHOCYTES NFR BLD: 7.2 % (ref 18–48)
MAGNESIUM SERPL-MCNC: 2.1 MG/DL (ref 1.6–2.6)
MCH RBC QN AUTO: 26.5 PG (ref 27–31)
MCHC RBC AUTO-ENTMCNC: 31.5 G/DL (ref 32–36)
MCV RBC AUTO: 84 FL (ref 82–98)
MONOCYTES # BLD AUTO: 1.3 K/UL (ref 0.3–1)
MONOCYTES NFR BLD: 7.4 % (ref 4–15)
NEUTROPHILS # BLD AUTO: 14.1 K/UL (ref 1.8–7.7)
NEUTROPHILS NFR BLD: 81 % (ref 38–73)
NRBC BLD-RTO: 0 /100 WBC
PHOSPHATE SERPL-MCNC: 2.5 MG/DL (ref 2.7–4.5)
PLATELET # BLD AUTO: 327 K/UL (ref 150–450)
PMV BLD AUTO: 11.4 FL (ref 9.2–12.9)
POTASSIUM SERPL-SCNC: 4 MMOL/L (ref 3.5–5.1)
PROT SERPL-MCNC: 5.4 G/DL (ref 6–8.4)
RBC # BLD AUTO: 3.09 M/UL (ref 4–5.4)
SODIUM SERPL-SCNC: 136 MMOL/L (ref 136–145)
WBC # BLD AUTO: 17.4 K/UL (ref 3.9–12.7)

## 2024-07-06 PROCEDURE — 80053 COMPREHEN METABOLIC PANEL: CPT | Performed by: STUDENT IN AN ORGANIZED HEALTH CARE EDUCATION/TRAINING PROGRAM

## 2024-07-06 PROCEDURE — 84100 ASSAY OF PHOSPHORUS: CPT | Performed by: STUDENT IN AN ORGANIZED HEALTH CARE EDUCATION/TRAINING PROGRAM

## 2024-07-06 PROCEDURE — 83735 ASSAY OF MAGNESIUM: CPT | Performed by: STUDENT IN AN ORGANIZED HEALTH CARE EDUCATION/TRAINING PROGRAM

## 2024-07-06 PROCEDURE — 25000003 PHARM REV CODE 250: Performed by: STUDENT IN AN ORGANIZED HEALTH CARE EDUCATION/TRAINING PROGRAM

## 2024-07-06 PROCEDURE — 99233 SBSQ HOSP IP/OBS HIGH 50: CPT | Mod: GC,,, | Performed by: HOSPITALIST

## 2024-07-06 PROCEDURE — 63600175 PHARM REV CODE 636 W HCPCS: Performed by: STUDENT IN AN ORGANIZED HEALTH CARE EDUCATION/TRAINING PROGRAM

## 2024-07-06 PROCEDURE — 20600001 HC STEP DOWN PRIVATE ROOM

## 2024-07-06 PROCEDURE — 85025 COMPLETE CBC W/AUTO DIFF WBC: CPT | Performed by: STUDENT IN AN ORGANIZED HEALTH CARE EDUCATION/TRAINING PROGRAM

## 2024-07-06 PROCEDURE — 25000003 PHARM REV CODE 250

## 2024-07-06 PROCEDURE — 36415 COLL VENOUS BLD VENIPUNCTURE: CPT | Performed by: STUDENT IN AN ORGANIZED HEALTH CARE EDUCATION/TRAINING PROGRAM

## 2024-07-06 RX ORDER — SIMETHICONE 80 MG
1 TABLET,CHEWABLE ORAL 3 TIMES DAILY PRN
Status: DISCONTINUED | OUTPATIENT
Start: 2024-07-06 | End: 2024-07-12 | Stop reason: HOSPADM

## 2024-07-06 RX ORDER — TALC
6 POWDER (GRAM) TOPICAL NIGHTLY PRN
Status: DISCONTINUED | OUTPATIENT
Start: 2024-07-06 | End: 2024-07-12 | Stop reason: HOSPADM

## 2024-07-06 RX ORDER — AMOXICILLIN 250 MG
1 CAPSULE ORAL DAILY PRN
Status: DISCONTINUED | OUTPATIENT
Start: 2024-07-06 | End: 2024-07-12 | Stop reason: HOSPADM

## 2024-07-06 RX ADMIN — PIPERACILLIN SODIUM AND TAZOBACTAM SODIUM 4.5 G: 4; .5 INJECTION, POWDER, FOR SOLUTION INTRAVENOUS at 09:07

## 2024-07-06 RX ADMIN — PIPERACILLIN SODIUM AND TAZOBACTAM SODIUM 4.5 G: 4; .5 INJECTION, POWDER, FOR SOLUTION INTRAVENOUS at 05:07

## 2024-07-06 RX ADMIN — MUPIROCIN: 20 OINTMENT TOPICAL at 09:07

## 2024-07-06 RX ADMIN — SENNOSIDES AND DOCUSATE SODIUM 1 TABLET: 50; 8.6 TABLET ORAL at 06:07

## 2024-07-06 RX ADMIN — Medication 6 MG: at 09:07

## 2024-07-06 RX ADMIN — HEPARIN SODIUM 5000 UNITS: 5000 INJECTION INTRAVENOUS; SUBCUTANEOUS at 02:07

## 2024-07-06 RX ADMIN — SIMETHICONE 80 MG: 80 TABLET, CHEWABLE ORAL at 02:07

## 2024-07-06 RX ADMIN — HEPARIN SODIUM 5000 UNITS: 5000 INJECTION INTRAVENOUS; SUBCUTANEOUS at 05:07

## 2024-07-06 RX ADMIN — POLYETHYLENE GLYCOL 3350 17 G: 17 POWDER, FOR SOLUTION ORAL at 09:07

## 2024-07-06 RX ADMIN — HEPARIN SODIUM 5000 UNITS: 5000 INJECTION INTRAVENOUS; SUBCUTANEOUS at 09:07

## 2024-07-06 RX ADMIN — PIPERACILLIN SODIUM AND TAZOBACTAM SODIUM 4.5 G: 4; .5 INJECTION, POWDER, FOR SOLUTION INTRAVENOUS at 12:07

## 2024-07-06 NOTE — TREATMENT PLAN
GI Treatment Plan    Patent seen and examined s/p ERCP yesterday. Doing well, no pain and tolerating po.     Thank you for involving us in the care of Deepali Alex. Please call with any additional questions, concerns or changes in the patient's clinical status. GI to sign off.     Consuelo Reyes MD  Gastroenterology Fellow, PGY IV

## 2024-07-06 NOTE — ASSESSMENT & PLAN NOTE
Pt has been experiencing abdominal pain and fevers x 1 week. ALP- 881, AST-64, ALT-51, Total Bili-2. MRCP significant for evidence of a stricture causing bile duct dilation. In setting of fever with elevated LFT and alk phos, concerned for cholangitis. On PE, pt not jaundiced but is TTP to epigastric and RUQ area. With her overnight fevers and worsening labs, ERCP done on 7/5 with two stents placed.   - Continue IV zosyn for now  - Fluids PRN

## 2024-07-06 NOTE — PLAN OF CARE
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 Pt involved in plan of care and communicating needs throughout shift. PRN simethicone given due to c/o abdominal fullness. Up in room and to bathroom independently; no c/o pain. Low sodium diet with poor intake. Pt. voiding without difficulty. Telemetry and continuous pulse ox monitored. Iv antibiotic provided. Pt. c/o constipation, Miralax and Senna given. All VSS; Pt. Afebrile, no acute events so far this shift.  Pt remaining free from falls or injury throughout shift; bed locked and in lowest position; call light within reach.  Pt instructed to call for assistance as needed.  Q1H rounding done on pt.

## 2024-07-06 NOTE — PLAN OF CARE
Problem: Adult Inpatient Plan of Care  Goal: Plan of Care Review  Outcome: Progressing  Goal: Optimal Comfort and Wellbeing  Outcome: Progressing     Problem: Sepsis/Septic Shock  Goal: Optimal Coping  Outcome: Progressing     Problem: Fall Injury Risk  Goal: Absence of Fall and Fall-Related Injury  Outcome: Progressing

## 2024-07-06 NOTE — ASSESSMENT & PLAN NOTE
Pt has been experiencing abdominal pain and fevers x 1 week. LFTs increased from previous, Total Bili-2. WBCs elevated. MRCP significant for evidence of a stricture causing bile duct dilation. In setting of fever with elevated LFT and alk phos, concerned for cholangitis. On PE, pt not jaundiced but is TTP to epigastric and RUQ area. UA normal. RIP unremarkable.    We had both AES and Dr. Ayala see her and they agreed that IF she needed an intervention it would be a PTC drain but they also agree that it's appropriate for her to just trial abx. However, with worsening labs and fevers overnight, she had ERCP done on 7/5. Patient is doing much better.  - Continue IV zosyn for now, transition to oral abx tomorrow  - Fluids PRN  - Trend Bili

## 2024-07-06 NOTE — PROGRESS NOTES
Dx: Sepsis    Shift Events: NAEON. Denies pain.    Neuro: A/Ox4    Vital Signs: VSS    Respiratory: Room air    Diet: low sodium    Urine Output/Bowel Movement: voiding; LBM 7/4     PIV/Central Lines: PIV     Gtts: n/a     Activity Level: independent     Bed is in lowest position and locked. Call bell within reach. Frequent rounding performed.

## 2024-07-06 NOTE — SUBJECTIVE & OBJECTIVE
Interval History:      Patient underwent ERCP yesterday with 2 stents placed.  Overnight, patient remained afebrile, abdominal pain improved.. WBC-17.4, ALP -624, T-bili 2.8, AST-45.  ALT-35. She is feeling much better. Currently have her on IV abx.    Oncology Treatment Plan:   OP GI liposomal irinotecan leucovorin fluorouracil Q2W    Medications:  Continuous Infusions:   0.9% NaCl   Intravenous Continuous 25 mL/hr at 07/05/24 1249 New Bag at 07/05/24 1249     Scheduled Meds:   heparin (porcine)  5,000 Units Subcutaneous Q8H    mupirocin   Nasal BID    piperacillin-tazobactam (Zosyn) IV (PEDS and ADULTS) (extended infusion is not appropriate)  4.5 g Intravenous Q8H    polyethylene glycol  17 g Oral Daily     PRN Meds:  Current Facility-Administered Medications:     acetaminophen, 650 mg, Oral, Q4H PRN    dextrose 10%, 12.5 g, Intravenous, PRN    dextrose 10%, 25 g, Intravenous, PRN    glucagon (human recombinant), 1 mg, Intramuscular, PRN    glucose, 16 g, Oral, PRN    glucose, 24 g, Oral, PRN    hydrOXYzine HCL, 25 mg, Oral, TID PRN    ibuprofen, 400 mg, Oral, Q6H PRN    naloxone, 0.02 mg, Intravenous, PRN    ondansetron, 4 mg, Intravenous, Q8H PRN    oxyCODONE, 5 mg, Oral, Q6H PRN    oxyCODONE, 10 mg, Oral, Q6H PRN    simethicone, 1 tablet, Oral, TID PRN    sodium chloride 0.9%, 10 mL, Intravenous, Q12H PRN     Review of Systems   Constitutional:  Negative for activity change, appetite change, chills and fever.   Respiratory:  Negative for shortness of breath.    Cardiovascular:  Negative for chest pain.   Gastrointestinal:  Negative for abdominal pain, diarrhea, nausea and vomiting.     Objective:     Vital Signs (Most Recent):  Temp: 98.3 °F (36.8 °C) (07/06/24 1147)  Pulse: 88 (07/06/24 1147)  Resp: 18 (07/06/24 1147)  BP: 113/66 (07/06/24 1147)  SpO2: 96 % (07/06/24 1300) Vital Signs (24h Range):  Temp:  [97.4 °F (36.3 °C)-98.3 °F (36.8 °C)] 98.3 °F (36.8 °C)  Pulse:  [60-92] 88  Resp:  [17-20] 18  SpO2:  [94  %-96 %] 96 %  BP: (109-148)/(60-69) 113/66     Weight: 61.7 kg (136 lb 2.1 oz)  Body mass index is 24.9 kg/m².  Body surface area is 1.64 meters squared.      Intake/Output Summary (Last 24 hours) at 7/6/2024 1447  Last data filed at 7/6/2024 1419  Gross per 24 hour   Intake 1110 ml   Output 1150 ml   Net -40 ml        Physical Exam  Vitals and nursing note reviewed.   Constitutional:       Appearance: She is not ill-appearing.   Eyes:      General: No scleral icterus.     Conjunctiva/sclera: Conjunctivae normal.   Cardiovascular:      Rate and Rhythm: Normal rate and regular rhythm.      Pulses: Normal pulses.      Heart sounds: Normal heart sounds.   Pulmonary:      Effort: Pulmonary effort is normal.      Breath sounds: Normal breath sounds.   Abdominal:      Tenderness: There is no abdominal tenderness.   Skin:     General: Skin is warm and dry.   Neurological:      Mental Status: She is alert.          Significant Labs:   All pertinent labs from the last 24 hours have been reviewed.    Diagnostic Results:  I have reviewed all pertinent imaging results/findings within the past 24 hours.

## 2024-07-06 NOTE — PROGRESS NOTES
"[facility] ([facility])  Hematology/Oncology  Progress Note    Patient Name: Deepali Alex  Admission Date: 7/1/2024  Hospital Length of Stay: 4 days  Code Status: Full Code     Subjective:     HPI:  Patient is a 79-year-old female with history of metastatic pancreatic adenocarcinoma to liver/lung who follows with Dr. Briceño. She finished chemo(gemcitabine + nab-paclitaxel) and is expected to start new regimen due to progression (5-FU and Onyvide) on 7/9. Pt admitted to medical oncology for fever and abdominal pain in setting of metastatic pancreatic adenocarcinoma. She has been experiencing fever x 1 week (Tmax of 101 at home) with worsening right sided abdominal pain. She denies CP, SOB, loss of appetite, diarrhea.      ED Course  Pt presented to the ED with abdominal pain and fevers.  Labs significant for ALP-881, AST-64, ALT-51, increased WBCs at 18.90. Preliminary blood cultures drawn. CT Abd shows "stable appearance of the heterogeneous hypoattenuating pancreatic tail lesion with continued abutment of the splenic artery." CT chest ruled out PE. Pt empirically started on IV zosyn for possible abdominal infection and sepsis protocol was initiated. Admitted to med onc for further management.     Interval History:      Patient underwent ERCP yesterday with 2 stents placed.  Overnight, patient remained afebrile, abdominal pain improved.. WBC-17.4, ALP -624, T-bili 2.8, AST-45.  ALT-35. She is feeling much better. Currently have her on IV abx.    Oncology Treatment Plan:   OP GI liposomal irinotecan leucovorin fluorouracil Q2W    Medications:  Continuous Infusions:   0.9% NaCl   Intravenous Continuous 25 mL/hr at 07/05/24 1249 New Bag at 07/05/24 1249     Scheduled Meds:   heparin (porcine)  5,000 Units Subcutaneous Q8H    mupirocin   Nasal BID    piperacillin-tazobactam (Zosyn) IV (PEDS and ADULTS) (extended infusion is not appropriate)  4.5 g Intravenous Q8H    polyethylene glycol  17 g Oral Daily "     PRN Meds:  Current Facility-Administered Medications:     acetaminophen, 650 mg, Oral, Q4H PRN    dextrose 10%, 12.5 g, Intravenous, PRN    dextrose 10%, 25 g, Intravenous, PRN    glucagon (human recombinant), 1 mg, Intramuscular, PRN    glucose, 16 g, Oral, PRN    glucose, 24 g, Oral, PRN    hydrOXYzine HCL, 25 mg, Oral, TID PRN    ibuprofen, 400 mg, Oral, Q6H PRN    naloxone, 0.02 mg, Intravenous, PRN    ondansetron, 4 mg, Intravenous, Q8H PRN    oxyCODONE, 5 mg, Oral, Q6H PRN    oxyCODONE, 10 mg, Oral, Q6H PRN    simethicone, 1 tablet, Oral, TID PRN    sodium chloride 0.9%, 10 mL, Intravenous, Q12H PRN     Review of Systems   Constitutional:  Negative for activity change, appetite change, chills and fever.   Respiratory:  Negative for shortness of breath.    Cardiovascular:  Negative for chest pain.   Gastrointestinal:  Negative for abdominal pain, diarrhea, nausea and vomiting.     Objective:     Vital Signs (Most Recent):  Temp: 98.3 °F (36.8 °C) (07/06/24 1147)  Pulse: 88 (07/06/24 1147)  Resp: 18 (07/06/24 1147)  BP: 113/66 (07/06/24 1147)  SpO2: 96 % (07/06/24 1300) Vital Signs (24h Range):  Temp:  [97.4 °F (36.3 °C)-98.3 °F (36.8 °C)] 98.3 °F (36.8 °C)  Pulse:  [60-92] 88  Resp:  [17-20] 18  SpO2:  [94 %-96 %] 96 %  BP: (109-148)/(60-69) 113/66     Weight: 61.7 kg (136 lb 2.1 oz)  Body mass index is 24.9 kg/m².  Body surface area is 1.64 meters squared.      Intake/Output Summary (Last 24 hours) at 7/6/2024 1447  Last data filed at 7/6/2024 1419  Gross per 24 hour   Intake 1110 ml   Output 1150 ml   Net -40 ml        Physical Exam  Vitals and nursing note reviewed.   Constitutional:       Appearance: She is not ill-appearing.   Eyes:      General: No scleral icterus.     Conjunctiva/sclera: Conjunctivae normal.   Cardiovascular:      Rate and Rhythm: Normal rate and regular rhythm.      Pulses: Normal pulses.      Heart sounds: Normal heart sounds.   Pulmonary:      Effort: Pulmonary effort is normal.       Breath sounds: Normal breath sounds.   Abdominal:      Tenderness: There is no abdominal tenderness.   Skin:     General: Skin is warm and dry.   Neurological:      Mental Status: She is alert.          Significant Labs:   All pertinent labs from the last 24 hours have been reviewed.    Diagnostic Results:  I have reviewed all pertinent imaging results/findings within the past 24 hours.  Assessment/Plan:     * Sepsis  Pt has been experiencing abdominal pain and fevers x 1 week. LFTs increased from previous, Total Bili-2. WBCs elevated. MRCP significant for evidence of a stricture causing bile duct dilation. In setting of fever with elevated LFT and alk phos, concerned for cholangitis. On PE, pt not jaundiced but is TTP to epigastric and RUQ area. UA normal. RIP unremarkable.    We had both AES and Dr. Ayala see her and they agreed that IF she needed an intervention it would be a PTC drain but they also agree that it's appropriate for her to just trial abx. However, with worsening labs and fevers overnight, she had ERCP done on 7/5. Patient is doing much better.  - Continue IV zosyn for now, transition to oral abx tomorrow  - Fluids PRN  - Trend Bili    Transaminitis  See plan for sepsis    Hyponatremia  Sodium on admission 132  - Daily CMP, monitor    Biliary stricture  Pt has been experiencing abdominal pain and fevers x 1 week. ALP- 881, AST-64, ALT-51, Total Bili-2. MRCP significant for evidence of a stricture causing bile duct dilation. In setting of fever with elevated LFT and alk phos, concerned for cholangitis. On PE, pt not jaundiced but is TTP to epigastric and RUQ area. With her overnight fevers and worsening labs, ERCP done on 7/5 with two stents placed.   - Continue IV zosyn for now  - Fluids PRN    Pancreatic adenocarcinoma  History of Pancreatic adenocarcinoma with metastasis to liver/lungs. Currently follows up with Dr. Briceño. Pt completed chemo cycle (gemcitabine + nab-paclitaxel) in May and  "is expected to start new 5-FU and Onyvide due to disease progression. CT abdomen shows "stable appearance of the heterogeneous hypoattenuating pancreatic tail lesion with continued abutment of the splenic artery" and "mildly worse innumerable hypoattenuating hepatic lesions.  Findings remain concerning for hepatic metastatic disease."              Solomon Hyde MD  Hematology/Oncology  [facility] - Oncology ([facility])      "

## 2024-07-07 PROBLEM — K59.00 CONSTIPATION: Status: ACTIVE | Noted: 2024-07-07

## 2024-07-07 LAB
ALBUMIN SERPL BCP-MCNC: 2 G/DL (ref 3.5–5.2)
ALP SERPL-CCNC: 750 U/L (ref 55–135)
ALT SERPL W/O P-5'-P-CCNC: 40 U/L (ref 10–44)
ANION GAP SERPL CALC-SCNC: 10 MMOL/L (ref 8–16)
AST SERPL-CCNC: 56 U/L (ref 10–40)
BACTERIA BLD CULT: NORMAL
BACTERIA BLD CULT: NORMAL
BASOPHILS # BLD AUTO: 0.1 K/UL (ref 0–0.2)
BASOPHILS NFR BLD: 0.5 % (ref 0–1.9)
BILIRUB SERPL-MCNC: 3.6 MG/DL (ref 0.1–1)
BUN SERPL-MCNC: 10 MG/DL (ref 8–23)
CALCIUM SERPL-MCNC: 8.2 MG/DL (ref 8.7–10.5)
CHLORIDE SERPL-SCNC: 102 MMOL/L (ref 95–110)
CO2 SERPL-SCNC: 22 MMOL/L (ref 23–29)
CREAT SERPL-MCNC: 0.6 MG/DL (ref 0.5–1.4)
DIFFERENTIAL METHOD BLD: ABNORMAL
EOSINOPHIL # BLD AUTO: 0.6 K/UL (ref 0–0.5)
EOSINOPHIL NFR BLD: 3 % (ref 0–8)
ERYTHROCYTE [DISTWIDTH] IN BLOOD BY AUTOMATED COUNT: 19.9 % (ref 11.5–14.5)
EST. GFR  (NO RACE VARIABLE): >60 ML/MIN/1.73 M^2
GLUCOSE SERPL-MCNC: 103 MG/DL (ref 70–110)
HCT VFR BLD AUTO: 27.5 % (ref 37–48.5)
HGB BLD-MCNC: 8.7 G/DL (ref 12–16)
IMM GRANULOCYTES # BLD AUTO: 0.17 K/UL (ref 0–0.04)
IMM GRANULOCYTES NFR BLD AUTO: 0.9 % (ref 0–0.5)
LYMPHOCYTES # BLD AUTO: 1.2 K/UL (ref 1–4.8)
LYMPHOCYTES NFR BLD: 6 % (ref 18–48)
MAGNESIUM SERPL-MCNC: 1.9 MG/DL (ref 1.6–2.6)
MCH RBC QN AUTO: 26.6 PG (ref 27–31)
MCHC RBC AUTO-ENTMCNC: 31.6 G/DL (ref 32–36)
MCV RBC AUTO: 84 FL (ref 82–98)
MONOCYTES # BLD AUTO: 1.7 K/UL (ref 0.3–1)
MONOCYTES NFR BLD: 8.8 % (ref 4–15)
NEUTROPHILS # BLD AUTO: 15.5 K/UL (ref 1.8–7.7)
NEUTROPHILS NFR BLD: 80.8 % (ref 38–73)
NRBC BLD-RTO: 0 /100 WBC
PHOSPHATE SERPL-MCNC: 2.4 MG/DL (ref 2.7–4.5)
PLATELET # BLD AUTO: 352 K/UL (ref 150–450)
PMV BLD AUTO: 11.3 FL (ref 9.2–12.9)
POTASSIUM SERPL-SCNC: 3.8 MMOL/L (ref 3.5–5.1)
PROT SERPL-MCNC: 5.6 G/DL (ref 6–8.4)
RBC # BLD AUTO: 3.27 M/UL (ref 4–5.4)
SODIUM SERPL-SCNC: 134 MMOL/L (ref 136–145)
WBC # BLD AUTO: 19.23 K/UL (ref 3.9–12.7)

## 2024-07-07 PROCEDURE — 84100 ASSAY OF PHOSPHORUS: CPT | Performed by: STUDENT IN AN ORGANIZED HEALTH CARE EDUCATION/TRAINING PROGRAM

## 2024-07-07 PROCEDURE — 20600001 HC STEP DOWN PRIVATE ROOM

## 2024-07-07 PROCEDURE — 36415 COLL VENOUS BLD VENIPUNCTURE: CPT | Performed by: STUDENT IN AN ORGANIZED HEALTH CARE EDUCATION/TRAINING PROGRAM

## 2024-07-07 PROCEDURE — 80053 COMPREHEN METABOLIC PANEL: CPT | Performed by: STUDENT IN AN ORGANIZED HEALTH CARE EDUCATION/TRAINING PROGRAM

## 2024-07-07 PROCEDURE — 25000003 PHARM REV CODE 250: Performed by: STUDENT IN AN ORGANIZED HEALTH CARE EDUCATION/TRAINING PROGRAM

## 2024-07-07 PROCEDURE — 25000003 PHARM REV CODE 250

## 2024-07-07 PROCEDURE — 85025 COMPLETE CBC W/AUTO DIFF WBC: CPT | Performed by: STUDENT IN AN ORGANIZED HEALTH CARE EDUCATION/TRAINING PROGRAM

## 2024-07-07 PROCEDURE — 63600175 PHARM REV CODE 636 W HCPCS: Performed by: STUDENT IN AN ORGANIZED HEALTH CARE EDUCATION/TRAINING PROGRAM

## 2024-07-07 PROCEDURE — 99233 SBSQ HOSP IP/OBS HIGH 50: CPT | Mod: GC,,, | Performed by: HOSPITALIST

## 2024-07-07 PROCEDURE — 83735 ASSAY OF MAGNESIUM: CPT | Performed by: STUDENT IN AN ORGANIZED HEALTH CARE EDUCATION/TRAINING PROGRAM

## 2024-07-07 RX ORDER — BISACODYL 5 MG
5 TABLET, DELAYED RELEASE (ENTERIC COATED) ORAL DAILY PRN
Status: DISCONTINUED | OUTPATIENT
Start: 2024-07-07 | End: 2024-07-12 | Stop reason: HOSPADM

## 2024-07-07 RX ADMIN — HEPARIN SODIUM 5000 UNITS: 5000 INJECTION INTRAVENOUS; SUBCUTANEOUS at 05:07

## 2024-07-07 RX ADMIN — PIPERACILLIN SODIUM AND TAZOBACTAM SODIUM 4.5 G: 4; .5 INJECTION, POWDER, FOR SOLUTION INTRAVENOUS at 09:07

## 2024-07-07 RX ADMIN — HEPARIN SODIUM 5000 UNITS: 5000 INJECTION INTRAVENOUS; SUBCUTANEOUS at 08:07

## 2024-07-07 RX ADMIN — PIPERACILLIN SODIUM AND TAZOBACTAM SODIUM 4.5 G: 4; .5 INJECTION, POWDER, FOR SOLUTION INTRAVENOUS at 12:07

## 2024-07-07 RX ADMIN — Medication 6 MG: at 09:07

## 2024-07-07 RX ADMIN — IBUPROFEN 400 MG: 200 TABLET, FILM COATED ORAL at 01:07

## 2024-07-07 RX ADMIN — PIPERACILLIN SODIUM AND TAZOBACTAM SODIUM 4.5 G: 4; .5 INJECTION, POWDER, FOR SOLUTION INTRAVENOUS at 05:07

## 2024-07-07 RX ADMIN — SIMETHICONE 80 MG: 80 TABLET, CHEWABLE ORAL at 05:07

## 2024-07-07 RX ADMIN — HEPARIN SODIUM 5000 UNITS: 5000 INJECTION INTRAVENOUS; SUBCUTANEOUS at 02:07

## 2024-07-07 RX ADMIN — BISACODYL 5 MG: 5 TABLET, COATED ORAL at 07:07

## 2024-07-07 RX ADMIN — SENNOSIDES AND DOCUSATE SODIUM 1 TABLET: 50; 8.6 TABLET ORAL at 04:07

## 2024-07-07 RX ADMIN — POLYETHYLENE GLYCOL 3350 17 G: 17 POWDER, FOR SOLUTION ORAL at 09:07

## 2024-07-07 NOTE — SUBJECTIVE & OBJECTIVE
Interval History:     Patient is S/P ERCP x2 days with 2 stents placed in the left and right hepatic ducts.  Overnight, patient remained afebrile but abdominal pain has returned.  She is tender in the right upper quadrant and having pain while breathing when palpated.  WBC-19.23. ALP- 750, AST -56. ALT-40. Labs increased from yesterday. Currently receiving IV zosyn.     Visit with patient, she is still experiencing abdominal pain and appears jaundiced. Pt also complaining of constipation. Last bm about 3 days ago.     Oncology Treatment Plan:   OP GI liposomal irinotecan leucovorin fluorouracil Q2W    Medications:  Continuous Infusions:   0.9% NaCl   Intravenous Continuous   Stopped at 07/06/24 0456     Scheduled Meds:   heparin (porcine)  5,000 Units Subcutaneous Q8H    piperacillin-tazobactam (Zosyn) IV (PEDS and ADULTS) (extended infusion is not appropriate)  4.5 g Intravenous Q8H    polyethylene glycol  17 g Oral Daily     PRN Meds:  Current Facility-Administered Medications:     acetaminophen, 650 mg, Oral, Q4H PRN    dextrose 10%, 12.5 g, Intravenous, PRN    dextrose 10%, 25 g, Intravenous, PRN    glucagon (human recombinant), 1 mg, Intramuscular, PRN    glucose, 16 g, Oral, PRN    glucose, 24 g, Oral, PRN    hydrOXYzine HCL, 25 mg, Oral, TID PRN    ibuprofen, 400 mg, Oral, Q6H PRN    melatonin, 6 mg, Oral, Nightly PRN    naloxone, 0.02 mg, Intravenous, PRN    ondansetron, 4 mg, Intravenous, Q8H PRN    oxyCODONE, 5 mg, Oral, Q6H PRN    oxyCODONE, 10 mg, Oral, Q6H PRN    senna-docusate 8.6-50 mg, 1 tablet, Oral, Daily PRN    simethicone, 1 tablet, Oral, TID PRN    sodium chloride 0.9%, 10 mL, Intravenous, Q12H PRN     Review of Systems   Constitutional:  Negative for activity change, appetite change, chills and fever.   Respiratory:  Negative for shortness of breath.    Cardiovascular:  Negative for chest pain.   Gastrointestinal:  Positive for abdominal pain and constipation. Negative for diarrhea, nausea and  vomiting.     Objective:     Vital Signs (Most Recent):  Temp: 98.1 °F (36.7 °C) (07/07/24 0721)  Pulse: 96 (07/07/24 0721)  Resp: 17 (07/07/24 0721)  BP: 109/68 (07/07/24 0721)  SpO2: 95 % (07/07/24 0721) Vital Signs (24h Range):  Temp:  [98.1 °F (36.7 °C)-99.8 °F (37.7 °C)] 98.1 °F (36.7 °C)  Pulse:  [] 96  Resp:  [17-20] 17  SpO2:  [95 %-96 %] 95 %  BP: (109-120)/(57-71) 109/68     Weight: 61.7 kg (136 lb 2.1 oz)  Body mass index is 24.9 kg/m².  Body surface area is 1.64 meters squared.      Intake/Output Summary (Last 24 hours) at 7/7/2024 0831  Last data filed at 7/7/2024 0828  Gross per 24 hour   Intake 2163.65 ml   Output 1750 ml   Net 413.65 ml        Physical Exam  Vitals and nursing note reviewed.   Constitutional:       Appearance: She is not ill-appearing.   Eyes:      General: No scleral icterus.     Conjunctiva/sclera: Conjunctivae normal.   Cardiovascular:      Rate and Rhythm: Normal rate and regular rhythm.      Pulses: Normal pulses.      Heart sounds: Normal heart sounds.   Pulmonary:      Effort: Pulmonary effort is normal.      Breath sounds: Normal breath sounds.   Abdominal:      Tenderness: There is generalized abdominal tenderness and tenderness in the right upper quadrant. Positive signs include Mahan's sign.   Skin:     General: Skin is warm and dry.      Coloration: Skin is jaundiced.   Neurological:      Mental Status: She is alert.          Significant Labs:   All pertinent labs from the last 24 hours have been reviewed.    Diagnostic Results:  I have reviewed all pertinent imaging results/findings within the past 24 hours.

## 2024-07-07 NOTE — ASSESSMENT & PLAN NOTE
Pt has been experiencing abdominal pain and fevers x 1 week. LFTs increased from previous, Total Bili-2. WBCs elevated. MRCP significant for evidence of a stricture causing bile duct dilation. In setting of fever with elevated LFT and alk phos, concerned for cholangitis. On PE, pt not jaundiced but is TTP to epigastric and RUQ area. UA normal. RIP unremarkable.    We had both AES and Dr. Ayala see her and they agreed that IF she needed an intervention it would be a PTC drain but they also agree that it's appropriate for her to just trial abx. However, with worsening labs and fevers overnight, she had ERCP done on 7/5. On 7/7, T-bili, ALP, AST, ALT increasing and she appears jaundiced. Pt experiencing abdominal pain again.  - Continue IV zosyn for now  - US Abd  - Fluids PRN  - Trend Bili

## 2024-07-07 NOTE — PROGRESS NOTES
Dx: Sepsis    Shift Events: NAEON. Simethicone given for complaints of abd/gas pain. Tbili/liver panel elevated this morning - reported labs and increasing abd pain to on-call NP.    Neuro: A/Ox4    Vital Signs: VSS    Respiratory: Room air    Diet: low sodium    Urine Output/Bowel Movement: voiding; LBM 7/4 - received miralax/senna today; passing gas, + bowel sounds     PIV/Central Lines: PIV     Gtts: n/a     Activity Level: independent     Bed is in lowest position and locked. Call bell within reach. Frequent rounding performed.

## 2024-07-07 NOTE — NURSING
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 Pt involved in plan of care and communicating needs throughout shift. PRN Ibuprofen given due to c/o back pain. Reassessed pain after an hour, stated she is a 0 out of 10 on the numeric pain scale. Up in room and to bathroom independently; no c/o pain. Patient NPO throughout shift for an abdominal ultrasound. Pt. voiding without difficulty. Telemetry and continuous pulse ox monitored. Iv antibiotic provided. Pt. c/o constipation, Miralax and Senna given. All VSS; Pt. Afebrile, no acute events so far this shift.  Pt remaining free from falls or injury throughout shift; bed locked and in lowest position; call light within reach.  Pt instructed to call for assistance as needed.  Q1H rounding done on pt.

## 2024-07-07 NOTE — PLAN OF CARE
Problem: Adult Inpatient Plan of Care  Goal: Plan of Care Review  Outcome: Progressing  Goal: Optimal Comfort and Wellbeing  Outcome: Progressing     Problem: Sepsis/Septic Shock  Goal: Absence of Infection Signs and Symptoms  Outcome: Progressing     Problem: Fall Injury Risk  Goal: Absence of Fall and Fall-Related Injury  Outcome: Progressing     Problem: Pain Acute  Goal: Optimal Pain Control and Function  Outcome: Progressing

## 2024-07-07 NOTE — PROGRESS NOTES
"[facility] - Oncology ([facility])  Hematology/Oncology  Progress Note    Patient Name: Deepali Alex  Admission Date: 7/1/2024  Hospital Length of Stay: 5 days  Code Status: Full Code     Subjective:     HPI:  Patient is a 79-year-old female with history of metastatic pancreatic adenocarcinoma to liver/lung who follows with Dr. Briceño. She finished chemo(gemcitabine + nab-paclitaxel) and is expected to start new regimen due to progression (5-FU and Onyvide) on 7/9. Pt admitted to medical oncology for fever and abdominal pain in setting of metastatic pancreatic adenocarcinoma. She has been experiencing fever x 1 week (Tmax of 101 at home) with worsening right sided abdominal pain. She denies CP, SOB, loss of appetite, diarrhea.      ED Course  Pt presented to the ED with abdominal pain and fevers.  Labs significant for ALP-881, AST-64, ALT-51, increased WBCs at 18.90. Preliminary blood cultures drawn. CT Abd shows "stable appearance of the heterogeneous hypoattenuating pancreatic tail lesion with continued abutment of the splenic artery." CT chest ruled out PE. Pt empirically started on IV zosyn for possible abdominal infection and sepsis protocol was initiated. Admitted to med onc for further management.     Interval History:     Patient is S/P ERCP x2 days with 2 stents placed in the left and right hepatic ducts.  Overnight, patient remained afebrile but abdominal pain has returned.  She is tender in the right upper quadrant and having pain while breathing when palpated.  WBC-19.23. ALP- 750, AST -56. ALT-40. Labs increased from yesterday. Currently receiving IV zosyn.     Visit with patient, she is still experiencing abdominal pain and appears jaundiced. Pt also complaining of constipation. Last bm about 3 days ago.     Oncology Treatment Plan:   OP GI liposomal irinotecan leucovorin fluorouracil Q2W    Medications:  Continuous Infusions:   0.9% NaCl   Intravenous Continuous   Stopped at 07/06/24 0456 "     Scheduled Meds:   heparin (porcine)  5,000 Units Subcutaneous Q8H    piperacillin-tazobactam (Zosyn) IV (PEDS and ADULTS) (extended infusion is not appropriate)  4.5 g Intravenous Q8H    polyethylene glycol  17 g Oral Daily     PRN Meds:  Current Facility-Administered Medications:     acetaminophen, 650 mg, Oral, Q4H PRN    dextrose 10%, 12.5 g, Intravenous, PRN    dextrose 10%, 25 g, Intravenous, PRN    glucagon (human recombinant), 1 mg, Intramuscular, PRN    glucose, 16 g, Oral, PRN    glucose, 24 g, Oral, PRN    hydrOXYzine HCL, 25 mg, Oral, TID PRN    ibuprofen, 400 mg, Oral, Q6H PRN    melatonin, 6 mg, Oral, Nightly PRN    naloxone, 0.02 mg, Intravenous, PRN    ondansetron, 4 mg, Intravenous, Q8H PRN    oxyCODONE, 5 mg, Oral, Q6H PRN    oxyCODONE, 10 mg, Oral, Q6H PRN    senna-docusate 8.6-50 mg, 1 tablet, Oral, Daily PRN    simethicone, 1 tablet, Oral, TID PRN    sodium chloride 0.9%, 10 mL, Intravenous, Q12H PRN     Review of Systems   Constitutional:  Negative for activity change, appetite change, chills and fever.   Respiratory:  Negative for shortness of breath.    Cardiovascular:  Negative for chest pain.   Gastrointestinal:  Positive for abdominal pain and constipation. Negative for diarrhea, nausea and vomiting.     Objective:     Vital Signs (Most Recent):  Temp: 98.1 °F (36.7 °C) (07/07/24 0721)  Pulse: 96 (07/07/24 0721)  Resp: 17 (07/07/24 0721)  BP: 109/68 (07/07/24 0721)  SpO2: 95 % (07/07/24 0721) Vital Signs (24h Range):  Temp:  [98.1 °F (36.7 °C)-99.8 °F (37.7 °C)] 98.1 °F (36.7 °C)  Pulse:  [] 96  Resp:  [17-20] 17  SpO2:  [95 %-96 %] 95 %  BP: (109-120)/(57-71) 109/68     Weight: 61.7 kg (136 lb 2.1 oz)  Body mass index is 24.9 kg/m².  Body surface area is 1.64 meters squared.      Intake/Output Summary (Last 24 hours) at 7/7/2024 0831  Last data filed at 7/7/2024 0828  Gross per 24 hour   Intake 2163.65 ml   Output 1750 ml   Net 413.65 ml        Physical Exam  Vitals and nursing  note reviewed.   Constitutional:       Appearance: She is not ill-appearing.   Eyes:      General: No scleral icterus.     Conjunctiva/sclera: Conjunctivae normal.   Cardiovascular:      Rate and Rhythm: Normal rate and regular rhythm.      Pulses: Normal pulses.      Heart sounds: Normal heart sounds.   Pulmonary:      Effort: Pulmonary effort is normal.      Breath sounds: Normal breath sounds.   Abdominal:      Tenderness: There is generalized abdominal tenderness and tenderness in the right upper quadrant. Positive signs include Mahan's sign.   Skin:     General: Skin is warm and dry.      Coloration: Skin is jaundiced.   Neurological:      Mental Status: She is alert.          Significant Labs:   All pertinent labs from the last 24 hours have been reviewed.    Diagnostic Results:  I have reviewed all pertinent imaging results/findings within the past 24 hours.  Assessment/Plan:     * Sepsis  Pt has been experiencing abdominal pain and fevers x 1 week. LFTs increased from previous, Total Bili-2. WBCs elevated. MRCP significant for evidence of a stricture causing bile duct dilation. In setting of fever with elevated LFT and alk phos, concerned for cholangitis. On PE, pt not jaundiced but is TTP to epigastric and RUQ area. UA normal. RIP unremarkable.    We had both AES and Dr. Ayala see her and they agreed that IF she needed an intervention it would be a PTC drain but they also agree that it's appropriate for her to just trial abx. However, with worsening labs and fevers overnight, she had ERCP done on 7/5. On 7/7, T-bili, ALP, AST, ALT increasing and she appears jaundiced. Pt experiencing abdominal pain again.  - Continue IV zosyn for now  - US Abd  - Fluids PRN  - Trend Bili    Constipation  - Optimize bowel regimen    Transaminitis  See plan for sepsis    Hyponatremia  Sodium on admission 132  - Daily CMP, monitor    Biliary stricture  See Sepsis    Pancreatic adenocarcinoma  History of Pancreatic  "adenocarcinoma with metastasis to liver/lungs. Currently follows up with Dr. Briceño. Pt completed chemo cycle (gemcitabine + nab-paclitaxel) in May and is expected to start new 5-FU and Onyvide due to disease progression. CT abdomen shows "stable appearance of the heterogeneous hypoattenuating pancreatic tail lesion with continued abutment of the splenic artery" and "mildly worse innumerable hypoattenuating hepatic lesions.  Findings remain concerning for hepatic metastatic disease."              Solomon Hyde MD  Hematology/Oncology  [facility] - Oncology ([facility])      "

## 2024-07-08 LAB
ALBUMIN SERPL BCP-MCNC: 1.9 G/DL (ref 3.5–5.2)
ALP SERPL-CCNC: 690 U/L (ref 55–135)
ALT SERPL W/O P-5'-P-CCNC: 36 U/L (ref 10–44)
ANION GAP SERPL CALC-SCNC: 7 MMOL/L (ref 8–16)
AST SERPL-CCNC: 45 U/L (ref 10–40)
BASOPHILS # BLD AUTO: 0.09 K/UL (ref 0–0.2)
BASOPHILS NFR BLD: 0.5 % (ref 0–1.9)
BILIRUB SERPL-MCNC: 3.8 MG/DL (ref 0.1–1)
BUN SERPL-MCNC: 11 MG/DL (ref 8–23)
CALCIUM SERPL-MCNC: 8.4 MG/DL (ref 8.7–10.5)
CHLORIDE SERPL-SCNC: 102 MMOL/L (ref 95–110)
CO2 SERPL-SCNC: 25 MMOL/L (ref 23–29)
CREAT SERPL-MCNC: 0.6 MG/DL (ref 0.5–1.4)
DIFFERENTIAL METHOD BLD: ABNORMAL
EOSINOPHIL # BLD AUTO: 0.6 K/UL (ref 0–0.5)
EOSINOPHIL NFR BLD: 3.1 % (ref 0–8)
ERYTHROCYTE [DISTWIDTH] IN BLOOD BY AUTOMATED COUNT: 20.4 % (ref 11.5–14.5)
EST. GFR  (NO RACE VARIABLE): >60 ML/MIN/1.73 M^2
GLUCOSE SERPL-MCNC: 127 MG/DL (ref 70–110)
HCT VFR BLD AUTO: 23.7 % (ref 37–48.5)
HGB BLD-MCNC: 7.8 G/DL (ref 12–16)
IMM GRANULOCYTES # BLD AUTO: 0.21 K/UL (ref 0–0.04)
IMM GRANULOCYTES NFR BLD AUTO: 1.2 % (ref 0–0.5)
INR PPP: 1.2 (ref 0.8–1.2)
LYMPHOCYTES # BLD AUTO: 0.9 K/UL (ref 1–4.8)
LYMPHOCYTES NFR BLD: 4.8 % (ref 18–48)
MAGNESIUM SERPL-MCNC: 2 MG/DL (ref 1.6–2.6)
MCH RBC QN AUTO: 26.8 PG (ref 27–31)
MCHC RBC AUTO-ENTMCNC: 32.9 G/DL (ref 32–36)
MCV RBC AUTO: 81 FL (ref 82–98)
MONOCYTES # BLD AUTO: 1.3 K/UL (ref 0.3–1)
MONOCYTES NFR BLD: 7 % (ref 4–15)
NEUTROPHILS # BLD AUTO: 15 K/UL (ref 1.8–7.7)
NEUTROPHILS NFR BLD: 83.4 % (ref 38–73)
NRBC BLD-RTO: 0 /100 WBC
PHOSPHATE SERPL-MCNC: 2.7 MG/DL (ref 2.7–4.5)
PLATELET # BLD AUTO: 326 K/UL (ref 150–450)
PMV BLD AUTO: 11.1 FL (ref 9.2–12.9)
POTASSIUM SERPL-SCNC: 3.9 MMOL/L (ref 3.5–5.1)
PROT SERPL-MCNC: 5.5 G/DL (ref 6–8.4)
PROTHROMBIN TIME: 13.2 SEC (ref 9–12.5)
RBC # BLD AUTO: 2.91 M/UL (ref 4–5.4)
SODIUM SERPL-SCNC: 134 MMOL/L (ref 136–145)
WBC # BLD AUTO: 17.96 K/UL (ref 3.9–12.7)

## 2024-07-08 PROCEDURE — 80053 COMPREHEN METABOLIC PANEL: CPT | Performed by: STUDENT IN AN ORGANIZED HEALTH CARE EDUCATION/TRAINING PROGRAM

## 2024-07-08 PROCEDURE — 85025 COMPLETE CBC W/AUTO DIFF WBC: CPT | Performed by: STUDENT IN AN ORGANIZED HEALTH CARE EDUCATION/TRAINING PROGRAM

## 2024-07-08 PROCEDURE — 63600175 PHARM REV CODE 636 W HCPCS: Performed by: STUDENT IN AN ORGANIZED HEALTH CARE EDUCATION/TRAINING PROGRAM

## 2024-07-08 PROCEDURE — 20600001 HC STEP DOWN PRIVATE ROOM

## 2024-07-08 PROCEDURE — 25000003 PHARM REV CODE 250: Performed by: STUDENT IN AN ORGANIZED HEALTH CARE EDUCATION/TRAINING PROGRAM

## 2024-07-08 PROCEDURE — 83735 ASSAY OF MAGNESIUM: CPT | Performed by: STUDENT IN AN ORGANIZED HEALTH CARE EDUCATION/TRAINING PROGRAM

## 2024-07-08 PROCEDURE — 84100 ASSAY OF PHOSPHORUS: CPT | Performed by: STUDENT IN AN ORGANIZED HEALTH CARE EDUCATION/TRAINING PROGRAM

## 2024-07-08 PROCEDURE — 25000003 PHARM REV CODE 250

## 2024-07-08 PROCEDURE — 85610 PROTHROMBIN TIME: CPT | Performed by: STUDENT IN AN ORGANIZED HEALTH CARE EDUCATION/TRAINING PROGRAM

## 2024-07-08 PROCEDURE — 99233 SBSQ HOSP IP/OBS HIGH 50: CPT | Mod: GC,,, | Performed by: HOSPITALIST

## 2024-07-08 PROCEDURE — 36415 COLL VENOUS BLD VENIPUNCTURE: CPT | Performed by: STUDENT IN AN ORGANIZED HEALTH CARE EDUCATION/TRAINING PROGRAM

## 2024-07-08 RX ADMIN — PIPERACILLIN SODIUM AND TAZOBACTAM SODIUM 4.5 G: 4; .5 INJECTION, POWDER, FOR SOLUTION INTRAVENOUS at 09:07

## 2024-07-08 RX ADMIN — PIPERACILLIN SODIUM AND TAZOBACTAM SODIUM 4.5 G: 4; .5 INJECTION, POWDER, FOR SOLUTION INTRAVENOUS at 05:07

## 2024-07-08 RX ADMIN — PIPERACILLIN SODIUM AND TAZOBACTAM SODIUM 4.5 G: 4; .5 INJECTION, POWDER, FOR SOLUTION INTRAVENOUS at 12:07

## 2024-07-08 RX ADMIN — Medication 6 MG: at 09:07

## 2024-07-08 RX ADMIN — SENNOSIDES AND DOCUSATE SODIUM 1 TABLET: 50; 8.6 TABLET ORAL at 03:07

## 2024-07-08 NOTE — PLAN OF CARE
AAOx3, afebrile, c/o mild abd pain/fullness. US of abd in process. Pt is NPO after midnight. AM heparin held per Dr. Ramirez order in case of possible procedure. Telemetry monitor in place (NSR). Continuous pulse ox in place. Pt is passing gas but no bm thus far this shift. PRN colace and dulcolax given at shift change yesterday afternoon. Pt's last bm was 7/4. PRN melatonin given for sleep. IV zosyn continued. Trace edema BLE. Pt able to position self independently. Pt in lowest position, side rails up x2, non-skid foot wear in place, call light within reach, pt verbalized understanding to call RN when needed. Hand hygiene practiced per protocol. Will continue to monitor.

## 2024-07-08 NOTE — SUBJECTIVE & OBJECTIVE
Interval History:     Patient is S/P ERCP x2 days with 2 stents placed in the left and right hepatic ducts.  Overnight, patient remained afebrile but abdominal pain has returned.  She is tender in the right upper quadrant and having pain while breathing when palpated.  T-bili increased to 3.8    Visit with patient, she is still experiencing abdominal pain and appears jaundiced. Pt also complaining of constipation. IR said intervention may not be beneficial at this point.     Oncology Treatment Plan:   OP GI liposomal irinotecan leucovorin fluorouracil Q2W    Medications:  Continuous Infusions:      Scheduled Meds:   piperacillin-tazobactam (Zosyn) IV (PEDS and ADULTS) (extended infusion is not appropriate)  4.5 g Intravenous Q8H    polyethylene glycol  17 g Oral Daily     PRN Meds:  Current Facility-Administered Medications:     acetaminophen, 650 mg, Oral, Q4H PRN    bisacodyL, 5 mg, Oral, Daily PRN    dextrose 10%, 12.5 g, Intravenous, PRN    dextrose 10%, 25 g, Intravenous, PRN    glucagon (human recombinant), 1 mg, Intramuscular, PRN    glucose, 16 g, Oral, PRN    glucose, 24 g, Oral, PRN    hydrOXYzine HCL, 25 mg, Oral, TID PRN    ibuprofen, 400 mg, Oral, Q6H PRN    melatonin, 6 mg, Oral, Nightly PRN    naloxone, 0.02 mg, Intravenous, PRN    ondansetron, 4 mg, Intravenous, Q8H PRN    oxyCODONE, 5 mg, Oral, Q6H PRN    oxyCODONE, 10 mg, Oral, Q6H PRN    senna-docusate 8.6-50 mg, 1 tablet, Oral, Daily PRN    simethicone, 1 tablet, Oral, TID PRN    sodium chloride 0.9%, 10 mL, Intravenous, Q12H PRN     Review of Systems   Constitutional:  Negative for activity change, appetite change, chills and fever.   Respiratory:  Negative for shortness of breath.    Cardiovascular:  Negative for chest pain.   Gastrointestinal:  Positive for abdominal pain and constipation. Negative for diarrhea, nausea and vomiting.     Objective:     Vital Signs (Most Recent):  Temp: 97.6 °F (36.4 °C) (07/08/24 1546)  Pulse: 88 (07/08/24  1546)  Resp: 20 (07/08/24 1546)  BP: 103/62 (07/08/24 1546)  SpO2: 96 % (07/08/24 1546) Vital Signs (24h Range):  Temp:  [97.6 °F (36.4 °C)-99.7 °F (37.6 °C)] 97.6 °F (36.4 °C)  Pulse:  [68-98] 88  Resp:  [18-20] 20  SpO2:  [94 %-98 %] 96 %  BP: (103-130)/(56-72) 103/62     Weight: 64.7 kg (142 lb 10.2 oz)  Body mass index is 26.09 kg/m².  Body surface area is 1.68 meters squared.      Intake/Output Summary (Last 24 hours) at 7/8/2024 1611  Last data filed at 7/8/2024 1545  Gross per 24 hour   Intake 1407.86 ml   Output 1490 ml   Net -82.14 ml        Physical Exam  Vitals and nursing note reviewed.   Constitutional:       Appearance: She is not ill-appearing.   Eyes:      General: No scleral icterus.     Conjunctiva/sclera: Conjunctivae normal.   Cardiovascular:      Rate and Rhythm: Normal rate and regular rhythm.      Pulses: Normal pulses.      Heart sounds: Normal heart sounds.   Pulmonary:      Effort: Pulmonary effort is normal.      Breath sounds: Normal breath sounds.   Abdominal:      Tenderness: There is generalized abdominal tenderness and tenderness in the right upper quadrant. Positive signs include Mahan's sign.   Skin:     General: Skin is warm and dry.      Coloration: Skin is jaundiced.   Neurological:      Mental Status: She is alert.          Significant Labs:   All pertinent labs from the last 24 hours have been reviewed.    Diagnostic Results:  I have reviewed all pertinent imaging results/findings within the past 24 hours.

## 2024-07-08 NOTE — CONSULTS
The patient is a 80 y/o F with PMHx of metastatic pancreatic cancer to liver/lung who was admitted on 7/1/24 for RUQ pain and fevers, found to have biliary obstruction and possible cholangitis. IR initially consulted on 7/5/24 for  PTC and biliary drains. At that time, recommended AES for biliary decompression. Patient underwent ERCP on 7/5/24 and R and L stents were placed. IR consulted today for PTC and biliary drains as pt's bilirubin continues to remain elevated and pt with RUQ abd pain. Case was discussed between IR and surg onc staff who agree that biliary drains would likely not significantly improve bilirubin as process is unlikely obstructive. Recommend continuing to trend bilirubin and consider palliative care consult. No indication for acute IR intervention at this time. Discussed with ordering team via epic secure chat.     Katherine Fragoso PA-C  Interventional Radiology  Spectra: [id number]  7/8/2024

## 2024-07-08 NOTE — ASSESSMENT & PLAN NOTE
Pt has been experiencing abdominal pain and fevers x 1 week. LFTs increased from previous, Total Bili-2. WBCs elevated. MRCP significant for evidence of a stricture causing bile duct dilation. In setting of fever with elevated LFT and alk phos, concerned for cholangitis. On PE, pt not jaundiced but is TTP to epigastric and RUQ area. UA normal. RIP unremarkable.    We had both AES and Dr. Ayala see her and they agreed that IF she needed an intervention it would be a PTC drain but they also agree that it's appropriate for her to just trial abx. However, with worsening labs and fevers overnight, she had ERCP done on 7/5. On 7/7, T-bili, ALP, AST, ALT increasing and she appears jaundiced. Pt experiencing abdominal pain again. IR consulted for increasing T-Bili. IR believes that intervention will not be beneficial in this case  - Continue IV zosyn for now  - Fluids PRN  - Trend Bili

## 2024-07-08 NOTE — PROGRESS NOTES
"[facility] - Oncology ([facility])  Hematology/Oncology  Progress Note    Patient Name: Deepali Alex  Admission Date: 7/1/2024  Hospital Length of Stay: 6 days  Code Status: Full Code     Subjective:     HPI:  Patient is a 79-year-old female with history of metastatic pancreatic adenocarcinoma to liver/lung who follows with Dr. Briceño. She finished chemo(gemcitabine + nab-paclitaxel) and is expected to start new regimen due to progression (5-FU and Onyvide) on 7/9. Pt admitted to medical oncology for fever and abdominal pain in setting of metastatic pancreatic adenocarcinoma. She has been experiencing fever x 1 week (Tmax of 101 at home) with worsening right sided abdominal pain. She denies CP, SOB, loss of appetite, diarrhea.      ED Course  Pt presented to the ED with abdominal pain and fevers.  Labs significant for ALP-881, AST-64, ALT-51, increased WBCs at 18.90. Preliminary blood cultures drawn. CT Abd shows "stable appearance of the heterogeneous hypoattenuating pancreatic tail lesion with continued abutment of the splenic artery." CT chest ruled out PE. Pt empirically started on IV zosyn for possible abdominal infection and sepsis protocol was initiated. Admitted to med onc for further management.     Interval History:     Patient is S/P ERCP x2 days with 2 stents placed in the left and right hepatic ducts.  Overnight, patient remained afebrile but abdominal pain has returned.  She is tender in the right upper quadrant and having pain while breathing when palpated.  T-bili increased to 3.8    Visit with patient, she is still experiencing abdominal pain and appears jaundiced. Pt also complaining of constipation. IR said intervention may not be beneficial at this point.     Oncology Treatment Plan:   OP GI liposomal irinotecan leucovorin fluorouracil Q2W    Medications:  Continuous Infusions:      Scheduled Meds:   piperacillin-tazobactam (Zosyn) IV (PEDS and ADULTS) (extended infusion is not " appropriate)  4.5 g Intravenous Q8H    polyethylene glycol  17 g Oral Daily     PRN Meds:  Current Facility-Administered Medications:     acetaminophen, 650 mg, Oral, Q4H PRN    bisacodyL, 5 mg, Oral, Daily PRN    dextrose 10%, 12.5 g, Intravenous, PRN    dextrose 10%, 25 g, Intravenous, PRN    glucagon (human recombinant), 1 mg, Intramuscular, PRN    glucose, 16 g, Oral, PRN    glucose, 24 g, Oral, PRN    hydrOXYzine HCL, 25 mg, Oral, TID PRN    ibuprofen, 400 mg, Oral, Q6H PRN    melatonin, 6 mg, Oral, Nightly PRN    naloxone, 0.02 mg, Intravenous, PRN    ondansetron, 4 mg, Intravenous, Q8H PRN    oxyCODONE, 5 mg, Oral, Q6H PRN    oxyCODONE, 10 mg, Oral, Q6H PRN    senna-docusate 8.6-50 mg, 1 tablet, Oral, Daily PRN    simethicone, 1 tablet, Oral, TID PRN    sodium chloride 0.9%, 10 mL, Intravenous, Q12H PRN     Review of Systems   Constitutional:  Negative for activity change, appetite change, chills and fever.   Respiratory:  Negative for shortness of breath.    Cardiovascular:  Negative for chest pain.   Gastrointestinal:  Positive for abdominal pain and constipation. Negative for diarrhea, nausea and vomiting.     Objective:     Vital Signs (Most Recent):  Temp: 97.6 °F (36.4 °C) (07/08/24 1546)  Pulse: 88 (07/08/24 1546)  Resp: 20 (07/08/24 1546)  BP: 103/62 (07/08/24 1546)  SpO2: 96 % (07/08/24 1546) Vital Signs (24h Range):  Temp:  [97.6 °F (36.4 °C)-99.7 °F (37.6 °C)] 97.6 °F (36.4 °C)  Pulse:  [68-98] 88  Resp:  [18-20] 20  SpO2:  [94 %-98 %] 96 %  BP: (103-130)/(56-72) 103/62     Weight: 64.7 kg (142 lb 10.2 oz)  Body mass index is 26.09 kg/m².  Body surface area is 1.68 meters squared.      Intake/Output Summary (Last 24 hours) at 7/8/2024 1611  Last data filed at 7/8/2024 1545  Gross per 24 hour   Intake 1407.86 ml   Output 1490 ml   Net -82.14 ml        Physical Exam  Vitals and nursing note reviewed.   Constitutional:       Appearance: She is not ill-appearing.   Eyes:      General: No scleral  icterus.     Conjunctiva/sclera: Conjunctivae normal.   Cardiovascular:      Rate and Rhythm: Normal rate and regular rhythm.      Pulses: Normal pulses.      Heart sounds: Normal heart sounds.   Pulmonary:      Effort: Pulmonary effort is normal.      Breath sounds: Normal breath sounds.   Abdominal:      Tenderness: There is generalized abdominal tenderness and tenderness in the right upper quadrant. Positive signs include Mahan's sign.   Skin:     General: Skin is warm and dry.      Coloration: Skin is jaundiced.   Neurological:      Mental Status: She is alert.          Significant Labs:   All pertinent labs from the last 24 hours have been reviewed.    Diagnostic Results:  I have reviewed all pertinent imaging results/findings within the past 24 hours.  Assessment/Plan:     * Sepsis  Pt has been experiencing abdominal pain and fevers x 1 week. LFTs increased from previous, Total Bili-2. WBCs elevated. MRCP significant for evidence of a stricture causing bile duct dilation. In setting of fever with elevated LFT and alk phos, concerned for cholangitis. On PE, pt not jaundiced but is TTP to epigastric and RUQ area. UA normal. RIP unremarkable.    We had both AES and Dr. Ayala see her and they agreed that IF she needed an intervention it would be a PTC drain but they also agree that it's appropriate for her to just trial abx. However, with worsening labs and fevers overnight, she had ERCP done on 7/5. On 7/7, T-bili, ALP, AST, ALT increasing and she appears jaundiced. Pt experiencing abdominal pain again. IR consulted for increasing T-Bili. IR believes that intervention will not be beneficial in this case  - Continue IV zosyn for now  - Fluids PRN  - Trend Bili    Constipation  - Optimize bowel regimen    Transaminitis  See plan for sepsis    Hyponatremia  Sodium on admission 132  - Daily CMP, monitor    Biliary stricture  See Sepsis    Pancreatic adenocarcinoma  History of Pancreatic adenocarcinoma with  "metastasis to liver/lungs. Currently follows up with Dr. Briceño. Pt completed chemo cycle (gemcitabine + nab-paclitaxel) in May and is expected to start new 5-FU and Onyvide due to disease progression. CT abdomen shows "stable appearance of the heterogeneous hypoattenuating pancreatic tail lesion with continued abutment of the splenic artery" and "mildly worse innumerable hypoattenuating hepatic lesions.  Findings remain concerning for hepatic metastatic disease."              Solomon Hyde MD  Hematology/Oncology  [facility] - Oncology ([facility])      "

## 2024-07-08 NOTE — NURSING
Pt involved in plan of care and communicating needs throughout shift. No bm thus far this shift. PRN Senna given for constipation. Last bm was 7/4, MD aware. Up in room and to bathroom independently; no c/o pain. Patient diet returned to regular diet. Pt. voiding without difficulty. Trace edema on BLE. Telemetry and continuous pulse ox monitored. Iv antibiotic provided. All VSS; Pt. Afebrile, no acute events so far this shift.  Pt remaining free from falls or injury throughout shift; bed locked and in lowest position; call light within reach.  Pt instructed to call for assistance as needed.  Q1H rounding done on pt.

## 2024-07-09 ENCOUNTER — ANESTHESIA EVENT (OUTPATIENT)
Dept: ENDOSCOPY | Facility: HOSPITAL | Age: 79
DRG: 871 | End: 2024-07-09
Payer: MEDICARE

## 2024-07-09 LAB
ALBUMIN SERPL BCP-MCNC: 1.9 G/DL (ref 3.5–5.2)
ALP SERPL-CCNC: 665 U/L (ref 55–135)
ALT SERPL W/O P-5'-P-CCNC: 31 U/L (ref 10–44)
ANION GAP SERPL CALC-SCNC: 9 MMOL/L (ref 8–16)
AST SERPL-CCNC: 52 U/L (ref 10–40)
BASOPHILS # BLD AUTO: 0.09 K/UL (ref 0–0.2)
BASOPHILS NFR BLD: 0.4 % (ref 0–1.9)
BILIRUB SERPL-MCNC: 4.9 MG/DL (ref 0.1–1)
BUN SERPL-MCNC: 9 MG/DL (ref 8–23)
CALCIUM SERPL-MCNC: 7.9 MG/DL (ref 8.7–10.5)
CHLORIDE SERPL-SCNC: 99 MMOL/L (ref 95–110)
CO2 SERPL-SCNC: 25 MMOL/L (ref 23–29)
CREAT SERPL-MCNC: 0.6 MG/DL (ref 0.5–1.4)
DIFFERENTIAL METHOD BLD: ABNORMAL
EOSINOPHIL # BLD AUTO: 0.3 K/UL (ref 0–0.5)
EOSINOPHIL NFR BLD: 1.4 % (ref 0–8)
ERYTHROCYTE [DISTWIDTH] IN BLOOD BY AUTOMATED COUNT: 21.6 % (ref 11.5–14.5)
EST. GFR  (NO RACE VARIABLE): >60 ML/MIN/1.73 M^2
GLUCOSE SERPL-MCNC: 112 MG/DL (ref 70–110)
HCT VFR BLD AUTO: 25.4 % (ref 37–48.5)
HGB BLD-MCNC: 8.2 G/DL (ref 12–16)
HYPOCHROMIA BLD QL SMEAR: ABNORMAL
IMM GRANULOCYTES # BLD AUTO: 0.25 K/UL (ref 0–0.04)
IMM GRANULOCYTES NFR BLD AUTO: 1 % (ref 0–0.5)
INR PPP: 1.3 (ref 0.8–1.2)
LACTATE SERPL-SCNC: 2 MMOL/L (ref 0.5–2.2)
LYMPHOCYTES # BLD AUTO: 1 K/UL (ref 1–4.8)
LYMPHOCYTES NFR BLD: 4 % (ref 18–48)
MAGNESIUM SERPL-MCNC: 1.9 MG/DL (ref 1.6–2.6)
MCH RBC QN AUTO: 27.1 PG (ref 27–31)
MCHC RBC AUTO-ENTMCNC: 32.3 G/DL (ref 32–36)
MCV RBC AUTO: 84 FL (ref 82–98)
MONOCYTES # BLD AUTO: 2 K/UL (ref 0.3–1)
MONOCYTES NFR BLD: 8.2 % (ref 4–15)
NEUTROPHILS # BLD AUTO: 20.7 K/UL (ref 1.8–7.7)
NEUTROPHILS NFR BLD: 85 % (ref 38–73)
NRBC BLD-RTO: 0 /100 WBC
PHOSPHATE SERPL-MCNC: 2 MG/DL (ref 2.7–4.5)
PLATELET # BLD AUTO: 342 K/UL (ref 150–450)
PMV BLD AUTO: 10.7 FL (ref 9.2–12.9)
POTASSIUM SERPL-SCNC: 3.5 MMOL/L (ref 3.5–5.1)
PROCALCITONIN SERPL IA-MCNC: 0.76 NG/ML
PROT SERPL-MCNC: 5.5 G/DL (ref 6–8.4)
PROTHROMBIN TIME: 13.6 SEC (ref 9–12.5)
RBC # BLD AUTO: 3.03 M/UL (ref 4–5.4)
SODIUM SERPL-SCNC: 133 MMOL/L (ref 136–145)
WBC # BLD AUTO: 24.37 K/UL (ref 3.9–12.7)

## 2024-07-09 PROCEDURE — 83605 ASSAY OF LACTIC ACID: CPT

## 2024-07-09 PROCEDURE — 36415 COLL VENOUS BLD VENIPUNCTURE: CPT

## 2024-07-09 PROCEDURE — 25000003 PHARM REV CODE 250: Performed by: STUDENT IN AN ORGANIZED HEALTH CARE EDUCATION/TRAINING PROGRAM

## 2024-07-09 PROCEDURE — 85025 COMPLETE CBC W/AUTO DIFF WBC: CPT | Performed by: STUDENT IN AN ORGANIZED HEALTH CARE EDUCATION/TRAINING PROGRAM

## 2024-07-09 PROCEDURE — 25000003 PHARM REV CODE 250

## 2024-07-09 PROCEDURE — 84100 ASSAY OF PHOSPHORUS: CPT | Performed by: STUDENT IN AN ORGANIZED HEALTH CARE EDUCATION/TRAINING PROGRAM

## 2024-07-09 PROCEDURE — 36415 COLL VENOUS BLD VENIPUNCTURE: CPT | Performed by: STUDENT IN AN ORGANIZED HEALTH CARE EDUCATION/TRAINING PROGRAM

## 2024-07-09 PROCEDURE — 83735 ASSAY OF MAGNESIUM: CPT | Performed by: STUDENT IN AN ORGANIZED HEALTH CARE EDUCATION/TRAINING PROGRAM

## 2024-07-09 PROCEDURE — 87040 BLOOD CULTURE FOR BACTERIA: CPT

## 2024-07-09 PROCEDURE — 80053 COMPREHEN METABOLIC PANEL: CPT | Performed by: STUDENT IN AN ORGANIZED HEALTH CARE EDUCATION/TRAINING PROGRAM

## 2024-07-09 PROCEDURE — 63600175 PHARM REV CODE 636 W HCPCS: Performed by: HOSPITALIST

## 2024-07-09 PROCEDURE — 84145 PROCALCITONIN (PCT): CPT

## 2024-07-09 PROCEDURE — 99233 SBSQ HOSP IP/OBS HIGH 50: CPT | Mod: GC,,, | Performed by: HOSPITALIST

## 2024-07-09 PROCEDURE — 20600001 HC STEP DOWN PRIVATE ROOM

## 2024-07-09 PROCEDURE — 63600175 PHARM REV CODE 636 W HCPCS

## 2024-07-09 PROCEDURE — 63600175 PHARM REV CODE 636 W HCPCS: Performed by: STUDENT IN AN ORGANIZED HEALTH CARE EDUCATION/TRAINING PROGRAM

## 2024-07-09 PROCEDURE — 85610 PROTHROMBIN TIME: CPT | Performed by: STUDENT IN AN ORGANIZED HEALTH CARE EDUCATION/TRAINING PROGRAM

## 2024-07-09 PROCEDURE — 25000003 PHARM REV CODE 250: Performed by: HOSPITALIST

## 2024-07-09 RX ORDER — SODIUM,POTASSIUM PHOSPHATES 280-250MG
2 POWDER IN PACKET (EA) ORAL
Status: DISCONTINUED | OUTPATIENT
Start: 2024-07-09 | End: 2024-07-12 | Stop reason: HOSPADM

## 2024-07-09 RX ADMIN — VANCOMYCIN HYDROCHLORIDE 1500 MG: 1.5 INJECTION, POWDER, LYOPHILIZED, FOR SOLUTION INTRAVENOUS at 02:07

## 2024-07-09 RX ADMIN — PIPERACILLIN SODIUM AND TAZOBACTAM SODIUM 4.5 G: 4; .5 INJECTION, POWDER, FOR SOLUTION INTRAVENOUS at 02:07

## 2024-07-09 RX ADMIN — POTASSIUM & SODIUM PHOSPHATES POWDER PACK 280-160-250 MG 2 PACKET: 280-160-250 PACK at 09:07

## 2024-07-09 RX ADMIN — ACETAMINOPHEN 650 MG: 325 TABLET ORAL at 05:07

## 2024-07-09 RX ADMIN — POTASSIUM & SODIUM PHOSPHATES POWDER PACK 280-160-250 MG 2 PACKET: 280-160-250 PACK at 05:07

## 2024-07-09 RX ADMIN — PIPERACILLIN SODIUM AND TAZOBACTAM SODIUM 4.5 G: 4; .5 INJECTION, POWDER, FOR SOLUTION INTRAVENOUS at 05:07

## 2024-07-09 RX ADMIN — POTASSIUM & SODIUM PHOSPHATES POWDER PACK 280-160-250 MG 2 PACKET: 280-160-250 PACK at 10:07

## 2024-07-09 RX ADMIN — SODIUM CHLORIDE, POTASSIUM CHLORIDE, SODIUM LACTATE AND CALCIUM CHLORIDE 500 ML: 600; 310; 30; 20 INJECTION, SOLUTION INTRAVENOUS at 09:07

## 2024-07-09 RX ADMIN — PIPERACILLIN SODIUM AND TAZOBACTAM SODIUM 4.5 G: 4; .5 INJECTION, POWDER, FOR SOLUTION INTRAVENOUS at 09:07

## 2024-07-09 NOTE — PROGRESS NOTES
Pharmacokinetic Initial Assessment: IV Vancomycin    Assessment/Plan:    Initiate intravenous vancomycin with loading dose of 1500 mg once followed by a maintenance dose of vancomycin 1000mg IV every 12 hours  Desired empiric serum trough concentration is 10 to 20 mcg/mL  Draw vancomycin trough level 60 min prior to third dose on 7/10 at approximately 1030.  Pharmacy will continue to follow and monitor vancomycin.      Please contact pharmacy at extension [phone] with any questions regarding this assessment.     Thank you for the consult,   Cassy Mccoy       Patient brief summary:  Deepali Alex is a 79 y.o. female initiated on antimicrobial therapy with IV Vancomycin for treatment of suspected sepsis    Drug Allergies:   Review of patient's allergies indicates:   Allergen Reactions    Erythromycin (bulk)      Other reaction(s): Eye irritation       Actual Body Weight:   64.7 kg    Renal Function:   Estimated Creatinine Clearance: 69.3 mL/min (based on SCr of 0.6 mg/dL).,     Dialysis Method (if applicable):  N/A    CBC (last 72 hours):  Recent Labs   Lab Result Units 07/07/24  0501 07/08/24  0237 07/09/24  0450   WBC K/uL 19.23* 17.96* 24.37*   Hemoglobin g/dL 8.7* 7.8* 8.2*   Hematocrit % 27.5* 23.7* 25.4*   Platelets K/uL 352 326 342   Gran % % 80.8* 83.4* 85.0*   Lymph % % 6.0* 4.8* 4.0*   Mono % % 8.8 7.0 8.2   Eosinophil % % 3.0 3.1 1.4   Basophil % % 0.5 0.5 0.4   Differential Method  Automated Automated Automated       Metabolic Panel (last 72 hours):  Recent Labs   Lab Result Units 07/07/24  0501 07/08/24  0237 07/09/24  0450   Sodium mmol/L 134* 134* 133*   Potassium mmol/L 3.8 3.9 3.5   Chloride mmol/L 102 102 99   CO2 mmol/L 22* 25 25   Glucose mg/dL 103 127* 112*   BUN mg/dL 10 11 9   Creatinine mg/dL 0.6 0.6 0.6   Albumin g/dL 2.0* 1.9* 1.9*   Total Bilirubin mg/dL 3.6* 3.8* 4.9*   Alkaline Phosphatase U/L 750* 690* 665*   AST U/L 56* 45* 52*   ALT U/L 40 36 31   Magnesium mg/dL 1.9 2.0 1.9  "  Phosphorus mg/dL 2.4* 2.7 2.0*       Drug levels (last 3 results):  No results for input(s): "VANCOMYCINRA", "VANCORANDOM", "VANCOMYCINPE", "VANCOPEAK", "VANCOMYCINTR", "VANCOTROUGH" in the last 72 hours.    Microbiologic Results:  Microbiology Results (last 7 days)       Procedure Component Value Units Date/Time    Blood culture [[id number]] Collected: 07/09/24 1001    Order Status: Sent Specimen: Blood from Peripheral, Antecubital, Right     Blood culture [[id number]] Collected: 07/09/24 1001    Order Status: Sent Specimen: Blood     Blood culture [[id number]] Collected: 07/05/24 0500    Order Status: Completed Specimen: Blood Updated: 07/09/24 0812     Blood Culture, Routine No Growth to date      No Growth to date      No Growth to date      No Growth to date      No Growth to date    Blood culture [[id number]] Collected: 07/05/24 0459    Order Status: Completed Specimen: Blood Updated: 07/09/24 0812     Blood Culture, Routine No Growth to date      No Growth to date      No Growth to date      No Growth to date      No Growth to date    Blood culture x two cultures. Draw prior to antibiotics. [[id number]] Collected: 07/02/24 0012    Order Status: Completed Specimen: Blood from Wrist, Left Updated: 07/07/24 0612     Blood Culture, Routine No growth after 5 days.    Narrative:      Aerobic and anaerobic    Blood culture x two cultures. Draw prior to antibiotics. [[id number]] Collected: 07/02/24 0012    Order Status: Completed Specimen: Blood from Peripheral, Hand, Right Updated: 07/07/24 0612     Blood Culture, Routine No growth after 5 days.    Narrative:      Aerobic and anaerobic    Respiratory Infection Panel (PCR), Nasopharyngeal [[id number]] Collected: 07/02/24 1452    Order Status: Completed Specimen: Nasopharyngeal Swab Updated: 07/02/24 1650     Respiratory Infection Panel Source NP Swab     Adenovirus Not Detected     Coronavirus 229E, Common Cold Virus Not Detected     Coronavirus HKU1, Common Cold " Virus Not Detected     Coronavirus NL63, Common Cold Virus Not Detected     Coronavirus OC43, Common Cold Virus Not Detected     Comment: The Coronavirus strains detected in this test cause the common cold.  These strains are not the COVID-19 (novel Coronavirus)strain   associated with the respiratory disease outbreak.          SARS-CoV2 (COVID-19) Qualitative PCR Not Detected     Human Metapneumovirus Not Detected     Human Rhinovirus/Enterovirus Not Detected     Influenza A (subtypes H1, H1-2009,H3) Not Detected     Influenza B Not Detected     Parainfluenza Virus 1 Not Detected     Parainfluenza Virus 2 Not Detected     Parainfluenza Virus 3 Not Detected     Parainfluenza Virus 4 Not Detected     Respiratory Syncytial Virus Not Detected     Bordetella Parapertussis (HQ3780) Not Detected     Bordetella pertussis (ptxP) Not Detected     Chlamydia pneumoniae Not Detected     Mycoplasma pneumoniae Not Detected    Narrative:      Assay not valid for lower respiratory specimens, alternate  testing required.

## 2024-07-09 NOTE — SUBJECTIVE & OBJECTIVE
Interval History:     Patient is S/P ERCP x3 days with 2 stents placed in the left and right hepatic ducts.  Overnight, patient febrile 100.8.. T-bili increased to 4.9.    Visit with patient, she is still experiencing abdominal pain and appears jaundiced. Vanc added back. Given fluids. Blood cultures redrawn in case of sepsis.    Oncology Treatment Plan:   OP GI liposomal irinotecan leucovorin fluorouracil Q2W    Medications:  Continuous Infusions:      Scheduled Meds:   piperacillin-tazobactam (Zosyn) IV (PEDS and ADULTS) (extended infusion is not appropriate)  4.5 g Intravenous Q8H    polyethylene glycol  17 g Oral Daily    potassium, sodium phosphates  2 packet Oral QID (AC & HS)    vancomycin (VANCOCIN) IV (PEDS and ADULTS)  1,500 mg Intravenous Once    Followed by    vancomycin (VANCOCIN) IV (PEDS and ADULTS)  15 mg/kg Intravenous Q12H     PRN Meds:  Current Facility-Administered Medications:     acetaminophen, 650 mg, Oral, Q4H PRN    bisacodyL, 5 mg, Oral, Daily PRN    dextrose 10%, 12.5 g, Intravenous, PRN    dextrose 10%, 25 g, Intravenous, PRN    glucagon (human recombinant), 1 mg, Intramuscular, PRN    glucose, 16 g, Oral, PRN    glucose, 24 g, Oral, PRN    hydrOXYzine HCL, 25 mg, Oral, TID PRN    ibuprofen, 400 mg, Oral, Q6H PRN    melatonin, 6 mg, Oral, Nightly PRN    naloxone, 0.02 mg, Intravenous, PRN    ondansetron, 4 mg, Intravenous, Q8H PRN    oxyCODONE, 5 mg, Oral, Q6H PRN    oxyCODONE, 10 mg, Oral, Q6H PRN    senna-docusate 8.6-50 mg, 1 tablet, Oral, Daily PRN    simethicone, 1 tablet, Oral, TID PRN    sodium chloride 0.9%, 10 mL, Intravenous, Q12H PRN    Pharmacy to dose Vancomycin consult, , , Once **AND** vancomycin - pharmacy to dose, , Intravenous, pharmacy to manage frequency     Review of Systems   Constitutional:  Positive for fever. Negative for activity change, appetite change and chills.   Respiratory:  Negative for shortness of breath.    Cardiovascular:  Negative for chest pain.    Gastrointestinal:  Positive for abdominal pain and constipation. Negative for diarrhea, nausea and vomiting.     Objective:     Vital Signs (Most Recent):  Temp: 97.9 °F (36.6 °C) (07/09/24 1215)  Pulse: 70 (07/09/24 1215)  Resp: 18 (07/09/24 1215)  BP: 105/64 (07/09/24 1215)  SpO2: (!) 93 % (07/09/24 1215) Vital Signs (24h Range):  Temp:  [97.5 °F (36.4 °C)-100.8 °F (38.2 °C)] 97.9 °F (36.6 °C)  Pulse:  [] 70  Resp:  [12-20] 18  SpO2:  [93 %-97 %] 93 %  BP: ()/(53-68) 105/64     Weight: 64.7 kg (142 lb 10.2 oz)  Body mass index is 26.09 kg/m².  Body surface area is 1.68 meters squared.      Intake/Output Summary (Last 24 hours) at 7/9/2024 1430  Last data filed at 7/8/2024 2127  Gross per 24 hour   Intake 852.64 ml   Output 95 ml   Net 757.64 ml        Physical Exam  Vitals and nursing note reviewed.   Constitutional:       Appearance: She is ill-appearing.   Eyes:      General: No scleral icterus.     Conjunctiva/sclera: Conjunctivae normal.   Cardiovascular:      Rate and Rhythm: Normal rate and regular rhythm.      Pulses: Normal pulses.      Heart sounds: Normal heart sounds.   Pulmonary:      Effort: Pulmonary effort is normal.      Breath sounds: Normal breath sounds.   Abdominal:      Tenderness: There is generalized abdominal tenderness and tenderness in the right upper quadrant.   Skin:     General: Skin is warm and dry.      Coloration: Skin is jaundiced.   Neurological:      Mental Status: She is alert.          Significant Labs:   All pertinent labs from the last 24 hours have been reviewed.    Diagnostic Results:  I have reviewed all pertinent imaging results/findings within the past 24 hours.

## 2024-07-09 NOTE — NURSING
"RAPID RESPONSE NURSE CHART REVIEW        Chart Reviewed: 07/09/2024, 4:56 AM    MRN: [id number]  Bed: 855/855 A    Dx: Sepsis    Deepali Alex has a past medical history of Age-related osteoporosis without current pathological fracture, Atherosclerosis of aortic arch, Back pain, Diverticulosis of sigmoid colon, Ectopic pregnancy, Fibrocystic breast, Hyperlipidemia, Inguinal hernia, Osteopenia, Polyp of ascending colon, Sepsis, and Urinary incontinence, mixed.    Last VS: /68   Pulse 109   Temp (!) 100.8 °F (38.2 °C) (Oral)   Resp 17   Ht 5' 2" (1.575 m)   Wt 64.7 kg (142 lb 10.2 oz)   SpO2 95%   Breastfeeding No   BMI 26.09 kg/m²     24H Vital Sign Range:  Temp:  [97.6 °F (36.4 °C)-100.8 °F (38.2 °C)]   Pulse:  []   Resp:  [16-20]   BP: (103-135)/(61-68)   SpO2:  [93 %-97 %]     Level of Consciousness (AVPU): responds to voice    Recent Labs     07/07/24  0501 07/08/24  0237   WBC 19.23* 17.96*   HGB 8.7* 7.8*   HCT 27.5* 23.7*    326       Recent Labs     07/07/24  0501 07/08/24  0237   * 134*   K 3.8 3.9    102   CO2 22* 25   BUN 10 11   CREATININE 0.6 0.6    127*   PHOS 2.4* 2.7   MG 1.9 2.0        No results for input(s): "PH", "PCO2", "PO2", "HCO3", "POCSATURATED", "BE" in the last 72 hours.     OXYGEN:  Flow (L/min) (Oxygen Therapy): 2          MEWS score: 1    Chart review completed. No additional concerns verbalized at this time. Instructed to call [phone] for further concerns or assistance.    Marie Cowan RN        "

## 2024-07-09 NOTE — TREATMENT PLAN
AES Treatment Plan    Deepali Alex is a 79 y.o. female admitted to hospital 7/1/2024 (Hospital Day: 9) due to Sepsis.     Interval History  T bili continues to rise (up to 4.9 today). IR r/o intervention.     Blood cx NGTD.     Objective  Temp:  [97.5 °F (36.4 °C)-100.8 °F (38.2 °C)] 97.9 °F (36.6 °C) (07/09 1215)  Pulse:  [] 70 (07/09 1215)  BP: ()/(53-68) 105/64 (07/09 1215)  Resp:  [12-20] 18 (07/09 1215)  SpO2:  [93 %-97 %] 93 % (07/09 1215)        Laboratory  Lab Results   Component Value Date    WBC 24.37 (H) 07/09/2024    HGB 8.2 (L) 07/09/2024    HCT 25.4 (L) 07/09/2024    MCV 84 07/09/2024     07/09/2024       Lab Results   Component Value Date    ALT 31 07/09/2024    AST 52 (H) 07/09/2024    ALKPHOS 665 (H) 07/09/2024    BILITOT 4.9 (H) 07/09/2024       Assessment  79-year-old female with past medical history of metastatic pancreatic cancer to liver and lung (diagnosed by IR guided liver biopsy, finished chemo in May) presenting with a fever and chills (Tmax 101 F at home). Labs on arrival notable for WBC 17, T bili 1.6 (up from 0.5), , AST 60, and ALT 51. UA +ve for leukocytes. MRI MRCP showed innumerable solid and cystic in liver. There is dilatation of the intrahepatic biliary radicles and absent signal/abrupt change in caliber and obliteration of the confluence of the intrahepatic biliary radicles. Not on blood thinners. No hx of prior gastric surgeries.     S/p ERCP showed diffuse biliary strictures & 1 biliary stent each was placed into the left and right hepatic duct. However LFTs continue to rise.     #biliary obstruction 2/2 diffuse biliary strictures    Plan  - Plan on repeat ERCP tomorrow. NPO MN.   - Would strongly recommend Palliative Care consult for GOC discussions.   - Daily CBC, CMP.   - Plan of care was discussed with primary team.  - We will continue to follow.    Thank you for involving us in the care of Deepali Alex. Please call with any  additional questions, concerns or changes in the patient's clinical status.    Manuel Hearn MD, PGY-VI  Gastroenterology Fellow  [facility]

## 2024-07-09 NOTE — ASSESSMENT & PLAN NOTE
Pt has been experiencing abdominal pain and fevers x 1 week. LFTs increased from previous, Total Bili-2. WBCs elevated. MRCP significant for evidence of a stricture causing bile duct dilation. In setting of fever with elevated LFT and alk phos, concerned for cholangitis. On PE, pt not jaundiced but is TTP to epigastric and RUQ area. UA normal. RIP unremarkable.    We had both AES and Dr. Ayala see her and they agreed that IF she needed an intervention it would be a PTC drain but they also agree that it's appropriate for her to just trial abx. However, with worsening labs and fevers overnight, she had ERCP done on 7/5. On 7/7, T-bili, ALP, AST, ALT increasing and she appears jaundiced. Pt experiencing abdominal pain again. IR consulted for increasing T-Bili. IR believes that intervention will not be beneficial in this case. 7/8 overnight, pt experienced febrile at 100.8. Concerned for sepsis. Vanc added back, received fluids. Blood cultures drawn.  - IV zosyn/vanc  - Fluids PRN  - Trend Bili  - Blood cultures pending

## 2024-07-09 NOTE — PROGRESS NOTES
"[facility] ([facility])  Hematology/Oncology  Progress Note    Patient Name: Deepali Alex  Admission Date: 7/1/2024  Hospital Length of Stay: 7 days  Code Status: Full Code     Subjective:     HPI:  Patient is a 79-year-old female with history of metastatic pancreatic adenocarcinoma to liver/lung who follows with Dr. Briceño. She finished chemo(gemcitabine + nab-paclitaxel) and is expected to start new regimen due to progression (5-FU and Onyvide) on 7/9. Pt admitted to medical oncology for fever and abdominal pain in setting of metastatic pancreatic adenocarcinoma. She has been experiencing fever x 1 week (Tmax of 101 at home) with worsening right sided abdominal pain. She denies CP, SOB, loss of appetite, diarrhea.      ED Course  Pt presented to the ED with abdominal pain and fevers.  Labs significant for ALP-881, AST-64, ALT-51, increased WBCs at 18.90. Preliminary blood cultures drawn. CT Abd shows "stable appearance of the heterogeneous hypoattenuating pancreatic tail lesion with continued abutment of the splenic artery." CT chest ruled out PE. Pt empirically started on IV zosyn for possible abdominal infection and sepsis protocol was initiated. Admitted to med onc for further management.     Interval History:     Patient is S/P ERCP x3 days with 2 stents placed in the left and right hepatic ducts.  Overnight, patient febrile 100.8.. T-bili increased to 4.9.    Visit with patient, she is still experiencing abdominal pain and appears jaundiced. Vanc added back. Given fluids. Blood cultures redrawn in case of sepsis.    Oncology Treatment Plan:   OP GI liposomal irinotecan leucovorin fluorouracil Q2W    Medications:  Continuous Infusions:      Scheduled Meds:   piperacillin-tazobactam (Zosyn) IV (PEDS and ADULTS) (extended infusion is not appropriate)  4.5 g Intravenous Q8H    polyethylene glycol  17 g Oral Daily    potassium, sodium phosphates  2 packet Oral QID (AC & HS)    vancomycin " (VANCOCIN) IV (PEDS and ADULTS)  1,500 mg Intravenous Once    Followed by    vancomycin (VANCOCIN) IV (PEDS and ADULTS)  15 mg/kg Intravenous Q12H     PRN Meds:  Current Facility-Administered Medications:     acetaminophen, 650 mg, Oral, Q4H PRN    bisacodyL, 5 mg, Oral, Daily PRN    dextrose 10%, 12.5 g, Intravenous, PRN    dextrose 10%, 25 g, Intravenous, PRN    glucagon (human recombinant), 1 mg, Intramuscular, PRN    glucose, 16 g, Oral, PRN    glucose, 24 g, Oral, PRN    hydrOXYzine HCL, 25 mg, Oral, TID PRN    ibuprofen, 400 mg, Oral, Q6H PRN    melatonin, 6 mg, Oral, Nightly PRN    naloxone, 0.02 mg, Intravenous, PRN    ondansetron, 4 mg, Intravenous, Q8H PRN    oxyCODONE, 5 mg, Oral, Q6H PRN    oxyCODONE, 10 mg, Oral, Q6H PRN    senna-docusate 8.6-50 mg, 1 tablet, Oral, Daily PRN    simethicone, 1 tablet, Oral, TID PRN    sodium chloride 0.9%, 10 mL, Intravenous, Q12H PRN    Pharmacy to dose Vancomycin consult, , , Once **AND** vancomycin - pharmacy to dose, , Intravenous, pharmacy to manage frequency     Review of Systems   Constitutional:  Positive for fever. Negative for activity change, appetite change and chills.   Respiratory:  Negative for shortness of breath.    Cardiovascular:  Negative for chest pain.   Gastrointestinal:  Positive for abdominal pain and constipation. Negative for diarrhea, nausea and vomiting.     Objective:     Vital Signs (Most Recent):  Temp: 97.9 °F (36.6 °C) (07/09/24 1215)  Pulse: 70 (07/09/24 1215)  Resp: 18 (07/09/24 1215)  BP: 105/64 (07/09/24 1215)  SpO2: (!) 93 % (07/09/24 1215) Vital Signs (24h Range):  Temp:  [97.5 °F (36.4 °C)-100.8 °F (38.2 °C)] 97.9 °F (36.6 °C)  Pulse:  [] 70  Resp:  [12-20] 18  SpO2:  [93 %-97 %] 93 %  BP: ()/(53-68) 105/64     Weight: 64.7 kg (142 lb 10.2 oz)  Body mass index is 26.09 kg/m².  Body surface area is 1.68 meters squared.      Intake/Output Summary (Last 24 hours) at 7/9/2024 1430  Last data filed at 7/8/2024 8137  Gross  per 24 hour   Intake 852.64 ml   Output 95 ml   Net 757.64 ml        Physical Exam  Vitals and nursing note reviewed.   Constitutional:       Appearance: She is ill-appearing.   Eyes:      General: No scleral icterus.     Conjunctiva/sclera: Conjunctivae normal.   Cardiovascular:      Rate and Rhythm: Normal rate and regular rhythm.      Pulses: Normal pulses.      Heart sounds: Normal heart sounds.   Pulmonary:      Effort: Pulmonary effort is normal.      Breath sounds: Normal breath sounds.   Abdominal:      Tenderness: There is generalized abdominal tenderness and tenderness in the right upper quadrant.   Skin:     General: Skin is warm and dry.      Coloration: Skin is jaundiced.   Neurological:      Mental Status: She is alert.          Significant Labs:   All pertinent labs from the last 24 hours have been reviewed.    Diagnostic Results:  I have reviewed all pertinent imaging results/findings within the past 24 hours.  Assessment/Plan:     * Sepsis  Pt has been experiencing abdominal pain and fevers x 1 week. LFTs increased from previous, Total Bili-2. WBCs elevated. MRCP significant for evidence of a stricture causing bile duct dilation. In setting of fever with elevated LFT and alk phos, concerned for cholangitis. On PE, pt not jaundiced but is TTP to epigastric and RUQ area. UA normal. RIP unremarkable.    We had both AES and Dr. Ayala see her and they agreed that IF she needed an intervention it would be a PTC drain but they also agree that it's appropriate for her to just trial abx. However, with worsening labs and fevers overnight, she had ERCP done on 7/5. On 7/7, T-bili, ALP, AST, ALT increasing and she appears jaundiced. Pt experiencing abdominal pain again. IR consulted for increasing T-Bili. IR believes that intervention will not be beneficial in this case. 7/8 overnight, pt experienced febrile at 100.8. Concerned for sepsis. Vanc added back, received fluids. Blood cultures drawn.  - IV  "zosyn/vanc  - Fluids PRN  - Trend Bili  - Blood cultures pending    Constipation  - Optimize bowel regimen    Transaminitis  See plan for sepsis    Hyponatremia  Sodium on admission 132  - Daily CMP, monitor    Biliary stricture  See Sepsis    Pancreatic adenocarcinoma  History of Pancreatic adenocarcinoma with metastasis to liver/lungs. Currently follows up with Dr. Briceño. Pt completed chemo cycle (gemcitabine + nab-paclitaxel) in May and is expected to start new 5-FU and Onyvide due to disease progression. CT abdomen shows "stable appearance of the heterogeneous hypoattenuating pancreatic tail lesion with continued abutment of the splenic artery" and "mildly worse innumerable hypoattenuating hepatic lesions.  Findings remain concerning for hepatic metastatic disease."              Solomon Hyde MD  Hematology/Oncology  [facility] - Oncology ([facility])      "

## 2024-07-09 NOTE — PLAN OF CARE
Problem: Adult Inpatient Plan of Care  Goal: Plan of Care Review  Outcome: Progressing  Goal: Patient-Specific Goal (Individualized)  Outcome: Progressing  Goal: Absence of Hospital-Acquired Illness or Injury  Outcome: Progressing  Goal: Optimal Comfort and Wellbeing  Outcome: Progressing  Goal: Readiness for Transition of Care  Outcome: Progressing     Pt AAOx4. Tmax of 100.8. Rechecked an hour later per protocol. Temp to 98.9. Other vitals stable. Zosyn given. Safety maintained. Call bell within reach.

## 2024-07-09 NOTE — ANESTHESIA PREPROCEDURE EVALUATION
[facility]  Anesthesia Pre-Operative Evaluation         Patient Name: Deepali Alex  YOB: 1945  MRN: [id number]    SUBJECTIVE:     Pre-operative Evaluation for Procedure(s) (LRB):  ERCP (ENDOSCOPIC RETROGRADE CHOLANGIOPANCREATOGRAPHY) (N/A)     07/09/2024    Deepali Alex is a 79 y.o. female with a PMHx significant for pancreatic adenocarcinoma, sepsis, cystic masses s/p ERCP 7/5 with stents placed, hx of aortic athlersclerosis.    The patient now presents for the above procedure(s).    Previous Airway: None documented.    LDA:        PowerPort A Cath Single Lumen 10/25/23 1350 Internal Jugular Right (Active)   Site Assessment No drainage;No redness;No swelling;No warmth 07/07/24 1920   Status Not Accessed 07/08/24 1940   Number of days: 258            Peripheral IV - Single Lumen 07/07/24 1502 22 G Left;Medial Wrist (Active)   Site Assessment Clean;Dry;Intact 07/09/24 0800   Extremity Assessment Distal to IV No redness;No swelling;No warmth 07/07/24 1920   Line Status Capped 07/09/24 0800   Dressing Status Clean;Dry 07/09/24 0800   Dressing Intervention Integrity maintained 07/09/24 0800   Dressing Change Due 07/11/24 07/07/24 1920   Site Change Due 07/11/24 07/07/24 1920   Reason Not Rotated Not due 07/07/24 1920   Number of days: 1       Drips: None documented.      Patient Active Problem List   Diagnosis    Dyslipidemia    Recurrent urinary tract infection    Family history of breast cancer    AR (allergic rhinitis)    Intermittent vertigo    Aortic atherosclerosis    Age-related osteoporosis without current pathological fracture    Tubular adenoma of colon 10/22/2019 colonoscopy with ascending tubular adenoma 3 mm    Right bundle branch block (RBBB) with left anterior fascicular block (LAFB)    Diastolic dysfunction    Urinary incontinence, mixed    Vaginal vault prolapse    Incomplete emptying of bladder    Rectocele, female    Fecal soiling    Vaginal atrophy     Pelvic floor dysfunction    Coordination impairment    Postural imbalance    Pancreatic adenocarcinoma    Liver disease, unspecified    Metastasis to liver    Pain of upper abdomen    Sepsis    Biliary stricture    Hyponatremia    Transaminitis    Constipation       Past Medical History:   Diagnosis Date    Age-related osteoporosis without current pathological fracture     Atherosclerosis of aortic arch     - noted on CXR 8/2017    Back pain     Diverticulosis of sigmoid colon 10/22/2019    Ectopic pregnancy     Fibrocystic breast     Hyperlipidemia     Inguinal hernia     Osteopenia     Polyp of ascending colon 10/22/2019    Sepsis 7/2/2024    Urinary incontinence, mixed 05/24/2022       Review of patient's allergies indicates:   Allergen Reactions    Erythromycin (bulk)      Other reaction(s): Eye irritation       Current Inpatient Medications:   piperacillin-tazobactam (Zosyn) IV (PEDS and ADULTS) (extended infusion is not appropriate)  4.5 g Intravenous Q8H    polyethylene glycol  17 g Oral Daily    potassium, sodium phosphates  2 packet Oral QID (AC & HS)    vancomycin (VANCOCIN) IV (PEDS and ADULTS)  1,500 mg Intravenous Once    Followed by    vancomycin (VANCOCIN) IV (PEDS and ADULTS)  15 mg/kg Intravenous Q12H       Current Outpatient Medications   Medication Instructions    multivitamin (THERAGRAN) per tablet 1 tablet, Daily    OLANZapine (ZYPREXA) 5 MG tablet Take 1 tab at nighttime on nights 1 thru 3 of each chemo cycle    ondansetron (ZOFRAN-ODT) 8 MG TbDL TAKE 1 TABLET (8 MG TOTAL) BY MOUTH EVERY 6 HOURS AS NEEDED FOR NAUSEA    promethazine (PHENERGAN) 12.5 mg, Oral, Every 6 hours PRN    vitamin D (VITAMIN D3) 1,000 Units, Daily       Past Surgical History:   Procedure Laterality Date    ECTOPIC PREGNANCY SURGERY      ERCP N/A 7/5/2024    Procedure: ERCP (ENDOSCOPIC RETROGRADE CHOLANGIOPANCREATOGRAPHY);  Surgeon: Chacne Mccann MD;  Location: [address]);  Service: Endoscopy;  Laterality:  N/A;    HERNIA REPAIR      left inguinal    HYSTERECTOMY  1980    without BSO       Social History     Substance and Sexual Activity   Drug Use No     Tobacco Use: Low Risk  (7/5/2024)    Patient History     Smoking Tobacco Use: Never     Smokeless Tobacco Use: Never     Passive Exposure: Not on file     Alcohol Use: Not At Risk (11/12/2023)    AUDIT-C     Frequency of Alcohol Consumption: Never     Average Number of Drinks: Patient does not drink     Frequency of Binge Drinking: Never         OBJECTIVE:     Vital Signs Range (Last 24H):  Temp:  [36.4 °C (97.5 °F)-38.2 °C (100.8 °F)]   Pulse:  []   Resp:  [12-20]   BP: ()/(53-68)   SpO2:  [93 %-97 %]       Significant Labs    Heme Profile  Lab Results   Component Value Date    WBC 24.37 (H) 07/09/2024    HGB 8.2 (L) 07/09/2024    HCT 25.4 (L) 07/09/2024     07/09/2024       Coagulation Studies  Lab Results   Component Value Date    LABPROT 13.6 (H) 07/09/2024    INR 1.3 (H) 07/09/2024       BMP  Lab Results   Component Value Date     (L) 07/09/2024    K 3.5 07/09/2024    CL 99 07/09/2024    CO2 25 07/09/2024    BUN 9 07/09/2024    CREATININE 0.6 07/09/2024    MG 1.9 07/09/2024    PHOS 2.0 (L) 07/09/2024       Liver Function Tests  Lab Results   Component Value Date    AST 52 (H) 07/09/2024    ALT 31 07/09/2024    ALKPHOS 665 (H) 07/09/2024    BILITOT 4.9 (H) 07/09/2024    PROT 5.5 (L) 07/09/2024    ALBUMIN 1.9 (L) 07/09/2024       Lipid Profile  Lab Results   Component Value Date    CHOL 187 06/20/2023    HDL 58 06/20/2023    TRIG 85 06/20/2023       Endocrine Profile  Lab Results   Component Value Date    TSH 2.025 10/20/2021       Cardiac Studies    EKG:   Results for orders placed or performed during the hospital encounter of 07/01/24   EKG 12-lead    Collection Time: 07/01/24 11:11 PM   Result Value Ref Range    QRS Duration 82 ms    [facility] QTC Calculation 429 ms    Narrative    Test Reason : R00.0,    Vent. Rate : 096 BPM     Atrial Rate :  096 BPM     P-R Int : 138 ms          QRS Dur : 082 ms      QT Int : 340 ms       P-R-T Axes : 000 -59 094 degrees     QTc Int : 429 ms    Normal sinus rhythm  Left anterior fascicular block  Nonspecific ST and/or T wave abnormalities  Abnormal ECG  When compared with ECG of 21-APR-2022 14:20,  No significant change was found  Confirmed by Jacinto Garcia MD ([id number]) on 7/2/2024 11:02:53 AM    Referred By: [email or web address]   SELF           Confirmed By:Jacinto Garcia MD       ANDREAS  No results found for this or any previous visit.      TTE  Results for orders placed during the hospital encounter of 04/29/22    Echo    Interpretation Summary  · The estimated ejection fraction is 55%.  · The left ventricle is normal in size with normal systolic function.  · Grade I left ventricular diastolic dysfunction.  · Normal right ventricular size with normal right ventricular systolic function.  · Mild mitral regurgitation.  · The estimated PA systolic pressure is 25 mmHg.  · Normal central venous pressure (3 mmHg).  · Mild pulmonic regurgitation.  · Mild to moderate left atrial enlargement.  · Mild right atrial enlargement.  · Mild aortic regurgitation.      Nuclear Stress Test   No results found for this or any previous visit.          ASSESSMENT/PLAN:           Pre-op Assessment    I have reviewed the Patient Summary Reports.     I have reviewed the Nursing Notes. I have reviewed the NPO Status.   I have reviewed the Medications.     Review of Systems  Anesthesia Hx:  No problems with previous Anesthesia   History of prior surgery of interest to airway management or planning:  Previous anesthesia: General          Denies Personal Hx of Anesthesia complications.                    Hematology/Oncology:  Hematology Normal   Oncology Normal                                   EENT/Dental:  EENT/Dental Normal           Cardiovascular:         Dysrhythmias           Atherosclerosis of aortic arch                         Pulmonary:  Pulmonary  Normal                       Renal/:  Renal/ Normal                 Hepatic/GI:      Liver Disease,            Musculoskeletal:  Musculoskeletal Normal                Neurological:    Neuromuscular Disease,                                   Endocrine:  Endocrine Normal            Dermatological:  Skin Normal    Psych:  Psychiatric Normal                    Physical Exam  General: Well nourished    Airway:  Mallampati: II   Mouth Opening: Normal  TM Distance: Normal  Tongue: Normal  Neck ROM: Normal ROM    Dental:  Intact        Anesthesia Plan  Type of Anesthesia, risks & benefits discussed:    Anesthesia Type: Gen Natural Airway  Intra-op Monitoring Plan: Standard ASA Monitors  Post Op Pain Control Plan: multimodal analgesia  Induction:  IV  Airway Plan: Direct  Informed Consent: Informed consent signed with the Patient and all parties understand the risks and agree with anesthesia plan.  All questions answered. Patient consented to blood products? No  ASA Score: 3  Day of Surgery Review of History & Physical: H&P Update referred to the surgeon/provider.    Ready For Surgery From Anesthesia Perspective.     .

## 2024-07-09 NOTE — CLINICAL REVIEW
Sepsis Screen (most recent)       Sepsis Screen (IP) - 07/09/24 0918       Is the patient's history or complaint suggestive of a possible infection? Yes  -    Are there at least two of the following signs and symptoms present? Yes   TMax 100.8, WBCs uptrending -    Sepsis signs/symptoms - Hyper or Hypothermia Hyperthermia >100.4 or Hypothermia < 96.8  -    Sepsis signs/symptoms - Tachycardia Tachycardia     >90  -    Sepsis signs/symptoms - WBC WBC < 4,000 or WBC > 12,000  -    Are any of the following organ dysfunction criteria present and not considered to be due to a chronic condition? Yes  -    Organ Dysfunction Criteria Total Bilirubin > 2.0 Platelet count < 100,000  -    Initiate Sepsis Protocol Yes   repeat BC, lac, procal ordered per MD Hyde -    Reason sepsis not considered --  -              User Key  (r) = Recorded By, (t) = Taken By, (c) = Cosigned By      Initials Name    Tricia Reed RN

## 2024-07-10 ENCOUNTER — ANESTHESIA (OUTPATIENT)
Dept: ENDOSCOPY | Facility: HOSPITAL | Age: 79
DRG: 871 | End: 2024-07-10
Payer: MEDICARE

## 2024-07-10 PROBLEM — Z51.5 PALLIATIVE CARE ENCOUNTER: Status: ACTIVE | Noted: 2024-07-10

## 2024-07-10 LAB
ALBUMIN SERPL BCP-MCNC: 1.8 G/DL (ref 3.5–5.2)
ALP SERPL-CCNC: 572 U/L (ref 55–135)
ALT SERPL W/O P-5'-P-CCNC: 31 U/L (ref 10–44)
ANION GAP SERPL CALC-SCNC: 9 MMOL/L (ref 8–16)
ANISOCYTOSIS BLD QL SMEAR: SLIGHT
AST SERPL-CCNC: 51 U/L (ref 10–40)
BACTERIA BLD CULT: NORMAL
BACTERIA BLD CULT: NORMAL
BASOPHILS # BLD AUTO: 0.1 K/UL (ref 0–0.2)
BASOPHILS NFR BLD: 0.3 % (ref 0–1.9)
BILIRUB SERPL-MCNC: 6 MG/DL (ref 0.1–1)
BUN SERPL-MCNC: 10 MG/DL (ref 8–23)
CALCIUM SERPL-MCNC: 7.9 MG/DL (ref 8.7–10.5)
CHLORIDE SERPL-SCNC: 98 MMOL/L (ref 95–110)
CO2 SERPL-SCNC: 25 MMOL/L (ref 23–29)
CREAT SERPL-MCNC: 0.6 MG/DL (ref 0.5–1.4)
DIFFERENTIAL METHOD BLD: ABNORMAL
EOSINOPHIL # BLD AUTO: 0.4 K/UL (ref 0–0.5)
EOSINOPHIL NFR BLD: 1.2 % (ref 0–8)
ERYTHROCYTE [DISTWIDTH] IN BLOOD BY AUTOMATED COUNT: 23.2 % (ref 11.5–14.5)
EST. GFR  (NO RACE VARIABLE): >60 ML/MIN/1.73 M^2
GLUCOSE SERPL-MCNC: 87 MG/DL (ref 70–110)
HCT VFR BLD AUTO: 22 % (ref 37–48.5)
HGB BLD-MCNC: 7.5 G/DL (ref 12–16)
HYPOCHROMIA BLD QL SMEAR: ABNORMAL
IMM GRANULOCYTES # BLD AUTO: 0.6 K/UL (ref 0–0.04)
IMM GRANULOCYTES NFR BLD AUTO: 2.1 % (ref 0–0.5)
INR PPP: 1.4 (ref 0.8–1.2)
LYMPHOCYTES # BLD AUTO: 0.9 K/UL (ref 1–4.8)
LYMPHOCYTES NFR BLD: 3 % (ref 18–48)
MAGNESIUM SERPL-MCNC: 1.8 MG/DL (ref 1.6–2.6)
MCH RBC QN AUTO: 27.4 PG (ref 27–31)
MCHC RBC AUTO-ENTMCNC: 34.1 G/DL (ref 32–36)
MCV RBC AUTO: 80 FL (ref 82–98)
MONOCYTES # BLD AUTO: 2.4 K/UL (ref 0.3–1)
MONOCYTES NFR BLD: 8.2 % (ref 4–15)
NEUTROPHILS # BLD AUTO: 24.5 K/UL (ref 1.8–7.7)
NEUTROPHILS NFR BLD: 85.2 % (ref 38–73)
NRBC BLD-RTO: 0 /100 WBC
PHOSPHATE SERPL-MCNC: 2.8 MG/DL (ref 2.7–4.5)
PLATELET # BLD AUTO: 314 K/UL (ref 150–450)
PLATELET BLD QL SMEAR: ABNORMAL
PMV BLD AUTO: 10.9 FL (ref 9.2–12.9)
POLYCHROMASIA BLD QL SMEAR: ABNORMAL
POTASSIUM SERPL-SCNC: 3.6 MMOL/L (ref 3.5–5.1)
PROT SERPL-MCNC: 5.3 G/DL (ref 6–8.4)
PROTHROMBIN TIME: 15.2 SEC (ref 9–12.5)
RBC # BLD AUTO: 2.74 M/UL (ref 4–5.4)
SODIUM SERPL-SCNC: 132 MMOL/L (ref 136–145)
VANCOMYCIN TROUGH SERPL-MCNC: 12.9 UG/ML (ref 10–22)
WBC # BLD AUTO: 28.79 K/UL (ref 3.9–12.7)

## 2024-07-10 PROCEDURE — 0F763DZ DILATION OF LEFT HEPATIC DUCT WITH INTRALUMINAL DEVICE, PERCUTANEOUS APPROACH: ICD-10-PCS | Performed by: INTERNAL MEDICINE

## 2024-07-10 PROCEDURE — 99223 1ST HOSP IP/OBS HIGH 75: CPT | Mod: ,,, | Performed by: STUDENT IN AN ORGANIZED HEALTH CARE EDUCATION/TRAINING PROGRAM

## 2024-07-10 PROCEDURE — 63600175 PHARM REV CODE 636 W HCPCS

## 2024-07-10 PROCEDURE — 80202 ASSAY OF VANCOMYCIN: CPT | Performed by: HOSPITALIST

## 2024-07-10 PROCEDURE — 99233 SBSQ HOSP IP/OBS HIGH 50: CPT | Mod: GC,,, | Performed by: HOSPITALIST

## 2024-07-10 PROCEDURE — 43276 ERCP STENT EXCHANGE W/DILATE: CPT | Performed by: INTERNAL MEDICINE

## 2024-07-10 PROCEDURE — C2617 STENT, NON-COR, TEM W/O DEL: HCPCS | Performed by: INTERNAL MEDICINE

## 2024-07-10 PROCEDURE — 63600175 PHARM REV CODE 636 W HCPCS: Performed by: STUDENT IN AN ORGANIZED HEALTH CARE EDUCATION/TRAINING PROGRAM

## 2024-07-10 PROCEDURE — 37000008 HC ANESTHESIA 1ST 15 MINUTES: Performed by: INTERNAL MEDICINE

## 2024-07-10 PROCEDURE — 85025 COMPLETE CBC W/AUTO DIFF WBC: CPT | Performed by: STUDENT IN AN ORGANIZED HEALTH CARE EDUCATION/TRAINING PROGRAM

## 2024-07-10 PROCEDURE — 25000003 PHARM REV CODE 250: Performed by: STUDENT IN AN ORGANIZED HEALTH CARE EDUCATION/TRAINING PROGRAM

## 2024-07-10 PROCEDURE — 63600175 PHARM REV CODE 636 W HCPCS: Performed by: NURSE ANESTHETIST, CERTIFIED REGISTERED

## 2024-07-10 PROCEDURE — C1769 GUIDE WIRE: HCPCS | Performed by: INTERNAL MEDICINE

## 2024-07-10 PROCEDURE — 25000003 PHARM REV CODE 250

## 2024-07-10 PROCEDURE — 85610 PROTHROMBIN TIME: CPT | Performed by: STUDENT IN AN ORGANIZED HEALTH CARE EDUCATION/TRAINING PROGRAM

## 2024-07-10 PROCEDURE — 74328 X-RAY BILE DUCT ENDOSCOPY: CPT | Mod: 26,,, | Performed by: INTERNAL MEDICINE

## 2024-07-10 PROCEDURE — 99497 ADVNCD CARE PLAN 30 MIN: CPT | Mod: 25,,, | Performed by: STUDENT IN AN ORGANIZED HEALTH CARE EDUCATION/TRAINING PROGRAM

## 2024-07-10 PROCEDURE — 27201089 HC SNARE, DISP (ANY): Performed by: INTERNAL MEDICINE

## 2024-07-10 PROCEDURE — 84100 ASSAY OF PHOSPHORUS: CPT | Performed by: STUDENT IN AN ORGANIZED HEALTH CARE EDUCATION/TRAINING PROGRAM

## 2024-07-10 PROCEDURE — 0F753DZ DILATION OF RIGHT HEPATIC DUCT WITH INTRALUMINAL DEVICE, PERCUTANEOUS APPROACH: ICD-10-PCS | Performed by: INTERNAL MEDICINE

## 2024-07-10 PROCEDURE — 83735 ASSAY OF MAGNESIUM: CPT | Performed by: STUDENT IN AN ORGANIZED HEALTH CARE EDUCATION/TRAINING PROGRAM

## 2024-07-10 PROCEDURE — 20600001 HC STEP DOWN PRIVATE ROOM

## 2024-07-10 PROCEDURE — 25000003 PHARM REV CODE 250: Performed by: ANESTHESIOLOGY

## 2024-07-10 PROCEDURE — 37000009 HC ANESTHESIA EA ADD 15 MINS: Performed by: INTERNAL MEDICINE

## 2024-07-10 PROCEDURE — 27202125 HC BALLOON, EXTRACTION (ANY): Performed by: INTERNAL MEDICINE

## 2024-07-10 PROCEDURE — 0FPB8DZ REMOVAL OF INTRALUMINAL DEVICE FROM HEPATOBILIARY DUCT, VIA NATURAL OR ARTIFICIAL OPENING ENDOSCOPIC: ICD-10-PCS | Performed by: INTERNAL MEDICINE

## 2024-07-10 PROCEDURE — 63600175 PHARM REV CODE 636 W HCPCS: Performed by: HOSPITALIST

## 2024-07-10 PROCEDURE — 74328 X-RAY BILE DUCT ENDOSCOPY: CPT | Mod: TC | Performed by: INTERNAL MEDICINE

## 2024-07-10 PROCEDURE — 25500020 PHARM REV CODE 255: Performed by: INTERNAL MEDICINE

## 2024-07-10 PROCEDURE — 94761 N-INVAS EAR/PLS OXIMETRY MLT: CPT

## 2024-07-10 PROCEDURE — 25000003 PHARM REV CODE 250: Performed by: NURSE ANESTHETIST, CERTIFIED REGISTERED

## 2024-07-10 PROCEDURE — 80053 COMPREHEN METABOLIC PANEL: CPT | Performed by: STUDENT IN AN ORGANIZED HEALTH CARE EDUCATION/TRAINING PROGRAM

## 2024-07-10 PROCEDURE — 43276 ERCP STENT EXCHANGE W/DILATE: CPT | Mod: 59,,, | Performed by: INTERNAL MEDICINE

## 2024-07-10 PROCEDURE — 25000003 PHARM REV CODE 250: Performed by: HOSPITALIST

## 2024-07-10 DEVICE — ZIMMON PANCREATIC STENT
Type: IMPLANTABLE DEVICE | Site: BILE DUCT | Status: FUNCTIONAL
Brand: ZIMMON

## 2024-07-10 RX ORDER — HALOPERIDOL 5 MG/ML
0.5 INJECTION INTRAMUSCULAR EVERY 10 MIN PRN
Status: DISCONTINUED | OUTPATIENT
Start: 2024-07-10 | End: 2024-07-10 | Stop reason: HOSPADM

## 2024-07-10 RX ORDER — HYDROMORPHONE HYDROCHLORIDE 1 MG/ML
0.2 INJECTION, SOLUTION INTRAMUSCULAR; INTRAVENOUS; SUBCUTANEOUS EVERY 5 MIN PRN
Status: DISCONTINUED | OUTPATIENT
Start: 2024-07-10 | End: 2024-07-10 | Stop reason: HOSPADM

## 2024-07-10 RX ORDER — LIDOCAINE HYDROCHLORIDE 20 MG/ML
INJECTION INTRAVENOUS
Status: DISCONTINUED | OUTPATIENT
Start: 2024-07-10 | End: 2024-07-10

## 2024-07-10 RX ORDER — GLUCAGON 1 MG
1 KIT INJECTION
Status: DISCONTINUED | OUTPATIENT
Start: 2024-07-10 | End: 2024-07-12 | Stop reason: HOSPADM

## 2024-07-10 RX ORDER — SODIUM CHLORIDE 9 MG/ML
INJECTION, SOLUTION INTRAVENOUS CONTINUOUS
Status: DISCONTINUED | OUTPATIENT
Start: 2024-07-10 | End: 2024-07-11

## 2024-07-10 RX ORDER — HALOPERIDOL 5 MG/ML
INJECTION INTRAMUSCULAR
Status: COMPLETED
Start: 2024-07-10 | End: 2024-07-10

## 2024-07-10 RX ORDER — HALOPERIDOL 5 MG/ML
0.5 INJECTION INTRAMUSCULAR EVERY 4 HOURS PRN
Status: DISCONTINUED | OUTPATIENT
Start: 2024-07-10 | End: 2024-07-12 | Stop reason: HOSPADM

## 2024-07-10 RX ORDER — SODIUM CHLORIDE, SODIUM LACTATE, POTASSIUM CHLORIDE, CALCIUM CHLORIDE 600; 310; 30; 20 MG/100ML; MG/100ML; MG/100ML; MG/100ML
INJECTION, SOLUTION INTRAVENOUS CONTINUOUS PRN
Status: DISCONTINUED | OUTPATIENT
Start: 2024-07-10 | End: 2024-07-10

## 2024-07-10 RX ORDER — SODIUM CHLORIDE 0.9 % (FLUSH) 0.9 %
3 SYRINGE (ML) INJECTION
Status: DISCONTINUED | OUTPATIENT
Start: 2024-07-10 | End: 2024-07-10 | Stop reason: HOSPADM

## 2024-07-10 RX ORDER — ONDANSETRON HYDROCHLORIDE 2 MG/ML
4 INJECTION, SOLUTION INTRAVENOUS ONCE AS NEEDED
Status: DISCONTINUED | OUTPATIENT
Start: 2024-07-10 | End: 2024-07-10 | Stop reason: HOSPADM

## 2024-07-10 RX ORDER — HYDROMORPHONE HYDROCHLORIDE 1 MG/ML
0.2 INJECTION, SOLUTION INTRAMUSCULAR; INTRAVENOUS; SUBCUTANEOUS EVERY 5 MIN PRN
Status: DISCONTINUED | OUTPATIENT
Start: 2024-07-10 | End: 2024-07-10

## 2024-07-10 RX ORDER — PROPOFOL 10 MG/ML
INJECTION, EMULSION INTRAVENOUS
Status: DISCONTINUED | OUTPATIENT
Start: 2024-07-10 | End: 2024-07-10

## 2024-07-10 RX ADMIN — SODIUM CHLORIDE, POTASSIUM CHLORIDE, SODIUM LACTATE AND CALCIUM CHLORIDE 500 ML: 600; 310; 30; 20 INJECTION, SOLUTION INTRAVENOUS at 09:07

## 2024-07-10 RX ADMIN — GLYCOPYRROLATE 0.2 MG: 0.2 INJECTION, SOLUTION INTRAMUSCULAR; INTRAVENOUS at 10:07

## 2024-07-10 RX ADMIN — HYDROMORPHONE HYDROCHLORIDE 0.2 MG: 1 INJECTION, SOLUTION INTRAMUSCULAR; INTRAVENOUS; SUBCUTANEOUS at 11:07

## 2024-07-10 RX ADMIN — HALOPERIDOL 0.5 MG: 5 INJECTION INTRAMUSCULAR at 11:07

## 2024-07-10 RX ADMIN — SODIUM CHLORIDE: 9 INJECTION, SOLUTION INTRAVENOUS at 12:07

## 2024-07-10 RX ADMIN — LIDOCAINE HYDROCHLORIDE 50 MG: 20 INJECTION INTRAVENOUS at 10:07

## 2024-07-10 RX ADMIN — ACETAMINOPHEN 650 MG: 325 TABLET ORAL at 03:07

## 2024-07-10 RX ADMIN — SODIUM CHLORIDE, SODIUM LACTATE, POTASSIUM CHLORIDE, AND CALCIUM CHLORIDE: 600; 310; 30; 20 INJECTION, SOLUTION INTRAVENOUS at 10:07

## 2024-07-10 RX ADMIN — POTASSIUM & SODIUM PHOSPHATES POWDER PACK 280-160-250 MG 2 PACKET: 280-160-250 PACK at 08:07

## 2024-07-10 RX ADMIN — HALOPERIDOL LACTATE 0.5 MG: 5 INJECTION, SOLUTION INTRAMUSCULAR at 11:07

## 2024-07-10 RX ADMIN — PIPERACILLIN SODIUM AND TAZOBACTAM SODIUM 4.5 G: 4; .5 INJECTION, POWDER, FOR SOLUTION INTRAVENOUS at 05:07

## 2024-07-10 RX ADMIN — VANCOMYCIN HYDROCHLORIDE 1000 MG: 1 INJECTION, POWDER, LYOPHILIZED, FOR SOLUTION INTRAVENOUS at 04:07

## 2024-07-10 RX ADMIN — VANCOMYCIN HYDROCHLORIDE 1000 MG: 1 INJECTION, POWDER, LYOPHILIZED, FOR SOLUTION INTRAVENOUS at 03:07

## 2024-07-10 RX ADMIN — PIPERACILLIN SODIUM AND TAZOBACTAM SODIUM 4.5 G: 4; .5 INJECTION, POWDER, FOR SOLUTION INTRAVENOUS at 12:07

## 2024-07-10 RX ADMIN — POTASSIUM & SODIUM PHOSPHATES POWDER PACK 280-160-250 MG 2 PACKET: 280-160-250 PACK at 04:07

## 2024-07-10 RX ADMIN — Medication 6 MG: at 09:07

## 2024-07-10 RX ADMIN — PIPERACILLIN SODIUM AND TAZOBACTAM SODIUM 4.5 G: 4; .5 INJECTION, POWDER, FOR SOLUTION INTRAVENOUS at 08:07

## 2024-07-10 RX ADMIN — PROPOFOL 150 MCG/KG/MIN: 10 INJECTION, EMULSION INTRAVENOUS at 10:07

## 2024-07-10 RX ADMIN — POTASSIUM & SODIUM PHOSPHATES POWDER PACK 280-160-250 MG 2 PACKET: 280-160-250 PACK at 12:07

## 2024-07-10 NOTE — HPI
Deepali Alex is a 79-year-old female with  metastatic pancreatic adenocarcinoma to liver and lung (follows with Dr. Briceño) who presented to [facility] ED on 7/2/24 for abdominal pain and nausea found to have biliary obstruction and sepsis treated with stenting and broad spectrum antibiotics.  Pallitative Medicine was consulted for goals of care discussion.  Upon our interview, the patient was resting comfortably eating a peach with her  Deshawn at bedside.  We had a discussion about the role of hospice care.  The patient expressed interest in home hospice when the time is appropriate.  We also discussed medical management of symptoms including nausea and pain.  She is willing to trial medications for nausea and pain and is interested in Zyprexa for nausea.  The patient remains hopeful that she may be provided the option of chemotherapy in the future with the understanding that although it may prolong her life, it may not provide a quality of life that she desires.  She spoke of her family including her , children, and grandchildren who are able to provide her with support.  She is hopeful that she will be able to attend family weddings in the near future.  She recognizes that her cancer diagnosis is life-limiting and stated that when she was diagnosed she did not expect to live past August of this year.  Of note, the patient met with Codie Gomez of Palliative Medicine in 12/2023 where she completed ACP documentation which names her  Deshawn as her HPOA.  Her living will states that in the setting of profound incurable illness that all life-sustaining procedures, including nutrition and hydration, be witheld or wthdrawn so that food and water will not be administered invasively.

## 2024-07-10 NOTE — ANESTHESIA RELEASE NOTE
"Anesthesia Release from PACU Note    Patient: Deepali Alex    Procedure(s) Performed: Procedure(s) (LRB):  ERCP (ENDOSCOPIC RETROGRADE CHOLANGIOPANCREATOGRAPHY) (N/A)    Anesthesia type: general    Post pain: Adequate analgesia    Post assessment: no apparent anesthetic complications    Last Vitals: Visit Vitals  /70   Pulse 95   Temp 36.3 °C (97.3 °F) (Temporal)   Resp 18   Ht 5' 2" (1.575 m)   Wt 64.7 kg (142 lb 10.2 oz)   SpO2 95%   Breastfeeding No   BMI 26.09 kg/m²       Post vital signs: stable    Level of consciousness: awake, alert , and oriented    Nausea/Vomiting: no nausea/no vomiting    Complications: none    Airway Patency: patent    Respiratory: unassisted    Cardiovascular: stable and blood pressure at baseline    Hydration: euvolemic  "

## 2024-07-10 NOTE — PROGRESS NOTES
Pharmacokinetic Assessment Follow Up: IV Vancomycin    Vancomycin serum concentration assessment(s):    The trough level was drawn correctly and can be used to guide therapy at this time. The measurement is within the desired definitive target range of 10 to 20 mcg/mL.    Vancomycin Regimen Plan:    Continue regimen of Vancomycin 1000 mg IV every 12 hours with next serum trough concentration measured at 1430 on 7/11    Drug levels (last 3 results):  Recent Labs   Lab Result Units 07/10/24  1442   Vancomycin-Trough ug/mL 12.9       Pharmacy will continue to follow and monitor vancomycin.    Thank you for the consult,   Bruce Jones       Patient brief summary:  Deepali Alex is a 79 y.o. female initiated on antimicrobial therapy with IV Vancomycin for treatment of sepsis      Drug Allergies:   Review of patient's allergies indicates:   Allergen Reactions    Erythromycin (bulk)      Other reaction(s): Eye irritation       Actual Body Weight:   64.7    Renal Function:   Estimated Creatinine Clearance: 69.3 mL/min (based on SCr of 0.6 mg/dL).,     Dialysis Method (if applicable):  N/A    CBC (last 72 hours):  Recent Labs   Lab Result Units 07/08/24  0237 07/09/24  0450 07/10/24  0322   WBC K/uL 17.96* 24.37* 28.79*   Hemoglobin g/dL 7.8* 8.2* 7.5*   Hematocrit % 23.7* 25.4* 22.0*   Platelets K/uL 326 342 314   Gran % % 83.4* 85.0* 85.2*   Lymph % % 4.8* 4.0* 3.0*   Mono % % 7.0 8.2 8.2   Eosinophil % % 3.1 1.4 1.2   Basophil % % 0.5 0.4 0.3   Differential Method  Automated Automated Automated       Metabolic Panel (last 72 hours):  Recent Labs   Lab Result Units 07/08/24 0237 07/09/24  0450 07/10/24  0322   Sodium mmol/L 134* 133* 132*   Potassium mmol/L 3.9 3.5 3.6   Chloride mmol/L 102 99 98   CO2 mmol/L 25 25 25   Glucose mg/dL 127* 112* 87   BUN mg/dL 11 9 10   Creatinine mg/dL 0.6 0.6 0.6   Albumin g/dL 1.9* 1.9* 1.8*   Total Bilirubin mg/dL 3.8* 4.9* 6.0*   Alkaline Phosphatase U/L 690* 665* 572*   AST  U/L 45* 52* 51*   ALT U/L 36 31 31   Magnesium mg/dL 2.0 1.9 1.8   Phosphorus mg/dL 2.7 2.0* 2.8       Vancomycin Administrations:  vancomycin given in the last 96 hours                     vancomycin (VANCOCIN) 1,000 mg in D5W 250 mL IVPB (Vial-Mate) (mg) 1,000 mg New Bag 07/10/24 0337    vancomycin 1,500 mg in D5W 250 mL IVPB (Vial-Mate) (mg) 1,500 mg New Bag 07/09/24 1459                    Microbiologic Results:  Microbiology Results (last 7 days)       Procedure Component Value Units Date/Time    Blood culture [[id number]] Collected: 07/09/24 1001    Order Status: Completed Specimen: Blood from Peripheral, Antecubital, Right Updated: 07/10/24 1212     Blood Culture, Routine No Growth to date      No Growth to date    Blood culture [[id number]] Collected: 07/09/24 1001    Order Status: Completed Specimen: Blood Updated: 07/10/24 1212     Blood Culture, Routine No Growth to date      No Growth to date    Blood culture [[id number]] Collected: 07/05/24 0500    Order Status: Completed Specimen: Blood Updated: 07/10/24 0812     Blood Culture, Routine No growth after 5 days.    Blood culture [[id number]] Collected: 07/05/24 0459    Order Status: Completed Specimen: Blood Updated: 07/10/24 0812     Blood Culture, Routine No growth after 5 days.    Blood culture x two cultures. Draw prior to antibiotics. [[id number]] Collected: 07/02/24 0012    Order Status: Completed Specimen: Blood from Wrist, Left Updated: 07/07/24 0612     Blood Culture, Routine No growth after 5 days.    Narrative:      Aerobic and anaerobic    Blood culture x two cultures. Draw prior to antibiotics. [[id number]] Collected: 07/02/24 0012    Order Status: Completed Specimen: Blood from Peripheral, Hand, Right Updated: 07/07/24 0612     Blood Culture, Routine No growth after 5 days.    Narrative:      Aerobic and anaerobic

## 2024-07-10 NOTE — CONSULTS
"[facility] - Oncology ([facility])  Palliative Medicine  Consult Note    Patient Name: Deepali Alex  MRN: [id number]  Admission Date: 7/1/2024  Hospital Length of Stay: 8 days  Code Status: Full Code   Attending Provider: Kinga Christian MD  Consulting Provider: Juan Jose Sousa MD  Primary Care Physician: Raul Rivera MD  Principal Problem:Sepsis    Patient information was obtained from patient, spouse/SO, past medical records, and primary team.      Inpatient consult to Palliative Care  Consult performed by: Juan Jose Sousa MD  Consult ordered by: Solomon Hyde MD        Assessment/Plan:     Oncology  Pancreatic adenocarcinoma  History of Pancreatic adenocarcinoma with metastasis to liver/lungs. Currently follows up with Dr. Briceño. Pt completed chemo cycle (gemcitabine + nab-paclitaxel) in May and is expected to start new 5-FU and Onyvide due to disease progression. CT abdomen shows "stable appearance of the heterogeneous hypoattenuating pancreatic tail lesion with continued abutment of the splenic artery" and "mildly worse innumerable hypoattenuating hepatic lesions.  Findings remain concerning for hepatic metastatic disease."     - Management per primary    GI  Biliary stricture  S/p stenting and sphincterotomy on 7/9 with exchange on 7/10.    - Management per primary    Palliative Care  Palliative care encounter  Insight/goals of care- very aware that her cancer is life-limiting. Wishes to treat symptoms (pain, nausea, constipation) to remain comfortable. Wishes to speak with Dr. Briceño to discuss chemotherapy options. Hopeful that stents will allow resolution of hyperbilirubinemia.  Willing to accept hospice if no further chemotherapy is pursued.    ACP documents- See ACP 7/10    Social support- Very good and includes her  Deshawn, two sons Landon and Jamaal, and grandchildren.    Psychological- Coping well with illness.  Appropriate family grief.    Spiritual- Not Mosque but open to speaking to a " . Her  had a productive conversation with a  during this admission.    Symptom management- Nausea, constipation, abdominal pain    Recommendations:  - Resume daily Miralax when diarrhea resolves  - Trial olanzapine for nausea, if refractory trial compazine  - Agree with oxycodone 5mg and 10mg for pain  - Agree with Dilaudid PRN but would increase 0.5mg  - Palliative Medicine will follow peripherally for continued support        Thank you for your consult. I will sign off. Please contact us if you have any additional questions.    Subjective:     HPI:   Deepali Alex is a 79-year-old female with  metastatic pancreatic adenocarcinoma to liver and lung (follows with Dr. Briceño) who presented to [facility] ED on 7/2/24 for abdominal pain and nausea found to have biliary obstruction and sepsis treated with stenting and broad spectrum antibiotics.  Pallitative Medicine was consulted for goals of care discussion.  Upon our interview, the patient was resting comfortably eating a peach with her  Ray at bedside.  We had a discussion about the role of hospice care.  The patient expressed interest in home hospice when the time is appropriate.  We also discussed medical management of symptoms including nausea and pain.  She is willing to trial medications for nausea and pain and is interested in Zyprexa for nausea.  The patient remains hopeful that she may be provided the option of chemotherapy in the future with the understanding that although it may prolong her life, it may not provide a quality of life that she desires.  She spoke of her family including her , children, and grandchildren who are able to provide her with support.  She is hopeful that she will be able to attend family weddings in the near future.  She recognizes that her cancer diagnosis is life-limiting and stated that when she was diagnosed she did not expect to live past August of this year.  Of note, the patient met with  Codie Gomez of Palliative Medicine in 12/2023 where she completed ACP documentation which names her  Deshawn as her HPOA.  Her living will states that in the setting of profound incurable illness that all life-sustaining procedures, including nutrition and hydration, be witheld or wthdrawn so that food and water will not be administered invasively.    Hospital Course:  No notes on file      Past Medical History:   Diagnosis Date    Age-related osteoporosis without current pathological fracture     Atherosclerosis of aortic arch     - noted on CXR 8/2017    Back pain     Diverticulosis of sigmoid colon 10/22/2019    Ectopic pregnancy     Fibrocystic breast     Hyperlipidemia     Inguinal hernia     Osteopenia     Polyp of ascending colon 10/22/2019    Sepsis 7/2/2024    Urinary incontinence, mixed 05/24/2022       Past Surgical History:   Procedure Laterality Date    ECTOPIC PREGNANCY SURGERY      ERCP N/A 7/5/2024    Procedure: ERCP (ENDOSCOPIC RETROGRADE CHOLANGIOPANCREATOGRAPHY);  Surgeon: Chance Mccann MD;  Location: [address]);  Service: Endoscopy;  Laterality: N/A;    HERNIA REPAIR      left inguinal    HYSTERECTOMY  1980    without BSO       Review of patient's allergies indicates:   Allergen Reactions    Erythromycin (bulk)      Other reaction(s): Eye irritation       Medications:  Continuous Infusions:   0.9% NaCl   Intravenous Continuous 25 mL/hr at 07/10/24 1226 New Bag at 07/10/24 1226     Scheduled Meds:   piperacillin-tazobactam (Zosyn) IV (PEDS and ADULTS) (extended infusion is not appropriate)  4.5 g Intravenous Q8H    polyethylene glycol  17 g Oral Daily    potassium, sodium phosphates  2 packet Oral QID (AC & HS)    vancomycin (VANCOCIN) IV (PEDS and ADULTS)  15 mg/kg Intravenous Q12H     PRN Meds:  Current Facility-Administered Medications:     acetaminophen, 650 mg, Oral, Q4H PRN    bisacodyL, 5 mg, Oral, Daily PRN    dextrose 10%, 12.5 g, Intravenous, PRN    dextrose 10%, 25 g,  Intravenous, PRN    glucagon (human recombinant), 1 mg, Intramuscular, PRN    glucagon (human recombinant), 1 mg, Intramuscular, PRN    glucose, 16 g, Oral, PRN    glucose, 24 g, Oral, PRN    haloperidol lactate, 0.5 mg, Intravenous, Q4H PRN    hydrOXYzine HCL, 25 mg, Oral, TID PRN    ibuprofen, 400 mg, Oral, Q6H PRN    melatonin, 6 mg, Oral, Nightly PRN    naloxone, 0.02 mg, Intravenous, PRN    ondansetron, 4 mg, Intravenous, Q8H PRN    oxyCODONE, 5 mg, Oral, Q6H PRN    oxyCODONE, 10 mg, Oral, Q6H PRN    senna-docusate 8.6-50 mg, 1 tablet, Oral, Daily PRN    simethicone, 1 tablet, Oral, TID PRN    sodium chloride 0.9%, 10 mL, Intravenous, Q12H PRN    Pharmacy to dose Vancomycin consult, , , Once **AND** vancomycin - pharmacy to dose, , Intravenous, pharmacy to manage frequency    Family History       Problem Relation (Age of Onset)    Breast cancer Mother (69), Sister (73), Maternal Aunt (73)    Cancer Maternal Aunt (83), Maternal Uncle    Depression Father, Paternal Grandmother, Paternal Aunt, Paternal Uncle    Heart attack Father, Maternal Grandfather, Paternal Uncle    Heart disease Father    Heart failure Maternal Grandmother    Mental illness Paternal Grandmother    Obesity Brother    Prostate cancer Brother (81)    Seizures Mother    Suicide Paternal Cousin    Suicide Attempts Paternal Grandmother    Tongue cancer Paternal Cousin          Tobacco Use    Smoking status: Never    Smokeless tobacco: Never   Substance and Sexual Activity    Alcohol use: No    Drug use: No    Sexual activity: Not Currently       Review of Systems   Constitutional:  Positive for appetite change and fatigue. Negative for fever.   Respiratory:  Negative for shortness of breath.    Cardiovascular:  Negative for chest pain.   Gastrointestinal:  Positive for abdominal distention, abdominal pain and nausea. Negative for constipation and vomiting.   Skin:  Positive for color change.   Allergic/Immunologic: Positive for immunocompromised  state.   Psychiatric/Behavioral:  Negative for confusion.      Objective:     Vital Signs (Most Recent):  Temp: 97.4 °F (36.3 °C) (07/10/24 1240)  Pulse: 92 (07/10/24 1454)  Resp: 18 (07/10/24 1240)  BP: (!) 94/56 (07/10/24 1240)  SpO2: (!) 93 % (07/10/24 1240) Vital Signs (24h Range):  Temp:  [97.3 °F (36.3 °C)-101.9 °F (38.8 °C)] 97.4 °F (36.3 °C)  Pulse:  [] 92  Resp:  [15-22] 18  SpO2:  [92 %-97 %] 93 %  BP: ()/(56-75) 94/56     Weight: 64.7 kg (142 lb 10.2 oz)  Body mass index is 26.09 kg/m².       Physical Exam  Vitals and nursing note reviewed.   Constitutional:       Appearance: She is ill-appearing.      Comments: Resting comfortably and eating a peach   HENT:      Mouth/Throat:      Mouth: Mucous membranes are moist.   Cardiovascular:      Rate and Rhythm: Normal rate and regular rhythm.      Pulses: Normal pulses.      Heart sounds: Normal heart sounds.   Pulmonary:      Effort: Pulmonary effort is normal.      Breath sounds: Normal breath sounds.   Abdominal:      General: There is distension.      Tenderness: There is abdominal tenderness.   Skin:     Coloration: Skin is jaundiced.   Neurological:      Mental Status: She is alert.   Psychiatric:         Mood and Affect: Mood normal.         Behavior: Behavior normal.           Advance Care Planning  Advance Directives:   Living Will: Yes        Copy on chart: Yes    Medical Power of : Yes    Goals of Care: What is most important right now is to focus on symptom/pain control, quality of life, even if it means sacrificing a little time, improvement in condition but with limits to invasive therapies. Accordingly, we have decided that the best plan to meet the patient's goals includes continuing with treatment.         Significant Labs:  CBC:   Recent Labs   Lab 07/10/24  0322   WBC 28.79*   HGB 7.5*   HCT 22.0*   MCV 80*        BMP:  Recent Labs   Lab 07/10/24  0322   GLU 87   *   K 3.6   CL 98   CO2 25   BUN 10    CREATININE 0.6   CALCIUM 7.9*   MG 1.8     LFT:  Lab Results   Component Value Date    AST 51 (H) 07/10/2024    ALKPHOS 572 (H) 07/10/2024    BILITOT 6.0 (H) 07/10/2024     Albumin:   Albumin   Date Value Ref Range Status   07/10/2024 1.8 (L) 3.5 - 5.2 g/dL Final     Protein:   Total Protein   Date Value Ref Range Status   07/10/2024 5.3 (L) 6.0 - 8.4 g/dL Final     Lactic acid:   Lab Results   Component Value Date    LACTATE 2.0 07/09/2024       Significant Imaging: I have reviewed all pertinent imaging results/findings within the past 24 hours.        Juan Jose Sousa MD  Palliative Medicine  [facility] - Oncology ([facility])

## 2024-07-10 NOTE — ASSESSMENT & PLAN NOTE
Pt has been experiencing abdominal pain and fevers x 1 week. LFTs increased from previous, Total Bili-2. WBCs elevated. MRCP significant for evidence of a stricture causing bile duct dilation. In setting of fever with elevated LFT and alk phos, concerned for cholangitis. On PE, pt not jaundiced but is TTP to epigastric and RUQ area. UA normal. RIP unremarkable.    We had both AES and Dr. Ayala see her and they agreed that IF she needed an intervention it would be a PTC drain but they also agree that it's appropriate for her to just trial abx. However, with worsening labs and fevers overnight, she had ERCP done on 7/5. On 7/7, T-bili, ALP, AST, ALT increasing and she appears jaundiced. Pt experiencing abdominal pain again. IR consulted for increasing T-Bili. IR believes that intervention will not be beneficial in this case. 7/8 overnight, pt experienced febrile at 101.9. Concerned for sepsis. Vanc added back, received fluids. Blood cultures drawn. ERCP on 7/10.   - ERCP  - IV zosyn/vanc  - Fluids PRN  - Trend Bili  - Blood cultures pending

## 2024-07-10 NOTE — PLAN OF CARE
Problem: Adult Inpatient Plan of Care  Goal: Plan of Care Review  Outcome: Progressing     Problem: Fall Injury Risk  Goal: Absence of Fall and Fall-Related Injury  Outcome: Progressing     Problem: Adult Inpatient Plan of Care  Goal: Absence of Hospital-Acquired Illness or Injury  Outcome: Not Progressing  Goal: Optimal Comfort and Wellbeing  Outcome: Not Progressing     Problem: Sepsis/Septic Shock  Goal: Optimal Nutrition Intake  Outcome: Not Progressing     Problem: Infection  Goal: Absence of Infection Signs and Symptoms  Outcome: Not Progressing     Pt AAO4. Tmax of 101.9. Tylenol given. MD aware. Pt O2 dropped to 88% while sleeping. 1L NC placed. Pt on RA when awake. Abx given. Port accessed overnight. Pt NPO since midnight for repeat ERCP this AM. Multiple Bm o/n. Safety maintained. Call bell within reach.

## 2024-07-10 NOTE — PROGRESS NOTES
"[facility] - Endoscopy  Hematology/Oncology  Progress Note    Patient Name: Deepali Alex  Admission Date: 7/1/2024  Hospital Length of Stay: 8 days  Code Status: Full Code     Subjective:     HPI:  Patient is a 79-year-old female with history of metastatic pancreatic adenocarcinoma to liver/lung who follows with Dr. Briceño. She finished chemo(gemcitabine + nab-paclitaxel) and is expected to start new regimen due to progression (5-FU and Onyvide) on 7/9. Pt admitted to medical oncology for fever and abdominal pain in setting of metastatic pancreatic adenocarcinoma. She has been experiencing fever x 1 week (Tmax of 101 at home) with worsening right sided abdominal pain. She denies CP, SOB, loss of appetite, diarrhea.      ED Course  Pt presented to the ED with abdominal pain and fevers.  Labs significant for ALP-881, AST-64, ALT-51, increased WBCs at 18.90. Preliminary blood cultures drawn. CT Abd shows "stable appearance of the heterogeneous hypoattenuating pancreatic tail lesion with continued abutment of the splenic artery." CT chest ruled out PE. Pt empirically started on IV zosyn for possible abdominal infection and sepsis protocol was initiated. Admitted to med onc for further management.     Interval History:     Patient is S/P ERCP x4 days with 2 stents placed in the left and right hepatic ducts.  Overnight, patient febrile 101.9. T-bili increased to 6.0.    She is still experiencing abdominal pain and appears jaundiced. Vanc added back. Given fluids. Blood cultures redrawn in case of sepsis. Scheduled for ERCP today.    Oncology Treatment Plan:   OP GI liposomal irinotecan leucovorin fluorouracil Q2W    Medications:  Continuous Infusions:      Scheduled Meds:   lactated ringers  500 mL Intravenous Once    piperacillin-tazobactam (Zosyn) IV (PEDS and ADULTS) (extended infusion is not appropriate)  4.5 g Intravenous Q8H    polyethylene glycol  17 g Oral Daily    potassium, sodium phosphates  2 packet " Oral QID (AC & HS)    vancomycin (VANCOCIN) IV (PEDS and ADULTS)  15 mg/kg Intravenous Q12H     PRN Meds:  Current Facility-Administered Medications:     acetaminophen, 650 mg, Oral, Q4H PRN    bisacodyL, 5 mg, Oral, Daily PRN    dextrose 10%, 12.5 g, Intravenous, PRN    dextrose 10%, 25 g, Intravenous, PRN    glucagon (human recombinant), 1 mg, Intramuscular, PRN    glucose, 16 g, Oral, PRN    glucose, 24 g, Oral, PRN    hydrOXYzine HCL, 25 mg, Oral, TID PRN    ibuprofen, 400 mg, Oral, Q6H PRN    melatonin, 6 mg, Oral, Nightly PRN    naloxone, 0.02 mg, Intravenous, PRN    ondansetron, 4 mg, Intravenous, Q8H PRN    oxyCODONE, 10 mg, Oral, Q6H PRN    oxyCODONE, 5 mg, Oral, Q6H PRN    senna-docusate 8.6-50 mg, 1 tablet, Oral, Daily PRN    simethicone, 1 tablet, Oral, TID PRN    sodium chloride 0.9%, 10 mL, Intravenous, Q12H PRN    Pharmacy to dose Vancomycin consult, , , Once **AND** vancomycin - pharmacy to dose, , Intravenous, pharmacy to manage frequency     Review of Systems   Constitutional:  Positive for fever. Negative for activity change, appetite change and chills.   Respiratory:  Negative for shortness of breath.    Cardiovascular:  Negative for chest pain.   Gastrointestinal:  Positive for abdominal pain and constipation. Negative for diarrhea, nausea and vomiting.     Objective:     Vital Signs (Most Recent):  Temp: 97.3 °F (36.3 °C) (07/10/24 0959)  Pulse: 75 (07/10/24 0959)  Resp: 16 (07/10/24 0959)  BP: (!) 107/57 (07/10/24 0959)  SpO2: 96 % (07/10/24 0959) Vital Signs (24h Range):  Temp:  [97.3 °F (36.3 °C)-101.9 °F (38.8 °C)] 97.3 °F (36.3 °C)  Pulse:  [70-99] 75  Resp:  [15-18] 16  SpO2:  [92 %-97 %] 96 %  BP: ()/(57-69) 107/57     Weight: 64.7 kg (142 lb 10.2 oz)  Body mass index is 26.09 kg/m².  Body surface area is 1.68 meters squared.      Intake/Output Summary (Last 24 hours) at 7/10/2024 1034  Last data filed at 7/10/2024 0442  Gross per 24 hour   Intake 510 ml   Output 1100 ml   Net -590  ml        Physical Exam  Vitals and nursing note reviewed.   Constitutional:       Appearance: She is ill-appearing.   Eyes:      General: No scleral icterus.     Conjunctiva/sclera: Conjunctivae normal.   Cardiovascular:      Rate and Rhythm: Normal rate and regular rhythm.      Pulses: Normal pulses.      Heart sounds: Normal heart sounds.   Pulmonary:      Effort: Pulmonary effort is normal.      Breath sounds: Normal breath sounds.   Abdominal:      Tenderness: There is generalized abdominal tenderness and tenderness in the right upper quadrant.   Skin:     General: Skin is warm and dry.      Coloration: Skin is jaundiced.   Neurological:      Mental Status: She is alert.          Significant Labs:   All pertinent labs from the last 24 hours have been reviewed.    Diagnostic Results:  I have reviewed all pertinent imaging results/findings within the past 24 hours.  Assessment/Plan:     * Sepsis  Pt has been experiencing abdominal pain and fevers x 1 week. LFTs increased from previous, Total Bili-2. WBCs elevated. MRCP significant for evidence of a stricture causing bile duct dilation. In setting of fever with elevated LFT and alk phos, concerned for cholangitis. On PE, pt not jaundiced but is TTP to epigastric and RUQ area. UA normal. RIP unremarkable.    We had both AES and Dr. Ayala see her and they agreed that IF she needed an intervention it would be a PTC drain but they also agree that it's appropriate for her to just trial abx. However, with worsening labs and fevers overnight, she had ERCP done on 7/5. On 7/7, T-bili, ALP, AST, ALT increasing and she appears jaundiced. Pt experiencing abdominal pain again. IR consulted for increasing T-Bili. IR believes that intervention will not be beneficial in this case. 7/8 overnight, pt experienced febrile at 101.9. Concerned for sepsis. Vanc added back, received fluids. Blood cultures drawn. ERCP on 7/10.   - ERCP  - IV zosyn/vanc  - Fluids PRN  - Trend Bili  -  "Blood cultures pending    Constipation  - Optimize bowel regimen    Transaminitis  See plan for sepsis    Hyponatremia  Sodium on admission 132  - Daily CMP, monitor    Biliary stricture  See Sepsis    Pancreatic adenocarcinoma  History of Pancreatic adenocarcinoma with metastasis to liver/lungs. Currently follows up with Dr. Briceño. Pt completed chemo cycle (gemcitabine + nab-paclitaxel) in May and is expected to start new 5-FU and Onyvide due to disease progression. CT abdomen shows "stable appearance of the heterogeneous hypoattenuating pancreatic tail lesion with continued abutment of the splenic artery" and "mildly worse innumerable hypoattenuating hepatic lesions.  Findings remain concerning for hepatic metastatic disease."              Solomon Hyde MD  Hematology/Oncology  [facility] - Endoscopy      "

## 2024-07-10 NOTE — PROGRESS NOTES
..Nursing Transfer Note      Reason patient is being transferred: post procedure    Transfer To: 855    Transfer via stretcher    Transfer with cardiac monitoring, continuous pulse ox    Transported by pct    Medicines sent: none    Any special needs or follow-up needed: routine    Chart send with patient: Yes    Notified: spouse    Patient reassessed at: 1119 7/10/2024

## 2024-07-10 NOTE — ASSESSMENT & PLAN NOTE
Insight/goals of care- very aware that her cancer is life-limiting. Wishes to treat symptoms (pain, nausea, constipation) to remain comfortable. Wishes to speak with Dr. Briceño to discuss chemotherapy options. Hopeful that stents will allow resolution of hyperbilirubinemia.  Willing to accept hospice if no further chemotherapy is pursued.    ACP documents- See ACP 7/10    Social support- Very good and includes her  Deshawn, two sons Landon and Jamaal, and grandchildren.    Psychological- Coping well with illness.  Appropriate family grief.    Spiritual- Not Adventism but open to speaking to a . Her  had a productive conversation with a  during this admission.    Symptom management- Nausea, constipation, abdominal pain    Recommendations:  - Resume daily Miralax when diarrhea resolves  - Trial olanzapine for nausea, if refractory trial compazine  - Agree with oxycodone 5mg and 10mg for pain  - Agree with Dilaudid PRN but would increase 0.5mg  - Palliative Medicine will follow peripherally for continued support

## 2024-07-10 NOTE — H&P
Short Stay Endoscopy History and Physical    PCP - Raul Rivera MD  Referring Physician - Self, Aaareferral  No address on file    Procedure - ercp  ASA - per anesthesia  Mallampati - per anesthesia  History of Anesthesia problems - no  Family history Anesthesia problems -  no   Plan of anesthesia - General    HPI:  This is a 79 y.o. female here for evaluation of: stricture    Reflux - no  Dysphagia - no  Abdominal pain - no  Diarrhea - no    ROS:  Constitutional: No fevers, chills, No weight loss  CV: No chest pain  Pulm: No cough, No shortness of breath  Ophtho: No vision changes  GI: see HPI  Derm: No rash    Medical History:  has a past medical history of Age-related osteoporosis without current pathological fracture, Atherosclerosis of aortic arch, Back pain, Diverticulosis of sigmoid colon (10/22/2019), Ectopic pregnancy, Fibrocystic breast, Hyperlipidemia, Inguinal hernia, Osteopenia, Polyp of ascending colon (10/22/2019), Sepsis (7/2/2024), and Urinary incontinence, mixed (05/24/2022).    Surgical History:  has a past surgical history that includes Hysterectomy (1980); Ectopic pregnancy surgery; Hernia repair; and ERCP (N/A, 7/5/2024).    Family History: family history includes Breast cancer (age of onset: 69) in her mother; Breast cancer (age of onset: 73) in her maternal aunt and sister; Cancer in her maternal uncle; Cancer (age of onset: 83) in her maternal aunt; Depression in her father, paternal aunt, paternal grandmother, and paternal uncle; Heart attack in her father, maternal grandfather, and paternal uncle; Heart disease in her father; Heart failure in her maternal grandmother; Mental illness in her paternal grandmother; Obesity in her brother; Prostate cancer (age of onset: 81) in her brother; Seizures in her mother; Suicide in her paternal cousin; Suicide Attempts in her paternal grandmother; Tongue cancer in her paternal cousin..    Social History:  reports that she has never smoked. She  has never used smokeless tobacco. She reports that she does not drink alcohol and does not use drugs.    Review of patient's allergies indicates:   Allergen Reactions    Erythromycin (bulk)      Other reaction(s): Eye irritation       Medications:   Medications Prior to Admission   Medication Sig Dispense Refill Last Dose    multivitamin (THERAGRAN) per tablet Take 1 tablet by mouth once daily. (Patient not taking: Reported on 6/13/2024)       OLANZapine (ZYPREXA) 5 MG tablet Take 1 tab at nighttime on nights 1 thru 3 of each chemo cycle 30 tablet 2     ondansetron (ZOFRAN-ODT) 8 MG TbDL TAKE 1 TABLET (8 MG TOTAL) BY MOUTH EVERY 6 HOURS AS NEEDED FOR NAUSEA (Patient not taking: Reported on 5/13/2024) 30 tablet 5     promethazine (PHENERGAN) 12.5 MG Tab Take 1 tablet (12.5 mg total) by mouth every 6 (six) hours as needed for Nausea. 60 tablet 2     vitamin D (VITAMIN D3) 1000 units Tab Take 1,000 Units by mouth once daily. (Patient not taking: Reported on 6/13/2024)          Physical Exam:    Vital Signs:   Vitals:    07/10/24 0959   BP: (!) 107/57   Pulse: 75   Resp: 16   Temp: 97.3 °F (36.3 °C)       General Appearance: Well appearing in no acute distress    Labs:  Lab Results   Component Value Date    WBC 28.79 (H) 07/10/2024    HGB 7.5 (L) 07/10/2024    HCT 22.0 (L) 07/10/2024     07/10/2024    CHOL 187 06/20/2023    TRIG 85 06/20/2023    HDL 58 06/20/2023    ALT 31 07/10/2024    AST 51 (H) 07/10/2024     (L) 07/10/2024    K 3.6 07/10/2024    CL 98 07/10/2024    CREATININE 0.6 07/10/2024    BUN 10 07/10/2024    CO2 25 07/10/2024    TSH 2.025 10/20/2021    INR 1.4 (H) 07/10/2024    GLUF 92 06/30/2009       I have explained the risks and benefits of this endoscopic procedure to the patient including but not limited to bleeding, inflammation, infection, perforation, and death.      Chance Mccann MD

## 2024-07-10 NOTE — ASSESSMENT & PLAN NOTE
"History of Pancreatic adenocarcinoma with metastasis to liver/lungs. Currently follows up with Dr. Briceño. Pt completed chemo cycle (gemcitabine + nab-paclitaxel) in May and is expected to start new 5-FU and Onyvide due to disease progression. CT abdomen shows "stable appearance of the heterogeneous hypoattenuating pancreatic tail lesion with continued abutment of the splenic artery" and "mildly worse innumerable hypoattenuating hepatic lesions.  Findings remain concerning for hepatic metastatic disease."     - Management per primary  "

## 2024-07-10 NOTE — PROVATION PATIENT INSTRUCTIONS
Discharge Summary/Instructions after an Endoscopic Procedure  Patient Name: Deepali Alex  Patient MRN: [id number]  Patient YOB: 1945  Wednesday, July 10, 2024  Chance Mccann MD  Dear patient,  As a result of recent federal legislation (The Federal Cures Act), you may   receive lab or pathology results from your procedure in your [facility]   account before your physician is able to contact you. Your physician or   their representative will relay the results to you with their   recommendations at their soonest availability.  Thank you,  RESTRICTIONS:  During your procedure today, you received medications for sedation.  These   medications may affect your judgment, balance and coordination.  Therefore,   for 24 hours, you have the following restrictions:   - DO NOT drive a car, operate machinery, make legal/financial decisions,   sign important papers or drink alcohol.    ACTIVITY:  Today: no heavy lifting, straining or running due to procedural   sedation/anesthesia.  The following day: return to full activity including work.  DIET:  Eat and drink normally unless instructed otherwise.     TREATMENT FOR COMMON SIDE EFFECTS:  - Mild abdominal pain, nausea, belching, bloating or excessive gas:  rest,   eat lightly and use a heating pad.  - Sore Throat: treat with throat lozenges and/or gargle with warm salt   water.  - Because air was used during the procedure, expelling large amounts of air   from your rectum or belching is normal.  - If a bowel prep was taken, you may not have a bowel movement for 1-3 days.    This is normal.  SYMPTOMS TO WATCH FOR AND REPORT TO YOUR PHYSICIAN:  1. Abdominal pain or bloating, other than gas cramps.  2. Chest pain.  3. Back pain.  4. Signs of infection such as: chills or fever occurring within 24 hours   after the procedure.  5. Rectal bleeding, which would show as bright red, maroon, or black stools.   (A tablespoon of blood from the rectum is not serious, especially if    hemorrhoids are present.)  6. Vomiting.  7. Weakness or dizziness.  GO DIRECTLY TO THE NEAREST EMERGENCY ROOM IF YOU HAVE ANY OF THE FOLLOWING:      Difficulty breathing              Chills and/or fever over 101 F   Persistent vomiting and/or vomiting blood   Severe abdominal pain   Severe chest pain   Black, tarry stools   Bleeding- more than one tablespoon   Any other symptom or condition that you feel may need urgent attention  Your doctor recommends these additional instructions:  If any biopsies were taken, your doctors clinic will contact you in 1 to 2   weeks with any results.  - [facility] to [facility] for [facility].   - Resume previous diet.   - Continue present medications.   - The findings and recommendations were discussed with the patient's   family.  For questions, problems or results please call your physician - Chance Mccann MD at Work:  [phone].  [facility], EMERGENCY ROOM PHONE NUMBER: [phone]  IF A COMPLICATION OR EMERGENCY SITUATION ARISES AND YOU ARE UNABLE TO REACH   YOUR PHYSICIAN - GO DIRECTLY TO THE EMERGENCY ROOM.  Chance Mccann MD  7/10/2024 11:17:28 AM  This report has been verified and signed electronically.  Dear patient,  As a result of recent federal legislation (The Federal Cures Act), you may   receive lab or pathology results from your procedure in your [facility]   account before your physician is able to contact you. Your physician or   their representative will relay the results to you with their   recommendations at their soonest availability.  Thank you,  PROVATION

## 2024-07-10 NOTE — SUBJECTIVE & OBJECTIVE
Interval History:     Patient is S/P ERCP x4 days with 2 stents placed in the left and right hepatic ducts.  Overnight, patient febrile 101.9. T-bili increased to 6.0.    She is still experiencing abdominal pain and appears jaundiced. Vanc added back. Given fluids. Blood cultures redrawn in case of sepsis. Scheduled for ERCP today.    Oncology Treatment Plan:   OP GI liposomal irinotecan leucovorin fluorouracil Q2W    Medications:  Continuous Infusions:      Scheduled Meds:   lactated ringers  500 mL Intravenous Once    piperacillin-tazobactam (Zosyn) IV (PEDS and ADULTS) (extended infusion is not appropriate)  4.5 g Intravenous Q8H    polyethylene glycol  17 g Oral Daily    potassium, sodium phosphates  2 packet Oral QID (AC & HS)    vancomycin (VANCOCIN) IV (PEDS and ADULTS)  15 mg/kg Intravenous Q12H     PRN Meds:  Current Facility-Administered Medications:     acetaminophen, 650 mg, Oral, Q4H PRN    bisacodyL, 5 mg, Oral, Daily PRN    dextrose 10%, 12.5 g, Intravenous, PRN    dextrose 10%, 25 g, Intravenous, PRN    glucagon (human recombinant), 1 mg, Intramuscular, PRN    glucose, 16 g, Oral, PRN    glucose, 24 g, Oral, PRN    hydrOXYzine HCL, 25 mg, Oral, TID PRN    ibuprofen, 400 mg, Oral, Q6H PRN    melatonin, 6 mg, Oral, Nightly PRN    naloxone, 0.02 mg, Intravenous, PRN    ondansetron, 4 mg, Intravenous, Q8H PRN    oxyCODONE, 10 mg, Oral, Q6H PRN    oxyCODONE, 5 mg, Oral, Q6H PRN    senna-docusate 8.6-50 mg, 1 tablet, Oral, Daily PRN    simethicone, 1 tablet, Oral, TID PRN    sodium chloride 0.9%, 10 mL, Intravenous, Q12H PRN    Pharmacy to dose Vancomycin consult, , , Once **AND** vancomycin - pharmacy to dose, , Intravenous, pharmacy to manage frequency     Review of Systems   Constitutional:  Positive for fever. Negative for activity change, appetite change and chills.   Respiratory:  Negative for shortness of breath.    Cardiovascular:  Negative for chest pain.   Gastrointestinal:  Positive for  abdominal pain and constipation. Negative for diarrhea, nausea and vomiting.     Objective:     Vital Signs (Most Recent):  Temp: 97.3 °F (36.3 °C) (07/10/24 0959)  Pulse: 75 (07/10/24 0959)  Resp: 16 (07/10/24 0959)  BP: (!) 107/57 (07/10/24 0959)  SpO2: 96 % (07/10/24 0959) Vital Signs (24h Range):  Temp:  [97.3 °F (36.3 °C)-101.9 °F (38.8 °C)] 97.3 °F (36.3 °C)  Pulse:  [70-99] 75  Resp:  [15-18] 16  SpO2:  [92 %-97 %] 96 %  BP: ()/(57-69) 107/57     Weight: 64.7 kg (142 lb 10.2 oz)  Body mass index is 26.09 kg/m².  Body surface area is 1.68 meters squared.      Intake/Output Summary (Last 24 hours) at 7/10/2024 1034  Last data filed at 7/10/2024 0442  Gross per 24 hour   Intake 510 ml   Output 1100 ml   Net -590 ml        Physical Exam  Vitals and nursing note reviewed.   Constitutional:       Appearance: She is ill-appearing.   Eyes:      General: No scleral icterus.     Conjunctiva/sclera: Conjunctivae normal.   Cardiovascular:      Rate and Rhythm: Normal rate and regular rhythm.      Pulses: Normal pulses.      Heart sounds: Normal heart sounds.   Pulmonary:      Effort: Pulmonary effort is normal.      Breath sounds: Normal breath sounds.   Abdominal:      Tenderness: There is generalized abdominal tenderness and tenderness in the right upper quadrant.   Skin:     General: Skin is warm and dry.      Coloration: Skin is jaundiced.   Neurological:      Mental Status: She is alert.          Significant Labs:   All pertinent labs from the last 24 hours have been reviewed.    Diagnostic Results:  I have reviewed all pertinent imaging results/findings within the past 24 hours.

## 2024-07-10 NOTE — PLAN OF CARE
Patient is AAOX4. Up, independent, stand by, family at the bed side. Call light and personal items within reach. Patient involved in care. NPO for ERCP today, ERCP done, new stent placed and old stent removed. Telemetry monitored. Vanc trough level sent to the lab. LR bolus 500 ml provided. Iv drip continued at 25 ml/hr.  IV antibiotic provided. Patient stable at this time.

## 2024-07-10 NOTE — TRANSFER OF CARE
"Anesthesia Transfer of Care Note    Patient: Deepali Alex    Procedure(s) Performed: Procedure(s) (LRB):  ERCP (ENDOSCOPIC RETROGRADE CHOLANGIOPANCREATOGRAPHY) (N/A)    Patient location: PACU    Anesthesia Type: MAC and general    Transport from OR: Transported from OR on room air with adequate spontaneous ventilation    Post pain: adequate analgesia    Post assessment: no apparent anesthetic complications    Post vital signs: stable    Level of consciousness: awake, alert and oriented    Nausea/Vomiting: no nausea/vomiting    Complications: none    Transfer of care protocol was followed      Last vitals: Visit Vitals  BP (!) 107/57 (Patient Position: Lying)   Pulse 75   Temp 36.3 °C (97.3 °F) (Temporal)   Resp 16   Ht 5' 2" (1.575 m)   Wt 64.7 kg (142 lb 10.2 oz)   SpO2 96%   Breastfeeding No   BMI 26.09 kg/m²     "

## 2024-07-10 NOTE — SUBJECTIVE & OBJECTIVE
Past Medical History:   Diagnosis Date    Age-related osteoporosis without current pathological fracture     Atherosclerosis of aortic arch     - noted on CXR 8/2017    Back pain     Diverticulosis of sigmoid colon 10/22/2019    Ectopic pregnancy     Fibrocystic breast     Hyperlipidemia     Inguinal hernia     Osteopenia     Polyp of ascending colon 10/22/2019    Sepsis 7/2/2024    Urinary incontinence, mixed 05/24/2022       Past Surgical History:   Procedure Laterality Date    ECTOPIC PREGNANCY SURGERY      ERCP N/A 7/5/2024    Procedure: ERCP (ENDOSCOPIC RETROGRADE CHOLANGIOPANCREATOGRAPHY);  Surgeon: Chance Mccann MD;  Location: [address]);  Service: Endoscopy;  Laterality: N/A;    HERNIA REPAIR      left inguinal    HYSTERECTOMY  1980    without BSO       Review of patient's allergies indicates:   Allergen Reactions    Erythromycin (bulk)      Other reaction(s): Eye irritation       Medications:  Continuous Infusions:   0.9% NaCl   Intravenous Continuous 25 mL/hr at 07/10/24 1226 New Bag at 07/10/24 1226     Scheduled Meds:   piperacillin-tazobactam (Zosyn) IV (PEDS and ADULTS) (extended infusion is not appropriate)  4.5 g Intravenous Q8H    polyethylene glycol  17 g Oral Daily    potassium, sodium phosphates  2 packet Oral QID (AC & HS)    vancomycin (VANCOCIN) IV (PEDS and ADULTS)  15 mg/kg Intravenous Q12H     PRN Meds:  Current Facility-Administered Medications:     acetaminophen, 650 mg, Oral, Q4H PRN    bisacodyL, 5 mg, Oral, Daily PRN    dextrose 10%, 12.5 g, Intravenous, PRN    dextrose 10%, 25 g, Intravenous, PRN    glucagon (human recombinant), 1 mg, Intramuscular, PRN    glucagon (human recombinant), 1 mg, Intramuscular, PRN    glucose, 16 g, Oral, PRN    glucose, 24 g, Oral, PRN    haloperidol lactate, 0.5 mg, Intravenous, Q4H PRN    hydrOXYzine HCL, 25 mg, Oral, TID PRN    ibuprofen, 400 mg, Oral, Q6H PRN    melatonin, 6 mg, Oral, Nightly PRN    naloxone, 0.02 mg, Intravenous, PRN     ondansetron, 4 mg, Intravenous, Q8H PRN    oxyCODONE, 5 mg, Oral, Q6H PRN    oxyCODONE, 10 mg, Oral, Q6H PRN    senna-docusate 8.6-50 mg, 1 tablet, Oral, Daily PRN    simethicone, 1 tablet, Oral, TID PRN    sodium chloride 0.9%, 10 mL, Intravenous, Q12H PRN    Pharmacy to dose Vancomycin consult, , , Once **AND** vancomycin - pharmacy to dose, , Intravenous, pharmacy to manage frequency    Family History       Problem Relation (Age of Onset)    Breast cancer Mother (69), Sister (73), Maternal Aunt (73)    Cancer Maternal Aunt (83), Maternal Uncle    Depression Father, Paternal Grandmother, Paternal Aunt, Paternal Uncle    Heart attack Father, Maternal Grandfather, Paternal Uncle    Heart disease Father    Heart failure Maternal Grandmother    Mental illness Paternal Grandmother    Obesity Brother    Prostate cancer Brother (81)    Seizures Mother    Suicide Paternal Cousin    Suicide Attempts Paternal Grandmother    Tongue cancer Paternal Cousin          Tobacco Use    Smoking status: Never    Smokeless tobacco: Never   Substance and Sexual Activity    Alcohol use: No    Drug use: No    Sexual activity: Not Currently       Review of Systems   Constitutional:  Positive for appetite change and fatigue. Negative for fever.   Respiratory:  Negative for shortness of breath.    Cardiovascular:  Negative for chest pain.   Gastrointestinal:  Positive for abdominal distention, abdominal pain and nausea. Negative for constipation and vomiting.   Skin:  Positive for color change.   Allergic/Immunologic: Positive for immunocompromised state.   Psychiatric/Behavioral:  Negative for confusion.      Objective:     Vital Signs (Most Recent):  Temp: 97.4 °F (36.3 °C) (07/10/24 1240)  Pulse: 92 (07/10/24 1454)  Resp: 18 (07/10/24 1240)  BP: (!) 94/56 (07/10/24 1240)  SpO2: (!) 93 % (07/10/24 1240) Vital Signs (24h Range):  Temp:  [97.3 °F (36.3 °C)-101.9 °F (38.8 °C)] 97.4 °F (36.3 °C)  Pulse:  [] 92  Resp:  [15-22] 18  SpO2:   [92 %-97 %] 93 %  BP: ()/(56-75) 94/56     Weight: 64.7 kg (142 lb 10.2 oz)  Body mass index is 26.09 kg/m².       Physical Exam  Vitals and nursing note reviewed.   Constitutional:       Appearance: She is ill-appearing.      Comments: Resting comfortably and eating a peach   HENT:      Mouth/Throat:      Mouth: Mucous membranes are moist.   Cardiovascular:      Rate and Rhythm: Normal rate and regular rhythm.      Pulses: Normal pulses.      Heart sounds: Normal heart sounds.   Pulmonary:      Effort: Pulmonary effort is normal.      Breath sounds: Normal breath sounds.   Abdominal:      General: There is distension.      Tenderness: There is abdominal tenderness.   Skin:     Coloration: Skin is jaundiced.   Neurological:      Mental Status: She is alert.   Psychiatric:         Mood and Affect: Mood normal.         Behavior: Behavior normal.           Advance Care Planning   Advance Directives:   Living Will: Yes        Copy on chart: Yes    Medical Power of : Yes    Goals of Care: What is most important right now is to focus on symptom/pain control, quality of life, even if it means sacrificing a little time, improvement in condition but with limits to invasive therapies. Accordingly, we have decided that the best plan to meet the patient's goals includes continuing with treatment.         Significant Labs:  CBC:   Recent Labs   Lab 07/10/24  0322   WBC 28.79*   HGB 7.5*   HCT 22.0*   MCV 80*        BMP:  Recent Labs   Lab 07/10/24  0322   GLU 87   *   K 3.6   CL 98   CO2 25   BUN 10   CREATININE 0.6   CALCIUM 7.9*   MG 1.8     LFT:  Lab Results   Component Value Date    AST 51 (H) 07/10/2024    ALKPHOS 572 (H) 07/10/2024    BILITOT 6.0 (H) 07/10/2024     Albumin:   Albumin   Date Value Ref Range Status   07/10/2024 1.8 (L) 3.5 - 5.2 g/dL Final     Protein:   Total Protein   Date Value Ref Range Status   07/10/2024 5.3 (L) 6.0 - 8.4 g/dL Final     Lactic acid:   Lab Results   Component  Value Date    LACTATE 2.0 07/09/2024       Significant Imaging: I have reviewed all pertinent imaging results/findings within the past 24 hours.

## 2024-07-11 LAB
ALBUMIN SERPL BCP-MCNC: 1.6 G/DL (ref 3.5–5.2)
ALP SERPL-CCNC: 488 U/L (ref 55–135)
ALT SERPL W/O P-5'-P-CCNC: 29 U/L (ref 10–44)
ANION GAP SERPL CALC-SCNC: 7 MMOL/L (ref 8–16)
ANISOCYTOSIS BLD QL SMEAR: SLIGHT
AST SERPL-CCNC: 45 U/L (ref 10–40)
BASOPHILS # BLD AUTO: 0.06 K/UL (ref 0–0.2)
BASOPHILS NFR BLD: 0.2 % (ref 0–1.9)
BILIRUB SERPL-MCNC: 5.5 MG/DL (ref 0.1–1)
BILIRUB UR QL STRIP: ABNORMAL
BUN SERPL-MCNC: 10 MG/DL (ref 8–23)
CALCIUM SERPL-MCNC: 7.7 MG/DL (ref 8.7–10.5)
CHLORIDE SERPL-SCNC: 95 MMOL/L (ref 95–110)
CLARITY UR REFRACT.AUTO: ABNORMAL
CO2 SERPL-SCNC: 26 MMOL/L (ref 23–29)
COLOR UR AUTO: YELLOW
CREAT SERPL-MCNC: 0.6 MG/DL (ref 0.5–1.4)
DIFFERENTIAL METHOD BLD: ABNORMAL
EOSINOPHIL # BLD AUTO: 0.5 K/UL (ref 0–0.5)
EOSINOPHIL NFR BLD: 1.8 % (ref 0–8)
ERYTHROCYTE [DISTWIDTH] IN BLOOD BY AUTOMATED COUNT: 23.8 % (ref 11.5–14.5)
EST. GFR  (NO RACE VARIABLE): >60 ML/MIN/1.73 M^2
GLUCOSE SERPL-MCNC: 132 MG/DL (ref 70–110)
GLUCOSE UR QL STRIP: NEGATIVE
HCT VFR BLD AUTO: 21.2 % (ref 37–48.5)
HGB BLD-MCNC: 7.3 G/DL (ref 12–16)
HGB UR QL STRIP: NEGATIVE
HYPOCHROMIA BLD QL SMEAR: ABNORMAL
IMM GRANULOCYTES # BLD AUTO: 0.61 K/UL (ref 0–0.04)
IMM GRANULOCYTES NFR BLD AUTO: 2.2 % (ref 0–0.5)
INR PPP: 1.5 (ref 0.8–1.2)
KETONES UR QL STRIP: NEGATIVE
LEUKOCYTE ESTERASE UR QL STRIP: NEGATIVE
LYMPHOCYTES # BLD AUTO: 0.9 K/UL (ref 1–4.8)
LYMPHOCYTES NFR BLD: 3.4 % (ref 18–48)
MAGNESIUM SERPL-MCNC: 1.8 MG/DL (ref 1.6–2.6)
MCH RBC QN AUTO: 27.7 PG (ref 27–31)
MCHC RBC AUTO-ENTMCNC: 34.4 G/DL (ref 32–36)
MCV RBC AUTO: 80 FL (ref 82–98)
MONOCYTES # BLD AUTO: 2.5 K/UL (ref 0.3–1)
MONOCYTES NFR BLD: 9.1 % (ref 4–15)
NEUTROPHILS # BLD AUTO: 23.1 K/UL (ref 1.8–7.7)
NEUTROPHILS NFR BLD: 83.3 % (ref 38–73)
NITRITE UR QL STRIP: NEGATIVE
NRBC BLD-RTO: 0 /100 WBC
OVALOCYTES BLD QL SMEAR: ABNORMAL
PH UR STRIP: 6 [PH] (ref 5–8)
PHOSPHATE SERPL-MCNC: 2.9 MG/DL (ref 2.7–4.5)
PLATELET # BLD AUTO: 286 K/UL (ref 150–450)
PLATELET BLD QL SMEAR: ABNORMAL
PMV BLD AUTO: 10.7 FL (ref 9.2–12.9)
POIKILOCYTOSIS BLD QL SMEAR: SLIGHT
POLYCHROMASIA BLD QL SMEAR: ABNORMAL
POTASSIUM SERPL-SCNC: 3.6 MMOL/L (ref 3.5–5.1)
PROT SERPL-MCNC: 5 G/DL (ref 6–8.4)
PROT UR QL STRIP: NEGATIVE
PROTHROMBIN TIME: 16.3 SEC (ref 9–12.5)
RBC # BLD AUTO: 2.64 M/UL (ref 4–5.4)
SODIUM SERPL-SCNC: 128 MMOL/L (ref 136–145)
SP GR UR STRIP: 1.02 (ref 1–1.03)
URN SPEC COLLECT METH UR: ABNORMAL
WBC # BLD AUTO: 27.7 K/UL (ref 3.9–12.7)

## 2024-07-11 PROCEDURE — 87040 BLOOD CULTURE FOR BACTERIA: CPT | Performed by: STUDENT IN AN ORGANIZED HEALTH CARE EDUCATION/TRAINING PROGRAM

## 2024-07-11 PROCEDURE — 94761 N-INVAS EAR/PLS OXIMETRY MLT: CPT

## 2024-07-11 PROCEDURE — 27000221 HC OXYGEN, UP TO 24 HOURS

## 2024-07-11 PROCEDURE — 99497 ADVNCD CARE PLAN 30 MIN: CPT | Mod: ,,, | Performed by: STUDENT IN AN ORGANIZED HEALTH CARE EDUCATION/TRAINING PROGRAM

## 2024-07-11 PROCEDURE — 99233 SBSQ HOSP IP/OBS HIGH 50: CPT | Mod: ,,, | Performed by: STUDENT IN AN ORGANIZED HEALTH CARE EDUCATION/TRAINING PROGRAM

## 2024-07-11 PROCEDURE — 63600175 PHARM REV CODE 636 W HCPCS: Performed by: STUDENT IN AN ORGANIZED HEALTH CARE EDUCATION/TRAINING PROGRAM

## 2024-07-11 PROCEDURE — 84100 ASSAY OF PHOSPHORUS: CPT | Performed by: STUDENT IN AN ORGANIZED HEALTH CARE EDUCATION/TRAINING PROGRAM

## 2024-07-11 PROCEDURE — 63600175 PHARM REV CODE 636 W HCPCS: Performed by: HOSPITALIST

## 2024-07-11 PROCEDURE — 25000003 PHARM REV CODE 250: Performed by: HOSPITALIST

## 2024-07-11 PROCEDURE — 25000003 PHARM REV CODE 250: Performed by: STUDENT IN AN ORGANIZED HEALTH CARE EDUCATION/TRAINING PROGRAM

## 2024-07-11 PROCEDURE — 85610 PROTHROMBIN TIME: CPT | Performed by: STUDENT IN AN ORGANIZED HEALTH CARE EDUCATION/TRAINING PROGRAM

## 2024-07-11 PROCEDURE — 25000003 PHARM REV CODE 250

## 2024-07-11 PROCEDURE — 83735 ASSAY OF MAGNESIUM: CPT | Performed by: STUDENT IN AN ORGANIZED HEALTH CARE EDUCATION/TRAINING PROGRAM

## 2024-07-11 PROCEDURE — 20600001 HC STEP DOWN PRIVATE ROOM

## 2024-07-11 PROCEDURE — 81003 URINALYSIS AUTO W/O SCOPE: CPT | Performed by: STUDENT IN AN ORGANIZED HEALTH CARE EDUCATION/TRAINING PROGRAM

## 2024-07-11 PROCEDURE — 99233 SBSQ HOSP IP/OBS HIGH 50: CPT | Mod: GC,,, | Performed by: HOSPITALIST

## 2024-07-11 PROCEDURE — 36415 COLL VENOUS BLD VENIPUNCTURE: CPT | Performed by: STUDENT IN AN ORGANIZED HEALTH CARE EDUCATION/TRAINING PROGRAM

## 2024-07-11 PROCEDURE — 80053 COMPREHEN METABOLIC PANEL: CPT | Performed by: STUDENT IN AN ORGANIZED HEALTH CARE EDUCATION/TRAINING PROGRAM

## 2024-07-11 PROCEDURE — 85025 COMPLETE CBC W/AUTO DIFF WBC: CPT | Performed by: STUDENT IN AN ORGANIZED HEALTH CARE EDUCATION/TRAINING PROGRAM

## 2024-07-11 RX ORDER — FUROSEMIDE 10 MG/ML
40 INJECTION INTRAMUSCULAR; INTRAVENOUS ONCE
Status: COMPLETED | OUTPATIENT
Start: 2024-07-11 | End: 2024-07-11

## 2024-07-11 RX ORDER — SODIUM CHLORIDE 9 MG/ML
INJECTION, SOLUTION INTRAVENOUS CONTINUOUS
Status: DISCONTINUED | OUTPATIENT
Start: 2024-07-11 | End: 2024-07-11

## 2024-07-11 RX ORDER — OLANZAPINE 5 MG/1
5 TABLET, ORALLY DISINTEGRATING ORAL DAILY PRN
Status: DISCONTINUED | OUTPATIENT
Start: 2024-07-11 | End: 2024-07-12 | Stop reason: HOSPADM

## 2024-07-11 RX ORDER — OLANZAPINE 5 MG/1
10 TABLET, ORALLY DISINTEGRATING ORAL DAILY PRN
Status: DISCONTINUED | OUTPATIENT
Start: 2024-07-11 | End: 2024-07-11

## 2024-07-11 RX ADMIN — PIPERACILLIN SODIUM AND TAZOBACTAM SODIUM 4.5 G: 4; .5 INJECTION, POWDER, FOR SOLUTION INTRAVENOUS at 08:07

## 2024-07-11 RX ADMIN — VANCOMYCIN HYDROCHLORIDE 1000 MG: 1 INJECTION, POWDER, LYOPHILIZED, FOR SOLUTION INTRAVENOUS at 02:07

## 2024-07-11 RX ADMIN — POTASSIUM & SODIUM PHOSPHATES POWDER PACK 280-160-250 MG 2 PACKET: 280-160-250 PACK at 05:07

## 2024-07-11 RX ADMIN — POTASSIUM & SODIUM PHOSPHATES POWDER PACK 280-160-250 MG 2 PACKET: 280-160-250 PACK at 06:07

## 2024-07-11 RX ADMIN — PIPERACILLIN SODIUM AND TAZOBACTAM SODIUM 4.5 G: 4; .5 INJECTION, POWDER, FOR SOLUTION INTRAVENOUS at 04:07

## 2024-07-11 RX ADMIN — IBUPROFEN 400 MG: 200 TABLET, FILM COATED ORAL at 03:07

## 2024-07-11 RX ADMIN — POTASSIUM & SODIUM PHOSPHATES POWDER PACK 280-160-250 MG 2 PACKET: 280-160-250 PACK at 12:07

## 2024-07-11 RX ADMIN — PIPERACILLIN SODIUM AND TAZOBACTAM SODIUM 4.5 G: 4; .5 INJECTION, POWDER, FOR SOLUTION INTRAVENOUS at 12:07

## 2024-07-11 RX ADMIN — POTASSIUM & SODIUM PHOSPHATES POWDER PACK 280-160-250 MG 2 PACKET: 280-160-250 PACK at 08:07

## 2024-07-11 RX ADMIN — FUROSEMIDE 40 MG: 10 INJECTION, SOLUTION INTRAVENOUS at 10:07

## 2024-07-11 NOTE — PROGRESS NOTES
Therapy with vancomycin complete and/or consult discontinued by provider.  Pharmacy will sign off, please re-consult as needed.  WALLY

## 2024-07-11 NOTE — SUBJECTIVE & OBJECTIVE
Interval History: Febrile again overnight after her second ERCP w stent placement. WBC remains at 26k and TB minimally decreased to 5.5. AES following and believe clinical picture is due to disease progression. Ongoing C talks    Oncology Treatment Plan:   OP GI liposomal irinotecan leucovorin fluorouracil Q2W    Medications:  Continuous Infusions:  Scheduled Meds:   piperacillin-tazobactam (Zosyn) IV (PEDS and ADULTS) (extended infusion is not appropriate)  4.5 g Intravenous Q8H    potassium, sodium phosphates  2 packet Oral QID (AC & HS)    vancomycin (VANCOCIN) IV (PEDS and ADULTS)  15 mg/kg Intravenous Q12H     PRN Meds:  Current Facility-Administered Medications:     acetaminophen, 650 mg, Oral, Q4H PRN    bisacodyL, 5 mg, Oral, Daily PRN    dextrose 10%, 12.5 g, Intravenous, PRN    dextrose 10%, 25 g, Intravenous, PRN    glucagon (human recombinant), 1 mg, Intramuscular, PRN    glucose, 16 g, Oral, PRN    glucose, 24 g, Oral, PRN    haloperidol lactate, 0.5 mg, Intravenous, Q4H PRN    ibuprofen, 400 mg, Oral, Q6H PRN    melatonin, 6 mg, Oral, Nightly PRN    naloxone, 0.02 mg, Intravenous, PRN    ondansetron, 4 mg, Intravenous, Q8H PRN    oxyCODONE, 5 mg, Oral, Q6H PRN    oxyCODONE, 10 mg, Oral, Q6H PRN    senna-docusate 8.6-50 mg, 1 tablet, Oral, Daily PRN    simethicone, 1 tablet, Oral, TID PRN    sodium chloride 0.9%, 10 mL, Intravenous, Q12H PRN    Pharmacy to dose Vancomycin consult, , , Once **AND** vancomycin - pharmacy to dose, , Intravenous, pharmacy to manage frequency     Review of Systems   Constitutional:  Positive for appetite change and fatigue. Negative for fever.   Respiratory:  Negative for shortness of breath.    Cardiovascular:  Negative for chest pain.   Gastrointestinal:  Positive for abdominal distention and abdominal pain. Negative for constipation, nausea and vomiting.   Skin:  Positive for color change.   Allergic/Immunologic: Positive for immunocompromised state.    Psychiatric/Behavioral:  Negative for confusion.      Objective:     Vital Signs (Most Recent):  Temp: 97.4 °F (36.3 °C) (07/11/24 0735)  Pulse: 80 (07/11/24 0735)  Resp: 18 (07/11/24 0735)  BP: 105/62 (07/11/24 0735)  SpO2: 97 % (07/11/24 0735) Vital Signs (24h Range):  Temp:  [97.3 °F (36.3 °C)-100.5 °F (38.1 °C)] 97.4 °F (36.3 °C)  Pulse:  [] 80  Resp:  [16-22] 18  SpO2:  [92 %-98 %] 97 %  BP: ()/(56-75) 105/62     Weight: 64.7 kg (142 lb 10.2 oz)  Body mass index is 26.09 kg/m².  Body surface area is 1.68 meters squared.      Intake/Output Summary (Last 24 hours) at 7/11/2024 0828  Last data filed at 7/11/2024 0650  Gross per 24 hour   Intake 2393.76 ml   Output 1200 ml   Net 1193.76 ml        Physical Exam  Vitals and nursing note reviewed.   Constitutional:       Appearance: She is ill-appearing.      Comments: Resting comfortably   HENT:      Mouth/Throat:      Mouth: Mucous membranes are moist.   Cardiovascular:      Rate and Rhythm: Normal rate and regular rhythm.      Pulses: Normal pulses.      Heart sounds: Normal heart sounds.   Pulmonary:      Effort: Pulmonary effort is normal.      Breath sounds: Normal breath sounds.   Abdominal:      General: There is distension.      Tenderness: There is abdominal tenderness.   Skin:     Coloration: Skin is jaundiced.   Neurological:      Mental Status: She is alert.   Psychiatric:         Mood and Affect: Mood normal.         Behavior: Behavior normal.          Significant Labs:   All pertinent labs from the last 24 hours have been reviewed.    Diagnostic Results:  I have reviewed all pertinent imaging results/findings within the past 24 hours.

## 2024-07-11 NOTE — ASSESSMENT & PLAN NOTE
Pt has been experiencing abdominal pain and fevers x 1 week. LFTs increased from previous, Total Bili-2. WBCs elevated. MRCP significant for evidence of a stricture causing bile duct dilation. In setting of fever with elevated LFT and alk phos, concerned for cholangitis. On PE, pt not jaundiced but is TTP to epigastric and RUQ area. UA normal. RIP unremarkable.    We had both AES and Dr. Ayala see her and they agreed that IF she needed an intervention it would be a PTC drain but they also agree that it's appropriate for her to just trial abx. However, with worsening labs and fevers overnight, she had ERCP done on 7/5. On 7/7, T-bili, ALP, AST, ALT increasing and she appears jaundiced. Pt experiencing abdominal pain again. IR consulted for increasing T-Bili. IR believes that intervention will not be beneficial in this case. 7/8 overnight, pt experienced febrile at 101.9. Concerned for sepsis. Vanc added back, received fluids. Blood cultures drawn. Repeat ERCP w stent exchange on 7/10.     Bilirubin appears to not be normalizing as much as expected, AES believes cholestasis is secondary to disease progression    - IV zosyn/vanc  - Fluids PRN  - Trend Bili  - Blood cultures pending

## 2024-07-11 NOTE — SUBJECTIVE & OBJECTIVE
Interval history: Chart reviewed including 24h medication use. Patient resting comfortably in bedside chair, awake and conversant with  at bedside during visit    Past Medical History:   Diagnosis Date    Age-related osteoporosis without current pathological fracture     Atherosclerosis of aortic arch     - noted on CXR 8/2017    Back pain     Diverticulosis of sigmoid colon 10/22/2019    Ectopic pregnancy     Fibrocystic breast     Hyperlipidemia     Inguinal hernia     Osteopenia     Polyp of ascending colon 10/22/2019    Sepsis 7/2/2024    Urinary incontinence, mixed 05/24/2022       Past Surgical History:   Procedure Laterality Date    ECTOPIC PREGNANCY SURGERY      ERCP N/A 7/5/2024    Procedure: ERCP (ENDOSCOPIC RETROGRADE CHOLANGIOPANCREATOGRAPHY);  Surgeon: Chance Mccann MD;  Location: [facility] ([address] [place] [address]);  Service: Endoscopy;  Laterality: N/A;    ERCP N/A 7/10/2024    Procedure: ERCP (ENDOSCOPIC RETROGRADE CHOLANGIOPANCREATOGRAPHY);  Surgeon: Chance Mccann MD;  Location: [address]);  Service: Endoscopy;  Laterality: N/A;    HERNIA REPAIR      left inguinal    HYSTERECTOMY  1980    without BSO       Review of patient's allergies indicates:   Allergen Reactions    Erythromycin (bulk)      Other reaction(s): Eye irritation       Medications:  Continuous Infusions:      Scheduled Meds:   piperacillin-tazobactam (Zosyn) IV (PEDS and ADULTS) (extended infusion is not appropriate)  4.5 g Intravenous Q8H    potassium, sodium phosphates  2 packet Oral QID (AC & HS)     PRN Meds:  Current Facility-Administered Medications:     acetaminophen, 650 mg, Oral, Q4H PRN    bisacodyL, 5 mg, Oral, Daily PRN    dextrose 10%, 12.5 g, Intravenous, PRN    dextrose 10%, 25 g, Intravenous, PRN    glucagon (human recombinant), 1 mg, Intramuscular, PRN    glucose, 16 g, Oral, PRN    glucose, 24 g, Oral, PRN    haloperidol lactate, 0.5 mg, Intravenous, Q4H PRN    ibuprofen, 400 mg, Oral, Q6H PRN    melatonin, 6 mg,  Oral, Nightly PRN    naloxone, 0.02 mg, Intravenous, PRN    OLANZapine zydis, 5 mg, Oral, Daily PRN    ondansetron, 4 mg, Intravenous, Q8H PRN    oxyCODONE, 5 mg, Oral, Q6H PRN    oxyCODONE, 10 mg, Oral, Q6H PRN    senna-docusate 8.6-50 mg, 1 tablet, Oral, Daily PRN    simethicone, 1 tablet, Oral, TID PRN    sodium chloride 0.9%, 10 mL, Intravenous, Q12H PRN    Family History       Problem Relation (Age of Onset)    Breast cancer Mother (69), Sister (73), Maternal Aunt (73)    Cancer Maternal Aunt (83), Maternal Uncle    Depression Father, Paternal Grandmother, Paternal Aunt, Paternal Uncle    Heart attack Father, Maternal Grandfather, Paternal Uncle    Heart disease Father    Heart failure Maternal Grandmother    Mental illness Paternal Grandmother    Obesity Brother    Prostate cancer Brother (81)    Seizures Mother    Suicide Paternal Cousin    Suicide Attempts Paternal Grandmother    Tongue cancer Paternal Cousin          Tobacco Use    Smoking status: Never    Smokeless tobacco: Never   Substance and Sexual Activity    Alcohol use: No    Drug use: No    Sexual activity: Not Currently         Objective:     Vital Signs (Most Recent):  Temp: 97.5 °F (36.4 °C) (07/11/24 1613)  Pulse: 75 (07/11/24 1613)  Resp: 18 (07/11/24 1613)  BP: (!) 102/56 (07/11/24 1613)  SpO2: 97 % (07/11/24 1613) Vital Signs (24h Range):  Temp:  [97.4 °F (36.3 °C)-100.5 °F (38.1 °C)] 97.5 °F (36.4 °C)  Pulse:  [] 75  Resp:  [17-20] 18  SpO2:  [92 %-98 %] 97 %  BP: (102-134)/(56-73) 102/56     Weight: 64.7 kg (142 lb 10.2 oz)  Body mass index is 26.09 kg/m².       Physical Exam  Vitals and nursing note reviewed.   Constitutional:       Appearance: She is ill-appearing.      Comments: Elderly, ill-appearing female sitting up in chair, awake and conversant, no observed pain behaviors   HENT:      Mouth/Throat:      Mouth: Mucous membranes are moist.   Pulmonary:      Effort: Pulmonary effort is normal.      Breath sounds: Normal breath  sounds.   Skin:     Coloration: Skin is jaundiced.   Neurological:      Mental Status: She is alert.   Psychiatric:         Mood and Affect: Mood normal.         Behavior: Behavior normal.           Advance Care Planning   Advance Directives:   Living Will: Yes        Copy on chart: Yes    Medical Power of : Yes      Decision Making:  Patient answered questions and Family answered questions  Goals of Care: Continued supportive conversation at bedside regarding wishes and values in the setting of severe life-limiting illness. Patient expresses continued values focused on quality of life. Despite this, she continues to hope that her biliary numbers improve to the point where she might be offered more chemo, acknowledging that chemo may not meaningfully extend her life and likely will add further symptom burden. If she is not offered further cancer treatments, she would want home hospice care. She also is clear that she would not want advanced life support, CPR, or VALERI if she were actively dying.          Significant Labs:  CBC:   Recent Labs   Lab 07/11/24 0451   WBC 27.70*   HGB 7.3*   HCT 21.2*   MCV 80*        BMP:  Recent Labs   Lab 07/11/24 0451   *   *   K 3.6   CL 95   CO2 26   BUN 10   CREATININE 0.6   CALCIUM 7.7*   MG 1.8     LFT:  Lab Results   Component Value Date    AST 45 (H) 07/11/2024    ALKPHOS 488 (H) 07/11/2024    BILITOT 5.5 (H) 07/11/2024     Albumin:   Albumin   Date Value Ref Range Status   07/11/2024 1.6 (L) 3.5 - 5.2 g/dL Final     Protein:   Total Protein   Date Value Ref Range Status   07/11/2024 5.0 (L) 6.0 - 8.4 g/dL Final     Lactic acid:   Lab Results   Component Value Date    LACTATE 2.0 07/09/2024       Reviewed CBC with persistent leukocytosis and anemia, CMP with stable renal function but cholangitic labs consistent with known malignant bilary obstruction

## 2024-07-11 NOTE — PROGRESS NOTES
07/10/24 8670   Vital Signs   Temp (!) 100.4 °F (38 °C)   Temp Source Oral   Pulse 93     Dr. Prisca Davis notified of patient's temp. Monitoring as ordered

## 2024-07-11 NOTE — PLAN OF CARE
AAOX4. Afebrile, on RA. Denies pain. Medications reviewed. Voiding w/o difficulty, tolerating regular diet no n/v.Up in room and to bathroom independently. Nonskid footwear on, bed locked and low, bed rails up x2. Call light and personal items within reach. Instructed to call for any needs or concerns, voiced understanding.

## 2024-07-11 NOTE — ASSESSMENT & PLAN NOTE
-appreciate ongoing GOC talks  -hospice may be route to pursue given progressive cancer and minimal to no treatment options per Dr. Briceño

## 2024-07-11 NOTE — PROGRESS NOTES
"[facility] ([facility])  Palliative Medicine  Progress Note    Patient Name: Deepali Alex  MRN: [id number]  Admission Date: 7/1/2024  Hospital Length of Stay: 9 days  Code Status: Full Code   Attending Provider: Kinga Christian MD  Consulting Provider: Manny Roman MD  Primary Care Physician: Raul Rivera MD  Principal Problem:Sepsis    Patient information was obtained from patient, spouse/SO, past medical records, and primary team.      Assessment/Plan:     Oncology  Pancreatic adenocarcinoma  History of Pancreatic adenocarcinoma with metastasis to liver/lungs. Currently follows up with Dr. Briceño. Pt completed chemo cycle (gemcitabine + nab-paclitaxel) in May and is expected to start new 5-FU and Onyvide due to disease progression. CT abdomen shows "stable appearance of the heterogeneous hypoattenuating pancreatic tail lesion with continued abutment of the splenic artery" and "mildly worse innumerable hypoattenuating hepatic lesions.  Findings remain concerning for hepatic metastatic disease."     - Management per primary     Palliative Care  Palliative care encounter  Insight/goals of care- very aware that her cancer is life-limiting. Wishes to treat symptoms (pain, nausea, constipation) to remain comfortable. Wishes to speak with Dr. Briceño to discuss chemotherapy options. Hopeful that stents will allow resolution of hyperbilirubinemia.  Willing to accept hospice if no further chemotherapy is pursued.    ACP documents- See ACP 7/10-7/11; has living will on file which states no CPR, life support, or VALERI in the setting of irreversible illness    Social support- Very good and includes her  Deshawn, two sons Landon and Jamaal, and grandchildren.    Psychological- Coping well with illness.  Appropriate family grief.    Spiritual- Not Religion but open to speaking to a . Her  had a productive conversation with a  during this admission.    Symptom management- Nausea, " constipation, abdominal pain    Recommendations:  - Resume daily Miralax when diarrhea resolves  - Trial olanzapine 5mg daily PRN for nausea, if refractory trial compazine  - Agree with oxycodone 5mg and 10mg for pain  - Agree with Dilaudid PRN but would increase 0.5mg  - Palliative Medicine will follow peripherally for continued support        I will sign off. Please contact us if you have any additional questions.    Subjective:     Chief Complaint:   Chief Complaint   Patient presents with    Fever     Hx of pancreatic cancer with liver mets. Last chemo May 27. Tmax 101 at home       HPI:   Deepali Alex is a 79-year-old female with  metastatic pancreatic adenocarcinoma to liver and lung (follows with Dr. Briceño) who presented to [facility] ED on 7/2/24 for abdominal pain and nausea found to have biliary obstruction and sepsis treated with stenting and broad spectrum antibiotics.  Pallitative Medicine was consulted for goals of care discussion.  Upon our interview, the patient was resting comfortably eating a peach with her  Ray at bedside.  We had a discussion about the role of hospice care.  The patient expressed interest in home hospice when the time is appropriate.  We also discussed medical management of symptoms including nausea and pain.  She is willing to trial medications for nausea and pain and is interested in Zyprexa for nausea.  The patient remains hopeful that she may be provided the option of chemotherapy in the future with the understanding that although it may prolong her life, it may not provide a quality of life that she desires.  She spoke of her family including her , children, and grandchildren who are able to provide her with support.  She is hopeful that she will be able to attend family weddings in the near future.  She recognizes that her cancer diagnosis is life-limiting and stated that when she was diagnosed she did not expect to live past August of this year.  Of note, the  patient met with Codie Gomez of Palliative Medicine in 12/2023 where she completed ACP documentation which names her  Deshawn as her HPOA.  Her living will states that in the setting of profound incurable illness that all life-sustaining procedures, including nutrition and hydration, be witheld or wthdrawn so that food and water will not be administered invasively.    Hospital Course:  No notes on file    Interval history: Chart reviewed including 24h medication use. Patient resting comfortably in bedside chair, awake and conversant with  at bedside during visit    Past Medical History:   Diagnosis Date    Age-related osteoporosis without current pathological fracture     Atherosclerosis of aortic arch     - noted on CXR 8/2017    Back pain     Diverticulosis of sigmoid colon 10/22/2019    Ectopic pregnancy     Fibrocystic breast     Hyperlipidemia     Inguinal hernia     Osteopenia     Polyp of ascending colon 10/22/2019    Sepsis 7/2/2024    Urinary incontinence, mixed 05/24/2022       Past Surgical History:   Procedure Laterality Date    ECTOPIC PREGNANCY SURGERY      ERCP N/A 7/5/2024    Procedure: ERCP (ENDOSCOPIC RETROGRADE CHOLANGIOPANCREATOGRAPHY);  Surgeon: Chance Mccann MD;  Location: [facility] ([address] [place] [address]);  Service: Endoscopy;  Laterality: N/A;    ERCP N/A 7/10/2024    Procedure: ERCP (ENDOSCOPIC RETROGRADE CHOLANGIOPANCREATOGRAPHY);  Surgeon: Chance Mccann MD;  Location: [facility] ([address] [place] [address]);  Service: Endoscopy;  Laterality: N/A;    HERNIA REPAIR      left inguinal    HYSTERECTOMY  1980    without BSO       Review of patient's allergies indicates:   Allergen Reactions    Erythromycin (bulk)      Other reaction(s): Eye irritation       Medications:  Continuous Infusions:      Scheduled Meds:   piperacillin-tazobactam (Zosyn) IV (PEDS and ADULTS) (extended infusion is not appropriate)  4.5 g Intravenous Q8H    potassium, sodium phosphates  2 packet Oral QID (AC & HS)     PRN Meds:  Current  Facility-Administered Medications:     acetaminophen, 650 mg, Oral, Q4H PRN    bisacodyL, 5 mg, Oral, Daily PRN    dextrose 10%, 12.5 g, Intravenous, PRN    dextrose 10%, 25 g, Intravenous, PRN    glucagon (human recombinant), 1 mg, Intramuscular, PRN    glucose, 16 g, Oral, PRN    glucose, 24 g, Oral, PRN    haloperidol lactate, 0.5 mg, Intravenous, Q4H PRN    ibuprofen, 400 mg, Oral, Q6H PRN    melatonin, 6 mg, Oral, Nightly PRN    naloxone, 0.02 mg, Intravenous, PRN    OLANZapine zydis, 5 mg, Oral, Daily PRN    ondansetron, 4 mg, Intravenous, Q8H PRN    oxyCODONE, 5 mg, Oral, Q6H PRN    oxyCODONE, 10 mg, Oral, Q6H PRN    senna-docusate 8.6-50 mg, 1 tablet, Oral, Daily PRN    simethicone, 1 tablet, Oral, TID PRN    sodium chloride 0.9%, 10 mL, Intravenous, Q12H PRN    Family History       Problem Relation (Age of Onset)    Breast cancer Mother (69), Sister (73), Maternal Aunt (73)    Cancer Maternal Aunt (83), Maternal Uncle    Depression Father, Paternal Grandmother, Paternal Aunt, Paternal Uncle    Heart attack Father, Maternal Grandfather, Paternal Uncle    Heart disease Father    Heart failure Maternal Grandmother    Mental illness Paternal Grandmother    Obesity Brother    Prostate cancer Brother (81)    Seizures Mother    Suicide Paternal Cousin    Suicide Attempts Paternal Grandmother    Tongue cancer Paternal Cousin          Tobacco Use    Smoking status: Never    Smokeless tobacco: Never   Substance and Sexual Activity    Alcohol use: No    Drug use: No    Sexual activity: Not Currently         Objective:     Vital Signs (Most Recent):  Temp: 97.5 °F (36.4 °C) (07/11/24 1613)  Pulse: 75 (07/11/24 1613)  Resp: 18 (07/11/24 1613)  BP: (!) 102/56 (07/11/24 1613)  SpO2: 97 % (07/11/24 1613) Vital Signs (24h Range):  Temp:  [97.4 °F (36.3 °C)-100.5 °F (38.1 °C)] 97.5 °F (36.4 °C)  Pulse:  [] 75  Resp:  [17-20] 18  SpO2:  [92 %-98 %] 97 %  BP: (102-134)/(56-73) 102/56     Weight: 64.7 kg (142 lb 10.2  oz)  Body mass index is 26.09 kg/m².       Physical Exam  Vitals and nursing note reviewed.   Constitutional:       Appearance: She is ill-appearing.      Comments: Elderly, ill-appearing female sitting up in chair, awake and conversant, no observed pain behaviors   HENT:      Mouth/Throat:      Mouth: Mucous membranes are moist.   Pulmonary:      Effort: Pulmonary effort is normal.      Breath sounds: Normal breath sounds.   Skin:     Coloration: Skin is jaundiced.   Neurological:      Mental Status: She is alert.   Psychiatric:         Mood and Affect: Mood normal.         Behavior: Behavior normal.           Advance Care Planning  Advance Directives:   Living Will: Yes        Copy on chart: Yes    Medical Power of : Yes      Decision Making:  Patient answered questions and Family answered questions  Goals of Care: Continued supportive conversation at bedside regarding wishes and values in the setting of severe life-limiting illness. Patient expresses continued values focused on quality of life. Despite this, she continues to hope that her biliary numbers improve to the point where she might be offered more chemo, acknowledging that chemo may not meaningfully extend her life and likely will add further symptom burden. If she is not offered further cancer treatments, she would want home hospice care. She also is clear that she would not want advanced life support, CPR, or VALERI if she were actively dying.          Significant Labs:  CBC:   Recent Labs   Lab 07/11/24  0451   WBC 27.70*   HGB 7.3*   HCT 21.2*   MCV 80*        BMP:  Recent Labs   Lab 07/11/24  0451   *   *   K 3.6   CL 95   CO2 26   BUN 10   CREATININE 0.6   CALCIUM 7.7*   MG 1.8     LFT:  Lab Results   Component Value Date    AST 45 (H) 07/11/2024    ALKPHOS 488 (H) 07/11/2024    BILITOT 5.5 (H) 07/11/2024     Albumin:   Albumin   Date Value Ref Range Status   07/11/2024 1.6 (L) 3.5 - 5.2 g/dL Final     Protein:   Total  Protein   Date Value Ref Range Status   07/11/2024 5.0 (L) 6.0 - 8.4 g/dL Final     Lactic acid:   Lab Results   Component Value Date    LACTATE 2.0 07/09/2024       Reviewed CBC with persistent leukocytosis and anemia, CMP with stable renal function but cholangitic labs consistent with known malignant bilary obstruction      In my care of this patient with acute on chronic severe illness with threat to life and/or bodily function, I am recommending goal-concordant care as noted above. I spent a significant amount of time reviewing external records/ recommendations of other providers (med onc, AES), reviewing recent test results (CBC, CMP), and discussed care with other subspecialists involved (med onc)    In addition to above, I spent 20 minutes specifically discussing advance care planning and goals of care with patient and family at bedside.       The above recommendations communicated directly to primary team on 7/11      Manny Roman MD  Palliative Medicine  [facility] - Oncology ([facility])

## 2024-07-11 NOTE — TREATMENT PLAN
AES Treatment Plan    Deepali Alex is a 79 y.o. female admitted to hospital 7/1/2024 (Hospital Day: 11) due to Sepsis.     Interval History  S/p ERCP yesterday with removal of 2 stents. A total of 3 new stents placed (2 in the right hep duct and 1 in the left hep duct). Multiple localized biliary strictures were found in the left and right hepatic ducts and all intrahepatic branches were found.     Resting in chair. NAEON. Denies significant pain.     T bili 6>5.5, noted decrease in Alk Phos.     Has been evaluated by palliative care.     Objective  Temp:  [97.3 °F (36.3 °C)-100.5 °F (38.1 °C)] 97.4 °F (36.3 °C) (07/11 0735)  Pulse:  [] 80 (07/11 0735)  BP: ()/(56-75) 105/62 (07/11 0735)  Resp:  [18-22] 18 (07/11 0735)  SpO2:  [92 %-98 %] 97 % (07/11 0900)    General: Alert, Oriented x3, no distress  Abdomen: Non-distended. Normal tympany. Soft. Non-tender. No peritoneal signs.    Laboratory  Lab Results   Component Value Date    WBC 27.70 (H) 07/11/2024    HGB 7.3 (L) 07/11/2024    HCT 21.2 (L) 07/11/2024    MCV 80 (L) 07/11/2024     07/11/2024       Lab Results   Component Value Date    ALT 29 07/11/2024    AST 45 (H) 07/11/2024    ALKPHOS 488 (H) 07/11/2024    BILITOT 5.5 (H) 07/11/2024       Assessment  79-year-old female with past medical history of metastatic pancreatic cancer to liver and lung (diagnosed by IR guided liver biopsy, finished chemo in May) presenting with a fever and chills (Tmax 101 F at home). Labs on arrival notable for WBC 17, T bili 1.6 (up from 0.5), , AST 60, and ALT 51. UA +ve for leukocytes. MRI MRCP showed innumerable solid and cystic in liver. There is dilatation of the intrahepatic biliary radicles and absent signal/abrupt change in caliber and obliteration of the confluence of the intrahepatic biliary radicles. Not on blood thinners. No hx of prior gastric surgeries.      S/p ERCP 7/5/24 showed diffuse biliary strictures & 1 biliary stent each was  placed into the left and right hepatic duct. However LFTs continue to rise.     S/p ERCP 7/10/24 with removal of 2 stents. A total of 3 new stents placed (2 in the right hep duct and 1 in the left hep duct). Multiple localized biliary strictures were found in the left and right hepatic ducts and all intrahepatic branches were found. Patient doing well.     Plan  - Appreciate GOC discussions led by Palliative Care team.    - Advance diet as tolerated.   - Daily CBC, CMP.   - Plan of care was discussed with primary team.  - We are signing-off. Please call with any questions.    Thank you for involving us in the care of Deepali Alex. Please call with any additional questions, concerns or changes in the patient's clinical status.    Manuel Hearn MD, PGY-VI  Gastroenterology Fellow  [facility]

## 2024-07-11 NOTE — PLAN OF CARE
Patient AAOx4. Tmax of 100.5. VSS on 1 L on oxygen nasal cannula. Normal saline infusing @ 25 ml/hr. Currently on tele and continuous pulse ox. Patient complained of abdominal discomfort, PRN ibuprofen given x1 with moderate relief. Receiving antibiotics (Vanc and Zosyn) as ordered. Repeat blood cultures and UA ordered. Patient is up with assist to the bathroom. Free from falls and injury. 3 formed bowel movements overnight. Monitoring I&Os, tolerating diet, and CHG wipes given for care. Bed is locked and in lowest position, non-skid socks on, and call light within reach. Patient educated on using the call light when needing assistance and verbalizes understanding. Plan of care reviewed and is ongoing. Patient expresses no needs at this time.

## 2024-07-11 NOTE — PROGRESS NOTES
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 Briceño. She finished chemo(gemcitabine + nab-paclitaxel) and is expected to start new regimen due to progression (5-FU and Onyvide) on 7/9. Pt admitted to medical oncology for fever and abdominal pain in setting of metastatic pancreatic adenocarcinoma. She has been experiencing fever x 1 week (Tmax of 101 at home) with worsening right sided abdominal pain. She denies CP, SOB, loss of appetite, diarrhea.      ED Course  Pt presented to the ED with abdominal pain and fevers.  Labs significant for ALP-881, AST-64, ALT-51, increased WBCs at 18.90. Preliminary blood cultures drawn. CT Abd shows "stable appearance of the heterogeneous hypoattenuating pancreatic tail lesion with continued abutment of the splenic artery." CT chest ruled out PE. Pt empirically started on IV zosyn for possible abdominal infection and sepsis protocol was initiated. Admitted to med onc for further management.     Interval History: Febrile again overnight after her second ERCP w stent placement. WBC remains at 26k and TB minimally decreased to 5.5. AES following and believe clinical picture is due to disease progression. Ongoing GOC talks    Oncology Treatment Plan:   OP GI liposomal irinotecan leucovorin fluorouracil Q2W    Medications:  Continuous Infusions:  Scheduled Meds:   piperacillin-tazobactam (Zosyn) IV (PEDS and ADULTS) (extended infusion is not appropriate)  4.5 g Intravenous Q8H    potassium, sodium phosphates  2 packet Oral QID (AC & HS)    vancomycin (VANCOCIN) IV (PEDS and ADULTS)  15 mg/kg Intravenous Q12H     PRN Meds:  Current Facility-Administered Medications:     acetaminophen, 650 mg, Oral, " Q4H PRN    bisacodyL, 5 mg, Oral, Daily PRN    dextrose 10%, 12.5 g, Intravenous, PRN    dextrose 10%, 25 g, Intravenous, PRN    glucagon (human recombinant), 1 mg, Intramuscular, PRN    glucose, 16 g, Oral, PRN    glucose, 24 g, Oral, PRN    haloperidol lactate, 0.5 mg, Intravenous, Q4H PRN    ibuprofen, 400 mg, Oral, Q6H PRN    melatonin, 6 mg, Oral, Nightly PRN    naloxone, 0.02 mg, Intravenous, PRN    ondansetron, 4 mg, Intravenous, Q8H PRN    oxyCODONE, 5 mg, Oral, Q6H PRN    oxyCODONE, 10 mg, Oral, Q6H PRN    senna-docusate 8.6-50 mg, 1 tablet, Oral, Daily PRN    simethicone, 1 tablet, Oral, TID PRN    sodium chloride 0.9%, 10 mL, Intravenous, Q12H PRN    Pharmacy to dose Vancomycin consult, , , Once **AND** vancomycin - pharmacy to dose, , Intravenous, pharmacy to manage frequency     Review of Systems   Constitutional:  Positive for appetite change and fatigue. Negative for fever.   Respiratory:  Negative for shortness of breath.    Cardiovascular:  Negative for chest pain.   Gastrointestinal:  Positive for abdominal distention and abdominal pain. Negative for constipation, nausea and vomiting.   Skin:  Positive for color change.   Allergic/Immunologic: Positive for immunocompromised state.   Psychiatric/Behavioral:  Negative for confusion.      Objective:     Vital Signs (Most Recent):  Temp: 97.4 °F (36.3 °C) (07/11/24 0735)  Pulse: 80 (07/11/24 0735)  Resp: 18 (07/11/24 0735)  BP: 105/62 (07/11/24 0735)  SpO2: 97 % (07/11/24 0735) Vital Signs (24h Range):  Temp:  [97.3 °F (36.3 °C)-100.5 °F (38.1 °C)] 97.4 °F (36.3 °C)  Pulse:  [] 80  Resp:  [16-22] 18  SpO2:  [92 %-98 %] 97 %  BP: ()/(56-75) 105/62     Weight: 64.7 kg (142 lb 10.2 oz)  Body mass index is 26.09 kg/m².  Body surface area is 1.68 meters squared.      Intake/Output Summary (Last 24 hours) at 7/11/2024 0828  Last data filed at 7/11/2024 0650  Gross per 24 hour   Intake 2393.76 ml   Output 1200 ml   Net 1193.76 ml        Physical  Exam  Vitals and nursing note reviewed.   Constitutional:       Appearance: She is ill-appearing.      Comments: Resting comfortably   HENT:      Mouth/Throat:      Mouth: Mucous membranes are moist.   Cardiovascular:      Rate and Rhythm: Normal rate and regular rhythm.      Pulses: Normal pulses.      Heart sounds: Normal heart sounds.   Pulmonary:      Effort: Pulmonary effort is normal.      Breath sounds: Normal breath sounds.   Abdominal:      General: There is distension.      Tenderness: There is abdominal tenderness.   Skin:     Coloration: Skin is jaundiced.   Neurological:      Mental Status: She is alert.   Psychiatric:         Mood and Affect: Mood normal.         Behavior: Behavior normal.          Significant Labs:   All pertinent labs from the last 24 hours have been reviewed.    Diagnostic Results:  I have reviewed all pertinent imaging results/findings within the past 24 hours.  Assessment/Plan:     * Sepsis  Pt has been experiencing abdominal pain and fevers x 1 week. LFTs increased from previous, Total Bili-2. WBCs elevated. MRCP significant for evidence of a stricture causing bile duct dilation. In setting of fever with elevated LFT and alk phos, concerned for cholangitis. On PE, pt not jaundiced but is TTP to epigastric and RUQ area. UA normal. RIP unremarkable.    We had both [facility] and Dr. Ayala see her and they agreed that IF she needed an intervention it would be a PTC drain but they also agree that it's appropriate for her to just trial abx. However, with worsening labs and fevers overnight, she had ERCP done on 7/5. On 7/7, T-bili, ALP, AST, ALT increasing and she appears jaundiced. Pt experiencing abdominal pain again. IR consulted for increasing T-Bili. IR believes that intervention will not be beneficial in this case. 7/8 overnight, pt experienced febrile at 101.9. Concerned for sepsis. Vanc added back, received fluids. Blood cultures drawn. Repeat ERCP w stent exchange on 7/10.  "    Bilirubin appears to not be normalizing as much as expected, AES believes cholestasis is secondary to disease progression    - IV zosyn/vanc  - Fluids PRN  - Trend Bili  - Blood cultures pending    Palliative care encounter  -appreciate ongoing GOC talks  -hospice may be route to pursue given progressive cancer and minimal to no treatment options per Dr. Briceño    Constipation  - Optimize bowel regimen    Transaminitis  See plan for sepsis    Hyponatremia  Sodium on admission 132  - Daily CMP, monitor    Biliary stricture  See Sepsis    Pancreatic adenocarcinoma  History of Pancreatic adenocarcinoma with metastasis to liver/lungs. Currently follows up with Dr. Briceño. Pt completed chemo cycle (gemcitabine + nab-paclitaxel) in May and is expected to start new 5-FU and Onyvide due to disease progression. CT abdomen shows "stable appearance of the heterogeneous hypoattenuating pancreatic tail lesion with continued abutment of the splenic artery" and "mildly worse innumerable hypoattenuating hepatic lesions.  Findings remain concerning for hepatic metastatic disease."              Simon Rooney MD  Hematology/Oncology  [facility] - Oncology ([facility])      "

## 2024-07-11 NOTE — ASSESSMENT & PLAN NOTE
Insight/goals of care- very aware that her cancer is life-limiting. Wishes to treat symptoms (pain, nausea, constipation) to remain comfortable. Wishes to speak with Dr. Briceño to discuss chemotherapy options. Hopeful that stents will allow resolution of hyperbilirubinemia.  Willing to accept hospice if no further chemotherapy is pursued.    ACP documents- See ACP 7/10-7/11; has living will on file which states no CPR, life support, or VALERI in the setting of irreversible illness    Social support- Very good and includes her  Deshawn, two sons Landon and Jamaal, and grandchildren.    Psychological- Coping well with illness.  Appropriate family grief.    Spiritual- Not Scientologist but open to speaking to a . Her  had a productive conversation with a  during this admission.    Symptom management- Nausea, constipation, abdominal pain    Recommendations:  - Resume daily Miralax when diarrhea resolves  - Trial olanzapine 5mg daily PRN for nausea, if refractory trial compazine  - Agree with oxycodone 5mg and 10mg for pain  - Agree with Dilaudid PRN but would increase 0.5mg  - Palliative Medicine will follow peripherally for continued support

## 2024-07-11 NOTE — PROGRESS NOTES
07/11/24 0420   Vital Signs   Temp (!) 100.5 °F (38.1 °C)   Temp Source Oral   Pulse 105     Dr. Prisca Davis notified of patient's temperature. Repeat blood cultures and UA ordered.

## 2024-07-12 VITALS
SYSTOLIC BLOOD PRESSURE: 139 MMHG | OXYGEN SATURATION: 98 % | HEART RATE: 97 BPM | RESPIRATION RATE: 18 BRPM | BODY MASS INDEX: 26.25 KG/M2 | HEIGHT: 62 IN | TEMPERATURE: 99 F | DIASTOLIC BLOOD PRESSURE: 62 MMHG | WEIGHT: 142.63 LBS

## 2024-07-12 LAB
ALBUMIN SERPL BCP-MCNC: 1.6 G/DL (ref 3.5–5.2)
ALP SERPL-CCNC: 471 U/L (ref 55–135)
ALT SERPL W/O P-5'-P-CCNC: 28 U/L (ref 10–44)
ANION GAP SERPL CALC-SCNC: 8 MMOL/L (ref 8–16)
ANISOCYTOSIS BLD QL SMEAR: SLIGHT
AST SERPL-CCNC: 47 U/L (ref 10–40)
BASOPHILS # BLD AUTO: 0.1 K/UL (ref 0–0.2)
BASOPHILS NFR BLD: 0.4 % (ref 0–1.9)
BILIRUB SERPL-MCNC: 5.2 MG/DL (ref 0.1–1)
BUN SERPL-MCNC: 9 MG/DL (ref 8–23)
CALCIUM SERPL-MCNC: 7.7 MG/DL (ref 8.7–10.5)
CHLORIDE SERPL-SCNC: 96 MMOL/L (ref 95–110)
CO2 SERPL-SCNC: 26 MMOL/L (ref 23–29)
CREAT SERPL-MCNC: 0.6 MG/DL (ref 0.5–1.4)
DACRYOCYTES BLD QL SMEAR: ABNORMAL
DIFFERENTIAL METHOD BLD: ABNORMAL
EOSINOPHIL # BLD AUTO: 0.6 K/UL (ref 0–0.5)
EOSINOPHIL NFR BLD: 2.4 % (ref 0–8)
ERYTHROCYTE [DISTWIDTH] IN BLOOD BY AUTOMATED COUNT: 24 % (ref 11.5–14.5)
EST. GFR  (NO RACE VARIABLE): >60 ML/MIN/1.73 M^2
GLUCOSE SERPL-MCNC: 94 MG/DL (ref 70–110)
HCT VFR BLD AUTO: 22.2 % (ref 37–48.5)
HGB BLD-MCNC: 7.3 G/DL (ref 12–16)
HYPOCHROMIA BLD QL SMEAR: ABNORMAL
IMM GRANULOCYTES # BLD AUTO: 0.53 K/UL (ref 0–0.04)
IMM GRANULOCYTES NFR BLD AUTO: 2 % (ref 0–0.5)
INR PPP: 1.4 (ref 0.8–1.2)
LYMPHOCYTES # BLD AUTO: 1 K/UL (ref 1–4.8)
LYMPHOCYTES NFR BLD: 3.8 % (ref 18–48)
MAGNESIUM SERPL-MCNC: 1.8 MG/DL (ref 1.6–2.6)
MCH RBC QN AUTO: 26.7 PG (ref 27–31)
MCHC RBC AUTO-ENTMCNC: 32.9 G/DL (ref 32–36)
MCV RBC AUTO: 81 FL (ref 82–98)
MONOCYTES # BLD AUTO: 2.1 K/UL (ref 0.3–1)
MONOCYTES NFR BLD: 7.8 % (ref 4–15)
NEUTROPHILS # BLD AUTO: 22.5 K/UL (ref 1.8–7.7)
NEUTROPHILS NFR BLD: 83.6 % (ref 38–73)
NRBC BLD-RTO: 0 /100 WBC
PHOSPHATE SERPL-MCNC: 3.5 MG/DL (ref 2.7–4.5)
PLATELET # BLD AUTO: 315 K/UL (ref 150–450)
PLATELET BLD QL SMEAR: ABNORMAL
PMV BLD AUTO: 11.2 FL (ref 9.2–12.9)
POIKILOCYTOSIS BLD QL SMEAR: SLIGHT
POLYCHROMASIA BLD QL SMEAR: ABNORMAL
POTASSIUM SERPL-SCNC: 3.7 MMOL/L (ref 3.5–5.1)
PROT SERPL-MCNC: 5 G/DL (ref 6–8.4)
PROTHROMBIN TIME: 15.1 SEC (ref 9–12.5)
RBC # BLD AUTO: 2.73 M/UL (ref 4–5.4)
SODIUM SERPL-SCNC: 130 MMOL/L (ref 136–145)
TARGETS BLD QL SMEAR: ABNORMAL
WBC # BLD AUTO: 26.92 K/UL (ref 3.9–12.7)

## 2024-07-12 PROCEDURE — 63600175 PHARM REV CODE 636 W HCPCS: Performed by: STUDENT IN AN ORGANIZED HEALTH CARE EDUCATION/TRAINING PROGRAM

## 2024-07-12 PROCEDURE — 85025 COMPLETE CBC W/AUTO DIFF WBC: CPT | Performed by: HOSPITALIST

## 2024-07-12 PROCEDURE — 25000003 PHARM REV CODE 250: Performed by: STUDENT IN AN ORGANIZED HEALTH CARE EDUCATION/TRAINING PROGRAM

## 2024-07-12 PROCEDURE — 80053 COMPREHEN METABOLIC PANEL: CPT | Performed by: HOSPITALIST

## 2024-07-12 PROCEDURE — 99239 HOSP IP/OBS DSCHRG MGMT >30: CPT | Mod: ,,, | Performed by: HOSPITALIST

## 2024-07-12 PROCEDURE — 85610 PROTHROMBIN TIME: CPT | Performed by: HOSPITALIST

## 2024-07-12 PROCEDURE — 83735 ASSAY OF MAGNESIUM: CPT | Performed by: HOSPITALIST

## 2024-07-12 PROCEDURE — 63600175 PHARM REV CODE 636 W HCPCS: Mod: JZ,JG | Performed by: STUDENT IN AN ORGANIZED HEALTH CARE EDUCATION/TRAINING PROGRAM

## 2024-07-12 PROCEDURE — 3E04317 INTRODUCTION OF OTHER THROMBOLYTIC INTO CENTRAL VEIN, PERCUTANEOUS APPROACH: ICD-10-PCS | Performed by: STUDENT IN AN ORGANIZED HEALTH CARE EDUCATION/TRAINING PROGRAM

## 2024-07-12 PROCEDURE — 25000003 PHARM REV CODE 250

## 2024-07-12 PROCEDURE — 84100 ASSAY OF PHOSPHORUS: CPT | Performed by: HOSPITALIST

## 2024-07-12 RX ORDER — SODIUM CHLORIDE 0.9 % (FLUSH) 0.9 %
10 SYRINGE (ML) INJECTION
Status: DISCONTINUED | OUTPATIENT
Start: 2024-07-12 | End: 2024-07-12 | Stop reason: HOSPADM

## 2024-07-12 RX ORDER — FUROSEMIDE 10 MG/ML
40 INJECTION INTRAMUSCULAR; INTRAVENOUS ONCE
Status: COMPLETED | OUTPATIENT
Start: 2024-07-12 | End: 2024-07-12

## 2024-07-12 RX ORDER — OXYCODONE HYDROCHLORIDE 5 MG/1
5 TABLET ORAL EVERY 4 HOURS PRN
Qty: 28 TABLET | Refills: 0 | Status: SHIPPED | OUTPATIENT
Start: 2024-07-12

## 2024-07-12 RX ORDER — HEPARIN 100 UNIT/ML
500 SYRINGE INTRAVENOUS
Status: DISCONTINUED | OUTPATIENT
Start: 2024-07-12 | End: 2024-07-12 | Stop reason: HOSPADM

## 2024-07-12 RX ORDER — METRONIDAZOLE 500 MG/1
500 TABLET ORAL 3 TIMES DAILY
Qty: 21 TABLET | Refills: 0 | Status: SHIPPED | OUTPATIENT
Start: 2024-07-13 | End: 2024-07-20

## 2024-07-12 RX ORDER — SODIUM CHLORIDE 0.9 % (FLUSH) 0.9 %
20 SYRINGE (ML) INJECTION
Status: DISCONTINUED | OUTPATIENT
Start: 2024-07-12 | End: 2024-07-12 | Stop reason: HOSPADM

## 2024-07-12 RX ORDER — CIPROFLOXACIN 500 MG/1
500 TABLET ORAL 2 TIMES DAILY
Qty: 14 TABLET | Refills: 0 | Status: SHIPPED | OUTPATIENT
Start: 2024-07-13 | End: 2024-07-20

## 2024-07-12 RX ADMIN — FUROSEMIDE 40 MG: 10 INJECTION, SOLUTION INTRAVENOUS at 10:07

## 2024-07-12 RX ADMIN — POTASSIUM & SODIUM PHOSPHATES POWDER PACK 280-160-250 MG 2 PACKET: 280-160-250 PACK at 05:07

## 2024-07-12 RX ADMIN — POTASSIUM & SODIUM PHOSPHATES POWDER PACK 280-160-250 MG 2 PACKET: 280-160-250 PACK at 11:07

## 2024-07-12 RX ADMIN — ALTEPLASE 2 MG: 2.2 INJECTION, POWDER, LYOPHILIZED, FOR SOLUTION INTRAVENOUS at 04:07

## 2024-07-12 RX ADMIN — IBUPROFEN 400 MG: 200 TABLET, FILM COATED ORAL at 02:07

## 2024-07-12 RX ADMIN — PIPERACILLIN SODIUM AND TAZOBACTAM SODIUM 4.5 G: 4; .5 INJECTION, POWDER, FOR SOLUTION INTRAVENOUS at 05:07

## 2024-07-12 RX ADMIN — PIPERACILLIN SODIUM AND TAZOBACTAM SODIUM 4.5 G: 4; .5 INJECTION, POWDER, FOR SOLUTION INTRAVENOUS at 12:07

## 2024-07-12 NOTE — PROGRESS NOTES
07/11/24 2346   Vital Signs   Temp (!) 100.9 °F (38.3 °C)   Temp Source Oral   Pulse 105     Dr. Prisca Davis notified of patient's temp. No new orders put in at this time.

## 2024-07-12 NOTE — PLAN OF CARE
AAOX4.VSS. Pt discharged to home per MD order. Per pt and family they will access the [facility] for discharge instructions. All questions and concerns answered. Left foor by wheelchair with , personal items and belongings with pt.

## 2024-07-12 NOTE — ASSESSMENT & PLAN NOTE
Pt has been experiencing abdominal pain and fevers x 1 week. LFTs increased from previous, Total Bili-2. WBCs elevated. MRCP significant for evidence of a stricture causing bile duct dilation. In setting of fever with elevated LFT and alk phos, concerned for cholangitis. On PE, pt not jaundiced but is TTP to epigastric and RUQ area. UA normal. RIP unremarkable.    We had both AES and Dr. Ayala see her and they agreed that IF she needed an intervention it would be a PTC drain but they also agree that it's appropriate for her to just trial abx. However, with worsening labs and fevers overnight, she had ERCP done on 7/5. On 7/7, T-bili, ALP, AST, ALT increasing and she appears jaundiced. Pt experiencing abdominal pain again. IR consulted for increasing T-Bili. IR believes that intervention will not be beneficial in this case. 7/8 overnight, pt experienced febrile at 101.9. Concerned for sepsis. Vanc added back, received fluids. Blood cultures drawn. Repeat ERCP w stent exchange on 7/10.     Bilirubin appears to not be normalizing as much as expected, AES believes cholestasis is secondary to disease progression    - Discontinued IV zosyn/vanc, starting on cipro/flagyl outpt  - Fluids PRN  - Trend Bili  - Blood cultures NGTD

## 2024-07-12 NOTE — PLAN OF CARE
Patient is AAOx4. VSS, TMAX 100.9, on 1L of O2 overnight. Currently on continuous tele and pulse ox. Patient receiving antibiotics (Zosyn) as ordered. Re-accessed port. Patient expresses pain in her lower back when febrile. Patient up to the bathroom with SBA. Patient is free from falls and injuries. I&Os monitored. Bed positioned all the way down, side rails up x2, non-skid socks on, and call light in reach. Patient expresses no other needs at this time. Plan of care ongoing.

## 2024-07-12 NOTE — DISCHARGE SUMMARY
"[facility] - Oncology ([facility])  Hematology/Oncology  Discharge Summary      Patient Name: Deepali Alex  MRN: [id number]  Admission Date: 7/1/2024  Hospital Length of Stay: 10 days  Discharge Date and Time:  07/12/2024 2:05 PM  Attending Physician: Kinga Christian MD   Discharging Provider: Solomon Hyde MD  Primary Care Provider: Raul Rivera MD    HPI: Patient is a 79-year-old female with history of metastatic pancreatic adenocarcinoma to liver/lung who follows with Dr. Briceño. She finished chemo(gemcitabine + nab-paclitaxel) and is expected to start new regimen due to progression (5-FU and Onyvide) on 7/9. Pt admitted to medical oncology for fever and abdominal pain in setting of metastatic pancreatic adenocarcinoma. She has been experiencing fever x 1 week (Tmax of 101 at home) with worsening right sided abdominal pain. She denies CP, SOB, loss of appetite, diarrhea.      ED Course  Pt presented to the ED with abdominal pain and fevers.  Labs significant for ALP-881, AST-64, ALT-51, increased WBCs at 18.90. Preliminary blood cultures drawn. CT Abd shows "stable appearance of the heterogeneous hypoattenuating pancreatic tail lesion with continued abutment of the splenic artery." CT chest ruled out PE. Pt empirically started on IV zosyn for possible abdominal infection and sepsis protocol was initiated. Admitted to med onc for further management.     Procedure(s) (LRB):  ERCP (ENDOSCOPIC RETROGRADE CHOLANGIOPANCREATOGRAPHY) (N/A)     Hospital Course: She came in with a fever at home, bili of 2.0 and a new ALP of 888 in the setting of very mild upstream biliary dilatation on MRCP. We had both AES and Dr. Ayala see her and they agreed that if she needed an intervention, it would be a PTC drain, but to manage conservatively on abx. Initially started on IV zosyn, but pt had fevers overnight in the 101s and liver function tests worsened. With overnight fevers and worsening labs, patient underwent ERCP " (7/5/24) and had two stents placed in left and right hepatic duct. ERCP s/p 1 day, patient feeling much better with down trending LFTs. but ERCP s/p day 3, pt's T-Bili increased and appeared jaundiced on PE. IR consulted for possible PCT but determined that intervention is not beneficial at this time. With her worsening fevers and T-Bili, she is underwent ERCP again on 7/10/2024. She is still experiencing overnight fevers and T-bili decreased to 5.2 from 6.0. Blood cultures negative. Palliative care discussed goals with her. She is scheduled to meet with Dr. Briceño to figure out next options in one week. Discharging her on oral cipro/flagyll to take for 7 days and pain meds prn. Plan discussed with patient and/or family. Patient and/or family are in agreement with plan, verbalized understanding, and all questions were answered.      Goals of Care Treatment Preferences:  Code Status: Full Code    Living Will: Yes     What is most important right now is to focus on symptom/pain control, quality of life, even if it means sacrificing a little time, improvement in condition but with limits to invasive therapies.  Accordingly, we have decided that the best plan to meet the patient's goals includes continuing with treatment.      Consults:   Consults (From admission, onward)          Status Ordering Provider     Inpatient consult to Palliative Care  Once        Provider:  (Not yet assigned)    Completed ANIL RILEY     Inpatient consult to Interventional Radiology  Once        Provider:  (Not yet assigned)    Completed LISSETH BIRMINGHAM     Inpatient consult to [facility] ([facility])  Once        Provider:  (Not yet assigned)    Completed MARVIN MARTIN     Inpatient consult to Interventional Radiology  Once        Provider:  (Not yet assigned)    Completed MARVIN MARTIN     Inpatient consult to Surgical Oncology  Once        Provider:  (Not yet assigned)    Completed ANIL RILEY      Inpatient consult to [facility] ([facility])  Once        Provider:  (Not yet assigned)    Completed ANIL RILEY            Significant Diagnostic Studies: Labs: CMP   Recent Labs   Lab 07/11/24  0451 07/12/24  0533   * 130*   K 3.6 3.7   CL 95 96   CO2 26 26   * 94   BUN 10 9   CREATININE 0.6 0.6   CALCIUM 7.7* 7.7*   PROT 5.0* 5.0*   ALBUMIN 1.6* 1.6*   BILITOT 5.5* 5.2*   ALKPHOS 488* 471*   AST 45* 47*   ALT 29 28   ANIONGAP 7* 8       Pending Diagnostic Studies:       None          Final Active Diagnoses:    Diagnosis Date Noted POA    PRINCIPAL PROBLEM:  Sepsis [A41.9] 07/02/2024 Yes    Palliative care encounter [Z51.5] 07/10/2024 Not Applicable    Constipation [K59.00] 07/07/2024 Yes    Biliary stricture [K83.1] 07/02/2024 Yes    Hyponatremia [E87.1] 07/02/2024 Yes    Transaminitis [R74.01] 07/02/2024 Yes    Metastasis to liver [C78.7] 09/21/2023 Yes    Pancreatic adenocarcinoma [C25.9] 09/05/2023 Yes      Problems Resolved During this Admission:      Discharged Condition: stable    Disposition: Home or Self Care    Follow Up:    Patient Instructions:      CBC W/ AUTO DIFFERENTIAL   Standing Status: Future Standing Exp. Date: 10/10/25     COMPREHENSIVE METABOLIC PANEL   Standing Status: Future Standing Exp. Date: 10/10/25     Medications:  Reconciled Home Medications:      Medication List        START taking these medications      ciprofloxacin HCl 500 MG tablet  Commonly known as: CIPRO  Take 1 tablet (500 mg total) by mouth 2 (two) times daily. for 7 days  Start taking on: July 13, 2024     metroNIDAZOLE 500 MG tablet  Commonly known as: FLAGYL  Take 1 tablet (500 mg total) by mouth 3 (three) times daily. for 7 days  Start taking on: July 13, 2024     oxyCODONE 5 MG immediate release tablet  Commonly known as: ROXICODONE  Take 1 tablet (5 mg total) by mouth every 4 (four) hours as needed for Pain.            CONTINUE taking these medications      OLANZapine 5 MG tablet  Commonly  known as: ZyPREXA  Take 1 tab at nighttime on nights 1 thru 3 of each chemo cycle     promethazine 12.5 MG Tab  Commonly known as: PHENERGAN  Take 1 tablet (12.5 mg total) by mouth every 6 (six) hours as needed for Nausea.            STOP taking these medications      multivitamin per tablet  Commonly known as: THERAGRAN     ondansetron 8 MG Tbdl  Commonly known as: ZOFRAN-ODT     vitamin D 1000 units Tab  Commonly known as: VITAMIN D3              Solomon Hyde MD  Hematology/Oncology  [facility] - Oncology ([facility])

## 2024-07-13 ENCOUNTER — NURSE TRIAGE (OUTPATIENT)
Dept: ADMINISTRATIVE | Facility: CLINIC | Age: 79
End: 2024-07-13
Payer: MEDICARE

## 2024-07-13 NOTE — TELEPHONE ENCOUNTER
Spoke with pt who reports that, she was recently discharged from hospital. States [employer] system was down, and unable to get prescriptions. Called in to ask about medications called in for her. Pt informed of medications listed in MAR, and that they are located at [facility]. Pt verbalized understanding.    Reason for Disposition   General information question, no triage required and triager able to answer question    Protocols used: Information Only Call - No Triage-A-

## 2024-07-14 ENCOUNTER — NURSE TRIAGE (OUTPATIENT)
Dept: ADMINISTRATIVE | Facility: CLINIC | Age: 79
End: 2024-07-14
Payer: MEDICARE

## 2024-07-14 ENCOUNTER — PATIENT MESSAGE (OUTPATIENT)
Dept: ADMINISTRATIVE | Facility: OTHER | Age: 79
End: 2024-07-14
Payer: MEDICARE

## 2024-07-14 ENCOUNTER — HOSPITAL ENCOUNTER (INPATIENT)
Facility: HOSPITAL | Age: 79
LOS: 1 days | Discharge: HOSPICE | DRG: 445 | End: 2024-07-15
Attending: STUDENT IN AN ORGANIZED HEALTH CARE EDUCATION/TRAINING PROGRAM | Admitting: EMERGENCY MEDICINE
Payer: MEDICARE

## 2024-07-14 DIAGNOSIS — M79.89 LEG SWELLING: ICD-10-CM

## 2024-07-14 DIAGNOSIS — D72.829 LEUKOCYTOSIS, UNSPECIFIED TYPE: Primary | ICD-10-CM

## 2024-07-14 DIAGNOSIS — A41.9 SEPSIS, DUE TO UNSPECIFIED ORGANISM, UNSPECIFIED WHETHER ACUTE ORGAN DYSFUNCTION PRESENT: ICD-10-CM

## 2024-07-14 DIAGNOSIS — R07.9 CHEST PAIN: ICD-10-CM

## 2024-07-14 DIAGNOSIS — I95.9 HYPOTENSION: ICD-10-CM

## 2024-07-14 LAB
ALBUMIN SERPL BCP-MCNC: 1.8 G/DL (ref 3.5–5.2)
ALLENS TEST: ABNORMAL
ALP SERPL-CCNC: 458 U/L (ref 55–135)
ALT SERPL W/O P-5'-P-CCNC: 27 U/L (ref 10–44)
ANION GAP SERPL CALC-SCNC: 10 MMOL/L (ref 8–16)
ANISOCYTOSIS BLD QL SMEAR: SLIGHT
AST SERPL-CCNC: 50 U/L (ref 10–40)
BACTERIA #/AREA URNS AUTO: ABNORMAL /HPF
BACTERIA BLD CULT: NORMAL
BACTERIA BLD CULT: NORMAL
BASOPHILS # BLD AUTO: 0.06 K/UL (ref 0–0.2)
BASOPHILS NFR BLD: 0.2 % (ref 0–1.9)
BILIRUB SERPL-MCNC: 5.8 MG/DL (ref 0.1–1)
BILIRUB SERPL-MCNC: 6.1 MG/DL (ref 0.1–1)
BILIRUB UR QL STRIP: ABNORMAL
BNP SERPL-MCNC: 63 PG/ML (ref 0–99)
BUN SERPL-MCNC: 26 MG/DL (ref 8–23)
CALCIUM SERPL-MCNC: 8.2 MG/DL (ref 8.7–10.5)
CHLORIDE SERPL-SCNC: 90 MMOL/L (ref 95–110)
CLARITY UR REFRACT.AUTO: ABNORMAL
CO2 SERPL-SCNC: 25 MMOL/L (ref 23–29)
COLOR UR AUTO: YELLOW
CREAT SERPL-MCNC: 0.9 MG/DL (ref 0.5–1.4)
DACRYOCYTES BLD QL SMEAR: ABNORMAL
DIFFERENTIAL METHOD BLD: ABNORMAL
EOSINOPHIL # BLD AUTO: 0.4 K/UL (ref 0–0.5)
EOSINOPHIL NFR BLD: 1.3 % (ref 0–8)
ERYTHROCYTE [DISTWIDTH] IN BLOOD BY AUTOMATED COUNT: 25.1 % (ref 11.5–14.5)
EST. GFR  (NO RACE VARIABLE): >60 ML/MIN/1.73 M^2
GLUCOSE SERPL-MCNC: 117 MG/DL (ref 70–110)
GLUCOSE UR QL STRIP: NEGATIVE
HCT VFR BLD AUTO: 21.8 % (ref 37–48.5)
HGB BLD-MCNC: 7 G/DL (ref 12–16)
HGB UR QL STRIP: NEGATIVE
HYALINE CASTS UR QL AUTO: 42 /LPF
HYPOCHROMIA BLD QL SMEAR: ABNORMAL
IMM GRANULOCYTES # BLD AUTO: 0.61 K/UL (ref 0–0.04)
IMM GRANULOCYTES NFR BLD AUTO: 1.9 % (ref 0–0.5)
KETONES UR QL STRIP: NEGATIVE
LDH SERPL L TO P-CCNC: 2.23 MMOL/L (ref 0.5–2.2)
LEUKOCYTE ESTERASE UR QL STRIP: ABNORMAL
LIPASE SERPL-CCNC: 14 U/L (ref 4–60)
LYMPHOCYTES # BLD AUTO: 1.2 K/UL (ref 1–4.8)
LYMPHOCYTES NFR BLD: 3.8 % (ref 18–48)
MAGNESIUM SERPL-MCNC: 2 MG/DL (ref 1.6–2.6)
MCH RBC QN AUTO: 27.5 PG (ref 27–31)
MCHC RBC AUTO-ENTMCNC: 32.1 G/DL (ref 32–36)
MCV RBC AUTO: 86 FL (ref 82–98)
MICROSCOPIC COMMENT: ABNORMAL
MONOCYTES # BLD AUTO: 2.3 K/UL (ref 0.3–1)
MONOCYTES NFR BLD: 7.2 % (ref 4–15)
NEUTROPHILS # BLD AUTO: 27.2 K/UL (ref 1.8–7.7)
NEUTROPHILS NFR BLD: 85.6 % (ref 38–73)
NITRITE UR QL STRIP: NEGATIVE
NRBC BLD-RTO: 0 /100 WBC
OVALOCYTES BLD QL SMEAR: ABNORMAL
PH UR STRIP: 5 [PH] (ref 5–8)
PLATELET # BLD AUTO: 338 K/UL (ref 150–450)
PLATELET BLD QL SMEAR: ABNORMAL
PMV BLD AUTO: 11 FL (ref 9.2–12.9)
POIKILOCYTOSIS BLD QL SMEAR: SLIGHT
POLYCHROMASIA BLD QL SMEAR: ABNORMAL
POTASSIUM SERPL-SCNC: 4 MMOL/L (ref 3.5–5.1)
PROT SERPL-MCNC: 5.3 G/DL (ref 6–8.4)
PROT UR QL STRIP: NEGATIVE
RBC # BLD AUTO: 2.55 M/UL (ref 4–5.4)
RBC #/AREA URNS AUTO: 2 /HPF (ref 0–4)
SAMPLE: ABNORMAL
SITE: ABNORMAL
SODIUM SERPL-SCNC: 125 MMOL/L (ref 136–145)
SP GR UR STRIP: 1.01 (ref 1–1.03)
SQUAMOUS #/AREA URNS AUTO: 3 /HPF
TARGETS BLD QL SMEAR: ABNORMAL
TROPONIN I SERPL DL<=0.01 NG/ML-MCNC: <0.006 NG/ML (ref 0–0.03)
TROPONIN I SERPL DL<=0.01 NG/ML-MCNC: <0.006 NG/ML (ref 0–0.03)
URN SPEC COLLECT METH UR: ABNORMAL
WBC # BLD AUTO: 31.83 K/UL (ref 3.9–12.7)
WBC #/AREA URNS AUTO: 9 /HPF (ref 0–5)

## 2024-07-14 PROCEDURE — 85025 COMPLETE CBC W/AUTO DIFF WBC: CPT | Performed by: STUDENT IN AN ORGANIZED HEALTH CARE EDUCATION/TRAINING PROGRAM

## 2024-07-14 PROCEDURE — 99900035 HC TECH TIME PER 15 MIN (STAT)

## 2024-07-14 PROCEDURE — 81001 URINALYSIS AUTO W/SCOPE: CPT | Performed by: STUDENT IN AN ORGANIZED HEALTH CARE EDUCATION/TRAINING PROGRAM

## 2024-07-14 PROCEDURE — 93005 ELECTROCARDIOGRAM TRACING: CPT

## 2024-07-14 PROCEDURE — 83880 ASSAY OF NATRIURETIC PEPTIDE: CPT | Performed by: STUDENT IN AN ORGANIZED HEALTH CARE EDUCATION/TRAINING PROGRAM

## 2024-07-14 PROCEDURE — U0002 COVID-19 LAB TEST NON-CDC: HCPCS | Performed by: INTERNAL MEDICINE

## 2024-07-14 PROCEDURE — 82247 BILIRUBIN TOTAL: CPT | Performed by: STUDENT IN AN ORGANIZED HEALTH CARE EDUCATION/TRAINING PROGRAM

## 2024-07-14 PROCEDURE — 25000003 PHARM REV CODE 250

## 2024-07-14 PROCEDURE — 80053 COMPREHEN METABOLIC PANEL: CPT | Performed by: STUDENT IN AN ORGANIZED HEALTH CARE EDUCATION/TRAINING PROGRAM

## 2024-07-14 PROCEDURE — 82803 BLOOD GASES ANY COMBINATION: CPT

## 2024-07-14 PROCEDURE — 83690 ASSAY OF LIPASE: CPT | Performed by: STUDENT IN AN ORGANIZED HEALTH CARE EDUCATION/TRAINING PROGRAM

## 2024-07-14 PROCEDURE — 25000003 PHARM REV CODE 250: Performed by: STUDENT IN AN ORGANIZED HEALTH CARE EDUCATION/TRAINING PROGRAM

## 2024-07-14 PROCEDURE — 84484 ASSAY OF TROPONIN QUANT: CPT | Mod: 91 | Performed by: STUDENT IN AN ORGANIZED HEALTH CARE EDUCATION/TRAINING PROGRAM

## 2024-07-14 PROCEDURE — 83735 ASSAY OF MAGNESIUM: CPT | Performed by: STUDENT IN AN ORGANIZED HEALTH CARE EDUCATION/TRAINING PROGRAM

## 2024-07-14 PROCEDURE — 83605 ASSAY OF LACTIC ACID: CPT

## 2024-07-14 PROCEDURE — 96374 THER/PROPH/DIAG INJ IV PUSH: CPT

## 2024-07-14 PROCEDURE — 94761 N-INVAS EAR/PLS OXIMETRY MLT: CPT | Mod: XB

## 2024-07-14 PROCEDURE — 63600175 PHARM REV CODE 636 W HCPCS: Performed by: STUDENT IN AN ORGANIZED HEALTH CARE EDUCATION/TRAINING PROGRAM

## 2024-07-14 PROCEDURE — 93010 ELECTROCARDIOGRAM REPORT: CPT | Mod: ,,, | Performed by: INTERNAL MEDICINE

## 2024-07-14 PROCEDURE — 99285 EMERGENCY DEPT VISIT HI MDM: CPT | Mod: 25

## 2024-07-14 PROCEDURE — 87040 BLOOD CULTURE FOR BACTERIA: CPT | Performed by: STUDENT IN AN ORGANIZED HEALTH CARE EDUCATION/TRAINING PROGRAM

## 2024-07-14 PROCEDURE — 12000002 HC ACUTE/MED SURGE SEMI-PRIVATE ROOM

## 2024-07-14 RX ORDER — ACETAMINOPHEN 500 MG
1000 TABLET ORAL
Status: COMPLETED | OUTPATIENT
Start: 2024-07-14 | End: 2024-07-14

## 2024-07-14 RX ORDER — OXYCODONE HYDROCHLORIDE 5 MG/1
5 TABLET ORAL
Status: DISCONTINUED | OUTPATIENT
Start: 2024-07-14 | End: 2024-07-15

## 2024-07-14 RX ADMIN — ACETAMINOPHEN 1000 MG: 500 TABLET ORAL at 11:07

## 2024-07-14 RX ADMIN — PIPERACILLIN SODIUM AND TAZOBACTAM SODIUM 4.5 G: 4; .5 INJECTION, POWDER, FOR SOLUTION INTRAVENOUS at 10:07

## 2024-07-14 NOTE — TELEPHONE ENCOUNTER
Pt was hospitalized last week and discharged home on Friday. Pt had some swelling while hospitalized and received diuretic while in the hospital. This morning she woke up with additional swelling to her legs. Pt had low BP (80's/40's)  this morning. Current /62. Dispo provided see pcp within 4 hours, unable to schedule appointment, advised to be seen in ER. Pt verbalized understanding.   Reason for Disposition   SEVERE leg swelling (e.g., swelling extends above knee, entire leg is swollen, weeping fluid)    Additional Information   Negative: SEVERE difficulty breathing (e.g., struggling for each breath, speaks in single words)   Negative: Looks like a broken bone or dislocated joint (e.g., crooked or deformed)   Negative: Sounds like a life-threatening emergency to the triager   Negative: Difficulty breathing at rest   Negative: Entire foot is cool or blue in comparison to other side   Negative: [1] Can't walk or can barely walk AND [2] new-onset   Negative: [1] Difficulty breathing with exertion (e.g., walking) AND [2] new-onset or getting worse   Negative: [1] Red area or streak AND [2] fever   Negative: [1] Swelling is painful to touch AND [2] fever   Negative: [1] Cast on leg or ankle AND [2] now increased pain   Negative: Patient sounds very sick or weak to the triager    Protocols used: Leg Swelling and Edema-A-AH

## 2024-07-15 VITALS
BODY MASS INDEX: 27.23 KG/M2 | RESPIRATION RATE: 18 BRPM | WEIGHT: 148 LBS | HEART RATE: 89 BPM | DIASTOLIC BLOOD PRESSURE: 56 MMHG | HEIGHT: 62 IN | OXYGEN SATURATION: 94 % | SYSTOLIC BLOOD PRESSURE: 94 MMHG | TEMPERATURE: 98 F

## 2024-07-15 PROBLEM — E46 HYPOALBUMINEMIA DUE TO PROTEIN-CALORIE MALNUTRITION: Status: ACTIVE | Noted: 2024-07-15

## 2024-07-15 PROBLEM — R60.0 BILATERAL LOWER EXTREMITY EDEMA: Status: ACTIVE | Noted: 2024-07-15

## 2024-07-15 PROBLEM — D64.9 NORMOCYTIC ANEMIA: Status: ACTIVE | Noted: 2024-07-15

## 2024-07-15 PROBLEM — E88.09 HYPOALBUMINEMIA DUE TO PROTEIN-CALORIE MALNUTRITION: Status: ACTIVE | Noted: 2024-07-15

## 2024-07-15 LAB
ALBUMIN SERPL BCP-MCNC: 1.6 G/DL (ref 3.5–5.2)
ALP SERPL-CCNC: 404 U/L (ref 55–135)
ALT SERPL W/O P-5'-P-CCNC: 24 U/L (ref 10–44)
ANION GAP SERPL CALC-SCNC: 8 MMOL/L (ref 8–16)
AST SERPL-CCNC: 45 U/L (ref 10–40)
BASOPHILS # BLD AUTO: 0.09 K/UL (ref 0–0.2)
BASOPHILS NFR BLD: 0.3 % (ref 0–1.9)
BILIRUB DIRECT SERPL-MCNC: 4.6 MG/DL (ref 0.1–0.3)
BILIRUB SERPL-MCNC: 5.8 MG/DL (ref 0.1–1)
BUN SERPL-MCNC: 24 MG/DL (ref 8–23)
CALCIUM SERPL-MCNC: 8 MG/DL (ref 8.7–10.5)
CHLORIDE SERPL-SCNC: 93 MMOL/L (ref 95–110)
CO2 SERPL-SCNC: 26 MMOL/L (ref 23–29)
CORTIS SERPL-MCNC: 16.3 UG/DL
CREAT SERPL-MCNC: 0.8 MG/DL (ref 0.5–1.4)
DIFFERENTIAL METHOD BLD: ABNORMAL
EOSINOPHIL # BLD AUTO: 0.4 K/UL (ref 0–0.5)
EOSINOPHIL NFR BLD: 1.4 % (ref 0–8)
ERYTHROCYTE [DISTWIDTH] IN BLOOD BY AUTOMATED COUNT: 26.1 % (ref 11.5–14.5)
EST. GFR  (NO RACE VARIABLE): >60 ML/MIN/1.73 M^2
FERRITIN SERPL-MCNC: 5304 NG/ML (ref 20–300)
FOLATE SERPL-MCNC: 9 NG/ML (ref 4–24)
GLUCOSE SERPL-MCNC: 85 MG/DL (ref 70–110)
HAPTOGLOB SERPL-MCNC: 183 MG/DL (ref 30–250)
HCT VFR BLD AUTO: 21.4 % (ref 37–48.5)
HGB BLD-MCNC: 7.2 G/DL (ref 12–16)
IMM GRANULOCYTES # BLD AUTO: 1.13 K/UL (ref 0–0.04)
IMM GRANULOCYTES NFR BLD AUTO: 3.9 % (ref 0–0.5)
IRON SERPL-MCNC: 34 UG/DL (ref 30–160)
LACTATE SERPL-SCNC: 1.6 MMOL/L (ref 0.5–2.2)
LYMPHOCYTES # BLD AUTO: 0.8 K/UL (ref 1–4.8)
LYMPHOCYTES NFR BLD: 2.7 % (ref 18–48)
MAGNESIUM SERPL-MCNC: 1.9 MG/DL (ref 1.6–2.6)
MCH RBC QN AUTO: 27.9 PG (ref 27–31)
MCHC RBC AUTO-ENTMCNC: 33.6 G/DL (ref 32–36)
MCV RBC AUTO: 83 FL (ref 82–98)
MONOCYTES # BLD AUTO: 2.2 K/UL (ref 0.3–1)
MONOCYTES NFR BLD: 7.6 % (ref 4–15)
NEUTROPHILS # BLD AUTO: 24.5 K/UL (ref 1.8–7.7)
NEUTROPHILS NFR BLD: 84.1 % (ref 38–73)
NRBC BLD-RTO: 0 /100 WBC
OHS QRS DURATION: 90 MS
OHS QTC CALCULATION: 455 MS
OSMOLALITY SERPL: 275 MOSM/KG (ref 275–295)
PHOSPHATE SERPL-MCNC: 3.6 MG/DL (ref 2.7–4.5)
PLATELET # BLD AUTO: 323 K/UL (ref 150–450)
PMV BLD AUTO: 11.1 FL (ref 9.2–12.9)
POTASSIUM SERPL-SCNC: 3.8 MMOL/L (ref 3.5–5.1)
PROCALCITONIN SERPL IA-MCNC: 2.08 NG/ML
PROT SERPL-MCNC: 4.8 G/DL (ref 6–8.4)
RBC # BLD AUTO: 2.58 M/UL (ref 4–5.4)
SARS-COV-2 RDRP RESP QL NAA+PROBE: NEGATIVE
SATURATED IRON: 31 % (ref 20–50)
SODIUM SERPL-SCNC: 127 MMOL/L (ref 136–145)
T4 FREE SERPL-MCNC: 0.82 NG/DL (ref 0.71–1.51)
TOTAL IRON BINDING CAPACITY: 110 UG/DL (ref 250–450)
TRANSFERRIN SERPL-MCNC: 74 MG/DL (ref 200–375)
TSH SERPL DL<=0.005 MIU/L-ACNC: 2.58 UIU/ML (ref 0.4–4)
VIT B12 SERPL-MCNC: >2000 PG/ML (ref 210–950)
WBC # BLD AUTO: 29.11 K/UL (ref 3.9–12.7)

## 2024-07-15 PROCEDURE — 83930 ASSAY OF BLOOD OSMOLALITY: CPT | Performed by: HOSPITALIST

## 2024-07-15 PROCEDURE — 80053 COMPREHEN METABOLIC PANEL: CPT | Performed by: HOSPITALIST

## 2024-07-15 PROCEDURE — 84100 ASSAY OF PHOSPHORUS: CPT | Performed by: HOSPITALIST

## 2024-07-15 PROCEDURE — 84466 ASSAY OF TRANSFERRIN: CPT | Performed by: HOSPITALIST

## 2024-07-15 PROCEDURE — 82533 TOTAL CORTISOL: CPT | Performed by: HOSPITALIST

## 2024-07-15 PROCEDURE — 99223 1ST HOSP IP/OBS HIGH 75: CPT | Mod: NSCH,,, | Performed by: HOSPITALIST

## 2024-07-15 PROCEDURE — 84443 ASSAY THYROID STIM HORMONE: CPT | Performed by: HOSPITALIST

## 2024-07-15 PROCEDURE — 85025 COMPLETE CBC W/AUTO DIFF WBC: CPT | Performed by: HOSPITALIST

## 2024-07-15 PROCEDURE — 36415 COLL VENOUS BLD VENIPUNCTURE: CPT | Performed by: HOSPITALIST

## 2024-07-15 PROCEDURE — 83735 ASSAY OF MAGNESIUM: CPT | Performed by: HOSPITALIST

## 2024-07-15 PROCEDURE — 84439 ASSAY OF FREE THYROXINE: CPT | Performed by: HOSPITALIST

## 2024-07-15 PROCEDURE — 83010 ASSAY OF HAPTOGLOBIN QUANT: CPT | Performed by: HOSPITALIST

## 2024-07-15 PROCEDURE — 82607 VITAMIN B-12: CPT | Performed by: HOSPITALIST

## 2024-07-15 PROCEDURE — 99291 CRITICAL CARE FIRST HOUR: CPT | Mod: ,,,

## 2024-07-15 PROCEDURE — 82248 BILIRUBIN DIRECT: CPT | Performed by: HOSPITALIST

## 2024-07-15 PROCEDURE — 63600175 PHARM REV CODE 636 W HCPCS: Performed by: HOSPITALIST

## 2024-07-15 PROCEDURE — 84145 PROCALCITONIN (PCT): CPT | Performed by: HOSPITALIST

## 2024-07-15 PROCEDURE — 82728 ASSAY OF FERRITIN: CPT | Performed by: HOSPITALIST

## 2024-07-15 PROCEDURE — 83605 ASSAY OF LACTIC ACID: CPT | Performed by: HOSPITALIST

## 2024-07-15 PROCEDURE — 82746 ASSAY OF FOLIC ACID SERUM: CPT | Performed by: HOSPITALIST

## 2024-07-15 PROCEDURE — 25000003 PHARM REV CODE 250: Performed by: HOSPITALIST

## 2024-07-15 RX ORDER — SIMETHICONE 80 MG
1 TABLET,CHEWABLE ORAL 4 TIMES DAILY PRN
Status: DISCONTINUED | OUTPATIENT
Start: 2024-07-15 | End: 2024-07-15 | Stop reason: HOSPADM

## 2024-07-15 RX ORDER — IBUPROFEN 200 MG
16 TABLET ORAL
Status: DISCONTINUED | OUTPATIENT
Start: 2024-07-15 | End: 2024-07-15 | Stop reason: HOSPADM

## 2024-07-15 RX ORDER — HEPARIN 100 UNIT/ML
500 SYRINGE INTRAVENOUS
Status: DISCONTINUED | OUTPATIENT
Start: 2024-07-15 | End: 2024-07-15 | Stop reason: HOSPADM

## 2024-07-15 RX ORDER — AMOXICILLIN 250 MG
2 CAPSULE ORAL 2 TIMES DAILY PRN
Status: DISCONTINUED | OUTPATIENT
Start: 2024-07-15 | End: 2024-07-15 | Stop reason: HOSPADM

## 2024-07-15 RX ORDER — IBUPROFEN 200 MG
24 TABLET ORAL
Status: DISCONTINUED | OUTPATIENT
Start: 2024-07-15 | End: 2024-07-15 | Stop reason: HOSPADM

## 2024-07-15 RX ORDER — DIPHENHYDRAMINE HCL 25 MG
25 CAPSULE ORAL EVERY 6 HOURS PRN
Status: DISCONTINUED | OUTPATIENT
Start: 2024-07-15 | End: 2024-07-15 | Stop reason: HOSPADM

## 2024-07-15 RX ORDER — MORPHINE SULFATE 4 MG/ML
4 INJECTION, SOLUTION INTRAMUSCULAR; INTRAVENOUS EVERY 4 HOURS PRN
Status: DISCONTINUED | OUTPATIENT
Start: 2024-07-15 | End: 2024-07-15 | Stop reason: HOSPADM

## 2024-07-15 RX ORDER — ACETAMINOPHEN 500 MG
500 TABLET ORAL EVERY 6 HOURS PRN
Status: DISCONTINUED | OUTPATIENT
Start: 2024-07-15 | End: 2024-07-15 | Stop reason: HOSPADM

## 2024-07-15 RX ORDER — GLUCAGON 1 MG
1 KIT INJECTION
Status: DISCONTINUED | OUTPATIENT
Start: 2024-07-15 | End: 2024-07-15 | Stop reason: HOSPADM

## 2024-07-15 RX ORDER — OXYCODONE HYDROCHLORIDE 5 MG/1
5 TABLET ORAL EVERY 6 HOURS PRN
Status: DISCONTINUED | OUTPATIENT
Start: 2024-07-15 | End: 2024-07-15 | Stop reason: HOSPADM

## 2024-07-15 RX ORDER — TALC
6 POWDER (GRAM) TOPICAL NIGHTLY PRN
Status: DISCONTINUED | OUTPATIENT
Start: 2024-07-15 | End: 2024-07-15 | Stop reason: HOSPADM

## 2024-07-15 RX ORDER — POLYETHYLENE GLYCOL 3350 17 G/17G
17 POWDER, FOR SOLUTION ORAL DAILY
Status: DISCONTINUED | OUTPATIENT
Start: 2024-07-15 | End: 2024-07-15

## 2024-07-15 RX ORDER — ONDANSETRON HYDROCHLORIDE 2 MG/ML
4 INJECTION, SOLUTION INTRAVENOUS EVERY 8 HOURS PRN
Status: DISCONTINUED | OUTPATIENT
Start: 2024-07-15 | End: 2024-07-15 | Stop reason: HOSPADM

## 2024-07-15 RX ORDER — SODIUM CHLORIDE 0.9 % (FLUSH) 0.9 %
10 SYRINGE (ML) INJECTION EVERY 12 HOURS PRN
Status: DISCONTINUED | OUTPATIENT
Start: 2024-07-15 | End: 2024-07-15 | Stop reason: HOSPADM

## 2024-07-15 RX ORDER — NALOXONE HCL 0.4 MG/ML
0.02 VIAL (ML) INJECTION
Status: DISCONTINUED | OUTPATIENT
Start: 2024-07-15 | End: 2024-07-15 | Stop reason: HOSPADM

## 2024-07-15 RX ADMIN — ACETAMINOPHEN 500 MG: 500 TABLET ORAL at 06:07

## 2024-07-15 RX ADMIN — SENNOSIDES AND DOCUSATE SODIUM 2 TABLET: 50; 8.6 TABLET ORAL at 09:07

## 2024-07-15 RX ADMIN — PIPERACILLIN SODIUM AND TAZOBACTAM SODIUM 4.5 G: 4; .5 INJECTION, POWDER, FOR SOLUTION INTRAVENOUS at 03:07

## 2024-07-15 RX ADMIN — PIPERACILLIN SODIUM AND TAZOBACTAM SODIUM 4.5 G: 4; .5 INJECTION, POWDER, FOR SOLUTION INTRAVENOUS at 06:07

## 2024-07-15 NOTE — ASSESSMENT & PLAN NOTE
-likely from intravascular volume depletion vs sepsis   -hypotension resolved at home after drinking water and smoothies   -afebrile in ED with SBP 90s-110  -have leukocytosis WBC 32 compared to 27 on 7/12/24 and bilirubin 5.8 compared to 5.2  -elevated lactate 2.23 with background liver disease   -does not appear toxic, neurovascular intact and abdomen benign   -given immunosuppressive status will continue empiric zosyn pending blood cultures  -trend lactate and check procalcitonin with morning labs   -trend WBC and LFTs   -follow up with blood cultures

## 2024-07-15 NOTE — NURSING
Nurses Note -- 4 Eyes      7/15/2024   2:08 AM      Skin assessed during: Admit      [x] No Altered Skin Integrity Present    [x]Prevention Measures Documented      [] Yes- Altered Skin Integrity Present or Discovered   [] LDA Added if Not in Epic (Describe Wound)   [] New Altered Skin Integrity was Present on Admit and Documented in LDA   [] Wound Image Taken    Wound Care Consulted? No    Attending Nurse:  Nicole Dawson RN/Staff Member:  Aliyah Rawls RN

## 2024-07-15 NOTE — NURSING
Medical oncology team  notified that patient rt chest port doesn't draw back blood and doesn't flush.

## 2024-07-15 NOTE — SUBJECTIVE & OBJECTIVE
Past Medical History:   Diagnosis Date    Age-related osteoporosis without current pathological fracture     Atherosclerosis of aortic arch     - noted on CXR 8/2017    Back pain     Diverticulosis of sigmoid colon 10/22/2019    Ectopic pregnancy     Fibrocystic breast     Hyperlipidemia     Inguinal hernia     Osteopenia     Polyp of ascending colon 10/22/2019    Sepsis 7/2/2024    Urinary incontinence, mixed 05/24/2022       Past Surgical History:   Procedure Laterality Date    ECTOPIC PREGNANCY SURGERY      ERCP N/A 7/5/2024    Procedure: ERCP (ENDOSCOPIC RETROGRADE CHOLANGIOPANCREATOGRAPHY);  Surgeon: Chance Mccann MD;  Location: [facility] ([address] [place] [address]);  Service: Endoscopy;  Laterality: N/A;    ERCP N/A 7/10/2024    Procedure: ERCP (ENDOSCOPIC RETROGRADE CHOLANGIOPANCREATOGRAPHY);  Surgeon: Chance Mccann MD;  Location: [facility] ([address] [place] [address]);  Service: Endoscopy;  Laterality: N/A;    HERNIA REPAIR      left inguinal    HYSTERECTOMY  1980    without BSO       Review of patient's allergies indicates:   Allergen Reactions    Erythromycin (bulk)      Other reaction(s): Eye irritation       No current facility-administered medications on file prior to encounter.     Current Outpatient Medications on File Prior to Encounter   Medication Sig    ciprofloxacin HCl (CIPRO) 500 MG tablet Take 1 tablet (500 mg total) by mouth 2 (two) times daily. for 7 days    metroNIDAZOLE (FLAGYL) 500 MG tablet Take 1 tablet (500 mg total) by mouth 3 (three) times daily. for 7 days    OLANZapine (ZYPREXA) 5 MG tablet Take 1 tab at nighttime on nights 1 thru 3 of each chemo cycle    oxyCODONE (ROXICODONE) 5 MG immediate release tablet Take 1 tablet (5 mg total) by mouth every 4 (four) hours as needed for Pain.    promethazine (PHENERGAN) 12.5 MG Tab Take 1 tablet (12.5 mg total) by mouth every 6 (six) hours as needed for Nausea.     Family History       Problem Relation (Age of Onset)    Breast cancer Mother (69),  Sister (73), Maternal Aunt (73)    Cancer Maternal Aunt (83), Maternal Uncle    Depression Father, Paternal Grandmother, Paternal Aunt, Paternal Uncle    Heart attack Father, Maternal Grandfather, Paternal Uncle    Heart disease Father    Heart failure Maternal Grandmother    Mental illness Paternal Grandmother    Obesity Brother    Prostate cancer Brother (81)    Seizures Mother    Suicide Paternal Cousin    Suicide Attempts Paternal Grandmother    Tongue cancer Paternal Cousin          Tobacco Use    Smoking status: Never    Smokeless tobacco: Never   Substance and Sexual Activity    Alcohol use: No    Drug use: No    Sexual activity: Not Currently     Review of Systems   Constitutional:  Positive for chills and fatigue. Negative for activity change, appetite change, diaphoresis and fever.   HENT:  Negative for congestion, dental problem, drooling, ear discharge, ear pain, facial swelling, hearing loss, mouth sores, nosebleeds, postnasal drip, rhinorrhea, sinus pressure, sneezing, sore throat, tinnitus, trouble swallowing and voice change.    Eyes:  Negative for photophobia, pain, discharge, redness, itching and visual disturbance.   Respiratory:  Negative for apnea, cough, choking, chest tightness, shortness of breath, wheezing and stridor.    Cardiovascular:  Positive for leg swelling (worsening bilateral leg swelling.). Negative for chest pain and palpitations.   Gastrointestinal:  Negative for abdominal distention, abdominal pain, anal bleeding, blood in stool, constipation, diarrhea, nausea, rectal pain and vomiting.   Endocrine: Negative for cold intolerance, heat intolerance, polydipsia, polyphagia and polyuria.   Genitourinary:  Negative for decreased urine volume, difficulty urinating, dyspareunia, dysuria, enuresis, flank pain, frequency, genital sores, hematuria, menstrual problem, pelvic pain, urgency, vaginal bleeding, vaginal discharge and vaginal pain.   Musculoskeletal:  Negative for  arthralgias, back pain, gait problem, joint swelling, myalgias, neck pain and neck stiffness.   Skin:  Negative for color change, pallor, rash and wound.   Allergic/Immunologic: Negative for environmental allergies, food allergies and immunocompromised state.   Neurological:  Negative for dizziness, tremors, seizures, syncope, facial asymmetry, speech difficulty, weakness, light-headedness, numbness and headaches.   Hematological:  Negative for adenopathy. Does not bruise/bleed easily.   Psychiatric/Behavioral:  Negative for agitation, behavioral problems, confusion, decreased concentration, dysphoric mood, hallucinations, self-injury, sleep disturbance and suicidal ideas. The patient is not nervous/anxious and is not hyperactive.      Objective:     Vital Signs (Most Recent):  Temp: 98.2 °F (36.8 °C) (07/14/24 2330)  Pulse: 100 (07/15/24 0031)  Resp: 20 (07/14/24 2330)  BP: (!) 100/54 (07/15/24 0107)  SpO2: 96 % (07/15/24 0031) Vital Signs (24h Range):  Temp:  [97.9 °F (36.6 °C)-98.2 °F (36.8 °C)] 98.2 °F (36.8 °C)  Pulse:  [] 100  Resp:  [16-20] 20  SpO2:  [95 %-99 %] 96 %  BP: ()/(50-69) 100/54     Weight: 67.1 kg (148 lb)  Body mass index is 27.07 kg/m².     Physical Exam  Constitutional:       General: She is not in acute distress.     Appearance: She is well-developed. She is ill-appearing. She is not diaphoretic.   HENT:      Head: Normocephalic and atraumatic.      Right Ear: External ear normal.      Left Ear: External ear normal.      Nose: Nose normal.      Mouth/Throat:      Pharynx: No oropharyngeal exudate.   Eyes:      General: Scleral icterus present.      Pupils: Pupils are equal, round, and reactive to light.   Neck:      Thyroid: No thyromegaly.      Vascular: No JVD.      Trachea: No tracheal deviation.   Pulmonary:      Effort: Pulmonary effort is normal.      Comments: Unlabored respiration with normal chest movement with breathing.   Abdominal:      Palpations: Abdomen is soft.       Tenderness: There is no abdominal tenderness.      Comments: No TTP per nurse's palpation    Musculoskeletal:      Cervical back: Neck supple.      Right lower leg: Edema present.      Left lower leg: Edema present.      Comments: 2+ bilateral lower extremity swelling up to mid thighs.    Skin:     General: Skin is dry.      Coloration: Skin is jaundiced. Skin is not pale.      Findings: No erythema or rash.   Neurological:      Mental Status: She is alert and oriented to person, place, and time.      Cranial Nerves: No cranial nerve deficit.      Motor: No abnormal muscle tone.      Coordination: Coordination normal.      Deep Tendon Reflexes: Reflexes are normal and symmetric. Reflexes normal.      Comments: Mild tremors of upper extremities    Psychiatric:         Behavior: Behavior normal.         Thought Content: Thought content normal.         Judgment: Judgment normal.      Comments: Flat affect             CRANIAL NERVES     CN III, IV, VI   Pupils are equal, round, and reactive to light.       Significant Labs: All pertinent labs within the past 24 hours have been reviewed.  Recent Lab Results  (Last 5 results in the past 24 hours)        07/14/24  2334   07/14/24  2237   07/14/24  2225   07/14/24  2104   07/14/24  2036        Allens Test         N/A       Appearance, UA   Hazy             Bacteria, UA   Rare             Bilirubin (UA)   1+  Comment: Positive urine bilirubin is not confirmed. Correlate with   serum bilirubin and clinical presentation.               BILIRUBIN TOTAL     6.1  Comment: For infants and newborns, interpretation of results should be based  on gestational age, weight and in agreement with clinical  observations.    Premature Infant recommended reference ranges:  Up to 24 hours.............<8.0 mg/dL  Up to 48 hours............<12.0 mg/dL  3-5 days..................<15.0 mg/dL  6-29 days.................<15.0 mg/dL             Site         Other       Color, UA   Yellow              Glucose, UA   Negative             Hyaline Casts, UA   42             Ketones, UA   Negative             Leukocyte Esterase, UA   Trace             Lipase     14           Microscopic Comment   SEE COMMENT  Comment: Other formed elements not mentioned in the report are not   present in the microscopic examination.                NITRITE UA   Negative             Blood, UA   Negative             pH, UA   5.0             POC Lactate         2.23       Protein, UA   Negative  Comment: Recommend a 24 hour urine protein or a urine   protein/creatinine ratio if globulin induced proteinuria is  clinically suspected.               RBC, UA   2             Sample         VENOUS       SARS-CoV-2 RNA, Amplification, Qual Negative  Comment: This test utilizes isothermal nucleic acid amplification technology   to   detect the SARS-CoV-2 RdRp nucleic acid segment. The analytical   sensitivity   (limit of detection) is 500 copies/swab.    A POSITIVE result is indicative of the presence of SARS-CoV-2 RNA;   clinical   correlation with patient history and other diagnostic information is   necessary to determine patient infection status.    A NEGATIVE result means that SARS-CoV-2 nucleic acids are not present   above   the limit of detection. A NEGATIVE result should be treated as   presumptive.   It does not rule out the possibility of COVID-19 and should not be   the sole   basis for treatment decisions.    If COVID-19 is strongly suspected based on clinical and exposure   history,   re-testing using an alternate molecular assay should be considered.    This test is Food and Drug Administration (FDA) approved. Performance   characteristics of this has been independently verified by [facility] Department of Pathology and Laboratory Medicine.                 Spec Grav UA   1.010             Specimen UA   Urine, Catheterized             Squam Epithel, UA   3             Troponin I     <0.006  Comment: The reference  interval for Troponin I represents the 99th percentile   cutoff   for our facility and is consistent with 3rd generation assay   performance.     <0.006  Comment: The reference interval for Troponin I represents the 99th percentile   cutoff   for our facility and is consistent with 3rd generation assay   performance.           WBC, UA   9                                    Significant Imaging: I have reviewed all pertinent imaging results/findings within the past 24 hours.

## 2024-07-15 NOTE — ASSESSMENT & PLAN NOTE
Patient has hyponatremia which is worsening,We will aim to correct the sodium by 4-6mEq in 24 hours. We will monitor sodium Daily. The hyponatremia is due to intravascular volume depletion. We will obtain the following studies: Urine sodium, urine osmolality, serum osmolality, AM cortisol, or TSH, T4. We will treat the hyponatremia with Fluid restriction of:  1.5 liter per day. The patient's sodium results have been reviewed and are listed below.  Recent Labs   Lab 07/14/24 2021   *

## 2024-07-15 NOTE — ASSESSMENT & PLAN NOTE
-likely associated with underlying pancreatic cancer and treatment   -no signs of bleeding   -check iron panel, vitamin B12, folate, FOBT   -transfuse PRBC prn Hgb < 7

## 2024-07-15 NOTE — ED NOTES
The patient is resting quietly in ED stretcher, and is AAOx4 at this time. Respirations are even and unlabored, with no distress noted. The patient remains on continuous cardiac monitor, automated BP cuff cycling Q30 minutes, and continuous pulse oximeter. The patient is aware of POC and all questions and concerns addressed at this time. The patient was offered restroom assistance and denies need to void. The patient denies further needs and has no complaints at this time. SR raised x2, bed locked and in low position with brake engaged. Call bell within reach and the patient verbalized she would call for assistance if needed. Personal belongings are at bedside within reach. Patient has a visitor at bedside.

## 2024-07-15 NOTE — PROGRESS NOTES
SW met with the patient and patient's friend at the bedside to discuss the patient's d//c plans. Patient is aware that she will be discharging with home hospice; however the patient is requesting time to interview a couple home hospice agencies before making a selection. Patient requested that referrals be sent to [facility], [facility] and [facility]. The patient reported she will interview the hospice agencies tomorrow and will contact SW on Wednesday with her selection. SW notified the patient's ONC Care Team via secure chat. SW answered and addressed all questions and concerns. SW provided patient with SW contact information should she have any further questions or concerns.     SW faxed hospice referral to [facility], [facility] and [facility] via [employer] for review. ALICIA will continue to follow.     MALATHI Gloria, [employer]  Oncology Social Worker   [facility]  [phone]

## 2024-07-15 NOTE — CONSULTS
[facility] - Emergency Dept  Critical Care Medicine  Consult Note    Patient Name: Deepali Alex  MRN: [id number]  Admission Date: 7/14/2024  Hospital Length of Stay: 1 days  Code Status: Prior  Attending Physician: Nate Elliott MD   Primary Care Provider: Raul Rivera MD   Principal Problem: <principal problem not specified>    Inpatient consult to Critical Care Medicine  Consult performed by: Oneyda Kwon NP  Consult ordered by: Melvina Yusuf MD        Subjective:     HPI:  Patient is a 78 yo female with a PMH of pancreatic adenocarcinoma, sepsis, hyperlipidemia, and diverticulosis who presented form home with low blood pressures. She had a recent admission on 07/02 where she was admitted for fevers of unknown origin and she was discharged on cipro and flagyl. She monitors her blood pressures at home and day of admission her systolics were noted in the 70s. She states that she has had lower appetite lately and may have been dehydrated. Her only other associated symptoms are some mild chills and some lower extremity swelling that has been getting worse in the previous 48 hours. She denies and shortness of breath, chest pain, abdominal pain, dysuria, or n/v/d.    While in the ED: She had initial BP of 95/50. Afebrile. WBC >31. Lactate 2.23. She was started on broad spectrum zosyn for possible sepsis and MICU was consulted for hypotension.     Hospital/ICU Course:  No notes on file    Past Medical History:   Diagnosis Date    Age-related osteoporosis without current pathological fracture     Atherosclerosis of aortic arch     - noted on CXR 8/2017    Back pain     Diverticulosis of sigmoid colon 10/22/2019    Ectopic pregnancy     Fibrocystic breast     Hyperlipidemia     Inguinal hernia     Osteopenia     Polyp of ascending colon 10/22/2019    Sepsis 7/2/2024    Urinary incontinence, mixed 05/24/2022       Past Surgical History:   Procedure Laterality Date    ECTOPIC PREGNANCY SURGERY       ERCP N/A 7/5/2024    Procedure: ERCP (ENDOSCOPIC RETROGRADE CHOLANGIOPANCREATOGRAPHY);  Surgeon: Chance Mccann MD;  Location: [facility] ENDO (2ND FLR);  Service: Endoscopy;  Laterality: N/A;    ERCP N/A 7/10/2024    Procedure: ERCP (ENDOSCOPIC RETROGRADE CHOLANGIOPANCREATOGRAPHY);  Surgeon: Chance Mccann MD;  Location: [facility] ENDO (2ND FLR);  Service: Endoscopy;  Laterality: N/A;    HERNIA REPAIR      left inguinal    HYSTERECTOMY  1980    without BSO       Review of patient's allergies indicates:   Allergen Reactions    Erythromycin (bulk)      Other reaction(s): Eye irritation       Family History       Problem Relation (Age of Onset)    Breast cancer Mother (69), Sister (73), Maternal Aunt (73)    Cancer Maternal Aunt (83), Maternal Uncle    Depression Father, Paternal Grandmother, Paternal Aunt, Paternal Uncle    Heart attack Father, Maternal Grandfather, Paternal Uncle    Heart disease Father    Heart failure Maternal Grandmother    Mental illness Paternal Grandmother    Obesity Brother    Prostate cancer Brother (81)    Seizures Mother    Suicide Paternal Cousin    Suicide Attempts Paternal Grandmother    Tongue cancer Paternal Cousin          Tobacco Use    Smoking status: Never    Smokeless tobacco: Never   Substance and Sexual Activity    Alcohol use: No    Drug use: No    Sexual activity: Not Currently      Review of Systems   Constitutional:  Positive for fatigue and fever. Negative for diaphoresis.   Respiratory:  Negative for shortness of breath and wheezing.    Cardiovascular:  Positive for leg swelling. Negative for chest pain.   Gastrointestinal:  Negative for abdominal distention, abdominal pain, constipation, diarrhea, nausea and vomiting.   Neurological:  Negative for speech difficulty and weakness.     Objective:     Vital Signs (Most Recent):  Temp: 97.9 °F (36.6 °C) (07/14/24 1844)  Pulse: 75 (07/14/24 2132)  Resp: 18 (07/14/24 2132)  BP: (!) 112/58 (07/14/24 2132)  SpO2: 99 % (07/14/24 2132)  "Vital Signs (24h Range):  Temp:  [97.9 °F (36.6 °C)] 97.9 °F (36.6 °C)  Pulse:  [75-84] 75  Resp:  [16-20] 18  SpO2:  [97 %-99 %] 99 %  BP: ()/(50-61) 112/58   Weight: 67.1 kg (148 lb)  Body mass index is 27.07 kg/m².    No intake or output data in the 24 hours ending 07/14/24 2213       Physical Exam  Constitutional:       General: She is not in acute distress.     Appearance: She is ill-appearing. She is not toxic-appearing or diaphoretic.   HENT:      Head: Normocephalic and atraumatic.   Cardiovascular:      Rate and Rhythm: Normal rate and regular rhythm.      Heart sounds: Murmur heard.   Pulmonary:      Effort: No respiratory distress.      Breath sounds: No wheezing.   Abdominal:      General: There is no distension.   Musculoskeletal:      Right lower leg: Edema present.      Left lower leg: Edema present.   Neurological:      General: No focal deficit present.      Mental Status: She is oriented to person, place, and time. Mental status is at baseline.   Psychiatric:         Mood and Affect: Mood normal.         Behavior: Behavior normal.            Vents:     Lines/Drains/Airways       Central Venous Catheter Line  Duration                  PowerPort A Cath Single Lumen 10/25/23 1350 Atrial Right 263 days                  Significant Labs:    CBC/Anemia Profile:  Recent Labs   Lab 07/14/24 2021   WBC 31.83*   HGB 7.0*   HCT 21.8*      MCV 86   RDW 25.1*        Chemistries:  Recent Labs   Lab 07/14/24 2021   *   K 4.0   CL 90*   CO2 25   BUN 26*   CREATININE 0.9   CALCIUM 8.2*   ALBUMIN 1.8*   PROT 5.3*   BILITOT 5.8*   ALKPHOS 458*   ALT 27   AST 50*   MG 2.0       All pertinent labs within the past 24 hours have been reviewed.    Significant Imaging: I have reviewed all pertinent imaging results/findings within the past 24 hours.    ABG  No results for input(s): "PH", "PO2", "PCO2", "HCO3", "BE" in the last 168 hours.  Assessment/Plan:     Cardiac/Vascular  Hypotension  Hypotensive " at home and initial BP borderline.  ABX started and septic workup.  BP improved to 120s/60s, on room air    Plan:   - Holding maps in 70s-80s without IVF   - Agree with broad spec abx for possible sepsis, thou unclear on source but has had multiple ERCP with stent placements would cover intraabdominal    - Consider IVF, however in setting of LE edema   - Patient without ICU requirements at this time          Critical Care Time: 40 minutes  Critical secondary to Patient has a condition that poses threat to life and bodily function: Sepsis    Plan discussed with Dr. Mejia.       Critical care was time spent personally by me on the following activities: development of treatment plan with patient or surrogate and bedside caregivers, discussions with consultants, evaluation of patient's response to treatment, examination of patient, ordering and performing treatments and interventions, ordering and review of laboratory studies, ordering and review of radiographic studies, pulse oximetry, re-evaluation of patient's condition. This critical care time did not overlap with that of any other provider or involve time for any procedures.    Thank you for your consult. I will sign off. Please contact us if you have any additional questions.     Oneyda Kwon NP  Critical Care Medicine  [facility] - Emergency Dept

## 2024-07-15 NOTE — DISCHARGE SUMMARY
[facility] - Oncology ([facility])  Hematology/Oncology  Discharge Summary      Patient Name: Deepali Alex  MRN: [id number]  Admission Date: 7/14/2024  Hospital Length of Stay: 1 days  Discharge Date and Time:  07/15/2024 4:19 PM  Attending Physician: Nate Elliott MD   Discharging Provider: Simon Rooney MD  Primary Care Provider: Raul Rivera MD    HPI: No notes on file    * No surgery found *     Hospital Course: Admitted to [facility] onc for hypotension in the setting of cholangitis. BP normalized here without intervention. We discussed that her bilirubin and her WBC have uptrended since discharge despite PO antibiotics. We discussed that her clinical picture of cholangitis is likely secondary to disease progression and that unfortunately, we do not have any further options for systemic therapy. We had a GOC discussion and she decided to go with home hospice. Plan discussed with patient and/or family. Patient and/or family are in agreement with plan, verbalized understanding, and all questions were answered.       Vitals:    07/15/24 1118 07/15/24 1124 07/15/24 1457 07/15/24 1523   BP:  (!) 92/58  (!) 94/56   BP Location:       Patient Position:       Pulse: 77 76 93 89   Resp:  18  18   Temp:  98 °F (36.7 °C)  98.4 °F (36.9 °C)   TempSrc:  Oral  Oral   SpO2:  (!) 92%  (!) 94%   Weight:       Height:              Goals of Care Treatment Preferences:  Code Status: Full Code    Living Will: Yes     What is most important right now is to focus on symptom/pain control, quality of life, even if it means sacrificing a little time, improvement in condition but with limits to invasive therapies.  Accordingly, we have decided that the best plan to meet the patient's goals includes continuing with treatment.      Consults:   Consults (From admission, onward)          Status Ordering Provider     Inpatient consult to Registered Dietitian/Nutritionist  Once        Provider:  (Not yet assigned)     Acknowledged IFTIKHAR SAMUEL     Inpatient consult to Critical Care Medicine  Once        Provider:  (Not yet assigned)    Completed CARINA MILAN            Significant Diagnostic Studies: Labs: CMP   Recent Labs   Lab 07/14/24 2021 07/14/24  2225 07/15/24  0708   *  --  127*   K 4.0  --  3.8   CL 90*  --  93*   CO2 25  --  26   *  --  85   BUN 26*  --  24*   CREATININE 0.9  --  0.8   CALCIUM 8.2*  --  8.0*   PROT 5.3*  --  4.8*   ALBUMIN 1.8*  --  1.6*   BILITOT 5.8* 6.1* 5.8*   ALKPHOS 458*  --  404*   AST 50*  --  45*   ALT 27  --  24   ANIONGAP 10  --  8    and CBC   Recent Labs   Lab 07/14/24  2021 07/15/24  0708   WBC 31.83* 29.11*   HGB 7.0* 7.2*   HCT 21.8* 21.4*    323       Pending Diagnostic Studies:       None          Final Active Diagnoses:    Diagnosis Date Noted POA    PRINCIPAL PROBLEM:  Pancreatic adenocarcinoma [C25.9] 09/05/2023 Yes    Hypoalbuminemia due to protein-calorie malnutrition [E88.09, E46] 07/15/2024 Yes    Normocytic anemia [D64.9] 07/15/2024 Yes    Bilateral lower extremity edema [R60.0] 07/15/2024 Yes    Hypotension [I95.9] 07/14/2024 Yes    Hyponatremia [E87.1] 07/02/2024 Yes      Problems Resolved During this Admission:      Discharged Condition: good    Disposition: Hospice/Home    Follow Up:    Patient Instructions:   No discharge procedures on file.  Medications:  Reconciled Home Medications:      Medication List        CONTINUE taking these medications      ciprofloxacin HCl 500 MG tablet  Commonly known as: CIPRO  Take 1 tablet (500 mg total) by mouth 2 (two) times daily. for 7 days     metroNIDAZOLE 500 MG tablet  Commonly known as: FLAGYL  Take 1 tablet (500 mg total) by mouth 3 (three) times daily. for 7 days     oxyCODONE 5 MG immediate release tablet  Commonly known as: ROXICODONE  Take 1 tablet (5 mg total) by mouth every 4 (four) hours as needed for Pain.     promethazine 12.5 MG Tab  Commonly known as: PHENERGAN  Take 1 tablet (12.5 mg total) by  mouth every 6 (six) hours as needed for Nausea.            STOP taking these medications      OLANZapine 5 MG tablet  Commonly known as: ZyPREXA              Simon Rooney MD  Hematology/Oncology  [address] - Oncology ([facility])

## 2024-07-15 NOTE — HPI
Patient is a 78 yo female with a PMH of pancreatic adenocarcinoma, sepsis, hyperlipidemia, and diverticulosis who presented form home with low blood pressures. She had a recent admission on 07/02 where she was admitted for fevers of unknown origin and she was discharged on cipro and flagyl. She monitors her blood pressures at home and day of admission her systolics were noted in the 70s. She states that she has had lower appetite lately and may have been dehydrated. Her only other associated symptoms are some mild chills and some lower extremity swelling that has been getting worse in the previous 48 hours. She denies and shortness of breath, chest pain, abdominal pain, dysuria, or n/v/d.    While in the ED: She had initial BP of 95/50. Afebrile. WBC >31. Lactate 2.23. She was started on broad spectrum zosyn for possible sepsis and MICU was consulted for hypotension.

## 2024-07-15 NOTE — ED TRIAGE NOTES
Deepali Alex, a 79 y.o. female presents to the ED w/ complaint of lower extremity edema and low BP.     Triage note:  Chief Complaint   Patient presents with    Leg Swelling     BLE edema. Reported low B/P at home.      Review of patient's allergies indicates:   Allergen Reactions    Erythromycin (bulk)      Other reaction(s): Eye irritation     Past Medical History:   Diagnosis Date    Age-related osteoporosis without current pathological fracture     Atherosclerosis of aortic arch     - noted on CXR 8/2017    Back pain     Diverticulosis of sigmoid colon 10/22/2019    Ectopic pregnancy     Fibrocystic breast     Hyperlipidemia     Inguinal hernia     Osteopenia     Polyp of ascending colon 10/22/2019    Sepsis 7/2/2024    Urinary incontinence, mixed 05/24/2022     Patient identifiers verified and correct for   LOC: The patient is awake, alert and aware of environment with an appropriate affect, the patient is oriented x 3 and speaking appropriately.   APPEARANCE: Patient appears comfortable and in no acute distress, patient is clean and well groomed.  SKIN: The skin is warm and dry, is lightly jaundiced, patient has normal skin turgor and moist mucus membranes, skin intact, no breakdown or bruising noted.   MUSCULOSKELETAL: Patient moving all extremities spontaneously.  RESPIRATORY: Airway is open and patent, respirations are spontaneous, patient has a normal effort and rate, no accessory muscle use noted, pt placed on continuous pulse ox with O2 sats noted at 97% on room air.  CARDIAC: Pt placed on cardiac monitor. Patient has a normal rate and regular rhythm, no edema noted, capillary refill < 3 seconds.   GASTRO: Soft and non tender to palpation, no distention noted, normoactive bowel sounds present in all four quadrants. Pt states bowel movements have been regular.  : Pt denies any pain or frequency with urination.  NEURO: Pt opens eyes spontaneously, behavior appropriate to situation, follows  commands, facial expression symmetrical.

## 2024-07-15 NOTE — ASSESSMENT & PLAN NOTE
- history of metastatic pancreatic adenocarcinoma to liver/lung who follows with Dr. Briceño. She finished chemo(gemcitabine + nab-paclitaxel) and is expected to start new regimen soon due to progression (5-FU and Onyvide).   -recent ERCP x 2 on 7/5 and 7/10/24 showed multiple malignant biliary strictures s/p metal stents

## 2024-07-15 NOTE — H&P
[facility] - Oncology ([facility])  [facility]  History & Physical    Patient Name: Deepali Alex  MRN: [id number]  Admission Date: 7/14/2024  Attending Physician: Nate Elliott MD   Primary Care Provider: Raul Rivera MD         Patient information was obtained from patient and ER records.       Subjective:     Principal Problem:<principal problem not specified>    Chief Complaint:   Chief Complaint   Patient presents with    Leg Swelling     BLE edema. Reported low B/P at home.         HPI: Deepali Alex  is a 79-year-old female with history of metastatic pancreatic adenocarcinoma to liver/lung who follows with Dr. Briceño. She finished chemo(gemcitabine + nab-paclitaxel) and is expected to start new regimen soon due to progression (5-FU and Onyvide). Patient was recently admitted to medical oncology service from 7/1/24-7/12/24 for sepsis with presumed source of cholangitis (fever, abdominal pain, bilirubinemia and leukocytosis. She was empirically treated with IV zosyn in hospital and discharged home on ciprofloxacin + flagyl. Blood cultures were negative. She had ERCP x 2 on (7/5/24, 7/10/24) by Dr. Mccann from [facility] which showed multiple malignant biliary strictures s/p biliary sphincterotomy and metal stents placement.     Patient presented to ED today for evaluation of hypotension during blood pressure check at home.     Patient was monitoring her blood pressures in accordance with her discharge instructions and she noticed her blood pressure was noted to be in 70s systolic.  She endorsed fatigue and chills, but denies fever, shortness of breath, nausea, vomiting, abdominal pain, diarrhea,  cough, chest pain, dysuria, hematuria, focal weakness/numbness, dark stool or bleeding from other sites.   She notes mild decreased appetite.  She felt dehydrated this morning so she drank multiple glasses of water and had multiple smoothies in an attempt to raise her blood pressure at home. Patient  states that after drinking water and smoothies her systolic blood pressure came up to 88 and she felt better.  She also notes progressive worsening lower extremity edema.  She noted mild edema last week, however in the last 48 hours her legs have swollen up to her mid thighs.       In ED patient is afebrile and HDS.  Workup notable for +3 pitting edema in bilateral mid thighs. Uptrending leukocytosis at 32 compared to 27 on 7/12/24 , Hgb 7 compared to 7.2, worsening hyponatremia at 125 compared to 130, Total bilirubin 5.8 compared to 5.2, low albumin 1.8.  Lactate elevated to 2.23. blood cultures sent. Patient received empiric dose of IV zosyn in ED. No IVF given as patient is normotensive and has worsening bilateral leg swelling.      Past Medical History:   Diagnosis Date    Age-related osteoporosis without current pathological fracture     Atherosclerosis of aortic arch     - noted on CXR 8/2017    Back pain     Diverticulosis of sigmoid colon 10/22/2019    Ectopic pregnancy     Fibrocystic breast     Hyperlipidemia     Inguinal hernia     Osteopenia     Polyp of ascending colon 10/22/2019    Sepsis 7/2/2024    Urinary incontinence, mixed 05/24/2022       Past Surgical History:   Procedure Laterality Date    ECTOPIC PREGNANCY SURGERY      ERCP N/A 7/5/2024    Procedure: ERCP (ENDOSCOPIC RETROGRADE CHOLANGIOPANCREATOGRAPHY);  Surgeon: Chance Mccann MD;  Location: [address]);  Service: Endoscopy;  Laterality: N/A;    ERCP N/A 7/10/2024    Procedure: ERCP (ENDOSCOPIC RETROGRADE CHOLANGIOPANCREATOGRAPHY);  Surgeon: Chance Mccann MD;  Location: [address]);  Service: Endoscopy;  Laterality: N/A;    HERNIA REPAIR      left inguinal    HYSTERECTOMY  1980    without BSO       Review of patient's allergies indicates:   Allergen Reactions    Erythromycin (bulk)      Other reaction(s): Eye irritation       No current facility-administered medications on file prior to encounter.     Current Outpatient  Medications on File Prior to Encounter   Medication Sig    ciprofloxacin HCl (CIPRO) 500 MG tablet Take 1 tablet (500 mg total) by mouth 2 (two) times daily. for 7 days    metroNIDAZOLE (FLAGYL) 500 MG tablet Take 1 tablet (500 mg total) by mouth 3 (three) times daily. for 7 days    OLANZapine (ZYPREXA) 5 MG tablet Take 1 tab at nighttime on nights 1 thru 3 of each chemo cycle    oxyCODONE (ROXICODONE) 5 MG immediate release tablet Take 1 tablet (5 mg total) by mouth every 4 (four) hours as needed for Pain.    promethazine (PHENERGAN) 12.5 MG Tab Take 1 tablet (12.5 mg total) by mouth every 6 (six) hours as needed for Nausea.     Family History       Problem Relation (Age of Onset)    Breast cancer Mother (69), Sister (73), Maternal Aunt (73)    Cancer Maternal Aunt (83), Maternal Uncle    Depression Father, Paternal Grandmother, Paternal Aunt, Paternal Uncle    Heart attack Father, Maternal Grandfather, Paternal Uncle    Heart disease Father    Heart failure Maternal Grandmother    Mental illness Paternal Grandmother    Obesity Brother    Prostate cancer Brother (81)    Seizures Mother    Suicide Paternal Cousin    Suicide Attempts Paternal Grandmother    Tongue cancer Paternal Cousin          Tobacco Use    Smoking status: Never    Smokeless tobacco: Never   Substance and Sexual Activity    Alcohol use: No    Drug use: No    Sexual activity: Not Currently     Review of Systems   Constitutional:  Positive for chills and fatigue. Negative for activity change, appetite change, diaphoresis and fever.   HENT:  Negative for congestion, dental problem, drooling, ear discharge, ear pain, facial swelling, hearing loss, mouth sores, nosebleeds, postnasal drip, rhinorrhea, sinus pressure, sneezing, sore throat, tinnitus, trouble swallowing and voice change.    Eyes:  Negative for photophobia, pain, discharge, redness, itching and visual disturbance.   Respiratory:  Negative for apnea, cough, choking, chest tightness,  shortness of breath, wheezing and stridor.    Cardiovascular:  Positive for leg swelling (worsening bilateral leg swelling.). Negative for chest pain and palpitations.   Gastrointestinal:  Negative for abdominal distention, abdominal pain, anal bleeding, blood in stool, constipation, diarrhea, nausea, rectal pain and vomiting.   Endocrine: Negative for cold intolerance, heat intolerance, polydipsia, polyphagia and polyuria.   Genitourinary:  Negative for decreased urine volume, difficulty urinating, dyspareunia, dysuria, enuresis, flank pain, frequency, genital sores, hematuria, menstrual problem, pelvic pain, urgency, vaginal bleeding, vaginal discharge and vaginal pain.   Musculoskeletal:  Negative for arthralgias, back pain, gait problem, joint swelling, myalgias, neck pain and neck stiffness.   Skin:  Negative for color change, pallor, rash and wound.   Allergic/Immunologic: Negative for environmental allergies, food allergies and immunocompromised state.   Neurological:  Negative for dizziness, tremors, seizures, syncope, facial asymmetry, speech difficulty, weakness, light-headedness, numbness and headaches.   Hematological:  Negative for adenopathy. Does not bruise/bleed easily.   Psychiatric/Behavioral:  Negative for agitation, behavioral problems, confusion, decreased concentration, dysphoric mood, hallucinations, self-injury, sleep disturbance and suicidal ideas. The patient is not nervous/anxious and is not hyperactive.      Objective:     Vital Signs (Most Recent):  Temp: 98.2 °F (36.8 °C) (07/14/24 2330)  Pulse: 100 (07/15/24 0031)  Resp: 20 (07/14/24 2330)  BP: (!) 100/54 (07/15/24 0107)  SpO2: 96 % (07/15/24 0031) Vital Signs (24h Range):  Temp:  [97.9 °F (36.6 °C)-98.2 °F (36.8 °C)] 98.2 °F (36.8 °C)  Pulse:  [] 100  Resp:  [16-20] 20  SpO2:  [95 %-99 %] 96 %  BP: ()/(50-69) 100/54     Weight: 67.1 kg (148 lb)  Body mass index is 27.07 kg/m².     Physical Exam  Constitutional:        General: She is not in acute distress.     Appearance: She is well-developed. She is ill-appearing. She is not diaphoretic.   HENT:      Head: Normocephalic and atraumatic.      Right Ear: External ear normal.      Left Ear: External ear normal.      Nose: Nose normal.      Mouth/Throat:      Pharynx: No oropharyngeal exudate.   Eyes:      General: Scleral icterus present.      Pupils: Pupils are equal, round, and reactive to light.   Neck:      Thyroid: No thyromegaly.      Vascular: No JVD.      Trachea: No tracheal deviation.   Pulmonary:      Effort: Pulmonary effort is normal.      Comments: Unlabored respiration with normal chest movement with breathing.   Abdominal:      Palpations: Abdomen is soft.      Tenderness: There is no abdominal tenderness.      Comments: No TTP per nurse's palpation    Musculoskeletal:      Cervical back: Neck supple.      Right lower leg: Edema present.      Left lower leg: Edema present.      Comments: 2+ bilateral lower extremity swelling up to mid thighs.    Skin:     General: Skin is dry.      Coloration: Skin is jaundiced. Skin is not pale.      Findings: No erythema or rash.   Neurological:      Mental Status: She is alert and oriented to person, place, and time.      Cranial Nerves: No cranial nerve deficit.      Motor: No abnormal muscle tone.      Coordination: Coordination normal.      Deep Tendon Reflexes: Reflexes are normal and symmetric. Reflexes normal.      Comments: Mild tremors of upper extremities    Psychiatric:         Behavior: Behavior normal.         Thought Content: Thought content normal.         Judgment: Judgment normal.      Comments: Flat affect             CRANIAL NERVES     CN III, IV, VI   Pupils are equal, round, and reactive to light.       Significant Labs: All pertinent labs within the past 24 hours have been reviewed.  Recent Lab Results  (Last 5 results in the past 24 hours)        07/14/24  2334   07/14/24  2237   07/14/24  2226    07/14/24 2104 07/14/24 2036        Allens Test         N/A       Appearance, UA   Hazy             Bacteria, UA   Rare             Bilirubin (UA)   1+  Comment: Positive urine bilirubin is not confirmed. Correlate with   serum bilirubin and clinical presentation.               BILIRUBIN TOTAL     6.1  Comment: For infants and newborns, interpretation of results should be based  on gestational age, weight and in agreement with clinical  observations.    Premature Infant recommended reference ranges:  Up to 24 hours.............<8.0 mg/dL  Up to 48 hours............<12.0 mg/dL  3-5 days..................<15.0 mg/dL  6-29 days.................<15.0 mg/dL             Site         Other       Color, UA   Yellow             Glucose, UA   Negative             Hyaline Casts, UA   42             Ketones, UA   Negative             Leukocyte Esterase, UA   Trace             Lipase     14           Microscopic Comment   SEE COMMENT  Comment: Other formed elements not mentioned in the report are not   present in the microscopic examination.                NITRITE UA   Negative             Blood, UA   Negative             pH, UA   5.0             POC Lactate         2.23       Protein, UA   Negative  Comment: Recommend a 24 hour urine protein or a urine   protein/creatinine ratio if globulin induced proteinuria is  clinically suspected.               RBC, UA   2             Sample         VENOUS       SARS-CoV-2 RNA, Amplification, Qual Negative  Comment: This test utilizes isothermal nucleic acid amplification technology   to   detect the SARS-CoV-2 RdRp nucleic acid segment. The analytical   sensitivity   (limit of detection) is 500 copies/swab.    A POSITIVE result is indicative of the presence of SARS-CoV-2 RNA;   clinical   correlation with patient history and other diagnostic information is   necessary to determine patient infection status.    A NEGATIVE result means that SARS-CoV-2 nucleic acids are not present   above    the limit of detection. A NEGATIVE result should be treated as   presumptive.   It does not rule out the possibility of COVID-19 and should not be   the sole   basis for treatment decisions.    If COVID-19 is strongly suspected based on clinical and exposure   history,   re-testing using an alternate molecular assay should be considered.    This test is Food and Drug Administration (FDA) approved. Performance   characteristics of this has been independently verified by [facility] Department of Pathology and Laboratory Medicine.                 Spec Grav UA   1.010             Specimen UA   Urine, Catheterized             Squam Epithel, UA   3             Troponin I     <0.006  Comment: The reference interval for Troponin I represents the 99th percentile   cutoff   for our facility and is consistent with 3rd generation assay   performance.     <0.006  Comment: The reference interval for Troponin I represents the 99th percentile   cutoff   for our facility and is consistent with 3rd generation assay   performance.           WBC, UA   9                                    Significant Imaging: I have reviewed all pertinent imaging results/findings within the past 24 hours.   Assessment/Plan:     Hypotension  -likely from intravascular volume depletion vs sepsis   -hypotension resolved at home after drinking water and smoothies   -afebrile in ED with SBP 90s-110  -have leukocytosis WBC 32 compared to 27 on 7/12/24 and bilirubin 5.8 compared to 5.2  -elevated lactate 2.23 with background liver disease   -does not appear toxic, neurovascular intact and abdomen benign   -given immunosuppressive status will continue empiric zosyn pending blood cultures  -trend lactate and check procalcitonin with morning labs   -trend WBC and LFTs   -follow up with blood cultures         Pancreatic adenocarcinoma  - history of metastatic pancreatic adenocarcinoma to liver/lung who follows with Dr. Briceño. She finished  chemo(gemcitabine + nab-paclitaxel) and is expected to start new regimen soon due to progression (5-FU and Onyvide).   -recent ERCP x 2 on 7/5 and 7/10/24 showed multiple malignant biliary strictures s/p metal stents       Hyponatremia  Patient has hyponatremia which is worsening,We will aim to correct the sodium by 4-6mEq in 24 hours. We will monitor sodium Daily. The hyponatremia is due to intravascular volume depletion. We will obtain the following studies: Urine sodium, urine osmolality, serum osmolality, AM cortisol, or TSH, T4. We will treat the hyponatremia with Fluid restriction of:  1.5 liter per day. The patient's sodium results have been reviewed and are listed below.  Recent Labs   Lab 07/14/24 2021   *       Hypoalbuminemia due to protein-calorie malnutrition  -due to underlying pancreatic cancer and treatment   -boosts with meals   -nutrition consult       Bilateral lower extremity edema  -chronic in nature with gradual worsening   -patient reports swelling went down compared to at home   -likely due to decreased intravascular oncotic pressure from hypoalbuminemia   -keep legs elevated and compressive stockings       Normocytic anemia  -likely associated with underlying pancreatic cancer and treatment   -no signs of bleeding   -check iron panel, vitamin B12, folate, FOBT   -transfuse PRBC prn Hgb < 7          VTE Risk Mitigation (From admission, onward)           Ordered     IP VTE HIGH RISK PATIENT  Once         07/15/24 0125     Place sequential compression device  Until discontinued         07/15/24 0125                         The attending portion of this evaluation, treatment, and documentation was performed per Ed Bryant DO via Telemedicine AudioVisual using the secure Med[employer] DX software platform with 2 way audio/video. The provider was located off-site and the patient is located in the hospital. The aforementioned video software was utilized to document the relevant history and physical  exam            Ed Bryant DO  Department of Hospital Medicine   [facility] - Oncology ([facility])

## 2024-07-15 NOTE — PROVIDER PROGRESS NOTES - EMERGENCY DEPT.
Encounter Date: 7/14/2024    ED Physician Progress Notes        Physician Note:   Signout Note  I received signout from the previous providers.     Chief complaint:  Leg Swelling (BLE edema. Reported low B/P at home. )      Per sign out and chart review: Deepali Alex is a 79 y.o. female presented with hypertension in setting of pancreatic adenocarcinoma    During ED stay there was concern for sepsis and patient was given antibiotics and started on sepsis protocol..    Pt signed out to me pending:  ICU and hematology oncology eval    Update/ Disposition:  Patient was admitted by hematology oncology and patient remained stable in the emergency department.    Patient, caregiver and/or family understands the plan and verbalized agreement. All questions answered.     Diagnostic Impression:    Leukocytosis, unspecified type  (primary encounter diagnosis)  Hypotension  Chest pain  Leg swelling  Sepsis, due to unspecified organism, unspecified whether acute organ dysfunction present     ED Disposition Condition    Admit

## 2024-07-15 NOTE — ED PROVIDER NOTES
Encounter Date: 7/14/2024       History     Chief Complaint   Patient presents with    Leg Swelling     BLE edema. Reported low B/P at home.      HPI      79-year-old female significant medical history of pancreatic adenocarcinoma=, sepsis, hyperlipidemia, diverticulosis presenting for hypotension.      Patient was monitoring her blood pressures and in accordance with her discharge instructions when her blood pressure was noted to be 70s systolic.  She denies associated shortness of breath, nausea, vomiting, cough, chest pain, generalized malaise or fatigue.  She notes mild decreased appetite.  She felt dehydrated this morning so she drank multiple glasses of water and had multiple smoothies in an attempt to raise her blood pressure at home.  She also notes progressive worsening lower extremity edema.  She noted mild edema last week, however in the last 48 hours her legs have swollen up to her mid thighs.    On chart review, patient was recently admitted to the Heme-Onc service 7/2 through 07/12/2024.  She was admitted for fever and abdominal pain setting of metastatic pancreatic adenocarcinoma, had multiple ERCPs for elevated total bilirubin, was discharged on Cipro Flagyl with plan to discuss further with palliative care and outpatient discussion of further management.    Review of patient's allergies indicates:   Allergen Reactions    Erythromycin (bulk)      Other reaction(s): Eye irritation     Past Medical History:   Diagnosis Date    Age-related osteoporosis without current pathological fracture     Atherosclerosis of aortic arch     - noted on CXR 8/2017    Back pain     Diverticulosis of sigmoid colon 10/22/2019    Ectopic pregnancy     Fibrocystic breast     Hyperlipidemia     Inguinal hernia     Osteopenia     Polyp of ascending colon 10/22/2019    Sepsis 7/2/2024    Urinary incontinence, mixed 05/24/2022     Past Surgical History:   Procedure Laterality Date    ECTOPIC PREGNANCY SURGERY      ERCP N/A  7/5/2024    Procedure: ERCP (ENDOSCOPIC RETROGRADE CHOLANGIOPANCREATOGRAPHY);  Surgeon: Chance Mccann MD;  Location: [facility] ([facility]  Service: Endoscopy;  Laterality: N/A;    ERCP N/A 7/10/2024    Procedure: ERCP (ENDOSCOPIC RETROGRADE CHOLANGIOPANCREATOGRAPHY);  Surgeon: Chance Mccann MD;  Location: [facility] ([facility]);  Service: Endoscopy;  Laterality: N/A;    HERNIA REPAIR      left inguinal    HYSTERECTOMY  1980    without BSO     Family History   Problem Relation Name Age of Onset    Breast cancer Mother  69    Seizures Mother      Heart disease Father      Heart attack Father      Depression Father      Breast cancer Sister Monika 73    Breast cancer Maternal Aunt Kathleen 73    Prostate cancer Brother Lamine 81        no mets    Obesity Brother Lamine     Heart failure Maternal Grandmother      Heart attack Maternal Grandfather      Suicide Attempts Paternal Grandmother      Depression Paternal Grandmother      Mental illness Paternal Grandmother      Depression Paternal Aunt Jenniffer     Heart attack Paternal Uncle [place]     Depression Paternal Uncle Leodan     Cancer Maternal Aunt Rosio 83        blood cancer (leukemia?)    Cancer Maternal Uncle Bill         unk type; was COD    Suicide Paternal Cousin      Tongue cancer Paternal Cousin Jimi         smoking hx unk    Ovarian cancer Neg Hx       Social History     Tobacco Use    Smoking status: Never    Smokeless tobacco: Never   Substance Use Topics    Alcohol use: No    Drug use: No     Review of Systems  See HPI for pertinent ROS.   Physical Exam     Initial Vitals [07/14/24 1844]   BP Pulse Resp Temp SpO2   (!) 95/50 84 16 97.9 °F (36.6 °C) 97 %      MAP       --         Physical Exam    Constitutional: She appears well-developed and well-nourished.   HENT:   Head: Normocephalic and atraumatic.   Eyes: No scleral icterus.   Pale conjunctiva    Neck: No JVD present.   Cardiovascular:  Normal rate, regular rhythm and normal heart sounds.     Exam  reveals no gallop and no friction rub.       No murmur heard.  Pulmonary/Chest: Breath sounds normal. No stridor. She has no wheezes. She has no rhonchi. She has no rales.   Abdominal: Abdomen is soft. There is abdominal tenderness (RUQ TTP). There is no rebound and no guarding.   Musculoskeletal:         General: Edema (+3 pitting edema to bilateral thighs) present. No tenderness.     Neurological: She is alert.   Skin: Skin is warm. Capillary refill takes less than 2 seconds. There is pallor.   Jaundiced    Psychiatric: She has a normal mood and affect.         ED Course   Procedures  Labs Reviewed   CBC W/ AUTO DIFFERENTIAL - Abnormal; Notable for the following components:       Result Value    WBC 31.83 (*)     RBC 2.55 (*)     Hemoglobin 7.0 (*)     Hematocrit 21.8 (*)     RDW 25.1 (*)     Immature Granulocytes 1.9 (*)     Gran # (ANC) 27.2 (*)     Immature Grans (Abs) 0.61 (*)     Mono # 2.3 (*)     Gran % 85.6 (*)     Lymph % 3.8 (*)     All other components within normal limits   COMPREHENSIVE METABOLIC PANEL - Abnormal; Notable for the following components:    Sodium 125 (*)     Chloride 90 (*)     Glucose 117 (*)     BUN 26 (*)     Calcium 8.2 (*)     Total Protein 5.3 (*)     Albumin 1.8 (*)     Total Bilirubin 5.8 (*)     Alkaline Phosphatase 458 (*)     AST 50 (*)     All other components within normal limits   ISTAT LACTATE - Abnormal; Notable for the following components:    POC Lactate 2.23 (*)     All other components within normal limits   CULTURE, BLOOD   CULTURE, BLOOD   TROPONIN I   B-TYPE NATRIURETIC PEPTIDE   MAGNESIUM   URINALYSIS, REFLEX TO URINE CULTURE   BILIRUBIN, TOTAL   LIPASE   TROPONIN I          Imaging Results              X-Ray Chest 1 View (In process)                      Medications   piperacillin-tazobactam (ZOSYN) 4.5 g in D5W 100 mL IVPB (MB+) (has no administration in time range)   oxyCODONE immediate release tablet 5 mg (has no administration in time range)     Medical  Decision Making  79-year-old female significant medical history of pancreatic adenocarcinoma with recen discharge 2 days ago from the hematology oncology service for sepsis on Cipro Flagyl presenting for hypotension at home and new onset lower extremity edema.  Blood pressures 70s systolic at home.  Workup notable for +3 pitting edema in bilateral mid thighs.  Workup notable for uptrending leukocytosis at 31.8, worsening hyponatremia at 125, sustained an elevated alk-phos and AST elevations.  Total bilirubin 5.8, mild uptrend from previous.  Lactate with mild elevation indicating tissue hypoperfusion vs sepsis. Hemoglobin 7.0 at the cusp of transfusion.  However given concern for fluid overload status currently deferring transfusion to admitting team.  BNP reassuring patient not acutely in CHF exacerbation.  Ordered ED sepsis order set, Bcx, and started on IV Zosyn.  Discussed patient with heme/onc who requested ICU evaluation.  Discussed with ICU who recommends admission to heme/onc service who accepted patient for admission ultimately.       Ddx includes: sepsis, CHF exacerbation, COPD, PNA, viral URI, UTI     See ED course for personal interpretation of workup/ differential.     This patient does not have evidence of infective focus  My overall impression is sepsis.  Source: Unknown  Antibiotics given- Antibiotics (72h ago, onward)    Start     Stop Route Frequency Ordered    07/14/24 2130  piperacillin-tazobactam (ZOSYN) 4.5 g in D5W 100 mL IVPB   (MB+)         07/15/24 0929 IV ED 1 Time 07/14/24 2129      Latest lactate reviewed-  Lab             07/14/24 2036          POCLAC       2.23*         Organ dysfunction indicated by Acute liver injury    Fluid challenge Contraindicated- Fluid bolus is contraindicated in this patient due to Volume overload due to- hepatic failure     Post- resuscitation assessment No - Post resuscitation assessment not needed       Will Not start Pressors-  Levophed for MAP of 65  Source control achieved by: IV zosyn               Amount and/or Complexity of Data Reviewed  Independent Historian: spouse  Labs: ordered. Decision-making details documented in ED Course.  Radiology: ordered.  ECG/medicine tests: independent interpretation performed. Decision-making details documented in ED Course.    Risk  Prescription drug management.  Decision regarding hospitalization.               ED Course as of 07/14/24 2234   Sun Jul 14, 2024   1851 EKG 12-lead  No STEMI. [LP]   1955 EKG 12-lead  EKG independently interpreted by me shows normal sinus rhythm, rate 82, no STEMI, flipped T-waves inferiorly, which is new when compared to 07/01/2024 [BD]   2156 CBC auto differential(!) [KB]   2231 ATTENDING PHYSICIAN ATTESTATION    I have repeated the key portions of the resident's history and physical face to face with the patient, reviewed and agree with the resident medical documentation except as noted below, and supervised and managed the medical care of the patient.  Procedure: Critical Care  Please put in 30 minutes of critical care due to patient having a high risk of neurological failure from worsening hyponatremia. Fluid restricting. Also sepsis workup with broad-spectrum abx..            Mike Garcia MD  Department of Emergency Medicine   [BD]      ED Course User Index  [BD] Mike Garcia MD  [KB] Melvina Yusuf MD  [LP] Dimas Durand III, MD                           Clinical Impression:  Final diagnoses:  [I95.9] Hypotension  [R07.9] Chest pain  [M79.89] Leg swelling  [D72.829] Leukocytosis, unspecified type (Primary)  [A41.9] Sepsis, due to unspecified organism, unspecified whether acute organ dysfunction present                 Melvina Yusuf MD  Resident  07/14/24 2234       Melvina Yusuf MD  Resident  07/14/24 2236

## 2024-07-15 NOTE — ED NOTES
Telemetry Verification   Patient placed on Telemetry Box  Verified with War Room  Box # [id number]       Rate 96   Rhythm NS

## 2024-07-15 NOTE — NURSING
Pt arrived to unit from ED, transferred to bed,VSS, afebrile, admission assessment completed, 4 eyes assessment completed, virtual admission done, no c/o N&V or pain, NAD.

## 2024-07-15 NOTE — PLAN OF CARE
[facility]  Department of Hospital Medicine  [address] [place] [address]  [phone] after hours  [phone] fax    HOSPICE  ORDERS    07/15/2024    Admit to Hospice:  Home Service     Diagnoses:   Active Hospital Problems    Diagnosis  POA    Hypoalbuminemia due to protein-calorie malnutrition [E88.09, E46]  Yes    Normocytic anemia [D64.9]  Yes    Bilateral lower extremity edema [R60.0]  Yes    Hypotension [I95.9]  Yes    Hyponatremia [E87.1]  Yes     POA, Na 132  Needs daily BMP      Pancreatic adenocarcinoma [C25.9]  Yes      Resolved Hospital Problems   No resolved problems to display.       Hospice Qualifying Diagnoses:        Patient has a life expectancy < 6 months due to:  Primary Hospice Diagnosis:  Metastatic Pancreatic Cancer  Comorbid Conditions Contributing to Decline:  Liver failure  Vital Signs: Routine per Hospice Protocol.    Code Status: Full    Allergies:   Review of patient's allergies indicates:   Allergen Reactions    Erythromycin (bulk)      Other reaction(s): Eye irritation       Diet: Regular     Activities: As tolerated    Goals of Care Treatment Preferences:  Code Status: Full Code    Living Will: Yes     What is most important right now is to focus on symptom/pain control, quality of life, even if it means sacrificing a little time, improvement in condition but with limits to invasive therapies.  Accordingly, we have decided that the best plan to meet the patient's goals includes continuing with treatment.      Nursing: Per Hospice Routine.        Medication List        CONTINUE taking these medications      ciprofloxacin HCl 500 MG tablet  Commonly known as: CIPRO  Take 1 tablet (500 mg total) by mouth 2 (two) times daily. for 7 days     metroNIDAZOLE 500 MG tablet  Commonly known as: FLAGYL  Take 1 tablet (500 mg total) by mouth 3 (three) times daily. for 7 days     oxyCODONE 5 MG immediate release tablet  Commonly known as:  ROXICODONE  Take 1 tablet (5 mg total) by mouth every 4 (four) hours as needed for Pain.     promethazine 12.5 MG Tab  Commonly known as: PHENERGAN  Take 1 tablet (12.5 mg total) by mouth every 6 (six) hours as needed for Nausea.            STOP taking these medications      OLANZapine 5 MG tablet  Commonly known as: ZyPREXA                  Future Orders:  Hospice Medical Director may dictate new orders for comfortable care measures & sign death certificate.        _________________________________  Simon Rooney MD  07/15/2024

## 2024-07-15 NOTE — NURSING
Left general message for pt that she is due for repeat imaging at this time and [facility] will obtain authorization from insurance and call her once they have the approval. She may then call central scheduling at [phone] to arrange.  Told to c/b if she has fu Patient is discharged home will pick a hospice company on her own.  Respirations even and unlabored.    Road Test: O:  no oxygen in use pulse ox 94% A:   ambulate with assistance , D:  rt chest port deaccessed and left antecubital iv discontinued. T:  last bowel movement 7/15/24, voiding without difficulty, E:  tolerating regular diet without difficulty, S:  need assistance with adl's, T:  discharge teaching done.

## 2024-07-15 NOTE — PLAN OF CARE
No acute events this shift, pt aaox4, VSS, afebrile, pt ambulates independently,  at bedside, frequent rounds performed, I/O recorded, pt in bed resting, call light in reach, NAD, safety maintained.

## 2024-07-15 NOTE — HPI
Deepali Alex  is a 79-year-old female with history of metastatic pancreatic adenocarcinoma to liver/lung who follows with Dr. Briceño. She finished chemo(gemcitabine + nab-paclitaxel) and is expected to start new regimen soon due to progression (5-FU and Onyvide). Patient was recently admitted to medical oncology service from 7/1/24-7/12/24 for sepsis with presumed source of cholangitis (fever, abdominal pain, bilirubinemia and leukocytosis. She was empirically treated with IV zosyn in hospital and discharged home on ciprofloxacin + flagyl. Blood cultures were negative. She had ERCP x 2 on (7/5/24, 7/10/24) by Dr. Mccann from [facility] which showed multiple malignant biliary strictures s/p biliary sphincterotomy and metal stents placement.     Patient presented to ED today for evaluation of hypotension during blood pressure check at home.     Patient was monitoring her blood pressures in accordance with her discharge instructions and she noticed her blood pressure was noted to be in 70s systolic.  She endorsed fatigue and chills, but denies fever, shortness of breath, nausea, vomiting, abdominal pain, diarrhea,  cough, chest pain, dysuria, hematuria, focal weakness/numbness, dark stool or bleeding from other sites.   She notes mild decreased appetite.  She felt dehydrated this morning so she drank multiple glasses of water and had multiple smoothies in an attempt to raise her blood pressure at home. Patient states that after drinking water and smoothies her systolic blood pressure came up to 88 and she felt better.  She also notes progressive worsening lower extremity edema.  She noted mild edema last week, however in the last 48 hours her legs have swollen up to her mid thighs.       In ED patient is afebrile and HDS.  Workup notable for +3 pitting edema in bilateral mid thighs. Uptrending leukocytosis at 32 compared to 27 on 7/12/24 , Hgb 7 compared to 7.2, worsening hyponatremia at 125 compared to 130, Total  bilirubin 5.8 compared to 5.2, low albumin 1.8.  Lactate elevated to 2.23. blood cultures sent. Patient received empiric dose of IV zosyn in ED. No IVF given as patient is normotensive and has worsening bilateral leg swelling.

## 2024-07-15 NOTE — ASSESSMENT & PLAN NOTE
-chronic in nature with gradual worsening   -patient reports swelling went down compared to at home   -likely due to decreased intravascular oncotic pressure from hypoalbuminemia   -keep legs elevated and compressive stockings

## 2024-07-15 NOTE — PLAN OF CARE
Plan of care reviewed with patient and patient's sister.  Fall precautions maintained, side rails up x2, call light in reach bed in low position and locked, nonskid socks on.  Patient tolerating a regular diet without difficulty.  Voiding without difficulty.  Awaiting a pending stool specimen.  Patient requested senna , will continue to monitor.

## 2024-07-15 NOTE — PROGRESS NOTES
Admit Assessment    Patient Identification: Deepali Alex   :  1945  Admit Date:  2024  Attending Provider:  Ntae Elliott MD              Referral:   Patient was admitted to [facility] with a diagnosis of Metastatic Pancreatic Cancer and was admitted this hospital stay due to Leg swelling [M79.89]  Chest pain [R07.9]  Hypotension [I95.9]  Leukocytosis, unspecified type [D72.829]  Sepsis, due to unspecified organism, unspecified whether acute organ dysfunction present [A41.9].       is involved was referred to the Social Work Department via routine referral. Patient presents as a 79 y.o. year old  female.    Persons interviewed: Patient    Living Situation:    Patient resides with her spouse in a one story home with no steps to enter.     Resides at:  [address] [id number]   Phone: [phone] (Home)       (RETIRED) Functional Status Prior  Ambulation Prior: 0-->independent  Transferrin-->independent  Toiletin-->independent    Current or Past Agencies and Description of Services/Supplies    DME  Agency Name: N/A     Home Health  Agency Name: N/A    IV Infusion  Agency Name: N/A    Nutrition: Oral    Outpatient Pharmacy:     [id number] - [place] -  [address]  [address]  [facility] [address]  Phone: [phone] Fax: [phone]    [id number] - [place] - [address] AT [facility]  [address]  [facility] [address]  Phone: [phone] Fax: [phone]    Patient Preference of agencies include: Patient has no agency preference.     Patient/Caregiver informed of right to choose providers or agencies.  Patient provides permission to release any necessary information to [facility] and to Non[facility] agencies as needed to facilitate patient care, treatment planning, and patient discharge planning.  Written and verbal resources provided.    Coping  Patient reports overall  she has been doing her best to  cope.    Adjustment to Diagnosis and Treatment  Patient reports she is also doing her best to adjust.     Emotional/Behavioral/Cognitive Issues  Patient reports no emotional, behavioral or cognitive issues at this time.     History/Current Symptoms of Anxiety/Depression: No    History/Current Substance Use:   Social History     Tobacco Use    Smoking status: Never    Smokeless tobacco: Never   Substance and Sexual Activity    Alcohol use: No    Drug use: No    Sexual activity: Not Currently     Indications of Abuse/Neglect: No  Abuse Screen (yes response referral indicated)  Feels Unsafe at Home or Work/School: no  Feels Threatened by Someone: no  Does anyone try to keep you from having contact with others or doing things outside your home?: no  Physical Signs of Abuse Present: no    Financial:  Payer/Plan Subscr  Sex Relation Sub. Ins. ID Effective Group Num   1. [employer]* PO [place]* 1945 Female Self [id number]                                   [address] [address]      Other identified concerns/needs: Home Hospice    Plan: Home Hospice    Interventions/Referrals: TBD    Patient/caregiver engaged in treatment planning process.     providing psychosocial and supportive counseling, resources, education, assistance and discharge planning as appropriate.  Patient/caregiver state understanding of  available resources,  following, remains available.    MALATHI Gloria, [facility]  Oncology Social Worker   [facility] - Oncology  [phone]

## 2024-07-15 NOTE — SUBJECTIVE & OBJECTIVE
Past Medical History:   Diagnosis Date    Age-related osteoporosis without current pathological fracture     Atherosclerosis of aortic arch     - noted on CXR 8/2017    Back pain     Diverticulosis of sigmoid colon 10/22/2019    Ectopic pregnancy     Fibrocystic breast     Hyperlipidemia     Inguinal hernia     Osteopenia     Polyp of ascending colon 10/22/2019    Sepsis 7/2/2024    Urinary incontinence, mixed 05/24/2022       Past Surgical History:   Procedure Laterality Date    ECTOPIC PREGNANCY SURGERY      ERCP N/A 7/5/2024    Procedure: ERCP (ENDOSCOPIC RETROGRADE CHOLANGIOPANCREATOGRAPHY);  Surgeon: Chance Mccann MD;  [facility]: [facility] ([facility]  Service: Endoscopy;  Laterality: N/A;    ERCP N/A 7/10/2024    Procedure: ERCP (ENDOSCOPIC RETROGRADE CHOLANGIOPANCREATOGRAPHY);  Surgeon: Chance Mccann MD;  [facility]  Service: Endoscopy;  Laterality: N/A;    HERNIA REPAIR      left inguinal    HYSTERECTOMY  1980    without BSO       Review of patient's allergies indicates:   Allergen Reactions    Erythromycin (bulk)      Other reaction(s): Eye irritation       Family History       Problem Relation (Age of Onset)    Breast cancer Mother (69), Sister (73), Maternal Aunt (73)    Cancer Maternal Aunt (83), Maternal Uncle    Depression Father, Paternal Grandmother, Paternal Aunt, Paternal Uncle    Heart attack Father, Maternal Grandfather, Paternal Uncle    Heart disease Father    Heart failure Maternal Grandmother    Mental illness Paternal Grandmother    Obesity Brother    Prostate cancer Brother (81)    Seizures Mother    Suicide Paternal Cousin    Suicide Attempts Paternal Grandmother    Tongue cancer Paternal Cousin          Tobacco Use    Smoking status: Never    Smokeless tobacco: Never   Substance and Sexual Activity    Alcohol use: No    Drug use: No    Sexual activity: Not Currently      Review of Systems   Constitutional:  Positive for fatigue and fever. Negative for diaphoresis.    Respiratory:  Negative for shortness of breath and wheezing.    Cardiovascular:  Positive for leg swelling. Negative for chest pain.   Gastrointestinal:  Negative for abdominal distention, abdominal pain, constipation, diarrhea, nausea and vomiting.   Neurological:  Negative for speech difficulty and weakness.     Objective:     Vital Signs (Most Recent):  Temp: 97.9 °F (36.6 °C) (07/14/24 1844)  Pulse: 75 (07/14/24 2132)  Resp: 18 (07/14/24 2132)  BP: (!) 112/58 (07/14/24 2132)  SpO2: 99 % (07/14/24 2132) Vital Signs (24h Range):  Temp:  [97.9 °F (36.6 °C)] 97.9 °F (36.6 °C)  Pulse:  [75-84] 75  Resp:  [16-20] 18  SpO2:  [97 %-99 %] 99 %  BP: ()/(50-61) 112/58   Weight: 67.1 kg (148 lb)  Body mass index is 27.07 kg/m².    No intake or output data in the 24 hours ending 07/14/24 2213       Physical Exam  Constitutional:       General: She is not in acute distress.     Appearance: She is ill-appearing. She is not toxic-appearing or diaphoretic.   HENT:      Head: Normocephalic and atraumatic.   Cardiovascular:      Rate and Rhythm: Normal rate and regular rhythm.      Heart sounds: Murmur heard.   Pulmonary:      Effort: No respiratory distress.      Breath sounds: No wheezing.   Abdominal:      General: There is no distension.   Musculoskeletal:      Right lower leg: Edema present.      Left lower leg: Edema present.   Neurological:      General: No focal deficit present.      Mental Status: She is oriented to person, place, and time. Mental status is at baseline.   Psychiatric:         Mood and Affect: Mood normal.         Behavior: Behavior normal.            Vents:     Lines/Drains/Airways       Central Venous Catheter Line  Duration                  PowerPort A Cath Single Lumen 10/25/23 1350 Atrial Right 263 days                  Significant Labs:    CBC/Anemia Profile:  Recent Labs   Lab 07/14/24 2021   WBC 31.83*   HGB 7.0*   HCT 21.8*      MCV 86   RDW 25.1*        Chemistries:  Recent Labs    Lab 07/14/24 2021   *   K 4.0   CL 90*   CO2 25   BUN 26*   CREATININE 0.9   CALCIUM 8.2*   ALBUMIN 1.8*   PROT 5.3*   BILITOT 5.8*   ALKPHOS 458*   ALT 27   AST 50*   MG 2.0       All pertinent labs within the past 24 hours have been reviewed.    Significant Imaging: I have reviewed all pertinent imaging results/findings within the past 24 hours.

## 2024-07-16 ENCOUNTER — PATIENT OUTREACH (OUTPATIENT)
Dept: ADMINISTRATIVE | Facility: CLINIC | Age: 79
End: 2024-07-16
Payer: MEDICARE

## 2024-07-16 LAB
BACTERIA BLD CULT: NORMAL
BACTERIA BLD CULT: NORMAL

## 2024-07-16 NOTE — ANESTHESIA POSTPROCEDURE EVALUATION
Anesthesia Post Evaluation    Patient: Deepali Alex    Procedure(s) Performed: Procedure(s) (LRB):  ERCP (ENDOSCOPIC RETROGRADE CHOLANGIOPANCREATOGRAPHY) (N/A)    Final Anesthesia Type: general      Patient location during evaluation: PACU  Patient participation: Yes- Able to Participate  Level of consciousness: awake and alert and oriented  Post-procedure vital signs: reviewed and stable  Pain management: adequate  Airway patency: patent    PONV status at discharge: No PONV  Anesthetic complications: no      Cardiovascular status: hemodynamically stable  Respiratory status: unassisted, spontaneous ventilation and room air  Hydration status: euvolemic  Follow-up not needed.          Vital signs stable, no issues at this time.  Event Time   Out of Recovery 07/10/2024 12:01:00         Pain/Regina Score: Pain Rating Prior to Med Admin: 2 (7/15/2024  6:24 AM)  Pain Rating Post Med Admin: 3 (7/15/2024  7:24 AM)

## 2024-07-16 NOTE — PROGRESS NOTES
Patient selected [facility] hospice. Orders sent to [facility].

## 2024-07-20 LAB
BACTERIA BLD CULT: NORMAL
BACTERIA BLD CULT: NORMAL

## 2025-04-01 NOTE — HOSPITAL COURSE
Admitted to med onc for hypotension in the setting of cholangitis. BP normalized here without intervention. We discussed that her bilirubin and her WBC have uptrended since discharge despite PO antibiotics. We discussed that her clinical picture of cholangitis is likely secondary to disease progression and that unfortunately, we do not have any further options for systemic therapy. We had a GOC discussion and she decided to go with home hospice. Plan discussed with patient and/or family. Patient and/or family are in agreement with plan, verbalized understanding, and all questions were answered.       Vitals:    07/15/24 1118 07/15/24 1124 07/15/24 1457 07/15/24 1523   BP:  (!) 92/58  (!) 94/56   BP Location:       Patient Position:       Pulse: 77 76 93 89   Resp:  18  18   Temp:  98 °F (36.7 °C)  98.4 °F (36.9 °C)   TempSrc:  Oral  Oral   SpO2:  (!) 92%  (!) 94%   Weight:       Height:            
03-Mar-2025